# Patient Record
Sex: FEMALE | Race: WHITE | NOT HISPANIC OR LATINO | Employment: OTHER | URBAN - METROPOLITAN AREA
[De-identification: names, ages, dates, MRNs, and addresses within clinical notes are randomized per-mention and may not be internally consistent; named-entity substitution may affect disease eponyms.]

---

## 2017-01-01 ENCOUNTER — HOSPITAL ENCOUNTER (EMERGENCY)
Facility: MEDICAL CENTER | Age: 77
End: 2017-08-18
Payer: MEDICARE

## 2017-01-01 ENCOUNTER — APPOINTMENT (OUTPATIENT)
Dept: RADIOLOGY | Facility: MEDICAL CENTER | Age: 77
DRG: 441 | End: 2017-01-01
Attending: EMERGENCY MEDICINE
Payer: MEDICARE

## 2017-01-01 ENCOUNTER — APPOINTMENT (OUTPATIENT)
Dept: WOUND CARE | Facility: MEDICAL CENTER | Age: 77
End: 2017-01-01
Attending: NURSE PRACTITIONER
Payer: MEDICARE

## 2017-01-01 ENCOUNTER — APPOINTMENT (OUTPATIENT)
Dept: RADIOLOGY | Facility: MEDICAL CENTER | Age: 77
DRG: 563 | End: 2017-01-01
Attending: EMERGENCY MEDICINE
Payer: MEDICARE

## 2017-01-01 ENCOUNTER — PATIENT OUTREACH (OUTPATIENT)
Dept: HEALTH INFORMATION MANAGEMENT | Facility: OTHER | Age: 77
End: 2017-01-01

## 2017-01-01 ENCOUNTER — HOSPITAL ENCOUNTER (OUTPATIENT)
Dept: LAB | Facility: MEDICAL CENTER | Age: 77
End: 2017-04-04
Attending: INTERNAL MEDICINE
Payer: MEDICARE

## 2017-01-01 ENCOUNTER — APPOINTMENT (OUTPATIENT)
Dept: RADIOLOGY | Facility: MEDICAL CENTER | Age: 77
DRG: 441 | End: 2017-01-01
Attending: INTERNAL MEDICINE
Payer: MEDICARE

## 2017-01-01 ENCOUNTER — APPOINTMENT (OUTPATIENT)
Dept: RADIOLOGY | Facility: MEDICAL CENTER | Age: 77
DRG: 442 | End: 2017-01-01
Attending: STUDENT IN AN ORGANIZED HEALTH CARE EDUCATION/TRAINING PROGRAM
Payer: MEDICARE

## 2017-01-01 ENCOUNTER — RESOLUTE PROFESSIONAL BILLING HOSPITAL PROF FEE (OUTPATIENT)
Dept: HOSPITALIST | Facility: MEDICAL CENTER | Age: 77
End: 2017-01-01
Payer: MEDICARE

## 2017-01-01 ENCOUNTER — APPOINTMENT (OUTPATIENT)
Dept: NEPHROLOGY | Facility: MEDICAL CENTER | Age: 77
End: 2017-01-01
Payer: MEDICARE

## 2017-01-01 ENCOUNTER — HOSPITAL ENCOUNTER (OUTPATIENT)
Facility: MEDICAL CENTER | Age: 77
End: 2017-10-11
Attending: NURSE PRACTITIONER
Payer: MEDICARE

## 2017-01-01 ENCOUNTER — APPOINTMENT (OUTPATIENT)
Dept: RADIOLOGY | Facility: MEDICAL CENTER | Age: 77
DRG: 442 | End: 2017-01-01
Attending: EMERGENCY MEDICINE
Payer: MEDICARE

## 2017-01-01 ENCOUNTER — OFFICE VISIT (OUTPATIENT)
Dept: NEPHROLOGY | Facility: MEDICAL CENTER | Age: 77
End: 2017-01-01
Payer: MEDICARE

## 2017-01-01 ENCOUNTER — HOSPITAL ENCOUNTER (INPATIENT)
Facility: MEDICAL CENTER | Age: 77
LOS: 4 days | DRG: 563 | End: 2017-01-30
Attending: EMERGENCY MEDICINE | Admitting: INTERNAL MEDICINE
Payer: MEDICARE

## 2017-01-01 ENCOUNTER — APPOINTMENT (OUTPATIENT)
Dept: RADIOLOGY | Facility: MEDICAL CENTER | Age: 77
DRG: 442 | End: 2017-01-01
Payer: MEDICARE

## 2017-01-01 ENCOUNTER — APPOINTMENT (OUTPATIENT)
Dept: RADIOLOGY | Facility: MEDICAL CENTER | Age: 77
End: 2017-01-01
Attending: EMERGENCY MEDICINE
Payer: MEDICARE

## 2017-01-01 ENCOUNTER — HOSPITAL ENCOUNTER (INPATIENT)
Facility: MEDICAL CENTER | Age: 77
LOS: 3 days | DRG: 442 | End: 2017-04-03
Attending: EMERGENCY MEDICINE | Admitting: INTERNAL MEDICINE
Payer: MEDICARE

## 2017-01-01 ENCOUNTER — TELEPHONE (OUTPATIENT)
Dept: MEDICAL GROUP | Facility: CLINIC | Age: 77
End: 2017-01-01

## 2017-01-01 ENCOUNTER — APPOINTMENT (OUTPATIENT)
Dept: RADIOLOGY | Facility: MEDICAL CENTER | Age: 77
DRG: 433 | End: 2017-01-01
Attending: EMERGENCY MEDICINE
Payer: MEDICARE

## 2017-01-01 ENCOUNTER — HOSPITAL ENCOUNTER (INPATIENT)
Facility: MEDICAL CENTER | Age: 77
LOS: 3 days | DRG: 442 | End: 2017-04-14
Attending: EMERGENCY MEDICINE | Admitting: INTERNAL MEDICINE
Payer: MEDICARE

## 2017-01-01 ENCOUNTER — APPOINTMENT (OUTPATIENT)
Dept: RADIOLOGY | Facility: MEDICAL CENTER | Age: 77
DRG: 442 | End: 2017-01-01
Attending: INTERNAL MEDICINE
Payer: MEDICARE

## 2017-01-01 ENCOUNTER — HOSPITAL ENCOUNTER (OUTPATIENT)
Dept: LAB | Facility: MEDICAL CENTER | Age: 77
End: 2017-04-27
Attending: INTERNAL MEDICINE
Payer: MEDICARE

## 2017-01-01 ENCOUNTER — APPOINTMENT (OUTPATIENT)
Dept: RADIOLOGY | Facility: MEDICAL CENTER | Age: 77
DRG: 563 | End: 2017-01-01
Attending: INTERNAL MEDICINE
Payer: MEDICARE

## 2017-01-01 ENCOUNTER — HOSPITAL ENCOUNTER (OUTPATIENT)
Dept: LAB | Facility: MEDICAL CENTER | Age: 77
End: 2017-07-13
Attending: INTERNAL MEDICINE
Payer: MEDICARE

## 2017-01-01 ENCOUNTER — HOSPITAL ENCOUNTER (OUTPATIENT)
Dept: LAB | Facility: MEDICAL CENTER | Age: 77
End: 2017-11-28
Attending: INTERNAL MEDICINE
Payer: MEDICARE

## 2017-01-01 ENCOUNTER — TELEPHONE (OUTPATIENT)
Dept: NEPHROLOGY | Facility: MEDICAL CENTER | Age: 77
End: 2017-01-01

## 2017-01-01 ENCOUNTER — OFFICE VISIT (OUTPATIENT)
Dept: MEDICAL GROUP | Facility: CLINIC | Age: 77
End: 2017-01-01
Payer: MEDICARE

## 2017-01-01 ENCOUNTER — APPOINTMENT (OUTPATIENT)
Dept: RADIOLOGY | Facility: MEDICAL CENTER | Age: 77
DRG: 356 | End: 2017-01-01
Attending: EMERGENCY MEDICINE
Payer: MEDICARE

## 2017-01-01 ENCOUNTER — HOSPITAL ENCOUNTER (OUTPATIENT)
Dept: LAB | Facility: MEDICAL CENTER | Age: 77
End: 2017-04-03
Attending: INTERNAL MEDICINE
Payer: MEDICARE

## 2017-01-01 ENCOUNTER — HOSPITAL ENCOUNTER (OUTPATIENT)
Facility: MEDICAL CENTER | Age: 77
End: 2017-03-01
Attending: INTERNAL MEDICINE
Payer: MEDICARE

## 2017-01-01 ENCOUNTER — HOSPITAL ENCOUNTER (OUTPATIENT)
Facility: MEDICAL CENTER | Age: 77
End: 2017-09-13
Attending: INTERNAL MEDICINE
Payer: MEDICARE

## 2017-01-01 ENCOUNTER — HOSPITAL ENCOUNTER (INPATIENT)
Facility: MEDICAL CENTER | Age: 77
LOS: 2 days | DRG: 442 | End: 2017-12-22
Attending: EMERGENCY MEDICINE | Admitting: HOSPITALIST
Payer: MEDICARE

## 2017-01-01 ENCOUNTER — HOSPITAL ENCOUNTER (INPATIENT)
Facility: MEDICAL CENTER | Age: 77
LOS: 6 days | DRG: 433 | End: 2017-05-30
Attending: EMERGENCY MEDICINE | Admitting: HOSPITALIST
Payer: MEDICARE

## 2017-01-01 ENCOUNTER — APPOINTMENT (OUTPATIENT)
Dept: RADIOLOGY | Facility: MEDICAL CENTER | Age: 77
DRG: 441 | End: 2017-01-01
Attending: HOSPITALIST
Payer: MEDICARE

## 2017-01-01 ENCOUNTER — HOSPITAL ENCOUNTER (OUTPATIENT)
Dept: LAB | Facility: MEDICAL CENTER | Age: 77
End: 2017-09-12
Attending: INTERNAL MEDICINE
Payer: MEDICARE

## 2017-01-01 ENCOUNTER — HOSPITAL ENCOUNTER (OUTPATIENT)
Dept: LAB | Facility: MEDICAL CENTER | Age: 77
End: 2017-05-12
Attending: INTERNAL MEDICINE
Payer: MEDICARE

## 2017-01-01 ENCOUNTER — HOSPITAL ENCOUNTER (OUTPATIENT)
Facility: MEDICAL CENTER | Age: 77
End: 2017-10-12
Attending: INTERNAL MEDICINE
Payer: MEDICARE

## 2017-01-01 ENCOUNTER — APPOINTMENT (OUTPATIENT)
Dept: RADIOLOGY | Facility: MEDICAL CENTER | Age: 77
DRG: 441 | End: 2017-01-01
Attending: PSYCHIATRY & NEUROLOGY
Payer: MEDICARE

## 2017-01-01 ENCOUNTER — RESOLUTE PROFESSIONAL BILLING HOSPITAL PROF FEE (OUTPATIENT)
Dept: MEDSURG UNIT | Facility: MEDICAL CENTER | Age: 77
End: 2017-01-01
Payer: MEDICARE

## 2017-01-01 ENCOUNTER — HOSPITAL ENCOUNTER (OUTPATIENT)
Dept: LAB | Facility: MEDICAL CENTER | Age: 77
End: 2017-10-16
Attending: INTERNAL MEDICINE
Payer: MEDICARE

## 2017-01-01 ENCOUNTER — HOSPITAL ENCOUNTER (INPATIENT)
Facility: MEDICAL CENTER | Age: 77
LOS: 10 days | DRG: 442 | End: 2017-09-03
Attending: EMERGENCY MEDICINE | Admitting: HOSPITALIST
Payer: MEDICARE

## 2017-01-01 ENCOUNTER — NON-PROVIDER VISIT (OUTPATIENT)
Dept: WOUND CARE | Facility: MEDICAL CENTER | Age: 77
End: 2017-01-01
Attending: FAMILY MEDICINE
Payer: MEDICARE

## 2017-01-01 ENCOUNTER — HOSPITAL ENCOUNTER (EMERGENCY)
Facility: MEDICAL CENTER | Age: 77
End: 2017-11-11
Attending: EMERGENCY MEDICINE
Payer: MEDICARE

## 2017-01-01 ENCOUNTER — HOSPITAL ENCOUNTER (INPATIENT)
Facility: MEDICAL CENTER | Age: 77
LOS: 4 days | DRG: 356 | End: 2017-09-29
Attending: EMERGENCY MEDICINE | Admitting: HOSPITALIST
Payer: MEDICARE

## 2017-01-01 ENCOUNTER — HOSPITAL ENCOUNTER (OUTPATIENT)
Dept: LAB | Facility: MEDICAL CENTER | Age: 77
End: 2017-07-06
Attending: FAMILY MEDICINE
Payer: MEDICARE

## 2017-01-01 ENCOUNTER — OFFICE VISIT (OUTPATIENT)
Dept: URGENT CARE | Facility: PHYSICIAN GROUP | Age: 77
End: 2017-01-01
Payer: MEDICARE

## 2017-01-01 ENCOUNTER — HOSPITAL ENCOUNTER (INPATIENT)
Facility: MEDICAL CENTER | Age: 77
LOS: 6 days | DRG: 441 | End: 2017-08-12
Attending: EMERGENCY MEDICINE | Admitting: INTERNAL MEDICINE
Payer: MEDICARE

## 2017-01-01 ENCOUNTER — HOSPITAL ENCOUNTER (OUTPATIENT)
Facility: MEDICAL CENTER | Age: 77
End: 2017-06-14
Attending: INTERNAL MEDICINE
Payer: MEDICARE

## 2017-01-01 ENCOUNTER — HOSPITAL ENCOUNTER (OUTPATIENT)
Dept: LAB | Facility: MEDICAL CENTER | Age: 77
End: 2017-03-10
Attending: FAMILY MEDICINE
Payer: MEDICARE

## 2017-01-01 ENCOUNTER — HOSPITAL ENCOUNTER (OUTPATIENT)
Dept: LAB | Facility: MEDICAL CENTER | Age: 77
End: 2017-06-21
Attending: FAMILY MEDICINE
Payer: MEDICARE

## 2017-01-01 ENCOUNTER — APPOINTMENT (OUTPATIENT)
Dept: RADIOLOGY | Facility: IMAGING CENTER | Age: 77
End: 2017-01-01
Attending: PHYSICIAN ASSISTANT
Payer: MEDICARE

## 2017-01-01 ENCOUNTER — HOSPITAL ENCOUNTER (INPATIENT)
Facility: MEDICAL CENTER | Age: 77
LOS: 3 days | DRG: 689 | End: 2017-07-02
Attending: EMERGENCY MEDICINE | Admitting: HOSPITALIST
Payer: MEDICARE

## 2017-01-01 ENCOUNTER — HOSPITAL ENCOUNTER (OUTPATIENT)
Dept: RADIOLOGY | Facility: MEDICAL CENTER | Age: 77
End: 2017-05-19
Attending: INTERNAL MEDICINE
Payer: MEDICARE

## 2017-01-01 ENCOUNTER — HOSPITAL ENCOUNTER (INPATIENT)
Facility: MEDICAL CENTER | Age: 77
LOS: 2 days | DRG: 441 | End: 2017-10-04
Attending: EMERGENCY MEDICINE | Admitting: HOSPITALIST
Payer: MEDICARE

## 2017-01-01 ENCOUNTER — HOSPITAL ENCOUNTER (OUTPATIENT)
Dept: LAB | Facility: MEDICAL CENTER | Age: 77
End: 2017-07-05
Attending: INTERNAL MEDICINE
Payer: MEDICARE

## 2017-01-01 VITALS
DIASTOLIC BLOOD PRESSURE: 43 MMHG | HEIGHT: 60 IN | HEART RATE: 76 BPM | SYSTOLIC BLOOD PRESSURE: 99 MMHG | OXYGEN SATURATION: 93 % | RESPIRATION RATE: 18 BRPM | TEMPERATURE: 97.3 F | BODY MASS INDEX: 29.22 KG/M2 | WEIGHT: 148.81 LBS

## 2017-01-01 VITALS
BODY MASS INDEX: 31.56 KG/M2 | OXYGEN SATURATION: 97 % | SYSTOLIC BLOOD PRESSURE: 97 MMHG | HEART RATE: 60 BPM | RESPIRATION RATE: 16 BRPM | WEIGHT: 161.6 LBS | TEMPERATURE: 97.3 F | DIASTOLIC BLOOD PRESSURE: 43 MMHG

## 2017-01-01 VITALS
DIASTOLIC BLOOD PRESSURE: 36 MMHG | SYSTOLIC BLOOD PRESSURE: 91 MMHG | RESPIRATION RATE: 16 BRPM | BODY MASS INDEX: 30.25 KG/M2 | HEART RATE: 82 BPM | OXYGEN SATURATION: 94 % | HEIGHT: 60 IN | WEIGHT: 154.1 LBS | TEMPERATURE: 97.9 F

## 2017-01-01 VITALS
HEIGHT: 60 IN | RESPIRATION RATE: 20 BRPM | DIASTOLIC BLOOD PRESSURE: 56 MMHG | BODY MASS INDEX: 29.78 KG/M2 | SYSTOLIC BLOOD PRESSURE: 105 MMHG | OXYGEN SATURATION: 97 % | TEMPERATURE: 96.6 F | WEIGHT: 151.68 LBS | HEART RATE: 60 BPM

## 2017-01-01 VITALS
DIASTOLIC BLOOD PRESSURE: 50 MMHG | SYSTOLIC BLOOD PRESSURE: 90 MMHG | RESPIRATION RATE: 16 BRPM | WEIGHT: 148 LBS | BODY MASS INDEX: 29.06 KG/M2 | OXYGEN SATURATION: 100 % | TEMPERATURE: 97.1 F | HEART RATE: 84 BPM | HEIGHT: 60 IN

## 2017-01-01 VITALS
OXYGEN SATURATION: 100 % | WEIGHT: 161.82 LBS | BODY MASS INDEX: 31.77 KG/M2 | HEART RATE: 90 BPM | RESPIRATION RATE: 14 BRPM | HEIGHT: 60 IN | SYSTOLIC BLOOD PRESSURE: 114 MMHG | DIASTOLIC BLOOD PRESSURE: 43 MMHG | TEMPERATURE: 97.9 F

## 2017-01-01 VITALS
WEIGHT: 152 LBS | HEART RATE: 82 BPM | TEMPERATURE: 97.7 F | DIASTOLIC BLOOD PRESSURE: 58 MMHG | RESPIRATION RATE: 16 BRPM | OXYGEN SATURATION: 97 % | BODY MASS INDEX: 29.69 KG/M2 | SYSTOLIC BLOOD PRESSURE: 120 MMHG

## 2017-01-01 VITALS
TEMPERATURE: 98.1 F | HEIGHT: 60 IN | HEART RATE: 90 BPM | SYSTOLIC BLOOD PRESSURE: 107 MMHG | DIASTOLIC BLOOD PRESSURE: 60 MMHG | WEIGHT: 165.57 LBS | RESPIRATION RATE: 15 BRPM | OXYGEN SATURATION: 98 % | BODY MASS INDEX: 32.51 KG/M2

## 2017-01-01 VITALS
SYSTOLIC BLOOD PRESSURE: 101 MMHG | DIASTOLIC BLOOD PRESSURE: 50 MMHG | WEIGHT: 155.42 LBS | HEIGHT: 60 IN | RESPIRATION RATE: 16 BRPM | TEMPERATURE: 97.9 F | OXYGEN SATURATION: 99 % | BODY MASS INDEX: 30.51 KG/M2 | HEART RATE: 79 BPM

## 2017-01-01 VITALS
BODY MASS INDEX: 31.22 KG/M2 | HEIGHT: 60 IN | DIASTOLIC BLOOD PRESSURE: 74 MMHG | TEMPERATURE: 97.8 F | WEIGHT: 159 LBS | RESPIRATION RATE: 16 BRPM | HEART RATE: 90 BPM | SYSTOLIC BLOOD PRESSURE: 130 MMHG

## 2017-01-01 VITALS
OXYGEN SATURATION: 99 % | BODY MASS INDEX: 32.38 KG/M2 | DIASTOLIC BLOOD PRESSURE: 46 MMHG | WEIGHT: 165.79 LBS | SYSTOLIC BLOOD PRESSURE: 101 MMHG | TEMPERATURE: 98 F | RESPIRATION RATE: 18 BRPM | HEART RATE: 101 BPM

## 2017-01-01 VITALS
DIASTOLIC BLOOD PRESSURE: 78 MMHG | WEIGHT: 149 LBS | HEIGHT: 60 IN | HEART RATE: 72 BPM | BODY MASS INDEX: 29.25 KG/M2 | TEMPERATURE: 97.9 F | SYSTOLIC BLOOD PRESSURE: 128 MMHG | RESPIRATION RATE: 14 BRPM

## 2017-01-01 VITALS
BODY MASS INDEX: 32.55 KG/M2 | HEIGHT: 60 IN | WEIGHT: 165.79 LBS | HEART RATE: 66 BPM | DIASTOLIC BLOOD PRESSURE: 42 MMHG | TEMPERATURE: 98.9 F | RESPIRATION RATE: 18 BRPM | SYSTOLIC BLOOD PRESSURE: 92 MMHG | OXYGEN SATURATION: 99 %

## 2017-01-01 VITALS
BODY MASS INDEX: 29.64 KG/M2 | HEART RATE: 89 BPM | HEIGHT: 60 IN | DIASTOLIC BLOOD PRESSURE: 78 MMHG | WEIGHT: 151 LBS | RESPIRATION RATE: 16 BRPM | TEMPERATURE: 97.8 F | SYSTOLIC BLOOD PRESSURE: 130 MMHG

## 2017-01-01 VITALS
HEIGHT: 60 IN | WEIGHT: 155.42 LBS | TEMPERATURE: 97.7 F | DIASTOLIC BLOOD PRESSURE: 66 MMHG | SYSTOLIC BLOOD PRESSURE: 127 MMHG | BODY MASS INDEX: 30.51 KG/M2 | RESPIRATION RATE: 20 BRPM | HEART RATE: 91 BPM | OXYGEN SATURATION: 99 %

## 2017-01-01 VITALS
DIASTOLIC BLOOD PRESSURE: 47 MMHG | RESPIRATION RATE: 17 BRPM | SYSTOLIC BLOOD PRESSURE: 95 MMHG | TEMPERATURE: 97.2 F | HEIGHT: 60 IN | OXYGEN SATURATION: 99 % | BODY MASS INDEX: 31.55 KG/M2 | WEIGHT: 160.72 LBS | HEART RATE: 88 BPM

## 2017-01-01 VITALS
SYSTOLIC BLOOD PRESSURE: 120 MMHG | WEIGHT: 183 LBS | HEART RATE: 69 BPM | TEMPERATURE: 97.8 F | OXYGEN SATURATION: 94 % | BODY MASS INDEX: 35.93 KG/M2 | RESPIRATION RATE: 17 BRPM | DIASTOLIC BLOOD PRESSURE: 75 MMHG | HEIGHT: 60 IN

## 2017-01-01 VITALS
RESPIRATION RATE: 16 BRPM | HEART RATE: 80 BPM | TEMPERATURE: 98 F | HEIGHT: 60 IN | WEIGHT: 153 LBS | BODY MASS INDEX: 30.04 KG/M2 | DIASTOLIC BLOOD PRESSURE: 76 MMHG | SYSTOLIC BLOOD PRESSURE: 126 MMHG

## 2017-01-01 VITALS
SYSTOLIC BLOOD PRESSURE: 122 MMHG | TEMPERATURE: 98.4 F | DIASTOLIC BLOOD PRESSURE: 68 MMHG | HEIGHT: 60 IN | RESPIRATION RATE: 22 BRPM | HEART RATE: 86 BPM | WEIGHT: 164 LBS | BODY MASS INDEX: 32.2 KG/M2 | OXYGEN SATURATION: 99 %

## 2017-01-01 DIAGNOSIS — E83.42 HYPOMAGNESEMIA: ICD-10-CM

## 2017-01-01 DIAGNOSIS — R40.4 ALTERED LEVEL OF CONSCIOUSNESS: ICD-10-CM

## 2017-01-01 DIAGNOSIS — N18.9 CRF (CHRONIC RENAL FAILURE), UNSPECIFIED STAGE: ICD-10-CM

## 2017-01-01 DIAGNOSIS — S50.02XA CONTUSION OF LEFT ELBOW, INITIAL ENCOUNTER: ICD-10-CM

## 2017-01-01 DIAGNOSIS — G93.40 ACUTE ENCEPHALOPATHY: ICD-10-CM
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
## 2017-01-01 DIAGNOSIS — R53.83 FATIGUE, UNSPECIFIED TYPE: ICD-10-CM

## 2017-01-01 DIAGNOSIS — N18.30 CHRONIC KIDNEY DISEASE, STAGE III (MODERATE) (HCC): ICD-10-CM

## 2017-01-01 DIAGNOSIS — A49.1 VRE (VANCOMYCIN-RESISTANT ENTEROCOCCI): ICD-10-CM

## 2017-01-01 DIAGNOSIS — K74.60 CIRRHOSIS OF LIVER WITHOUT ASCITES, UNSPECIFIED HEPATIC CIRRHOSIS TYPE (HCC): ICD-10-CM

## 2017-01-01 DIAGNOSIS — R41.0 CONFUSION: ICD-10-CM

## 2017-01-01 DIAGNOSIS — K76.82 HEPATIC ENCEPHALOPATHY (HCC): ICD-10-CM

## 2017-01-01 DIAGNOSIS — E87.6 HYPOKALEMIA: ICD-10-CM

## 2017-01-01 DIAGNOSIS — R41.0 DISORIENTATION: ICD-10-CM

## 2017-01-01 DIAGNOSIS — R58 BLEEDING: ICD-10-CM

## 2017-01-01 DIAGNOSIS — E87.20 METABOLIC ACIDOSIS, NAG, BICARBONATE LOSSES: ICD-10-CM

## 2017-01-01 DIAGNOSIS — N18.4 CHRONIC KIDNEY DISEASE, STAGE IV (SEVERE) (HCC): ICD-10-CM

## 2017-01-01 DIAGNOSIS — K75.81 LIVER CIRRHOSIS SECONDARY TO NASH (HCC): ICD-10-CM

## 2017-01-01 DIAGNOSIS — R39.9 UTI SYMPTOMS: ICD-10-CM

## 2017-01-01 DIAGNOSIS — Z16.21 VRE (VANCOMYCIN-RESISTANT ENTEROCOCCI): ICD-10-CM

## 2017-01-01 DIAGNOSIS — G47.33 OSA ON CPAP: ICD-10-CM

## 2017-01-01 DIAGNOSIS — N39.0 UTI (URINARY TRACT INFECTION) DUE TO ENTEROCOCCUS: ICD-10-CM

## 2017-01-01 DIAGNOSIS — S80.01XA CONTUSION OF RIGHT KNEE, INITIAL ENCOUNTER: ICD-10-CM

## 2017-01-01 DIAGNOSIS — S52.125A CLOSED NONDISPLACED FRACTURE OF HEAD OF LEFT RADIUS, INITIAL ENCOUNTER: ICD-10-CM

## 2017-01-01 DIAGNOSIS — Z09 HOSPITAL DISCHARGE FOLLOW-UP: ICD-10-CM

## 2017-01-01 DIAGNOSIS — E87.20 METABOLIC ACIDOSIS: ICD-10-CM

## 2017-01-01 DIAGNOSIS — N30.00 ACUTE CYSTITIS WITHOUT HEMATURIA: ICD-10-CM

## 2017-01-01 DIAGNOSIS — K76.82 ENCEPHALOPATHY, HEPATIC (HCC): ICD-10-CM

## 2017-01-01 DIAGNOSIS — Z87.19 HISTORY OF CIRRHOSIS OF LIVER: ICD-10-CM

## 2017-01-01 DIAGNOSIS — N17.9 ACUTE RENAL FAILURE, UNSPECIFIED ACUTE RENAL FAILURE TYPE (HCC): ICD-10-CM

## 2017-01-01 DIAGNOSIS — E87.29 HYPERCHLOREMIC METABOLIC ACIDOSIS: ICD-10-CM

## 2017-01-01 DIAGNOSIS — S42.352A COMMINUTED FRACTURE OF HUMERUS, LEFT, CLOSED, INITIAL ENCOUNTER: ICD-10-CM

## 2017-01-01 DIAGNOSIS — K92.2 ACUTE UPPER GI BLEED: ICD-10-CM

## 2017-01-01 DIAGNOSIS — S09.90XA HEAD INJURY, INITIAL ENCOUNTER: ICD-10-CM

## 2017-01-01 DIAGNOSIS — R41.0 DELIRIUM: ICD-10-CM

## 2017-01-01 DIAGNOSIS — M79.602 PAIN OF LEFT UPPER EXTREMITY: ICD-10-CM

## 2017-01-01 DIAGNOSIS — R11.0 NAUSEA: ICD-10-CM

## 2017-01-01 DIAGNOSIS — K74.60 LIVER CIRRHOSIS SECONDARY TO NASH (HCC): ICD-10-CM

## 2017-01-01 DIAGNOSIS — D64.9 ANEMIA, UNSPECIFIED TYPE: ICD-10-CM

## 2017-01-01 DIAGNOSIS — S80.12XA CONTUSION OF LEFT LOWER LEG, INITIAL ENCOUNTER: ICD-10-CM

## 2017-01-01 DIAGNOSIS — D61.818 PANCYTOPENIA (HCC): ICD-10-CM

## 2017-01-01 DIAGNOSIS — S09.90XA CLOSED HEAD INJURY, INITIAL ENCOUNTER: ICD-10-CM

## 2017-01-01 DIAGNOSIS — K21.9 GASTROESOPHAGEAL REFLUX DISEASE, ESOPHAGITIS PRESENCE NOT SPECIFIED: ICD-10-CM

## 2017-01-01 DIAGNOSIS — K50.118 CROHN'S DISEASE OF LARGE INTESTINE WITH OTHER COMPLICATION (HCC): ICD-10-CM

## 2017-01-01 DIAGNOSIS — E83.51 HYPOCALCEMIA: ICD-10-CM

## 2017-01-01 DIAGNOSIS — S01.81XA LACERATION OF OTHER PART OF HEAD WITHOUT FOREIGN BODY, INITIAL ENCOUNTER: ICD-10-CM

## 2017-01-01 DIAGNOSIS — E87.20 METABOLIC ACIDEMIA: ICD-10-CM

## 2017-01-01 DIAGNOSIS — Z91.81 AT RISK FOR FALLS: ICD-10-CM

## 2017-01-01 DIAGNOSIS — S00.83XA CONTUSION OF FACE, INITIAL ENCOUNTER: ICD-10-CM

## 2017-01-01 DIAGNOSIS — D62 ACUTE BLOOD LOSS ANEMIA: ICD-10-CM

## 2017-01-01 DIAGNOSIS — R53.1 WEAKNESS: ICD-10-CM

## 2017-01-01 DIAGNOSIS — E87.20 ACIDOSIS: ICD-10-CM

## 2017-01-01 DIAGNOSIS — R41.82 ALTERED MENTAL STATUS, UNSPECIFIED ALTERED MENTAL STATUS TYPE: ICD-10-CM

## 2017-01-01 DIAGNOSIS — B95.2 UTI (URINARY TRACT INFECTION) DUE TO ENTEROCOCCUS: ICD-10-CM

## 2017-01-01 DIAGNOSIS — D69.6 THROMBOCYTOPENIA (HCC): ICD-10-CM

## 2017-01-01 DIAGNOSIS — K74.69 OTHER CIRRHOSIS OF LIVER (HCC): ICD-10-CM

## 2017-01-01 DIAGNOSIS — R16.1 SPLENOMEGALY: ICD-10-CM

## 2017-01-01 DIAGNOSIS — E55.9 VITAMIN D DEFICIENCY: ICD-10-CM

## 2017-01-01 DIAGNOSIS — D64.9 ANEMIA, UNSPECIFIED: ICD-10-CM

## 2017-01-01 DIAGNOSIS — K92.1 MELENA: ICD-10-CM

## 2017-01-01 LAB
25(OH)D3 SERPL-MCNC: 12 NG/ML (ref 30–100)
25(OH)D3 SERPL-MCNC: 12 NG/ML (ref 30–100)
25(OH)D3 SERPL-MCNC: 22 NG/ML (ref 30–100)
25(OH)D3 SERPL-MCNC: 25 NG/ML (ref 30–100)
25(OH)D3 SERPL-MCNC: 25 NG/ML (ref 30–100)
A1AT SERPL-MCNC: 136 MG/DL (ref 90–200)
A1AT SERPL-MCNC: 92 MG/DL (ref 90–200)
ABO GROUP BLD: NORMAL
AFP-TM SERPL-MCNC: 2 NG/ML (ref 0–9)
AFP-TM SERPL-MCNC: 2 NG/ML (ref 0–9)
ALBUMIN SERPL BCP-MCNC: 2.1 G/DL (ref 3.2–4.9)
ALBUMIN SERPL BCP-MCNC: 2.1 G/DL (ref 3.2–4.9)
ALBUMIN SERPL BCP-MCNC: 2.2 G/DL (ref 3.2–4.9)
ALBUMIN SERPL BCP-MCNC: 2.2 G/DL (ref 3.2–4.9)
ALBUMIN SERPL BCP-MCNC: 2.3 G/DL (ref 3.2–4.9)
ALBUMIN SERPL BCP-MCNC: 2.4 G/DL (ref 3.2–4.9)
ALBUMIN SERPL BCP-MCNC: 2.5 G/DL (ref 3.2–4.9)
ALBUMIN SERPL BCP-MCNC: 2.5 G/DL (ref 3.2–4.9)
ALBUMIN SERPL BCP-MCNC: 2.6 G/DL (ref 3.2–4.9)
ALBUMIN SERPL BCP-MCNC: 2.6 G/DL (ref 3.2–4.9)
ALBUMIN SERPL BCP-MCNC: 2.7 G/DL (ref 3.2–4.9)
ALBUMIN SERPL BCP-MCNC: 2.8 G/DL (ref 3.2–4.9)
ALBUMIN SERPL BCP-MCNC: 2.8 G/DL (ref 3.2–4.9)
ALBUMIN SERPL BCP-MCNC: 2.9 G/DL (ref 3.2–4.9)
ALBUMIN SERPL BCP-MCNC: 3 G/DL (ref 3.2–4.9)
ALBUMIN SERPL BCP-MCNC: 3 G/DL (ref 3.2–4.9)
ALBUMIN SERPL BCP-MCNC: 3.1 G/DL (ref 3.2–4.9)
ALBUMIN SERPL BCP-MCNC: 3.2 G/DL (ref 3.2–4.9)
ALBUMIN SERPL BCP-MCNC: 3.2 G/DL (ref 3.2–4.9)
ALBUMIN SERPL BCP-MCNC: 3.3 G/DL (ref 3.2–4.9)
ALBUMIN SERPL BCP-MCNC: 3.5 G/DL (ref 3.2–4.9)
ALBUMIN SERPL BCP-MCNC: 3.5 G/DL (ref 3.2–4.9)
ALBUMIN SERPL BCP-MCNC: 3.6 G/DL (ref 3.2–4.9)
ALBUMIN SERPL-MCNC: 3.58 G/DL (ref 3.75–5.01)
ALBUMIN/GLOB SERPL: 0.8 G/DL
ALBUMIN/GLOB SERPL: 1 G/DL
ALBUMIN/GLOB SERPL: 1.1 G/DL
ALBUMIN/GLOB SERPL: 1.2 G/DL
ALBUMIN/GLOB SERPL: 1.3 G/DL
ALBUMIN/GLOB SERPL: 1.4 G/DL
ALP SERPL-CCNC: 201 U/L (ref 30–99)
ALP SERPL-CCNC: 203 U/L (ref 30–99)
ALP SERPL-CCNC: 207 U/L (ref 30–99)
ALP SERPL-CCNC: 220 U/L (ref 30–99)
ALP SERPL-CCNC: 223 U/L (ref 30–99)
ALP SERPL-CCNC: 224 U/L (ref 30–99)
ALP SERPL-CCNC: 226 U/L (ref 30–99)
ALP SERPL-CCNC: 237 U/L (ref 30–99)
ALP SERPL-CCNC: 241 U/L (ref 30–99)
ALP SERPL-CCNC: 241 U/L (ref 30–99)
ALP SERPL-CCNC: 245 U/L (ref 30–99)
ALP SERPL-CCNC: 249 U/L (ref 30–99)
ALP SERPL-CCNC: 250 U/L (ref 30–99)
ALP SERPL-CCNC: 251 U/L (ref 30–99)
ALP SERPL-CCNC: 253 U/L (ref 30–99)
ALP SERPL-CCNC: 256 U/L (ref 30–99)
ALP SERPL-CCNC: 261 U/L (ref 30–99)
ALP SERPL-CCNC: 262 U/L (ref 30–99)
ALP SERPL-CCNC: 267 U/L (ref 30–99)
ALP SERPL-CCNC: 267 U/L (ref 30–99)
ALP SERPL-CCNC: 270 U/L (ref 30–99)
ALP SERPL-CCNC: 270 U/L (ref 30–99)
ALP SERPL-CCNC: 273 U/L (ref 30–99)
ALP SERPL-CCNC: 276 U/L (ref 30–99)
ALP SERPL-CCNC: 277 U/L (ref 30–99)
ALP SERPL-CCNC: 278 U/L (ref 30–99)
ALP SERPL-CCNC: 281 U/L (ref 30–99)
ALP SERPL-CCNC: 298 U/L (ref 30–99)
ALP SERPL-CCNC: 317 U/L (ref 30–99)
ALP SERPL-CCNC: 325 U/L (ref 30–99)
ALP SERPL-CCNC: 326 U/L (ref 30–99)
ALP SERPL-CCNC: 330 U/L (ref 30–99)
ALP SERPL-CCNC: 334 U/L (ref 30–99)
ALP SERPL-CCNC: 335 U/L (ref 30–99)
ALP SERPL-CCNC: 353 U/L (ref 30–99)
ALP SERPL-CCNC: 399 U/L (ref 30–99)
ALPHA1 GLOB SERPL ELPH-MCNC: 0.26 G/DL (ref 0.19–0.46)
ALPHA2 GLOB SERPL ELPH-MCNC: 0.61 G/DL (ref 0.48–1.05)
ALT SERPL-CCNC: 20 U/L (ref 2–50)
ALT SERPL-CCNC: 21 U/L (ref 2–50)
ALT SERPL-CCNC: 22 U/L (ref 2–50)
ALT SERPL-CCNC: 22 U/L (ref 2–50)
ALT SERPL-CCNC: 24 U/L (ref 2–50)
ALT SERPL-CCNC: 25 U/L (ref 2–50)
ALT SERPL-CCNC: 26 U/L (ref 2–50)
ALT SERPL-CCNC: 27 U/L (ref 2–50)
ALT SERPL-CCNC: 28 U/L (ref 2–50)
ALT SERPL-CCNC: 29 U/L (ref 2–50)
ALT SERPL-CCNC: 29 U/L (ref 2–50)
ALT SERPL-CCNC: 30 U/L (ref 2–50)
ALT SERPL-CCNC: 30 U/L (ref 2–50)
ALT SERPL-CCNC: 31 U/L (ref 2–50)
ALT SERPL-CCNC: 33 U/L (ref 2–50)
ALT SERPL-CCNC: 35 U/L (ref 2–50)
AMMONIA PLAS-SCNC: 108 UMOL/L (ref 11–45)
AMMONIA PLAS-SCNC: 109 UMOL/L (ref 11–45)
AMMONIA PLAS-SCNC: 122 UMOL/L (ref 11–45)
AMMONIA PLAS-SCNC: 126 UMOL/L (ref 11–45)
AMMONIA PLAS-SCNC: 177 UMOL/L (ref 11–45)
AMMONIA PLAS-SCNC: 182 UMOL/L (ref 11–45)
AMMONIA PLAS-SCNC: 190 UMOL/L (ref 11–45)
AMMONIA PLAS-SCNC: 214 UMOL/L (ref 11–45)
AMMONIA PLAS-SCNC: 331 UMOL/L (ref 11–45)
AMMONIA PLAS-SCNC: 45 UMOL/L (ref 11–45)
AMMONIA PLAS-SCNC: 49 UMOL/L (ref 11–45)
AMMONIA PLAS-SCNC: 50 UMOL/L (ref 11–45)
AMMONIA PLAS-SCNC: 51 UMOL/L (ref 11–45)
AMMONIA PLAS-SCNC: 51 UMOL/L (ref 11–45)
AMMONIA PLAS-SCNC: 59 UMOL/L (ref 11–45)
AMMONIA PLAS-SCNC: 60 UMOL/L (ref 11–45)
AMMONIA PLAS-SCNC: 62 UMOL/L (ref 11–45)
AMMONIA PLAS-SCNC: 64 UMOL/L (ref 11–45)
AMMONIA PLAS-SCNC: 71 UMOL/L (ref 11–45)
AMMONIA PLAS-SCNC: 75 UMOL/L (ref 11–45)
AMMONIA PLAS-SCNC: 76 UMOL/L (ref 11–45)
AMMONIA PLAS-SCNC: 78 UMOL/L (ref 11–45)
AMMONIA PLAS-SCNC: 83 UMOL/L (ref 11–45)
AMMONIA PLAS-SCNC: 83 UMOL/L (ref 11–45)
AMMONIA PLAS-SCNC: 85 UMOL/L (ref 11–45)
AMMONIA PLAS-SCNC: 87 UMOL/L (ref 11–45)
AMMONIA PLAS-SCNC: 95 UMOL/L (ref 11–45)
AMMONIA PLAS-SCNC: 96 UMOL/L (ref 11–45)
AMMONIA PLAS-SCNC: 96 UMOL/L (ref 11–45)
AMORPH CRY #/AREA URNS HPF: PRESENT /HPF
AMPHET UR QL SCN: NEGATIVE
AMPHET UR QL SCN: NEGATIVE
ANION GAP SERPL CALC-SCNC: 10 MMOL/L (ref 0–11.9)
ANION GAP SERPL CALC-SCNC: 11 MMOL/L (ref 0–11.9)
ANION GAP SERPL CALC-SCNC: 11 MMOL/L (ref 0–11.9)
ANION GAP SERPL CALC-SCNC: 12 MMOL/L (ref 0–11.9)
ANION GAP SERPL CALC-SCNC: 13 MMOL/L (ref 0–11.9)
ANION GAP SERPL CALC-SCNC: 14 MMOL/L (ref 0–11.9)
ANION GAP SERPL CALC-SCNC: 4 MMOL/L (ref 0–11.9)
ANION GAP SERPL CALC-SCNC: 5 MMOL/L (ref 0–11.9)
ANION GAP SERPL CALC-SCNC: 6 MMOL/L (ref 0–11.9)
ANION GAP SERPL CALC-SCNC: 7 MMOL/L (ref 0–11.9)
ANION GAP SERPL CALC-SCNC: 8 MMOL/L (ref 0–11.9)
ANION GAP SERPL CALC-SCNC: 9 MMOL/L (ref 0–11.9)
ANISOCYTOSIS BLD QL SMEAR: ABNORMAL
APAP SERPL-MCNC: 15 UG/ML (ref 10–30)
APPEARANCE UR: ABNORMAL
APPEARANCE UR: CLEAR
APTT PPP: 26.4 SEC (ref 24.7–36)
APTT PPP: 31.4 SEC (ref 24.7–36)
APTT PPP: 32.5 SEC (ref 24.7–36)
APTT PPP: 33.3 SEC (ref 24.7–36)
APTT PPP: 33.5 SEC (ref 24.7–36)
APTT PPP: 33.8 SEC (ref 24.7–36)
APTT PPP: 35.1 SEC (ref 24.7–36)
APTT PPP: 37.1 SEC (ref 24.7–36)
APTT PPP: 37.8 SEC (ref 24.7–36)
AST SERPL-CCNC: 30 U/L (ref 12–45)
AST SERPL-CCNC: 32 U/L (ref 12–45)
AST SERPL-CCNC: 36 U/L (ref 12–45)
AST SERPL-CCNC: 37 U/L (ref 12–45)
AST SERPL-CCNC: 37 U/L (ref 12–45)
AST SERPL-CCNC: 38 U/L (ref 12–45)
AST SERPL-CCNC: 38 U/L (ref 12–45)
AST SERPL-CCNC: 39 U/L (ref 12–45)
AST SERPL-CCNC: 39 U/L (ref 12–45)
AST SERPL-CCNC: 40 U/L (ref 12–45)
AST SERPL-CCNC: 40 U/L (ref 12–45)
AST SERPL-CCNC: 41 U/L (ref 12–45)
AST SERPL-CCNC: 44 U/L (ref 12–45)
AST SERPL-CCNC: 46 U/L (ref 12–45)
AST SERPL-CCNC: 47 U/L (ref 12–45)
AST SERPL-CCNC: 48 U/L (ref 12–45)
AST SERPL-CCNC: 49 U/L (ref 12–45)
AST SERPL-CCNC: 50 U/L (ref 12–45)
AST SERPL-CCNC: 51 U/L (ref 12–45)
AST SERPL-CCNC: 53 U/L (ref 12–45)
AST SERPL-CCNC: 53 U/L (ref 12–45)
AST SERPL-CCNC: 54 U/L (ref 12–45)
AST SERPL-CCNC: 54 U/L (ref 12–45)
AST SERPL-CCNC: 55 U/L (ref 12–45)
AST SERPL-CCNC: 55 U/L (ref 12–45)
AST SERPL-CCNC: 56 U/L (ref 12–45)
AST SERPL-CCNC: 57 U/L (ref 12–45)
AST SERPL-CCNC: 57 U/L (ref 12–45)
AST SERPL-CCNC: 61 U/L (ref 12–45)
AST SERPL-CCNC: 66 U/L (ref 12–45)
B-GLOBULIN SERPL ELPH-MCNC: 0.88 G/DL (ref 0.48–1.1)
BACTERIA #/AREA URNS HPF: ABNORMAL /HPF
BACTERIA #/AREA URNS HPF: NEGATIVE /HPF
BACTERIA #/AREA URNS HPF: NEGATIVE /HPF
BACTERIA BLD CULT: ABNORMAL
BACTERIA BLD CULT: ABNORMAL
BACTERIA BLD CULT: NORMAL
BACTERIA UR CULT: ABNORMAL
BACTERIA UR CULT: NORMAL
BARBITURATES UR QL SCN: NEGATIVE
BARBITURATES UR QL SCN: NEGATIVE
BARCODED ABORH UBTYP: 8400
BARCODED PRD CODE UBPRD: NORMAL
BARCODED UNIT NUM UBUNT: NORMAL
BASE EXCESS BLDA CALC-SCNC: -10 MMOL/L (ref -4–3)
BASE EXCESS BLDA CALC-SCNC: -12 MMOL/L (ref -4–3)
BASE EXCESS BLDA CALC-SCNC: -14 MMOL/L (ref -4–3)
BASE EXCESS BLDA CALC-SCNC: -18 MMOL/L (ref -4–3)
BASE EXCESS BLDA CALC-SCNC: -8 MMOL/L (ref -4–3)
BASE EXCESS BLDA CALC-SCNC: -8 MMOL/L (ref -4–3)
BASOPHILS # BLD AUTO: 0 % (ref 0–1.8)
BASOPHILS # BLD AUTO: 0.02 K/UL (ref 0–0.12)
BASOPHILS # BLD AUTO: 0.2 % (ref 0–1.8)
BASOPHILS # BLD AUTO: 0.3 % (ref 0–1.8)
BASOPHILS # BLD AUTO: 0.4 % (ref 0–1.8)
BASOPHILS # BLD AUTO: 0.5 % (ref 0–1.8)
BASOPHILS # BLD AUTO: 0.6 % (ref 0–1.8)
BASOPHILS # BLD AUTO: 0.7 % (ref 0–1.8)
BASOPHILS # BLD AUTO: 0.9 % (ref 0–1.8)
BASOPHILS # BLD: 0 K/UL (ref 0–0.12)
BASOPHILS # BLD: 0.01 K/UL (ref 0–0.12)
BASOPHILS # BLD: 0.02 K/UL (ref 0–0.12)
BASOPHILS # BLD: 0.03 K/UL (ref 0–0.12)
BASOPHILS # BLD: 0.04 K/UL (ref 0–0.12)
BASOPHILS # BLD: 0.06 K/UL (ref 0–0.12)
BASOPHILS NFR BLD AUTO: 0.6 % (ref 0–1.8)
BENZODIAZ UR QL SCN: NEGATIVE
BENZODIAZ UR QL SCN: NEGATIVE
BILIRUB SERPL-MCNC: 0.2 MG/DL (ref 0.1–1.5)
BILIRUB SERPL-MCNC: 0.4 MG/DL (ref 0.1–1.5)
BILIRUB SERPL-MCNC: 0.5 MG/DL (ref 0.1–1.5)
BILIRUB SERPL-MCNC: 0.6 MG/DL (ref 0.1–1.5)
BILIRUB SERPL-MCNC: 0.7 MG/DL (ref 0.1–1.5)
BILIRUB SERPL-MCNC: 0.8 MG/DL (ref 0.1–1.5)
BILIRUB SERPL-MCNC: 0.9 MG/DL (ref 0.1–1.5)
BILIRUB SERPL-MCNC: 1 MG/DL (ref 0.1–1.5)
BILIRUB SERPL-MCNC: 1.1 MG/DL (ref 0.1–1.5)
BILIRUB SERPL-MCNC: 1.2 MG/DL (ref 0.1–1.5)
BILIRUB SERPL-MCNC: 1.3 MG/DL (ref 0.1–1.5)
BILIRUB SERPL-MCNC: 1.3 MG/DL (ref 0.1–1.5)
BILIRUB SERPL-MCNC: 1.4 MG/DL (ref 0.1–1.5)
BILIRUB SERPL-MCNC: 1.7 MG/DL (ref 0.1–1.5)
BILIRUB SERPL-MCNC: 1.9 MG/DL (ref 0.1–1.5)
BILIRUB UR QL STRIP.AUTO: NEGATIVE
BLD GP AB SCN SERPL QL: NORMAL
BLOOD CULTURE HOLD CXBCH: NORMAL
BLOOD CULTURE HOLD CXBCH: NORMAL
BNP SERPL-MCNC: 82 PG/ML (ref 0–100)
BNP SERPL-MCNC: 88 PG/ML (ref 0–100)
BNP SERPL-MCNC: 95 PG/ML (ref 0–100)
BODY FLD TYPE: NORMAL
BODY TEMPERATURE: ABNORMAL CENTIGRADE
BODY TEMPERATURE: ABNORMAL CENTIGRADE
BODY TEMPERATURE: ABNORMAL DEGREES
BUN SERPL-MCNC: 31 MG/DL (ref 8–22)
BUN SERPL-MCNC: 32 MG/DL (ref 8–22)
BUN SERPL-MCNC: 33 MG/DL (ref 8–22)
BUN SERPL-MCNC: 34 MG/DL (ref 8–22)
BUN SERPL-MCNC: 35 MG/DL (ref 8–22)
BUN SERPL-MCNC: 36 MG/DL (ref 8–22)
BUN SERPL-MCNC: 39 MG/DL (ref 8–22)
BUN SERPL-MCNC: 40 MG/DL (ref 8–22)
BUN SERPL-MCNC: 41 MG/DL (ref 8–22)
BUN SERPL-MCNC: 42 MG/DL (ref 8–22)
BUN SERPL-MCNC: 42 MG/DL (ref 8–22)
BUN SERPL-MCNC: 43 MG/DL (ref 8–22)
BUN SERPL-MCNC: 44 MG/DL (ref 8–22)
BUN SERPL-MCNC: 45 MG/DL (ref 8–22)
BUN SERPL-MCNC: 46 MG/DL (ref 8–22)
BUN SERPL-MCNC: 47 MG/DL (ref 8–22)
BUN SERPL-MCNC: 48 MG/DL (ref 8–22)
BUN SERPL-MCNC: 49 MG/DL (ref 8–22)
BUN SERPL-MCNC: 50 MG/DL (ref 8–22)
BUN SERPL-MCNC: 50 MG/DL (ref 8–22)
BUN SERPL-MCNC: 51 MG/DL (ref 8–22)
BUN SERPL-MCNC: 51 MG/DL (ref 8–22)
BUN SERPL-MCNC: 52 MG/DL (ref 8–22)
BUN SERPL-MCNC: 52 MG/DL (ref 8–22)
BUN SERPL-MCNC: 53 MG/DL (ref 8–22)
BUN SERPL-MCNC: 54 MG/DL (ref 8–22)
BUN SERPL-MCNC: 55 MG/DL (ref 8–22)
BUN SERPL-MCNC: 56 MG/DL (ref 8–22)
BUN SERPL-MCNC: 56 MG/DL (ref 8–22)
BUN SERPL-MCNC: 58 MG/DL (ref 8–22)
BUN SERPL-MCNC: 59 MG/DL (ref 8–22)
BUN SERPL-MCNC: 59 MG/DL (ref 8–22)
BUN SERPL-MCNC: 60 MG/DL (ref 8–22)
BUN SERPL-MCNC: 63 MG/DL (ref 8–22)
BUN SERPL-MCNC: 64 MG/DL (ref 8–22)
BUN SERPL-MCNC: 65 MG/DL (ref 8–22)
BUN SERPL-MCNC: 69 MG/DL (ref 8–22)
BUN SERPL-MCNC: 71 MG/DL (ref 8–22)
BUN SERPL-MCNC: 73 MG/DL (ref 8–22)
BUN SERPL-MCNC: 77 MG/DL (ref 8–22)
BUN SERPL-MCNC: 78 MG/DL (ref 8–22)
BUN SERPL-MCNC: 84 MG/DL (ref 8–22)
BUN SERPL-MCNC: 93 MG/DL (ref 8–22)
BURR CELLS BLD QL SMEAR: NORMAL
BZE UR QL SCN: NEGATIVE
BZE UR QL SCN: NEGATIVE
CALCIUM SERPL-MCNC: 7 MG/DL (ref 8.5–10.5)
CALCIUM SERPL-MCNC: 7.2 MG/DL (ref 8.5–10.5)
CALCIUM SERPL-MCNC: 7.3 MG/DL (ref 8.5–10.5)
CALCIUM SERPL-MCNC: 7.4 MG/DL (ref 8.5–10.5)
CALCIUM SERPL-MCNC: 7.5 MG/DL (ref 8.5–10.5)
CALCIUM SERPL-MCNC: 7.5 MG/DL (ref 8.5–10.5)
CALCIUM SERPL-MCNC: 7.6 MG/DL (ref 8.5–10.5)
CALCIUM SERPL-MCNC: 7.7 MG/DL (ref 8.5–10.5)
CALCIUM SERPL-MCNC: 7.8 MG/DL (ref 8.5–10.5)
CALCIUM SERPL-MCNC: 7.9 MG/DL (ref 8.5–10.5)
CALCIUM SERPL-MCNC: 8 MG/DL (ref 8.5–10.5)
CALCIUM SERPL-MCNC: 8.1 MG/DL (ref 8.5–10.5)
CALCIUM SERPL-MCNC: 8.2 MG/DL (ref 8.5–10.5)
CALCIUM SERPL-MCNC: 8.3 MG/DL (ref 8.5–10.5)
CALCIUM SERPL-MCNC: 8.4 MG/DL (ref 8.5–10.5)
CALCIUM SERPL-MCNC: 8.7 MG/DL (ref 8.5–10.5)
CALCIUM SERPL-MCNC: 8.7 MG/DL (ref 8.5–10.5)
CALCIUM SERPL-MCNC: 8.8 MG/DL (ref 8.5–10.5)
CALCIUM SERPL-MCNC: 8.9 MG/DL (ref 8.5–10.5)
CANNABINOIDS UR QL SCN: NEGATIVE
CANNABINOIDS UR QL SCN: NEGATIVE
CFT BLD TEG: 4.8 MIN (ref 5–10)
CHLORIDE SERPL-SCNC: 110 MMOL/L (ref 96–112)
CHLORIDE SERPL-SCNC: 112 MMOL/L (ref 96–112)
CHLORIDE SERPL-SCNC: 112 MMOL/L (ref 96–112)
CHLORIDE SERPL-SCNC: 113 MMOL/L (ref 96–112)
CHLORIDE SERPL-SCNC: 114 MMOL/L (ref 96–112)
CHLORIDE SERPL-SCNC: 115 MMOL/L (ref 96–112)
CHLORIDE SERPL-SCNC: 116 MMOL/L (ref 96–112)
CHLORIDE SERPL-SCNC: 117 MMOL/L (ref 96–112)
CHLORIDE SERPL-SCNC: 118 MMOL/L (ref 96–112)
CHLORIDE SERPL-SCNC: 119 MMOL/L (ref 96–112)
CHLORIDE SERPL-SCNC: 120 MMOL/L (ref 96–112)
CHLORIDE SERPL-SCNC: 121 MMOL/L (ref 96–112)
CHLORIDE SERPL-SCNC: 122 MMOL/L (ref 96–112)
CHLORIDE SERPL-SCNC: 123 MMOL/L (ref 96–112)
CHLORIDE SERPL-SCNC: 124 MMOL/L (ref 96–112)
CHLORIDE SERPL-SCNC: 124 MMOL/L (ref 96–112)
CHLORIDE SERPL-SCNC: 125 MMOL/L (ref 96–112)
CHLORIDE SERPL-SCNC: 127 MMOL/L (ref 96–112)
CHLORIDE UR-SCNC: 41 MMOL/L
CK MB SERPL-MCNC: 2.7 NG/ML (ref 0–5)
CLOT ANGLE BLD TEG: 59.2 DEGREES (ref 53–72)
CLOT LYSIS 30M P MA LENFR BLD TEG: 0 % (ref 0–8)
CO2 BLDA-SCNC: 16 MMOL/L (ref 20–33)
CO2 BLDA-SCNC: 19 MMOL/L (ref 20–33)
CO2 BLDA-SCNC: 19 MMOL/L (ref 20–33)
CO2 BLDA-SCNC: 9 MMOL/L (ref 20–33)
CO2 SERPL-SCNC: 10 MMOL/L (ref 20–33)
CO2 SERPL-SCNC: 11 MMOL/L (ref 20–33)
CO2 SERPL-SCNC: 12 MMOL/L (ref 20–33)
CO2 SERPL-SCNC: 13 MMOL/L (ref 20–33)
CO2 SERPL-SCNC: 14 MMOL/L (ref 20–33)
CO2 SERPL-SCNC: 15 MMOL/L (ref 20–33)
CO2 SERPL-SCNC: 16 MMOL/L (ref 20–33)
CO2 SERPL-SCNC: 17 MMOL/L (ref 20–33)
CO2 SERPL-SCNC: 18 MMOL/L (ref 20–33)
CO2 SERPL-SCNC: 18 MMOL/L (ref 20–33)
CO2 SERPL-SCNC: 20 MMOL/L (ref 20–33)
CO2 SERPL-SCNC: 20 MMOL/L (ref 20–33)
CO2 SERPL-SCNC: 22 MMOL/L (ref 20–33)
CO2 SERPL-SCNC: 8 MMOL/L (ref 20–33)
CO2 SERPL-SCNC: 9 MMOL/L (ref 20–33)
COLOR UR: ABNORMAL
COLOR UR: YELLOW
COMMENT 1642: NORMAL
COMPONENT R 8504R: NORMAL
CREAT SERPL-MCNC: 1.78 MG/DL (ref 0.5–1.4)
CREAT SERPL-MCNC: 1.82 MG/DL (ref 0.5–1.4)
CREAT SERPL-MCNC: 1.89 MG/DL (ref 0.5–1.4)
CREAT SERPL-MCNC: 1.91 MG/DL (ref 0.5–1.4)
CREAT SERPL-MCNC: 1.93 MG/DL (ref 0.5–1.4)
CREAT SERPL-MCNC: 1.95 MG/DL (ref 0.5–1.4)
CREAT SERPL-MCNC: 1.96 MG/DL (ref 0.5–1.4)
CREAT SERPL-MCNC: 1.98 MG/DL (ref 0.5–1.4)
CREAT SERPL-MCNC: 1.99 MG/DL (ref 0.5–1.4)
CREAT SERPL-MCNC: 2.01 MG/DL (ref 0.5–1.4)
CREAT SERPL-MCNC: 2.03 MG/DL (ref 0.5–1.4)
CREAT SERPL-MCNC: 2.04 MG/DL (ref 0.5–1.4)
CREAT SERPL-MCNC: 2.05 MG/DL (ref 0.5–1.4)
CREAT SERPL-MCNC: 2.06 MG/DL (ref 0.5–1.4)
CREAT SERPL-MCNC: 2.06 MG/DL (ref 0.5–1.4)
CREAT SERPL-MCNC: 2.1 MG/DL (ref 0.5–1.4)
CREAT SERPL-MCNC: 2.13 MG/DL (ref 0.5–1.4)
CREAT SERPL-MCNC: 2.14 MG/DL (ref 0.5–1.4)
CREAT SERPL-MCNC: 2.14 MG/DL (ref 0.5–1.4)
CREAT SERPL-MCNC: 2.16 MG/DL (ref 0.5–1.4)
CREAT SERPL-MCNC: 2.21 MG/DL (ref 0.5–1.4)
CREAT SERPL-MCNC: 2.23 MG/DL (ref 0.5–1.4)
CREAT SERPL-MCNC: 2.26 MG/DL (ref 0.5–1.4)
CREAT SERPL-MCNC: 2.28 MG/DL (ref 0.5–1.4)
CREAT SERPL-MCNC: 2.29 MG/DL (ref 0.5–1.4)
CREAT SERPL-MCNC: 2.29 MG/DL (ref 0.5–1.4)
CREAT SERPL-MCNC: 2.3 MG/DL (ref 0.5–1.4)
CREAT SERPL-MCNC: 2.31 MG/DL (ref 0.5–1.4)
CREAT SERPL-MCNC: 2.32 MG/DL (ref 0.5–1.4)
CREAT SERPL-MCNC: 2.35 MG/DL (ref 0.5–1.4)
CREAT SERPL-MCNC: 2.36 MG/DL (ref 0.5–1.4)
CREAT SERPL-MCNC: 2.37 MG/DL (ref 0.5–1.4)
CREAT SERPL-MCNC: 2.37 MG/DL (ref 0.5–1.4)
CREAT SERPL-MCNC: 2.39 MG/DL (ref 0.5–1.4)
CREAT SERPL-MCNC: 2.41 MG/DL (ref 0.5–1.4)
CREAT SERPL-MCNC: 2.44 MG/DL (ref 0.5–1.4)
CREAT SERPL-MCNC: 2.45 MG/DL (ref 0.5–1.4)
CREAT SERPL-MCNC: 2.46 MG/DL (ref 0.5–1.4)
CREAT SERPL-MCNC: 2.47 MG/DL (ref 0.5–1.4)
CREAT SERPL-MCNC: 2.5 MG/DL (ref 0.5–1.4)
CREAT SERPL-MCNC: 2.56 MG/DL (ref 0.5–1.4)
CREAT SERPL-MCNC: 2.57 MG/DL (ref 0.5–1.4)
CREAT SERPL-MCNC: 2.59 MG/DL (ref 0.5–1.4)
CREAT SERPL-MCNC: 2.59 MG/DL (ref 0.5–1.4)
CREAT SERPL-MCNC: 2.68 MG/DL (ref 0.5–1.4)
CREAT SERPL-MCNC: 2.69 MG/DL (ref 0.5–1.4)
CREAT SERPL-MCNC: 2.72 MG/DL (ref 0.5–1.4)
CREAT SERPL-MCNC: 2.86 MG/DL (ref 0.5–1.4)
CREAT SERPL-MCNC: 2.94 MG/DL (ref 0.5–1.4)
CREAT SERPL-MCNC: 3 MG/DL (ref 0.5–1.4)
CREAT SERPL-MCNC: 3.02 MG/DL (ref 0.5–1.4)
CREAT SERPL-MCNC: 3.06 MG/DL (ref 0.5–1.4)
CREAT SERPL-MCNC: 3.11 MG/DL (ref 0.5–1.4)
CREAT SERPL-MCNC: 3.15 MG/DL (ref 0.5–1.4)
CREAT SERPL-MCNC: 3.21 MG/DL (ref 0.5–1.4)
CREAT SERPL-MCNC: 3.21 MG/DL (ref 0.5–1.4)
CREAT SERPL-MCNC: 3.29 MG/DL (ref 0.5–1.4)
CREAT SERPL-MCNC: 3.36 MG/DL (ref 0.5–1.4)
CREAT SERPL-MCNC: 3.78 MG/DL (ref 0.5–1.4)
CREAT SERPL-MCNC: 3.82 MG/DL (ref 0.5–1.4)
CREAT SERPL-MCNC: 3.94 MG/DL (ref 0.5–1.4)
CREAT UR-MCNC: 137 MG/DL
CREAT UR-MCNC: 145.2 MG/DL
CREAT UR-MCNC: 148 MG/DL
CREAT UR-MCNC: 149.8 MG/DL
CREAT UR-MCNC: 190.3 MG/DL
CREAT UR-MCNC: 197.9 MG/DL
CREAT UR-MCNC: 205.3 MG/DL
CRP SERPL HS-MCNC: 1.11 MG/DL (ref 0–0.75)
CT.EXTRINSIC BLD ROTEM: 2.8 MIN (ref 1–3)
CULTURE IF INDICATED INDCX: NO UA CULTURE
CULTURE IF INDICATED INDCX: NO UA CULTURE
CULTURE IF INDICATED INDCX: YES UA CULTURE
CULTURE IF INDICATED INDCX: YES UA CULTURE
DACRYOCYTES BLD QL SMEAR: NORMAL
DEPRECATED KAPPA LC FREE/LAMBDA SER: 2.43 {RATIO} (ref 0.26–1.65)
DSDNA AB TITR SER CLIF: ABNORMAL {TITER}
EER PROT ELECT SER Q1092: ABNORMAL
EKG IMPRESSION: NORMAL
ENA SM IGG SER-ACNC: 0 AU/ML (ref 0–40)
ENA SS-B IGG SER IA-ACNC: 0 AU/ML (ref 0–40)
EOSINOPHIL # BLD AUTO: 0 K/UL (ref 0–0.51)
EOSINOPHIL # BLD AUTO: 0.03 K/UL (ref 0–0.51)
EOSINOPHIL # BLD AUTO: 0.04 K/UL (ref 0–0.51)
EOSINOPHIL # BLD AUTO: 0.05 K/UL (ref 0–0.51)
EOSINOPHIL # BLD AUTO: 0.06 K/UL (ref 0–0.51)
EOSINOPHIL # BLD AUTO: 0.06 K/UL (ref 0–0.51)
EOSINOPHIL # BLD AUTO: 0.07 K/UL (ref 0–0.51)
EOSINOPHIL # BLD AUTO: 0.08 K/UL (ref 0–0.51)
EOSINOPHIL # BLD AUTO: 0.09 K/UL (ref 0–0.51)
EOSINOPHIL # BLD AUTO: 0.1 K/UL (ref 0–0.51)
EOSINOPHIL # BLD AUTO: 0.11 K/UL (ref 0–0.51)
EOSINOPHIL # BLD AUTO: 0.12 K/UL (ref 0–0.51)
EOSINOPHIL # BLD AUTO: 0.14 K/UL (ref 0–0.51)
EOSINOPHIL # BLD AUTO: 0.14 K/UL (ref 0–0.51)
EOSINOPHIL # BLD AUTO: 0.15 K/UL (ref 0–0.51)
EOSINOPHIL # BLD AUTO: 0.17 K/UL (ref 0–0.51)
EOSINOPHIL # BLD AUTO: 0.18 K/UL (ref 0–0.51)
EOSINOPHIL # BLD AUTO: 0.19 K/UL (ref 0–0.51)
EOSINOPHIL # BLD AUTO: 0.2 K/UL (ref 0–0.51)
EOSINOPHIL # BLD AUTO: 0.22 K/UL (ref 0–0.51)
EOSINOPHIL # BLD AUTO: 0.22 K/UL (ref 0–0.51)
EOSINOPHIL # BLD AUTO: 0.24 K/UL (ref 0–0.51)
EOSINOPHIL # BLD AUTO: 0.29 K/UL (ref 0–0.51)
EOSINOPHIL # BLD: 0.08 K/UL (ref 0–0.51)
EOSINOPHIL NFR BLD AUTO: 2.2 % (ref 0–6.9)
EOSINOPHIL NFR BLD: 0 % (ref 0–6.9)
EOSINOPHIL NFR BLD: 0.6 % (ref 0–6.9)
EOSINOPHIL NFR BLD: 0.7 % (ref 0–6.9)
EOSINOPHIL NFR BLD: 1 % (ref 0–6.9)
EOSINOPHIL NFR BLD: 1.4 % (ref 0–6.9)
EOSINOPHIL NFR BLD: 1.6 % (ref 0–6.9)
EOSINOPHIL NFR BLD: 1.6 % (ref 0–6.9)
EOSINOPHIL NFR BLD: 1.7 % (ref 0–6.9)
EOSINOPHIL NFR BLD: 1.9 % (ref 0–6.9)
EOSINOPHIL NFR BLD: 2.1 % (ref 0–6.9)
EOSINOPHIL NFR BLD: 2.2 % (ref 0–6.9)
EOSINOPHIL NFR BLD: 2.2 % (ref 0–6.9)
EOSINOPHIL NFR BLD: 2.4 % (ref 0–6.9)
EOSINOPHIL NFR BLD: 2.5 % (ref 0–6.9)
EOSINOPHIL NFR BLD: 2.5 % (ref 0–6.9)
EOSINOPHIL NFR BLD: 2.6 % (ref 0–6.9)
EOSINOPHIL NFR BLD: 2.7 % (ref 0–6.9)
EOSINOPHIL NFR BLD: 3 % (ref 0–6.9)
EOSINOPHIL NFR BLD: 3 % (ref 0–6.9)
EOSINOPHIL NFR BLD: 3.1 % (ref 0–6.9)
EOSINOPHIL NFR BLD: 3.2 % (ref 0–6.9)
EOSINOPHIL NFR BLD: 3.6 % (ref 0–6.9)
EOSINOPHIL NFR BLD: 3.6 % (ref 0–6.9)
EOSINOPHIL NFR BLD: 3.7 % (ref 0–6.9)
EOSINOPHIL NFR BLD: 3.8 % (ref 0–6.9)
EOSINOPHIL NFR BLD: 3.9 % (ref 0–6.9)
EOSINOPHIL NFR BLD: 3.9 % (ref 0–6.9)
EOSINOPHIL NFR BLD: 4.7 % (ref 0–6.9)
EOSINOPHIL NFR BLD: 5 % (ref 0–6.9)
EOSINOPHIL NFR BLD: 5.2 % (ref 0–6.9)
EOSINOPHIL NFR BLD: 5.3 % (ref 0–6.9)
EOSINOPHIL NFR BLD: 5.6 % (ref 0–6.9)
EOSINOPHIL NFR BLD: 5.7 % (ref 0–6.9)
EOSINOPHIL NFR BLD: 6.1 % (ref 0–6.9)
EOSINOPHIL NFR BLD: 6.8 % (ref 0–6.9)
EPI CELLS #/AREA URNS HPF: ABNORMAL /HPF
EPI CELLS #/AREA URNS HPF: NEGATIVE /HPF
EPO SERPL-ACNC: 25 MU/ML (ref 4–27)
ERYTHROCYTE [DISTWIDTH] IN BLOOD BY AUTOMATED COUNT: 49.8 FL (ref 35.9–50)
ERYTHROCYTE [DISTWIDTH] IN BLOOD BY AUTOMATED COUNT: 50.7 FL (ref 35.9–50)
ERYTHROCYTE [DISTWIDTH] IN BLOOD BY AUTOMATED COUNT: 51.3 FL (ref 35.9–50)
ERYTHROCYTE [DISTWIDTH] IN BLOOD BY AUTOMATED COUNT: 51.4 FL (ref 35.9–50)
ERYTHROCYTE [DISTWIDTH] IN BLOOD BY AUTOMATED COUNT: 51.6 FL (ref 35.9–50)
ERYTHROCYTE [DISTWIDTH] IN BLOOD BY AUTOMATED COUNT: 51.6 FL (ref 35.9–50)
ERYTHROCYTE [DISTWIDTH] IN BLOOD BY AUTOMATED COUNT: 51.8 FL (ref 35.9–50)
ERYTHROCYTE [DISTWIDTH] IN BLOOD BY AUTOMATED COUNT: 52 FL (ref 35.9–50)
ERYTHROCYTE [DISTWIDTH] IN BLOOD BY AUTOMATED COUNT: 52.2 FL (ref 35.9–50)
ERYTHROCYTE [DISTWIDTH] IN BLOOD BY AUTOMATED COUNT: 52.4 FL (ref 35.9–50)
ERYTHROCYTE [DISTWIDTH] IN BLOOD BY AUTOMATED COUNT: 52.5 FL (ref 35.9–50)
ERYTHROCYTE [DISTWIDTH] IN BLOOD BY AUTOMATED COUNT: 52.6 FL (ref 35.9–50)
ERYTHROCYTE [DISTWIDTH] IN BLOOD BY AUTOMATED COUNT: 52.7 FL (ref 35.9–50)
ERYTHROCYTE [DISTWIDTH] IN BLOOD BY AUTOMATED COUNT: 53.3 FL (ref 35.9–50)
ERYTHROCYTE [DISTWIDTH] IN BLOOD BY AUTOMATED COUNT: 53.4 FL (ref 35.9–50)
ERYTHROCYTE [DISTWIDTH] IN BLOOD BY AUTOMATED COUNT: 53.5 FL (ref 35.9–50)
ERYTHROCYTE [DISTWIDTH] IN BLOOD BY AUTOMATED COUNT: 53.6 FL (ref 35.9–50)
ERYTHROCYTE [DISTWIDTH] IN BLOOD BY AUTOMATED COUNT: 53.6 FL (ref 35.9–50)
ERYTHROCYTE [DISTWIDTH] IN BLOOD BY AUTOMATED COUNT: 54.4 FL (ref 35.9–50)
ERYTHROCYTE [DISTWIDTH] IN BLOOD BY AUTOMATED COUNT: 54.5 FL (ref 35.9–50)
ERYTHROCYTE [DISTWIDTH] IN BLOOD BY AUTOMATED COUNT: 55 FL (ref 35.9–50)
ERYTHROCYTE [DISTWIDTH] IN BLOOD BY AUTOMATED COUNT: 55.4 FL (ref 35.9–50)
ERYTHROCYTE [DISTWIDTH] IN BLOOD BY AUTOMATED COUNT: 55.7 FL (ref 35.9–50)
ERYTHROCYTE [DISTWIDTH] IN BLOOD BY AUTOMATED COUNT: 55.9 FL (ref 35.9–50)
ERYTHROCYTE [DISTWIDTH] IN BLOOD BY AUTOMATED COUNT: 55.9 FL (ref 35.9–50)
ERYTHROCYTE [DISTWIDTH] IN BLOOD BY AUTOMATED COUNT: 56 FL (ref 35.9–50)
ERYTHROCYTE [DISTWIDTH] IN BLOOD BY AUTOMATED COUNT: 56.2 FL (ref 35.9–50)
ERYTHROCYTE [DISTWIDTH] IN BLOOD BY AUTOMATED COUNT: 56.3 FL (ref 35.9–50)
ERYTHROCYTE [DISTWIDTH] IN BLOOD BY AUTOMATED COUNT: 56.6 FL (ref 35.9–50)
ERYTHROCYTE [DISTWIDTH] IN BLOOD BY AUTOMATED COUNT: 57.1 FL (ref 35.9–50)
ERYTHROCYTE [DISTWIDTH] IN BLOOD BY AUTOMATED COUNT: 57.6 FL (ref 35.9–50)
ERYTHROCYTE [DISTWIDTH] IN BLOOD BY AUTOMATED COUNT: 58 FL (ref 35.9–50)
ERYTHROCYTE [DISTWIDTH] IN BLOOD BY AUTOMATED COUNT: 58.6 FL (ref 35.9–50)
ERYTHROCYTE [DISTWIDTH] IN BLOOD BY AUTOMATED COUNT: 58.6 FL (ref 35.9–50)
ERYTHROCYTE [DISTWIDTH] IN BLOOD BY AUTOMATED COUNT: 60.9 FL (ref 35.9–50)
ERYTHROCYTE [DISTWIDTH] IN BLOOD BY AUTOMATED COUNT: 61.1 FL (ref 35.9–50)
ERYTHROCYTE [DISTWIDTH] IN BLOOD BY AUTOMATED COUNT: 62 FL (ref 35.9–50)
ERYTHROCYTE [DISTWIDTH] IN BLOOD BY AUTOMATED COUNT: 62.1 FL (ref 35.9–50)
ERYTHROCYTE [DISTWIDTH] IN BLOOD BY AUTOMATED COUNT: 62.8 FL (ref 35.9–50)
ERYTHROCYTE [DISTWIDTH] IN BLOOD BY AUTOMATED COUNT: 62.9 FL (ref 35.9–50)
ERYTHROCYTE [DISTWIDTH] IN BLOOD BY AUTOMATED COUNT: 63 FL (ref 35.9–50)
ERYTHROCYTE [DISTWIDTH] IN BLOOD BY AUTOMATED COUNT: 63.3 FL (ref 35.9–50)
ERYTHROCYTE [DISTWIDTH] IN BLOOD BY AUTOMATED COUNT: 63.5 FL (ref 35.9–50)
ERYTHROCYTE [DISTWIDTH] IN BLOOD BY AUTOMATED COUNT: 64 FL (ref 35.9–50)
ERYTHROCYTE [DISTWIDTH] IN BLOOD BY AUTOMATED COUNT: 64.7 FL (ref 35.9–50)
ERYTHROCYTE [DISTWIDTH] IN BLOOD BY AUTOMATED COUNT: 65.9 FL (ref 35.9–50)
ERYTHROCYTE [DISTWIDTH] IN BLOOD BY AUTOMATED COUNT: 66.2 FL (ref 35.9–50)
ERYTHROCYTE [DISTWIDTH] IN BLOOD BY AUTOMATED COUNT: 66.3 FL (ref 35.9–50)
ERYTHROCYTE [DISTWIDTH] IN BLOOD BY AUTOMATED COUNT: 66.5 FL (ref 35.9–50)
ERYTHROCYTE [DISTWIDTH] IN BLOOD BY AUTOMATED COUNT: 66.7 FL (ref 35.9–50)
ERYTHROCYTE [DISTWIDTH] IN BLOOD BY AUTOMATED COUNT: 67 FL (ref 35.9–50)
ERYTHROCYTE [DISTWIDTH] IN BLOOD BY AUTOMATED COUNT: 67.9 FL (ref 35.9–50)
ETHANOL BLD-MCNC: 0 G/DL
ETHANOL BLD-MCNC: 0.01 G/DL
FERRITIN SERPL-MCNC: 23.5 NG/ML (ref 10–291)
FERRITIN SERPL-MCNC: 38.5 NG/ML (ref 10–291)
FERRITIN SERPL-MCNC: 45.4 NG/ML (ref 10–291)
FERRITIN SERPL-MCNC: 49.5 NG/ML (ref 10–291)
FOLATE SERPL-MCNC: 20.7 NG/ML
FOLATE SERPL-MCNC: >23.7 NG/ML
GAMMA GLOB SERPL ELPH-MCNC: 1.07 G/DL (ref 0.62–1.51)
GFR SERPL CREATININE-BSD FRML MDRD: 11 ML/MIN/1.73 M 2
GFR SERPL CREATININE-BSD FRML MDRD: 11 ML/MIN/1.73 M 2
GFR SERPL CREATININE-BSD FRML MDRD: 12 ML/MIN/1.73 M 2
GFR SERPL CREATININE-BSD FRML MDRD: 13 ML/MIN/1.73 M 2
GFR SERPL CREATININE-BSD FRML MDRD: 14 ML/MIN/1.73 M 2
GFR SERPL CREATININE-BSD FRML MDRD: 15 ML/MIN/1.73 M 2
GFR SERPL CREATININE-BSD FRML MDRD: 16 ML/MIN/1.73 M 2
GFR SERPL CREATININE-BSD FRML MDRD: 16 ML/MIN/1.73 M 2
GFR SERPL CREATININE-BSD FRML MDRD: 17 ML/MIN/1.73 M 2
GFR SERPL CREATININE-BSD FRML MDRD: 18 ML/MIN/1.73 M 2
GFR SERPL CREATININE-BSD FRML MDRD: 19 ML/MIN/1.73 M 2
GFR SERPL CREATININE-BSD FRML MDRD: 20 ML/MIN/1.73 M 2
GFR SERPL CREATININE-BSD FRML MDRD: 21 ML/MIN/1.73 M 2
GFR SERPL CREATININE-BSD FRML MDRD: 22 ML/MIN/1.73 M 2
GFR SERPL CREATININE-BSD FRML MDRD: 23 ML/MIN/1.73 M 2
GFR SERPL CREATININE-BSD FRML MDRD: 24 ML/MIN/1.73 M 2
GFR SERPL CREATININE-BSD FRML MDRD: 25 ML/MIN/1.73 M 2
GFR SERPL CREATININE-BSD FRML MDRD: 26 ML/MIN/1.73 M 2
GFR SERPL CREATININE-BSD FRML MDRD: 26 ML/MIN/1.73 M 2
GFR SERPL CREATININE-BSD FRML MDRD: 27 ML/MIN/1.73 M 2
GFR SERPL CREATININE-BSD FRML MDRD: 28 ML/MIN/1.73 M 2
GLOBULIN SER CALC-MCNC: 2 G/DL (ref 1.9–3.5)
GLOBULIN SER CALC-MCNC: 2.1 G/DL (ref 1.9–3.5)
GLOBULIN SER CALC-MCNC: 2.2 G/DL (ref 1.9–3.5)
GLOBULIN SER CALC-MCNC: 2.3 G/DL (ref 1.9–3.5)
GLOBULIN SER CALC-MCNC: 2.4 G/DL (ref 1.9–3.5)
GLOBULIN SER CALC-MCNC: 2.5 G/DL (ref 1.9–3.5)
GLOBULIN SER CALC-MCNC: 2.6 G/DL (ref 1.9–3.5)
GLOBULIN SER CALC-MCNC: 2.7 G/DL (ref 1.9–3.5)
GLOBULIN SER CALC-MCNC: 2.8 G/DL (ref 1.9–3.5)
GLOBULIN SER CALC-MCNC: 2.8 G/DL (ref 1.9–3.5)
GLOBULIN SER CALC-MCNC: 3 G/DL (ref 1.9–3.5)
GLOBULIN SER CALC-MCNC: 3.1 G/DL (ref 1.9–3.5)
GLUCOSE BLD-MCNC: 101 MG/DL (ref 65–99)
GLUCOSE BLD-MCNC: 102 MG/DL (ref 65–99)
GLUCOSE BLD-MCNC: 116 MG/DL (ref 65–99)
GLUCOSE BLD-MCNC: 118 MG/DL (ref 65–99)
GLUCOSE BLD-MCNC: 123 MG/DL (ref 65–99)
GLUCOSE BLD-MCNC: 133 MG/DL (ref 65–99)
GLUCOSE BLD-MCNC: 138 MG/DL (ref 65–99)
GLUCOSE BLD-MCNC: 142 MG/DL (ref 65–99)
GLUCOSE BLD-MCNC: 144 MG/DL (ref 65–99)
GLUCOSE BLD-MCNC: 145 MG/DL (ref 65–99)
GLUCOSE BLD-MCNC: 151 MG/DL (ref 65–99)
GLUCOSE BLD-MCNC: 153 MG/DL (ref 65–99)
GLUCOSE BLD-MCNC: 162 MG/DL (ref 65–99)
GLUCOSE BLD-MCNC: 162 MG/DL (ref 65–99)
GLUCOSE BLD-MCNC: 171 MG/DL (ref 65–99)
GLUCOSE BLD-MCNC: 76 MG/DL (ref 65–99)
GLUCOSE BLD-MCNC: 78 MG/DL (ref 65–99)
GLUCOSE BLD-MCNC: 81 MG/DL (ref 65–99)
GLUCOSE BLD-MCNC: 86 MG/DL (ref 65–99)
GLUCOSE BLD-MCNC: 92 MG/DL (ref 65–99)
GLUCOSE SERPL-MCNC: 100 MG/DL (ref 65–99)
GLUCOSE SERPL-MCNC: 101 MG/DL (ref 65–99)
GLUCOSE SERPL-MCNC: 102 MG/DL (ref 65–99)
GLUCOSE SERPL-MCNC: 104 MG/DL (ref 65–99)
GLUCOSE SERPL-MCNC: 104 MG/DL (ref 65–99)
GLUCOSE SERPL-MCNC: 105 MG/DL (ref 65–99)
GLUCOSE SERPL-MCNC: 106 MG/DL (ref 65–99)
GLUCOSE SERPL-MCNC: 107 MG/DL (ref 65–99)
GLUCOSE SERPL-MCNC: 108 MG/DL (ref 65–99)
GLUCOSE SERPL-MCNC: 108 MG/DL (ref 65–99)
GLUCOSE SERPL-MCNC: 109 MG/DL (ref 65–99)
GLUCOSE SERPL-MCNC: 111 MG/DL (ref 65–99)
GLUCOSE SERPL-MCNC: 111 MG/DL (ref 65–99)
GLUCOSE SERPL-MCNC: 112 MG/DL (ref 65–99)
GLUCOSE SERPL-MCNC: 113 MG/DL (ref 65–99)
GLUCOSE SERPL-MCNC: 114 MG/DL (ref 65–99)
GLUCOSE SERPL-MCNC: 114 MG/DL (ref 65–99)
GLUCOSE SERPL-MCNC: 116 MG/DL (ref 65–99)
GLUCOSE SERPL-MCNC: 118 MG/DL (ref 65–99)
GLUCOSE SERPL-MCNC: 119 MG/DL (ref 65–99)
GLUCOSE SERPL-MCNC: 128 MG/DL (ref 65–99)
GLUCOSE SERPL-MCNC: 139 MG/DL (ref 65–99)
GLUCOSE SERPL-MCNC: 143 MG/DL (ref 65–99)
GLUCOSE SERPL-MCNC: 145 MG/DL (ref 65–99)
GLUCOSE SERPL-MCNC: 145 MG/DL (ref 65–99)
GLUCOSE SERPL-MCNC: 146 MG/DL (ref 65–99)
GLUCOSE SERPL-MCNC: 146 MG/DL (ref 65–99)
GLUCOSE SERPL-MCNC: 148 MG/DL (ref 65–99)
GLUCOSE SERPL-MCNC: 148 MG/DL (ref 65–99)
GLUCOSE SERPL-MCNC: 153 MG/DL (ref 65–99)
GLUCOSE SERPL-MCNC: 156 MG/DL (ref 65–99)
GLUCOSE SERPL-MCNC: 166 MG/DL (ref 65–99)
GLUCOSE SERPL-MCNC: 69 MG/DL (ref 65–99)
GLUCOSE SERPL-MCNC: 83 MG/DL (ref 65–99)
GLUCOSE SERPL-MCNC: 86 MG/DL (ref 65–99)
GLUCOSE SERPL-MCNC: 86 MG/DL (ref 65–99)
GLUCOSE SERPL-MCNC: 89 MG/DL (ref 65–99)
GLUCOSE SERPL-MCNC: 89 MG/DL (ref 65–99)
GLUCOSE SERPL-MCNC: 91 MG/DL (ref 65–99)
GLUCOSE SERPL-MCNC: 92 MG/DL (ref 65–99)
GLUCOSE SERPL-MCNC: 93 MG/DL (ref 65–99)
GLUCOSE SERPL-MCNC: 93 MG/DL (ref 65–99)
GLUCOSE SERPL-MCNC: 94 MG/DL (ref 65–99)
GLUCOSE SERPL-MCNC: 95 MG/DL (ref 65–99)
GLUCOSE SERPL-MCNC: 96 MG/DL (ref 65–99)
GLUCOSE SERPL-MCNC: 98 MG/DL (ref 65–99)
GLUCOSE SERPL-MCNC: 99 MG/DL (ref 65–99)
GLUCOSE UR STRIP.AUTO-MCNC: NEGATIVE MG/DL
HBV SURFACE AG SER QL: NEGATIVE
HBV SURFACE AG SER QL: NEGATIVE
HCO3 BLDA-SCNC: 10 MMOL/L (ref 17–25)
HCO3 BLDA-SCNC: 12 MMOL/L (ref 17–25)
HCO3 BLDA-SCNC: 15.2 MMOL/L (ref 17–25)
HCO3 BLDA-SCNC: 17.5 MMOL/L (ref 17–25)
HCO3 BLDA-SCNC: 18 MMOL/L (ref 17–25)
HCO3 BLDA-SCNC: 8.2 MMOL/L (ref 17–25)
HCT VFR BLD AUTO: 21.1 % (ref 37–47)
HCT VFR BLD AUTO: 21.1 % (ref 37–47)
HCT VFR BLD AUTO: 23.1 % (ref 37–47)
HCT VFR BLD AUTO: 23.3 % (ref 37–47)
HCT VFR BLD AUTO: 23.4 % (ref 37–47)
HCT VFR BLD AUTO: 23.6 % (ref 37–47)
HCT VFR BLD AUTO: 23.9 % (ref 37–47)
HCT VFR BLD AUTO: 24.5 % (ref 37–47)
HCT VFR BLD AUTO: 24.6 % (ref 37–47)
HCT VFR BLD AUTO: 24.7 % (ref 37–47)
HCT VFR BLD AUTO: 25.1 % (ref 37–47)
HCT VFR BLD AUTO: 25.4 % (ref 37–47)
HCT VFR BLD AUTO: 25.4 % (ref 37–47)
HCT VFR BLD AUTO: 26.1 % (ref 37–47)
HCT VFR BLD AUTO: 26.2 % (ref 37–47)
HCT VFR BLD AUTO: 26.4 % (ref 37–47)
HCT VFR BLD AUTO: 26.5 % (ref 37–47)
HCT VFR BLD AUTO: 26.6 % (ref 37–47)
HCT VFR BLD AUTO: 26.7 % (ref 37–47)
HCT VFR BLD AUTO: 26.9 % (ref 37–47)
HCT VFR BLD AUTO: 27 % (ref 37–47)
HCT VFR BLD AUTO: 27.6 % (ref 37–47)
HCT VFR BLD AUTO: 27.7 % (ref 37–47)
HCT VFR BLD AUTO: 27.9 % (ref 37–47)
HCT VFR BLD AUTO: 28.4 % (ref 37–47)
HCT VFR BLD AUTO: 28.4 % (ref 37–47)
HCT VFR BLD AUTO: 28.6 % (ref 37–47)
HCT VFR BLD AUTO: 28.7 % (ref 37–47)
HCT VFR BLD AUTO: 28.9 % (ref 37–47)
HCT VFR BLD AUTO: 28.9 % (ref 37–47)
HCT VFR BLD AUTO: 29 % (ref 37–47)
HCT VFR BLD AUTO: 29.1 % (ref 37–47)
HCT VFR BLD AUTO: 29.2 % (ref 37–47)
HCT VFR BLD AUTO: 29.3 % (ref 37–47)
HCT VFR BLD AUTO: 29.4 % (ref 37–47)
HCT VFR BLD AUTO: 29.7 % (ref 37–47)
HCT VFR BLD AUTO: 29.8 % (ref 37–47)
HCT VFR BLD AUTO: 30 % (ref 37–47)
HCT VFR BLD AUTO: 30.1 % (ref 37–47)
HCT VFR BLD AUTO: 30.1 % (ref 37–47)
HCT VFR BLD AUTO: 30.2 % (ref 37–47)
HCT VFR BLD AUTO: 30.3 % (ref 37–47)
HCT VFR BLD AUTO: 30.5 % (ref 37–47)
HCT VFR BLD AUTO: 31.7 % (ref 37–47)
HCT VFR BLD AUTO: 31.9 % (ref 37–47)
HCT VFR BLD AUTO: 32 % (ref 37–47)
HCT VFR BLD AUTO: 32.3 % (ref 37–47)
HCT VFR BLD AUTO: 32.5 % (ref 37–47)
HCT VFR BLD AUTO: 32.5 % (ref 37–47)
HCT VFR BLD AUTO: 33 % (ref 37–47)
HCT VFR BLD AUTO: 33 % (ref 37–47)
HCT VFR BLD AUTO: 33.6 % (ref 37–47)
HCT VFR BLD AUTO: 34.9 % (ref 37–47)
HCT VFR BLD AUTO: 36.2 % (ref 37–47)
HCT VFR BLD AUTO: 37 % (ref 37–47)
HCT VFR BLD AUTO: 38.5 % (ref 37–47)
HCV AB SER QL: NEGATIVE
HCV AB SER QL: NEGATIVE
HEMOCCULT SP1 SPEC QL: NEGATIVE
HGB BLD-MCNC: 10.1 G/DL (ref 12–16)
HGB BLD-MCNC: 10.1 G/DL (ref 12–16)
HGB BLD-MCNC: 10.4 G/DL (ref 12–16)
HGB BLD-MCNC: 10.5 G/DL (ref 12–16)
HGB BLD-MCNC: 10.6 G/DL (ref 12–16)
HGB BLD-MCNC: 10.7 G/DL (ref 12–16)
HGB BLD-MCNC: 10.8 G/DL (ref 12–16)
HGB BLD-MCNC: 11.8 G/DL (ref 12–16)
HGB BLD-MCNC: 6.6 G/DL (ref 12–16)
HGB BLD-MCNC: 6.9 G/DL (ref 12–16)
HGB BLD-MCNC: 6.9 G/DL (ref 12–16)
HGB BLD-MCNC: 7.1 G/DL (ref 12–16)
HGB BLD-MCNC: 7.2 G/DL (ref 12–16)
HGB BLD-MCNC: 7.3 G/DL (ref 12–16)
HGB BLD-MCNC: 7.4 G/DL (ref 12–16)
HGB BLD-MCNC: 7.5 G/DL (ref 12–16)
HGB BLD-MCNC: 7.5 G/DL (ref 12–16)
HGB BLD-MCNC: 7.6 G/DL (ref 12–16)
HGB BLD-MCNC: 7.6 G/DL (ref 12–16)
HGB BLD-MCNC: 7.7 G/DL (ref 12–16)
HGB BLD-MCNC: 7.8 G/DL (ref 12–16)
HGB BLD-MCNC: 7.8 G/DL (ref 12–16)
HGB BLD-MCNC: 7.9 G/DL (ref 12–16)
HGB BLD-MCNC: 7.9 G/DL (ref 12–16)
HGB BLD-MCNC: 8 G/DL (ref 12–16)
HGB BLD-MCNC: 8.1 G/DL (ref 12–16)
HGB BLD-MCNC: 8.2 G/DL (ref 12–16)
HGB BLD-MCNC: 8.3 G/DL (ref 12–16)
HGB BLD-MCNC: 8.3 G/DL (ref 12–16)
HGB BLD-MCNC: 8.4 G/DL (ref 12–16)
HGB BLD-MCNC: 8.5 G/DL (ref 12–16)
HGB BLD-MCNC: 8.6 G/DL (ref 12–16)
HGB BLD-MCNC: 8.7 G/DL (ref 12–16)
HGB BLD-MCNC: 8.7 G/DL (ref 12–16)
HGB BLD-MCNC: 8.9 G/DL (ref 12–16)
HGB BLD-MCNC: 8.9 G/DL (ref 12–16)
HGB BLD-MCNC: 9.1 G/DL (ref 12–16)
HGB BLD-MCNC: 9.2 G/DL (ref 12–16)
HGB BLD-MCNC: 9.4 G/DL (ref 12–16)
HGB BLD-MCNC: 9.5 G/DL (ref 12–16)
HGB BLD-MCNC: 9.5 G/DL (ref 12–16)
HGB BLD-MCNC: 9.6 G/DL (ref 12–16)
HGB BLD-MCNC: 9.6 G/DL (ref 12–16)
HGB BLD-MCNC: 9.7 G/DL (ref 12–16)
HGB BLD-MCNC: 9.9 G/DL (ref 12–16)
HIV 1+2 AB+HIV1 P24 AG SERPL QL IA: NON REACTIVE
HYALINE CASTS #/AREA URNS LPF: ABNORMAL /LPF
HYPOCHROMIA BLD QL SMEAR: ABNORMAL
HYPOCHROMIA BLD QL SMEAR: ABNORMAL
IMM GRANULOCYTES # BLD AUTO: 0 K/UL (ref 0–0.11)
IMM GRANULOCYTES # BLD AUTO: 0.01 K/UL (ref 0–0.11)
IMM GRANULOCYTES # BLD AUTO: 0.02 K/UL (ref 0–0.11)
IMM GRANULOCYTES # BLD AUTO: 0.03 K/UL (ref 0–0.11)
IMM GRANULOCYTES # BLD AUTO: 0.04 K/UL (ref 0–0.11)
IMM GRANULOCYTES # BLD AUTO: 0.05 K/UL (ref 0–0.11)
IMM GRANULOCYTES # BLD AUTO: 0.05 K/UL (ref 0–0.11)
IMM GRANULOCYTES # BLD AUTO: 0.07 K/UL (ref 0–0.11)
IMM GRANULOCYTES # BLD AUTO: 0.09 K/UL (ref 0–0.11)
IMM GRANULOCYTES NFR BLD AUTO: 0 % (ref 0–0.9)
IMM GRANULOCYTES NFR BLD AUTO: 0.2 % (ref 0–0.9)
IMM GRANULOCYTES NFR BLD AUTO: 0.2 % (ref 0–0.9)
IMM GRANULOCYTES NFR BLD AUTO: 0.3 % (ref 0–0.9)
IMM GRANULOCYTES NFR BLD AUTO: 0.5 % (ref 0–0.9)
IMM GRANULOCYTES NFR BLD AUTO: 0.5 % (ref 0–0.9)
IMM GRANULOCYTES NFR BLD AUTO: 0.6 % (ref 0–0.9)
IMM GRANULOCYTES NFR BLD AUTO: 0.7 % (ref 0–0.9)
IMM GRANULOCYTES NFR BLD AUTO: 0.8 % (ref 0–0.9)
IMM GRANULOCYTES NFR BLD AUTO: 0.9 % (ref 0–0.9)
IMM GRANULOCYTES NFR BLD AUTO: 1 % (ref 0–0.9)
IMM GRANULOCYTES NFR BLD AUTO: 1 % (ref 0–0.9)
IMM GRANULOCYTES NFR BLD AUTO: 1.1 % (ref 0–0.9)
IMM GRANULOCYTES NFR BLD AUTO: 1.2 % (ref 0–0.9)
IMM GRANULOCYTES NFR BLD AUTO: 1.4 % (ref 0–0.9)
IMM GRANULOCYTES NFR BLD AUTO: 1.6 % (ref 0–0.9)
IMM GRANULOCYTES NFR BLD AUTO: 1.7 % (ref 0–0.9)
INR PPP: 1.19 (ref 0.87–1.13)
INR PPP: 1.21 (ref 0.87–1.13)
INR PPP: 1.21 (ref 0.87–1.13)
INR PPP: 1.23 (ref 0.87–1.13)
INR PPP: 1.25 (ref 0.87–1.13)
INR PPP: 1.26 (ref 0.87–1.13)
INR PPP: 1.28 (ref 0.87–1.13)
INR PPP: 1.37 (ref 0.87–1.13)
INR PPP: 1.37 (ref 0.87–1.13)
INR PPP: 1.38 (ref 0.87–1.13)
INR PPP: 1.39 (ref 0.87–1.13)
INR PPP: 1.39 (ref 0.87–1.13)
INTERPRETATION SERPL IFE-IMP: ABNORMAL
IRON SATN MFR SERPL: 14 % (ref 15–55)
IRON SATN MFR SERPL: 21 % (ref 15–55)
IRON SATN MFR SERPL: 25 % (ref 15–55)
IRON SERPL-MCNC: 53 UG/DL (ref 40–170)
IRON SERPL-MCNC: 87 UG/DL (ref 40–170)
IRON SERPL-MCNC: 89 UG/DL (ref 40–170)
KAPPA LC FREE SER-MCNC: 15.7 MG/DL (ref 0.33–1.94)
KETONES UR STRIP.AUTO-MCNC: NEGATIVE MG/DL
LACTATE BLD-SCNC: 1.2 MMOL/L (ref 0.5–2)
LACTATE BLD-SCNC: 1.3 MMOL/L (ref 0.5–2)
LACTATE BLD-SCNC: 1.4 MMOL/L (ref 0.5–2)
LACTATE BLD-SCNC: 1.6 MMOL/L (ref 0.5–2)
LACTATE BLD-SCNC: 1.6 MMOL/L (ref 0.5–2)
LACTATE BLD-SCNC: 1.7 MMOL/L (ref 0.5–2)
LACTATE BLD-SCNC: 1.8 MMOL/L (ref 0.5–2)
LACTATE BLD-SCNC: 1.9 MMOL/L (ref 0.5–2)
LACTATE BLD-SCNC: 2 MMOL/L (ref 0.5–2)
LACTATE BLD-SCNC: 2.1 MMOL/L (ref 0.5–2)
LAMBDA LC FREE SERPL-MCNC: 6.46 MG/DL (ref 0.57–2.63)
LEUKOCYTE ESTERASE UR QL STRIP.AUTO: ABNORMAL
LEUKOCYTE ESTERASE UR QL STRIP.AUTO: NEGATIVE
LG PLATELETS BLD QL SMEAR: NORMAL
LIPASE SERPL-CCNC: 28 U/L (ref 11–82)
LIPASE SERPL-CCNC: 34 U/L (ref 11–82)
LIPASE SERPL-CCNC: 41 U/L (ref 11–82)
LIPASE SERPL-CCNC: 46 U/L (ref 11–82)
LIPASE SERPL-CCNC: 58 U/L (ref 11–82)
LV EJECT FRACT  99904: 70
LV EJECT FRACT MOD 2C 99903: 81.57
LV EJECT FRACT MOD 4C 99902: 73.83
LV EJECT FRACT MOD BP 99901: 76.86
LYMPHOCYTES # BLD AUTO: 0.14 K/UL (ref 1–4.8)
LYMPHOCYTES # BLD AUTO: 0.26 K/UL (ref 1–4.8)
LYMPHOCYTES # BLD AUTO: 0.29 K/UL (ref 1–4.8)
LYMPHOCYTES # BLD AUTO: 0.34 K/UL (ref 1–4.8)
LYMPHOCYTES # BLD AUTO: 0.35 K/UL (ref 1–4.8)
LYMPHOCYTES # BLD AUTO: 0.35 K/UL (ref 1–4.8)
LYMPHOCYTES # BLD AUTO: 0.36 K/UL (ref 1–4.8)
LYMPHOCYTES # BLD AUTO: 0.36 K/UL (ref 1–4.8)
LYMPHOCYTES # BLD AUTO: 0.37 K/UL (ref 1–4.8)
LYMPHOCYTES # BLD AUTO: 0.38 K/UL (ref 1–4.8)
LYMPHOCYTES # BLD AUTO: 0.38 K/UL (ref 1–4.8)
LYMPHOCYTES # BLD AUTO: 0.39 K/UL (ref 1–4.8)
LYMPHOCYTES # BLD AUTO: 0.4 K/UL (ref 1–4.8)
LYMPHOCYTES # BLD AUTO: 0.42 K/UL (ref 1–4.8)
LYMPHOCYTES # BLD AUTO: 0.44 K/UL (ref 1–4.8)
LYMPHOCYTES # BLD AUTO: 0.44 K/UL (ref 1–4.8)
LYMPHOCYTES # BLD AUTO: 0.46 K/UL (ref 1–4.8)
LYMPHOCYTES # BLD AUTO: 0.47 K/UL (ref 1–4.8)
LYMPHOCYTES # BLD AUTO: 0.47 K/UL (ref 1–4.8)
LYMPHOCYTES # BLD AUTO: 0.48 K/UL (ref 1–4.8)
LYMPHOCYTES # BLD AUTO: 0.48 K/UL (ref 1–4.8)
LYMPHOCYTES # BLD AUTO: 0.49 K/UL (ref 1–4.8)
LYMPHOCYTES # BLD AUTO: 0.49 K/UL (ref 1–4.8)
LYMPHOCYTES # BLD AUTO: 0.51 K/UL (ref 1–4.8)
LYMPHOCYTES # BLD AUTO: 0.53 K/UL (ref 1–4.8)
LYMPHOCYTES # BLD AUTO: 0.54 K/UL (ref 1–4.8)
LYMPHOCYTES # BLD AUTO: 0.6 K/UL (ref 1–4.8)
LYMPHOCYTES # BLD AUTO: 0.62 K/UL (ref 1–4.8)
LYMPHOCYTES # BLD AUTO: 0.63 K/UL (ref 1–4.8)
LYMPHOCYTES # BLD AUTO: 0.66 K/UL (ref 1–4.8)
LYMPHOCYTES # BLD AUTO: 0.81 K/UL (ref 1–4.8)
LYMPHOCYTES # BLD AUTO: 0.87 K/UL (ref 1–4.8)
LYMPHOCYTES # BLD: 0.57 K/UL (ref 1–4.8)
LYMPHOCYTES NFR BLD AUTO: 15.9 % (ref 22–41)
LYMPHOCYTES NFR BLD: 10.2 % (ref 22–41)
LYMPHOCYTES NFR BLD: 10.4 % (ref 22–41)
LYMPHOCYTES NFR BLD: 10.9 % (ref 22–41)
LYMPHOCYTES NFR BLD: 10.9 % (ref 22–41)
LYMPHOCYTES NFR BLD: 11 % (ref 22–41)
LYMPHOCYTES NFR BLD: 11.1 % (ref 22–41)
LYMPHOCYTES NFR BLD: 11.3 % (ref 22–41)
LYMPHOCYTES NFR BLD: 11.5 % (ref 22–41)
LYMPHOCYTES NFR BLD: 11.7 % (ref 22–41)
LYMPHOCYTES NFR BLD: 11.8 % (ref 22–41)
LYMPHOCYTES NFR BLD: 12.5 % (ref 22–41)
LYMPHOCYTES NFR BLD: 12.5 % (ref 22–41)
LYMPHOCYTES NFR BLD: 12.6 % (ref 22–41)
LYMPHOCYTES NFR BLD: 12.9 % (ref 22–41)
LYMPHOCYTES NFR BLD: 12.9 % (ref 22–41)
LYMPHOCYTES NFR BLD: 13.4 % (ref 22–41)
LYMPHOCYTES NFR BLD: 14 % (ref 22–41)
LYMPHOCYTES NFR BLD: 14.1 % (ref 22–41)
LYMPHOCYTES NFR BLD: 14.4 % (ref 22–41)
LYMPHOCYTES NFR BLD: 14.5 % (ref 22–41)
LYMPHOCYTES NFR BLD: 15 % (ref 22–41)
LYMPHOCYTES NFR BLD: 16.2 % (ref 22–41)
LYMPHOCYTES NFR BLD: 16.7 % (ref 22–41)
LYMPHOCYTES NFR BLD: 16.9 % (ref 22–41)
LYMPHOCYTES NFR BLD: 17.9 % (ref 22–41)
LYMPHOCYTES NFR BLD: 20 % (ref 22–41)
LYMPHOCYTES NFR BLD: 21.7 % (ref 22–41)
LYMPHOCYTES NFR BLD: 3.5 % (ref 22–41)
LYMPHOCYTES NFR BLD: 5.3 % (ref 22–41)
LYMPHOCYTES NFR BLD: 5.3 % (ref 22–41)
LYMPHOCYTES NFR BLD: 6.4 % (ref 22–41)
LYMPHOCYTES NFR BLD: 7.1 % (ref 22–41)
LYMPHOCYTES NFR BLD: 8.5 % (ref 22–41)
LYMPHOCYTES NFR BLD: 8.9 % (ref 22–41)
LYMPHOCYTES NFR BLD: 8.9 % (ref 22–41)
LYMPHOCYTES NFR BLD: 9 % (ref 22–41)
LYMPHOCYTES NFR BLD: 9.1 % (ref 22–41)
LYMPHOCYTES NFR BLD: 9.2 % (ref 22–41)
LYMPHOCYTES NFR BLD: 9.3 % (ref 22–41)
LYMPHOCYTES NFR BLD: 9.9 % (ref 22–41)
MACROCYTES BLD QL SMEAR: ABNORMAL
MAGNESIUM SERPL-MCNC: 1.6 MG/DL (ref 1.5–2.5)
MAGNESIUM SERPL-MCNC: 1.7 MG/DL (ref 1.5–2.5)
MAGNESIUM SERPL-MCNC: 1.7 MG/DL (ref 1.5–2.5)
MAGNESIUM SERPL-MCNC: 1.8 MG/DL (ref 1.5–2.5)
MAGNESIUM SERPL-MCNC: 1.9 MG/DL (ref 1.5–2.5)
MAGNESIUM SERPL-MCNC: 2 MG/DL (ref 1.5–2.5)
MAGNESIUM SERPL-MCNC: 2.1 MG/DL (ref 1.5–2.5)
MAGNESIUM SERPL-MCNC: 2.2 MG/DL (ref 1.5–2.5)
MAGNESIUM SERPL-MCNC: 2.3 MG/DL (ref 1.5–2.5)
MAGNESIUM SERPL-MCNC: 2.5 MG/DL (ref 1.5–2.5)
MANUAL DIFF BLD: NORMAL
MCF BLD TEG: 50.5 MM (ref 50–70)
MCH RBC QN AUTO: 27.8 PG (ref 27–33)
MCH RBC QN AUTO: 28.1 PG (ref 27–33)
MCH RBC QN AUTO: 28.2 PG (ref 27–33)
MCH RBC QN AUTO: 28.3 PG (ref 27–33)
MCH RBC QN AUTO: 28.4 PG (ref 27–33)
MCH RBC QN AUTO: 28.5 PG (ref 27–33)
MCH RBC QN AUTO: 28.6 PG (ref 27–33)
MCH RBC QN AUTO: 28.7 PG (ref 27–33)
MCH RBC QN AUTO: 28.8 PG (ref 27–33)
MCH RBC QN AUTO: 28.9 PG (ref 27–33)
MCH RBC QN AUTO: 29 PG (ref 27–33)
MCH RBC QN AUTO: 29.1 PG (ref 27–33)
MCH RBC QN AUTO: 29.2 PG (ref 27–33)
MCH RBC QN AUTO: 29.3 PG (ref 27–33)
MCH RBC QN AUTO: 29.4 PG (ref 27–33)
MCH RBC QN AUTO: 29.5 PG (ref 27–33)
MCH RBC QN AUTO: 29.6 PG (ref 27–33)
MCH RBC QN AUTO: 29.6 PG (ref 27–33)
MCH RBC QN AUTO: 29.7 PG (ref 27–33)
MCH RBC QN AUTO: 29.7 PG (ref 27–33)
MCH RBC QN AUTO: 30.1 PG (ref 27–33)
MCH RBC QN AUTO: 30.3 PG (ref 27–33)
MCHC RBC AUTO-ENTMCNC: 28.8 G/DL (ref 33.6–35)
MCHC RBC AUTO-ENTMCNC: 28.9 G/DL (ref 33.6–35)
MCHC RBC AUTO-ENTMCNC: 28.9 G/DL (ref 33.6–35)
MCHC RBC AUTO-ENTMCNC: 29 G/DL (ref 33.6–35)
MCHC RBC AUTO-ENTMCNC: 29.2 G/DL (ref 33.6–35)
MCHC RBC AUTO-ENTMCNC: 29.4 G/DL (ref 33.6–35)
MCHC RBC AUTO-ENTMCNC: 29.6 G/DL (ref 33.6–35)
MCHC RBC AUTO-ENTMCNC: 29.7 G/DL (ref 33.6–35)
MCHC RBC AUTO-ENTMCNC: 29.8 G/DL (ref 33.6–35)
MCHC RBC AUTO-ENTMCNC: 29.8 G/DL (ref 33.6–35)
MCHC RBC AUTO-ENTMCNC: 29.9 G/DL (ref 33.6–35)
MCHC RBC AUTO-ENTMCNC: 30 G/DL (ref 33.6–35)
MCHC RBC AUTO-ENTMCNC: 30.1 G/DL (ref 33.6–35)
MCHC RBC AUTO-ENTMCNC: 30.1 G/DL (ref 33.6–35)
MCHC RBC AUTO-ENTMCNC: 30.4 G/DL (ref 33.6–35)
MCHC RBC AUTO-ENTMCNC: 30.5 G/DL (ref 33.6–35)
MCHC RBC AUTO-ENTMCNC: 30.5 G/DL (ref 33.6–35)
MCHC RBC AUTO-ENTMCNC: 30.6 G/DL (ref 33.6–35)
MCHC RBC AUTO-ENTMCNC: 30.7 G/DL (ref 33.6–35)
MCHC RBC AUTO-ENTMCNC: 30.7 G/DL (ref 33.6–35)
MCHC RBC AUTO-ENTMCNC: 30.8 G/DL (ref 33.6–35)
MCHC RBC AUTO-ENTMCNC: 30.8 G/DL (ref 33.6–35)
MCHC RBC AUTO-ENTMCNC: 30.9 G/DL (ref 33.6–35)
MCHC RBC AUTO-ENTMCNC: 31 G/DL (ref 33.6–35)
MCHC RBC AUTO-ENTMCNC: 31.1 G/DL (ref 33.6–35)
MCHC RBC AUTO-ENTMCNC: 31.2 G/DL (ref 33.6–35)
MCHC RBC AUTO-ENTMCNC: 31.3 G/DL (ref 33.6–35)
MCHC RBC AUTO-ENTMCNC: 31.4 G/DL (ref 33.6–35)
MCHC RBC AUTO-ENTMCNC: 31.4 G/DL (ref 33.6–35)
MCHC RBC AUTO-ENTMCNC: 31.5 G/DL (ref 33.6–35)
MCHC RBC AUTO-ENTMCNC: 31.5 G/DL (ref 33.6–35)
MCHC RBC AUTO-ENTMCNC: 31.6 G/DL (ref 33.6–35)
MCHC RBC AUTO-ENTMCNC: 31.7 G/DL (ref 33.6–35)
MCHC RBC AUTO-ENTMCNC: 31.9 G/DL (ref 33.6–35)
MCHC RBC AUTO-ENTMCNC: 31.9 G/DL (ref 33.6–35)
MCHC RBC AUTO-ENTMCNC: 32 G/DL (ref 33.6–35)
MCHC RBC AUTO-ENTMCNC: 32.1 G/DL (ref 33.6–35)
MCHC RBC AUTO-ENTMCNC: 32.3 G/DL (ref 33.6–35)
MCV RBC AUTO: 100 FL (ref 81.4–97.8)
MCV RBC AUTO: 100.6 FL (ref 81.4–97.8)
MCV RBC AUTO: 101.1 FL (ref 81.4–97.8)
MCV RBC AUTO: 102.2 FL (ref 81.4–97.8)
MCV RBC AUTO: 88.6 FL (ref 81.4–97.8)
MCV RBC AUTO: 90.5 FL (ref 81.4–97.8)
MCV RBC AUTO: 90.6 FL (ref 81.4–97.8)
MCV RBC AUTO: 90.9 FL (ref 81.4–97.8)
MCV RBC AUTO: 91.1 FL (ref 81.4–97.8)
MCV RBC AUTO: 91.5 FL (ref 81.4–97.8)
MCV RBC AUTO: 91.5 FL (ref 81.4–97.8)
MCV RBC AUTO: 91.7 FL (ref 81.4–97.8)
MCV RBC AUTO: 91.8 FL (ref 81.4–97.8)
MCV RBC AUTO: 91.8 FL (ref 81.4–97.8)
MCV RBC AUTO: 92.3 FL (ref 81.4–97.8)
MCV RBC AUTO: 92.4 FL (ref 81.4–97.8)
MCV RBC AUTO: 92.4 FL (ref 81.4–97.8)
MCV RBC AUTO: 92.8 FL (ref 81.4–97.8)
MCV RBC AUTO: 93 FL (ref 81.4–97.8)
MCV RBC AUTO: 93.2 FL (ref 81.4–97.8)
MCV RBC AUTO: 93.3 FL (ref 81.4–97.8)
MCV RBC AUTO: 93.4 FL (ref 81.4–97.8)
MCV RBC AUTO: 93.7 FL (ref 81.4–97.8)
MCV RBC AUTO: 93.8 FL (ref 81.4–97.8)
MCV RBC AUTO: 94.1 FL (ref 81.4–97.8)
MCV RBC AUTO: 94.1 FL (ref 81.4–97.8)
MCV RBC AUTO: 94.2 FL (ref 81.4–97.8)
MCV RBC AUTO: 94.4 FL (ref 81.4–97.8)
MCV RBC AUTO: 94.6 FL (ref 81.4–97.8)
MCV RBC AUTO: 95 FL (ref 81.4–97.8)
MCV RBC AUTO: 95.7 FL (ref 81.4–97.8)
MCV RBC AUTO: 95.7 FL (ref 81.4–97.8)
MCV RBC AUTO: 96 FL (ref 81.4–97.8)
MCV RBC AUTO: 96 FL (ref 81.4–97.8)
MCV RBC AUTO: 96.1 FL (ref 81.4–97.8)
MCV RBC AUTO: 96.2 FL (ref 81.4–97.8)
MCV RBC AUTO: 96.3 FL (ref 81.4–97.8)
MCV RBC AUTO: 96.8 FL (ref 81.4–97.8)
MCV RBC AUTO: 97.3 FL (ref 81.4–97.8)
MCV RBC AUTO: 97.7 FL (ref 81.4–97.8)
MCV RBC AUTO: 97.8 FL (ref 81.4–97.8)
MCV RBC AUTO: 98.2 FL (ref 81.4–97.8)
MCV RBC AUTO: 98.2 FL (ref 81.4–97.8)
MCV RBC AUTO: 98.6 FL (ref 81.4–97.8)
MCV RBC AUTO: 98.9 FL (ref 81.4–97.8)
MCV RBC AUTO: 98.9 FL (ref 81.4–97.8)
MCV RBC AUTO: 99 FL (ref 81.4–97.8)
MCV RBC AUTO: 99.1 FL (ref 81.4–97.8)
MCV RBC AUTO: 99.1 FL (ref 81.4–97.8)
MCV RBC AUTO: 99.3 FL (ref 81.4–97.8)
MCV RBC AUTO: 99.6 FL (ref 81.4–97.8)
MDMA UR QL SCN: NEGATIVE
METHADONE UR QL SCN: NEGATIVE
METHADONE UR QL SCN: NEGATIVE
MICRO URNS: ABNORMAL
MICRO URNS: NORMAL
MICROALBUMIN UR-MCNC: 0.7 MG/DL
MICROALBUMIN UR-MCNC: <0.7 MG/DL
MICROALBUMIN/CREAT UR: 5 MG/G (ref 0–30)
MICROALBUMIN/CREAT UR: NORMAL MG/G (ref 0–30)
MICROCYTES BLD QL SMEAR: ABNORMAL
MITOCHONDRIA M2 IGG SER-ACNC: 4.6 UNITS (ref 0–20)
MITOCHONDRIA M2 IGG SER-ACNC: 5.3 UNITS (ref 0–20)
MONOCYTES # BLD AUTO: 0.13 K/UL (ref 0–0.85)
MONOCYTES # BLD AUTO: 0.16 K/UL (ref 0–0.85)
MONOCYTES # BLD AUTO: 0.17 K/UL (ref 0–0.85)
MONOCYTES # BLD AUTO: 0.2 K/UL (ref 0–0.85)
MONOCYTES # BLD AUTO: 0.22 K/UL (ref 0–0.85)
MONOCYTES # BLD AUTO: 0.23 K/UL (ref 0–0.85)
MONOCYTES # BLD AUTO: 0.25 K/UL (ref 0–0.85)
MONOCYTES # BLD AUTO: 0.26 K/UL (ref 0–0.85)
MONOCYTES # BLD AUTO: 0.26 K/UL (ref 0–0.85)
MONOCYTES # BLD AUTO: 0.27 K/UL (ref 0–0.85)
MONOCYTES # BLD AUTO: 0.28 K/UL (ref 0–0.85)
MONOCYTES # BLD AUTO: 0.29 K/UL (ref 0–0.85)
MONOCYTES # BLD AUTO: 0.3 K/UL (ref 0–0.85)
MONOCYTES # BLD AUTO: 0.3 K/UL (ref 0–0.85)
MONOCYTES # BLD AUTO: 0.31 K/UL (ref 0–0.85)
MONOCYTES # BLD AUTO: 0.32 K/UL (ref 0–0.85)
MONOCYTES # BLD AUTO: 0.34 K/UL (ref 0–0.85)
MONOCYTES # BLD AUTO: 0.35 K/UL (ref 0–0.85)
MONOCYTES # BLD AUTO: 0.36 K/UL (ref 0–0.85)
MONOCYTES # BLD AUTO: 0.36 K/UL (ref 0–0.85)
MONOCYTES # BLD AUTO: 0.37 K/UL (ref 0–0.85)
MONOCYTES # BLD AUTO: 0.39 K/UL (ref 0–0.85)
MONOCYTES # BLD AUTO: 0.41 K/UL (ref 0–0.85)
MONOCYTES # BLD AUTO: 0.47 K/UL (ref 0–0.85)
MONOCYTES # BLD: 0.25 K/UL (ref 0–0.85)
MONOCYTES NFR BLD AUTO: 10.4 % (ref 0–13.4)
MONOCYTES NFR BLD AUTO: 10.7 % (ref 0–13.4)
MONOCYTES NFR BLD AUTO: 10.8 % (ref 0–13.4)
MONOCYTES NFR BLD AUTO: 11.5 % (ref 0–13.4)
MONOCYTES NFR BLD AUTO: 11.7 % (ref 0–13.4)
MONOCYTES NFR BLD AUTO: 2.7 % (ref 0–13.4)
MONOCYTES NFR BLD AUTO: 2.7 % (ref 0–13.4)
MONOCYTES NFR BLD AUTO: 3.5 % (ref 0–13.4)
MONOCYTES NFR BLD AUTO: 4.9 % (ref 0–13.4)
MONOCYTES NFR BLD AUTO: 5.3 % (ref 0–13.4)
MONOCYTES NFR BLD AUTO: 5.5 % (ref 0–13.4)
MONOCYTES NFR BLD AUTO: 5.6 % (ref 0–13.4)
MONOCYTES NFR BLD AUTO: 6.2 % (ref 0–13.4)
MONOCYTES NFR BLD AUTO: 6.4 % (ref 0–13.4)
MONOCYTES NFR BLD AUTO: 6.5 % (ref 0–13.4)
MONOCYTES NFR BLD AUTO: 6.6 % (ref 0–13.4)
MONOCYTES NFR BLD AUTO: 6.8 % (ref 0–13.4)
MONOCYTES NFR BLD AUTO: 7 % (ref 0–13.4)
MONOCYTES NFR BLD AUTO: 7.1 % (ref 0–13.4)
MONOCYTES NFR BLD AUTO: 7.3 % (ref 0–13.4)
MONOCYTES NFR BLD AUTO: 7.4 % (ref 0–13.4)
MONOCYTES NFR BLD AUTO: 7.6 % (ref 0–13.4)
MONOCYTES NFR BLD AUTO: 7.8 % (ref 0–13.4)
MONOCYTES NFR BLD AUTO: 7.9 % (ref 0–13.4)
MONOCYTES NFR BLD AUTO: 8 % (ref 0–13.4)
MONOCYTES NFR BLD AUTO: 8.1 % (ref 0–13.4)
MONOCYTES NFR BLD AUTO: 8.1 % (ref 0–13.4)
MONOCYTES NFR BLD AUTO: 8.2 % (ref 0–13.4)
MONOCYTES NFR BLD AUTO: 8.3 % (ref 0–13.4)
MONOCYTES NFR BLD AUTO: 8.6 % (ref 0–13.4)
MONOCYTES NFR BLD AUTO: 8.6 % (ref 0–13.4)
MONOCYTES NFR BLD AUTO: 8.9 % (ref 0–13.4)
MONOCYTES NFR BLD AUTO: 9 % (ref 0–13.4)
MONOCYTES NFR BLD AUTO: 9.7 % (ref 0–13.4)
MONOCYTES NFR BLD AUTO: 9.8 % (ref 0–13.4)
MONOCYTES NFR BLD AUTO: 9.8 % (ref 0–13.4)
MORPHOLOGY BLD-IMP: NORMAL
NEUTROPHILS # BLD AUTO: 1.91 K/UL (ref 2–7.15)
NEUTROPHILS # BLD AUTO: 1.97 K/UL (ref 2–7.15)
NEUTROPHILS # BLD AUTO: 1.98 K/UL (ref 2–7.15)
NEUTROPHILS # BLD AUTO: 2.03 K/UL (ref 2–7.15)
NEUTROPHILS # BLD AUTO: 2.12 K/UL (ref 2–7.15)
NEUTROPHILS # BLD AUTO: 2.16 K/UL (ref 2–7.15)
NEUTROPHILS # BLD AUTO: 2.21 K/UL (ref 2–7.15)
NEUTROPHILS # BLD AUTO: 2.29 K/UL (ref 2–7.15)
NEUTROPHILS # BLD AUTO: 2.31 K/UL (ref 2–7.15)
NEUTROPHILS # BLD AUTO: 2.31 K/UL (ref 2–7.15)
NEUTROPHILS # BLD AUTO: 2.32 K/UL (ref 2–7.15)
NEUTROPHILS # BLD AUTO: 2.38 K/UL (ref 2–7.15)
NEUTROPHILS # BLD AUTO: 2.45 K/UL (ref 2–7.15)
NEUTROPHILS # BLD AUTO: 2.54 K/UL (ref 2–7.15)
NEUTROPHILS # BLD AUTO: 2.61 K/UL (ref 2–7.15)
NEUTROPHILS # BLD AUTO: 2.62 K/UL (ref 2–7.15)
NEUTROPHILS # BLD AUTO: 2.74 K/UL (ref 2–7.15)
NEUTROPHILS # BLD AUTO: 2.76 K/UL (ref 2–7.15)
NEUTROPHILS # BLD AUTO: 2.77 K/UL (ref 2–7.15)
NEUTROPHILS # BLD AUTO: 2.87 K/UL (ref 2–7.15)
NEUTROPHILS # BLD AUTO: 2.96 K/UL (ref 2–7.15)
NEUTROPHILS # BLD AUTO: 3.05 K/UL (ref 2–7.15)
NEUTROPHILS # BLD AUTO: 3.06 K/UL (ref 2–7.15)
NEUTROPHILS # BLD AUTO: 3.06 K/UL (ref 2–7.15)
NEUTROPHILS # BLD AUTO: 3.1 K/UL (ref 2–7.15)
NEUTROPHILS # BLD AUTO: 3.15 K/UL (ref 2–7.15)
NEUTROPHILS # BLD AUTO: 3.16 K/UL (ref 2–7.15)
NEUTROPHILS # BLD AUTO: 3.27 K/UL (ref 2–7.15)
NEUTROPHILS # BLD AUTO: 3.32 K/UL (ref 2–7.15)
NEUTROPHILS # BLD AUTO: 3.4 K/UL (ref 2–7.15)
NEUTROPHILS # BLD AUTO: 3.6 K/UL (ref 2–7.15)
NEUTROPHILS # BLD AUTO: 3.93 K/UL (ref 2–7.15)
NEUTROPHILS # BLD AUTO: 3.98 K/UL (ref 2–7.15)
NEUTROPHILS # BLD AUTO: 4.08 K/UL (ref 2–7.15)
NEUTROPHILS # BLD AUTO: 4.17 K/UL (ref 2–7.15)
NEUTROPHILS # BLD AUTO: 4.25 K/UL (ref 2–7.15)
NEUTROPHILS # BLD AUTO: 4.59 K/UL (ref 2–7.15)
NEUTROPHILS # BLD AUTO: 5.24 K/UL (ref 2–7.15)
NEUTROPHILS # BLD AUTO: 5.83 K/UL (ref 2–7.15)
NEUTROPHILS # BLD AUTO: 7.36 K/UL (ref 2–7.15)
NEUTROPHILS # BLD: 2.66 K/UL (ref 2–7.15)
NEUTROPHILS NFR BLD AUTO: 74 % (ref 44–72)
NEUTROPHILS NFR BLD: 61.2 % (ref 44–72)
NEUTROPHILS NFR BLD: 64.6 % (ref 44–72)
NEUTROPHILS NFR BLD: 65.5 % (ref 44–72)
NEUTROPHILS NFR BLD: 66.1 % (ref 44–72)
NEUTROPHILS NFR BLD: 66.8 % (ref 44–72)
NEUTROPHILS NFR BLD: 70.6 % (ref 44–72)
NEUTROPHILS NFR BLD: 71.3 % (ref 44–72)
NEUTROPHILS NFR BLD: 71.6 % (ref 44–72)
NEUTROPHILS NFR BLD: 71.6 % (ref 44–72)
NEUTROPHILS NFR BLD: 71.9 % (ref 44–72)
NEUTROPHILS NFR BLD: 73 % (ref 44–72)
NEUTROPHILS NFR BLD: 73 % (ref 44–72)
NEUTROPHILS NFR BLD: 73.1 % (ref 44–72)
NEUTROPHILS NFR BLD: 73.5 % (ref 44–72)
NEUTROPHILS NFR BLD: 73.7 % (ref 44–72)
NEUTROPHILS NFR BLD: 74.1 % (ref 44–72)
NEUTROPHILS NFR BLD: 74.8 % (ref 44–72)
NEUTROPHILS NFR BLD: 74.9 % (ref 44–72)
NEUTROPHILS NFR BLD: 75.3 % (ref 44–72)
NEUTROPHILS NFR BLD: 75.9 % (ref 44–72)
NEUTROPHILS NFR BLD: 76.2 % (ref 44–72)
NEUTROPHILS NFR BLD: 76.4 % (ref 44–72)
NEUTROPHILS NFR BLD: 76.7 % (ref 44–72)
NEUTROPHILS NFR BLD: 77.2 % (ref 44–72)
NEUTROPHILS NFR BLD: 78.2 % (ref 44–72)
NEUTROPHILS NFR BLD: 78.4 % (ref 44–72)
NEUTROPHILS NFR BLD: 78.5 % (ref 44–72)
NEUTROPHILS NFR BLD: 78.6 % (ref 44–72)
NEUTROPHILS NFR BLD: 79.6 % (ref 44–72)
NEUTROPHILS NFR BLD: 79.7 % (ref 44–72)
NEUTROPHILS NFR BLD: 80 % (ref 44–72)
NEUTROPHILS NFR BLD: 80.7 % (ref 44–72)
NEUTROPHILS NFR BLD: 82.2 % (ref 44–72)
NEUTROPHILS NFR BLD: 82.3 % (ref 44–72)
NEUTROPHILS NFR BLD: 82.5 % (ref 44–72)
NEUTROPHILS NFR BLD: 84.2 % (ref 44–72)
NEUTROPHILS NFR BLD: 85.1 % (ref 44–72)
NEUTROPHILS NFR BLD: 87.3 % (ref 44–72)
NEUTROPHILS NFR BLD: 87.8 % (ref 44–72)
NEUTROPHILS NFR BLD: 91.1 % (ref 44–72)
NEUTS BAND NFR BLD MANUAL: 2.6 % (ref 0–10)
NITRITE UR QL STRIP.AUTO: NEGATIVE
NRBC # BLD AUTO: 0 K/UL
NRBC BLD AUTO-RTO: 0 /100 WBC
NRBC BLD-RTO: 0 /100 WBC
NUCLEAR IGG SER QL IA: DETECTED
NUCLEAR IGG SER QL IA: DETECTED
NUCLEAR IGG TITR SER IF: ABNORMAL {TITER}
NUCLEAR IGG TITR SER IF: ABNORMAL {TITER}
O2/TOTAL GAS SETTING VFR VENT: 21 %
O2/TOTAL GAS SETTING VFR VENT: 30 %
O2/TOTAL GAS SETTING VFR VENT: 40 %
O2/TOTAL GAS SETTING VFR VENT: 60 %
OPIATES UR QL SCN: NEGATIVE
OPIATES UR QL SCN: POSITIVE
OVALOCYTES BLD QL SMEAR: NORMAL
OXYCODONE UR QL SCN: NEGATIVE
OXYCODONE UR QL SCN: POSITIVE
PA AA BLD-ACNC: 34.6 %
PA ADP BLD-ACNC: 14.9 %
PCO2 BLDA: 18.8 MMHG (ref 26–37)
PCO2 BLDA: 20.7 MMHG (ref 26–37)
PCO2 BLDA: 22 MMHG (ref 26–37)
PCO2 BLDA: 30.8 MMHG (ref 26–37)
PCO2 BLDA: 33.4 MMHG (ref 26–37)
PCO2 BLDA: 36.5 MMHG (ref 26–37)
PCO2 TEMP ADJ BLDA: 20.5 MMHG (ref 26–37)
PCO2 TEMP ADJ BLDA: 30.5 MMHG (ref 26–37)
PCO2 TEMP ADJ BLDA: 32.3 MMHG (ref 26–37)
PCO2 TEMP ADJ BLDA: 34.7 MMHG (ref 26–37)
PCP UR QL SCN: NEGATIVE
PCP UR QL SCN: NEGATIVE
PH BLDA: 7.21 [PH] (ref 7.4–7.5)
PH BLDA: 7.3 [PH] (ref 7.4–7.5)
PH BLDA: 7.3 [PH] (ref 7.4–7.5)
PH BLDA: 7.32 [PH] (ref 7.4–7.5)
PH BLDA: 7.33 [PH] (ref 7.4–7.5)
PH BLDA: 7.35 [PH] (ref 7.4–7.5)
PH TEMP ADJ BLDA: 7.21 [PH] (ref 7.4–7.5)
PH TEMP ADJ BLDA: 7.3 [PH] (ref 7.4–7.5)
PH TEMP ADJ BLDA: 7.32 [PH] (ref 7.4–7.5)
PH TEMP ADJ BLDA: 7.34 [PH] (ref 7.4–7.5)
PH UR STRIP.AUTO: 5 [PH]
PH UR STRIP.AUTO: 5 [PH]
PH UR STRIP.AUTO: 5.5 [PH]
PH UR STRIP.AUTO: 5.5 [PH]
PH UR STRIP.AUTO: 6 [PH]
PHOSPHATE SERPL-MCNC: 2.4 MG/DL (ref 2.5–4.5)
PHOSPHATE SERPL-MCNC: 2.7 MG/DL (ref 2.5–4.5)
PHOSPHATE SERPL-MCNC: 2.8 MG/DL (ref 2.5–4.5)
PHOSPHATE SERPL-MCNC: 3.2 MG/DL (ref 2.5–4.5)
PHOSPHATE SERPL-MCNC: 3.2 MG/DL (ref 2.5–4.5)
PHOSPHATE SERPL-MCNC: 3.3 MG/DL (ref 2.5–4.5)
PHOSPHATE SERPL-MCNC: 3.4 MG/DL (ref 2.5–4.5)
PHOSPHATE SERPL-MCNC: 3.5 MG/DL (ref 2.5–4.5)
PHOSPHATE SERPL-MCNC: 3.8 MG/DL (ref 2.5–4.5)
PHOSPHATE SERPL-MCNC: 4.3 MG/DL (ref 2.5–4.5)
PHOSPHATE SERPL-MCNC: 5 MG/DL (ref 2.5–4.5)
PLATELET # BLD AUTO: 35 K/UL (ref 164–446)
PLATELET # BLD AUTO: 36 K/UL (ref 164–446)
PLATELET # BLD AUTO: 37 K/UL (ref 164–446)
PLATELET # BLD AUTO: 37 K/UL (ref 164–446)
PLATELET # BLD AUTO: 40 K/UL (ref 164–446)
PLATELET # BLD AUTO: 47 K/UL (ref 164–446)
PLATELET # BLD AUTO: 50 K/UL (ref 164–446)
PLATELET # BLD AUTO: 51 K/UL (ref 164–446)
PLATELET # BLD AUTO: 52 K/UL (ref 164–446)
PLATELET # BLD AUTO: 53 K/UL (ref 164–446)
PLATELET # BLD AUTO: 54 K/UL (ref 164–446)
PLATELET # BLD AUTO: 55 K/UL (ref 164–446)
PLATELET # BLD AUTO: 56 K/UL (ref 164–446)
PLATELET # BLD AUTO: 56 K/UL (ref 164–446)
PLATELET # BLD AUTO: 57 K/UL (ref 164–446)
PLATELET # BLD AUTO: 58 K/UL (ref 164–446)
PLATELET # BLD AUTO: 58 K/UL (ref 164–446)
PLATELET # BLD AUTO: 59 K/UL (ref 164–446)
PLATELET # BLD AUTO: 60 K/UL (ref 164–446)
PLATELET # BLD AUTO: 62 K/UL (ref 164–446)
PLATELET # BLD AUTO: 62 K/UL (ref 164–446)
PLATELET # BLD AUTO: 63 K/UL (ref 164–446)
PLATELET # BLD AUTO: 63 K/UL (ref 164–446)
PLATELET # BLD AUTO: 64 K/UL (ref 164–446)
PLATELET # BLD AUTO: 64 K/UL (ref 164–446)
PLATELET # BLD AUTO: 65 K/UL (ref 164–446)
PLATELET # BLD AUTO: 66 K/UL (ref 164–446)
PLATELET # BLD AUTO: 68 K/UL (ref 164–446)
PLATELET # BLD AUTO: 68 K/UL (ref 164–446)
PLATELET # BLD AUTO: 69 K/UL (ref 164–446)
PLATELET # BLD AUTO: 70 K/UL (ref 164–446)
PLATELET # BLD AUTO: 74 K/UL (ref 164–446)
PLATELET # BLD AUTO: 75 K/UL (ref 164–446)
PLATELET # BLD AUTO: 76 K/UL (ref 164–446)
PLATELET # BLD AUTO: 76 K/UL (ref 164–446)
PLATELET # BLD AUTO: 78 K/UL (ref 164–446)
PLATELET # BLD AUTO: 80 K/UL (ref 164–446)
PLATELET # BLD AUTO: 84 K/UL (ref 164–446)
PLATELET # BLD AUTO: 94 K/UL (ref 164–446)
PLATELET BLD QL SMEAR: NORMAL
PLATELETS.RETICULATED NFR BLD AUTO: 7.9 K/UL (ref 0.6–13.1)
PMV BLD AUTO: 10.4 FL (ref 9–12.9)
PMV BLD AUTO: 10.6 FL (ref 9–12.9)
PMV BLD AUTO: 10.8 FL (ref 9–12.9)
PMV BLD AUTO: 10.9 FL (ref 9–12.9)
PMV BLD AUTO: 11.1 FL (ref 9–12.9)
PMV BLD AUTO: 11.3 FL (ref 9–12.9)
PMV BLD AUTO: 11.4 FL (ref 9–12.9)
PMV BLD AUTO: 11.4 FL (ref 9–12.9)
PMV BLD AUTO: 11.8 FL (ref 9–12.9)
PMV BLD AUTO: 11.8 FL (ref 9–12.9)
PMV BLD AUTO: 11.9 FL (ref 9–12.9)
PMV BLD AUTO: 12 FL (ref 9–12.9)
PMV BLD AUTO: 12.1 FL (ref 9–12.9)
PMV BLD AUTO: 12.5 FL (ref 9–12.9)
PMV BLD AUTO: 12.6 FL (ref 9–12.9)
PMV BLD AUTO: 12.6 FL (ref 9–12.9)
PMV BLD AUTO: 12.7 FL (ref 9–12.9)
PMV BLD AUTO: 12.8 FL (ref 9–12.9)
PMV BLD AUTO: 12.8 FL (ref 9–12.9)
PMV BLD AUTO: 13 FL (ref 9–12.9)
PMV BLD AUTO: 13.1 FL (ref 9–12.9)
PMV BLD AUTO: 13.2 FL (ref 9–12.9)
PMV BLD AUTO: 13.3 FL (ref 9–12.9)
PMV BLD AUTO: 13.5 FL (ref 9–12.9)
PMV BLD AUTO: 13.5 FL (ref 9–12.9)
PMV BLD AUTO: 13.6 FL (ref 9–12.9)
PMV BLD AUTO: 13.6 FL (ref 9–12.9)
PMV BLD AUTO: 13.7 FL (ref 9–12.9)
PMV BLD AUTO: 13.9 FL (ref 9–12.9)
PMV BLD AUTO: 14 FL (ref 9–12.9)
PO2 BLDA: 118 MMHG (ref 64–87)
PO2 BLDA: 190 MMHG (ref 64–87)
PO2 BLDA: 57 MMHG (ref 64–87)
PO2 BLDA: 82.1 MMHG (ref 64–87)
PO2 BLDA: 85 MMHG (ref 64–87)
PO2 BLDA: 87.2 MMHG (ref 64–87)
PO2 TEMP ADJ BLDA: 117 MMHG (ref 64–87)
PO2 TEMP ADJ BLDA: 184 MMHG (ref 64–87)
PO2 TEMP ADJ BLDA: 56 MMHG (ref 64–87)
PO2 TEMP ADJ BLDA: 81 MMHG (ref 64–87)
POIKILOCYTOSIS BLD QL SMEAR: NORMAL
POTASSIUM SERPL-SCNC: 2.9 MMOL/L (ref 3.6–5.5)
POTASSIUM SERPL-SCNC: 3.1 MMOL/L (ref 3.6–5.5)
POTASSIUM SERPL-SCNC: 3.2 MMOL/L (ref 3.6–5.5)
POTASSIUM SERPL-SCNC: 3.3 MMOL/L (ref 3.6–5.5)
POTASSIUM SERPL-SCNC: 3.4 MMOL/L (ref 3.6–5.5)
POTASSIUM SERPL-SCNC: 3.4 MMOL/L (ref 3.6–5.5)
POTASSIUM SERPL-SCNC: 3.5 MMOL/L (ref 3.6–5.5)
POTASSIUM SERPL-SCNC: 3.6 MMOL/L (ref 3.6–5.5)
POTASSIUM SERPL-SCNC: 3.7 MMOL/L (ref 3.6–5.5)
POTASSIUM SERPL-SCNC: 3.8 MMOL/L (ref 3.6–5.5)
POTASSIUM SERPL-SCNC: 3.9 MMOL/L (ref 3.6–5.5)
POTASSIUM SERPL-SCNC: 4 MMOL/L (ref 3.6–5.5)
POTASSIUM SERPL-SCNC: 4 MMOL/L (ref 3.6–5.5)
POTASSIUM SERPL-SCNC: 4.1 MMOL/L (ref 3.6–5.5)
POTASSIUM SERPL-SCNC: 4.1 MMOL/L (ref 3.6–5.5)
POTASSIUM SERPL-SCNC: 4.2 MMOL/L (ref 3.6–5.5)
POTASSIUM SERPL-SCNC: 4.3 MMOL/L (ref 3.6–5.5)
POTASSIUM SERPL-SCNC: 4.3 MMOL/L (ref 3.6–5.5)
POTASSIUM SERPL-SCNC: 4.4 MMOL/L (ref 3.6–5.5)
POTASSIUM SERPL-SCNC: 4.4 MMOL/L (ref 3.6–5.5)
POTASSIUM SERPL-SCNC: 4.5 MMOL/L (ref 3.6–5.5)
POTASSIUM SERPL-SCNC: 4.5 MMOL/L (ref 3.6–5.5)
POTASSIUM SERPL-SCNC: 4.6 MMOL/L (ref 3.6–5.5)
POTASSIUM SERPL-SCNC: 4.7 MMOL/L (ref 3.6–5.5)
POTASSIUM SERPL-SCNC: 4.7 MMOL/L (ref 3.6–5.5)
POTASSIUM SERPL-SCNC: 4.8 MMOL/L (ref 3.6–5.5)
POTASSIUM SERPL-SCNC: 4.8 MMOL/L (ref 3.6–5.5)
POTASSIUM SERPL-SCNC: 4.9 MMOL/L (ref 3.6–5.5)
POTASSIUM SERPL-SCNC: 4.9 MMOL/L (ref 3.6–5.5)
POTASSIUM SERPL-SCNC: 5 MMOL/L (ref 3.6–5.5)
POTASSIUM SERPL-SCNC: 5 MMOL/L (ref 3.6–5.5)
POTASSIUM UR-SCNC: 19.3 MMOL/L
PREALB SERPL-MCNC: 9 MG/DL (ref 18–38)
PRODUCT TYPE UPROD: NORMAL
PROMYELOCYTES NFR BLD MANUAL: 1.8 %
PROPOXYPH UR QL SCN: NEGATIVE
PROPOXYPH UR QL SCN: NEGATIVE
PROT SERPL-MCNC: 4.4 G/DL (ref 6–8.2)
PROT SERPL-MCNC: 4.5 G/DL (ref 6–8.2)
PROT SERPL-MCNC: 4.6 G/DL (ref 6–8.2)
PROT SERPL-MCNC: 4.6 G/DL (ref 6–8.2)
PROT SERPL-MCNC: 4.7 G/DL (ref 6–8.2)
PROT SERPL-MCNC: 4.8 G/DL (ref 6–8.2)
PROT SERPL-MCNC: 4.8 G/DL (ref 6–8.2)
PROT SERPL-MCNC: 4.9 G/DL (ref 6–8.2)
PROT SERPL-MCNC: 4.9 G/DL (ref 6–8.2)
PROT SERPL-MCNC: 5 G/DL (ref 6–8.2)
PROT SERPL-MCNC: 5.1 G/DL (ref 6–8.2)
PROT SERPL-MCNC: 5.2 G/DL (ref 6–8.2)
PROT SERPL-MCNC: 5.3 G/DL (ref 6–8.2)
PROT SERPL-MCNC: 5.4 G/DL (ref 6–8.2)
PROT SERPL-MCNC: 5.4 G/DL (ref 6–8.2)
PROT SERPL-MCNC: 5.5 G/DL (ref 6–8.2)
PROT SERPL-MCNC: 5.6 G/DL (ref 6–8.2)
PROT SERPL-MCNC: 5.7 G/DL (ref 6–8.2)
PROT SERPL-MCNC: 5.8 G/DL (ref 6–8.2)
PROT SERPL-MCNC: 5.9 G/DL (ref 6–8.2)
PROT SERPL-MCNC: 6.2 G/DL (ref 6–8.2)
PROT SERPL-MCNC: 6.3 G/DL (ref 6–8.2)
PROT SERPL-MCNC: 6.3 G/DL (ref 6–8.2)
PROT SERPL-MCNC: 6.4 G/DL (ref 6–8.3)
PROT SERPL-MCNC: 6.5 G/DL (ref 6–8.2)
PROT UR QL STRIP: 30 MG/DL
PROT UR QL STRIP: NEGATIVE MG/DL
PROTHROMBIN TIME: 15.5 SEC (ref 12–14.6)
PROTHROMBIN TIME: 15.7 SEC (ref 12–14.6)
PROTHROMBIN TIME: 15.7 SEC (ref 12–14.6)
PROTHROMBIN TIME: 15.9 SEC (ref 12–14.6)
PROTHROMBIN TIME: 16.1 SEC (ref 12–14.6)
PROTHROMBIN TIME: 16.2 SEC (ref 12–14.6)
PROTHROMBIN TIME: 16.4 SEC (ref 12–14.6)
PROTHROMBIN TIME: 16.6 SEC (ref 12–14.6)
PROTHROMBIN TIME: 16.7 SEC (ref 12–14.6)
PROTHROMBIN TIME: 16.8 SEC (ref 12–14.6)
PROTHROMBIN TIME: 17.3 SEC (ref 12–14.6)
PROTHROMBIN TIME: 17.5 SEC (ref 12–14.6)
PTH-INTACT SERPL-MCNC: 101 PG/ML (ref 14–72)
PTH-INTACT SERPL-MCNC: 47 PG/ML (ref 14–72)
PTH-INTACT SERPL-MCNC: 53.1 PG/ML (ref 14–72)
PTH-INTACT SERPL-MCNC: 71.4 PG/ML (ref 14–72)
PTH-INTACT SERPL-MCNC: 79.6 PG/ML (ref 14–72)
RBC # BLD AUTO: 2.18 M/UL (ref 4.2–5.4)
RBC # BLD AUTO: 2.35 M/UL (ref 4.2–5.4)
RBC # BLD AUTO: 2.37 M/UL (ref 4.2–5.4)
RBC # BLD AUTO: 2.49 M/UL (ref 4.2–5.4)
RBC # BLD AUTO: 2.51 M/UL (ref 4.2–5.4)
RBC # BLD AUTO: 2.52 M/UL (ref 4.2–5.4)
RBC # BLD AUTO: 2.56 M/UL (ref 4.2–5.4)
RBC # BLD AUTO: 2.57 M/UL (ref 4.2–5.4)
RBC # BLD AUTO: 2.59 M/UL (ref 4.2–5.4)
RBC # BLD AUTO: 2.6 M/UL (ref 4.2–5.4)
RBC # BLD AUTO: 2.61 M/UL (ref 4.2–5.4)
RBC # BLD AUTO: 2.64 M/UL (ref 4.2–5.4)
RBC # BLD AUTO: 2.65 M/UL (ref 4.2–5.4)
RBC # BLD AUTO: 2.67 M/UL (ref 4.2–5.4)
RBC # BLD AUTO: 2.69 M/UL (ref 4.2–5.4)
RBC # BLD AUTO: 2.69 M/UL (ref 4.2–5.4)
RBC # BLD AUTO: 2.71 M/UL (ref 4.2–5.4)
RBC # BLD AUTO: 2.77 M/UL (ref 4.2–5.4)
RBC # BLD AUTO: 2.8 M/UL (ref 4.2–5.4)
RBC # BLD AUTO: 2.8 M/UL (ref 4.2–5.4)
RBC # BLD AUTO: 2.81 M/UL (ref 4.2–5.4)
RBC # BLD AUTO: 2.82 M/UL (ref 4.2–5.4)
RBC # BLD AUTO: 2.86 M/UL (ref 4.2–5.4)
RBC # BLD AUTO: 2.88 M/UL (ref 4.2–5.4)
RBC # BLD AUTO: 2.9 M/UL (ref 4.2–5.4)
RBC # BLD AUTO: 2.91 M/UL (ref 4.2–5.4)
RBC # BLD AUTO: 2.94 M/UL (ref 4.2–5.4)
RBC # BLD AUTO: 2.97 M/UL (ref 4.2–5.4)
RBC # BLD AUTO: 2.97 M/UL (ref 4.2–5.4)
RBC # BLD AUTO: 2.98 M/UL (ref 4.2–5.4)
RBC # BLD AUTO: 2.99 M/UL (ref 4.2–5.4)
RBC # BLD AUTO: 3.02 M/UL (ref 4.2–5.4)
RBC # BLD AUTO: 3.06 M/UL (ref 4.2–5.4)
RBC # BLD AUTO: 3.12 M/UL (ref 4.2–5.4)
RBC # BLD AUTO: 3.16 M/UL (ref 4.2–5.4)
RBC # BLD AUTO: 3.17 M/UL (ref 4.2–5.4)
RBC # BLD AUTO: 3.2 M/UL (ref 4.2–5.4)
RBC # BLD AUTO: 3.22 M/UL (ref 4.2–5.4)
RBC # BLD AUTO: 3.23 M/UL (ref 4.2–5.4)
RBC # BLD AUTO: 3.25 M/UL (ref 4.2–5.4)
RBC # BLD AUTO: 3.27 M/UL (ref 4.2–5.4)
RBC # BLD AUTO: 3.28 M/UL (ref 4.2–5.4)
RBC # BLD AUTO: 3.3 M/UL (ref 4.2–5.4)
RBC # BLD AUTO: 3.31 M/UL (ref 4.2–5.4)
RBC # BLD AUTO: 3.37 M/UL (ref 4.2–5.4)
RBC # BLD AUTO: 3.39 M/UL (ref 4.2–5.4)
RBC # BLD AUTO: 3.45 M/UL (ref 4.2–5.4)
RBC # BLD AUTO: 3.48 M/UL (ref 4.2–5.4)
RBC # BLD AUTO: 3.55 M/UL (ref 4.2–5.4)
RBC # BLD AUTO: 3.56 M/UL (ref 4.2–5.4)
RBC # BLD AUTO: 3.57 M/UL (ref 4.2–5.4)
RBC # BLD AUTO: 3.62 M/UL (ref 4.2–5.4)
RBC # BLD AUTO: 3.66 M/UL (ref 4.2–5.4)
RBC # BLD AUTO: 4.01 M/UL (ref 4.2–5.4)
RBC # URNS HPF: ABNORMAL /HPF
RBC BLD AUTO: PRESENT
RBC UR QL AUTO: ABNORMAL
RBC UR QL AUTO: NEGATIVE
RENAL EPI CELLS #/AREA URNS HPF: ABNORMAL /HPF
RH BLD: NORMAL
SALICYLATES SERPL-MCNC: 0 MG/DL (ref 15–25)
SAO2 % BLDA: 100 % (ref 93–99)
SAO2 % BLDA: 84 % (ref 93–99)
SAO2 % BLDA: 95.3 % (ref 93–99)
SAO2 % BLDA: 95.5 % (ref 93–99)
SAO2 % BLDA: 96 % (ref 93–99)
SAO2 % BLDA: 98 % (ref 93–99)
SCHISTOCYTES BLD QL SMEAR: NORMAL
SIGNIFICANT IND 70042: ABNORMAL
SIGNIFICANT IND 70042: NORMAL
SITE SITE: ABNORMAL
SITE SITE: ABNORMAL
SITE SITE: NORMAL
SMA IGG SER-ACNC: 15 UNITS (ref 0–19)
SODIUM SERPL-SCNC: 133 MMOL/L (ref 135–145)
SODIUM SERPL-SCNC: 133 MMOL/L (ref 135–145)
SODIUM SERPL-SCNC: 134 MMOL/L (ref 135–145)
SODIUM SERPL-SCNC: 134 MMOL/L (ref 135–145)
SODIUM SERPL-SCNC: 135 MMOL/L (ref 135–145)
SODIUM SERPL-SCNC: 136 MMOL/L (ref 135–145)
SODIUM SERPL-SCNC: 137 MMOL/L (ref 135–145)
SODIUM SERPL-SCNC: 138 MMOL/L (ref 135–145)
SODIUM SERPL-SCNC: 139 MMOL/L (ref 135–145)
SODIUM SERPL-SCNC: 140 MMOL/L (ref 135–145)
SODIUM SERPL-SCNC: 141 MMOL/L (ref 135–145)
SODIUM SERPL-SCNC: 142 MMOL/L (ref 135–145)
SODIUM SERPL-SCNC: 143 MMOL/L (ref 135–145)
SODIUM SERPL-SCNC: 145 MMOL/L (ref 135–145)
SODIUM SERPL-SCNC: 146 MMOL/L (ref 135–145)
SODIUM SERPL-SCNC: 149 MMOL/L (ref 135–145)
SODIUM SERPL-SCNC: 150 MMOL/L (ref 135–145)
SODIUM UR-SCNC: <10 MMOL/L
SODIUM UR-SCNC: <10 MMOL/L
SOURCE SOURCE: ABNORMAL
SOURCE SOURCE: NORMAL
SP GR UR STRIP.AUTO: 1
SP GR UR STRIP.AUTO: 1.01
SP GR UR STRIP.AUTO: 1.02
SPECIMEN DRAWN FROM PATIENT: ABNORMAL
SSA52 R0ENA AB IGG Q0420: 2 AU/ML (ref 0–40)
SSA60 R0ENA AB IGG Q0419: 4 AU/ML (ref 0–40)
TEG ALGORITHM TGALG: ABNORMAL
TIBC SERPL-MCNC: 363 UG/DL (ref 250–450)
TIBC SERPL-MCNC: 371 UG/DL (ref 250–450)
TIBC SERPL-MCNC: 414 UG/DL (ref 250–450)
TRANS CELLS #/AREA URNS HPF: ABNORMAL /HPF
TRANS CELLS #/AREA URNS HPF: ABNORMAL /HPF
TREPONEMA PALLIDUM IGG+IGM AB [PRESENCE] IN SERUM OR PLASMA BY IMMUNOASSAY: NON REACTIVE
TRIGL SERPL-MCNC: 109 MG/DL (ref 0–149)
TROPONIN I SERPL-MCNC: 0.37 NG/ML (ref 0–0.04)
TROPONIN I SERPL-MCNC: 0.48 NG/ML (ref 0–0.04)
TROPONIN I SERPL-MCNC: 0.75 NG/ML (ref 0–0.04)
TROPONIN I SERPL-MCNC: <0.01 NG/ML (ref 0–0.04)
TSH SERPL DL<=0.005 MIU/L-ACNC: 1.61 UIU/ML (ref 0.3–3.7)
TSH SERPL DL<=0.005 MIU/L-ACNC: 2.42 UIU/ML (ref 0.3–3.7)
TSH SERPL DL<=0.005 MIU/L-ACNC: 2.62 UIU/ML (ref 0.3–3.7)
TSH SERPL DL<=0.005 MIU/L-ACNC: 2.78 UIU/ML (ref 0.3–3.7)
U1 SNRNP IGG SER QL: 0 AU/ML (ref 0–40)
UNIT STATUS USTAT: NORMAL
UROBILINOGEN UR STRIP.AUTO-MCNC: 0.2 MG/DL
UROBILINOGEN UR STRIP.AUTO-MCNC: NORMAL MG/DL
UROBILINOGEN UR STRIP.AUTO-MCNC: NORMAL MG/DL
VIT B12 SERPL-MCNC: 883 PG/ML (ref 211–911)
VIT B12 SERPL-MCNC: 956 PG/ML (ref 211–911)
WBC # BLD AUTO: 2.6 K/UL (ref 4.8–10.8)
WBC # BLD AUTO: 2.8 K/UL (ref 4.8–10.8)
WBC # BLD AUTO: 3 K/UL (ref 4.8–10.8)
WBC # BLD AUTO: 3.1 K/UL (ref 4.8–10.8)
WBC # BLD AUTO: 3.2 K/UL (ref 4.8–10.8)
WBC # BLD AUTO: 3.3 K/UL (ref 4.8–10.8)
WBC # BLD AUTO: 3.6 K/UL (ref 4.8–10.8)
WBC # BLD AUTO: 3.7 K/UL (ref 4.8–10.8)
WBC # BLD AUTO: 3.7 K/UL (ref 4.8–10.8)
WBC # BLD AUTO: 3.8 K/UL (ref 4.8–10.8)
WBC # BLD AUTO: 4 K/UL (ref 4.8–10.8)
WBC # BLD AUTO: 4.1 K/UL (ref 4.8–10.8)
WBC # BLD AUTO: 4.2 K/UL (ref 4.8–10.8)
WBC # BLD AUTO: 4.3 K/UL (ref 4.8–10.8)
WBC # BLD AUTO: 4.3 K/UL (ref 4.8–10.8)
WBC # BLD AUTO: 4.5 K/UL (ref 4.8–10.8)
WBC # BLD AUTO: 4.9 K/UL (ref 4.8–10.8)
WBC # BLD AUTO: 4.9 K/UL (ref 4.8–10.8)
WBC # BLD AUTO: 5 K/UL (ref 4.8–10.8)
WBC # BLD AUTO: 5 K/UL (ref 4.8–10.8)
WBC # BLD AUTO: 5.1 K/UL (ref 4.8–10.8)
WBC # BLD AUTO: 5.2 K/UL (ref 4.8–10.8)
WBC # BLD AUTO: 5.2 K/UL (ref 4.8–10.8)
WBC # BLD AUTO: 5.4 K/UL (ref 4.8–10.8)
WBC # BLD AUTO: 5.6 K/UL (ref 4.8–10.8)
WBC # BLD AUTO: 5.7 K/UL (ref 4.8–10.8)
WBC # BLD AUTO: 5.8 K/UL (ref 4.8–10.8)
WBC # BLD AUTO: 6 K/UL (ref 4.8–10.8)
WBC # BLD AUTO: 6.2 K/UL (ref 4.8–10.8)
WBC # BLD AUTO: 6.4 K/UL (ref 4.8–10.8)
WBC # BLD AUTO: 8.4 K/UL (ref 4.8–10.8)
WBC #/AREA URNS HPF: ABNORMAL /HPF
YEAST BUDDING URNS QL: PRESENT /HPF
YEAST BUDDING URNS QL: PRESENT /HPF

## 2017-01-01 PROCEDURE — A9270 NON-COVERED ITEM OR SERVICE: HCPCS | Performed by: NURSE PRACTITIONER

## 2017-01-01 PROCEDURE — 302098 PASTE RING (FLAT): Performed by: INTERNAL MEDICINE

## 2017-01-01 PROCEDURE — 86900 BLOOD TYPING SEROLOGIC ABO: CPT

## 2017-01-01 PROCEDURE — 99213 OFFICE O/P EST LOW 20 MIN: CPT | Performed by: INTERNAL MEDICINE

## 2017-01-01 PROCEDURE — 302255 BARRIER CREAM MOISTURE BAZA PROTECT (ZINC) 5OZ: Performed by: HOSPITALIST

## 2017-01-01 PROCEDURE — 85007 BL SMEAR W/DIFF WBC COUNT: CPT

## 2017-01-01 PROCEDURE — 70450 CT HEAD/BRAIN W/O DYE: CPT

## 2017-01-01 PROCEDURE — 700102 HCHG RX REV CODE 250 W/ 637 OVERRIDE(OP): Performed by: INTERNAL MEDICINE

## 2017-01-01 PROCEDURE — 83970 ASSAY OF PARATHORMONE: CPT

## 2017-01-01 PROCEDURE — 99233 SBSQ HOSP IP/OBS HIGH 50: CPT | Performed by: INTERNAL MEDICINE

## 2017-01-01 PROCEDURE — A6250 SKIN SEAL PROTECT MOISTURIZR: HCPCS

## 2017-01-01 PROCEDURE — 97530 THERAPEUTIC ACTIVITIES: CPT

## 2017-01-01 PROCEDURE — G0378 HOSPITAL OBSERVATION PER HR: HCPCS

## 2017-01-01 PROCEDURE — 83735 ASSAY OF MAGNESIUM: CPT

## 2017-01-01 PROCEDURE — A9270 NON-COVERED ITEM OR SERVICE: HCPCS | Performed by: FAMILY MEDICINE

## 2017-01-01 PROCEDURE — 99285 EMERGENCY DEPT VISIT HI MDM: CPT

## 2017-01-01 PROCEDURE — 36415 COLL VENOUS BLD VENIPUNCTURE: CPT

## 2017-01-01 PROCEDURE — 770006 HCHG ROOM/CARE - MED/SURG/GYN SEMI*

## 2017-01-01 PROCEDURE — 770020 HCHG ROOM/CARE - TELE (206)

## 2017-01-01 PROCEDURE — 84160 ASSAY OF PROTEIN ANY SOURCE: CPT

## 2017-01-01 PROCEDURE — 86255 FLUORESCENT ANTIBODY SCREEN: CPT

## 2017-01-01 PROCEDURE — 80048 BASIC METABOLIC PNL TOTAL CA: CPT

## 2017-01-01 PROCEDURE — 76700 US EXAM ABDOM COMPLETE: CPT

## 2017-01-01 PROCEDURE — 94760 N-INVAS EAR/PLS OXIMETRY 1: CPT

## 2017-01-01 PROCEDURE — 82140 ASSAY OF AMMONIA: CPT

## 2017-01-01 PROCEDURE — 84484 ASSAY OF TROPONIN QUANT: CPT

## 2017-01-01 PROCEDURE — 700102 HCHG RX REV CODE 250 W/ 637 OVERRIDE(OP): Performed by: HOSPITALIST

## 2017-01-01 PROCEDURE — 92610 EVALUATE SWALLOWING FUNCTION: CPT

## 2017-01-01 PROCEDURE — 82103 ALPHA-1-ANTITRYPSIN TOTAL: CPT

## 2017-01-01 PROCEDURE — 87340 HEPATITIS B SURFACE AG IA: CPT

## 2017-01-01 PROCEDURE — 85025 COMPLETE CBC W/AUTO DIFF WBC: CPT

## 2017-01-01 PROCEDURE — 76705 ECHO EXAM OF ABDOMEN: CPT

## 2017-01-01 PROCEDURE — 82271 OCCULT BLOOD OTHER SOURCES: CPT

## 2017-01-01 PROCEDURE — 700111 HCHG RX REV CODE 636 W/ 250 OVERRIDE (IP): Performed by: FAMILY MEDICINE

## 2017-01-01 PROCEDURE — 36430 TRANSFUSION BLD/BLD COMPNT: CPT

## 2017-01-01 PROCEDURE — 700105 HCHG RX REV CODE 258: Performed by: HOSPITALIST

## 2017-01-01 PROCEDURE — 86850 RBC ANTIBODY SCREEN: CPT

## 2017-01-01 PROCEDURE — G8419 CALC BMI OUT NRM PARAM NOF/U: HCPCS | Performed by: INTERNAL MEDICINE

## 2017-01-01 PROCEDURE — 302085 CLAMP OSTOMY: Performed by: HOSPITALIST

## 2017-01-01 PROCEDURE — A9270 NON-COVERED ITEM OR SERVICE: HCPCS | Performed by: INTERNAL MEDICINE

## 2017-01-01 PROCEDURE — 82043 UR ALBUMIN QUANTITATIVE: CPT

## 2017-01-01 PROCEDURE — 80053 COMPREHEN METABOLIC PANEL: CPT

## 2017-01-01 PROCEDURE — 99232 SBSQ HOSP IP/OBS MODERATE 35: CPT | Performed by: FAMILY MEDICINE

## 2017-01-01 PROCEDURE — 302108 TAPE SECURITY OSTOMY (PINK): Performed by: HOSPITALIST

## 2017-01-01 PROCEDURE — 71010 DX-CHEST-LIMITED (1 VIEW): CPT

## 2017-01-01 PROCEDURE — 84133 ASSAY OF URINE POTASSIUM: CPT

## 2017-01-01 PROCEDURE — 99291 CRITICAL CARE FIRST HOUR: CPT | Performed by: INTERNAL MEDICINE

## 2017-01-01 PROCEDURE — 97116 GAIT TRAINING THERAPY: CPT

## 2017-01-01 PROCEDURE — 84165 PROTEIN E-PHORESIS SERUM: CPT

## 2017-01-01 PROCEDURE — 99232 SBSQ HOSP IP/OBS MODERATE 35: CPT | Performed by: HOSPITALIST

## 2017-01-01 PROCEDURE — 82746 ASSAY OF FOLIC ACID SERUM: CPT

## 2017-01-01 PROCEDURE — G8979 MOBILITY GOAL STATUS: HCPCS | Mod: CI

## 2017-01-01 PROCEDURE — 0W3F3ZZ CONTROL BLEEDING IN ABDOMINAL WALL, PERCUTANEOUS APPROACH: ICD-10-PCS | Performed by: SURGERY

## 2017-01-01 PROCEDURE — 700111 HCHG RX REV CODE 636 W/ 250 OVERRIDE (IP): Performed by: HOSPITALIST

## 2017-01-01 PROCEDURE — 82570 ASSAY OF URINE CREATININE: CPT

## 2017-01-01 PROCEDURE — A9270 NON-COVERED ITEM OR SERVICE: HCPCS | Performed by: STUDENT IN AN ORGANIZED HEALTH CARE EDUCATION/TRAINING PROGRAM

## 2017-01-01 PROCEDURE — 302102 BAG OST N IMG 2.25IN 2PC (FECAL): Performed by: HOSPITALIST

## 2017-01-01 PROCEDURE — 81003 URINALYSIS AUTO W/O SCOPE: CPT

## 2017-01-01 PROCEDURE — C9113 INJ PANTOPRAZOLE SODIUM, VIA: HCPCS | Performed by: HOSPITALIST

## 2017-01-01 PROCEDURE — 82306 VITAMIN D 25 HYDROXY: CPT

## 2017-01-01 PROCEDURE — 84100 ASSAY OF PHOSPHORUS: CPT

## 2017-01-01 PROCEDURE — 85730 THROMBOPLASTIN TIME PARTIAL: CPT

## 2017-01-01 PROCEDURE — G8989 SELF CARE D/C STATUS: HCPCS | Mod: CI

## 2017-01-01 PROCEDURE — A9270 NON-COVERED ITEM OR SERVICE: HCPCS | Performed by: HOSPITALIST

## 2017-01-01 PROCEDURE — 84300 ASSAY OF URINE SODIUM: CPT

## 2017-01-01 PROCEDURE — 97161 PT EVAL LOW COMPLEX 20 MIN: CPT

## 2017-01-01 PROCEDURE — 306473 SCISSORS,IRIS STERILE DISP: Performed by: HOSPITALIST

## 2017-01-01 PROCEDURE — 81001 URINALYSIS AUTO W/SCOPE: CPT

## 2017-01-01 PROCEDURE — 83690 ASSAY OF LIPASE: CPT

## 2017-01-01 PROCEDURE — 99232 SBSQ HOSP IP/OBS MODERATE 35: CPT | Performed by: INTERNAL MEDICINE

## 2017-01-01 PROCEDURE — 97166 OT EVAL MOD COMPLEX 45 MIN: CPT

## 2017-01-01 PROCEDURE — 700105 HCHG RX REV CODE 258: Performed by: FAMILY MEDICINE

## 2017-01-01 PROCEDURE — 82607 VITAMIN B-12: CPT

## 2017-01-01 PROCEDURE — A6402 STERILE GAUZE <= 16 SQ IN: HCPCS

## 2017-01-01 PROCEDURE — 73060 X-RAY EXAM OF HUMERUS: CPT | Mod: LT

## 2017-01-01 PROCEDURE — 700105 HCHG RX REV CODE 258: Performed by: INTERNAL MEDICINE

## 2017-01-01 PROCEDURE — 302111 WAFER OST 2.25IN N IMG RD 2 PC (BARRIER): Performed by: HOSPITALIST

## 2017-01-01 PROCEDURE — 1111F DSCHRG MED/CURRENT MED MERGE: CPT | Performed by: INTERNAL MEDICINE

## 2017-01-01 PROCEDURE — 83516 IMMUNOASSAY NONANTIBODY: CPT

## 2017-01-01 PROCEDURE — 700111 HCHG RX REV CODE 636 W/ 250 OVERRIDE (IP): Performed by: INTERNAL MEDICINE

## 2017-01-01 PROCEDURE — 97162 PT EVAL MOD COMPLEX 30 MIN: CPT

## 2017-01-01 PROCEDURE — 96365 THER/PROPH/DIAG IV INF INIT: CPT

## 2017-01-01 PROCEDURE — 85027 COMPLETE CBC AUTOMATED: CPT

## 2017-01-01 PROCEDURE — 700101 HCHG RX REV CODE 250: Performed by: EMERGENCY MEDICINE

## 2017-01-01 PROCEDURE — G8980 MOBILITY D/C STATUS: HCPCS | Mod: CI

## 2017-01-01 PROCEDURE — 85610 PROTHROMBIN TIME: CPT

## 2017-01-01 PROCEDURE — 700112 HCHG RX REV CODE 229: Performed by: INTERNAL MEDICINE

## 2017-01-01 PROCEDURE — 83605 ASSAY OF LACTIC ACID: CPT

## 2017-01-01 PROCEDURE — 86923 COMPATIBILITY TEST ELECTRIC: CPT

## 2017-01-01 PROCEDURE — 99213 OFFICE O/P EST LOW 20 MIN: CPT

## 2017-01-01 PROCEDURE — 83880 ASSAY OF NATRIURETIC PEPTIDE: CPT

## 2017-01-01 PROCEDURE — 99223 1ST HOSP IP/OBS HIGH 75: CPT | Mod: AI | Performed by: HOSPITALIST

## 2017-01-01 PROCEDURE — 82962 GLUCOSE BLOOD TEST: CPT | Mod: 91

## 2017-01-01 PROCEDURE — 99291 CRITICAL CARE FIRST HOUR: CPT

## 2017-01-01 PROCEDURE — 83540 ASSAY OF IRON: CPT

## 2017-01-01 PROCEDURE — 93005 ELECTROCARDIOGRAM TRACING: CPT | Performed by: STUDENT IN AN ORGANIZED HEALTH CARE EDUCATION/TRAINING PROGRAM

## 2017-01-01 PROCEDURE — 700105 HCHG RX REV CODE 258: Performed by: EMERGENCY MEDICINE

## 2017-01-01 PROCEDURE — 51798 US URINE CAPACITY MEASURE: CPT

## 2017-01-01 PROCEDURE — G8978 MOBILITY CURRENT STATUS: HCPCS | Mod: CJ

## 2017-01-01 PROCEDURE — 97597 DBRDMT OPN WND 1ST 20 CM/<: CPT

## 2017-01-01 PROCEDURE — G8978 MOBILITY CURRENT STATUS: HCPCS | Mod: CH

## 2017-01-01 PROCEDURE — 99226 PR SUBSEQUENT OBSERVATION CARE,LEVEL III: CPT | Performed by: HOSPITALIST

## 2017-01-01 PROCEDURE — 86235 NUCLEAR ANTIGEN ANTIBODY: CPT

## 2017-01-01 PROCEDURE — 86901 BLOOD TYPING SEROLOGIC RH(D): CPT

## 2017-01-01 PROCEDURE — 87040 BLOOD CULTURE FOR BACTERIA: CPT | Mod: 91

## 2017-01-01 PROCEDURE — 87086 URINE CULTURE/COLONY COUNT: CPT

## 2017-01-01 PROCEDURE — 82553 CREATINE MB FRACTION: CPT

## 2017-01-01 PROCEDURE — 302099 OSTOMY PASTE: Performed by: HOSPITALIST

## 2017-01-01 PROCEDURE — 51702 INSERT TEMP BLADDER CATH: CPT

## 2017-01-01 PROCEDURE — 93010 ELECTROCARDIOGRAM REPORT: CPT | Performed by: INTERNAL MEDICINE

## 2017-01-01 PROCEDURE — 94640 AIRWAY INHALATION TREATMENT: CPT

## 2017-01-01 PROCEDURE — 96366 THER/PROPH/DIAG IV INF ADDON: CPT

## 2017-01-01 PROCEDURE — 86780 TREPONEMA PALLIDUM: CPT

## 2017-01-01 PROCEDURE — 303105 HCHG CATHETER EXTRA

## 2017-01-01 PROCEDURE — G8978 MOBILITY CURRENT STATUS: HCPCS | Mod: CK

## 2017-01-01 PROCEDURE — 80307 DRUG TEST PRSMV CHEM ANLYZR: CPT

## 2017-01-01 PROCEDURE — 85018 HEMOGLOBIN: CPT

## 2017-01-01 PROCEDURE — 87186 SC STD MICRODIL/AGAR DIL: CPT

## 2017-01-01 PROCEDURE — 3017F COLORECTAL CA SCREEN DOC REV: CPT | Performed by: INTERNAL MEDICINE

## 2017-01-01 PROCEDURE — P9016 RBC LEUKOCYTES REDUCED: HCPCS

## 2017-01-01 PROCEDURE — G8996 SWALLOW CURRENT STATUS: HCPCS | Mod: CJ

## 2017-01-01 PROCEDURE — 99239 HOSP IP/OBS DSCHRG MGMT >30: CPT | Performed by: HOSPITALIST

## 2017-01-01 PROCEDURE — 86038 ANTINUCLEAR ANTIBODIES: CPT

## 2017-01-01 PROCEDURE — 99239 HOSP IP/OBS DSCHRG MGMT >30: CPT | Performed by: INTERNAL MEDICINE

## 2017-01-01 PROCEDURE — 84443 ASSAY THYROID STIM HORMONE: CPT

## 2017-01-01 PROCEDURE — 96361 HYDRATE IV INFUSION ADD-ON: CPT

## 2017-01-01 PROCEDURE — G8997 SWALLOW GOAL STATUS: HCPCS | Mod: CH

## 2017-01-01 PROCEDURE — G8432 DEP SCR NOT DOC, RNG: HCPCS | Performed by: INTERNAL MEDICINE

## 2017-01-01 PROCEDURE — 700102 HCHG RX REV CODE 250 W/ 637 OVERRIDE(OP): Performed by: STUDENT IN AN ORGANIZED HEALTH CARE EDUCATION/TRAINING PROGRAM

## 2017-01-01 PROCEDURE — 700105 HCHG RX REV CODE 258: Performed by: STUDENT IN AN ORGANIZED HEALTH CARE EDUCATION/TRAINING PROGRAM

## 2017-01-01 PROCEDURE — 76775 US EXAM ABDO BACK WALL LIM: CPT

## 2017-01-01 PROCEDURE — 1036F TOBACCO NON-USER: CPT | Performed by: INTERNAL MEDICINE

## 2017-01-01 PROCEDURE — 302106 OSTOMY POWDER: Performed by: HOSPITALIST

## 2017-01-01 PROCEDURE — 97165 OT EVAL LOW COMPLEX 30 MIN: CPT

## 2017-01-01 PROCEDURE — 93005 ELECTROCARDIOGRAM TRACING: CPT

## 2017-01-01 PROCEDURE — 99214 OFFICE O/P EST MOD 30 MIN: CPT | Mod: 25 | Performed by: PHYSICIAN ASSISTANT

## 2017-01-01 PROCEDURE — 302135 SEQUENTIAL COMPRESSION MACHINE: Performed by: INTERNAL MEDICINE

## 2017-01-01 PROCEDURE — 83883 ASSAY NEPHELOMETRY NOT SPEC: CPT | Mod: 91

## 2017-01-01 PROCEDURE — 302113 2 1/4 HIGH OUTPUT POUCH": Performed by: INTERNAL MEDICINE

## 2017-01-01 PROCEDURE — 82105 ALPHA-FETOPROTEIN SERUM: CPT

## 2017-01-01 PROCEDURE — 51701 INSERT BLADDER CATHETER: CPT

## 2017-01-01 PROCEDURE — 99222 1ST HOSP IP/OBS MODERATE 55: CPT | Mod: AI | Performed by: INTERNAL MEDICINE

## 2017-01-01 PROCEDURE — G8988 SELF CARE GOAL STATUS: HCPCS | Mod: CI

## 2017-01-01 PROCEDURE — 94003 VENT MGMT INPAT SUBQ DAY: CPT

## 2017-01-01 PROCEDURE — G8987 SELF CARE CURRENT STATUS: HCPCS | Mod: CJ

## 2017-01-01 PROCEDURE — 93005 ELECTROCARDIOGRAM TRACING: CPT | Performed by: INTERNAL MEDICINE

## 2017-01-01 PROCEDURE — 85576 BLOOD PLATELET AGGREGATION: CPT | Mod: 91

## 2017-01-01 PROCEDURE — 30233N1 TRANSFUSION OF NONAUTOLOGOUS RED BLOOD CELLS INTO PERIPHERAL VEIN, PERCUTANEOUS APPROACH: ICD-10-PCS | Performed by: SURGERY

## 2017-01-01 PROCEDURE — 82962 GLUCOSE BLOOD TEST: CPT

## 2017-01-01 PROCEDURE — G8988 SELF CARE GOAL STATUS: HCPCS | Mod: CJ

## 2017-01-01 PROCEDURE — 84478 ASSAY OF TRIGLYCERIDES: CPT

## 2017-01-01 PROCEDURE — 83550 IRON BINDING TEST: CPT

## 2017-01-01 PROCEDURE — 71010 DX-CHEST-PORTABLE (1 VIEW): CPT

## 2017-01-01 PROCEDURE — 302449 STATCHG TRIAGE ONLY (STATISTIC)

## 2017-01-01 PROCEDURE — 99223 1ST HOSP IP/OBS HIGH 75: CPT | Mod: AI | Performed by: INTERNAL MEDICINE

## 2017-01-01 PROCEDURE — 700111 HCHG RX REV CODE 636 W/ 250 OVERRIDE (IP): Performed by: EMERGENCY MEDICINE

## 2017-01-01 PROCEDURE — 700111 HCHG RX REV CODE 636 W/ 250 OVERRIDE (IP)

## 2017-01-01 PROCEDURE — 73090 X-RAY EXAM OF FOREARM: CPT | Mod: TC,LT | Performed by: PHYSICIAN ASSISTANT

## 2017-01-01 PROCEDURE — 73080 X-RAY EXAM OF ELBOW: CPT | Mod: TC,LT | Performed by: PHYSICIAN ASSISTANT

## 2017-01-01 PROCEDURE — 99239 HOSP IP/OBS DSCHRG MGMT >30: CPT | Mod: GC | Performed by: INTERNAL MEDICINE

## 2017-01-01 PROCEDURE — 82803 BLOOD GASES ANY COMBINATION: CPT

## 2017-01-01 PROCEDURE — 99223 1ST HOSP IP/OBS HIGH 75: CPT | Performed by: HOSPITALIST

## 2017-01-01 PROCEDURE — 700101 HCHG RX REV CODE 250: Performed by: INTERNAL MEDICINE

## 2017-01-01 PROCEDURE — 73030 X-RAY EXAM OF SHOULDER: CPT | Mod: LT

## 2017-01-01 PROCEDURE — 99239 HOSP IP/OBS DSCHRG MGMT >30: CPT | Performed by: FAMILY MEDICINE

## 2017-01-01 PROCEDURE — 304538 HCHG NG TUBE

## 2017-01-01 PROCEDURE — C9113 INJ PANTOPRAZOLE SODIUM, VIA: HCPCS | Performed by: EMERGENCY MEDICINE

## 2017-01-01 PROCEDURE — 99284 EMERGENCY DEPT VISIT MOD MDM: CPT

## 2017-01-01 PROCEDURE — 74000 DX-ABDOMEN-1 VIEW: CPT

## 2017-01-01 PROCEDURE — A4565 SLINGS: HCPCS | Performed by: PHYSICIAN ASSISTANT

## 2017-01-01 PROCEDURE — 302111 WAFER OST 2.25IN N IMG RD 2 PC (BARRIER): Performed by: INTERNAL MEDICINE

## 2017-01-01 PROCEDURE — 85018 HEMOGLOBIN: CPT | Mod: 91

## 2017-01-01 PROCEDURE — 93005 ELECTROCARDIOGRAM TRACING: CPT | Performed by: EMERGENCY MEDICINE

## 2017-01-01 PROCEDURE — 305308 HCHG STAPLER,SKIN,DISP.

## 2017-01-01 PROCEDURE — C1751 CATH, INF, PER/CENT/MIDLINE: HCPCS

## 2017-01-01 PROCEDURE — 770001 HCHG ROOM/CARE - MED/SURG/GYN PRIV*

## 2017-01-01 PROCEDURE — 700102 HCHG RX REV CODE 250 W/ 637 OVERRIDE(OP): Performed by: FAMILY MEDICINE

## 2017-01-01 PROCEDURE — 87077 CULTURE AEROBIC IDENTIFY: CPT

## 2017-01-01 PROCEDURE — 84132 ASSAY OF SERUM POTASSIUM: CPT

## 2017-01-01 PROCEDURE — 700105 HCHG RX REV CODE 258

## 2017-01-01 PROCEDURE — A6223 GAUZE >16<=48 NO W/SAL W/O B: HCPCS

## 2017-01-01 PROCEDURE — 93306 TTE W/DOPPLER COMPLETE: CPT

## 2017-01-01 PROCEDURE — 36600 WITHDRAWAL OF ARTERIAL BLOOD: CPT

## 2017-01-01 PROCEDURE — 96375 TX/PRO/DX INJ NEW DRUG ADDON: CPT

## 2017-01-01 PROCEDURE — 302102 2 1/4 OSTOMY POUCH(FECAL)": Performed by: HOSPITALIST

## 2017-01-01 PROCEDURE — 302108 TAPE SECURITY OSTOMY (PINK): Performed by: INTERNAL MEDICINE

## 2017-01-01 PROCEDURE — 87077 CULTURE AEROBIC IDENTIFY: CPT | Mod: 91

## 2017-01-01 PROCEDURE — 82272 OCCULT BLD FECES 1-3 TESTS: CPT

## 2017-01-01 PROCEDURE — 94150 VITAL CAPACITY TEST: CPT

## 2017-01-01 PROCEDURE — 82728 ASSAY OF FERRITIN: CPT

## 2017-01-01 PROCEDURE — 73590 X-RAY EXAM OF LOWER LEG: CPT | Mod: LT

## 2017-01-01 PROCEDURE — 73564 X-RAY EXAM KNEE 4 OR MORE: CPT | Mod: LT

## 2017-01-01 PROCEDURE — 87389 HIV-1 AG W/HIV-1&-2 AB AG IA: CPT

## 2017-01-01 PROCEDURE — B548ZZA ULTRASONOGRAPHY OF SUPERIOR VENA CAVA, GUIDANCE: ICD-10-PCS | Performed by: INTERNAL MEDICINE

## 2017-01-01 PROCEDURE — 80069 RENAL FUNCTION PANEL: CPT

## 2017-01-01 PROCEDURE — 70547 MR ANGIOGRAPHY NECK W/O DYE: CPT

## 2017-01-01 PROCEDURE — 770022 HCHG ROOM/CARE - ICU (200)

## 2017-01-01 PROCEDURE — 85347 COAGULATION TIME ACTIVATED: CPT

## 2017-01-01 PROCEDURE — 85055 RETICULATED PLATELET ASSAY: CPT

## 2017-01-01 PROCEDURE — G8987 SELF CARE CURRENT STATUS: HCPCS | Mod: CI

## 2017-01-01 PROCEDURE — 4040F PNEUMOC VAC/ADMIN/RCVD: CPT | Performed by: INTERNAL MEDICINE

## 2017-01-01 PROCEDURE — 30233N1 TRANSFUSION OF NONAUTOLOGOUS RED BLOOD CELLS INTO PERIPHERAL VEIN, PERCUTANEOUS APPROACH: ICD-10-PCS | Performed by: INTERNAL MEDICINE

## 2017-01-01 PROCEDURE — 99233 SBSQ HOSP IP/OBS HIGH 50: CPT | Mod: GC | Performed by: INTERNAL MEDICINE

## 2017-01-01 PROCEDURE — 85014 HEMATOCRIT: CPT

## 2017-01-01 PROCEDURE — G8987 SELF CARE CURRENT STATUS: HCPCS | Mod: CK

## 2017-01-01 PROCEDURE — 02HV33Z INSERTION OF INFUSION DEVICE INTO SUPERIOR VENA CAVA, PERCUTANEOUS APPROACH: ICD-10-PCS | Performed by: INTERNAL MEDICINE

## 2017-01-01 PROCEDURE — 1101F PT FALLS ASSESS-DOCD LE1/YR: CPT | Mod: 8P | Performed by: INTERNAL MEDICINE

## 2017-01-01 PROCEDURE — 70544 MR ANGIOGRAPHY HEAD W/O DYE: CPT

## 2017-01-01 PROCEDURE — 96374 THER/PROPH/DIAG INJ IV PUSH: CPT

## 2017-01-01 PROCEDURE — 92526 ORAL FUNCTION THERAPY: CPT

## 2017-01-01 PROCEDURE — A9270 NON-COVERED ITEM OR SERVICE: HCPCS | Performed by: EMERGENCY MEDICINE

## 2017-01-01 PROCEDURE — 5A1945Z RESPIRATORY VENTILATION, 24-96 CONSECUTIVE HOURS: ICD-10-PCS | Performed by: EMERGENCY MEDICINE

## 2017-01-01 PROCEDURE — 302099 OSTOMY PASTE: Performed by: INTERNAL MEDICINE

## 2017-01-01 PROCEDURE — 70486 CT MAXILLOFACIAL W/O DYE: CPT

## 2017-01-01 PROCEDURE — 302109 OSTOMY WAFER 4X4: Performed by: HOSPITALIST

## 2017-01-01 PROCEDURE — 99233 SBSQ HOSP IP/OBS HIGH 50: CPT | Performed by: HOSPITALIST

## 2017-01-01 PROCEDURE — G8979 MOBILITY GOAL STATUS: HCPCS | Mod: CH

## 2017-01-01 PROCEDURE — 304999 HCHG REPAIR-SIMPLE/INTERMED LEVEL 1

## 2017-01-01 PROCEDURE — 96372 THER/PROPH/DIAG INJ SC/IM: CPT | Performed by: PHYSICIAN ASSISTANT

## 2017-01-01 PROCEDURE — 700102 HCHG RX REV CODE 250 W/ 637 OVERRIDE(OP): Performed by: EMERGENCY MEDICINE

## 2017-01-01 PROCEDURE — 99214 OFFICE O/P EST MOD 30 MIN: CPT | Performed by: INTERNAL MEDICINE

## 2017-01-01 PROCEDURE — 302113 2 1/4 HIGH OUTPUT POUCH": Performed by: HOSPITALIST

## 2017-01-01 PROCEDURE — 700102 HCHG RX REV CODE 250 W/ 637 OVERRIDE(OP): Performed by: NURSE PRACTITIONER

## 2017-01-01 PROCEDURE — 96376 TX/PRO/DX INJ SAME DRUG ADON: CPT

## 2017-01-01 PROCEDURE — 99223 1ST HOSP IP/OBS HIGH 75: CPT | Mod: AI | Performed by: FAMILY MEDICINE

## 2017-01-01 PROCEDURE — A4371 SKIN BARRIER POWDER PER OZ: HCPCS

## 2017-01-01 PROCEDURE — 99223 1ST HOSP IP/OBS HIGH 75: CPT | Mod: AI,GC | Performed by: INTERNAL MEDICINE

## 2017-01-01 PROCEDURE — 86140 C-REACTIVE PROTEIN: CPT

## 2017-01-01 PROCEDURE — 70551 MRI BRAIN STEM W/O DYE: CPT

## 2017-01-01 PROCEDURE — 82436 ASSAY OF URINE CHLORIDE: CPT

## 2017-01-01 PROCEDURE — 302098 PASTE RING (FLAT): Performed by: HOSPITALIST

## 2017-01-01 PROCEDURE — 0BH17EZ INSERTION OF ENDOTRACHEAL AIRWAY INTO TRACHEA, VIA NATURAL OR ARTIFICIAL OPENING: ICD-10-PCS | Performed by: EMERGENCY MEDICINE

## 2017-01-01 PROCEDURE — G8978 MOBILITY CURRENT STATUS: HCPCS | Mod: CI

## 2017-01-01 PROCEDURE — 84134 ASSAY OF PREALBUMIN: CPT

## 2017-01-01 PROCEDURE — 99213 OFFICE O/P EST LOW 20 MIN: CPT | Mod: 25

## 2017-01-01 PROCEDURE — 87040 BLOOD CULTURE FOR BACTERIA: CPT

## 2017-01-01 PROCEDURE — A4590 SPECIAL CASTING MATERIAL: HCPCS | Performed by: PHYSICIAN ASSISTANT

## 2017-01-01 PROCEDURE — 302094 BELT OSTOMY (HOLLISTER): Performed by: HOSPITALIST

## 2017-01-01 PROCEDURE — G8978 MOBILITY CURRENT STATUS: HCPCS | Mod: CL

## 2017-01-01 PROCEDURE — 86803 HEPATITIS C AB TEST: CPT

## 2017-01-01 PROCEDURE — 31500 INSERT EMERGENCY AIRWAY: CPT

## 2017-01-01 PROCEDURE — 36556 INSERT NON-TUNNEL CV CATH: CPT

## 2017-01-01 PROCEDURE — 93306 TTE W/DOPPLER COMPLETE: CPT | Mod: 26 | Performed by: INTERNAL MEDICINE

## 2017-01-01 PROCEDURE — G8987 SELF CARE CURRENT STATUS: HCPCS | Mod: CL

## 2017-01-01 PROCEDURE — 302111: Performed by: HOSPITALIST

## 2017-01-01 PROCEDURE — 94002 VENT MGMT INPAT INIT DAY: CPT

## 2017-01-01 PROCEDURE — A6448 LT COMPRES BAND <3"/YD: HCPCS | Performed by: PHYSICIAN ASSISTANT

## 2017-01-01 PROCEDURE — 302102 BAG OST N IMG 2.25IN 2PC (FECAL): Performed by: INTERNAL MEDICINE

## 2017-01-01 PROCEDURE — 0HQ0XZZ REPAIR SCALP SKIN, EXTERNAL APPROACH: ICD-10-PCS | Performed by: EMERGENCY MEDICINE

## 2017-01-01 PROCEDURE — 86039 ANTINUCLEAR ANTIBODIES (ANA): CPT

## 2017-01-01 PROCEDURE — 99220 PR INITIAL OBSERVATION CARE,LEVL III: CPT | Performed by: HOSPITALIST

## 2017-01-01 PROCEDURE — 82668 ASSAY OF ERYTHROPOIETIN: CPT

## 2017-01-01 PROCEDURE — 302109 OSTOMY WAFER 4X4: Performed by: INTERNAL MEDICINE

## 2017-01-01 PROCEDURE — G8980 MOBILITY D/C STATUS: HCPCS | Mod: CH

## 2017-01-01 PROCEDURE — 85384 FIBRINOGEN ACTIVITY: CPT

## 2017-01-01 PROCEDURE — 73200 CT UPPER EXTREMITY W/O DYE: CPT | Mod: LT

## 2017-01-01 PROCEDURE — 302103 DOWN DRAIN ADAPTER: Performed by: INTERNAL MEDICINE

## 2017-01-01 PROCEDURE — 302804 HCHG HYDROFIBER SILVER 6X6

## 2017-01-01 PROCEDURE — 700101 HCHG RX REV CODE 250: Performed by: HOSPITALIST

## 2017-01-01 PROCEDURE — 303972 HCHG HYPAFIX RET DRST 18SQ PC"

## 2017-01-01 PROCEDURE — 86225 DNA ANTIBODY NATIVE: CPT

## 2017-01-01 PROCEDURE — 99496 TRANSJ CARE MGMT HIGH F2F 7D: CPT | Performed by: NURSE PRACTITIONER

## 2017-01-01 PROCEDURE — G8989 SELF CARE D/C STATUS: HCPCS | Mod: CJ

## 2017-01-01 PROCEDURE — 72125 CT NECK SPINE W/O DYE: CPT

## 2017-01-01 PROCEDURE — 302985 HCHG BULKY ROLL STERILE

## 2017-01-01 RX ORDER — ACETAMINOPHEN 325 MG/1
650 TABLET ORAL EVERY 6 HOURS PRN
Status: DISCONTINUED | OUTPATIENT
Start: 2017-01-01 | End: 2017-01-01

## 2017-01-01 RX ORDER — LACTULOSE 20 G/30ML
30 SOLUTION ORAL 3 TIMES DAILY
Status: DISCONTINUED | OUTPATIENT
Start: 2017-01-01 | End: 2017-01-01

## 2017-01-01 RX ORDER — PHYTONADIONE 10 MG/ML
10 INJECTION, EMULSION INTRAMUSCULAR; INTRAVENOUS; SUBCUTANEOUS DAILY
Status: DISCONTINUED | OUTPATIENT
Start: 2017-01-01 | End: 2017-01-01 | Stop reason: HOSPADM

## 2017-01-01 RX ORDER — CHLORHEXIDINE GLUCONATE ORAL RINSE 1.2 MG/ML
15 SOLUTION DENTAL 2 TIMES DAILY
Status: DISCONTINUED | OUTPATIENT
Start: 2017-01-01 | End: 2017-01-01

## 2017-01-01 RX ORDER — POTASSIUM CHLORIDE 20 MEQ/1
20 TABLET, EXTENDED RELEASE ORAL 4 TIMES DAILY
Status: DISCONTINUED | OUTPATIENT
Start: 2017-01-01 | End: 2017-01-01

## 2017-01-01 RX ORDER — DEXTROSE AND SODIUM CHLORIDE 5; .9 G/100ML; G/100ML
INJECTION, SOLUTION INTRAVENOUS CONTINUOUS
Status: DISCONTINUED | OUTPATIENT
Start: 2017-01-01 | End: 2017-01-01

## 2017-01-01 RX ORDER — SODIUM BICARBONATE 325 MG/1
1625 TABLET ORAL 2 TIMES DAILY
Qty: 120 TAB | Refills: 3
Start: 2017-01-01 | End: 2017-01-01

## 2017-01-01 RX ORDER — AMOXICILLIN AND CLAVULANATE POTASSIUM 500; 125 MG/1; MG/1
1 TABLET, FILM COATED ORAL EVERY 12 HOURS
Qty: 6 TAB | Refills: 0 | Status: SHIPPED | OUTPATIENT
Start: 2017-01-01 | End: 2017-01-01

## 2017-01-01 RX ORDER — HEPARIN SODIUM 5000 [USP'U]/ML
5000 INJECTION, SOLUTION INTRAVENOUS; SUBCUTANEOUS EVERY 12 HOURS
Status: DISCONTINUED | OUTPATIENT
Start: 2017-01-01 | End: 2017-01-01

## 2017-01-01 RX ORDER — NALOXONE HYDROCHLORIDE 0.4 MG/ML
INJECTION, SOLUTION INTRAMUSCULAR; INTRAVENOUS; SUBCUTANEOUS
Status: COMPLETED
Start: 2017-01-01 | End: 2017-01-01

## 2017-01-01 RX ORDER — SODIUM CHLORIDE 9 MG/ML
INJECTION, SOLUTION INTRAVENOUS CONTINUOUS
Status: DISCONTINUED | OUTPATIENT
Start: 2017-01-01 | End: 2017-01-01 | Stop reason: HOSPADM

## 2017-01-01 RX ORDER — SODIUM CHLORIDE 9 MG/ML
2000 INJECTION, SOLUTION INTRAVENOUS ONCE
Status: DISCONTINUED | OUTPATIENT
Start: 2017-01-01 | End: 2017-01-01

## 2017-01-01 RX ORDER — ACETAMINOPHEN 325 MG/1
650 TABLET ORAL EVERY 6 HOURS PRN
Status: DISCONTINUED | OUTPATIENT
Start: 2017-01-01 | End: 2017-01-01 | Stop reason: HOSPADM

## 2017-01-01 RX ORDER — OMEPRAZOLE 20 MG/1
20 CAPSULE, DELAYED RELEASE ORAL 2 TIMES DAILY
Status: DISCONTINUED | OUTPATIENT
Start: 2017-01-01 | End: 2017-01-01 | Stop reason: HOSPADM

## 2017-01-01 RX ORDER — L. ACIDOPHILUS/L.BULGARICUS 100MM CELL
1 GRANULES IN PACKET (EA) ORAL 2 TIMES DAILY
Qty: 60 PACKET | Refills: 0 | Status: SHIPPED | OUTPATIENT
Start: 2017-01-01 | End: 2017-01-01

## 2017-01-01 RX ORDER — ONDANSETRON 2 MG/ML
4 INJECTION INTRAMUSCULAR; INTRAVENOUS EVERY 4 HOURS PRN
Status: DISCONTINUED | OUTPATIENT
Start: 2017-01-01 | End: 2017-01-01 | Stop reason: HOSPADM

## 2017-01-01 RX ORDER — POTASSIUM CHLORIDE 750 MG/1
20 TABLET, FILM COATED, EXTENDED RELEASE ORAL 4 TIMES DAILY
Status: DISCONTINUED | OUTPATIENT
Start: 2017-01-01 | End: 2017-01-01

## 2017-01-01 RX ORDER — HYDROCODONE BITARTRATE AND ACETAMINOPHEN 5; 325 MG/1; MG/1
1 TABLET ORAL EVERY 8 HOURS PRN
Qty: 20 TAB | Refills: 0 | Status: SHIPPED | OUTPATIENT
Start: 2017-01-01 | End: 2017-01-01

## 2017-01-01 RX ORDER — ZINC SULFATE 50(220)MG
220 CAPSULE ORAL DAILY
Status: DISCONTINUED | OUTPATIENT
Start: 2017-01-01 | End: 2017-01-01 | Stop reason: HOSPADM

## 2017-01-01 RX ORDER — LACTULOSE 20 G/30ML
30 SOLUTION ORAL EVERY 8 HOURS
Status: DISCONTINUED | OUTPATIENT
Start: 2017-01-01 | End: 2017-01-01

## 2017-01-01 RX ORDER — ONDANSETRON 4 MG/1
4 TABLET, ORALLY DISINTEGRATING ORAL EVERY 4 HOURS PRN
Status: DISCONTINUED | OUTPATIENT
Start: 2017-01-01 | End: 2017-01-01 | Stop reason: HOSPADM

## 2017-01-01 RX ORDER — FERROUS GLUCONATE 324(38)MG
324 TABLET ORAL
Status: DISCONTINUED | OUTPATIENT
Start: 2017-01-01 | End: 2017-01-01 | Stop reason: HOSPADM

## 2017-01-01 RX ORDER — BISACODYL 10 MG
10 SUPPOSITORY, RECTAL RECTAL
Status: DISCONTINUED | OUTPATIENT
Start: 2017-01-01 | End: 2017-01-01 | Stop reason: HOSPADM

## 2017-01-01 RX ORDER — BISACODYL 10 MG
10 SUPPOSITORY, RECTAL RECTAL
Status: DISCONTINUED | OUTPATIENT
Start: 2017-01-01 | End: 2017-01-01

## 2017-01-01 RX ORDER — CEFDINIR 300 MG/1
300 CAPSULE ORAL EVERY 12 HOURS
Status: DISCONTINUED | OUTPATIENT
Start: 2017-01-01 | End: 2017-01-01 | Stop reason: HOSPADM

## 2017-01-01 RX ORDER — AMOXICILLIN 250 MG
1 CAPSULE ORAL NIGHTLY
Status: DISCONTINUED | OUTPATIENT
Start: 2017-01-01 | End: 2017-01-01 | Stop reason: HOSPADM

## 2017-01-01 RX ORDER — CLINDAMYCIN HYDROCHLORIDE 300 MG/1
300 CAPSULE ORAL 3 TIMES DAILY
Qty: 21 CAP | Refills: 0 | Status: SHIPPED | OUTPATIENT
Start: 2017-01-01 | End: 2017-01-01

## 2017-01-01 RX ORDER — POTASSIUM CHLORIDE 1500 MG/1
20 TABLET, EXTENDED RELEASE ORAL DAILY
Qty: 30 TAB | Refills: 0 | Status: SHIPPED | OUTPATIENT
Start: 2017-01-01 | End: 2017-01-01 | Stop reason: SDUPTHER

## 2017-01-01 RX ORDER — SODIUM CHLORIDE 9 MG/ML
INJECTION, SOLUTION INTRAVENOUS CONTINUOUS
Status: DISCONTINUED | OUTPATIENT
Start: 2017-01-01 | End: 2017-01-01

## 2017-01-01 RX ORDER — SODIUM BICARBONATE 650 MG/1
1950 TABLET ORAL 3 TIMES DAILY
Status: DISCONTINUED | OUTPATIENT
Start: 2017-01-01 | End: 2017-01-01 | Stop reason: HOSPADM

## 2017-01-01 RX ORDER — OXYCODONE HYDROCHLORIDE 5 MG/1
5 TABLET ORAL EVERY 4 HOURS PRN
Status: DISCONTINUED | OUTPATIENT
Start: 2017-01-01 | End: 2017-01-01 | Stop reason: HOSPADM

## 2017-01-01 RX ORDER — SODIUM BICARBONATE 650 MG/1
650 TABLET ORAL 3 TIMES DAILY
Status: DISCONTINUED | OUTPATIENT
Start: 2017-01-01 | End: 2017-01-01 | Stop reason: HOSPADM

## 2017-01-01 RX ORDER — IPRATROPIUM BROMIDE AND ALBUTEROL SULFATE 2.5; .5 MG/3ML; MG/3ML
3 SOLUTION RESPIRATORY (INHALATION)
Status: DISCONTINUED | OUTPATIENT
Start: 2017-01-01 | End: 2017-01-01

## 2017-01-01 RX ORDER — TRAMADOL HYDROCHLORIDE 50 MG/1
50 TABLET ORAL EVERY 6 HOURS PRN
Status: DISCONTINUED | OUTPATIENT
Start: 2017-01-01 | End: 2017-01-01 | Stop reason: HOSPADM

## 2017-01-01 RX ORDER — ZINC SULFATE 50(220)MG
220 CAPSULE ORAL DAILY
Qty: 30 CAP | Refills: 0 | Status: SHIPPED | OUTPATIENT
Start: 2017-01-01 | End: 2017-01-01

## 2017-01-01 RX ORDER — SODIUM BICARBONATE 650 MG/1
1300 TABLET ORAL DAILY
Status: DISCONTINUED | OUTPATIENT
Start: 2017-01-01 | End: 2017-01-01 | Stop reason: HOSPADM

## 2017-01-01 RX ORDER — PROPRANOLOL HYDROCHLORIDE 10 MG/1
10 TABLET ORAL 3 TIMES DAILY
Status: DISCONTINUED | OUTPATIENT
Start: 2017-01-01 | End: 2017-01-01 | Stop reason: HOSPADM

## 2017-01-01 RX ORDER — L. ACIDOPHILUS/L.BULGARICUS 100MM CELL
1 GRANULES IN PACKET (EA) ORAL DAILY
Status: DISCONTINUED | OUTPATIENT
Start: 2017-01-01 | End: 2017-01-01

## 2017-01-01 RX ORDER — ENEMA 19; 7 G/133ML; G/133ML
1 ENEMA RECTAL
Status: DISCONTINUED | OUTPATIENT
Start: 2017-01-01 | End: 2017-01-01

## 2017-01-01 RX ORDER — ALBUTEROL SULFATE 90 UG/1
2 AEROSOL, METERED RESPIRATORY (INHALATION) EVERY 4 HOURS PRN
Status: DISCONTINUED | OUTPATIENT
Start: 2017-01-01 | End: 2017-01-01 | Stop reason: HOSPADM

## 2017-01-01 RX ORDER — SODIUM CHLORIDE 9 MG/ML
INJECTION, SOLUTION INTRAVENOUS
Status: COMPLETED
Start: 2017-01-01 | End: 2017-01-01

## 2017-01-01 RX ORDER — POLYETHYLENE GLYCOL 3350 17 G/17G
1 POWDER, FOR SOLUTION ORAL
Status: DISCONTINUED | OUTPATIENT
Start: 2017-01-01 | End: 2017-01-01 | Stop reason: HOSPADM

## 2017-01-01 RX ORDER — POTASSIUM CHLORIDE 1500 MG/1
20 TABLET, EXTENDED RELEASE ORAL DAILY
Qty: 30 TAB | Refills: 11 | Status: SHIPPED | OUTPATIENT
Start: 2017-01-01 | End: 2017-01-01

## 2017-01-01 RX ORDER — ASCORBIC ACID 500 MG
500 TABLET ORAL 2 TIMES DAILY
Status: DISCONTINUED | OUTPATIENT
Start: 2017-01-01 | End: 2017-01-01 | Stop reason: HOSPADM

## 2017-01-01 RX ORDER — CARVEDILOL 3.12 MG/1
3.12 TABLET ORAL 2 TIMES DAILY WITH MEALS
Status: DISCONTINUED | OUTPATIENT
Start: 2017-01-01 | End: 2017-01-01 | Stop reason: HOSPADM

## 2017-01-01 RX ORDER — OMEPRAZOLE 20 MG/1
40 CAPSULE, DELAYED RELEASE ORAL DAILY
Status: DISCONTINUED | OUTPATIENT
Start: 2017-01-01 | End: 2017-01-01 | Stop reason: HOSPADM

## 2017-01-01 RX ORDER — DEXTROSE AND SODIUM CHLORIDE 5; .45 G/100ML; G/100ML
INJECTION, SOLUTION INTRAVENOUS CONTINUOUS
Status: DISCONTINUED | OUTPATIENT
Start: 2017-01-01 | End: 2017-01-01

## 2017-01-01 RX ORDER — HEPARIN SODIUM 5000 [USP'U]/ML
5000 INJECTION, SOLUTION INTRAVENOUS; SUBCUTANEOUS EVERY 8 HOURS
Status: DISCONTINUED | OUTPATIENT
Start: 2017-01-01 | End: 2017-01-01

## 2017-01-01 RX ORDER — ONDANSETRON 2 MG/ML
4 INJECTION INTRAMUSCULAR; INTRAVENOUS ONCE
Status: COMPLETED | OUTPATIENT
Start: 2017-01-01 | End: 2017-01-01

## 2017-01-01 RX ORDER — CEFTRIAXONE 2 G/1
2 INJECTION, POWDER, FOR SOLUTION INTRAMUSCULAR; INTRAVENOUS ONCE
Status: COMPLETED | OUTPATIENT
Start: 2017-01-01 | End: 2017-01-01

## 2017-01-01 RX ORDER — NALOXONE HYDROCHLORIDE 0.4 MG/ML
0.4 INJECTION, SOLUTION INTRAMUSCULAR; INTRAVENOUS; SUBCUTANEOUS ONCE
Status: COMPLETED | OUTPATIENT
Start: 2017-01-01 | End: 2017-01-01

## 2017-01-01 RX ORDER — AMOXICILLIN AND CLAVULANATE POTASSIUM 500; 125 MG/1; MG/1
1 TABLET, FILM COATED ORAL EVERY 12 HOURS
Qty: 6 TAB | Refills: 0 | Status: SHIPPED | OUTPATIENT
Start: 2017-01-01 | End: 2017-01-01 | Stop reason: SDUPTHER

## 2017-01-01 RX ORDER — ASCORBIC ACID 500 MG
500 TABLET ORAL 2 TIMES DAILY
Qty: 60 TAB | Refills: 3 | Status: SHIPPED | OUTPATIENT
Start: 2017-01-01 | End: 2017-01-01

## 2017-01-01 RX ORDER — OMEPRAZOLE 20 MG/1
40 CAPSULE, DELAYED RELEASE ORAL DAILY
COMMUNITY
End: 2017-01-01 | Stop reason: SDUPTHER

## 2017-01-01 RX ORDER — LIDOCAINE HYDROCHLORIDE 10 MG/ML
20 INJECTION, SOLUTION INFILTRATION; PERINEURAL ONCE
Status: COMPLETED | OUTPATIENT
Start: 2017-01-01 | End: 2017-01-01

## 2017-01-01 RX ORDER — SODIUM BICARBONATE 650 MG/1
1300 TABLET ORAL 4 TIMES DAILY
Status: DISCONTINUED | OUTPATIENT
Start: 2017-01-01 | End: 2017-01-01

## 2017-01-01 RX ORDER — PROPOFOL 10 MG/ML
20 INJECTION, EMULSION INTRAVENOUS ONCE
Status: COMPLETED | OUTPATIENT
Start: 2017-01-01 | End: 2017-01-01

## 2017-01-01 RX ORDER — SODIUM BICARBONATE 650 MG/1
650 TABLET ORAL 4 TIMES DAILY
Status: DISCONTINUED | OUTPATIENT
Start: 2017-01-01 | End: 2017-01-01

## 2017-01-01 RX ORDER — CARVEDILOL 3.12 MG/1
3.12 TABLET ORAL 2 TIMES DAILY WITH MEALS
Qty: 60 TAB | Refills: 0 | Status: SHIPPED | OUTPATIENT
Start: 2017-01-01 | End: 2017-01-01

## 2017-01-01 RX ORDER — SODIUM CHLORIDE 9 MG/ML
1000 INJECTION, SOLUTION INTRAVENOUS ONCE
Status: COMPLETED | OUTPATIENT
Start: 2017-01-01 | End: 2017-01-01

## 2017-01-01 RX ORDER — SODIUM CHLORIDE 9 MG/ML
500 INJECTION, SOLUTION INTRAVENOUS ONCE
Status: COMPLETED | OUTPATIENT
Start: 2017-01-01 | End: 2017-01-01

## 2017-01-01 RX ORDER — ALBUTEROL SULFATE 90 UG/1
2 AEROSOL, METERED RESPIRATORY (INHALATION)
Status: DISCONTINUED | OUTPATIENT
Start: 2017-01-01 | End: 2017-01-01

## 2017-01-01 RX ORDER — DIPHENHYDRAMINE HYDROCHLORIDE 50 MG/ML
INJECTION INTRAMUSCULAR; INTRAVENOUS
Status: COMPLETED
Start: 2017-01-01 | End: 2017-01-01

## 2017-01-01 RX ORDER — FAMOTIDINE 20 MG/1
20 TABLET, FILM COATED ORAL DAILY
Status: DISCONTINUED | OUTPATIENT
Start: 2017-01-01 | End: 2017-01-01

## 2017-01-01 RX ORDER — SUCCINYLCHOLINE CHLORIDE 20 MG/ML
80 INJECTION INTRAMUSCULAR; INTRAVENOUS ONCE
Status: COMPLETED | OUTPATIENT
Start: 2017-01-01 | End: 2017-01-01

## 2017-01-01 RX ORDER — LACTULOSE 20 G/30ML
30 SOLUTION ORAL EVERY EVENING
Status: DISCONTINUED | OUTPATIENT
Start: 2017-01-01 | End: 2017-01-01 | Stop reason: HOSPADM

## 2017-01-01 RX ORDER — SODIUM CHLORIDE 9 MG/ML
500 INJECTION, SOLUTION INTRAVENOUS
Status: DISCONTINUED | OUTPATIENT
Start: 2017-01-01 | End: 2017-01-01 | Stop reason: HOSPADM

## 2017-01-01 RX ORDER — POTASSIUM CHLORIDE 1500 MG/1
20 TABLET, EXTENDED RELEASE ORAL 4 TIMES DAILY
COMMUNITY
End: 2017-01-01 | Stop reason: SDUPTHER

## 2017-01-01 RX ORDER — AMOXICILLIN 250 MG
2 CAPSULE ORAL 2 TIMES DAILY
Status: DISCONTINUED | OUTPATIENT
Start: 2017-01-01 | End: 2017-01-01 | Stop reason: HOSPADM

## 2017-01-01 RX ORDER — POTASSIUM CHLORIDE 20 MEQ/1
20 TABLET, EXTENDED RELEASE ORAL 2 TIMES DAILY
Status: DISPENSED | OUTPATIENT
Start: 2017-01-01 | End: 2017-01-01

## 2017-01-01 RX ORDER — LIDOCAINE HYDROCHLORIDE 20 MG/ML
INJECTION, SOLUTION INFILTRATION; PERINEURAL
Status: DISPENSED
Start: 2017-01-01 | End: 2017-01-01

## 2017-01-01 RX ORDER — LACTULOSE 20 G/30ML
30 SOLUTION ORAL 2 TIMES DAILY
Status: DISCONTINUED | OUTPATIENT
Start: 2017-01-01 | End: 2017-01-01 | Stop reason: HOSPADM

## 2017-01-01 RX ORDER — LACTULOSE 10 G/15ML
SOLUTION ORAL; RECTAL
Refills: 2 | COMMUNITY
Start: 2017-01-01 | End: 2017-01-01

## 2017-01-01 RX ORDER — SODIUM BICARBONATE 650 MG/1
1850 TABLET ORAL 3 TIMES DAILY
Qty: 270 TAB | Refills: 11 | Status: SHIPPED | OUTPATIENT
Start: 2017-01-01 | End: 2017-01-01

## 2017-01-01 RX ORDER — AMOXICILLIN AND CLAVULANATE POTASSIUM 500; 125 MG/1; MG/1
1 TABLET, FILM COATED ORAL 2 TIMES DAILY
COMMUNITY
Start: 2017-01-01 | End: 2017-01-01

## 2017-01-01 RX ORDER — MORPHINE SULFATE 4 MG/ML
2 INJECTION, SOLUTION INTRAMUSCULAR; INTRAVENOUS
Status: DISCONTINUED | OUTPATIENT
Start: 2017-01-01 | End: 2017-01-01 | Stop reason: HOSPADM

## 2017-01-01 RX ORDER — IPRATROPIUM BROMIDE AND ALBUTEROL SULFATE 2.5; .5 MG/3ML; MG/3ML
3 SOLUTION RESPIRATORY (INHALATION)
Status: DISCONTINUED | OUTPATIENT
Start: 2017-01-01 | End: 2017-01-01 | Stop reason: HOSPADM

## 2017-01-01 RX ORDER — FERROUS SULFATE 325(65) MG
325 TABLET ORAL DAILY
Status: DISCONTINUED | OUTPATIENT
Start: 2017-01-01 | End: 2017-01-01 | Stop reason: HOSPADM

## 2017-01-01 RX ORDER — SODIUM BICARBONATE 650 MG/1
1950 TABLET ORAL 3 TIMES DAILY
Qty: 120 TAB | Refills: 1 | Status: SHIPPED | OUTPATIENT
Start: 2017-01-01

## 2017-01-01 RX ORDER — LACTULOSE 20 G/30ML
30 SOLUTION ORAL
Status: DISPENSED | OUTPATIENT
Start: 2017-01-01 | End: 2017-01-01

## 2017-01-01 RX ORDER — AMOXICILLIN 250 MG
2 CAPSULE ORAL 2 TIMES DAILY
Status: DISCONTINUED | OUTPATIENT
Start: 2017-01-01 | End: 2017-01-01

## 2017-01-01 RX ORDER — POTASSIUM CHLORIDE 20 MEQ/1
20 TABLET, EXTENDED RELEASE ORAL DAILY
Status: DISCONTINUED | OUTPATIENT
Start: 2017-01-01 | End: 2017-01-01 | Stop reason: HOSPADM

## 2017-01-01 RX ORDER — POTASSIUM CHLORIDE 20 MEQ/1
20 TABLET, EXTENDED RELEASE ORAL 2 TIMES DAILY
Status: COMPLETED | OUTPATIENT
Start: 2017-01-01 | End: 2017-01-01

## 2017-01-01 RX ORDER — MORPHINE SULFATE 4 MG/ML
2 INJECTION, SOLUTION INTRAMUSCULAR; INTRAVENOUS ONCE
Status: COMPLETED | OUTPATIENT
Start: 2017-01-01 | End: 2017-01-01

## 2017-01-01 RX ORDER — AMOXICILLIN AND CLAVULANATE POTASSIUM 875; 125 MG/1; MG/1
1 TABLET, FILM COATED ORAL EVERY 12 HOURS
Status: DISCONTINUED | OUTPATIENT
Start: 2017-01-01 | End: 2017-01-01

## 2017-01-01 RX ORDER — DEXTROSE AND SODIUM CHLORIDE 5; .45 G/100ML; G/100ML
INJECTION, SOLUTION INTRAVENOUS CONTINUOUS
Status: DISCONTINUED | OUTPATIENT
Start: 2017-01-01 | End: 2017-01-01 | Stop reason: HOSPADM

## 2017-01-01 RX ORDER — OMEPRAZOLE 20 MG/1
40 CAPSULE, DELAYED RELEASE ORAL DAILY
Status: DISCONTINUED | OUTPATIENT
Start: 2017-01-01 | End: 2017-01-01

## 2017-01-01 RX ORDER — OMEPRAZOLE 20 MG/1
40 CAPSULE, DELAYED RELEASE ORAL DAILY
Qty: 30 CAP | Refills: 3 | Status: SHIPPED | OUTPATIENT
Start: 2017-01-01

## 2017-01-01 RX ORDER — AMOXICILLIN 250 MG
1 CAPSULE ORAL
Status: DISCONTINUED | OUTPATIENT
Start: 2017-01-01 | End: 2017-01-01 | Stop reason: HOSPADM

## 2017-01-01 RX ORDER — SODIUM CHLORIDE 9 MG/ML
30 INJECTION, SOLUTION INTRAVENOUS ONCE
Status: COMPLETED | OUTPATIENT
Start: 2017-01-01 | End: 2017-01-01

## 2017-01-01 RX ORDER — FERROUS GLUCONATE 324(38)MG
324 TABLET ORAL 2 TIMES DAILY WITH MEALS
Status: DISCONTINUED | OUTPATIENT
Start: 2017-01-01 | End: 2017-01-01 | Stop reason: HOSPADM

## 2017-01-01 RX ORDER — FERROUS GLUCONATE 324(38)MG
324 TABLET ORAL
Status: DISCONTINUED | OUTPATIENT
Start: 2017-01-01 | End: 2017-01-01

## 2017-01-01 RX ORDER — LIDOCAINE HYDROCHLORIDE 10 MG/ML
1-2 INJECTION, SOLUTION INFILTRATION; PERINEURAL
Status: DISCONTINUED | OUTPATIENT
Start: 2017-01-01 | End: 2017-01-01

## 2017-01-01 RX ORDER — DEXTROSE MONOHYDRATE 25 G/50ML
25 INJECTION, SOLUTION INTRAVENOUS
Status: DISCONTINUED | OUTPATIENT
Start: 2017-01-01 | End: 2017-01-01

## 2017-01-01 RX ORDER — SODIUM CHLORIDE, SODIUM LACTATE, POTASSIUM CHLORIDE, CALCIUM CHLORIDE 600; 310; 30; 20 MG/100ML; MG/100ML; MG/100ML; MG/100ML
INJECTION, SOLUTION INTRAVENOUS CONTINUOUS
Status: DISCONTINUED | OUTPATIENT
Start: 2017-01-01 | End: 2017-01-01

## 2017-01-01 RX ORDER — DOXYCYCLINE HYCLATE 100 MG
100 TABLET ORAL 2 TIMES DAILY
Qty: 14 TAB | Refills: 0 | Status: SHIPPED | OUTPATIENT
Start: 2017-01-01 | End: 2017-01-01

## 2017-01-01 RX ORDER — SODIUM BICARBONATE 650 MG/1
650 TABLET ORAL 4 TIMES DAILY
Status: ON HOLD | COMMUNITY
End: 2017-01-01

## 2017-01-01 RX ORDER — PROPRANOLOL HYDROCHLORIDE 10 MG/1
10 TABLET ORAL 3 TIMES DAILY
Qty: 90 TAB | Refills: 2 | Status: SHIPPED | DISCHARGE
Start: 2017-01-01 | End: 2017-01-01

## 2017-01-01 RX ORDER — CEFDINIR 300 MG/1
300 CAPSULE ORAL EVERY 12 HOURS
Qty: 4 CAP | Refills: 0 | Status: SHIPPED | OUTPATIENT
Start: 2017-01-01 | End: 2017-01-01

## 2017-01-01 RX ORDER — LACTULOSE 20 G/30ML
30 SOLUTION ORAL 2 TIMES DAILY
Qty: 30 BOTTLE | Refills: 1 | Status: SHIPPED | OUTPATIENT
Start: 2017-01-01 | End: 2017-01-01

## 2017-01-01 RX ORDER — DOCUSATE SODIUM 100 MG/1
100 CAPSULE, LIQUID FILLED ORAL EVERY MORNING
Status: DISCONTINUED | OUTPATIENT
Start: 2017-01-01 | End: 2017-01-01 | Stop reason: HOSPADM

## 2017-01-01 RX ORDER — SODIUM BICARBONATE 650 MG/1
650 TABLET ORAL 3 TIMES DAILY
Status: DISCONTINUED | OUTPATIENT
Start: 2017-01-01 | End: 2017-01-01

## 2017-01-01 RX ORDER — POTASSIUM CHLORIDE 750 MG/1
20 TABLET, FILM COATED, EXTENDED RELEASE ORAL DAILY
Status: DISCONTINUED | OUTPATIENT
Start: 2017-01-01 | End: 2017-01-01 | Stop reason: HOSPADM

## 2017-01-01 RX ORDER — LACTULOSE 20 G/30ML
30 SOLUTION ORAL EVERY 8 HOURS
Status: DISCONTINUED | OUTPATIENT
Start: 2017-01-01 | End: 2017-01-01 | Stop reason: HOSPADM

## 2017-01-01 RX ORDER — AMOXICILLIN AND CLAVULANATE POTASSIUM 500; 125 MG/1; MG/1
1 TABLET, FILM COATED ORAL EVERY 12 HOURS
Status: DISCONTINUED | OUTPATIENT
Start: 2017-01-01 | End: 2017-01-01 | Stop reason: HOSPADM

## 2017-01-01 RX ORDER — SODIUM BICARBONATE 650 MG/1
1300 TABLET ORAL 3 TIMES DAILY
Status: DISCONTINUED | OUTPATIENT
Start: 2017-01-01 | End: 2017-01-01 | Stop reason: HOSPADM

## 2017-01-01 RX ORDER — SODIUM BICARBONATE 650 MG/1
1300 TABLET ORAL 3 TIMES DAILY
Qty: 180 TAB | Refills: 11 | Status: SHIPPED | OUTPATIENT
Start: 2017-01-01 | End: 2017-01-01 | Stop reason: SDUPTHER

## 2017-01-01 RX ORDER — LACTULOSE 20 G/30ML
15 SOLUTION ORAL 3 TIMES DAILY
Status: DISCONTINUED | OUTPATIENT
Start: 2017-01-01 | End: 2017-01-01 | Stop reason: HOSPADM

## 2017-01-01 RX ORDER — LACTULOSE 10 G/15ML
20 SOLUTION ORAL EVERY MORNING
COMMUNITY

## 2017-01-01 RX ORDER — LACTULOSE 20 G/30ML
15 SOLUTION ORAL
Status: DISCONTINUED | OUTPATIENT
Start: 2017-01-01 | End: 2017-01-01

## 2017-01-01 RX ORDER — ZINC SULFATE 50(220)MG
220 CAPSULE ORAL DAILY
Status: DISCONTINUED | OUTPATIENT
Start: 2017-01-01 | End: 2017-01-01

## 2017-01-01 RX ORDER — HYDRALAZINE HYDROCHLORIDE 20 MG/ML
20 INJECTION INTRAMUSCULAR; INTRAVENOUS EVERY 6 HOURS PRN
Status: DISCONTINUED | OUTPATIENT
Start: 2017-01-01 | End: 2017-01-01 | Stop reason: HOSPADM

## 2017-01-01 RX ORDER — FERROUS GLUCONATE 324(38)MG
324 TABLET ORAL
COMMUNITY

## 2017-01-01 RX ORDER — SODIUM BICARBONATE 650 MG/1
650 TABLET ORAL 3 TIMES DAILY
Qty: 120 TAB | Refills: 3 | Status: SHIPPED | OUTPATIENT
Start: 2017-01-01 | End: 2017-01-01 | Stop reason: SDUPTHER

## 2017-01-01 RX ORDER — ZINC SULFATE 50(220)MG
220 CAPSULE ORAL DAILY
COMMUNITY

## 2017-01-01 RX ORDER — PROPRANOLOL HYDROCHLORIDE 10 MG/1
10 TABLET ORAL EVERY 8 HOURS
Status: DISCONTINUED | OUTPATIENT
Start: 2017-01-01 | End: 2017-01-01 | Stop reason: HOSPADM

## 2017-01-01 RX ORDER — POTASSIUM CHLORIDE 750 MG/1
20 TABLET, FILM COATED, EXTENDED RELEASE ORAL 4 TIMES DAILY
Status: DISCONTINUED | OUTPATIENT
Start: 2017-01-01 | End: 2017-01-01 | Stop reason: HOSPADM

## 2017-01-01 RX ORDER — TRAMADOL HYDROCHLORIDE 50 MG/1
50 TABLET ORAL EVERY 12 HOURS PRN
Status: DISCONTINUED | OUTPATIENT
Start: 2017-01-01 | End: 2017-01-01

## 2017-01-01 RX ORDER — POLYETHYLENE GLYCOL 3350 17 G/17G
1 POWDER, FOR SOLUTION ORAL
Status: DISCONTINUED | OUTPATIENT
Start: 2017-01-01 | End: 2017-01-01

## 2017-01-01 RX ORDER — LACTULOSE 20 G/30ML
15 SOLUTION ORAL 3 TIMES DAILY
Qty: 90 EACH | Refills: 2
Start: 2017-01-01 | End: 2017-01-01

## 2017-01-01 RX ORDER — SODIUM BICARBONATE 650 MG/1
1300 TABLET ORAL 2 TIMES DAILY
Qty: 120 TAB | Refills: 11 | Status: ON HOLD | OUTPATIENT
Start: 2017-01-01 | End: 2017-01-01

## 2017-01-01 RX ORDER — SODIUM BICARBONATE 650 MG/1
1300 TABLET ORAL 2 TIMES DAILY
Status: DISCONTINUED | OUTPATIENT
Start: 2017-01-01 | End: 2017-01-01

## 2017-01-01 RX ORDER — LACTULOSE 20 G/30ML
30 SOLUTION ORAL
Status: DISCONTINUED | OUTPATIENT
Start: 2017-01-01 | End: 2017-01-01 | Stop reason: HOSPADM

## 2017-01-01 RX ORDER — ALBUTEROL SULFATE 90 UG/1
2 AEROSOL, METERED RESPIRATORY (INHALATION) EVERY 4 HOURS PRN
Qty: 8.5 G
Start: 2017-01-01 | End: 2017-01-01

## 2017-01-01 RX ORDER — HYDROCODONE BITARTRATE AND ACETAMINOPHEN 5; 325 MG/1; MG/1
1 TABLET ORAL
Status: DISCONTINUED | OUTPATIENT
Start: 2017-01-01 | End: 2017-01-01

## 2017-01-01 RX ORDER — L. ACIDOPHILUS/L.BULGARICUS 100MM CELL
1 GRANULES IN PACKET (EA) ORAL
Status: DISCONTINUED | OUTPATIENT
Start: 2017-01-01 | End: 2017-01-01 | Stop reason: HOSPADM

## 2017-01-01 RX ORDER — POTASSIUM CHLORIDE 1500 MG/1
20 TABLET, EXTENDED RELEASE ORAL 4 TIMES DAILY
Qty: 120 TAB | Refills: 3 | Status: ON HOLD | OUTPATIENT
Start: 2017-01-01 | End: 2018-01-01

## 2017-01-01 RX ORDER — LACTULOSE 20 G/30ML
45 SOLUTION ORAL 3 TIMES DAILY
Status: DISCONTINUED | OUTPATIENT
Start: 2017-01-01 | End: 2017-01-01 | Stop reason: HOSPADM

## 2017-01-01 RX ORDER — MAGNESIUM SULFATE HEPTAHYDRATE 40 MG/ML
2 INJECTION, SOLUTION INTRAVENOUS ONCE
Status: COMPLETED | OUTPATIENT
Start: 2017-01-01 | End: 2017-01-01

## 2017-01-01 RX ORDER — POTASSIUM CHLORIDE 20 MEQ/1
40 TABLET, EXTENDED RELEASE ORAL ONCE
Status: COMPLETED | OUTPATIENT
Start: 2017-01-01 | End: 2017-01-01

## 2017-01-01 RX ORDER — LACTULOSE 20 G/30ML
15 SOLUTION ORAL 3 TIMES DAILY
Qty: 90 EACH | Refills: 2 | Status: ON HOLD | OUTPATIENT
Start: 2017-01-01 | End: 2017-01-01

## 2017-01-01 RX ORDER — CEFTRIAXONE 1 G/1
1 INJECTION, POWDER, FOR SOLUTION INTRAMUSCULAR; INTRAVENOUS ONCE
Status: DISCONTINUED | OUTPATIENT
Start: 2017-01-01 | End: 2017-01-01

## 2017-01-01 RX ORDER — TRAMADOL HYDROCHLORIDE 50 MG/1
100 TABLET ORAL EVERY 6 HOURS PRN
Status: DISCONTINUED | OUTPATIENT
Start: 2017-01-01 | End: 2017-01-01 | Stop reason: HOSPADM

## 2017-01-01 RX ORDER — LACTULOSE 20 G/30ML
45 SOLUTION ORAL ONCE
Status: COMPLETED | OUTPATIENT
Start: 2017-01-01 | End: 2017-01-01

## 2017-01-01 RX ORDER — ENALAPRILAT 1.25 MG/ML
1.25 INJECTION INTRAVENOUS EVERY 6 HOURS PRN
Status: DISCONTINUED | OUTPATIENT
Start: 2017-01-01 | End: 2017-01-01 | Stop reason: HOSPADM

## 2017-01-01 RX ORDER — POTASSIUM CHLORIDE 20 MEQ/1
40 TABLET, EXTENDED RELEASE ORAL EVERY 4 HOURS
Status: DISPENSED | OUTPATIENT
Start: 2017-01-01 | End: 2017-01-01

## 2017-01-01 RX ADMIN — POTASSIUM CHLORIDE 20 MEQ: 750 TABLET, FILM COATED, EXTENDED RELEASE ORAL at 08:33

## 2017-01-01 RX ADMIN — Medication 325 MG: at 08:25

## 2017-01-01 RX ADMIN — FAMOTIDINE 20 MG: 10 INJECTION, SOLUTION INTRAVENOUS at 09:25

## 2017-01-01 RX ADMIN — LACTULOSE 30 ML: 20 SOLUTION ORAL at 09:01

## 2017-01-01 RX ADMIN — FERROUS GLUCONATE 324 MG: 324 TABLET ORAL at 17:35

## 2017-01-01 RX ADMIN — PROPRANOLOL HYDROCHLORIDE 10 MG: 10 TABLET ORAL at 09:22

## 2017-01-01 RX ADMIN — LACTULOSE 30 ML: 20 SOLUTION ORAL at 08:08

## 2017-01-01 RX ADMIN — HEPARIN SODIUM 5000 UNITS: 5000 INJECTION, SOLUTION INTRAVENOUS; SUBCUTANEOUS at 14:44

## 2017-01-01 RX ADMIN — RIFAXIMIN 550 MG: 550 TABLET ORAL at 09:12

## 2017-01-01 RX ADMIN — SODIUM CHLORIDE: 9 INJECTION, SOLUTION INTRAVENOUS at 14:18

## 2017-01-01 RX ADMIN — OMEPRAZOLE 40 MG: 20 CAPSULE, DELAYED RELEASE ORAL at 08:20

## 2017-01-01 RX ADMIN — FERROUS GLUCONATE 324 MG: 324 TABLET ORAL at 08:47

## 2017-01-01 RX ADMIN — LACTULOSE 30 ML: 20 SOLUTION ORAL at 20:21

## 2017-01-01 RX ADMIN — ONDANSETRON 4 MG: 4 TABLET, ORALLY DISINTEGRATING ORAL at 03:23

## 2017-01-01 RX ADMIN — RIFAXIMIN 550 MG: 550 TABLET ORAL at 20:53

## 2017-01-01 RX ADMIN — RIFAXIMIN 550 MG: 550 TABLET ORAL at 20:25

## 2017-01-01 RX ADMIN — SODIUM BICARBONATE TAB 650 MG 1300 MG: 650 TAB at 08:49

## 2017-01-01 RX ADMIN — DEXTROSE AND SODIUM CHLORIDE: 5; .45 INJECTION, SOLUTION INTRAVENOUS at 13:48

## 2017-01-01 RX ADMIN — RIFAXIMIN 550 MG: 550 TABLET ORAL at 14:17

## 2017-01-01 RX ADMIN — STANDARDIZED SENNA CONCENTRATE AND DOCUSATE SODIUM 1 TABLET: 8.6; 5 TABLET, FILM COATED ORAL at 21:01

## 2017-01-01 RX ADMIN — VITAMIN D, TAB 1000IU (100/BT) 1000 UNITS: 25 TAB at 07:54

## 2017-01-01 RX ADMIN — POTASSIUM CHLORIDE 20 MEQ: 750 TABLET, FILM COATED, EXTENDED RELEASE ORAL at 17:22

## 2017-01-01 RX ADMIN — LACTULOSE 30 ML: 20 SOLUTION ORAL at 08:43

## 2017-01-01 RX ADMIN — POTASSIUM CHLORIDE 20 MEQ: 1500 TABLET, EXTENDED RELEASE ORAL at 09:02

## 2017-01-01 RX ADMIN — SODIUM CHLORIDE: 9 INJECTION, SOLUTION INTRAVENOUS at 14:44

## 2017-01-01 RX ADMIN — OXYCODONE HYDROCHLORIDE 5 MG: 5 TABLET ORAL at 03:31

## 2017-01-01 RX ADMIN — PROPRANOLOL HYDROCHLORIDE 10 MG: 10 TABLET ORAL at 15:56

## 2017-01-01 RX ADMIN — RIFAXIMIN 550 MG: 550 TABLET ORAL at 15:07

## 2017-01-01 RX ADMIN — RIFAXIMIN 550 MG: 550 TABLET ORAL at 15:26

## 2017-01-01 RX ADMIN — STANDARDIZED SENNA CONCENTRATE AND DOCUSATE SODIUM 1 TABLET: 8.6; 5 TABLET, FILM COATED ORAL at 21:39

## 2017-01-01 RX ADMIN — SODIUM BICARBONATE 1787.5 MG: 650 TABLET ORAL at 08:23

## 2017-01-01 RX ADMIN — OMEPRAZOLE 40 MG: 20 CAPSULE, DELAYED RELEASE ORAL at 09:31

## 2017-01-01 RX ADMIN — RIFAXIMIN 550 MG: 550 TABLET ORAL at 21:18

## 2017-01-01 RX ADMIN — PROPRANOLOL HYDROCHLORIDE 10 MG: 10 TABLET ORAL at 16:30

## 2017-01-01 RX ADMIN — FERROUS GLUCONATE 324 MG: 324 TABLET ORAL at 08:07

## 2017-01-01 RX ADMIN — LACTULOSE 30 ML: 20 SOLUTION ORAL at 06:21

## 2017-01-01 RX ADMIN — OMEPRAZOLE 40 MG: 20 CAPSULE, DELAYED RELEASE ORAL at 10:15

## 2017-01-01 RX ADMIN — VITAMIN D, TAB 1000IU (100/BT) 1000 UNITS: 25 TAB at 08:07

## 2017-01-01 RX ADMIN — DEXTROSE AND SODIUM CHLORIDE: 5; .45 INJECTION, SOLUTION INTRAVENOUS at 18:29

## 2017-01-01 RX ADMIN — SODIUM CHLORIDE: 9 INJECTION, SOLUTION INTRAVENOUS at 14:26

## 2017-01-01 RX ADMIN — OXYCODONE HYDROCHLORIDE AND ACETAMINOPHEN 500 MG: 500 TABLET ORAL at 08:48

## 2017-01-01 RX ADMIN — PROPRANOLOL HYDROCHLORIDE 10 MG: 10 TABLET ORAL at 09:02

## 2017-01-01 RX ADMIN — OMEPRAZOLE 20 MG: 20 CAPSULE, DELAYED RELEASE ORAL at 09:02

## 2017-01-01 RX ADMIN — OMEPRAZOLE 20 MG: 20 CAPSULE, DELAYED RELEASE ORAL at 21:39

## 2017-01-01 RX ADMIN — VITAMIN D, TAB 1000IU (100/BT) 1000 UNITS: 25 TAB at 08:25

## 2017-01-01 RX ADMIN — PROPRANOLOL HYDROCHLORIDE 10 MG: 10 TABLET ORAL at 20:30

## 2017-01-01 RX ADMIN — RIFAXIMIN 550 MG: 550 TABLET ORAL at 10:05

## 2017-01-01 RX ADMIN — OXYCODONE HYDROCHLORIDE 5 MG: 5 TABLET ORAL at 21:01

## 2017-01-01 RX ADMIN — FERROUS GLUCONATE 324 MG: 324 TABLET ORAL at 17:07

## 2017-01-01 RX ADMIN — DOCUSATE SODIUM 100 MG: 100 CAPSULE ORAL at 08:30

## 2017-01-01 RX ADMIN — PROPRANOLOL HYDROCHLORIDE 10 MG: 10 TABLET ORAL at 22:15

## 2017-01-01 RX ADMIN — ACETAMINOPHEN 650 MG: 325 TABLET, FILM COATED ORAL at 11:04

## 2017-01-01 RX ADMIN — DOCUSATE SODIUM 100 MG: 100 CAPSULE ORAL at 09:20

## 2017-01-01 RX ADMIN — OXYCODONE HYDROCHLORIDE 5 MG: 5 TABLET ORAL at 17:45

## 2017-01-01 RX ADMIN — SODIUM BICARBONATE 650 MG: 650 TABLET ORAL at 20:35

## 2017-01-01 RX ADMIN — SODIUM BICARBONATE 1950 MG: 650 TABLET ORAL at 16:42

## 2017-01-01 RX ADMIN — DOCUSATE SODIUM 100 MG: 100 CAPSULE ORAL at 08:44

## 2017-01-01 RX ADMIN — INSULIN LISPRO 2 UNITS: 100 INJECTION, SOLUTION INTRAVENOUS; SUBCUTANEOUS at 12:08

## 2017-01-01 RX ADMIN — RIFAXIMIN 550 MG: 550 TABLET ORAL at 20:27

## 2017-01-01 RX ADMIN — SODIUM BICARBONATE 1787.5 MG: 650 TABLET ORAL at 09:27

## 2017-01-01 RX ADMIN — HEPARIN SODIUM 5000 UNITS: 5000 INJECTION, SOLUTION INTRAVENOUS; SUBCUTANEOUS at 20:53

## 2017-01-01 RX ADMIN — FERROUS GLUCONATE 324 MG: 324 TABLET ORAL at 10:03

## 2017-01-01 RX ADMIN — FERROUS GLUCONATE 324 MG: 324 TABLET ORAL at 08:34

## 2017-01-01 RX ADMIN — CEFTRIAXONE 2 G: 2 INJECTION, SOLUTION INTRAVENOUS at 08:28

## 2017-01-01 RX ADMIN — OMEPRAZOLE 20 MG: 20 CAPSULE, DELAYED RELEASE ORAL at 08:55

## 2017-01-01 RX ADMIN — DEXTROSE AND SODIUM CHLORIDE: 5; .45 INJECTION, SOLUTION INTRAVENOUS at 16:52

## 2017-01-01 RX ADMIN — RIFAXIMIN 550 MG: 550 TABLET ORAL at 08:07

## 2017-01-01 RX ADMIN — RIFAXIMIN 550 MG: 550 TABLET ORAL at 20:30

## 2017-01-01 RX ADMIN — LACTOBACILLUS ACIDOPHILUS / LACTOBACILLUS BULGARICUS 1 PACKET: 100 MILLION CFU STRENGTH GRANULES at 13:47

## 2017-01-01 RX ADMIN — HEPARIN SODIUM 5000 UNITS: 5000 INJECTION, SOLUTION INTRAVENOUS; SUBCUTANEOUS at 13:47

## 2017-01-01 RX ADMIN — SODIUM BICARBONATE 1787.5 MG: 650 TABLET ORAL at 21:04

## 2017-01-01 RX ADMIN — OMEPRAZOLE 20 MG: 20 CAPSULE, DELAYED RELEASE ORAL at 08:36

## 2017-01-01 RX ADMIN — SODIUM BICARBONATE 1950 MG: 650 TABLET ORAL at 20:53

## 2017-01-01 RX ADMIN — POTASSIUM CHLORIDE 20 MEQ: 750 TABLET, FILM COATED, EXTENDED RELEASE ORAL at 13:03

## 2017-01-01 RX ADMIN — HEPARIN SODIUM 5000 UNITS: 5000 INJECTION, SOLUTION INTRAVENOUS; SUBCUTANEOUS at 05:28

## 2017-01-01 RX ADMIN — FERROUS GLUCONATE 324 MG: 324 TABLET ORAL at 09:30

## 2017-01-01 RX ADMIN — IPRATROPIUM BROMIDE AND ALBUTEROL SULFATE 3 ML: .5; 3 SOLUTION RESPIRATORY (INHALATION) at 19:12

## 2017-01-01 RX ADMIN — POTASSIUM CHLORIDE 20 MEQ: 1500 TABLET, EXTENDED RELEASE ORAL at 20:53

## 2017-01-01 RX ADMIN — RIFAXIMIN 550 MG: 550 TABLET ORAL at 20:56

## 2017-01-01 RX ADMIN — OMEPRAZOLE 40 MG: 20 CAPSULE, DELAYED RELEASE ORAL at 09:01

## 2017-01-01 RX ADMIN — SODIUM BICARBONATE TAB 650 MG 1625 MG: 650 TAB at 20:50

## 2017-01-01 RX ADMIN — LACTOBACILLUS ACIDOPHILUS / LACTOBACILLUS BULGARICUS 1 PACKET: 100 MILLION CFU STRENGTH GRANULES at 09:50

## 2017-01-01 RX ADMIN — OMEPRAZOLE 20 MG: 20 CAPSULE, DELAYED RELEASE ORAL at 21:17

## 2017-01-01 RX ADMIN — FERROUS GLUCONATE 324 MG: 324 TABLET ORAL at 07:59

## 2017-01-01 RX ADMIN — RIFAXIMIN 550 MG: 550 TABLET ORAL at 15:41

## 2017-01-01 RX ADMIN — SODIUM BICARBONATE TAB 650 MG 1300 MG: 650 TAB at 08:07

## 2017-01-01 RX ADMIN — PROPRANOLOL HYDROCHLORIDE 10 MG: 10 TABLET ORAL at 00:02

## 2017-01-01 RX ADMIN — RIFAXIMIN 550 MG: 550 TABLET ORAL at 17:44

## 2017-01-01 RX ADMIN — VITAMIN D, TAB 1000IU (100/BT) 1000 UNITS: 25 TAB at 09:00

## 2017-01-01 RX ADMIN — VITAMIN D, TAB 1000IU (100/BT) 1000 UNITS: 25 TAB at 10:40

## 2017-01-01 RX ADMIN — SODIUM CHLORIDE 80 MG: 9 INJECTION, SOLUTION INTRAVENOUS at 11:19

## 2017-01-01 RX ADMIN — TRAMADOL HYDROCHLORIDE 50 MG: 50 TABLET, FILM COATED ORAL at 21:39

## 2017-01-01 RX ADMIN — CEFDINIR 300 MG: 300 CAPSULE ORAL at 20:38

## 2017-01-01 RX ADMIN — SODIUM BICARBONATE 650 MG: 650 TABLET ORAL at 21:00

## 2017-01-01 RX ADMIN — POTASSIUM CHLORIDE 20 MEQ: 750 TABLET, FILM COATED, EXTENDED RELEASE ORAL at 20:56

## 2017-01-01 RX ADMIN — POTASSIUM CHLORIDE 20 MEQ: 750 TABLET, FILM COATED, EXTENDED RELEASE ORAL at 08:26

## 2017-01-01 RX ADMIN — SODIUM BICARBONATE 1300 MG: 650 TABLET ORAL at 14:45

## 2017-01-01 RX ADMIN — POTASSIUM CHLORIDE 20 MEQ: 1500 TABLET, EXTENDED RELEASE ORAL at 20:13

## 2017-01-01 RX ADMIN — PROPRANOLOL HYDROCHLORIDE 10 MG: 10 TABLET ORAL at 14:07

## 2017-01-01 RX ADMIN — SODIUM CHLORIDE 1000 ML: 9 INJECTION, SOLUTION INTRAVENOUS at 01:38

## 2017-01-01 RX ADMIN — OMEPRAZOLE 40 MG: 20 CAPSULE, DELAYED RELEASE ORAL at 08:23

## 2017-01-01 RX ADMIN — VITAMIN D, TAB 1000IU (100/BT) 1000 UNITS: 25 TAB at 08:52

## 2017-01-01 RX ADMIN — SODIUM CHLORIDE: 9 INJECTION, SOLUTION INTRAVENOUS at 21:00

## 2017-01-01 RX ADMIN — DOCUSATE SODIUM 100 MG: 100 CAPSULE ORAL at 08:25

## 2017-01-01 RX ADMIN — SODIUM CHLORIDE: 9 INJECTION, SOLUTION INTRAVENOUS at 09:18

## 2017-01-01 RX ADMIN — OMEPRAZOLE 20 MG: 20 CAPSULE, DELAYED RELEASE ORAL at 09:21

## 2017-01-01 RX ADMIN — SODIUM BICARBONATE 1787.5 MG: 650 TABLET ORAL at 09:45

## 2017-01-01 RX ADMIN — VITAMIN D, TAB 1000IU (100/BT) 1000 UNITS: 25 TAB at 08:50

## 2017-01-01 RX ADMIN — VITAMIN D, TAB 1000IU (100/BT) 1000 UNITS: 25 TAB at 08:00

## 2017-01-01 RX ADMIN — CEFTRIAXONE 2 G: 2 INJECTION, POWDER, FOR SOLUTION INTRAMUSCULAR; INTRAVENOUS at 17:51

## 2017-01-01 RX ADMIN — SODIUM BICARBONATE 650 MG: 650 TABLET ORAL at 20:10

## 2017-01-01 RX ADMIN — AMOXICILLIN AND CLAVULANATE POTASSIUM 1 TABLET: 500; 125 TABLET, FILM COATED ORAL at 21:16

## 2017-01-01 RX ADMIN — SODIUM CHLORIDE: 9 INJECTION, SOLUTION INTRAVENOUS at 06:12

## 2017-01-01 RX ADMIN — RIFAXIMIN 550 MG: 550 TABLET ORAL at 09:23

## 2017-01-01 RX ADMIN — TRAMADOL HYDROCHLORIDE 50 MG: 50 TABLET, COATED ORAL at 10:05

## 2017-01-01 RX ADMIN — RIFAXIMIN 550 MG: 550 TABLET ORAL at 08:52

## 2017-01-01 RX ADMIN — SODIUM BICARBONATE TAB 650 MG 1300 MG: 650 TAB at 10:29

## 2017-01-01 RX ADMIN — LACTOBACILLUS ACIDOPHILUS / LACTOBACILLUS BULGARICUS 1 PACKET: 100 MILLION CFU STRENGTH GRANULES at 16:06

## 2017-01-01 RX ADMIN — Medication 325 MG: at 08:44

## 2017-01-01 RX ADMIN — VITAMIN D, TAB 1000IU (100/BT) 1000 UNITS: 25 TAB at 09:31

## 2017-01-01 RX ADMIN — SODIUM BICARBONATE 650 MG: 650 TABLET ORAL at 14:13

## 2017-01-01 RX ADMIN — OMEPRAZOLE 40 MG: 20 CAPSULE, DELAYED RELEASE ORAL at 15:41

## 2017-01-01 RX ADMIN — OMEPRAZOLE 40 MG: 20 CAPSULE, DELAYED RELEASE ORAL at 08:52

## 2017-01-01 RX ADMIN — SODIUM BICARBONATE 1787.5 MG: 650 TABLET ORAL at 09:54

## 2017-01-01 RX ADMIN — FERROUS GLUCONATE 324 MG: 324 TABLET ORAL at 09:12

## 2017-01-01 RX ADMIN — LACTULOSE 30 ML: 20 SOLUTION ORAL at 13:41

## 2017-01-01 RX ADMIN — RIFAXIMIN 550 MG: 550 TABLET ORAL at 08:05

## 2017-01-01 RX ADMIN — IPRATROPIUM BROMIDE AND ALBUTEROL SULFATE 3 ML: .5; 3 SOLUTION RESPIRATORY (INHALATION) at 10:46

## 2017-01-01 RX ADMIN — RIFAXIMIN 550 MG: 550 TABLET ORAL at 10:15

## 2017-01-01 RX ADMIN — VITAMIN D, TAB 1000IU (100/BT) 1000 UNITS: 25 TAB at 08:11

## 2017-01-01 RX ADMIN — SODIUM CHLORIDE 8 MG/HR: 9 INJECTION, SOLUTION INTRAVENOUS at 06:20

## 2017-01-01 RX ADMIN — POTASSIUM CHLORIDE 20 MEQ: 750 TABLET, FILM COATED, EXTENDED RELEASE ORAL at 08:49

## 2017-01-01 RX ADMIN — SODIUM BICARBONATE 1950 MG: 650 TABLET ORAL at 20:22

## 2017-01-01 RX ADMIN — LACTULOSE 30 ML: 20 SOLUTION ORAL at 22:02

## 2017-01-01 RX ADMIN — VITAMIN D, TAB 1000IU (100/BT) 1000 UNITS: 25 TAB at 09:23

## 2017-01-01 RX ADMIN — OMEPRAZOLE 20 MG: 20 CAPSULE, DELAYED RELEASE ORAL at 08:50

## 2017-01-01 RX ADMIN — SODIUM BICARBONATE 1300 MG: 650 TABLET ORAL at 20:32

## 2017-01-01 RX ADMIN — OMEPRAZOLE 20 MG: 20 CAPSULE, DELAYED RELEASE ORAL at 08:44

## 2017-01-01 RX ADMIN — CARVEDILOL 3.12 MG: 3.12 TABLET, FILM COATED ORAL at 18:16

## 2017-01-01 RX ADMIN — SODIUM CHLORIDE: 9 INJECTION, SOLUTION INTRAVENOUS at 16:18

## 2017-01-01 RX ADMIN — SODIUM BICARBONATE 1950 MG: 650 TABLET ORAL at 09:13

## 2017-01-01 RX ADMIN — SODIUM CHLORIDE 1000 ML: 9 INJECTION, SOLUTION INTRAVENOUS at 10:22

## 2017-01-01 RX ADMIN — OXYCODONE HYDROCHLORIDE 5 MG: 5 TABLET ORAL at 10:57

## 2017-01-01 RX ADMIN — SODIUM CHLORIDE, POTASSIUM CHLORIDE, SODIUM LACTATE AND CALCIUM CHLORIDE: 600; 310; 30; 20 INJECTION, SOLUTION INTRAVENOUS at 06:10

## 2017-01-01 RX ADMIN — SODIUM BICARBONATE 1950 MG: 650 TABLET ORAL at 12:30

## 2017-01-01 RX ADMIN — LACTULOSE 30 ML: 20 SOLUTION ORAL at 08:49

## 2017-01-01 RX ADMIN — OXYCODONE HYDROCHLORIDE AND ACETAMINOPHEN 500 MG: 500 TABLET ORAL at 20:21

## 2017-01-01 RX ADMIN — RIFAXIMIN 550 MG: 550 TABLET ORAL at 22:32

## 2017-01-01 RX ADMIN — FENTANYL CITRATE 100 MCG: 50 INJECTION, SOLUTION INTRAMUSCULAR; INTRAVENOUS at 11:22

## 2017-01-01 RX ADMIN — AMOXICILLIN AND CLAVULANATE POTASSIUM 1 TABLET: 500; 125 TABLET, FILM COATED ORAL at 10:05

## 2017-01-01 RX ADMIN — SODIUM CHLORIDE: 9 INJECTION, SOLUTION INTRAVENOUS at 21:49

## 2017-01-01 RX ADMIN — CEFTRIAXONE SODIUM 1 G: 1 INJECTION, POWDER, FOR SOLUTION INTRAMUSCULAR; INTRAVENOUS at 16:16

## 2017-01-01 RX ADMIN — ONDANSETRON 4 MG: 2 INJECTION, SOLUTION INTRAMUSCULAR; INTRAVENOUS at 23:46

## 2017-01-01 RX ADMIN — RIFAXIMIN 550 MG: 550 TABLET ORAL at 07:54

## 2017-01-01 RX ADMIN — PROPRANOLOL HYDROCHLORIDE 10 MG: 10 TABLET ORAL at 10:28

## 2017-01-01 RX ADMIN — OMEPRAZOLE 40 MG: 20 CAPSULE, DELAYED RELEASE ORAL at 08:34

## 2017-01-01 RX ADMIN — RIFAXIMIN 550 MG: 550 TABLET ORAL at 20:22

## 2017-01-01 RX ADMIN — LACTULOSE 30 ML: 20 SOLUTION ORAL at 18:37

## 2017-01-01 RX ADMIN — CEFTRIAXONE SODIUM 2 G: 2 INJECTION, POWDER, FOR SOLUTION INTRAMUSCULAR; INTRAVENOUS at 09:07

## 2017-01-01 RX ADMIN — SODIUM BICARBONATE 1950 MG: 650 TABLET ORAL at 13:55

## 2017-01-01 RX ADMIN — LACTULOSE 30 ML: 20 SOLUTION ORAL at 10:40

## 2017-01-01 RX ADMIN — RIFAXIMIN 550 MG: 550 TABLET ORAL at 20:38

## 2017-01-01 RX ADMIN — LACTULOSE 30 ML: 20 SOLUTION ORAL at 22:15

## 2017-01-01 RX ADMIN — PROPOFOL 5 MCG/KG/MIN: 10 INJECTION, EMULSION INTRAVENOUS at 05:25

## 2017-01-01 RX ADMIN — SODIUM BICARBONATE TAB 650 MG 1300 MG: 650 TAB at 22:03

## 2017-01-01 RX ADMIN — SODIUM BICARBONATE 1300 MG: 650 TABLET ORAL at 20:18

## 2017-01-01 RX ADMIN — CEFTRIAXONE 2 G: 2 INJECTION, SOLUTION INTRAVENOUS at 11:45

## 2017-01-01 RX ADMIN — OMEPRAZOLE 20 MG: 20 CAPSULE, DELAYED RELEASE ORAL at 20:38

## 2017-01-01 RX ADMIN — OMEPRAZOLE 40 MG: 20 CAPSULE, DELAYED RELEASE ORAL at 10:40

## 2017-01-01 RX ADMIN — FERROUS GLUCONATE 324 MG: 324 TABLET ORAL at 09:28

## 2017-01-01 RX ADMIN — VITAMIN D, TAB 1000IU (100/BT) 1000 UNITS: 25 TAB at 09:25

## 2017-01-01 RX ADMIN — OMEPRAZOLE 40 MG: 20 CAPSULE, DELAYED RELEASE ORAL at 05:49

## 2017-01-01 RX ADMIN — LACTULOSE 30 ML: 20 SOLUTION ORAL at 20:28

## 2017-01-01 RX ADMIN — LACTULOSE 30 ML: 20 SOLUTION ORAL at 07:58

## 2017-01-01 RX ADMIN — SODIUM CHLORIDE: 9 INJECTION, SOLUTION INTRAVENOUS at 22:32

## 2017-01-01 RX ADMIN — LACTULOSE 30 ML: 20 SOLUTION ORAL at 13:53

## 2017-01-01 RX ADMIN — PROPRANOLOL HYDROCHLORIDE 10 MG: 10 TABLET ORAL at 15:47

## 2017-01-01 RX ADMIN — INSULIN LISPRO 2 UNITS: 100 INJECTION, SOLUTION INTRAVENOUS; SUBCUTANEOUS at 00:27

## 2017-01-01 RX ADMIN — SODIUM CHLORIDE: 9 INJECTION, SOLUTION INTRAVENOUS at 03:23

## 2017-01-01 RX ADMIN — SODIUM CHLORIDE 8 MG/HR: 9 INJECTION, SOLUTION INTRAVENOUS at 21:52

## 2017-01-01 RX ADMIN — LACTULOSE 30 ML: 20 SOLUTION ORAL at 09:02

## 2017-01-01 RX ADMIN — RIFAXIMIN 550 MG: 550 TABLET ORAL at 08:34

## 2017-01-01 RX ADMIN — SODIUM CHLORIDE: 9 INJECTION, SOLUTION INTRAVENOUS at 14:45

## 2017-01-01 RX ADMIN — POTASSIUM CHLORIDE 20 MEQ: 1500 TABLET, EXTENDED RELEASE ORAL at 20:33

## 2017-01-01 RX ADMIN — VITAMIN D, TAB 1000IU (100/BT) 1000 UNITS: 25 TAB at 09:13

## 2017-01-01 RX ADMIN — OMEPRAZOLE 20 MG: 20 CAPSULE, DELAYED RELEASE ORAL at 08:07

## 2017-01-01 RX ADMIN — SODIUM CHLORIDE: 9 INJECTION, SOLUTION INTRAVENOUS at 22:47

## 2017-01-01 RX ADMIN — LACTULOSE 45 ML: 20 SOLUTION ORAL at 20:08

## 2017-01-01 RX ADMIN — SODIUM BICARBONATE 650 MG: 650 TABLET ORAL at 20:26

## 2017-01-01 RX ADMIN — RIFAXIMIN 550 MG: 550 TABLET ORAL at 08:48

## 2017-01-01 RX ADMIN — SODIUM BICARBONATE TAB 650 MG 1300 MG: 650 TAB at 08:44

## 2017-01-01 RX ADMIN — RIFAXIMIN 550 MG: 550 TABLET ORAL at 09:15

## 2017-01-01 RX ADMIN — RIFAXIMIN 550 MG: 550 TABLET ORAL at 08:36

## 2017-01-01 RX ADMIN — OMEPRAZOLE 20 MG: 20 CAPSULE, DELAYED RELEASE ORAL at 21:01

## 2017-01-01 RX ADMIN — SODIUM BICARBONATE 1787.5 MG: 650 TABLET ORAL at 20:43

## 2017-01-01 RX ADMIN — HEPARIN SODIUM 5000 UNITS: 5000 INJECTION, SOLUTION INTRAVENOUS; SUBCUTANEOUS at 21:09

## 2017-01-01 RX ADMIN — POTASSIUM CHLORIDE 20 MEQ: 750 TABLET, FILM COATED, EXTENDED RELEASE ORAL at 18:15

## 2017-01-01 RX ADMIN — POTASSIUM CHLORIDE 20 MEQ: 1500 TABLET, EXTENDED RELEASE ORAL at 08:06

## 2017-01-01 RX ADMIN — CHLORHEXIDINE GLUCONATE 15 ML: 1.2 RINSE ORAL at 20:29

## 2017-01-01 RX ADMIN — VITAMIN D, TAB 1000IU (100/BT) 1000 UNITS: 25 TAB at 08:30

## 2017-01-01 RX ADMIN — FERROUS SULFATE TAB 325 MG (65 MG ELEMENTAL FE) 325 MG: 325 (65 FE) TAB at 10:29

## 2017-01-01 RX ADMIN — SODIUM BICARBONATE 1300 MG: 650 TABLET ORAL at 17:42

## 2017-01-01 RX ADMIN — OMEPRAZOLE 40 MG: 20 CAPSULE, DELAYED RELEASE ORAL at 08:33

## 2017-01-01 RX ADMIN — DEXTROSE AND SODIUM CHLORIDE: 5; .45 INJECTION, SOLUTION INTRAVENOUS at 15:25

## 2017-01-01 RX ADMIN — Medication 325 MG: at 09:20

## 2017-01-01 RX ADMIN — LACTULOSE 30 ML: 20 SOLUTION ORAL at 21:38

## 2017-01-01 RX ADMIN — SODIUM BICARBONATE TAB 650 MG 1300 MG: 650 TAB at 08:30

## 2017-01-01 RX ADMIN — LIDOCAINE HYDROCHLORIDE 20 ML: 10 INJECTION, SOLUTION INFILTRATION; PERINEURAL at 23:30

## 2017-01-01 RX ADMIN — LACTULOSE 30 ML: 20 SOLUTION ORAL at 17:43

## 2017-01-01 RX ADMIN — CEFTRIAXONE 2 G: 2 INJECTION, POWDER, FOR SOLUTION INTRAMUSCULAR; INTRAVENOUS at 11:10

## 2017-01-01 RX ADMIN — SODIUM BICARBONATE 1787.5 MG: 650 TABLET ORAL at 15:07

## 2017-01-01 RX ADMIN — SODIUM BICARBONATE TAB 650 MG 1300 MG: 650 TAB at 09:20

## 2017-01-01 RX ADMIN — HEPARIN SODIUM 5000 UNITS: 5000 INJECTION, SOLUTION INTRAVENOUS; SUBCUTANEOUS at 14:49

## 2017-01-01 RX ADMIN — IPRATROPIUM BROMIDE AND ALBUTEROL SULFATE 3 ML: .5; 3 SOLUTION RESPIRATORY (INHALATION) at 15:20

## 2017-01-01 RX ADMIN — OMEPRAZOLE 40 MG: 20 CAPSULE, DELAYED RELEASE ORAL at 08:46

## 2017-01-01 RX ADMIN — LACTULOSE 30 ML: 20 SOLUTION ORAL at 22:12

## 2017-01-01 RX ADMIN — SODIUM BICARBONATE 1787.5 MG: 650 TABLET ORAL at 08:25

## 2017-01-01 RX ADMIN — PROPRANOLOL HYDROCHLORIDE 10 MG: 10 TABLET ORAL at 23:59

## 2017-01-01 RX ADMIN — RIFAXIMIN 550 MG: 550 TABLET ORAL at 10:28

## 2017-01-01 RX ADMIN — ACETAMINOPHEN 650 MG: 325 TABLET, FILM COATED ORAL at 20:22

## 2017-01-01 RX ADMIN — HEPARIN SODIUM 5000 UNITS: 5000 INJECTION, SOLUTION INTRAVENOUS; SUBCUTANEOUS at 13:40

## 2017-01-01 RX ADMIN — SODIUM BICARBONATE 1300 MG: 650 TABLET ORAL at 08:06

## 2017-01-01 RX ADMIN — SODIUM CHLORIDE: 9 INJECTION, SOLUTION INTRAVENOUS at 06:20

## 2017-01-01 RX ADMIN — FERROUS GLUCONATE 324 MG: 324 TABLET ORAL at 10:40

## 2017-01-01 RX ADMIN — SODIUM BICARBONATE 1950 MG: 650 TABLET ORAL at 06:06

## 2017-01-01 RX ADMIN — MORPHINE SULFATE 2 MG: 4 INJECTION INTRAVENOUS at 21:51

## 2017-01-01 RX ADMIN — PROPRANOLOL HYDROCHLORIDE 10 MG: 10 TABLET ORAL at 05:37

## 2017-01-01 RX ADMIN — LACTULOSE 30 ML: 20 SOLUTION ORAL at 05:51

## 2017-01-01 RX ADMIN — LACTULOSE 30 ML: 20 SOLUTION ORAL at 13:50

## 2017-01-01 RX ADMIN — SODIUM BICARBONATE TAB 650 MG 1625 MG: 650 TAB at 21:44

## 2017-01-01 RX ADMIN — RIFAXIMIN 550 MG: 550 TABLET ORAL at 14:45

## 2017-01-01 RX ADMIN — DEXTROSE AND SODIUM CHLORIDE: 5; .45 INJECTION, SOLUTION INTRAVENOUS at 22:00

## 2017-01-01 RX ADMIN — SODIUM BICARBONATE TAB 650 MG 1300 MG: 650 TAB at 08:35

## 2017-01-01 RX ADMIN — LACTULOSE 30 ML: 20 SOLUTION ORAL at 22:50

## 2017-01-01 RX ADMIN — LACTOBACILLUS ACIDOPHILUS / LACTOBACILLUS BULGARICUS 1 PACKET: 100 MILLION CFU STRENGTH GRANULES at 12:54

## 2017-01-01 RX ADMIN — IPRATROPIUM BROMIDE AND ALBUTEROL SULFATE 3 ML: .5; 3 SOLUTION RESPIRATORY (INHALATION) at 03:30

## 2017-01-01 RX ADMIN — FERROUS GLUCONATE 324 MG: 324 TABLET ORAL at 09:17

## 2017-01-01 RX ADMIN — LACTULOSE 30 ML: 20 SOLUTION ORAL at 09:31

## 2017-01-01 RX ADMIN — SODIUM BICARBONATE TAB 650 MG 1300 MG: 650 TAB at 00:45

## 2017-01-01 RX ADMIN — SODIUM BICARBONATE 1300 MG: 650 TABLET ORAL at 08:47

## 2017-01-01 RX ADMIN — LACTULOSE 30 ML: 20 SOLUTION ORAL at 09:23

## 2017-01-01 RX ADMIN — SODIUM CHLORIDE: 9 INJECTION, SOLUTION INTRAVENOUS at 01:38

## 2017-01-01 RX ADMIN — LACTOBACILLUS ACIDOPHILUS / LACTOBACILLUS BULGARICUS 1 PACKET: 100 MILLION CFU STRENGTH GRANULES at 08:15

## 2017-01-01 RX ADMIN — SODIUM BICARBONATE 1950 MG: 650 TABLET ORAL at 13:02

## 2017-01-01 RX ADMIN — SODIUM BICARBONATE 1300 MG: 650 TABLET ORAL at 20:28

## 2017-01-01 RX ADMIN — Medication 220 MG: at 08:14

## 2017-01-01 RX ADMIN — SODIUM BICARBONATE 650 MG: 650 TABLET ORAL at 13:48

## 2017-01-01 RX ADMIN — LACTOBACILLUS ACIDOPHILUS / LACTOBACILLUS BULGARICUS 1 PACKET: 100 MILLION CFU STRENGTH GRANULES at 17:20

## 2017-01-01 RX ADMIN — VITAMIN D, TAB 1000IU (100/BT) 1000 UNITS: 25 TAB at 09:47

## 2017-01-01 RX ADMIN — LACTULOSE 30 ML: 20 SOLUTION ORAL at 08:00

## 2017-01-01 RX ADMIN — Medication 220 MG: at 09:16

## 2017-01-01 RX ADMIN — SODIUM BICARBONATE 1950 MG: 650 TABLET ORAL at 09:15

## 2017-01-01 RX ADMIN — SODIUM CHLORIDE: 9 INJECTION, SOLUTION INTRAVENOUS at 15:01

## 2017-01-01 RX ADMIN — POTASSIUM CHLORIDE 20 MEQ: 750 TABLET, FILM COATED, EXTENDED RELEASE ORAL at 07:54

## 2017-01-01 RX ADMIN — SODIUM CHLORIDE: 9 INJECTION, SOLUTION INTRAVENOUS at 22:24

## 2017-01-01 RX ADMIN — SODIUM CHLORIDE: 9 INJECTION, SOLUTION INTRAVENOUS at 02:48

## 2017-01-01 RX ADMIN — RIFAXIMIN 550 MG: 550 TABLET ORAL at 09:54

## 2017-01-01 RX ADMIN — PROPRANOLOL HYDROCHLORIDE 10 MG: 10 TABLET ORAL at 21:46

## 2017-01-01 RX ADMIN — VITAMIN D, TAB 1000IU (100/BT) 1000 UNITS: 25 TAB at 08:49

## 2017-01-01 RX ADMIN — CARVEDILOL 3.12 MG: 3.12 TABLET, FILM COATED ORAL at 08:07

## 2017-01-01 RX ADMIN — SODIUM CHLORIDE 500 ML: 9 INJECTION, SOLUTION INTRAVENOUS at 08:07

## 2017-01-01 RX ADMIN — SODIUM CHLORIDE: 9 INJECTION, SOLUTION INTRAVENOUS at 15:47

## 2017-01-01 RX ADMIN — Medication 325 MG: at 08:30

## 2017-01-01 RX ADMIN — OXYCODONE HYDROCHLORIDE 5 MG: 5 TABLET ORAL at 09:21

## 2017-01-01 RX ADMIN — PROPOFOL 10 MCG/KG/MIN: 10 INJECTION, EMULSION INTRAVENOUS at 09:04

## 2017-01-01 RX ADMIN — OMEPRAZOLE 40 MG: 20 CAPSULE, DELAYED RELEASE ORAL at 10:07

## 2017-01-01 RX ADMIN — SODIUM CHLORIDE: 9 INJECTION, SOLUTION INTRAVENOUS at 19:55

## 2017-01-01 RX ADMIN — SODIUM BICARBONATE 1950 MG: 650 TABLET ORAL at 15:35

## 2017-01-01 RX ADMIN — SODIUM CHLORIDE 8 MG/HR: 9 INJECTION, SOLUTION INTRAVENOUS at 23:05

## 2017-01-01 RX ADMIN — RIFAXIMIN 550 MG: 550 TABLET ORAL at 20:32

## 2017-01-01 RX ADMIN — SODIUM BICARBONATE 1300 MG: 650 TABLET ORAL at 14:39

## 2017-01-01 RX ADMIN — SODIUM ACETATE: 164 INJECTION, SOLUTION, CONCENTRATE INTRAVENOUS at 05:09

## 2017-01-01 RX ADMIN — OMEPRAZOLE 20 MG: 20 CAPSULE, DELAYED RELEASE ORAL at 09:23

## 2017-01-01 RX ADMIN — STANDARDIZED SENNA CONCENTRATE AND DOCUSATE SODIUM 1 TABLET: 8.6; 5 TABLET, FILM COATED ORAL at 21:17

## 2017-01-01 RX ADMIN — LACTULOSE 30 ML: 20 SOLUTION ORAL at 10:15

## 2017-01-01 RX ADMIN — RIFAXIMIN 550 MG: 550 TABLET ORAL at 08:08

## 2017-01-01 RX ADMIN — POTASSIUM CHLORIDE 20 MEQ: 750 TABLET, FILM COATED, EXTENDED RELEASE ORAL at 20:53

## 2017-01-01 RX ADMIN — ACETAMINOPHEN 650 MG: 325 TABLET, FILM COATED ORAL at 20:25

## 2017-01-01 RX ADMIN — POTASSIUM CHLORIDE 20 MEQ: 1500 TABLET, EXTENDED RELEASE ORAL at 08:34

## 2017-01-01 RX ADMIN — POTASSIUM CHLORIDE 20 MEQ: 750 TABLET, FILM COATED, EXTENDED RELEASE ORAL at 13:00

## 2017-01-01 RX ADMIN — VITAMIN D, TAB 1000IU (100/BT) 1000 UNITS: 25 TAB at 08:14

## 2017-01-01 RX ADMIN — VITAMIN D, TAB 1000IU (100/BT) 1000 UNITS: 25 TAB at 09:03

## 2017-01-01 RX ADMIN — CEFTRIAXONE SODIUM 2 G: 2 INJECTION, POWDER, FOR SOLUTION INTRAMUSCULAR; INTRAVENOUS at 09:50

## 2017-01-01 RX ADMIN — VITAMIN D, TAB 1000IU (100/BT) 1000 UNITS: 25 TAB at 08:36

## 2017-01-01 RX ADMIN — STANDARDIZED SENNA CONCENTRATE AND DOCUSATE SODIUM 1 TABLET: 8.6; 5 TABLET, FILM COATED ORAL at 21:46

## 2017-01-01 RX ADMIN — SODIUM CHLORIDE: 9 INJECTION, SOLUTION INTRAVENOUS at 11:03

## 2017-01-01 RX ADMIN — FERROUS GLUCONATE 324 MG: 324 TABLET ORAL at 08:52

## 2017-01-01 RX ADMIN — LACTULOSE 30 ML: 20 SOLUTION ORAL at 19:46

## 2017-01-01 RX ADMIN — SODIUM BICARBONATE 1950 MG: 650 TABLET ORAL at 06:07

## 2017-01-01 RX ADMIN — DEXTROSE AND SODIUM CHLORIDE 1000 ML: 5; .45 INJECTION, SOLUTION INTRAVENOUS at 16:45

## 2017-01-01 RX ADMIN — HEPARIN SODIUM 5000 UNITS: 5000 INJECTION, SOLUTION INTRAVENOUS; SUBCUTANEOUS at 22:12

## 2017-01-01 RX ADMIN — SODIUM CHLORIDE: 9 INJECTION, SOLUTION INTRAVENOUS at 13:30

## 2017-01-01 RX ADMIN — LACTULOSE 30 ML: 20 SOLUTION ORAL at 20:27

## 2017-01-01 RX ADMIN — Medication 325 MG: at 08:50

## 2017-01-01 RX ADMIN — LACTULOSE 30 ML: 20 SOLUTION ORAL at 20:41

## 2017-01-01 RX ADMIN — TRAMADOL HYDROCHLORIDE 50 MG: 50 TABLET, COATED ORAL at 06:11

## 2017-01-01 RX ADMIN — IPRATROPIUM BROMIDE AND ALBUTEROL SULFATE 3 ML: .5; 3 SOLUTION RESPIRATORY (INHALATION) at 07:52

## 2017-01-01 RX ADMIN — RIFAXIMIN 550 MG: 550 TABLET ORAL at 10:39

## 2017-01-01 RX ADMIN — SODIUM BICARBONATE 650 MG: 650 TABLET ORAL at 20:41

## 2017-01-01 RX ADMIN — RIFAXIMIN 550 MG: 550 TABLET ORAL at 08:23

## 2017-01-01 RX ADMIN — POTASSIUM CHLORIDE 20 MEQ: 1500 TABLET, EXTENDED RELEASE ORAL at 10:03

## 2017-01-01 RX ADMIN — RIFAXIMIN 550 MG: 550 TABLET ORAL at 20:26

## 2017-01-01 RX ADMIN — IPRATROPIUM BROMIDE AND ALBUTEROL SULFATE 3 ML: .5; 3 SOLUTION RESPIRATORY (INHALATION) at 22:52

## 2017-01-01 RX ADMIN — RIFAXIMIN 550 MG: 550 TABLET ORAL at 09:25

## 2017-01-01 RX ADMIN — LACTULOSE 30 ML: 20 SOLUTION ORAL at 20:12

## 2017-01-01 RX ADMIN — SODIUM BICARBONATE 1950 MG: 650 TABLET ORAL at 21:18

## 2017-01-01 RX ADMIN — AMOXICILLIN AND CLAVULANATE POTASSIUM 1 TABLET: 500; 125 TABLET, FILM COATED ORAL at 10:39

## 2017-01-01 RX ADMIN — LACTOBACILLUS ACIDOPHILUS / LACTOBACILLUS BULGARICUS 1 PACKET: 100 MILLION CFU STRENGTH GRANULES at 08:08

## 2017-01-01 RX ADMIN — Medication 220 MG: at 10:07

## 2017-01-01 RX ADMIN — FERROUS GLUCONATE 324 MG: 324 TABLET ORAL at 09:04

## 2017-01-01 RX ADMIN — SODIUM BICARBONATE 650 MG: 650 TABLET ORAL at 07:59

## 2017-01-01 RX ADMIN — RIFAXIMIN 550 MG: 550 TABLET ORAL at 20:52

## 2017-01-01 RX ADMIN — OMEPRAZOLE 40 MG: 20 CAPSULE, DELAYED RELEASE ORAL at 08:00

## 2017-01-01 RX ADMIN — OMEPRAZOLE 40 MG: 20 CAPSULE, DELAYED RELEASE ORAL at 08:49

## 2017-01-01 RX ADMIN — LACTULOSE 30 ML: 20 SOLUTION ORAL at 22:14

## 2017-01-01 RX ADMIN — SODIUM BICARBONATE 1950 MG: 650 TABLET ORAL at 13:09

## 2017-01-01 RX ADMIN — POTASSIUM CHLORIDE 20 MEQ: 750 TABLET, FILM COATED, EXTENDED RELEASE ORAL at 14:03

## 2017-01-01 RX ADMIN — CEFTRIAXONE SODIUM 2 G: 2 INJECTION, POWDER, FOR SOLUTION INTRAMUSCULAR; INTRAVENOUS at 08:16

## 2017-01-01 RX ADMIN — SODIUM BICARBONATE 1950 MG: 650 TABLET ORAL at 20:25

## 2017-01-01 RX ADMIN — CHLORHEXIDINE GLUCONATE 15 ML: 1.2 RINSE ORAL at 09:24

## 2017-01-01 RX ADMIN — STANDARDIZED SENNA CONCENTRATE AND DOCUSATE SODIUM 2 TABLET: 8.6; 5 TABLET, FILM COATED ORAL at 08:25

## 2017-01-01 RX ADMIN — FERROUS GLUCONATE 324 MG: 324 TABLET ORAL at 08:20

## 2017-01-01 RX ADMIN — SODIUM CHLORIDE: 9 INJECTION, SOLUTION INTRAVENOUS at 06:24

## 2017-01-01 RX ADMIN — LACTOBACILLUS ACIDOPHILUS / LACTOBACILLUS BULGARICUS 1 PACKET: 100 MILLION CFU STRENGTH GRANULES at 09:02

## 2017-01-01 RX ADMIN — OXYCODONE HYDROCHLORIDE 5 MG: 5 TABLET ORAL at 08:30

## 2017-01-01 RX ADMIN — VITAMIN D, TAB 1000IU (100/BT) 1000 UNITS: 25 TAB at 10:05

## 2017-01-01 RX ADMIN — LACTOBACILLUS ACIDOPHILUS / LACTOBACILLUS BULGARICUS 1 PACKET: 100 MILLION CFU STRENGTH GRANULES at 17:46

## 2017-01-01 RX ADMIN — VITAMIN D, TAB 1000IU (100/BT) 1000 UNITS: 25 TAB at 09:04

## 2017-01-01 RX ADMIN — SODIUM BICARBONATE 1787.5 MG: 650 TABLET ORAL at 21:36

## 2017-01-01 RX ADMIN — SODIUM BICARBONATE 1950 MG: 650 TABLET ORAL at 08:49

## 2017-01-01 RX ADMIN — POTASSIUM CHLORIDE 20 MEQ: 1500 TABLET, EXTENDED RELEASE ORAL at 22:32

## 2017-01-01 RX ADMIN — ACETAMINOPHEN 650 MG: 325 TABLET, FILM COATED ORAL at 16:06

## 2017-01-01 RX ADMIN — LACTULOSE 30 ML: 20 SOLUTION ORAL at 10:28

## 2017-01-01 RX ADMIN — SODIUM BICARBONATE 650 MG: 650 TABLET ORAL at 17:23

## 2017-01-01 RX ADMIN — LACTULOSE 30 ML: 20 SOLUTION ORAL at 20:07

## 2017-01-01 RX ADMIN — LACTULOSE 30 ML: 20 SOLUTION ORAL at 10:03

## 2017-01-01 RX ADMIN — VITAMIN D, TAB 1000IU (100/BT) 1000 UNITS: 25 TAB at 09:21

## 2017-01-01 RX ADMIN — VITAMIN D, TAB 1000IU (100/BT) 1000 UNITS: 25 TAB at 08:24

## 2017-01-01 RX ADMIN — HEPARIN SODIUM 5000 UNITS: 5000 INJECTION, SOLUTION INTRAVENOUS; SUBCUTANEOUS at 08:50

## 2017-01-01 RX ADMIN — LACTULOSE 30 ML: 20 SOLUTION ORAL at 20:25

## 2017-01-01 RX ADMIN — LACTULOSE 45 ML: 20 SOLUTION ORAL at 11:46

## 2017-01-01 RX ADMIN — LACTULOSE 30 ML: 20 SOLUTION ORAL at 20:26

## 2017-01-01 RX ADMIN — LACTULOSE 30 ML: 20 SOLUTION ORAL at 08:14

## 2017-01-01 RX ADMIN — RIFAXIMIN 550 MG: 550 TABLET ORAL at 09:47

## 2017-01-01 RX ADMIN — SODIUM BICARBONATE 1950 MG: 650 TABLET ORAL at 21:19

## 2017-01-01 RX ADMIN — SODIUM CHLORIDE: 9 INJECTION, SOLUTION INTRAVENOUS at 20:42

## 2017-01-01 RX ADMIN — THERA TABS 1 TABLET: TAB at 08:47

## 2017-01-01 RX ADMIN — LACTOBACILLUS ACIDOPHILUS / LACTOBACILLUS BULGARICUS 1 PACKET: 100 MILLION CFU STRENGTH GRANULES at 18:16

## 2017-01-01 RX ADMIN — SODIUM BICARBONATE 1950 MG: 650 TABLET ORAL at 21:16

## 2017-01-01 RX ADMIN — ONDANSETRON 4 MG: 2 INJECTION, SOLUTION INTRAMUSCULAR; INTRAVENOUS at 15:06

## 2017-01-01 RX ADMIN — OXYCODONE HYDROCHLORIDE 5 MG: 5 TABLET ORAL at 21:41

## 2017-01-01 RX ADMIN — SODIUM CHLORIDE, POTASSIUM CHLORIDE, SODIUM LACTATE AND CALCIUM CHLORIDE: 600; 310; 30; 20 INJECTION, SOLUTION INTRAVENOUS at 22:02

## 2017-01-01 RX ADMIN — LACTULOSE 30 ML: 20 SOLUTION ORAL at 08:33

## 2017-01-01 RX ADMIN — LACTULOSE 30 ML: 20 SOLUTION ORAL at 15:42

## 2017-01-01 RX ADMIN — FENTANYL CITRATE 100 MCG: 50 INJECTION, SOLUTION INTRAMUSCULAR; INTRAVENOUS at 09:47

## 2017-01-01 RX ADMIN — OMEPRAZOLE 40 MG: 20 CAPSULE, DELAYED RELEASE ORAL at 17:14

## 2017-01-01 RX ADMIN — SODIUM BICARBONATE 650 MG: 650 TABLET ORAL at 08:00

## 2017-01-01 RX ADMIN — POTASSIUM CHLORIDE 20 MEQ: 1500 TABLET, EXTENDED RELEASE ORAL at 10:05

## 2017-01-01 RX ADMIN — OXYCODONE HYDROCHLORIDE 5 MG: 5 TABLET ORAL at 16:54

## 2017-01-01 RX ADMIN — OMEPRAZOLE 40 MG: 20 CAPSULE, DELAYED RELEASE ORAL at 06:12

## 2017-01-01 RX ADMIN — RIFAXIMIN 550 MG: 550 TABLET ORAL at 19:47

## 2017-01-01 RX ADMIN — SODIUM BICARBONATE 1950 MG: 650 TABLET ORAL at 17:18

## 2017-01-01 RX ADMIN — VITAMIN D, TAB 1000IU (100/BT) 1000 UNITS: 25 TAB at 18:38

## 2017-01-01 RX ADMIN — INSULIN LISPRO 2 UNITS: 100 INJECTION, SOLUTION INTRAVENOUS; SUBCUTANEOUS at 05:29

## 2017-01-01 RX ADMIN — POTASSIUM CHLORIDE 20 MEQ: 750 TABLET, FILM COATED, EXTENDED RELEASE ORAL at 16:44

## 2017-01-01 RX ADMIN — RIFAXIMIN 550 MG: 550 TABLET ORAL at 20:35

## 2017-01-01 RX ADMIN — RIFAXIMIN 550 MG: 550 TABLET ORAL at 20:13

## 2017-01-01 RX ADMIN — SODIUM BICARBONATE 1300 MG: 650 TABLET ORAL at 14:19

## 2017-01-01 RX ADMIN — SODIUM BICARBONATE 1950 MG: 650 TABLET ORAL at 13:15

## 2017-01-01 RX ADMIN — LACTULOSE 30 ML: 20 SOLUTION ORAL at 20:56

## 2017-01-01 RX ADMIN — LACTULOSE 30 ML: 20 SOLUTION ORAL at 09:04

## 2017-01-01 RX ADMIN — SODIUM CHLORIDE 8 MG/HR: 9 INJECTION, SOLUTION INTRAVENOUS at 12:47

## 2017-01-01 RX ADMIN — OMEPRAZOLE 40 MG: 20 CAPSULE, DELAYED RELEASE ORAL at 10:03

## 2017-01-01 RX ADMIN — SODIUM BICARBONATE TAB 650 MG 1300 MG: 650 TAB at 08:25

## 2017-01-01 RX ADMIN — HEPARIN SODIUM 5000 UNITS: 5000 INJECTION, SOLUTION INTRAVENOUS; SUBCUTANEOUS at 13:50

## 2017-01-01 RX ADMIN — RIFAXIMIN 550 MG: 550 TABLET ORAL at 09:02

## 2017-01-01 RX ADMIN — SODIUM BICARBONATE 1950 MG: 650 TABLET ORAL at 20:38

## 2017-01-01 RX ADMIN — SODIUM CHLORIDE: 9 INJECTION, SOLUTION INTRAVENOUS at 10:29

## 2017-01-01 RX ADMIN — POTASSIUM CHLORIDE 20 MEQ: 1500 TABLET, EXTENDED RELEASE ORAL at 10:39

## 2017-01-01 RX ADMIN — RIFAXIMIN 550 MG: 550 TABLET ORAL at 08:43

## 2017-01-01 RX ADMIN — TRAMADOL HYDROCHLORIDE 50 MG: 50 TABLET, COATED ORAL at 21:08

## 2017-01-01 RX ADMIN — VITAMIN D, TAB 1000IU (100/BT) 1000 UNITS: 25 TAB at 09:54

## 2017-01-01 RX ADMIN — SODIUM CHLORIDE: 9 INJECTION, SOLUTION INTRAVENOUS at 12:58

## 2017-01-01 RX ADMIN — RIFAXIMIN 550 MG: 550 TABLET ORAL at 09:01

## 2017-01-01 RX ADMIN — SODIUM BICARBONATE 1787.5 MG: 650 TABLET ORAL at 20:29

## 2017-01-01 RX ADMIN — PHYTONADIONE 10 MG: 10 INJECTION, EMULSION INTRAMUSCULAR; INTRAVENOUS; SUBCUTANEOUS at 14:39

## 2017-01-01 RX ADMIN — VITAMIN D, TAB 1000IU (100/BT) 1000 UNITS: 25 TAB at 08:47

## 2017-01-01 RX ADMIN — SODIUM BICARBONATE 1950 MG: 650 TABLET ORAL at 05:40

## 2017-01-01 RX ADMIN — RIFAXIMIN 550 MG: 550 TABLET ORAL at 08:11

## 2017-01-01 RX ADMIN — SODIUM BICARBONATE 1950 MG: 650 TABLET ORAL at 08:25

## 2017-01-01 RX ADMIN — PROPRANOLOL HYDROCHLORIDE 10 MG: 10 TABLET ORAL at 00:11

## 2017-01-01 RX ADMIN — OMEPRAZOLE 40 MG: 20 CAPSULE, DELAYED RELEASE ORAL at 09:04

## 2017-01-01 RX ADMIN — RIFAXIMIN 550 MG: 550 TABLET ORAL at 21:39

## 2017-01-01 RX ADMIN — HEPARIN SODIUM 5000 UNITS: 5000 INJECTION, SOLUTION INTRAVENOUS; SUBCUTANEOUS at 20:27

## 2017-01-01 RX ADMIN — VITAMIN D, TAB 1000IU (100/BT) 1000 UNITS: 25 TAB at 10:11

## 2017-01-01 RX ADMIN — ACETAMINOPHEN 650 MG: 325 TABLET, FILM COATED ORAL at 22:35

## 2017-01-01 RX ADMIN — SODIUM CHLORIDE 8 MG/HR: 9 INJECTION, SOLUTION INTRAVENOUS at 20:21

## 2017-01-01 RX ADMIN — OMEPRAZOLE 20 MG: 20 CAPSULE, DELAYED RELEASE ORAL at 22:15

## 2017-01-01 RX ADMIN — FERROUS GLUCONATE 324 MG: 324 TABLET ORAL at 08:00

## 2017-01-01 RX ADMIN — OMEPRAZOLE 40 MG: 20 CAPSULE, DELAYED RELEASE ORAL at 08:09

## 2017-01-01 RX ADMIN — LACTULOSE 30 ML: 20 SOLUTION ORAL at 21:16

## 2017-01-01 RX ADMIN — RIFAXIMIN 550 MG: 550 TABLET ORAL at 16:41

## 2017-01-01 RX ADMIN — SODIUM BICARBONATE 650 MG: 650 TABLET ORAL at 14:26

## 2017-01-01 RX ADMIN — LACTULOSE 30 ML: 20 SOLUTION ORAL at 20:53

## 2017-01-01 RX ADMIN — ACETAMINOPHEN 650 MG: 325 TABLET, FILM COATED ORAL at 03:06

## 2017-01-01 RX ADMIN — SODIUM BICARBONATE 1950 MG: 650 TABLET ORAL at 22:33

## 2017-01-01 RX ADMIN — PROPRANOLOL HYDROCHLORIDE 10 MG: 10 TABLET ORAL at 18:38

## 2017-01-01 RX ADMIN — FERROUS GLUCONATE 324 MG: 324 TABLET ORAL at 10:08

## 2017-01-01 RX ADMIN — OMEPRAZOLE 40 MG: 20 CAPSULE, DELAYED RELEASE ORAL at 08:15

## 2017-01-01 RX ADMIN — PHYTONADIONE 10 MG: 10 INJECTION, EMULSION INTRAMUSCULAR; INTRAVENOUS; SUBCUTANEOUS at 08:15

## 2017-01-01 RX ADMIN — LACTULOSE 30 ML: 20 SOLUTION ORAL at 16:41

## 2017-01-01 RX ADMIN — SODIUM CHLORIDE: 9 INJECTION, SOLUTION INTRAVENOUS at 00:57

## 2017-01-01 RX ADMIN — POTASSIUM CHLORIDE 20 MEQ: 750 TABLET, FILM COATED, EXTENDED RELEASE ORAL at 16:25

## 2017-01-01 RX ADMIN — LACTULOSE 30 ML: 20 SOLUTION ORAL at 08:07

## 2017-01-01 RX ADMIN — OMEPRAZOLE 20 MG: 20 CAPSULE, DELAYED RELEASE ORAL at 21:46

## 2017-01-01 RX ADMIN — VITAMIN D, TAB 1000IU (100/BT) 1000 UNITS: 25 TAB at 08:34

## 2017-01-01 RX ADMIN — LACTULOSE 30 ML: 20 SOLUTION ORAL at 21:19

## 2017-01-01 RX ADMIN — CEFTRIAXONE 2 G: 2 INJECTION, SOLUTION INTRAVENOUS at 08:35

## 2017-01-01 RX ADMIN — FERROUS GLUCONATE 324 MG: 324 TABLET ORAL at 09:50

## 2017-01-01 RX ADMIN — OMEPRAZOLE 20 MG: 20 CAPSULE, DELAYED RELEASE ORAL at 20:30

## 2017-01-01 RX ADMIN — FERROUS GLUCONATE 324 MG: 324 TABLET ORAL at 08:14

## 2017-01-01 RX ADMIN — VITAMIN D, TAB 1000IU (100/BT) 1000 UNITS: 25 TAB at 08:44

## 2017-01-01 RX ADMIN — OMEPRAZOLE 20 MG: 20 CAPSULE, DELAYED RELEASE ORAL at 08:30

## 2017-01-01 RX ADMIN — LACTULOSE 30 ML: 20 SOLUTION ORAL at 10:39

## 2017-01-01 RX ADMIN — LACTULOSE 30 ML: 20 SOLUTION ORAL at 16:31

## 2017-01-01 RX ADMIN — RIFAXIMIN 550 MG: 550 TABLET ORAL at 13:53

## 2017-01-01 RX ADMIN — HEPARIN SODIUM 5000 UNITS: 5000 INJECTION, SOLUTION INTRAVENOUS; SUBCUTANEOUS at 05:30

## 2017-01-01 RX ADMIN — RIFAXIMIN 550 MG: 550 TABLET ORAL at 21:09

## 2017-01-01 RX ADMIN — PROPRANOLOL HYDROCHLORIDE 10 MG: 10 TABLET ORAL at 15:42

## 2017-01-01 RX ADMIN — SODIUM CHLORIDE: 9 INJECTION, SOLUTION INTRAVENOUS at 06:46

## 2017-01-01 RX ADMIN — LACTOBACILLUS ACIDOPHILUS / LACTOBACILLUS BULGARICUS 1 PACKET: 100 MILLION CFU STRENGTH GRANULES at 08:48

## 2017-01-01 RX ADMIN — RIFAXIMIN 550 MG: 550 TABLET ORAL at 21:38

## 2017-01-01 RX ADMIN — IPRATROPIUM BROMIDE AND ALBUTEROL SULFATE 3 ML: .5; 3 SOLUTION RESPIRATORY (INHALATION) at 23:57

## 2017-01-01 RX ADMIN — LACTULOSE 30 ML: 20 SOLUTION ORAL at 05:28

## 2017-01-01 RX ADMIN — POTASSIUM CHLORIDE 20 MEQ: 1500 TABLET, EXTENDED RELEASE ORAL at 10:11

## 2017-01-01 RX ADMIN — RIFAXIMIN 550 MG: 550 TABLET ORAL at 20:29

## 2017-01-01 RX ADMIN — POTASSIUM CHLORIDE 20 MEQ: 1500 TABLET, EXTENDED RELEASE ORAL at 09:16

## 2017-01-01 RX ADMIN — HEPARIN SODIUM 5000 UNITS: 5000 INJECTION, SOLUTION INTRAVENOUS; SUBCUTANEOUS at 05:26

## 2017-01-01 RX ADMIN — OMEPRAZOLE 20 MG: 20 CAPSULE, DELAYED RELEASE ORAL at 08:43

## 2017-01-01 RX ADMIN — RIFAXIMIN 550 MG: 550 TABLET ORAL at 20:47

## 2017-01-01 RX ADMIN — RIFAXIMIN 550 MG: 550 TABLET ORAL at 00:45

## 2017-01-01 RX ADMIN — FERROUS SULFATE TAB 325 MG (65 MG ELEMENTAL FE) 325 MG: 325 (65 FE) TAB at 08:55

## 2017-01-01 RX ADMIN — SODIUM BICARBONATE 1787.5 MG: 650 TABLET ORAL at 15:53

## 2017-01-01 RX ADMIN — RIFAXIMIN 550 MG: 550 TABLET ORAL at 09:04

## 2017-01-01 RX ADMIN — FENTANYL CITRATE 50 MCG/HR: 50 INJECTION, SOLUTION INTRAMUSCULAR; INTRAVENOUS at 14:25

## 2017-01-01 RX ADMIN — FERROUS GLUCONATE 324 MG: 324 TABLET ORAL at 08:24

## 2017-01-01 RX ADMIN — RIFAXIMIN 550 MG: 550 TABLET ORAL at 21:16

## 2017-01-01 RX ADMIN — SODIUM CHLORIDE 1000 ML: 9 INJECTION, SOLUTION INTRAVENOUS at 09:53

## 2017-01-01 RX ADMIN — POTASSIUM CHLORIDE 20 MEQ: 750 TABLET, FILM COATED, EXTENDED RELEASE ORAL at 20:21

## 2017-01-01 RX ADMIN — RIFAXIMIN 550 MG: 550 TABLET ORAL at 21:20

## 2017-01-01 RX ADMIN — HEPARIN SODIUM 5000 UNITS: 5000 INJECTION, SOLUTION INTRAVENOUS; SUBCUTANEOUS at 17:42

## 2017-01-01 RX ADMIN — SODIUM BICARBONATE 1950 MG: 650 TABLET ORAL at 10:08

## 2017-01-01 RX ADMIN — MORPHINE SULFATE 2 MG: 4 INJECTION INTRAVENOUS at 05:13

## 2017-01-01 RX ADMIN — SODIUM BICARBONATE 1300 MG: 650 TABLET ORAL at 16:31

## 2017-01-01 RX ADMIN — SODIUM BICARBONATE 1950 MG: 650 TABLET ORAL at 20:21

## 2017-01-01 RX ADMIN — LACTULOSE 30 ML: 20 SOLUTION ORAL at 20:36

## 2017-01-01 RX ADMIN — SODIUM BICARBONATE 1950 MG: 650 TABLET ORAL at 16:26

## 2017-01-01 RX ADMIN — OMEPRAZOLE 20 MG: 20 CAPSULE, DELAYED RELEASE ORAL at 20:26

## 2017-01-01 RX ADMIN — TRAMADOL HYDROCHLORIDE 50 MG: 50 TABLET, COATED ORAL at 16:59

## 2017-01-01 RX ADMIN — LACTOBACILLUS ACIDOPHILUS / LACTOBACILLUS BULGARICUS 1 PACKET: 100 MILLION CFU STRENGTH GRANULES at 17:50

## 2017-01-01 RX ADMIN — LACTULOSE 30 ML: 20 SOLUTION ORAL at 20:32

## 2017-01-01 RX ADMIN — PROPRANOLOL HYDROCHLORIDE 10 MG: 10 TABLET ORAL at 08:07

## 2017-01-01 RX ADMIN — MORPHINE SULFATE 2 MG: 4 INJECTION INTRAVENOUS at 22:47

## 2017-01-01 RX ADMIN — OMEPRAZOLE 40 MG: 20 CAPSULE, DELAYED RELEASE ORAL at 09:50

## 2017-01-01 RX ADMIN — IPRATROPIUM BROMIDE AND ALBUTEROL SULFATE 3 ML: .5; 3 SOLUTION RESPIRATORY (INHALATION) at 03:18

## 2017-01-01 RX ADMIN — RIFAXIMIN 550 MG: 550 TABLET ORAL at 20:28

## 2017-01-01 RX ADMIN — SODIUM CHLORIDE 500 ML: 9 INJECTION, SOLUTION INTRAVENOUS at 09:15

## 2017-01-01 RX ADMIN — SODIUM BICARBONATE 650 MG: 650 TABLET ORAL at 09:01

## 2017-01-01 RX ADMIN — SODIUM CHLORIDE: 9 INJECTION, SOLUTION INTRAVENOUS at 20:45

## 2017-01-01 RX ADMIN — SODIUM BICARBONATE 1950 MG: 650 TABLET ORAL at 09:02

## 2017-01-01 RX ADMIN — SODIUM BICARBONATE 650 MG: 650 TABLET ORAL at 07:54

## 2017-01-01 RX ADMIN — FERROUS GLUCONATE 324 MG: 324 TABLET ORAL at 09:02

## 2017-01-01 RX ADMIN — CARVEDILOL 3.12 MG: 3.12 TABLET, FILM COATED ORAL at 17:20

## 2017-01-01 RX ADMIN — RIFAXIMIN 550 MG: 550 TABLET ORAL at 08:55

## 2017-01-01 RX ADMIN — SODIUM CHLORIDE: 9 INJECTION, SOLUTION INTRAVENOUS at 18:15

## 2017-01-01 RX ADMIN — OMEPRAZOLE 20 MG: 20 CAPSULE, DELAYED RELEASE ORAL at 10:28

## 2017-01-01 RX ADMIN — HEPARIN SODIUM 5000 UNITS: 5000 INJECTION, SOLUTION INTRAVENOUS; SUBCUTANEOUS at 20:26

## 2017-01-01 RX ADMIN — SODIUM BICARBONATE 1950 MG: 650 TABLET ORAL at 20:56

## 2017-01-01 RX ADMIN — RIFAXIMIN 550 MG: 550 TABLET ORAL at 14:39

## 2017-01-01 RX ADMIN — SODIUM BICARBONATE 1950 MG: 650 TABLET ORAL at 21:38

## 2017-01-01 RX ADMIN — LACTULOSE 30 ML: 20 SOLUTION ORAL at 00:11

## 2017-01-01 RX ADMIN — CHLORHEXIDINE GLUCONATE 15 ML: 1.2 RINSE ORAL at 21:09

## 2017-01-01 RX ADMIN — VITAMIN D, TAB 1000IU (100/BT) 1000 UNITS: 25 TAB at 10:08

## 2017-01-01 RX ADMIN — SODIUM CHLORIDE 8 MG/HR: 9 INJECTION, SOLUTION INTRAVENOUS at 09:48

## 2017-01-01 RX ADMIN — RIFAXIMIN 550 MG: 550 TABLET ORAL at 08:20

## 2017-01-01 RX ADMIN — LACTULOSE 30 ML: 20 SOLUTION ORAL at 15:55

## 2017-01-01 RX ADMIN — LACTULOSE 45 ML: 20 SOLUTION ORAL at 08:36

## 2017-01-01 RX ADMIN — FERROUS GLUCONATE 324 MG: 324 TABLET ORAL at 10:12

## 2017-01-01 RX ADMIN — OMEPRAZOLE 20 MG: 20 CAPSULE, DELAYED RELEASE ORAL at 13:46

## 2017-01-01 RX ADMIN — LACTOBACILLUS ACIDOPHILUS / LACTOBACILLUS BULGARICUS 1 PACKET: 100 MILLION CFU STRENGTH GRANULES at 08:49

## 2017-01-01 RX ADMIN — OMEPRAZOLE 40 MG: 20 CAPSULE, DELAYED RELEASE ORAL at 09:17

## 2017-01-01 RX ADMIN — RIFAXIMIN 550 MG: 550 TABLET ORAL at 09:13

## 2017-01-01 RX ADMIN — VITAMIN D, TAB 1000IU (100/BT) 1000 UNITS: 25 TAB at 08:09

## 2017-01-01 RX ADMIN — VITAMIN D, TAB 1000IU (100/BT) 1000 UNITS: 25 TAB at 09:16

## 2017-01-01 RX ADMIN — STANDARDIZED SENNA CONCENTRATE AND DOCUSATE SODIUM 2 TABLET: 8.6; 5 TABLET, FILM COATED ORAL at 20:30

## 2017-01-01 RX ADMIN — SODIUM BICARBONATE TAB 650 MG 1625 MG: 650 TAB at 09:07

## 2017-01-01 RX ADMIN — LACTULOSE 30 ML: 20 SOLUTION ORAL at 08:26

## 2017-01-01 RX ADMIN — CARVEDILOL 3.12 MG: 3.12 TABLET, FILM COATED ORAL at 08:08

## 2017-01-01 RX ADMIN — HEPARIN SODIUM 5000 UNITS: 5000 INJECTION, SOLUTION INTRAVENOUS; SUBCUTANEOUS at 22:15

## 2017-01-01 RX ADMIN — SODIUM BICARBONATE TAB 650 MG 1625 MG: 650 TAB at 08:55

## 2017-01-01 RX ADMIN — SODIUM BICARBONATE 1300 MG: 650 TABLET ORAL at 17:45

## 2017-01-01 RX ADMIN — RIFAXIMIN 550 MG: 550 TABLET ORAL at 17:46

## 2017-01-01 RX ADMIN — AMOXICILLIN AND CLAVULANATE POTASSIUM 1 TABLET: 500; 125 TABLET, FILM COATED ORAL at 21:18

## 2017-01-01 RX ADMIN — CEFTRIAXONE SODIUM 1 G: 1 INJECTION, POWDER, FOR SOLUTION INTRAMUSCULAR; INTRAVENOUS at 19:46

## 2017-01-01 RX ADMIN — TRAMADOL HYDROCHLORIDE 50 MG: 50 TABLET, FILM COATED ORAL at 22:15

## 2017-01-01 RX ADMIN — HEPARIN SODIUM 5000 UNITS: 5000 INJECTION, SOLUTION INTRAVENOUS; SUBCUTANEOUS at 06:00

## 2017-01-01 RX ADMIN — SODIUM BICARBONATE 1950 MG: 650 TABLET ORAL at 22:15

## 2017-01-01 RX ADMIN — HEPARIN SODIUM 5000 UNITS: 5000 INJECTION, SOLUTION INTRAVENOUS; SUBCUTANEOUS at 05:46

## 2017-01-01 RX ADMIN — THERA TABS 1 TABLET: TAB at 14:03

## 2017-01-01 RX ADMIN — SODIUM BICARBONATE 1787.5 MG: 650 TABLET ORAL at 17:45

## 2017-01-01 RX ADMIN — IPRATROPIUM BROMIDE AND ALBUTEROL SULFATE 3 ML: .5; 3 SOLUTION RESPIRATORY (INHALATION) at 11:13

## 2017-01-01 RX ADMIN — DEXTROSE AND SODIUM CHLORIDE: 5; .45 INJECTION, SOLUTION INTRAVENOUS at 09:30

## 2017-01-01 RX ADMIN — LACTULOSE 30 ML: 20 SOLUTION ORAL at 20:38

## 2017-01-01 RX ADMIN — LACTULOSE 30 ML: 20 SOLUTION ORAL at 13:47

## 2017-01-01 RX ADMIN — DEXTROSE AND SODIUM CHLORIDE: 5; 900 INJECTION, SOLUTION INTRAVENOUS at 14:18

## 2017-01-01 RX ADMIN — SODIUM CHLORIDE 1000 ML: 9 INJECTION, SOLUTION INTRAVENOUS at 11:13

## 2017-01-01 RX ADMIN — TRAMADOL HYDROCHLORIDE 50 MG: 50 TABLET, FILM COATED ORAL at 20:38

## 2017-01-01 RX ADMIN — DEXTROSE AND SODIUM CHLORIDE: 5; .45 INJECTION, SOLUTION INTRAVENOUS at 09:04

## 2017-01-01 RX ADMIN — SODIUM CHLORIDE: 9 INJECTION, SOLUTION INTRAVENOUS at 17:39

## 2017-01-01 RX ADMIN — RIFAXIMIN 550 MG: 550 TABLET ORAL at 07:59

## 2017-01-01 RX ADMIN — OMEPRAZOLE 40 MG: 20 CAPSULE, DELAYED RELEASE ORAL at 08:47

## 2017-01-01 RX ADMIN — PROPOFOL 20 MG: 10 INJECTION, EMULSION INTRAVENOUS at 08:59

## 2017-01-01 RX ADMIN — OMEPRAZOLE 40 MG: 20 CAPSULE, DELAYED RELEASE ORAL at 10:05

## 2017-01-01 RX ADMIN — SODIUM BICARBONATE 1300 MG: 650 TABLET ORAL at 08:14

## 2017-01-01 RX ADMIN — DEXTROSE AND SODIUM CHLORIDE: 5; 900 INJECTION, SOLUTION INTRAVENOUS at 17:30

## 2017-01-01 RX ADMIN — LACTULOSE 30 ML: 20 SOLUTION ORAL at 16:13

## 2017-01-01 RX ADMIN — PROPRANOLOL HYDROCHLORIDE 10 MG: 10 TABLET ORAL at 08:55

## 2017-01-01 RX ADMIN — CARVEDILOL 3.12 MG: 3.12 TABLET, FILM COATED ORAL at 17:50

## 2017-01-01 RX ADMIN — OMEPRAZOLE 20 MG: 20 CAPSULE, DELAYED RELEASE ORAL at 08:25

## 2017-01-01 RX ADMIN — ALBUTEROL SULFATE 2 PUFF: 90 AEROSOL, METERED RESPIRATORY (INHALATION) at 21:20

## 2017-01-01 RX ADMIN — VITAMIN D, TAB 1000IU (100/BT) 1000 UNITS: 25 TAB at 10:29

## 2017-01-01 RX ADMIN — FERROUS GLUCONATE 324 MG: 324 TABLET ORAL at 08:48

## 2017-01-01 RX ADMIN — SUCCINYLCHOLINE CHLORIDE 80 MG: 20 INJECTION, SOLUTION INTRAMUSCULAR; INTRAVENOUS at 08:59

## 2017-01-01 RX ADMIN — CHLORHEXIDINE GLUCONATE 15 ML: 1.2 RINSE ORAL at 22:12

## 2017-01-01 RX ADMIN — LACTULOSE 30 ML: 20 SOLUTION ORAL at 09:29

## 2017-01-01 RX ADMIN — OMEPRAZOLE 40 MG: 20 CAPSULE, DELAYED RELEASE ORAL at 05:42

## 2017-01-01 RX ADMIN — HEPARIN SODIUM 5000 UNITS: 5000 INJECTION, SOLUTION INTRAVENOUS; SUBCUTANEOUS at 20:32

## 2017-01-01 RX ADMIN — IPRATROPIUM BROMIDE AND ALBUTEROL SULFATE 3 ML: .5; 3 SOLUTION RESPIRATORY (INHALATION) at 18:36

## 2017-01-01 RX ADMIN — SODIUM BICARBONATE 1787.5 MG: 650 TABLET ORAL at 21:10

## 2017-01-01 RX ADMIN — OMEPRAZOLE 20 MG: 20 CAPSULE, DELAYED RELEASE ORAL at 21:38

## 2017-01-01 RX ADMIN — POTASSIUM CHLORIDE 40 MEQ: 1500 TABLET, EXTENDED RELEASE ORAL at 09:14

## 2017-01-01 RX ADMIN — FERROUS GLUCONATE 324 MG: 324 TABLET ORAL at 08:43

## 2017-01-01 RX ADMIN — DEXTROSE AND SODIUM CHLORIDE: 5; .45 INJECTION, SOLUTION INTRAVENOUS at 20:41

## 2017-01-01 RX ADMIN — FERROUS SULFATE TAB 325 MG (65 MG ELEMENTAL FE) 325 MG: 325 (65 FE) TAB at 09:03

## 2017-01-01 RX ADMIN — HEPARIN SODIUM 5000 UNITS: 5000 INJECTION, SOLUTION INTRAVENOUS; SUBCUTANEOUS at 05:13

## 2017-01-01 RX ADMIN — LACTULOSE 30 ML: 20 SOLUTION ORAL at 08:52

## 2017-01-01 RX ADMIN — ONDANSETRON 4 MG: 2 INJECTION, SOLUTION INTRAMUSCULAR; INTRAVENOUS at 16:35

## 2017-01-01 RX ADMIN — SODIUM BICARBONATE 1950 MG: 650 TABLET ORAL at 14:40

## 2017-01-01 RX ADMIN — SODIUM BICARBONATE 1300 MG: 650 TABLET ORAL at 15:25

## 2017-01-01 RX ADMIN — SODIUM BICARBONATE 150 MEQ: 84 INJECTION, SOLUTION INTRAVENOUS at 10:12

## 2017-01-01 RX ADMIN — POTASSIUM CHLORIDE 20 MEQ: 750 TABLET, FILM COATED, EXTENDED RELEASE ORAL at 08:11

## 2017-01-01 RX ADMIN — LACTOBACILLUS ACIDOPHILUS / LACTOBACILLUS BULGARICUS 1 PACKET: 100 MILLION CFU STRENGTH GRANULES at 08:07

## 2017-01-01 RX ADMIN — LACTULOSE 30 ML: 20 SOLUTION ORAL at 08:20

## 2017-01-01 RX ADMIN — SODIUM BICARBONATE 1300 MG: 650 TABLET ORAL at 08:20

## 2017-01-01 RX ADMIN — RIFAXIMIN 550 MG: 550 TABLET ORAL at 08:24

## 2017-01-01 RX ADMIN — SODIUM BICARBONATE 1950 MG: 650 TABLET ORAL at 06:10

## 2017-01-01 RX ADMIN — LACTULOSE 30 ML: 20 SOLUTION ORAL at 20:47

## 2017-01-01 RX ADMIN — VITAMIN D, TAB 1000IU (100/BT) 1000 UNITS: 25 TAB at 09:01

## 2017-01-01 RX ADMIN — OXYCODONE HYDROCHLORIDE AND ACETAMINOPHEN 500 MG: 500 TABLET ORAL at 14:04

## 2017-01-01 RX ADMIN — RIFAXIMIN 550 MG: 550 TABLET ORAL at 22:03

## 2017-01-01 RX ADMIN — POTASSIUM CHLORIDE 20 MEQ: 750 TABLET, FILM COATED, EXTENDED RELEASE ORAL at 07:59

## 2017-01-01 RX ADMIN — RIFAXIMIN 550 MG: 550 TABLET ORAL at 20:41

## 2017-01-01 RX ADMIN — RIFAXIMIN 550 MG: 550 TABLET ORAL at 22:15

## 2017-01-01 RX ADMIN — LACTULOSE 30 ML: 20 SOLUTION ORAL at 10:05

## 2017-01-01 RX ADMIN — OMEPRAZOLE 40 MG: 20 CAPSULE, DELAYED RELEASE ORAL at 08:06

## 2017-01-01 RX ADMIN — OMEPRAZOLE 20 MG: 20 CAPSULE, DELAYED RELEASE ORAL at 20:08

## 2017-01-01 RX ADMIN — LACTULOSE 30 ML: 20 SOLUTION ORAL at 22:01

## 2017-01-01 RX ADMIN — OMEPRAZOLE 40 MG: 20 CAPSULE, DELAYED RELEASE ORAL at 08:24

## 2017-01-01 RX ADMIN — LACTULOSE 45 ML: 20 SOLUTION ORAL at 15:47

## 2017-01-01 RX ADMIN — FERROUS GLUCONATE 324 MG: 324 TABLET ORAL at 08:09

## 2017-01-01 RX ADMIN — NALOXONE HYDROCHLORIDE 0.4 MG: 0.4 INJECTION, SOLUTION INTRAMUSCULAR; INTRAVENOUS; SUBCUTANEOUS at 19:43

## 2017-01-01 RX ADMIN — SODIUM BICARBONATE 1950 MG: 650 TABLET ORAL at 08:33

## 2017-01-01 RX ADMIN — CEFTRIAXONE SODIUM 2 G: 2 INJECTION, POWDER, FOR SOLUTION INTRAMUSCULAR; INTRAVENOUS at 15:23

## 2017-01-01 RX ADMIN — VITAMIN D, TAB 1000IU (100/BT) 1000 UNITS: 25 TAB at 08:20

## 2017-01-01 RX ADMIN — INSULIN LISPRO 2 UNITS: 100 INJECTION, SOLUTION INTRAVENOUS; SUBCUTANEOUS at 17:32

## 2017-01-01 RX ADMIN — SODIUM BICARBONATE 1950 MG: 650 TABLET ORAL at 09:29

## 2017-01-01 RX ADMIN — LACTULOSE 30 ML: 20 SOLUTION ORAL at 20:18

## 2017-01-01 RX ADMIN — SODIUM CHLORIDE: 9 INJECTION, SOLUTION INTRAVENOUS at 09:33

## 2017-01-01 RX ADMIN — LACTULOSE 30 ML: 20 SOLUTION ORAL at 20:35

## 2017-01-01 RX ADMIN — CARVEDILOL 3.12 MG: 3.12 TABLET, FILM COATED ORAL at 09:50

## 2017-01-01 RX ADMIN — RIFAXIMIN 550 MG: 550 TABLET ORAL at 21:01

## 2017-01-01 RX ADMIN — LACTULOSE 15 ML: 20 SOLUTION ORAL at 08:55

## 2017-01-01 RX ADMIN — SODIUM BICARBONATE 1950 MG: 650 TABLET ORAL at 16:43

## 2017-01-01 RX ADMIN — RIFAXIMIN 550 MG: 550 TABLET ORAL at 08:49

## 2017-01-01 RX ADMIN — SODIUM BICARBONATE 1300 MG: 650 TABLET ORAL at 20:53

## 2017-01-01 RX ADMIN — IPRATROPIUM BROMIDE AND ALBUTEROL SULFATE 3 ML: .5; 3 SOLUTION RESPIRATORY (INHALATION) at 15:46

## 2017-01-01 RX ADMIN — RIFAXIMIN 550 MG: 550 TABLET ORAL at 15:54

## 2017-01-01 RX ADMIN — IPRATROPIUM BROMIDE AND ALBUTEROL SULFATE 3 ML: .5; 3 SOLUTION RESPIRATORY (INHALATION) at 10:53

## 2017-01-01 RX ADMIN — LACTULOSE 30 ML: 20 SOLUTION ORAL at 09:13

## 2017-01-01 RX ADMIN — RIFAXIMIN 550 MG: 550 TABLET ORAL at 22:50

## 2017-01-01 RX ADMIN — RIFAXIMIN 550 MG: 550 TABLET ORAL at 10:07

## 2017-01-01 RX ADMIN — VITAMIN D, TAB 1000IU (100/BT) 1000 UNITS: 25 TAB at 08:33

## 2017-01-01 RX ADMIN — DEXTROSE AND SODIUM CHLORIDE: 5; .45 INJECTION, SOLUTION INTRAVENOUS at 23:32

## 2017-01-01 RX ADMIN — RIFAXIMIN 550 MG: 550 TABLET ORAL at 20:21

## 2017-01-01 RX ADMIN — SODIUM BICARBONATE 1787.5 MG: 650 TABLET ORAL at 22:14

## 2017-01-01 RX ADMIN — RIFAXIMIN 550 MG: 550 TABLET ORAL at 21:44

## 2017-01-01 RX ADMIN — SODIUM BICARBONATE 1787.5 MG: 650 TABLET ORAL at 14:18

## 2017-01-01 RX ADMIN — PROPRANOLOL HYDROCHLORIDE 10 MG: 10 TABLET ORAL at 21:17

## 2017-01-01 RX ADMIN — CEFDINIR 300 MG: 300 CAPSULE ORAL at 08:24

## 2017-01-01 RX ADMIN — LACTULOSE 30 ML: 20 SOLUTION ORAL at 21:09

## 2017-01-01 RX ADMIN — RIFAXIMIN 550 MG: 550 TABLET ORAL at 08:25

## 2017-01-01 RX ADMIN — RIFAXIMIN 550 MG: 550 TABLET ORAL at 09:50

## 2017-01-01 RX ADMIN — POTASSIUM CHLORIDE 40 MEQ: 1500 TABLET, EXTENDED RELEASE ORAL at 06:20

## 2017-01-01 RX ADMIN — ONDANSETRON 4 MG: 2 INJECTION INTRAMUSCULAR; INTRAVENOUS at 15:26

## 2017-01-01 RX ADMIN — RIFAXIMIN 550 MG: 550 TABLET ORAL at 08:47

## 2017-01-01 RX ADMIN — CEFTRIAXONE SODIUM 1 G: 1 INJECTION, POWDER, FOR SOLUTION INTRAMUSCULAR; INTRAVENOUS at 20:35

## 2017-01-01 RX ADMIN — SODIUM BICARBONATE 1950 MG: 650 TABLET ORAL at 22:50

## 2017-01-01 RX ADMIN — FERROUS GLUCONATE 324 MG: 324 TABLET ORAL at 07:54

## 2017-01-01 RX ADMIN — SODIUM CHLORIDE 8 MG/HR: 9 INJECTION, SOLUTION INTRAVENOUS at 12:10

## 2017-01-01 RX ADMIN — FERROUS GLUCONATE 324 MG: 324 TABLET ORAL at 09:01

## 2017-01-01 RX ADMIN — VITAMIN D, TAB 1000IU (100/BT) 1000 UNITS: 25 TAB at 07:59

## 2017-01-01 RX ADMIN — POTASSIUM CHLORIDE 20 MEQ: 750 TABLET, FILM COATED, EXTENDED RELEASE ORAL at 08:48

## 2017-01-01 RX ADMIN — LACTOBACILLUS ACIDOPHILUS / LACTOBACILLUS BULGARICUS 1 PACKET: 100 MILLION CFU STRENGTH GRANULES at 13:03

## 2017-01-01 RX ADMIN — SODIUM BICARBONATE 1950 MG: 650 TABLET ORAL at 12:28

## 2017-01-01 RX ADMIN — SODIUM BICARBONATE 1950 MG: 650 TABLET ORAL at 08:43

## 2017-01-01 RX ADMIN — FAMOTIDINE 20 MG: 10 INJECTION, SOLUTION INTRAVENOUS at 08:25

## 2017-01-01 RX ADMIN — RIFAXIMIN 550 MG: 550 TABLET ORAL at 22:12

## 2017-01-01 RX ADMIN — SODIUM BICARBONATE 1787.5 MG: 650 TABLET ORAL at 13:53

## 2017-01-01 RX ADMIN — SODIUM BICARBONATE 1300 MG: 650 TABLET ORAL at 20:12

## 2017-01-01 RX ADMIN — LACTULOSE 30 ML: 20 SOLUTION ORAL at 05:26

## 2017-01-01 RX ADMIN — SODIUM BICARBONATE 1300 MG: 650 TABLET ORAL at 10:04

## 2017-01-01 RX ADMIN — VITAMIN D, TAB 1000IU (100/BT) 1000 UNITS: 25 TAB at 09:50

## 2017-01-01 RX ADMIN — LACTULOSE 30 ML: 20 SOLUTION ORAL at 14:26

## 2017-01-01 RX ADMIN — SODIUM CHLORIDE: 9 INJECTION, SOLUTION INTRAVENOUS at 22:15

## 2017-01-01 RX ADMIN — DEXTROSE AND SODIUM CHLORIDE: 5; .45 INJECTION, SOLUTION INTRAVENOUS at 02:06

## 2017-01-01 RX ADMIN — SODIUM CHLORIDE 500 ML: 9 INJECTION, SOLUTION INTRAVENOUS at 22:14

## 2017-01-01 RX ADMIN — RIFAXIMIN 550 MG: 550 TABLET ORAL at 09:29

## 2017-01-01 RX ADMIN — POTASSIUM CHLORIDE 40 MEQ: 1500 TABLET, EXTENDED RELEASE ORAL at 06:47

## 2017-01-01 RX ADMIN — RIFAXIMIN 550 MG: 550 TABLET ORAL at 20:08

## 2017-01-01 RX ADMIN — PROPRANOLOL HYDROCHLORIDE 10 MG: 10 TABLET ORAL at 16:13

## 2017-01-01 RX ADMIN — SODIUM BICARBONATE 650 MG: 650 TABLET ORAL at 09:50

## 2017-01-01 RX ADMIN — IPRATROPIUM BROMIDE AND ALBUTEROL SULFATE 3 ML: .5; 3 SOLUTION RESPIRATORY (INHALATION) at 07:08

## 2017-01-01 RX ADMIN — LACTULOSE 45 ML: 20 SOLUTION ORAL at 08:07

## 2017-01-01 RX ADMIN — SODIUM BICARBONATE 650 MG: 650 TABLET ORAL at 13:50

## 2017-01-01 RX ADMIN — LACTULOSE 30 ML: 20 SOLUTION ORAL at 07:54

## 2017-01-01 RX ADMIN — SODIUM BICARBONATE 650 MG: 650 TABLET ORAL at 09:31

## 2017-01-01 RX ADMIN — RIFAXIMIN 550 MG: 550 TABLET ORAL at 08:14

## 2017-01-01 RX ADMIN — SODIUM CHLORIDE 2178 ML: 9 INJECTION, SOLUTION INTRAVENOUS at 17:47

## 2017-01-01 RX ADMIN — CEFTRIAXONE 2 G: 2 INJECTION, POWDER, FOR SOLUTION INTRAMUSCULAR; INTRAVENOUS at 00:46

## 2017-01-01 RX ADMIN — AMOXICILLIN AND CLAVULANATE POTASSIUM 1 TABLET: 500; 125 TABLET, FILM COATED ORAL at 20:25

## 2017-01-01 RX ADMIN — FERROUS GLUCONATE 324 MG: 324 TABLET ORAL at 08:11

## 2017-01-01 RX ADMIN — VITAMIN D, TAB 1000IU (100/BT) 1000 UNITS: 25 TAB at 10:03

## 2017-01-01 RX ADMIN — TRAMADOL HYDROCHLORIDE 50 MG: 50 TABLET, COATED ORAL at 23:42

## 2017-01-01 RX ADMIN — INSULIN LISPRO 2 UNITS: 100 INJECTION, SOLUTION INTRAVENOUS; SUBCUTANEOUS at 00:51

## 2017-01-01 RX ADMIN — SODIUM BICARBONATE TAB 650 MG 1300 MG: 650 TAB at 09:23

## 2017-01-01 RX ADMIN — ACETAMINOPHEN 650 MG: 325 TABLET, FILM COATED ORAL at 22:38

## 2017-01-01 RX ADMIN — FERROUS GLUCONATE 324 MG: 324 TABLET ORAL at 08:33

## 2017-01-01 RX ADMIN — IPRATROPIUM BROMIDE AND ALBUTEROL SULFATE 3 ML: .5; 3 SOLUTION RESPIRATORY (INHALATION) at 06:54

## 2017-01-01 RX ADMIN — SODIUM CHLORIDE 250 ML: 9 INJECTION, SOLUTION INTRAVENOUS at 06:30

## 2017-01-01 RX ADMIN — SODIUM BICARBONATE 1950 MG: 650 TABLET ORAL at 06:33

## 2017-01-01 RX ADMIN — DOCUSATE SODIUM 100 MG: 100 CAPSULE ORAL at 08:50

## 2017-01-01 RX ADMIN — SODIUM BICARBONATE 1950 MG: 650 TABLET ORAL at 16:13

## 2017-01-01 RX ADMIN — LACTULOSE 30 ML: 20 SOLUTION ORAL at 08:11

## 2017-01-01 RX ADMIN — LACTULOSE 30 ML: 20 SOLUTION ORAL at 08:34

## 2017-01-01 RX ADMIN — FERROUS GLUCONATE 324 MG: 324 TABLET ORAL at 10:05

## 2017-01-01 RX ADMIN — SODIUM BICARBONATE 1950 MG: 650 TABLET ORAL at 20:37

## 2017-01-01 RX ADMIN — MAGNESIUM SULFATE IN WATER 2 G: 40 INJECTION, SOLUTION INTRAVENOUS at 09:26

## 2017-01-01 RX ADMIN — FAMOTIDINE 20 MG: 10 INJECTION, SOLUTION INTRAVENOUS at 11:22

## 2017-01-01 RX ADMIN — CHLORHEXIDINE GLUCONATE 15 ML: 1.2 RINSE ORAL at 08:25

## 2017-01-01 RX ADMIN — Medication 220 MG: at 13:03

## 2017-01-01 RX ADMIN — AMOXICILLIN AND CLAVULANATE POTASSIUM 1 TABLET: 500; 125 TABLET, FILM COATED ORAL at 08:34

## 2017-01-01 RX ADMIN — PROPRANOLOL HYDROCHLORIDE 10 MG: 10 TABLET ORAL at 21:39

## 2017-01-01 RX ADMIN — LACTULOSE 30 ML: 20 SOLUTION ORAL at 20:30

## 2017-01-01 RX ADMIN — LACTOBACILLUS ACIDOPHILUS / LACTOBACILLUS BULGARICUS 1 PACKET: 100 MILLION CFU STRENGTH GRANULES at 12:35

## 2017-01-01 RX ADMIN — RIFAXIMIN 550 MG: 550 TABLET ORAL at 20:36

## 2017-01-01 RX ADMIN — POTASSIUM CHLORIDE 20 MEQ: 750 TABLET, FILM COATED, EXTENDED RELEASE ORAL at 23:46

## 2017-01-01 RX ADMIN — FERROUS GLUCONATE 324 MG: 324 TABLET ORAL at 18:16

## 2017-01-01 ASSESSMENT — ENCOUNTER SYMPTOMS
DEPRESSION: 0
BLURRED VISION: 0
BLURRED VISION: 0
HEADACHES: 0
DEPRESSION: 0
HEADACHES: 0
FALLS: 1
EYE PAIN: 0
BACK PAIN: 0
WEAKNESS: 1
CARDIOVASCULAR NEGATIVE: 1
BLOOD IN STOOL: 0
MEMORY LOSS: 1
SHORTNESS OF BREATH: 0
DEPRESSION: 0
CHILLS: 0
COUGH: 0
FEVER: 0
SHORTNESS OF BREATH: 0
BLURRED VISION: 0
TINGLING: 0
CONSTIPATION: 0
NAUSEA: 0
SEIZURES: 0
BLURRED VISION: 0
MYALGIAS: 0
NAUSEA: 0
CHILLS: 0
VOMITING: 0
HEARTBURN: 0
FEVER: 0
FEVER: 0
VOMITING: 0
NAUSEA: 0
PALPITATIONS: 0
TREMORS: 1
ORTHOPNEA: 0
FEVER: 0
MYALGIAS: 0
NERVOUS/ANXIOUS: 0
EYE DISCHARGE: 0
NAUSEA: 0
MYALGIAS: 0
CHILLS: 0
HEADACHES: 0
BLURRED VISION: 0
SHORTNESS OF BREATH: 0
VOMITING: 0
DIZZINESS: 0
PHOTOPHOBIA: 0
PALPITATIONS: 0
FEVER: 0
SHORTNESS OF BREATH: 0
SORE THROAT: 1
CHILLS: 0
FEVER: 0
PALPITATIONS: 0
NAUSEA: 0
PSYCHIATRIC NEGATIVE: 1
RESPIRATORY NEGATIVE: 1
ABDOMINAL PAIN: 1
RESPIRATORY NEGATIVE: 1
MYALGIAS: 0
FLANK PAIN: 0
BLURRED VISION: 0
MYALGIAS: 0
DIARRHEA: 0
SENSORY CHANGE: 0
CHILLS: 0
COUGH: 0
MYALGIAS: 0
FEVER: 0
BLURRED VISION: 0
COUGH: 0
FEVER: 0
EYE REDNESS: 0
ABDOMINAL PAIN: 0
WHEEZING: 0
DIZZINESS: 0
FOCAL WEAKNESS: 0
ABDOMINAL PAIN: 0
PALPITATIONS: 0
NAUSEA: 0
BLURRED VISION: 0
DIZZINESS: 0
COUGH: 0
VOMITING: 0
ABDOMINAL PAIN: 0
HEARTBURN: 0
ABDOMINAL PAIN: 0
NEUROLOGICAL NEGATIVE: 1
VOMITING: 0
MEMORY LOSS: 0
FEVER: 0
BACK PAIN: 0
NERVOUS/ANXIOUS: 0
BLURRED VISION: 0
FLANK PAIN: 0
WEAKNESS: 1
NAUSEA: 0
VOMITING: 0
HALLUCINATIONS: 0
WEAKNESS: 1
HEADACHES: 0
FEVER: 0
CONSTIPATION: 0
NAUSEA: 0
COUGH: 0
BACK PAIN: 0
DIAPHORESIS: 0
LOSS OF CONSCIOUSNESS: 0
VOMITING: 0
DIARRHEA: 0
PND: 0
HALLUCINATIONS: 0
SHORTNESS OF BREATH: 0
MYALGIAS: 0
BRUISES/BLEEDS EASILY: 1
NEUROLOGICAL NEGATIVE: 1
CHILLS: 0
TREMORS: 1
MYALGIAS: 0
BLOOD IN STOOL: 0
COUGH: 0
DOUBLE VISION: 0
COUGH: 0
FEVER: 0
TREMORS: 1
TREMORS: 1
DIARRHEA: 0
HEARTBURN: 0
FALLS: 0
CONSTITUTIONAL NEGATIVE: 1
DOUBLE VISION: 0
WEAKNESS: 1
NAUSEA: 0
SHORTNESS OF BREATH: 0
SHORTNESS OF BREATH: 0
COUGH: 0
FEVER: 0
SENSORY CHANGE: 0
NAUSEA: 0
COUGH: 0
COUGH: 0
FOCAL WEAKNESS: 0
WEAKNESS: 1
SPUTUM PRODUCTION: 0
DIZZINESS: 1
POLYDIPSIA: 0
VOMITING: 0
CHILLS: 0
FEVER: 0
ABDOMINAL PAIN: 0
FOCAL WEAKNESS: 0
VOMITING: 0
NERVOUS/ANXIOUS: 0
COUGH: 0
DIAPHORESIS: 0
DEPRESSION: 0
FEVER: 0
NERVOUS/ANXIOUS: 1
CHILLS: 0
VOMITING: 0
DIZZINESS: 0
NAUSEA: 1
BACK PAIN: 0
WEAKNESS: 1
FLANK PAIN: 0
DIARRHEA: 0
DEPRESSION: 0
DIARRHEA: 0
CHILLS: 0
VOMITING: 0
VOMITING: 0
NAUSEA: 0
CHILLS: 0
DIZZINESS: 0
CHILLS: 0
FALLS: 0
HEADACHES: 0
NAUSEA: 0
SHORTNESS OF BREATH: 1
MYALGIAS: 0
NAUSEA: 0
SHORTNESS OF BREATH: 0
CHILLS: 0
FEVER: 0
SHORTNESS OF BREATH: 0
COUGH: 0
FEVER: 0
WEIGHT LOSS: 0
DIARRHEA: 0
MYALGIAS: 1
DIZZINESS: 0
COUGH: 1
HEADACHES: 0
FEVER: 0
FLANK PAIN: 0
DIZZINESS: 0
WHEEZING: 0
SHORTNESS OF BREATH: 0
WHEEZING: 0
DEPRESSION: 0
ABDOMINAL PAIN: 0
DEPRESSION: 0
VOMITING: 0
SHORTNESS OF BREATH: 0
DIZZINESS: 0
PALPITATIONS: 0
FEVER: 0
COUGH: 0
VOMITING: 0
SHORTNESS OF BREATH: 0
FOCAL WEAKNESS: 0
HEARTBURN: 0
COUGH: 0
FEVER: 0
ABDOMINAL PAIN: 0
CHILLS: 0
HEARTBURN: 0
CONSTITUTIONAL NEGATIVE: 1
FEVER: 0
BACK PAIN: 0
COUGH: 0
ABDOMINAL PAIN: 1
CHILLS: 0
DIZZINESS: 0
ABDOMINAL PAIN: 0
MEMORY LOSS: 0
EYES NEGATIVE: 1
SENSORY CHANGE: 0
FLANK PAIN: 0
FOCAL WEAKNESS: 0
NAUSEA: 0
FOCAL WEAKNESS: 0
WHEEZING: 0
BRUISES/BLEEDS EASILY: 0
CONSTIPATION: 0
NAUSEA: 0
BLURRED VISION: 0
SPUTUM PRODUCTION: 0
CHILLS: 0
SENSORY CHANGE: 0
HEARTBURN: 0
FEVER: 0
PALPITATIONS: 0
WEAKNESS: 1
HEADACHES: 0
BLOOD IN STOOL: 0
DIZZINESS: 0
FEVER: 0
DIAPHORESIS: 0
FEVER: 0
RESPIRATORY NEGATIVE: 1
NAUSEA: 0
DEPRESSION: 0
HEADACHES: 0
NERVOUS/ANXIOUS: 0
WEAKNESS: 1
FOCAL WEAKNESS: 0
DEPRESSION: 0
CHILLS: 0
DOUBLE VISION: 0
SHORTNESS OF BREATH: 0
HEARTBURN: 0
PALPITATIONS: 0
SHORTNESS OF BREATH: 0
HALLUCINATIONS: 0
DIARRHEA: 0
NECK PAIN: 0
HEADACHES: 0
SHORTNESS OF BREATH: 0
HEMOPTYSIS: 0
HEADACHES: 0
FEVER: 0
CONSTITUTIONAL NEGATIVE: 1
WEAKNESS: 0
SHORTNESS OF BREATH: 0
SHORTNESS OF BREATH: 0
NAUSEA: 0
COUGH: 0
FEVER: 0
CHILLS: 0
TREMORS: 0
VOMITING: 0
BLURRED VISION: 0
CHILLS: 0
SHORTNESS OF BREATH: 0
NAUSEA: 0
ABDOMINAL PAIN: 0
HEADACHES: 0
SHORTNESS OF BREATH: 0
HEARTBURN: 0
CARDIOVASCULAR NEGATIVE: 1
EYE PAIN: 0
FEVER: 0
DIZZINESS: 0
VOMITING: 0
SHORTNESS OF BREATH: 0
FEVER: 0
SENSORY CHANGE: 0
FLANK PAIN: 0
CONSTIPATION: 0
VOMITING: 0
SHORTNESS OF BREATH: 0
COUGH: 0
HEADACHES: 0
VOMITING: 0
FEVER: 0
HEADACHES: 0
VOMITING: 0
HEADACHES: 0
WHEEZING: 0
WEAKNESS: 1
PALPITATIONS: 0
BLURRED VISION: 0
FEVER: 0
DEPRESSION: 0
PALPITATIONS: 0
FEVER: 0
BLURRED VISION: 0
COUGH: 0
COUGH: 0
FEVER: 0
ABDOMINAL PAIN: 0
GASTROINTESTINAL NEGATIVE: 1
COUGH: 0
CARDIOVASCULAR NEGATIVE: 1
MYALGIAS: 0
PALPITATIONS: 0
BRUISES/BLEEDS EASILY: 0
PALPITATIONS: 0
COUGH: 0
VOMITING: 0
CHILLS: 0
CHILLS: 0
NAUSEA: 0
DIARRHEA: 0
ABDOMINAL PAIN: 0
COUGH: 0
BLURRED VISION: 0
HEADACHES: 0
NAUSEA: 0
ROS SKIN COMMENTS: BRUISING
BLURRED VISION: 0
BLURRED VISION: 0
DEPRESSION: 0
BRUISES/BLEEDS EASILY: 0
CLAUDICATION: 0
CHILLS: 0
PALPITATIONS: 0
MYALGIAS: 1
BLURRED VISION: 0
VOMITING: 0
TREMORS: 1
CONSTIPATION: 0
BACK PAIN: 0
HEADACHES: 0
SHORTNESS OF BREATH: 0
FEVER: 0
NAUSEA: 1
FOCAL WEAKNESS: 0
CHILLS: 0
COUGH: 0
CHILLS: 0
VOMITING: 0
MYALGIAS: 0
ABDOMINAL PAIN: 0
COUGH: 0
FEVER: 0
FEVER: 0
CHILLS: 0
NERVOUS/ANXIOUS: 1
VOMITING: 0
HEADACHES: 0
DIZZINESS: 0
HEADACHES: 0
SHORTNESS OF BREATH: 0
BLOOD IN STOOL: 0
SPEECH CHANGE: 0
DIZZINESS: 0
ABDOMINAL PAIN: 0
HEARTBURN: 0
NAUSEA: 0
NAUSEA: 0
CHILLS: 0
CHILLS: 0
COUGH: 0
CONSTIPATION: 0
SENSORY CHANGE: 0
DEPRESSION: 0
HEADACHES: 0
GASTROINTESTINAL NEGATIVE: 1
SHORTNESS OF BREATH: 0
DIZZINESS: 0
BLURRED VISION: 0
NAUSEA: 0
BLOOD IN STOOL: 0
VOMITING: 1
COUGH: 0
SHORTNESS OF BREATH: 0
HEADACHES: 0
SPUTUM PRODUCTION: 0
VOMITING: 0
CHILLS: 0
NERVOUS/ANXIOUS: 0
STRIDOR: 0
WEAKNESS: 1
PALPITATIONS: 0
SHORTNESS OF BREATH: 0
INSOMNIA: 0
NAUSEA: 0

## 2017-01-01 ASSESSMENT — COGNITIVE AND FUNCTIONAL STATUS - GENERAL
STANDING UP FROM CHAIR USING ARMS: A LITTLE
DRESSING REGULAR LOWER BODY CLOTHING: A LITTLE
MOVING TO AND FROM BED TO CHAIR: A LITTLE
HELP NEEDED FOR BATHING: A LITTLE
EATING MEALS: A LITTLE
SUGGESTED CMS G CODE MODIFIER DAILY ACTIVITY: CK
EATING MEALS: TOTAL
MOBILITY SCORE: 16
TOILETING: A LITTLE
DRESSING REGULAR UPPER BODY CLOTHING: A LITTLE
DRESSING REGULAR LOWER BODY CLOTHING: A LOT
CLIMB 3 TO 5 STEPS WITH RAILING: A LOT
DRESSING REGULAR UPPER BODY CLOTHING: A LITTLE
MOBILITY SCORE: 24
SUGGESTED CMS G CODE MODIFIER DAILY ACTIVITY: CJ
WALKING IN HOSPITAL ROOM: A LITTLE
SUGGESTED CMS G CODE MODIFIER DAILY ACTIVITY: CJ
MOVING FROM LYING ON BACK TO SITTING ON SIDE OF FLAT BED: A LITTLE
CLIMB 3 TO 5 STEPS WITH RAILING: A LOT
STANDING UP FROM CHAIR USING ARMS: A LITTLE
DAILY ACTIVITIY SCORE: 18
DAILY ACTIVITIY SCORE: 22
SUGGESTED CMS G CODE MODIFIER MOBILITY: CJ
TOILETING: A LITTLE
WALKING IN HOSPITAL ROOM: A LITTLE
HELP NEEDED FOR BATHING: A LITTLE
SUGGESTED CMS G CODE MODIFIER DAILY ACTIVITY: CH
SUGGESTED CMS G CODE MODIFIER MOBILITY: CJ
SUGGESTED CMS G CODE MODIFIER MOBILITY: CK
SUGGESTED CMS G CODE MODIFIER DAILY ACTIVITY: CH
SUGGESTED CMS G CODE MODIFIER DAILY ACTIVITY: CJ
SUGGESTED CMS G CODE MODIFIER MOBILITY: CH
MOVING TO AND FROM BED TO CHAIR: A LITTLE
WALKING IN HOSPITAL ROOM: A LOT
HELP NEEDED FOR BATHING: A LOT
DAILY ACTIVITIY SCORE: 22
DAILY ACTIVITIY SCORE: 24
MOBILITY SCORE: 21
SUGGESTED CMS G CODE MODIFIER MOBILITY: CK
MOBILITY SCORE: 17
STANDING UP FROM CHAIR USING ARMS: A LOT
MOBILITY SCORE: 22
SUGGESTED CMS G CODE MODIFIER MOBILITY: CJ
TURNING FROM BACK TO SIDE WHILE IN FLAT BAD: A LITTLE
MOVING TO AND FROM BED TO CHAIR: A LITTLE
MOBILITY SCORE: 20
STANDING UP FROM CHAIR USING ARMS: A LITTLE
SUGGESTED CMS G CODE MODIFIER DAILY ACTIVITY: CK
DAILY ACTIVITIY SCORE: 14
CLIMB 3 TO 5 STEPS WITH RAILING: A LITTLE
PERSONAL GROOMING: A LITTLE
DRESSING REGULAR LOWER BODY CLOTHING: A LITTLE
TOILETING: A LITTLE
CLIMB 3 TO 5 STEPS WITH RAILING: A LITTLE
HELP NEEDED FOR BATHING: A LITTLE
MOVING TO AND FROM BED TO CHAIR: A LITTLE
DRESSING REGULAR LOWER BODY CLOTHING: A LITTLE
DRESSING REGULAR LOWER BODY CLOTHING: A LITTLE
CLIMB 3 TO 5 STEPS WITH RAILING: A LOT
DAILY ACTIVITIY SCORE: 22
DAILY ACTIVITIY SCORE: 24
PERSONAL GROOMING: A LITTLE
MOVING FROM LYING ON BACK TO SITTING ON SIDE OF FLAT BED: A LITTLE

## 2017-01-01 ASSESSMENT — PAIN SCALES - GENERAL
PAINLEVEL_OUTOF10: 0
PAINLEVEL_OUTOF10: 6
PAINLEVEL_OUTOF10: 0
PAINLEVEL_OUTOF10: 2
PAINLEVEL_OUTOF10: 7
PAINLEVEL_OUTOF10: 0
PAINLEVEL_OUTOF10: 3
PAINLEVEL_OUTOF10: 0
PAINLEVEL_OUTOF10: 0
PAINLEVEL_OUTOF10: 2
PAINLEVEL_OUTOF10: 0
PAINLEVEL_OUTOF10: 3
PAINLEVEL_OUTOF10: 2
PAINLEVEL_OUTOF10: 0
PAINLEVEL: 7=MODERATE-SEVERE PAIN
PAINLEVEL_OUTOF10: 0
PAINLEVEL_OUTOF10: 10
PAINLEVEL_OUTOF10: 0
PAINLEVEL_OUTOF10: 2
PAINLEVEL_OUTOF10: 0
PAINLEVEL_OUTOF10: 4
PAINLEVEL_OUTOF10: 5
PAINLEVEL_OUTOF10: 0
PAINLEVEL_OUTOF10: 5
PAINLEVEL_OUTOF10: 0
PAINLEVEL_OUTOF10: 0
PAINLEVEL_OUTOF10: 2
PAINLEVEL_OUTOF10: 4
PAINLEVEL_OUTOF10: 0
PAINLEVEL_OUTOF10: 0
PAINLEVEL_OUTOF10: 7
PAINLEVEL_OUTOF10: 2
PAINLEVEL_OUTOF10: 3
PAINLEVEL_OUTOF10: 5
PAINLEVEL_OUTOF10: 0
PAINLEVEL_OUTOF10: 3
PAINLEVEL_OUTOF10: 3
PAINLEVEL_OUTOF10: 0
PAINLEVEL_OUTOF10: 4
PAINLEVEL_OUTOF10: 0
PAINLEVEL_OUTOF10: 7
PAINLEVEL_OUTOF10: 0
PAINLEVEL_OUTOF10: 8
PAINLEVEL_OUTOF10: 0
PAINLEVEL_OUTOF10: 3
PAINLEVEL_OUTOF10: 0
PAINLEVEL_OUTOF10: 0
PAINLEVEL_OUTOF10: 2
PAINLEVEL_OUTOF10: 0
PAINLEVEL_OUTOF10: 7
PAINLEVEL_OUTOF10: 0
PAINLEVEL_OUTOF10: 3
PAINLEVEL_OUTOF10: 4
PAINLEVEL_OUTOF10: 2
PAINLEVEL_OUTOF10: 7
PAINLEVEL_OUTOF10: 5
PAINLEVEL_OUTOF10: 0
PAINLEVEL_OUTOF10: 0
PAINLEVEL_OUTOF10: 5
PAINLEVEL_OUTOF10: 0
PAINLEVEL_OUTOF10: 5
PAINLEVEL_OUTOF10: 5
PAINLEVEL_OUTOF10: 0
PAINLEVEL_OUTOF10: 1
PAINLEVEL_OUTOF10: 0
PAINLEVEL_OUTOF10: 5
PAINLEVEL_OUTOF10: 0
PAINLEVEL_OUTOF10: 2
PAINLEVEL_OUTOF10: 0
PAINLEVEL_OUTOF10: 7
PAINLEVEL_OUTOF10: 2
PAINLEVEL_OUTOF10: 2
PAINLEVEL_OUTOF10: 0
PAINLEVEL_OUTOF10: 0
PAINLEVEL_OUTOF10: 2
PAINLEVEL_OUTOF10: 0
PAINLEVEL_OUTOF10: 2
PAINLEVEL_OUTOF10: 0
PAINLEVEL_OUTOF10: 2
PAINLEVEL_OUTOF10: 0
PAINLEVEL_OUTOF10: 10
PAINLEVEL_OUTOF10: 0
PAINLEVEL_OUTOF10: 0
PAINLEVEL_OUTOF10: 3
PAINLEVEL_OUTOF10: 5
PAINLEVEL_OUTOF10: 6
PAINLEVEL_OUTOF10: 0
PAINLEVEL_OUTOF10: 2
PAINLEVEL_OUTOF10: 5
PAINLEVEL_OUTOF10: 0
PAINLEVEL_OUTOF10: 0
PAINLEVEL_OUTOF10: 2
PAINLEVEL_OUTOF10: 0
PAINLEVEL_OUTOF10: 0
PAINLEVEL_OUTOF10: 7
PAINLEVEL_OUTOF10: 0
PAINLEVEL_OUTOF10: 7
PAINLEVEL_OUTOF10: 0
PAINLEVEL_OUTOF10: 10
PAINLEVEL_OUTOF10: 0
PAINLEVEL_OUTOF10: 2
PAINLEVEL_OUTOF10: 0
PAINLEVEL_OUTOF10: 2
PAINLEVEL_OUTOF10: 0
PAINLEVEL_OUTOF10: 0
PAINLEVEL_OUTOF10: 5
PAINLEVEL_OUTOF10: 0
PAINLEVEL_OUTOF10: 7

## 2017-01-01 ASSESSMENT — GAIT ASSESSMENTS
DEVIATION: BRADYKINETIC
DEVIATION: DECREASED BASE OF SUPPORT;SHUFFLED GAIT
GAIT LEVEL OF ASSIST: MINIMAL ASSIST
ASSISTIVE DEVICE: FRONT WHEEL WALKER
DEVIATION: BRADYKINETIC;SHUFFLED GAIT
ASSISTIVE DEVICE: FRONT WHEEL WALKER
DISTANCE (FEET): 50
DISTANCE (FEET): 150
DISTANCE (FEET): 125
DEVIATION: INCREASED BASE OF SUPPORT;BRADYKINETIC;SHUFFLED GAIT
GAIT LEVEL OF ASSIST: MINIMAL ASSIST
ASSISTIVE DEVICE: QUAD CANE
GAIT LEVEL OF ASSIST: MINIMAL ASSIST
GAIT LEVEL OF ASSIST: MODIFIED INDEPENDENT
DISTANCE (FEET): 2
DEVIATION: BRADYKINETIC
DISTANCE (FEET): 150
GAIT LEVEL OF ASSIST: CONTACT GUARD ASSIST
ASSISTIVE DEVICE: SINGLE POINT CANE
GAIT LEVEL OF ASSIST: CONTACT GUARD ASSIST
DEVIATION: BRADYKINETIC;SHUFFLED GAIT
DEVIATION: BRADYKINETIC;SHUFFLED GAIT
DISTANCE (FEET): 15
DISTANCE (FEET): 3
DISTANCE (FEET): 25
GAIT LEVEL OF ASSIST: SUPERVISED
ASSISTIVE DEVICE: 4 WHEEL WALKER
DEVIATION: BRADYKINETIC;SHUFFLED GAIT
GAIT LEVEL OF ASSIST: STAND BY ASSIST
GAIT LEVEL OF ASSIST: SUPERVISED
ASSISTIVE DEVICE: FRONT WHEEL WALKER
ASSISTIVE DEVICE: HEMI-WALKER
DEVIATION: BRADYKINETIC;SHUFFLED GAIT
DISTANCE (FEET): 200
ASSISTIVE DEVICE: QUAD CANE
DISTANCE (FEET): 15
GAIT LEVEL OF ASSIST: MODERATE ASSIST
GAIT LEVEL OF ASSIST: MINIMAL ASSIST
DISTANCE (FEET): 100

## 2017-01-01 ASSESSMENT — LIFESTYLE VARIABLES
EVER_SMOKED: YES
EVER_SMOKED: YES
ALCOHOL_USE: NO
DO YOU DRINK ALCOHOL: NO
EVER_SMOKED: YES
PACK_YEARS: 10
DO YOU DRINK ALCOHOL: NO
EVER_SMOKED: YES
SUBSTANCE_ABUSE: 0
EVER_SMOKED: YES
REASON UNABLE TO ASSESS: INTUBATED, ON VENT
ALCOHOL_USE: NO
EVER_SMOKED: YES
DO YOU DRINK ALCOHOL: NO
ALCOHOL_USE: NO
EVER_SMOKED: YES
ALCOHOL_USE: NO
ALCOHOL_USE: NO
EVER_SMOKED: YES
EVER_SMOKED: YES
REASON UNABLE TO ASSESS: INTUBATED, ON VENT
EVER_SMOKED: YES
EVER_SMOKED: YES
ALCOHOL_USE: NO
DO YOU DRINK ALCOHOL: NO
EVER_SMOKED: YES
ALCOHOL_USE: NO
DO YOU DRINK ALCOHOL: NO
ALCOHOL_USE: NO

## 2017-01-01 ASSESSMENT — PATIENT HEALTH QUESTIONNAIRE - PHQ9
SUM OF ALL RESPONSES TO PHQ QUESTIONS 1-9: 0
1. LITTLE INTEREST OR PLEASURE IN DOING THINGS: NOT AT ALL
1. LITTLE INTEREST OR PLEASURE IN DOING THINGS: NOT AT ALL
SUM OF ALL RESPONSES TO PHQ9 QUESTIONS 1 AND 2: 0
2. FEELING DOWN, DEPRESSED, IRRITABLE, OR HOPELESS: NOT AT ALL
1. LITTLE INTEREST OR PLEASURE IN DOING THINGS: NOT AT ALL
SUM OF ALL RESPONSES TO PHQ QUESTIONS 1-9: 0
1. LITTLE INTEREST OR PLEASURE IN DOING THINGS: NOT AT ALL
2. FEELING DOWN, DEPRESSED, IRRITABLE, OR HOPELESS: NOT AT ALL
2. FEELING DOWN, DEPRESSED, IRRITABLE, OR HOPELESS: NOT AT ALL
SUM OF ALL RESPONSES TO PHQ QUESTIONS 1-9: 0
SUM OF ALL RESPONSES TO PHQ9 QUESTIONS 1 AND 2: 0
SUM OF ALL RESPONSES TO PHQ9 QUESTIONS 1 AND 2: 0
SUM OF ALL RESPONSES TO PHQ QUESTIONS 1-9: 0
SUM OF ALL RESPONSES TO PHQ QUESTIONS 1-9: 0
SUM OF ALL RESPONSES TO PHQ9 QUESTIONS 1 AND 2: 0
2. FEELING DOWN, DEPRESSED, IRRITABLE, OR HOPELESS: NOT AT ALL
2. FEELING DOWN, DEPRESSED, IRRITABLE, OR HOPELESS: NOT AT ALL
1. LITTLE INTEREST OR PLEASURE IN DOING THINGS: NOT AT ALL
1. LITTLE INTEREST OR PLEASURE IN DOING THINGS: NOT AT ALL
SUM OF ALL RESPONSES TO PHQ9 QUESTIONS 1 AND 2: 0
SUM OF ALL RESPONSES TO PHQ9 QUESTIONS 1 AND 2: 0
SUM OF ALL RESPONSES TO PHQ QUESTIONS 1-9: 0
CLINICAL INTERPRETATION OF PHQ2 SCORE: 0
2. FEELING DOWN, DEPRESSED, IRRITABLE, OR HOPELESS: NOT AT ALL
SUM OF ALL RESPONSES TO PHQ QUESTIONS 1-9: 0
1. LITTLE INTEREST OR PLEASURE IN DOING THINGS: NOT AT ALL
SUM OF ALL RESPONSES TO PHQ9 QUESTIONS 1 AND 2: 0
2. FEELING DOWN, DEPRESSED, IRRITABLE, OR HOPELESS: NOT AT ALL

## 2017-01-01 ASSESSMENT — ACTIVITIES OF DAILY LIVING (ADL)
TOILETING: INDEPENDENT

## 2017-01-01 ASSESSMENT — COPD QUESTIONNAIRES
COPD SCREENING SCORE: 4
DO YOU EVER COUGH UP ANY MUCUS OR PHLEGM?: NO/ONLY WITH OCCASIONAL COLDS OR INFECTIONS
DO YOU EVER COUGH UP ANY MUCUS OR PHLEGM?: YES, A FEW DAYS A WEEK OR MONTH
DO YOU EVER COUGH UP ANY MUCUS OR PHLEGM?: NO/ONLY WITH OCCASIONAL COLDS OR INFECTIONS
DURING THE PAST 4 WEEKS HOW MUCH DID YOU FEEL SHORT OF BREATH: SOME OF THE TIME
DURING THE PAST 4 WEEKS HOW MUCH DID YOU FEEL SHORT OF BREATH: SOME OF THE TIME
DURING THE PAST 4 WEEKS HOW MUCH DID YOU FEEL SHORT OF BREATH: NONE/LITTLE OF THE TIME
HAVE YOU SMOKED AT LEAST 100 CIGARETTES IN YOUR ENTIRE LIFE: YES
DURING THE PAST 4 WEEKS HOW MUCH DID YOU FEEL SHORT OF BREATH: NONE/LITTLE OF THE TIME
DURING THE PAST 4 WEEKS HOW MUCH DID YOU FEEL SHORT OF BREATH: NONE/LITTLE OF THE TIME
HAVE YOU SMOKED AT LEAST 100 CIGARETTES IN YOUR ENTIRE LIFE: YES
COPD SCREENING SCORE: 5
HAVE YOU SMOKED AT LEAST 100 CIGARETTES IN YOUR ENTIRE LIFE: YES
COPD SCREENING SCORE: 7
COPD SCREENING SCORE: 5
HAVE YOU SMOKED AT LEAST 100 CIGARETTES IN YOUR ENTIRE LIFE: YES
COPD SCREENING SCORE: 5
DURING THE PAST 4 WEEKS HOW MUCH DID YOU FEEL SHORT OF BREATH: SOME OF THE TIME
COPD SCREENING SCORE: 5
COPD SCREENING SCORE: 4
DO YOU EVER COUGH UP ANY MUCUS OR PHLEGM?: YES, A FEW DAYS A WEEK OR MONTH
COPD SCREENING SCORE: 5
DO YOU EVER COUGH UP ANY MUCUS OR PHLEGM?: YES, A FEW DAYS A WEEK OR MONTH
HAVE YOU SMOKED AT LEAST 100 CIGARETTES IN YOUR ENTIRE LIFE: NO/DON'T KNOW
HAVE YOU SMOKED AT LEAST 100 CIGARETTES IN YOUR ENTIRE LIFE: YES
DURING THE PAST 4 WEEKS HOW MUCH DID YOU FEEL SHORT OF BREATH: SOME OF THE TIME
DURING THE PAST 4 WEEKS HOW MUCH DID YOU FEEL SHORT OF BREATH: SOME OF THE TIME
HAVE YOU SMOKED AT LEAST 100 CIGARETTES IN YOUR ENTIRE LIFE: YES
HAVE YOU SMOKED AT LEAST 100 CIGARETTES IN YOUR ENTIRE LIFE: YES
DO YOU EVER COUGH UP ANY MUCUS OR PHLEGM?: YES, A FEW DAYS A WEEK OR MONTH

## 2017-01-01 ASSESSMENT — PAIN SCALES - WONG BAKER: WONGBAKER_NUMERICALRESPONSE: HURTS A WHOLE LOT

## 2017-01-01 ASSESSMENT — PULMONARY FUNCTION TESTS: FVC: 1.6

## 2017-01-13 PROBLEM — D64.9 ANEMIA: Status: ACTIVE | Noted: 2017-01-13

## 2017-01-25 NOTE — IP AVS SNAPSHOT
1/30/2017          Cristina Tenorio  [address]  [facility] [address]    Dear Cristina:    [facility] wants to ensure your discharge home is safe and you or your loved ones have had all your questions answered regarding your care after you leave the hospital.    You may receive a telephone call within two days of your discharge.  This call is to make certain you understand your discharge instructions as well as ensure we provided you with the best care possible during your stay with us.     The call will only last approximately 3-5 minutes and will be done by a nurse.    Once again, we want to ensure your discharge home is safe and that you have a clear understanding of any next steps in your care.  If you have any questions or concerns, please do not hesitate to contact us, we are here for you.  Thank you for choosing [facility] for your healthcare needs.    Sincerely,    Paul Busby    [facility]

## 2017-01-25 NOTE — IP AVS SNAPSHOT
ComHear Access Code: Activation code not generated  Current ComHear Status: Active    [employer]  A secure, online tool to manage your health information     [employer]’s ComHear® is a secure, online tool that connects you to your personalized health information from the privacy of your home -- day or night - making it very easy for you to manage your healthcare. Once the activation process is completed, you can even access your medical information using the ComHear claudia, which is available for free in the Apple Claudia store or Google Play store.     ComHear provides the following levels of access (as shown below):   My Chart Features   [facility] Primary Care Doctor [facility]  Specialists [facility]  Urgent  Care Non-[facility]  Primary Care  Doctor   Email your healthcare team securely and privately 24/7 X X X X   Manage appointments: schedule your next appointment; view details of past/upcoming appointments X      Request prescription refills. X      View recent personal medical records, including lab and immunizations X X X X   View health record, including health history, allergies, medications X X X X   Read reports about your outpatient visits, procedures, consult and ER notes X X X X   See your discharge summary, which is a recap of your hospital and/or ER visit that includes your diagnosis, lab results, and care plan. X X       How to register for ComHear:  1. Go to  [email or web address].  2. Click on the Sign Up Now box, which takes you to the New Member Sign Up page. You will need to provide the following information:  a. Enter your ComHear Access Code exactly as it appears at the top of this page. (You will not need to use this code after you’ve completed the sign-up process. If you do not sign up before the expiration date, you must request a new code.)   b. Enter your date of birth.   c. Enter your home email address.   d. Click Submit, and follow the next screen’s instructions.  3. Create a ComHear ID. This will  be your Trueffect login ID and cannot be changed, so think of one that is secure and easy to remember.  4. Create a Trueffect password. You can change your password at any time.  5. Enter your Password Reset Question and Answer. This can be used at a later time if you forget your password.   6. Enter your e-mail address. This allows you to receive e-mail notifications when new information is available in Trueffect.  7. Click Sign Up. You can now view your health information.    For assistance activating your Trueffect account, call [phone]

## 2017-01-25 NOTE — IP AVS SNAPSHOT
After Visit Summary                                                                                                                  Name:Cristina Tenorio  Medical Record Number:[id number]  CSN: [id number]    YOB: 1940   Age: 76 y.o.  Sex: female  HT:1.524 m (5') WT: 83.008 kg (183 lb)          Admit Date: 1/25/2017     Discharge Date:   Today's Date: 1/30/2017  Attending Doctor:  Star Salcido M.D.                  Allergies:  Sulfa drugs            Discharge Instructions       Discharge Instructions    Discharged to other by medical transportation with escort. Discharged via wheelchair, hospital escort: Yes.  Special equipment needed: Hemiwalker    Be sure to schedule a follow-up appointment with your primary care doctor or any specialists as instructed.     Discharge Plan:   Diet Plan: Discussed  Activity Level: Discussed  Confirmed Follow up Appointment: Patient to Call and Schedule Appointment  Influenza Vaccine Indication: Not indicated: Previously immunized this influenza season and > 8 years of age    I understand that a diet low in cholesterol, fat, and sodium is recommended for good health. Unless I have been given specific instructions below for another diet, I accept this instruction as my diet prescription.   Other diet: Renal    Special Instructions: Discharge instructions for the Orthopedic Patient    Follow up with Primary Care Physician within 2 weeks of discharge to home, regarding:  Review of medications and diagnostic testing.  Surveillance for medical complications.  Workup and treatment of osteoporosis, if appropriate.     -Is this a Joint Replacement patient? No    -Is this patient being discharged with medication to prevent blood clots?  No    · Is patient discharged on Warfarin / Coumadin?   No     · Is patient Post Blood Transfusion?  No    Depression / Suicide Risk    As you are discharged from this [facility] facility, it is important to learn how to keep safe from harming  yourself.    Recognize the warning signs:  · Abrupt changes in personality, positive or negative- including increase in energy   · Giving away possessions  · Change in eating patterns- significant weight changes-  positive or negative  · Change in sleeping patterns- unable to sleep or sleeping all the time   · Unwillingness or inability to communicate  · Depression  · Unusual sadness, discouragement and loneliness  · Talk of wanting to die  · Neglect of personal appearance   · Rebelliousness- reckless behavior  · Withdrawal from people/activities they love  · Confusion- inability to concentrate     If you or a loved one observes any of these behaviors or has concerns about self-harm, here's what you can do:  · Talk about it- your feelings and reasons for harming yourself  · Remove any means that you might use to hurt yourself (examples: pills, rope, extension cords, firearm)  · Get professional help from the community (Mental Health, Substance Abuse, psychological counseling)  · Do not be alone:Call your Safe Contact- someone whom you trust who will be there for you.  · Call your local CRISIS HOTLINE 093-9451 or 740-499-7746  · Call your local Children's Mobile Crisis Response Team [place] (041) 383-5448 or www.Facishare  · Call the toll free National Suicide Prevention Hotlines   · National Suicide Prevention Lifeline 814-756-XATM (0094)  · National Hope Line Network 800-SUICIDE (155-4759)      Humerus Fracture Treated With Immobilization  The humerus is the large bone in the upper arm. A broken (fractured) humerus is often treated by wearing a cast, splint, or sling (immobilization). This holds the broken pieces in place so they can heal.   HOME CARE  · Put ice on the injured area.  ¨ Put ice in a plastic bag.  ¨ Place a towel between your skin and the bag.  ¨ Leave the ice on for 15-20 minutes, 03-04 times a day.  · If you are given a cast:  ¨ Do not scratch the skin under the cast.  ¨ Check the skin  around the cast every day. You may put lotion on any red or sore areas.  ¨ Keep the cast dry and clean.  · If you are given a splint:  ¨ Wear the splint as told.  ¨ Keep the splint clean and dry.  ¨ Loosen the elastic around the splint if your fingers become numb, cold, tingle, or turn blue.  · If you are given a sling:  ¨ Wear the sling as told.  · Do not put pressure on any part of the cast or splint until it is fully hardened.  · The cast or splint must be protected with a plastic bag during bathing. Do not lower the cast or splint into water.  · Only take medicine as told by your doctor.  · Do exercises as told by your doctor.  · Follow up as told by your doctor.  GET HELP RIGHT AWAY IF:   · Your skin or fingernails turn blue or gray.  · Your arm feels cold or numb.  · You have very bad pain in the injured arm.  · You are having problems with the medicines you were given.  MAKE SURE YOU:   · Understand these instructions.  · Will watch your condition.  · Will get help right away if you are not doing well or get worse.     This information is not intended to replace advice given to you by your health care provider. Make sure you discuss any questions you have with your health care provider.     Document Released: 06/05/2009 Document Revised: 01/08/2016 Document Reviewed: 02/01/2012  [employer] Interactive Patient Education ©2016 [employer]          Your appointments     Apr 07, 2017  1:15 PM   Follow Up Visit with Arik Vee M.D.   [facility] ([address])    [address] [place] [address] [address]  [place] [id number] [phone]           You will be receiving a confirmation call a few days before your appointment from our automated call confirmation system.              Follow-up Information     1. Call Roshni Morillo M.D..    Specialty:  Family Medicine    Contact information    [address]  [place] [id number] [phone]          2. Call Errol SCHNEIDER  LUMA Qureshi.    Specialty:  Orthopaedics    Contact information    [address] [place] [address]  [phone]           Discharge Medication Instructions:    Below are the medications your physician expects you to take upon discharge:    Review all your home medications and newly ordered medications with your doctor and/or pharmacist. Follow medication instructions as directed by your doctor and/or pharmacist.    Please keep your medication list with you and share with your physician.               Medication List      START taking these medications        Instructions    hydrocodone-acetaminophen 5-325 MG Tabs per tablet   Commonly known as:  NORCO    Take 1 Tab by mouth every 8 hours as needed.   Dose:  1 Tab       propranolol 10 MG Tabs   Last time this was given:  10 mg on 1/30/2017  2:07 PM   Commonly known as:  INDERAL    Take 1 Tab by mouth 3 times a day.   Dose:  10 mg         CONTINUE taking these medications        Instructions    ferrous sulfate 325 (65 FE) MG tablet   Last time this was given:  325 mg on 1/30/2017  8:30 AM    Take 325 mg by mouth every day.   Dose:  325 mg       ipratropium-albuterol  MCG/ACT Aers   Commonly known as:  COMBIVENT RESPIMAT    Inhale 1 Puff by mouth every four hours as needed (shortness of breath/wheezing.). Maximum 6 puffs/24 hours.   Dose:  1 Puff       omeprazole 20 MG delayed-release capsule   Last time this was given:  20 mg on 1/30/2017  8:30 AM   Commonly known as:  PRILOSEC    Take 1 Cap by mouth 2 times a day.   Dose:  20 mg       sodium bicarbonate 650 MG Tabs   Last time this was given:  1,300 mg on 1/30/2017  8:30 AM   Commonly known as:  SODIUM BICARBONATE    Take 1,300 mg by mouth every day.   Dose:  1300 mg       vitamin D 1000 UNIT Tabs   Last time this was given:  1,000 Units on 1/30/2017  8:30 AM   Commonly known as:  cholecalciferol    Take 1,000 Units by mouth every day.   Dose:  1000 Units               Instructions           Diet /  Nutrition:    Follow any diet instructions given to you by your doctor or the dietician, including how much salt (sodium) you are allowed each day.    If you are overweight, talk to your doctor about a weight reduction plan.    Activity:    Remain physically active following your doctor's instructions about exercise and activity.    Rest often.     Any time you become even a little tired or short of breath, SIT DOWN and rest.    Worsening Symptoms:    Report any of the following signs and symptoms to the doctor's office immediately:    *Pain of jaw, arm, or neck  *Chest pain not relieved by medication                               *Dizziness or loss of consciousness  *Difficulty breathing even when at rest   *More tired than usual                                       *Bleeding drainage or swelling of surgical site  *Swelling of feet, ankles, legs or stomach                 *Fever (>100ºF)  *Pink or blood tinged sputum  *Weight gain (3lbs/day or 5lbs /week)           *Shock from internal defibrillator (if applicable)  *Palpitations or irregular heartbeats                *Cool and/or numb extremities    Stroke Awareness    Common Risk Factors for Stroke include:    Age  Atrial Fibrillation  Carotid Artery Stenosis  Diabetes Mellitus  Excessive alcohol consumption  High blood pressure  Overweight   Physical inactivity  Smoking    Warning signs and symptoms of a stroke include:    *Sudden numbness or weakness of the face, arm or leg (especially on one side of the body).  *Sudden confusion, trouble speaking or understanding.  *Sudden trouble seeing in one or both eyes.  *Sudden trouble walking, dizziness, loss of balance or coordination.Sudden severe headache with no known cause.    It is very important to get treatment quickly when a stroke occurs. If you experience any of the above warning signs, call 911 immediately.                   Disclaimer         Quit Smoking / Tobacco Use:    I understand the use of any  tobacco products increases my chance of suffering from future heart disease or stroke and could cause other illnesses which may shorten my life. Quitting the use of tobacco products is the single most important thing I can do to improve my health. For further information on smoking / tobacco cessation call a Toll Free Quit Line at 1-713.485.6901 (*National Cancer [place]) or 1-399.770.4913 (American Lung Association) or you can access the web based program at www.lungusa.org.    [place] Tobacco Users Help Line:  (227) 737-9079       Toll Free: 1-996.171.2519  Quit Tobacco Program [facility] Management Services (747)158-8775    Crisis Hotline:    [place] Crisis Hotline:  7-690-VPUUQBT or 1-145.960.8278    [place] Crisis Hotline:    1-125.487.5335 or 041-372-1300    Discharge Survey:   Thank you for choosing [facility]. We hope we did everything we could to make your hospital stay a pleasant one. You may be receiving a phone survey and we would appreciate your time and participation in answering the questions. Your input is very valuable to us in our efforts to improve our service to our patients and their families.        My signature on this form indicates that:    1. I have reviewed and understand the above information.  2. My questions regarding this information have been answered to my satisfaction.  3. I have formulated a plan with my discharge nurse to obtain my prescribed medications for home.                  Disclaimer         __________________________________                     __________       ________                       Patient Signature                                                 Date                    Time

## 2017-01-25 NOTE — IP AVS SNAPSHOT
" <p align=\"LEFT\"><IMG SRC=\"//[email or web address]\" alt=\"Image\" WIDTH=\"50%\" HEIGHT=\"200\" BORDER=\"\"></p>                   Name:Cristina Tenorio  Medical Record Number:[id number]  CSN: [id number]    YOB: 1940   Age: 76 y.o.  Sex: female  HT:1.524 m (5') WT: 83.008 kg (183 lb)          Admit Date: 1/25/2017     Discharge Date:   Today's Date: 1/30/2017  Attending Doctor:  Star Salcido M.D.                  Allergies:  Sulfa drugs          Your appointments     Apr 07, 2017  1:15 PM   Follow Up Visit with Arik Vee M.D.   [facility] ([address] [place] [address])    [facility] [address]  [place] [id number] [phone]           You will be receiving a confirmation call a few days before your appointment from our automated call confirmation system.              Follow-up Information     1. Call Roshni Morillo M.D..    Specialty:  Family Medicine    Contact information    [address]  [place] [id number] [phone]          2. Call Errol Qureshi M.D..    Specialty:  Orthopaedics    Contact information    [address]  [place] [address]  [phone]           Medication List      Take these Medications        Instructions    ferrous sulfate 325 (65 FE) MG tablet    Take 325 mg by mouth every day.   Dose:  325 mg       hydrocodone-acetaminophen 5-325 MG Tabs per tablet   Commonly known as:  NORCO    Take 1 Tab by mouth every 8 hours as needed.   Dose:  1 Tab       ipratropium-albuterol  MCG/ACT Aers   Commonly known as:  COMBIVENT RESPIMAT    Inhale 1 Puff by mouth every four hours as needed (shortness of breath/wheezing.). Maximum 6 puffs/24 hours.   Dose:  1 Puff       omeprazole 20 MG delayed-release capsule   Commonly known as:  PRILOSEC    Take 1 Cap by mouth 2 times a day.   Dose:  20 mg       propranolol 10 MG Tabs   Commonly known as:  INDERAL    Take 1 Tab by mouth 3 times a day.   Dose:  10 mg       sodium " bicarbonate 650 MG Tabs   Commonly known as:  SODIUM BICARBONATE    Take 1,300 mg by mouth every day.   Dose:  1300 mg       vitamin D 1000 UNIT Tabs   Commonly known as:  cholecalciferol    Take 1,000 Units by mouth every day.   Dose:  1000 Units

## 2017-01-26 PROBLEM — S42.302A LEFT HUMERAL FRACTURE: Status: ACTIVE | Noted: 2017-01-01

## 2017-01-26 PROBLEM — K74.60 HEPATIC CIRRHOSIS (HCC): Status: ACTIVE | Noted: 2017-01-01

## 2017-01-26 NOTE — PROGRESS NOTES
Report received. Assumed care. Pt in bed awake. A/O x4. VSS. Responds appropriately. Denies pain, SOB. Assessment complete. Sling to the left arm in place. Noted laceration to the head, with staple in place, rossy. Noted redness but blanching sacrum. Noted LUQ ileostomy bag with moderate stool, brown, intact. Discussed POC, pain control, PT/OT, safety, DC planning , pt verbalizes understanding. Explained importance of calling before getting OOB. Call light and belongings within reach. Bed alarm on. Bed in the lowest position. Treaded socks in place. Hourly rounding in progress. Will continue to monitor .

## 2017-01-26 NOTE — DISCHARGE PLANNING
Medical Social Work    Pt is a 75 y/o female admitted on 1/25/17 for a left humerus fracture sustained after falling at home.  Pt has complex medical hx of liver cirrhosis, chronic kidney disease stage III, COPD, and Crohn's disease.  Pt has a colostomy in place.  Non-operative management for humerus fracture at this time.  Pt was evaluated by PT/OT and pt is requiring moderate assistance for bed mobility and ambulation.  SW or TCN will meet with pt to discuss d/c planning and potential SNF vs. Rehab referral.  No orders placed at this time.  SW notified [facility] of potential need for placement and requested SNF insurance benefits be gathered.     Plan: SW/TCN to meet with pt to discuss potential need for placement (SNF vs. Rehab) prior to d/c home.

## 2017-01-26 NOTE — PROGRESS NOTES
Med rec reviewed and complete per pt at bedside.  Allergies reviewed.  No ABX within last 30 days.

## 2017-01-26 NOTE — ED NOTES
Cleaned pt's scalp, ear, and face with lube and water. Placed lube and gauze over lac. Lac is 3cm to left occiput.

## 2017-01-26 NOTE — PROGRESS NOTES
2 RN skin check complete with Lin Ohara RN. Small area of blanching redness to left buttock near sacrum. No other breakdown noted.

## 2017-01-26 NOTE — CONSULTS
"1/26/2017    Cristina Tenorio is a 76 y.o. female who presents after a fall with a left proximal humerus fracture and is here for operative management. Patient denies numbness, parasthesias, loss of concousness or other trauma    Past Medical History   Diagnosis Date   • Bowel obstruction (CMS-HCC)    • Crohn's disease (CMS-HCC)    • Indigestion    • Obstruction    • Snoring    • Ileostomy in place (CMS-HCC)    • COPD (chronic obstructive pulmonary disease) (CMS-HCC)      per pt   • COPD (chronic obstructive pulmonary disease) (CMS-HCC) 3/20/2013   • Arthritis 12/30/16     to hands and feet   • Heart burn    • Emphysema of lung (CMS-HCC)      COPD   • Sleep apnea    • TRAVIS on CPAP      10/2016-CPAP DC'd and wears O2 @4L/NC   • Hemorrhagic disorder (CMS-HCC)      anemia of unkown etiology.   • Anemia 12/30/16     unknown etiology   • Renal disorder      Increased creatitine level due to meds.   • Breath shortness      uses O2@ at night and prn ([employer]). Uses inhaler prn. 12/30/16-reports no changes    • Cataract 2006,2007     bilat phaco with IOL   • Cirrhosis of liver (CMS-HCC) 12/30/16     states dx at one time but no treatment for >10yrs       Past Surgical History   Procedure Laterality Date   • Cholecystectomy  2013     \"burst\"   • Bowel resection  1992     with ileostomy (right side)   • Ileostomy  2010     moved to left side   • Recovery  6/21/2010     Performed by SURGERY, IR-RECOVERY at SURGERY SAME DAY [facility]   • Sigmoidoscopy flex  9/27/2016     Procedure: SIGMOIDOSCOPY FLEX;  Surgeon: Aftab Alegre M.D.;  Location: ENDOSCOPY [facility];  Service:    • Gastroscopy-endo  9/27/2016     Procedure: GASTROSCOPY-ENDO;  Surgeon: Aftab Alegre M.D.;  Location: ENDOSCOPY [facility];  Service:    • Gastroscopy wiithsavary dilatation  9/28/2016     Procedure: GASTROSCOPY WIITH SAVARY DILATATION;  Surgeon: Aftab Alegre M.D.;  Location: ENDOSCOPY [facility];  Service: " Gastroenterology   • Gastroscopy w/push enterscopy  9/28/2016     Procedure: GASTROSCOPY W/PUSH ENTERSCOPY;  Surgeon: Aftab Alegre M.D.;  Location: ENDOSCOPY [facility];  Service:    • Gastroscopy-endo N/A 10/5/2016     Procedure: GASTROSCOPY-ENDO;  Surgeon: Aravind Roman M.D.;  Location: ENDOSCOPY [facility];  Service:    • Umbilical hernia repair  2000   • Colonoscopy       Hx of  several    • Other surgical procedure  1994     closed off rectum    • Gastroscopy N/A 1/13/2017     Procedure: GASTROSCOPY - ENTEROSCOPY PUSH;  Surgeon: Boni Brooks M.D.;  Location: SURGERY [facility];  Service:        Medications  No current facility-administered medications on file prior to encounter.     Current Outpatient Prescriptions on File Prior to Encounter   Medication Sig Dispense Refill   • sodium bicarbonate (SODIUM BICARBONATE) 650 MG Tab Take 1,300 mg by mouth every day.     • omeprazole (PRILOSEC) 20 MG delayed-release capsule Take 1 Cap by mouth 2 times a day. 60 Cap 2   • vitamin D (CHOLECALCIFEROL) 1000 UNIT Tab Take 1,000 Units by mouth every day.     • ferrous sulfate 325 (65 FE) MG tablet Take 325 mg by mouth every day.     • ipratropium-albuterol (COMBIVENT RESPIMAT)  MCG/ACT Aero Soln Inhale 1 Puff by mouth every four hours as needed (shortness of breath/wheezing.). Maximum 6 puffs/24 hours. 1 Inhaler 6       Allergies  Sulfa drugs    ROS  Left shoulder pain. All other systems were reviewed and found to be negative    History reviewed. No pertinent family history.    Social History     Social History   • Marital Status:      Spouse Name: N/A   • Number of Children: N/A   • Years of Education: N/A     Social History Main Topics   • Smoking status: Former Smoker -- 50 years     Types: Cigarettes, Cigars     Quit date: 03/30/2013   • Smokeless tobacco: None      Comment: 50yrs 1.5 ppd quit 2009   • Alcohol Use: No      Comment: socially   • Drug Use: No   • Sexual  Activity: Not Asked     Other Topics Concern   • None     Social History Narrative       Physical Exam  Vitals  Blood pressure 134/63, pulse 81, temperature 35.8 °C (96.5 °F), resp. rate 16, height 1.524 m (5'), weight 83.008 kg (183 lb), SpO2 97 %, not currently breastfeeding.  General: Well Developed, Well Nourished, no acute distress  HEENT: Normocephalic, atraumatic  Eyes: Anicteric, PERRLA, EOMI  Neck: Supple, nontender, no masses  Lungs: CTA, no wheezes or crackles  Heart: RRR, no murmurs, rubs or gallops  Abdomen: Soft, NT, ND  Pelvis: Stable to AP and Lateral Compression  Skin: Intact, no open wounds  Extremities: Left shoulder swelling and pain  Neuro: M/R/U/A intact  Vascular: 2+Rad/Uln, Capillary refill <2 seconds    Radiographs:  DX-CHEST-LIMITED (1 VIEW)   Final Result         1. Left lung base atelectasis.      CT-SHOULDER W/O LEFT   Final Result         Redemonstration of angulated impacted fracture of the left humeral neck.      No glenohumeral joint dislocation.      Groundglass opacity seen in the left upper lobe could be edema or contusion.      DX-SHOULDER 2+ LEFT   Final Result         1. Displayed impacted fracture of the left humeral neck.      2. The humeral head appears inferiorly located when compared to the glenoid and could be subluxed or dislocated.      DX-HUMERUS 2+ LEFT   Final Result         1. Displayed impacted fracture of the left humeral neck.      2. The humeral head appears inferiorly located when compared to the glenoid and could be subluxed or dislocated.      CT-HEAD W/O   Final Result         1. No acute intracranial abnormality. No evidence of acute hemorrhage.                   Laboratory Values  Recent Labs      01/25/17   2250   WBC  3.2*   RBC  3.31*   HEMOGLOBIN  9.2*   HEMATOCRIT  30.1*   MCV  90.9   MCH  27.8   MCHC  30.6*   RDW  51.8*   PLATELETCT  51*   MPV  11.4     Recent Labs      01/25/17   2250   SODIUM  141   POTASSIUM  4.6   CHLORIDE  120*   CO2  14*    GLUCOSE  153*   BUN  34*             Impression:Left proximal humerus fracture    Plan: Sling for comfort. No surgery required. Gentle ROM ok.

## 2017-01-26 NOTE — CARE PLAN
Problem: Safety  Goal: Will remain free from injury  Treaded socks in place, bed in the lowest position, bed alarm on, call light and belongings within reach, pt call for assistance appropriately    Problem: Venous Thromboembolism (VTW)/Deep Vein Thrombosis (DVT) Prevention:  Goal: Patient will participate in Venous Thrombosis (VTE)/Deep Vein Thrombosis (DVT)Prevention Measures  scds on    Problem: Pain Management  Goal: Pain level will decrease to patient’s comfort goal  Denies pain at this time, hourly rounding in progress    Problem: Skin Integrity  Goal: Risk for impaired skin integrity will decrease  q2 turns, lillie risk assessment, applied barrier cream

## 2017-01-26 NOTE — ED NOTES
"BIBA from home after GLF. Pt denies LOC. C/o left upper arm pain and has controlled bleeding to behind left ear. Pt states, \"my balance has been off all day.\". AOx4, speaking in full sentences.   "

## 2017-01-26 NOTE — THERAPY
"Occupational Therapy Evaluation completed.   Functional Status:  Pt seen for OT eval due to L humeral fx. Min-mod A bed mobility, Mod A LB dressing, Mod A toileting.   Plan of Care: Will benefit from Occupational Therapy 3 times per week  Discharge Recommendations:  Equipment: Will Continue to Assess for Equipment Needs. Post-acute therapy recommended before discharged home.    See \"Rehab Therapy-Acute\" Patient Summary Report for complete documentation.    "

## 2017-01-26 NOTE — ED PROVIDER NOTES
ED Provider Note  Chief Complaint:   Head injury    HPI:  Cristina Tenorio is a 76 y.o. female who presents with a head injury. She was in her home, tripped and fell striking the left side of her head and her left shoulder. She says that she does have frequent falls, has not had any recent balance assessments per PT OT assessments. Most recent fall was one year ago when she slipped on ice. She is not on any anticoagulation or antiplatelet agents. She denies bleeding disorder, no loss of consciousness, no headache. No neck or back pain. No exacerbating or alleviating factors. She did not have any preceding episodes of chest pain, shortness of breath or lightheadedness.    No history of sedating medications, no antihypertensive use. No nausea or vomiting. She does have some bruising on the left hand, otherwise no rashes or lesions. No history of endocrine disorder, no history of impaired immunity. No recent fevers or other evidence of infection.    Review of Systems:  See HPI for pertinent positives and negatives. All other systems negative.    Past Medical History:   has a past medical history of Bowel obstruction (CMS-HCC); Crohn's disease (CMS-HCC); Indigestion; Obstruction; Snoring; Ileostomy in place (CMS-HCC); COPD (chronic obstructive pulmonary disease) (CMS-HCC); COPD (chronic obstructive pulmonary disease) (CMS-HCC) (3/20/2013); Arthritis (12/30/16); Heart burn; Emphysema of lung (CMS-HCC); Sleep apnea; TRAVIS on CPAP; Hemorrhagic disorder (CMS-HCC); Anemia (12/30/16); Renal disorder; Breath shortness; Cataract (2006,2007); and Cirrhosis of liver (CMS-HCC) (12/30/16).    Social History:  Social History     Social History Main Topics   • Smoking status: Former Smoker -- 50 years     Types: Cigarettes, Cigars     Quit date: 03/30/2013   • Smokeless tobacco: Not on file      Comment: 50yrs 1.5 ppd quit 2009   • Alcohol Use: No      Comment: socially   • Drug Use: No   • Sexual Activity: Not on file        Surgical History:   has past surgical history that includes cholecystectomy (2013); bowel resection (1992); ileostomy (2010); recovery (6/21/2010); sigmoidoscopy flex (9/27/2016); gastroscopy-endo (9/27/2016); gastroscopy wiithsavary dilatation (9/28/2016); gastroscopy w/push enterscopy (9/28/2016); gastroscopy-endo (N/A, 10/5/2016); umbilical hernia repair (2000); colonoscopy; other surgical procedure (1994); and gastroscopy (N/A, 1/13/2017).    Current Medications:  Home Medications     Reviewed by Ash Sanchez M.D. (Physician) on 01/26/17 at 0337  Med List Status: Partial    Medication Last Dose Status    ferrous sulfate 325 (65 FE) MG tablet 1/12/2017 Active    ipratropium-albuterol (COMBIVENT RESPIMAT)  MCG/ACT Aero Soln 11/3/2016 Active    omeprazole (PRILOSEC) 20 MG delayed-release capsule 1/25/2017 Active    sodium bicarbonate (SODIUM BICARBONATE) 650 MG Tab 1/25/2017 Active    vitamin D (CHOLECALCIFEROL) 1000 UNIT Tab 1/25/2017 Active                Allergies:  Allergies   Allergen Reactions   • Sulfa Drugs Hives and Itching       Physical Exam:  Vital Signs: /63 mmHg  Pulse 81  Temp(Src) 35.8 °C (96.5 °F)  Resp 16  Ht 1.524 m (5')  Wt 83.008 kg (183 lb)  BMI 35.74 kg/m2  SpO2 97%  Constitutional: Alert, no acute distress  HENT: Normocephalic, 1.5 cm laceration to left occipital region, hemostatic on arrival, involving dermal layer and subcutaneous fat, does not involve galea  Eyes: Pupils equal and reactive, normal conjunctiva, non-icteric  Neck: Supple, normal range of motion, no stridor  Cardiovascular: Normal peripheral perfusion, no murmer, no cyanosis, normal cardiac auscultation  Pulmonary: No respiratory distress, normal work of breathing, no accessory muscule usage, breath sounds clear and equal bilaterally  Abdomen: Bowel sounds present, non-tender, non-distended with no peritoneal signs, no palpable masses  Skin: Warm, dry, no rashes or lesions  Back: No pain with  active range of motion  Musculoskeletal: Tender to palpation over left upper arm, no shoulder tenderness to palpation, patient is able to passively range her shoulder by moving her left arm with her other arm. No obvious deformity. 2+ distal radial and ulnar pulses, motor function in the hand is intact in radial, median and ulnar nerve distribution, no sensory deficits.  Neurologic: CN II-XII intact, speech normal, muscle strength 5/5 in all four extremities, normal  strength bilaterally, sensation grossly intact, normal finger-to-nose, no pronator drift  Psychiatric: Normal and appropriate mood and affect    Labs:  Labs Reviewed   CBC WITH DIFFERENTIAL - Abnormal; Notable for the following:     WBC 3.2 (*)     RBC 3.31 (*)     Hemoglobin 9.2 (*)     Hematocrit 30.1 (*)     MCHC 30.6 (*)     RDW 51.8 (*)     Platelet Count 51 (*)     Neutrophils-Polys 80.70 (*)     Lymphocytes 9.20 (*)     Lymphs (Absolute) 0.29 (*)     All other components within normal limits   COMP METABOLIC PANEL - Abnormal; Notable for the following:     Chloride 120 (*)     Co2 14 (*)     Glucose 153 (*)     Bun 34 (*)     Creatinine 2.06 (*)     Calcium 8.4 (*)     AST(SGOT) 61 (*)     Alkaline Phosphatase 273 (*)     Total Protein 5.8 (*)     All other components within normal limits   ESTIMATED GFR - Abnormal; Notable for the following:     GFR If  28 (*)     GFR If Non  23 (*)     All other components within normal limits       Radiology:  DX-CHEST-LIMITED (1 VIEW)   Final Result         1. Left lung base atelectasis.      CT-SHOULDER W/O LEFT   Final Result         Redemonstration of angulated impacted fracture of the left humeral neck.      No glenohumeral joint dislocation.      Groundglass opacity seen in the left upper lobe could be edema or contusion.      DX-SHOULDER 2+ LEFT   Final Result         1. Displayed impacted fracture of the left humeral neck.      2. The humeral head appears inferiorly  located when compared to the glenoid and could be subluxed or dislocated.      DX-HUMERUS 2+ LEFT   Final Result         1. Displayed impacted fracture of the left humeral neck.      2. The humeral head appears inferiorly located when compared to the glenoid and could be subluxed or dislocated.      CT-HEAD W/O   Final Result         1. No acute intracranial abnormality. No evidence of acute hemorrhage.                    Differential diagnosis:  Scalp laceration, closed head injury, concussion, intracranial bleed, shoulder sprain or strain, humerus fracture    LACERATION REPAIR PROCEDURE NOTE  The patient's identification was confirmed and consent was obtained.  This procedure was performed by Dr. Calhoun at 12:28 AM  Site: Left occipital scalp  Sterile procedures observed  Anesthetic used (type and amt): Lidocaine 1% without epinephrine, 1 mL  Suture type/size: Staples  Length: 1.5 cm  # of Sutures: One  Technique: Staple  Complexity simple  Tetanus UTD  Site anesthetized, irrigated with NS, explored without evidence of foreign body, wound well approximated, site covered with dry, sterile dressing. Patient tolerated procedure well without complications. Instructions for care discussed verbally and patient provided with additional written instructions for homecare and f/u.    MDM:  History and physical exam as documented above. Patient presents with closed head injury and left arm pain. 2 mg morphine given for pain control, patient states she is able to tolerate this well. Her tetanus is up-to-date.    11:34 PM on reassessment, patient's pain is controlled however now she is experiencing nausea. This is treated with some crackers and Zofran. CT of the head is negative for acute process. Plain film of the shoulder demonstrates a displaced impacted fracture of the left humeral neck. Humeral head appears inferiorly located when compared to glenoid and could be subluxed or dislocated.    12:29 AM patient remains well  controlled. Patient is awaiting CT of the shoulder to further define injury.    CT demonstrates angulated impacted fracture of the left humeral neck, no glenohumeral joint dislocation is seen. Laceration repaired according above instructions. Patient tolerated the procedure well. This patient is already at very high risk for falls having sustained multiple falls over the past several years at baseline. At this time, she is requiring IV narcotics for pain control. She is very comfortable, however with the pain medication and the lack of her left arm for stabilization she is not at this time able to safely ambulate. Plan at this time is for hospitalist admission for orthopedic evaluation as well as PT OT evaluation for possible assistive devices or rehab placement if she is not able to ambulate safely. Case discussed with hospitalist who kindly agrees to admit the patient.    2:16 AM case discussed with Dr. Qureshi who recommends sling, kindly agrees to evaluate the patient in the hospital    Disposition:  Admit to hospitalist in guarded condition    Final Impression:  Left humeral neck fracture  Head laceration    Electronically signed by: Jessy Calhoun, 1/25/2017 10:35 PM

## 2017-01-26 NOTE — ED NOTES
Pt resting comfortably on gurney. Pt with no changes from previous assessments. Respirations are even and unlabored. Bed in lowest position, call light within reach. Pt with no further needs at this time.

## 2017-01-26 NOTE — H&P
CHIEF COMPLAINT:  Ground level fall/left humeral fracture.    HISTORY OF PRESENT ILLNESS:  This is a 76-year-old female with a history of   liver cirrhosis, chronic kidney disease stage III; COPD, not on home oxygen;   Crohn's disease, had a ground-level fall at home and suffered with left   humeral fracture.  Apparently, she was watching TV when she stood up to put   the ear phone out, she apparently lost her balance and fell hitting her back   of her head on the coffee table and landed on the left side of her body.    Denies any loss of consciousness.  Denies any dizziness prior to the fall.    She has had a laceration on her left occipital area, in which emergency room   physician did stapled it here. X-ray of the left shoulder showed displaced   impacted fracture with the left humeral neck.    PAST MEDICAL HISTORY:  Liver cirrhosis, Crohn's disease, COPD, chronic anemia,   hypertension and sleep apnea.    PAST SURGICAL HISTORY:  Cholecystectomy, bowel resection with colostomy   creation, cholecystectomy and the hernia repair.    FAMILY HISTORY:  No cancer in the family.    SOCIAL HISTORY:  Denies smoking, alcohol or illicit drug use.     ALLERGIES:  TO SULFA.    HOME MEDICATIONS:  1.  Ferrous sulfate 325 mg daily.  2.  Combivent  one puff inhalation every 4 hours as needed.  3.  Prilosec 200 mg b.i.d.  4.  Sodium bicarbonate 1300 mg daily.   5.  Vitamin D 1000 units daily.     REVIEW OF SYSTEMS:  The patient denies any cough or shortness of breath.  No   dysuria.  No hematuria.  All other systems are reviewed and were negative.    PHYSICAL EXAMINATION:  VITAL SIGNS:  Blood pressure is 104/46, pulse of 70, respiratory rate of 16,   temperature of 36.  Oxygen is 97% on room air.  GENERAL:  The patient is elderly individual lying in bed comfortably, not in   distress.  HEENT:  Head normocephalic and has a contusion in the left occipital area.   Eyes, pupils reactive to light and anicteric sclerae.  Pinkish  palpebral   conjunctivae.  Oral mucosa, no oral lesions noted.  Moist mucosa.  NECK:  No JVD.  No lymphadenopathy.  Supple.  No thyromegaly.  CHEST AND LUNGS:  Equal chest expansion.  Clear to auscultation bilaterally.    No crackles.  No wheezing.  No tenderness to palpation.   CARDIOVASCULAR:  Regular rate and rhythm.  S1, S2 heard.  No murmurs noted.  GASTROINTESTINAL:  Positive bowel sounds.  No tenderness.  No   hepatosplenomegaly.  EXTREMITIES:  Pulse is palpable in both upper and lower extremities.  The   patient has 2+ pitting edema in both lower extremities.  Left shoulder has a   sling placed.  NEUROLOGIC:  Cranial nerves II-XII intact.  Alert and oriented x3.  SKIN:  No cyanosis.  Capillary refill time normal.  No rash, but the patient   has bruises in the left arm.    LABORATORY DATA:  Currently pending.  CT of the shoulder showed demonstration   of angulated impacted fracture with left femoral neck.  No glenohumeral joint   dislocation.  Ground-glass opacities seen in the left upper lobe could be   edema or contusion.  CT of the head without contrast showed no acute   intracranial abnormality.  No evidence of acute hemorrhage.     ASSESSMENT AND PLAN:  1.  Left humeral neck fracture.  Immobilizer.  Orthopedic surgeon was already   consulted.  Awaiting further recommendations.  We will get physical therapy   and occupational therapy.  2.  Liver cirrhosis.  Currently stable.   3.  Chronic anemia.  Continue ferrous sulfate.  4.  Chronic kidney disease stage III.  Continue sodium bicarb.  Labs are   pending. Preop evaluation will get an EKG and chest x-ray.   5.  Code status is full.   6.  Deep vein thrombosis prophylaxis.  We will put on sequential compression   devices.    MEDICAL DECISION MAKING:  More than 2 midnights.       ____________________________________     MD RAJNI Hernandez / ALYSON    DD:  01/26/2017 03:53:25  DT:  01/26/2017 07:01:38    D#:  [id number]  Job#:  [id number]

## 2017-01-26 NOTE — DIETARY
Nutrition Services: Poor PO in admit screen  76 year old female admitted for left humeral fracture s/p fall at home.  Pertinent PMH: bowel obstruction, chron's disease, indigestion, ileostomy in place, COPD, arthritis, heart burn, emphysema of lung, hemorrhagic disorder, renal disorder and liver cirrhosis.  Patient is on a renal diet - day one of admit.    Ht: 5'  Wt: 83 kg  BMI: 35.74  Pertinent Labs: Glucose: 153, Bun: 34, Creatinine: 2.06, GFR: 23, Calcium: 8.4, Alk Phos: 273  Pertinent Meds: Colace, pericolace, ferrous sulfate, prilosec, sodium bicarbonate, vitamin D, morphine (prn)  Fluids: No IVF noted in chart  Skin: laceration to head  GI: Last BM 1/25      PLAN/RECOMMEND -   1) Nutrition rep to see patient daily for meal and snack preferences.  2) Encourage PO  3) Weekly weights to monitor fluid and nutrition status  4) Obtain supplement order per RD as needed.    RD following

## 2017-01-26 NOTE — THERAPY
"Physical Therapy Evaluation completed.   Bed Mobility:  Supine to Sit: Moderate Assist  Transfers: Sit to Stand: Contact Guard Assist  Gait: Level Of Assist: Moderate Assist with Ricky-Walker       Plan of Care: Will benefit from Physical Therapy 3 times per week  Discharge Recommendations: Equipment: Will Continue to Assess for Equipment Needs. Post-acute therapy recommended before discharged home.    See \"Rehab Therapy-Acute\" Patient Summary Report for complete documentation.     "

## 2017-01-27 NOTE — WOUND TEAM
"Patient with established ileostomy (reported by patient as stoma location is midline/LUQ).  Current appliance is intact with small amount watery, loose brown stool. Stoma red per pouch. Patient is independent with ostomy care but now with broken arm she will need assist from nursing.  She also reports that she will have her  bring in her ostomy supplies as she \"hates\" lock and roll pouch.  Ostomy supplies ordered x 2 to ensure nursing has appropriate supplies should she need appliance change prior to her supplies arriving.  "

## 2017-01-27 NOTE — PROGRESS NOTES
Hospital Medicine Progress Note, Adult, Complex               Author: Star S Omari Date & Time created: 2017  6:31 PM     Interval History:  CC: L humeral neck fracture  : non-op per ortho. PT/OT. Referred to SNF    Review of Systems:  Review of Systems   Constitutional: Negative for fever and chills.   Respiratory: Negative for cough, shortness of breath and wheezing.    Cardiovascular: Negative for chest pain.   Gastrointestinal: Negative for nausea, vomiting, diarrhea and constipation.   Musculoskeletal: Positive for joint pain.       Physical Exam:  Physical Exam   Constitutional: She appears well-developed.   HENT:   Head: Normocephalic.   Cardiovascular: Normal rate.  Exam reveals no gallop.    Pulmonary/Chest: No respiratory distress. She has no wheezes.   Abdominal: She exhibits no distension. There is no tenderness.   Musculoskeletal: She exhibits tenderness.   Neurological: She is alert.       Labs:        Invalid input(s): [id number]      Recent Labs      17   2250   SODIUM  141   POTASSIUM  4.6   CHLORIDE  120*   CO2  14*   BUN  34*   CREATININE  2.06*   CALCIUM  8.4*     Recent Labs      17   2250   ALTSGPT  28   ASTSGOT  61*   ALKPHOSPHAT  273*   TBILIRUBIN  0.7   GLUCOSE  153*     Recent Labs      17   2250   RBC  3.31*   HEMOGLOBIN  9.2*   HEMATOCRIT  30.1*   PLATELETCT  51*     Recent Labs      17   2250   WBC  3.2*   NEUTSPOLYS  80.70*   LYMPHOCYTES  9.20*   MONOCYTES  7.90   EOSINOPHILS  1.60   BASOPHILS  0.30   ASTSGOT  61*   ALTSGPT  28   ALKPHOSPHAT  273*   TBILIRUBIN  0.7           Hemodynamics:  Temp (24hrs), Av.2 °C (97.2 °F), Min:35.8 °C (96.5 °F), Max:36.7 °C (98.1 °F)  Temperature: 36.7 °C (98.1 °F)  Pulse  Av.3  Min: 62  Max: 82   Blood Pressure : 129/58 mmHg, NIBP: 108/49 mmHg     Respiratory:    Respiration: 17, Pulse Oximetry: 98 %        RUL Breath Sounds: Clear, RML Breath Sounds: Diminished, RLL Breath Sounds: Diminished, REMY Breath  Sounds: Clear, LLL Breath Sounds: Diminished  Fluids:  No intake or output data in the 24 hours ending 01/26/17 1831  Weight: 83.008 kg (183 lb)  GI/Nutrition:  Orders Placed This Encounter   Procedures   • Diet Order     Standing Status: Standing      Number of Occurrences: 1      Standing Expiration Date:      Order Specific Question:  Diet:     Answer:  Renal [8]     Medical Decision Making, by Problem:  Active Hospital Problems    Diagnosis   • *Left humeral fracture [S42.302A]  - ortho Althausan consulted  - no-op per ortho   • Chronic kidney disease, stage III (moderate) [N18.3]  - monitoring   • Hepatic cirrhosis (CMS-HCC) [K74.60]  - stable   • Anemia [D64.9]  - monitoring     Dispo: referred to SNF    Labs reviewed and Medications reviewed  Loaiza catheter: No Loaiza        DVT prophylaxis - mechanical: SCDs

## 2017-01-27 NOTE — PROGRESS NOTES
Report received. Assumed care. Pt in bed awake. A/O x4. VSS. Responds appropriately. Denies pain, SOB. Assessment complete. Sling to the left arm in place, cdi. SCDs on. Laceration to heap noted with rossy tarango. Discussed POC, pain control, mobility, safety, DC planning , pt verbalizes understanding. Explained importance of calling before getting OOB. Call light and belongings within reach. Bed alarm on. Bed in the lowest position. Treaded socks in place. Hourly rounding in progress. Will continue to monitor .

## 2017-01-27 NOTE — DISCHARGE PLANNING
TCN met with patient and  to discuss the care teams recommendation for SNF. Patient and  are agreeable to suggestion and selected [facility]. Choice signed and faxed to [facility]. TCN to follow as needed.

## 2017-01-27 NOTE — RESPIRATORY CARE
COPD EDUCATION by COPD CLINICAL EDUCATOR  1/27/2017 at 7:20 AM by Sulema Barragan     Patient reviewed by COPD education team. Patient does not qualify for COPD program.

## 2017-01-28 PROBLEM — R25.1 TREMOR: Status: ACTIVE | Noted: 2017-01-01

## 2017-01-28 NOTE — CARE PLAN
Problem: Safety  Goal: Will remain free from injury  Treaded socks in place, bed in the lowest position, bed alarm on, call light and belongings within reach, pt call for assistance appropriately    Problem: Venous Thromboembolism (VTW)/Deep Vein Thrombosis (DVT) Prevention:  Goal: Patient will participate in Venous Thrombosis (VTE)/Deep Vein Thrombosis (DVT)Prevention Measures  scds on    Problem: Pain Management  Goal: Pain level will decrease to patient’s comfort goal  Medicated with oxy 5 with adequate pain control, hourly rounding in progress    Problem: Skin Integrity  Goal: Risk for impaired skin integrity will decrease  q2 turns, lillie risk assessment, applied barrier cream

## 2017-01-28 NOTE — PROGRESS NOTES
Hospital Medicine Progress Note, Adult, Complex               Author: Star Salcido Date & Time created: 1/28/2017  10:13 AM     Interval History:  CC: L humeral neck fracture  1/26: non-op per ortho. PT/OT. Referred to SNF  1/27: Looking at [facility].     Review of Systems:  Review of Systems   Constitutional: Negative for fever.   Respiratory: Negative for cough and shortness of breath.    Cardiovascular: Negative for chest pain.   Gastrointestinal: Negative for nausea, vomiting, diarrhea and constipation.   Musculoskeletal: Positive for joint pain.     Physical Exam:  Physical Exam   Constitutional: She appears well-developed.   HENT:   Head: Normocephalic.   Eyes: Conjunctivae are normal.   Cardiovascular: Normal rate.    Pulmonary/Chest: No respiratory distress. She has no wheezes.   Abdominal: She exhibits no distension. There is no tenderness.   Musculoskeletal: She exhibits tenderness.   Neurological: She is alert.     Labs:        Invalid input(s): [id number]      Recent Labs      01/25/17 2250 01/27/17 0308 01/28/17   0209   SODIUM  141  141  140   POTASSIUM  4.6  4.7  4.2   CHLORIDE  120*  118*  118*   CO2  14*  15*  16*   BUN  34*  35*  33*   CREATININE  2.06*  2.03*  1.98*   CALCIUM  8.4*  8.1*  7.9*     Recent Labs      01/25/17 2250 01/27/17   0308  01/28/17   0209   ALTSGPT  28   --    --    ASTSGOT  61*   --    --    ALKPHOSPHAT  273*   --    --    TBILIRUBIN  0.7   --    --    GLUCOSE  153*  102*  111*     Recent Labs      01/25/17 2250 01/27/17   0308  01/28/17   0209   RBC  3.31*  2.99*  2.91*   HEMOGLOBIN  9.2*  8.5*  8.2*   HEMATOCRIT  30.1*  26.5*  26.7*   PLATELETCT  51*  50*  53*     Recent Labs      01/25/17 2250 01/27/17 0308  01/28/17   0209   WBC  3.2*  2.8*  2.6*   NEUTSPOLYS  80.70*   --    --    LYMPHOCYTES  9.20*   --    --    MONOCYTES  7.90   --    --    EOSINOPHILS  1.60   --    --    BASOPHILS  0.30   --    --    ASTSGOT  61*   --    --    ALTSGPT  28    --    --    ALKPHOSPHAT  273*   --    --    TBILIRUBIN  0.7   --    --            Hemodynamics:  Temp (24hrs), Av.6 °C (97.8 °F), Min:36.2 °C (97.1 °F), Max:36.8 °C (98.3 °F)  Temperature: 36.2 °C (97.1 °F)  Pulse  Av.6  Min: 62  Max: 88   Blood Pressure : 120/71 mmHg     Respiratory:    Respiration: 16, Pulse Oximetry: 94 %        RUL Breath Sounds: Clear, RML Breath Sounds: Diminished, RLL Breath Sounds: Diminished, REMY Breath Sounds: Clear, LLL Breath Sounds: Diminished  Fluids:    Intake/Output Summary (Last 24 hours) at 17 1013  Last data filed at 17 0200   Gross per 24 hour   Intake      0 ml   Output    300 ml   Net   -300 ml        GI/Nutrition:  Orders Placed This Encounter   Procedures   • Diet Order     Standing Status: Standing      Number of Occurrences: 1      Standing Expiration Date:      Order Specific Question:  Diet:     Answer:  Renal [8]     Medical Decision Making, by Problem:  Active Hospital Problems    Diagnosis   • *Left humeral fracture [S42.302A]  - ortho Althausan consulted  - no-op per ortho  - sling   • Chronic kidney disease, stage III (moderate) [N18.3]  - monitoring   • Hepatic cirrhosis (CMS-HCC) [K74.60]  - stable   • Anemia [D64.9]  - monitoring   Thrombocytopenia  - no current bleeding  - monitoring Hgb     Dispo: referred to SNF    Labs reviewed and Medications reviewed  Loaiza catheter: No Loaiza      DVT Prophylaxis: Contraindicated - High bleeding risk  DVT prophylaxis - mechanical: SCDs

## 2017-01-28 NOTE — PROGRESS NOTES
Hospital Medicine Progress Note, Adult, Complex               Author: Star Salcido Date & Time created: 2017  6:19 PM     Interval History:  CC: L humeral neck fracture  : non-op per ortho. PT/OT. Referred to SNF  : [facility] at [facility] [place].     Review of Systems:  Review of Systems   Constitutional: Negative for fever.   Respiratory: Negative for cough and shortness of breath.    Cardiovascular: Negative for chest pain.   Gastrointestinal: Negative for nausea, vomiting, diarrhea and constipation.   Musculoskeletal: Positive for joint pain.     Physical Exam:  Physical Exam   Constitutional: She appears well-developed.   HENT:   Head: Normocephalic.   Eyes: Conjunctivae are normal.   Cardiovascular: Normal rate.    Pulmonary/Chest: No respiratory distress. She has no wheezes.   Abdominal: She exhibits no distension. There is no tenderness.   Musculoskeletal: She exhibits tenderness.   Neurological: She is alert.     Labs:        Invalid input(s): [id number]      Recent Labs      17   0308   SODIUM  141  141   POTASSIUM  4.6  4.7   CHLORIDE  120*  118*   CO2  14*  15*   BUN  34*  35*   CREATININE  2.06*  2.03*   CALCIUM  8.4*  8.1*     Recent Labs      17   0308   ALTSGPT  28   --    ASTSGOT  61*   --    ALKPHOSPHAT  273*   --    TBILIRUBIN  0.7   --    GLUCOSE  153*  102*     Recent Labs      17   0308   RBC  3.31*  2.99*   HEMOGLOBIN  9.2*  8.5*   HEMATOCRIT  30.1*  26.5*   PLATELETCT  51*  50*     Recent Labs      17   0308   WBC  3.2*  2.8*   NEUTSPOLYS  80.70*   --    LYMPHOCYTES  9.20*   --    MONOCYTES  7.90   --    EOSINOPHILS  1.60   --    BASOPHILS  0.30   --    ASTSGOT  61*   --    ALTSGPT  28   --    ALKPHOSPHAT  273*   --    TBILIRUBIN  0.7   --            Hemodynamics:  Temp (24hrs), Av.6 °C (97.8 °F), Min:36.1 °C (97 °F), Max:37.2 °C (99 °F)  Temperature: 36.7 °C (98.1 °F)  Pulse  Avg:  79.2  Min: 62  Max: 88   Blood Pressure : 102/42 mmHg     Respiratory:    Respiration: 17, Pulse Oximetry: 98 %        RUL Breath Sounds: Clear, RML Breath Sounds: Diminished, RLL Breath Sounds: Diminished, REMY Breath Sounds: Clear, LLL Breath Sounds: Diminished  Fluids:    Intake/Output Summary (Last 24 hours) at 01/27/17 1819  Last data filed at 01/27/17 0500   Gross per 24 hour   Intake      0 ml   Output    200 ml   Net   -200 ml        GI/Nutrition:  Orders Placed This Encounter   Procedures   • Diet Order     Standing Status: Standing      Number of Occurrences: 1      Standing Expiration Date:      Order Specific Question:  Diet:     Answer:  Renal [8]     Medical Decision Making, by Problem:  Active Hospital Problems    Diagnosis   • *Left humeral fracture [S42.302A]  - ortho Althausan consulted  - no-op per ortho  - sling   • Chronic kidney disease, stage III (moderate) [N18.3]  - monitoring   • Hepatic cirrhosis (CMS-HCC) [K74.60]  - stable   • Anemia [D64.9]  - monitoring   Thrombocytopenia  - no current bleeding  - monitoring Hgb     Dispo: referred to SNF    Labs reviewed and Medications reviewed  Loaiza catheter: No Loaiza      DVT Prophylaxis: Contraindicated - High bleeding risk  DVT prophylaxis - mechanical: SCDs

## 2017-01-28 NOTE — WOUND TEAM
MELLY Hunter requested Wound Team to assess ileostomy due to bleeding. Nursing staff changed appliance before Wound Team at bedside. While Wound Team in room no bleeding seen. Patient states that this bleeding is from inside the stoma and occurs often, and that her physicians are aware. Stated that her  is bringing her personal supplies. New appliance not removed at this time. MELLY Hunter took a photo of peristomal skin, which appears macerated and partially denuded. Educated MELLY Hunter on how to crust peristomal skin and sent ostomy powder and no sting skin protectant to tube station 301 for her to receive.

## 2017-01-28 NOTE — DISCHARGE SUMMARY
Hospital Medicine Discharge Note     Admit Date:  1/25/2017       Discharge Date:   1/30/2017    Attending Physician:  Star Salcido     Diagnoses (includes active and resolved):   Principal Problem:    Left humeral fracture POA: Yes  Active Problems:    Chronic kidney disease, stage III (moderate) POA: Yes    Anemia POA: Yes    Hepatic cirrhosis (CMS-HCC) POA: Yes  Resolved Problems:    * No resolved hospital problems. *     Also:  Thrombocytopenia     Hospital Summary (Brief Narrative):       76-year-old female w/h/o liver cirrhosis, chronic kidney disease stage III; COPD, not on home oxygen; Crohn's disease, had a ground-level fall at home p/w left humeral fracture. She was watching TV when she stood up to pull the ear phone out, she lost her balance and fell hitting her back of her head on the coffee table and landed on the left side of her body. X-ray of the left shoulder showed displaced impacted fracture with the left humeral neck.  She was admitted for workup of her fracture. Ortho saw her an rec sling w/ no surgery. She worked w/ PT/OT and was very weak so will be sent to a SNF for further therapy.    She does complain of tremor and was started on a trial of low dose propranolol.   Narcotic history was researched for this patient. The last fill was in 9/2016.     Consultants:      Magdalena Qureshi    Procedures:        None    Discharge Medications:         Cristina Tenorio   Home Medication Instructions VANDANA:[id number]    Printed on:01/28/17 4290   Medication Information                      ferrous sulfate 325 (65 FE) MG tablet  Take 325 mg by mouth every day.             hydrocodone-acetaminophen (NORCO) 5-325 MG Tab per tablet  Take 1 Tab by mouth every 8 hours as needed.             ipratropium-albuterol (COMBIVENT RESPIMAT)  MCG/ACT Aero Soln  Inhale 1 Puff by mouth every four hours as needed (shortness of breath/wheezing.). Maximum 6 puffs/24 hours.             omeprazole (PRILOSEC) 20 MG  delayed-release capsule  Take 1 Cap by mouth 2 times a day.             propranolol (INDERAL) 10 MG Tab  Take 1 Tab by mouth 3 times a day.             sodium bicarbonate (SODIUM BICARBONATE) 650 MG Tab  Take 1,300 mg by mouth every day.             vitamin D (CHOLECALCIFEROL) 1000 UNIT Tab  Take 1,000 Units by mouth every day.               Disposition:   Transfer to skilled nursing facility    Diet:   Regular    Activity:   As tolerated    Code status:   Full code    Primary Care Provider:    Roshni Morillo M.D.    Follow up appointment details :      MD at [facility]  No follow-up provider specified.  Future Appointments  Date Time Provider Department Center   4/7/2017 1:15 PM Arik Vee M.D. [facility]       Pending Studies:        None    Time spent on discharge day patient visit: 42 minutes    #################################################    Interval history/exam for day of discharge:    Filed Vitals:    01/27/17 2000 01/28/17 0400 01/28/17 0800 01/28/17 1200   BP: 145/77 114/55 120/71 121/72   Pulse: 84 86 75 80   Temp:  36.8 °C (98.3 °F) 36.2 °C (97.1 °F) 36.3 °C (97.4 °F)   Resp: 16 17 16 16   Height:       Weight:       SpO2: 96% 97% 94% 96%     Weight/BMI: Body mass index is 35.74 kg/(m^2).  Pulse Oximetry: 96 %, O2 (LPM): 2, O2 Delivery: Nasal Cannula    General:  NAD  CVS:  RRR  PULM:  CTAB, no respiratory distress  Ext: left arm in sling    Most Recent Labs:    Lab Results   Component Value Date/Time    WBC 2.6* 01/28/2017 02:09 AM    RBC 2.91* 01/28/2017 02:09 AM    HEMOGLOBIN 8.2* 01/28/2017 02:09 AM    HEMATOCRIT 26.7* 01/28/2017 02:09 AM    MCV 91.8 01/28/2017 02:09 AM    MCH 28.2 01/28/2017 02:09 AM    MCHC 30.7* 01/28/2017 02:09 AM    MPV 13.6* 01/28/2017 02:09 AM    NEUTROPHILS-POLYS 80.70* 01/25/2017 10:50 PM    LYMPHOCYTES 9.20* 01/25/2017 10:50 PM    MONOCYTES 7.90 01/25/2017 10:50 PM    EOSINOPHILS 1.60 01/25/2017 10:50 PM    BASOPHILS 0.30 01/25/2017 10:50 PM    HYPOCHROMIA 1+  04/01/2014 03:11 AM    ANISOCYTOSIS 1+ 12/19/2016 10:28 AM      Lab Results   Component Value Date/Time    SODIUM 140 01/28/2017 02:09 AM    POTASSIUM 4.2 01/28/2017 02:09 AM    CHLORIDE 118* 01/28/2017 02:09 AM    CO2 16* 01/28/2017 02:09 AM    GLUCOSE 111* 01/28/2017 02:09 AM    BUN 33* 01/28/2017 02:09 AM    CREATININE 1.98* 01/28/2017 02:09 AM      Lab Results   Component Value Date/Time    ALT(SGPT) 28 01/25/2017 10:50 PM    AST(SGOT) 61* 01/25/2017 10:50 PM    ALKALINE PHOSPHATASE 273* 01/25/2017 10:50 PM    TOTAL BILIRUBIN 0.7 01/25/2017 10:50 PM    DIRECT BILIRUBIN 0.5 10/07/2016 03:11 AM    LIPASE 17 10/04/2016 10:35 PM    ALBUMIN 3.2 01/25/2017 10:50 PM    GLOBULIN 2.6 01/25/2017 10:50 PM    PRE-ALBUMIN 13.3* 04/29/2010 01:25 AM    INR 1.60* 10/04/2016 10:35 PM    MACROCYTOSIS 1+ 12/19/2016 10:28 AM     Lab Results   Component Value Date/Time    PT 19.5* 10/04/2016 10:35 PM    INR 1.60* 10/04/2016 10:35 PM        Imaging/ Testing:      DX-CHEST-LIMITED (1 VIEW)   Final Result         1. Left lung base atelectasis.      CT-SHOULDER W/O LEFT   Final Result         Redemonstration of angulated impacted fracture of the left humeral neck.      No glenohumeral joint dislocation.      Groundglass opacity seen in the left upper lobe could be edema or contusion.      DX-SHOULDER 2+ LEFT   Final Result         1. Displayed impacted fracture of the left humeral neck.      2. The humeral head appears inferiorly located when compared to the glenoid and could be subluxed or dislocated.      DX-HUMERUS 2+ LEFT   Final Result         1. Displayed impacted fracture of the left humeral neck.      2. The humeral head appears inferiorly located when compared to the glenoid and could be subluxed or dislocated.      CT-HEAD W/O   Final Result         1. No acute intracranial abnormality. No evidence of acute hemorrhage.                   Instructions:      The patient was instructed to return to the ER in the event of worsening  symptoms. I have counseled the patient on the importance of compliance and the patient has agreed to proceed with all medical recommendations and follow up plan indicated above.   The patient understands that all medications come with benefits and risks. Risks may include permanent injury or death and these risks can be minimized with close reassessment and monitoring.

## 2017-01-28 NOTE — PROGRESS NOTES
Report received. Assumed care. Pt in bed awake. A/O x4. VSS. Responds appropriately. C/O pain, medicated per MAR, no SOB. Assessment complete. Left upper arm sling in place, NWB. 1 assist to the bathroom with selam walker. LUQ ileostomy in place, changed bag, intact, stoma red in color, noted skin redness around the stoma.  Discussed POC, pain control, mobility, safety, DC planning, pt verbalizes understanding. Explained importance of calling before getting OOB. Call light and belongings within reach. Bed alarm on. Bed in the lowest position. Treaded socks in place. Hourly rounding in progress. Will continue to monitor .

## 2017-01-29 NOTE — PROGRESS NOTES
Hospital Medicine Progress Note, Adult, Complex               Author: Star S Omari Date & Time created: 1/28/2017  6:54 PM     Interval History:  CC: L humeral neck fracture  1/26: non-op per ortho. PT/OT. Referred to SNF  1/27: Looking at [facility].   1/28: Trialing propranolol for tremor. Likely transfer to SNF tomorrow    Review of Systems:  Review of Systems   Constitutional: Negative for fever.   Respiratory: Negative for cough and shortness of breath.    Cardiovascular: Negative for chest pain.   Gastrointestinal: Negative for nausea, vomiting and diarrhea.   Musculoskeletal: Positive for joint pain.   Neurological: Positive for tremors.   Psychiatric/Behavioral: The patient is nervous/anxious.      Physical Exam:  Physical Exam   Constitutional: She appears well-developed.   HENT:   Head: Normocephalic.   Cardiovascular: Normal rate.    Pulmonary/Chest: No respiratory distress.   Abdominal: She exhibits no distension. There is no tenderness.   Musculoskeletal: She exhibits tenderness.   Neurological: She is alert.     Labs:        Invalid input(s): [id number]      Recent Labs      01/25/17 2250 01/27/17   0308  01/28/17   0209   SODIUM  141  141  140   POTASSIUM  4.6  4.7  4.2   CHLORIDE  120*  118*  118*   CO2  14*  15*  16*   BUN  34*  35*  33*   CREATININE  2.06*  2.03*  1.98*   CALCIUM  8.4*  8.1*  7.9*     Recent Labs      01/25/17 2250 01/27/17   0308  01/28/17   0209   ALTSGPT  28   --    --    ASTSGOT  61*   --    --    ALKPHOSPHAT  273*   --    --    TBILIRUBIN  0.7   --    --    GLUCOSE  153*  102*  111*     Recent Labs      01/25/17 2250 01/27/17   0308  01/28/17   0209   RBC  3.31*  2.99*  2.91*   HEMOGLOBIN  9.2*  8.5*  8.2*   HEMATOCRIT  30.1*  26.5*  26.7*   PLATELETCT  51*  50*  53*     Recent Labs      01/25/17 2250 01/27/17   0308  01/28/17   0209   WBC  3.2*  2.8*  2.6*   NEUTSPOLYS  80.70*   --    --    LYMPHOCYTES  9.20*   --    --    MONOCYTES  7.90   --    --     EOSINOPHILS  1.60   --    --    BASOPHILS  0.30   --    --    ASTSGOT  61*   --    --    ALTSGPT  28   --    --    ALKPHOSPHAT  273*   --    --    TBILIRUBIN  0.7   --    --            Hemodynamics:  Temp (24hrs), Av.5 °C (97.7 °F), Min:36.2 °C (97.1 °F), Max:36.8 °C (98.3 °F)  Temperature: 36.6 °C (97.9 °F)  Pulse  Av.9  Min: 62  Max: 88   Blood Pressure : 128/68 mmHg     Respiratory:    Respiration: 16, Pulse Oximetry: 95 %        RUL Breath Sounds: Clear, RML Breath Sounds: Diminished, RLL Breath Sounds: Diminished, REMY Breath Sounds: Clear, LLL Breath Sounds: Diminished  Fluids:    Intake/Output Summary (Last 24 hours) at 17 1854  Last data filed at 17 0200   Gross per 24 hour   Intake      0 ml   Output    300 ml   Net   -300 ml        GI/Nutrition:  Orders Placed This Encounter   Procedures   • Diet Order     Standing Status: Standing      Number of Occurrences: 1      Standing Expiration Date:      Order Specific Question:  Diet:     Answer:  Renal [8]     Medical Decision Making, by Problem:  Active Hospital Problems    Diagnosis   • *Left humeral fracture [S42.302A]  - ortho Althausan consulted  - no-op per ortho  - sling   • Chronic kidney disease, stage III (moderate) [N18.3]  - monitoring   • Hepatic cirrhosis (CMS-HCC) [K74.60]  - stable   • Anemia [D64.9]  - monitoring   Thrombocytopenia  - no current bleeding  - monitoring Hgb   Tremor  - Trial propranolol    Dispo: referred to SNF, likely transfer tomorrow    Labs reviewed and Medications reviewed  Loaiza catheter: No Loaiza      DVT Prophylaxis: Contraindicated - High bleeding risk  DVT prophylaxis - mechanical: SCDs

## 2017-01-30 NOTE — PROGRESS NOTES
Hospital Medicine Progress Note, Adult, Complex               Author: Star Salcido Date & Time created: 2017  4:07 PM     Interval History:  CC: L humeral neck fracture  : non-op per ortho. PT/OT. Referred to SNF  : Looking at [facility] and [place].   : Trialing propranolol for tremor. Likely transfer to SNF tomorrow  : Hgb improving. No apparent ostomy bleeding.     Review of Systems:  Review of Systems   Constitutional: Negative for fever and chills.   Respiratory: Negative for cough and shortness of breath.    Gastrointestinal: Negative for nausea, vomiting and diarrhea.   Musculoskeletal: Positive for joint pain.   Neurological: Positive for tremors.   Psychiatric/Behavioral: The patient is nervous/anxious.      Physical Exam:  Physical Exam   Constitutional: She appears well-developed.   HENT:   Head: Normocephalic.   Eyes: Conjunctivae are normal.   Cardiovascular: Normal rate.    Pulmonary/Chest: No respiratory distress.   Abdominal: She exhibits no distension. There is no tenderness. There is no rebound.   Musculoskeletal: She exhibits tenderness.   Neurological: She is alert.     Labs:        Invalid input(s): [id number]      Recent Labs      17   SODIUM  141  140  139   POTASSIUM  4.7  4.2  4.3   CHLORIDE  118*  118*  115*   CO2  15*  16*  16*   BUN  35*  33*  31*   CREATININE  2.03*  1.98*  1.95*   CALCIUM  8.1*  7.9*  8.1*     Recent Labs      17   GLUCOSE  102*  111*  109*     Recent Labs      17   RBC  2.99*  2.91*  3.30*   HEMOGLOBIN  8.5*  8.2*  9.4*   HEMATOCRIT  26.5*  26.7*  30.2*   PLATELETCT  50*  53*  58*     Recent Labs      17   WBC  2.8*  2.6*  3.2*           Hemodynamics:  Temp (24hrs), Av.8 °C (98.2 °F), Min:36.3 °C (97.3 °F), Max:37 °C (98.6 °F)  Temperature: 37 °C (98.6  °F)  Pulse  Av.4  Min: 62  Max: 88   Blood Pressure : 106/64 mmHg     Respiratory:    Respiration: 18, Pulse Oximetry: 97 %        RUL Breath Sounds: Clear, RML Breath Sounds: Diminished, RLL Breath Sounds: Diminished, REMY Breath Sounds: Clear, LLL Breath Sounds: Diminished  Fluids:    Intake/Output Summary (Last 24 hours) at 17 1607  Last data filed at 17 1300   Gross per 24 hour   Intake    650 ml   Output     50 ml   Net    600 ml        GI/Nutrition:  Orders Placed This Encounter   Procedures   • Diet Order     Standing Status: Standing      Number of Occurrences: 1      Standing Expiration Date:      Order Specific Question:  Diet:     Answer:  Renal [8]     Medical Decision Making, by Problem:  Active Hospital Problems    Diagnosis   • *Left humeral fracture [S42.302A]  - ortho [facility]an consulted  - no-op per ortho  - sling   • Chronic kidney disease, stage III (moderate) [N18.3]  - monitoring   • Hepatic cirrhosis (CMS-HCC) [K74.60]  - stable   • Anemia [D64.9]  - monitoring   Thrombocytopenia  - no current bleeding  - monitoring Hgb   - now stabilized and improving  Tremor  - Trial propranolol    Dispo: referred to SNF, likely transfer tomorrow    Labs reviewed and Medications reviewed  Loaiza catheter: No Loaiza      DVT Prophylaxis: Contraindicated - High bleeding risk  DVT prophylaxis - mechanical: SCDs

## 2017-01-30 NOTE — DISCHARGE PLANNING
Arranged Transport with [facility] at [facility], pt will be transported by [facility] today @ 3:00 to be transported to [facility].

## 2017-01-30 NOTE — CARE PLAN
Problem: Nutritional:  Goal: Achieve adequate nutritional intake  Patient will consume >50% of meals   Outcome: MET Date Met:  01/30/17

## 2017-01-30 NOTE — CARE PLAN
Problem: Communication  Goal: The ability to communicate needs accurately and effectively will improve  Outcome: PROGRESSING AS EXPECTED  Educated patient on plan of care. Updated white board. All questions answered.     Problem: Safety  Goal: Will remain free from falls  Outcome: PROGRESSING AS EXPECTED  Pt remained free from falls during shift. Pt oriented to call light, bed in low position and wheels locked. Pt encouraged to call for assistance. Bed alarm intact.

## 2017-01-30 NOTE — THERAPY
"Physical Therapy Treatment completed.   Bed Mobility:  Supine to Sit: Moderate Assist  Transfers: Sit to Stand: Minimal Assist  Gait: Level Of Assist: Minimal Assist with Ricky-Walker       Plan of Care: Will benefit from Physical Therapy 3 times per week  Discharge Recommendations: Equipment: Will Continue to Assess for Equipment Needs. Post-acute therapy recommended before discharged home.     See \"Rehab Therapy-Acute\" Patient Summary Report for complete documentation.       "

## 2017-01-30 NOTE — DISCHARGE INSTRUCTIONS
Discharge Instructions    Discharged to other by medical transportation with escort. Discharged via wheelchair, hospital escort: Yes.  Special equipment needed: Sorinker    Be sure to schedule a follow-up appointment with your primary care doctor or any specialists as instructed.     Discharge Plan:   Diet Plan: Discussed  Activity Level: Discussed  Confirmed Follow up Appointment: Patient to Call and Schedule Appointment  Influenza Vaccine Indication: Not indicated: Previously immunized this influenza season and > 8 years of age    I understand that a diet low in cholesterol, fat, and sodium is recommended for good health. Unless I have been given specific instructions below for another diet, I accept this instruction as my diet prescription.   Other diet: Renal    Special Instructions: Discharge instructions for the Orthopedic Patient    Follow up with Primary Care Physician within 2 weeks of discharge to home, regarding:  Review of medications and diagnostic testing.  Surveillance for medical complications.  Workup and treatment of osteoporosis, if appropriate.     -Is this a Joint Replacement patient? No    -Is this patient being discharged with medication to prevent blood clots?  No    · Is patient discharged on Warfarin / Coumadin?   No     · Is patient Post Blood Transfusion?  No    Depression / Suicide Risk    As you are discharged from [facility], it is important to learn how to keep safe from harming yourself.    Recognize the warning signs:  · Abrupt changes in personality, positive or negative- including increase in energy   · Giving away possessions  · Change in eating patterns- significant weight changes-  positive or negative  · Change in sleeping patterns- unable to sleep or sleeping all the time   · Unwillingness or inability to communicate  · Depression  · Unusual sadness, discouragement and loneliness  · Talk of wanting to die  · Neglect of personal  appearance   · Rebelliousness- reckless behavior  · Withdrawal from people/activities they love  · Confusion- inability to concentrate     If you or a loved one observes any of these behaviors or has concerns about self-harm, here's what you can do:  · Talk about it- your feelings and reasons for harming yourself  · Remove any means that you might use to hurt yourself (examples: pills, rope, extension cords, firearm)  · Get professional help from the community (Mental Health, Substance Abuse, psychological counseling)  · Do not be alone:Call your Safe Contact- someone whom you trust who will be there for you.  · Call your local CRISIS HOTLINE 728-0352 or 930-777-5116  · Call your local Children's Mobile Crisis Response Team [place] (783) 995-7762 or www.Smadex  · Call the toll free National Suicide Prevention Hotlines   · National Suicide Prevention Lifeline 656-505-HGBA (0250)  · HowDo Line Network 800-SUICIDE (128-8458)      Humerus Fracture Treated With Immobilization  The humerus is the large bone in the upper arm. A broken (fractured) humerus is often treated by wearing a cast, splint, or sling (immobilization). This holds the broken pieces in place so they can heal.   HOME CARE  · Put ice on the injured area.  ¨ Put ice in a plastic bag.  ¨ Place a towel between your skin and the bag.  ¨ Leave the ice on for 15-20 minutes, 03-04 times a day.  · If you are given a cast:  ¨ Do not scratch the skin under the cast.  ¨ Check the skin around the cast every day. You may put lotion on any red or sore areas.  ¨ Keep the cast dry and clean.  · If you are given a splint:  ¨ Wear the splint as told.  ¨ Keep the splint clean and dry.  ¨ Loosen the elastic around the splint if your fingers become numb, cold, tingle, or turn blue.  · If you are given a sling:  ¨ Wear the sling as told.  · Do not put pressure on any part of the cast or splint until it is fully hardened.  · The cast or splint must be  protected with a plastic bag during bathing. Do not lower the cast or splint into water.  · Only take medicine as told by your doctor.  · Do exercises as told by your doctor.  · Follow up as told by your doctor.  GET HELP RIGHT AWAY IF:   · Your skin or fingernails turn blue or gray.  · Your arm feels cold or numb.  · You have very bad pain in the injured arm.  · You are having problems with the medicines you were given.  MAKE SURE YOU:   · Understand these instructions.  · Will watch your condition.  · Will get help right away if you are not doing well or get worse.     This information is not intended to replace advice given to you by your health care provider. Make sure you discuss any questions you have with your health care provider.     Document Released: 06/05/2009 Document Revised: 01/08/2016 Document Reviewed: 02/01/2012  [employer] Interactive Patient Education ©2016 [employer]

## 2017-01-30 NOTE — DISCHARGE PLANNING
Medical Social Work    Pt is medically cleared to transfer to SNF.  Pt's has been accepted to [facility] and pt's final choice is [facility].  Transport arranged for 1500 today.  ALONDRA met with pt at bedside.  Pt agreeable to transfer and signed cobra.  Cobra and transfer packet placed on pt's chart and bedside RN updated.        Plan: Pt to transfer to [facility] today at 1500.

## 2017-01-31 NOTE — PROGRESS NOTES
Pt was discharged to [facility] this PM as ordered by MD with medical transporation providing transportation via wheelchair. Pt AA/Ox4. VSS. Afebrile. No signs / symptoms of distress noted. Discharge Instructions given and explained and copy placed in yellow envelope for SNF, pt verbalized understanding. Questions answered. Needs attended. Pt helped into wheelchair, made comfortable, and was wheeled off from unit by transport staff. Report given to MELLY Samuel at [facility].

## 2017-03-31 PROBLEM — G93.40 ENCEPHALOPATHY ACUTE: Status: ACTIVE | Noted: 2017-01-01

## 2017-03-31 NOTE — ED PROVIDER NOTES
ED Provider Note    Scribed for Demetrio Cortez M.D. by Marco A Schultz. 3/31/2017  1:16 PM    Primary care provider: Roshni Morillo M.D.  Means of arrival: ambulance  History obtained from: patient's   History limited by: patient's altered mental status    CHIEF COMPLAINT  Chief Complaint   Patient presents with   • ALOC     x2 days       HPI  Cristina Tenorio is a 76 y.o. female who presents to the Emergency Department for evaluation of altered mental status. Per patient's , the patient has been confused the past few days. He reports the patient is typically able to cook, clean, and dress herself at baseline.  states the patient has not had a fever, cough, vomiting, or diarrhea. Patient denies abdominal pain or chest pain. Nursing staff states the patient has a colostomy that was very full upon arrival to the ED. The patient lives at home with her  at [place]. They have a nurse come to the house on a regular basis, but the  is unsure of why the nurse comes. He adds her last hospitalization was one year ago.     No further details of history of present illness can be obtained within the constraints of urgency of the patient's clinical condition.    REVIEW OF SYSTEMS  Pertinent positives include confusion. Pertinent negatives include no abdominal pain, chest pain, fever, cough, vomiting, or diarrhea.     No further details of ROS can be obtained within the constraints of urgency of the patient's clinical condition.      PAST MEDICAL HISTORY   has a past medical history of Bowel obstruction (CMS-HCC); Crohn's disease (CMS-HCC); Indigestion; Obstruction; Snoring; Ileostomy in place (CMS-HCC); COPD (chronic obstructive pulmonary disease) (CMS-HCC); COPD (chronic obstructive pulmonary disease) (CMS-HCC) (3/20/2013); Arthritis (12/30/16); Heart burn; Emphysema of lung (CMS-HCC); Sleep apnea; TRAVIS on CPAP; Hemorrhagic disorder (CMS-HCC); Anemia (12/30/16); Renal disorder; Breath  shortness; Cataract (2006,2007); and Cirrhosis of liver (CMS-HCC) (12/30/16).    SURGICAL HISTORY   has past surgical history that includes cholecystectomy (2013); bowel resection (1992); ileostomy (2010); recovery (6/21/2010); sigmoidoscopy flex (9/27/2016); gastroscopy-endo (9/27/2016); gastroscopy wiithsavary dilatation (9/28/2016); gastroscopy w/push enterscopy (9/28/2016); gastroscopy-endo (N/A, 10/5/2016); umbilical hernia repair (2000); colonoscopy; other surgical procedure (1994); and gastroscopy (N/A, 1/13/2017).    SOCIAL HISTORY  Social History   Substance Use Topics   • Smoking status: Former Smoker -- 50 years     Types: Cigarettes, Cigars     Quit date: 03/30/2013   • Smokeless tobacco: Not on file      Comment: 50yrs 1.5 ppd quit 2009   • Alcohol Use: No      Comment: socially      History   Drug Use No       FAMILY HISTORY  No family history on file.    CURRENT MEDICATIONS  Home Medications     Reviewed by Charlette Bonilla (Pharmacy Tech) on 03/31/17 at 1525  Med List Status: Unable to Obtain    Medication Last Dose Status    ferrous sulfate 325 (65 FE) MG tablet 1/26/2017 Active    hydrocodone-acetaminophen (NORCO) 5-325 MG Tab per tablet  Active    ipratropium-albuterol (COMBIVENT RESPIMAT)  MCG/ACT Aero Soln 1/26/2017 Active    omeprazole (PRILOSEC) 20 MG delayed-release capsule 1/26/2017 Active    propranolol (INDERAL) 10 MG Tab  Active    sodium bicarbonate (SODIUM BICARBONATE) 650 MG Tab 1/26/2017 Active    vitamin D (CHOLECALCIFEROL) 1000 UNIT Tab 1/26/2017 Active                ALLERGIES  Allergies   Allergen Reactions   • Sulfa Drugs Hives and Itching       PHYSICAL EXAM  VITAL SIGNS: /88 mmHg  Pulse 94  Temp(Src) 37.2 °C (98.9 °F)  Resp 18  Wt 72.576 kg (160 lb)  SpO2 96%    Vital signs reviewed.  Constitutional:  Elderly female. Non-toxic appearing. No acute distress.  Immunologic: No lymphadenopathy  Eyes: Extraocular movements are intact. No scleral  icterus  Oropharynx: Moist mucous membranes. No oral lesions  Cardiovascular: Regular rate and rhythm.   Pulmonary/Chest: Clear to ausculation. No respiratory distress.  Abdominal: Soft, non tender. Stool filled colostomy back.   Musculoskeletal: Tenderness to left humerus.   Neurological: Awake, alert and oriented x1.   Skin: Warm, dry. Bruising on bilateral upper and lower extremities.   Psychiatric: Unable to evaluate.    LABS  Results for orders placed or performed during the hospital encounter of 03/31/17   CBC WITH DIFFERENTIAL   Result Value Ref Range    WBC 2.8 (L) 4.8 - 10.8 K/uL    RBC 4.01 (L) 4.20 - 5.40 M/uL    Hemoglobin 11.8 (L) 12.0 - 16.0 g/dL    Hematocrit 38.5 37.0 - 47.0 %    MCV 96.0 81.4 - 97.8 fL    MCH 29.4 27.0 - 33.0 pg    MCHC 30.6 (L) 33.6 - 35.0 g/dL    RDW 57.1 (H) 35.9 - 50.0 fL    Platelet Count 52 (L) 164 - 446 K/uL    MPV 13.3 (H) 9.0 - 12.9 fL    Neutrophils-Polys 74.90 (H) 44.00 - 72.00 %    Lymphocytes 14.10 (L) 22.00 - 41.00 %    Monocytes 7.10 0.00 - 13.40 %    Eosinophils 2.10 0.00 - 6.90 %    Basophils 0.70 0.00 - 1.80 %    Immature Granulocytes 1.10 (H) 0.00 - 0.90 %    Nucleated RBC 0.00 /100 WBC    Neutrophils (Absolute) 2.12 2.00 - 7.15 K/uL    Lymphs (Absolute) 0.40 (L) 1.00 - 4.80 K/uL    Monos (Absolute) 0.20 0.00 - 0.85 K/uL    Eos (Absolute) 0.06 0.00 - 0.51 K/uL    Baso (Absolute) 0.02 0.00 - 0.12 K/uL    Immature Granulocytes (abs) 0.03 0.00 - 0.11 K/uL    NRBC (Absolute) 0.00 K/uL   COMP METABOLIC PANEL   Result Value Ref Range    Sodium 139 135 - 145 mmol/L    Potassium 4.6 3.6 - 5.5 mmol/L    Chloride 121 (H) 96 - 112 mmol/L    Co2 8 (LL) 20 - 33 mmol/L    Anion Gap 10.0 0.0 - 11.9    Glucose 114 (H) 65 - 99 mg/dL    Bun 35 (H) 8 - 22 mg/dL    Creatinine 2.14 (H) 0.50 - 1.40 mg/dL    Calcium 8.7 8.5 - 10.5 mg/dL    AST(SGOT) 53 (H) 12 - 45 U/L    ALT(SGPT) 27 2 - 50 U/L    Alkaline Phosphatase 267 (H) 30 - 99 U/L    Total Bilirubin 1.2 0.1 - 1.5 mg/dL    Albumin  3.5 3.2 - 4.9 g/dL    Total Protein 6.5 6.0 - 8.2 g/dL    Globulin 3.0 1.9 - 3.5 g/dL    A-G Ratio 1.2 g/dL   LACTIC ACID   Result Value Ref Range    Lactic Acid 1.6 0.5 - 2.0 mmol/L   URINALYSIS   Result Value Ref Range    Color Yellow     Character Clear     Specific Gravity 1.008 <1.035    Ph 6.0 5.0-8.0    Glucose Negative Negative mg/dL    Ketones Negative Negative mg/dL    Protein Negative Negative mg/dL    Bilirubin Negative Negative    Nitrite Negative Negative    Leukocyte Esterase Negative Negative    Occult Blood Negative Negative    Micro Urine Req see below    TROPONIN   Result Value Ref Range    Troponin I <0.01 0.00 - 0.04 ng/mL   BTYPE NATRIURETIC PEPTIDE   Result Value Ref Range    B Natriuretic Peptide 88 0 - 100 pg/mL   APTT   Result Value Ref Range    APTT 31.4 24.7 - 36.0 sec   PROTHROMBIN TIME   Result Value Ref Range    PT 16.4 (H) 12.0 - 14.6 sec    INR 1.28 (H) 0.87 - 1.13   AMMONIA   Result Value Ref Range    Ammonia 85 (H) 11 - 45 umol/L   ESTIMATED GFR   Result Value Ref Range    GFR If  27 (A) >60 mL/min/1.73 m 2    GFR If Non African American 22 (A) >60 mL/min/1.73 m 2      All labs reviewed by me.    RADIOLOGY  CT-HEAD W/O   Final Result         1. No acute intracranial findings. No evidence of acute hemorrhage or mass lesion.      2. White matter lucencies most consistent with chronic small vessel ischemic change (versus demyelination or gliosis).      Please note, hyperacute ischemia can remain occult on CT. If ischemia is clinically suspected, MRI is suggested for further evaluation.      DX-CHEST-PORTABLE (1 VIEW)   Final Result         1. Persistent patchy opacities in the left lung base.      US-LIVER AND BILIARY TREE    (Results Pending)     The radiologist's interpretation of all radiological studies have been reviewed by me.    COURSE & MEDICAL DECISION MAKING  Pertinent Labs & Imaging studies reviewed. (See chart for details) The patient's [facility] and  past medical   records were reviewed showing history of cirrhosis, kidney failure, Crohn's disease, and COPD.    1:16 PM - Patient seen and examined at bedside. The patient is awake but only able to answer some questions, and she seems confused. She is also hard of hearing, and I am unable to obtain a comprehensive history due to her altered mental status. Ordered chest x-ray, lactic acid, APTT, PTT, CBC with differential, CMP, Blood culture x2, UA, Urine culture, Troponin, BNP, Ammonia, estimated GFR to evaluate her symptoms. The differential diagnoses include but are not limited to: UTI vs pneumonia vs stroke. Blood work came back. It was fairly remarkable with chronic renal insufficiency, a low bicarb of 8 and a ammonia level is 85. Patient be admitted. IV fluids were established.    2:42 PM - Paged hospitalist.     3:29 PM - I discussed the patient's case and the above findings with Dr. Jean-Baptiste (Hospitalist) who agrees to admit the patient under his care.      DISPOSITION:   Patient will be admitted to Dr. Jean-Baptiste in guarded condition.     FINAL IMPRESSION  1. Delirium    2. Hepatic encephalopathy (CMS-HCC)          Marco A ANG (Scribe), am scribing for, and in the presence of, Demetrio Cortez M.D..    Electronically signed by: Marco A Schultz (Scribe), 3/31/2017    Demetrio ANG M.D. personally performed the services described in this documentation, as scribed by Marco A Schultz in my presence, and it is both accurate and complete.    The note accurately reflects work and decisions made by me.  Demetrio Cortez  3/31/2017  4:41 PM

## 2017-03-31 NOTE — ED NOTES
PT bib [facility] from home with c/c increased ALOC x2 days.  Pt arrives a&ox1, follows simple commands.  Friend at bedside- very poor historian.  On cardiac monitor.  Chart up for ERP evaluation.

## 2017-03-31 NOTE — IP AVS SNAPSHOT
Home Care Instructions                                                                                                                  Name:Cristina Tenorio  Medical Record Number:[id number]  CSN: [id number]    YOB: 1940   Age: 76 y.o.  Sex: female  HT:  WT: 73.3 kg (161 lb 9.6 oz)          Admit Date: 3/31/2017     Discharge Date:   Today's Date: 4/3/2017  Attending Doctor:  Manuel Verdugo M.D.                  Allergies:  Sulfa drugs            Discharge Instructions       Discharge Instructions    Discharged to home by car with relative. Discharged via wheelchair, hospital escort: Yes.  Special equipment needed: Not Applicable    Be sure to schedule a follow-up appointment with your primary care doctor and Dr. Brooks (GI - 2-3 weeks).     Discharge Plan:   Influenza Vaccine Indication: Not indicated: Previously immunized this influenza season and > 8 years of age    I understand that a diet low in cholesterol, fat, and sodium is recommended for good health. Unless I have been given specific instructions below for another diet, I accept this instruction as my diet prescription.   Other diet: Hepatic    Special Instructions: None    · Is patient discharged on Warfarin / Coumadin?   No     · Is patient Post Blood Transfusion?  No  Diet and Hepatitis  Chronic hepatitis infection affects the functioning of your liver and the way your body uses energy from food. Making the proper adjustments in your diet can help to protect your liver and prevent further complications from your condition.  You may also be at an increased risk for malnutrition because of the side effects of hepatitis treatment. Common side effects of hepatitis treatment include poor appetite, nausea, and vomiting. It is important that you eat a balanced diet with enough calories to support your body's energy needs and maintain a healthy weight.  WHAT IS MY PLAN?  Your health care provider or dietitian may make specific diet  recommendations based on your condition.  · Sodium (mg/day): ________  · Fluids (oz/day): ________  · Protein (oz/day): ________  · Vitamin and mineral supplements: ____________________________  WHAT DO I NEED TO KNOW ABOUT THIS DIET?  · Limit foods that are high in fat, sugar, and salt (sodium). Do not add extra salt to food.  · Avoid processed foods. Check food labels for dietary information.  · Do not eat uncooked shellfish.  · Avoid dehydration by drinking enough fluid to keep your urine clear or pale yellow or as directed. Limit your fluid intake only if recommended by your health care provider.  · [place], boil, or bake foods instead of frying. Do not use pots and pans made of iron.  · Try eating frequent small meals instead of three large meals each day.  · Avoid or limit drinks that contain alcohol, such as beer, wine, and hard liquor.  · Maintain a clean meal preparation and eating space. Wash your hands before and after preparing food and eating.  · If you are underweight, your health care provider may recommend a high-calorie diet.  · Take vitamin and mineral supplements only as directed by your dietitian.  · Make any dietary changes that are recommended by your health care provider or your dietitian. You may need to adjust the amount of certain foods and substances that you eat.  WHAT FOODS CAN I EAT?  Grains  Whole-grain bread, tortillas, cereals, and pasta. Brown rice. Oats and oatmeal.  Vegetables  Carrots, broccoli, cabbage, celery, cucumbers, and potatoes. Frozen vegetables. Low-sodium canned vegetables.  Fruits  Apples, bananas, pears, apricots, grapes, and cherries. Canned fruit (in juice or water).  Meats and Other Protein Sources  Lean meat and poultry. Fish. Tofu. Eggs. Nuts and nut butters. Beans.  Dairy  Low-fat yogurt. Low-fat cottage cheese. Low-fat cheese. Milk shakes.  Beverages  Water. Low-fat milk. 100% fruit juice. Low-sodium vegetable juice. Smoothies. Herbal tea.  Condiments  Low-fat  mayonnaise. Low-sodium soy sauce.  Sweets and Desserts  Low-fat cookies. Low-fat bran muffins.  Fats and Oils  Canola oil, olive oil, corn oil, sunflower oil, peanut oil, and flaxseed oil.  Other  Baked chips. Whole-grain crackers. Powdered protein supplements (check label for sodium, fat, and iron content).  The items listed above may not be a complete list of recommended foods or beverages. Contact your dietitian for more options.  WHAT FOODS ARE NOT RECOMMENDED?  Food adjustments will be different for each person with chronic hepatitis infection. Be sure to see a dietitian who can help you determine the specific adjustments that you will need to make for each of the food groups. Foods and food ingredients that are commonly not recommended include:  Grains  Iron-fortified cereals and breads.  Vegetables  Fermented vegetables, such as sauerkraut and pickles. Canned vegetables that are high in sodium. Dark green leafy vegetables, such as spinach and kale.  Fruits  Raisins.  Meats and Other Protein Sources  Red meat. Pork. Chicken. Shellfish. Fish. Beans. Salted nuts. Salted or cured meats.  Condiments  Ketchup. Mustard. Barbecue sauce.  Fats and Oils  Animal fats, such as butter, lard, or ghee. Fruit oils, such as coconut oil or avocado oil.   Other  Salted snacks, such as potato chips or pretzels. Canned soups. Frozen dinners. Processed foods. Multivitamins and supplements that contain iron.   The items listed above may not be a complete list of foods and beverages to avoid. Contact your dietitian for more information.     This information is not intended to replace advice given to you by your health care provider. Make sure you discuss any questions you have with your health care provider.         Depression / Suicide Risk    As you are discharged from this [facility] facility, it is important to learn how to keep safe from harming yourself.    Recognize the warning signs:  · Abrupt changes in personality,  positive or negative- including increase in energy   · Giving away possessions  · Change in eating patterns- significant weight changes-  positive or negative  · Change in sleeping patterns- unable to sleep or sleeping all the time   · Unwillingness or inability to communicate  · Depression  · Unusual sadness, discouragement and loneliness  · Talk of wanting to die  · Neglect of personal appearance   · Rebelliousness- reckless behavior  · Withdrawal from people/activities they love  · Confusion- inability to concentrate     If you or a loved one observes any of these behaviors or has concerns about self-harm, here's what you can do:  · Talk about it- your feelings and reasons for harming yourself  · Remove any means that you might use to hurt yourself (examples: pills, rope, extension cords, firearm)  · Get professional help from the community (Mental Health, Substance Abuse, psychological counseling)  · Do not be alone:Call your Safe Contact- someone whom you trust who will be there for you.  · Call your local CRISIS HOTLINE 754-3497 or 105-156-4499  · Call your local Children's Mobile Crisis Response Team [place] (473) 269-1566 or wwwTagArray  · Call the toll free National Suicide Prevention Hotlines   · National Suicide Prevention Lifeline 122-690-HVUI (6858)  · National Hope Line Network 800-SUICIDE (760-9398)        Your appointments     Apr 07, 2017  1:15 PM   Follow Up Visit with Arik Vee M.D.   [facility] ([address])    [facility] [address] [place] [address], [address]  [facility] [id number] [phone]           You will be receiving a confirmation call a few days before your appointment from our automated call confirmation system.              Follow-up Information     1. Follow up with Roshni Morillo M.D.. Go on 4/6/2017.    Specialty:  Family Medicine    Why:  PLEASE ARRIVE AT 2:45PM FOR YOUR APPOINTMENT. THANK YOU    Contact information    [address]  [place] [address]  [phone]          2. Schedule an appointment as soon as possible for a visit with Boni Brooks M.D..    Specialty:  Gastroenterology    Contact information    [address]  [place] [address]  [phone]           Discharge Medication Instructions:    Below are the medications your physician expects you to take upon discharge:    Review all your home medications and newly ordered medications with your doctor and/or pharmacist. Follow medication instructions as directed by your doctor and/or pharmacist.    Please keep your medication list with you and share with your physician.               Medication List      START taking these medications        Instructions    lactulose 20 GM/30ML Soln   Last time this was given:  45 mL on 4/3/2017  8:36 AM    Take 15 mL by mouth 3 times a day.   Dose:  15 mL       rifaximin 550 MG Tabs tablet   Last time this was given:  550 mg on 4/3/2017  8:36 AM   Commonly known as:  XIFAXAN    Take 1 Tab by mouth 2 Times a Day.   Dose:  550 mg         CONTINUE taking these medications        Instructions    ferrous sulfate 325 (65 FE) MG tablet    Take 325 mg by mouth every day.   Dose:  325 mg       hydrocodone-acetaminophen 5-325 MG Tabs per tablet   Commonly known as:  NORCO    Take 1 Tab by mouth every 8 hours as needed.   Dose:  1 Tab       ipratropium-albuterol  MCG/ACT Aers   Commonly known as:  COMBIVENT RESPIMAT    Inhale 1 Puff by mouth every four hours as needed (shortness of breath/wheezing.). Maximum 6 puffs/24 hours.   Dose:  1 Puff       omeprazole 20 MG delayed-release capsule   Last time this was given:  20 mg on 4/3/2017  8:36 AM   Commonly known as:  PRILOSEC    Take 1 Cap by mouth 2 times a day.   Dose:  20 mg       propranolol 10 MG Tabs   Last time this was given:  10 mg on 4/3/2017 12:02 AM   Commonly known as:  INDERAL    Take 1 Tab by mouth 3 times a day.   Dose:  10 mg       sodium bicarbonate 650 MG Tabs      Last time this was given:  1,300 mg on 4/3/2017  8:35 AM   Commonly known as:  SODIUM BICARBONATE    Take 1,300 mg by mouth every day.   Dose:  1300 mg       vitamin D 1000 UNIT Tabs   Last time this was given:  1,000 Units on 4/3/2017  8:36 AM   Commonly known as:  cholecalciferol    Take 1,000 Units by mouth every day.   Dose:  1000 Units               Instructions           Diet / Nutrition:    Follow any diet instructions given to you by your doctor or the dietician, including how much salt (sodium) you are allowed each day.    If you are overweight, talk to your doctor about a weight reduction plan.    Activity:    Remain physically active following your doctor's instructions about exercise and activity.    Rest often.     Any time you become even a little tired or short of breath, SIT DOWN and rest.    Worsening Symptoms:    Report any of the following signs and symptoms to the doctor's office immediately:    *Pain of jaw, arm, or neck  *Chest pain not relieved by medication                               *Dizziness or loss of consciousness  *Difficulty breathing even when at rest   *More tired than usual                                       *Bleeding drainage or swelling of surgical site  *Swelling of feet, ankles, legs or stomach                 *Fever (>100ºF)  *Pink or blood tinged sputum  *Weight gain (3lbs/day or 5lbs /week)           *Shock from internal defibrillator (if applicable)  *Palpitations or irregular heartbeats                *Cool and/or numb extremities    Stroke Awareness    Common Risk Factors for Stroke include:    Age  Atrial Fibrillation  Carotid Artery Stenosis  Diabetes Mellitus  Excessive alcohol consumption  High blood pressure  Overweight   Physical inactivity  Smoking    Warning signs and symptoms of a stroke include:    *Sudden numbness or weakness of the face, arm or leg (especially on one side of the body).  *Sudden confusion, trouble speaking or understanding.  *Sudden  trouble seeing in one or both eyes.  *Sudden trouble walking, dizziness, loss of balance or coordination.Sudden severe headache with no known cause.    It is very important to get treatment quickly when a stroke occurs. If you experience any of the above warning signs, call 911 immediately.                   Disclaimer         Quit Smoking / Tobacco Use:    I understand the use of any tobacco products increases my chance of suffering from future heart disease or stroke and could cause other illnesses which may shorten my life. Quitting the use of tobacco products is the single most important thing I can do to improve my health. For further information on smoking / tobacco cessation call a Toll Free Quit Line at 1-976.401.7138 (*National Cancer [place]) or 1-567.650.3006 (American Lung Association) or you can access the web based program at www.lungO3b Networks.org.    [place] Tobacco Users Help Line:  (814) 490-4713       Toll Free: 1-255.414.9733  Quit Tobacco Program Pending sale to [facility] ([phone]    Crisis Hotline:    [place] Crisis Hotline:  9-274-DRMXHBF or 1-889.325.2091    [place] Crisis Hotline:    1-982.412.1041 or 631-491-2045    Discharge Survey:   Thank you for choosing Pending sale to [facility]. We hope we did everything we could to make your hospital stay a pleasant one. You may be receiving a phone survey and we would appreciate your time and participation in answering the questions. Your input is very valuable to us in our efforts to improve our service to our patients and their families.        My signature on this form indicates that:    1. I have reviewed and understand the above information.  2. My questions regarding this information have been answered to my satisfaction.  3. I have formulated a plan with my discharge nurse to obtain my prescribed medications for home.                  Disclaimer         __________________________________                     __________       ________                        Patient Signature                                                 Date                    Time

## 2017-03-31 NOTE — IP AVS SNAPSHOT
Glownet Access Code: Activation code not generated  Current Glownet Status: Active    [employer]  A secure, online tool to manage your health information     [employer]’s Glownet® is a secure, online tool that connects you to your personalized health information from the privacy of your home -- day or night - making it very easy for you to manage your healthcare. Once the activation process is completed, you can even access your medical information using the Glownet claudia, which is available for free in the Apple Claudia store or Google Play store.     Glownet provides the following levels of access (as shown below):   My Chart Features   [facility] Primary Care Doctor [facility]  Specialists [facility]  Urgent  Care Non-[facility]  Primary Care  Doctor   Email your healthcare team securely and privately 24/7 X X X X   Manage appointments: schedule your next appointment; view details of past/upcoming appointments X      Request prescription refills. X      View recent personal medical records, including lab and immunizations X X X X   View health record, including health history, allergies, medications X X X X   Read reports about your outpatient visits, procedures, consult and ER notes X X X X   See your discharge summary, which is a recap of your hospital and/or ER visit that includes your diagnosis, lab results, and care plan. X X       How to register for Glownet:  1. Go to  [email or web address].  2. Click on the Sign Up Now box, which takes you to the New Member Sign Up page. You will need to provide the following information:  a. Enter your Glownet Access Code exactly as it appears at the top of this page. (You will not need to use this code after you’ve completed the sign-up process. If you do not sign up before the expiration date, you must request a new code.)   b. Enter your date of birth.   c. Enter your home email address.   d. Click Submit, and follow the next screen’s instructions.  3. Create a Glownet ID. This will  be your [employer] login ID and cannot be changed, so think of one that is secure and easy to remember.  4. Create a [employer] password. You can change your password at any time.  5. Enter your Password Reset Question and Answer. This can be used at a later time if you forget your password.   6. Enter your e-mail address. This allows you to receive e-mail notifications when new information is available in [employer].  7. Click Sign Up. You can now view your health information.    For assistance activating your [employer] account, call [phone]

## 2017-03-31 NOTE — IP AVS SNAPSHOT
4/3/2017          Cristina Tenorio  [address]  [place] [address]    Dear Cristina:    [facility] wants to ensure your discharge home is safe and you or your loved ones have had all your questions answered regarding your care after you leave the hospital.    You may receive a telephone call within two days of your discharge.  This call is to make certain you understand your discharge instructions as well as ensure we provided you with the best care possible during your stay with us.     The call will only last approximately 3-5 minutes and will be done by a nurse.    Once again, we want to ensure your discharge home is safe and that you have a clear understanding of any next steps in your care.  If you have any questions or concerns, please do not hesitate to contact us, we are here for you.  Thank you for choosing [facility] for your healthcare needs.    Sincerely,    Paul Busby    [facility]

## 2017-03-31 NOTE — IP AVS SNAPSHOT
" <p align=\"LEFT\"><IMG SRC=\"//[email or web address]$/[email or web address]\" alt=\"Image\" WIDTH=\"50%\" HEIGHT=\"200\" BORDER=\"\"></p>                   Name:Cristina Tenorio  Medical Record Number:[id number]  CSN: [id number]    YOB: 1940   Age: 76 y.o.  Sex: female  HT:  WT: 73.3 kg (161 lb 9.6 oz)          Admit Date: 3/31/2017     Discharge Date:   Today's Date: 4/3/2017  Attending Doctor:  Manuel Verdugo M.D.                  Allergies:  Sulfa drugs          Your appointments     Apr 07, 2017  1:15 PM   Follow Up Visit with Arik Vee M.D.   [facility] ([address] [place] [address])    [facility] [address]  [place] [id number] [phone]           You will be receiving a confirmation call a few days before your appointment from our automated call confirmation system.              Follow-up Information     1. Follow up with Roshni Morillo M.D.. Go on 4/6/2017.    Specialty:  Family Medicine    Why:  PLEASE ARRIVE AT 2:45PM FOR YOUR APPOINTMENT. THANK YOU    Contact information    [address]  [place] [address] [phone]          2. Schedule an appointment as soon as possible for a visit with Boni Brooks M.D..    Specialty:  Gastroenterology    Contact information    [id number]  [place] [id number] [phone]           Medication List      Take these Medications        Instructions    ferrous sulfate 325 (65 FE) MG tablet    Take 325 mg by mouth every day.   Dose:  325 mg       hydrocodone-acetaminophen 5-325 MG Tabs per tablet   Commonly known as:  NORCO    Take 1 Tab by mouth every 8 hours as needed.   Dose:  1 Tab       ipratropium-albuterol  MCG/ACT Aers   Commonly known as:  COMBIVENT RESPIMAT    Inhale 1 Puff by mouth every four hours as needed (shortness of breath/wheezing.). Maximum 6 puffs/24 hours.   Dose:  1 Puff       lactulose 20 GM/30ML Soln    Take 15 mL by mouth 3 times a day.   Dose:  15 mL       omeprazole 20 MG " delayed-release capsule   Commonly known as:  PRILOSEC    Take 1 Cap by mouth 2 times a day.   Dose:  20 mg       propranolol 10 MG Tabs   Commonly known as:  INDERAL    Take 1 Tab by mouth 3 times a day.   Dose:  10 mg       rifaximin 550 MG Tabs tablet   Commonly known as:  XIFAXAN    Take 1 Tab by mouth 2 Times a Day.   Dose:  550 mg       sodium bicarbonate 650 MG Tabs   Commonly known as:  SODIUM BICARBONATE    Take 1,300 mg by mouth every day.   Dose:  1300 mg       vitamin D 1000 UNIT Tabs   Commonly known as:  cholecalciferol    Take 1,000 Units by mouth every day.   Dose:  1000 Units

## 2017-04-01 PROBLEM — G89.29 CHRONIC PAIN: Status: ACTIVE | Noted: 2017-01-01

## 2017-04-01 PROBLEM — K74.60 CIRRHOSIS (HCC): Status: ACTIVE | Noted: 2017-01-01

## 2017-04-01 PROBLEM — K76.82 ACUTE HEPATIC ENCEPHALOPATHY (HCC): Status: ACTIVE | Noted: 2017-01-01

## 2017-04-01 PROBLEM — K21.9 GERD (GASTROESOPHAGEAL REFLUX DISEASE): Status: ACTIVE | Noted: 2017-01-01

## 2017-04-01 PROBLEM — E66.9 OBESITY (BMI 30.0-34.9): Status: ACTIVE | Noted: 2017-01-01

## 2017-04-01 NOTE — H&P
"PRIMARY CARE PHYSICIAN:  Roshni Morillo MD    GASTROENTEROLOGIST:  Boni Brooks MD from [facility].    CHIEF COMPLAINT:  Patient reports \"I do not know why I am here.\"  According to   significant other, patient was acting strange at home.    HISTORY OF PRESENT ILLNESS:  This is a 76-year-old female, who presents to the   emergency room for further evaluation of confusion.  Upon evaluation by me,   patient is aware that she is at [facility] when questioned   regarding the month, she reports this is January, when questioned regarding   the year, she reports this is 1991, when questioned regarding who is the   president of the country, she continues to think and is unable to answer this   question.  Patient is accompanied by her significant other who has been living   with the patient.  According to her significant other, patient was not acting   herself today very strange, confused overall and not knowing what was going   on.  Otherwise, patient at baseline is independent with activities of daily   living.  There is a caregiver who comes at home and helps with IADLs.    Otherwise, upon evaluation by me, patient denies having any headaches, chest   pain, shortness of breath, or cough.  She denies any abdominal pain.  She has   underlying colostomy with clear bowel movements.  She denies any dysuria,   frequency, urgency, or hematuria.  She denies any lower extremity swelling.    HOME MEDICATIONS:  1.  Norco 5/325 mg every 8 hours as needed.  2.  Inderal 10 mg 3 times a day.  3.  Sodium bicarbonate 1300 mg daily.  4.  Omeprazole 20 mg twice a day.  5.  Vitamin D3 1000 units daily.  6.  Ferrous sulfate 325 mg daily.  7.  Combivent inhaler as needed.    PAST MEDICAL HISTORY:  1.  Liver cirrhosis.  2.  Crohn's disease.  3.  Chronic obstructive pulmonary disease.  4.  Chronic anemia.  5.  Hypertension.  6.  Obstructive sleep apnea.  7.  Chronic kidney disease, stage III.  8.  " Thrombocytopenia.  9.  Left humeral fracture.  10.  History of gastrointestinal bleed.  11.  History of cerebrovascular accident.  12.  Obesity.  13.  Gastroesophageal reflux disease.    PAST SURGICAL HISTORY:  1.  Cholecystectomy.  2.  Bowel resection with colostomy creation.  3.  Hernia repair.    FAMILY HISTORY:  Mother with history of lung disease.  Father  from old   age and brother had a history of brain cancer.    SOCIAL HISTORY:  Unable to obtain from the patient given her underlying   encephalopathy, but according to her significant other, no smoking, alcohol   use or drugs abuse is noted.    ALLERGIES:  REPORTED TO [facility].    REVIEW OF SYSTEMS:  Detailed review of system was reviewed with the patient   and negative other than as listed in the history of presenting illness and   past medical history.  This remains restricted given patient's underlying   acute encephalopathy.    PHYSICAL EXAMINATION:  VITAL SIGNS:  On presentation, temperature of 37.2 degrees Celsius, pulse of   94, respiratory rate of 18, blood pressure of 145/88, weight of 72.5   kilograms, and oxygen saturation of 96% on room air.  GENERAL:  Patient is only alert to herself and being in the hospital,   otherwise she is confused as reviewed in the history of presenting illness.    She appears to be calm and in no acute distress.  HEAD, EYES, AND ENT:  Head is normocephalic.  Extraocular movements are   intact.  Bilateral pupils are responsive to light.  No conjunctival pallor is   noted.  No scleral icterus is noted.  NECK:  Minimal jugular venous distention is noted.  CARDIOVASCULAR:  Regular rate and rhythm.  S1 plus S2.  No murmurs, rubs, or   gallops.  RESPIRATORY:  Clear to auscultation bilaterally, slightly diminished in bases.  ABDOMEN:  Soft, slightly distended, tympanic on percussion.  No tenderness is   noted.  Bowel sounds are normal in frequency.  GENITOURINARY:  Not examined.  MUSCULOSKELETAL:  No joint swelling or  tenderness on examination.  SKIN:  Multiple ecchymosis overlying the bilateral upper and lower extremities   noted.  NEUROLOGICAL:  Cranial nerves without any gross deficit.  Motor strength of   5/5 in bilateral upper and lower extremities.  EXTREMITIES:  Bilateral lower extremity edema is noted.  No cyanosis.    LABORATORY STUDIES:  White blood cell count of 2.8, hemoglobin of 11.8, and   platelet count of 52.  Sodium of 139, potassium of 4.6, BUN of 35, creatinine   of 2.14, and bicarbonate of 8.  AST of 53, otherwise elevated alkaline   phosphatase at 267.  Elevated ammonia level of ____.  INR of 1.28.  Urinalysis   is negative for any evidence of infection.  ABG obtained reveals a pH of   7.32, pCO2 of 18.8, and pO2 of 87.2.    IMAGING STUDIES:  Chest x-ray obtained on presentation reveals chronic left   lower lobe patchy obesities.  Noncontrasted CT of the head reveals no acute   intracranial findings.  There is no evidence of acute hemorrhage or mass   lesion.    ASSESSMENT AND PLAN:  1.  Acute encephalopathy with hyperammonemia, concerning for hepatic   encephalopathy.  At this point, patient will be admitted to the hospital.    Based on her presentation, I anticipate this patient would require greater   than 2 midnights in the hospital for ongoing treatment of her hepatic   encephalopathy/further evaluation of encephalopathy.  Given her history of   cirrhosis, presentation is most likely 2/2 hepatic encephalopathy.  At baseline, she is   not on management for encephalopathy.  At this point in time, we would recommend   aggressive management with lactulose therapy for the next 4 doses with   subsequent initiation of lactulose, rifaximin, and zinc therapy.  Recommend   ruling out infectious etiology.  Urinalysis is negative for any evidence of   infection.  She does not have any signs or symptoms manifesting a pneumonic   process at this point in time and hence clinical monitoring should be   recommended as  opposed to initiation of antibiotic therapy.  We will obtain   blood cultures.  No other incident is noted, which may have led to   precipitation of encephalopathy in this patient.  No evidence of   gastrointestinal bleeding is noted at this point in time.  Patient will be   monitored during the hospital stay and further evaluation/management will   depend on the clinical course.  2.  Metabolic acidosis with respiratory compensation, most likely secondary to   underlying chronic kidney disease.  We will repeat her chemistry panel in the   morning tomorrow.  Meanwhile, continue gentle IV fluid hydration.  3.  Cirrhosis, most likely nonalcoholic steatohepatitis based on her previous   biopsy results with now progression to worse development of encephalopathy.    She is baseline on beta-blocker therapy likely for underlying portal   hypertension, which is manifested by her pancytopenia.  She is followed by Dr. Brooks from [facility] in the outpatient setting.  I do not see any   further workup, which has been done for underlying cirrhosis, most likely this   has been accomplished by [facility] in the outpatient setting.  Given   this, I would not pursue any further workup.  Recommend consultation from GI   consultants for evaluation and management of her underlying cirrhosis and to   see if any further inpatient workup is recommended by them.  Given liver   biopsy has been obtained, I assume that previously viral serologies and other   factors leading to cirrhosis would have been entertained by a gastroenterology   in the outpatient setting and this workup is not available to us.  In sight   of repeating, this recommend discussion with gastroenterology prior to   repeating any workup for this patient.  4.  Transaminitis in the setting of cirrhosis.  Recommend discussion with   gastroenterology.  5.  Gastroesophageal reflux disease.  Continue omeprazole therapy.  6.  Chronic pain with narcotic dependence.   Recommend holding narcotics given   presentation with encephalopathy.  7.  Chronic obstructive pulmonary disease without acute exacerbation.  8.  Chronic kidney disease, stage IV.  Avoid nephrotoxin dose medication   renally and monitor her renal function.  9.  Obesity with a body mass index of 31.56.  Recommend weight loss.  10.  History of Crohn's disease.  11.  Preventive prophylaxis.  Deep venous thrombosis preventive prophylaxis   with sequential compression devices have been ordered.  Given her presentation   with profound thrombocytopenia, recommend monitoring before initiation of   preventive prophylaxis with subcutaneous heparin.  Stress ulcer prophylaxes   are not indicated in this patient.  12.  Code status:  Patient is unable to be engaged her productive discussion   regarding her code status given her encephalopathy at this point in time.    This should be discussed with the patient as her encephalopathy improves and   patient should be encouraged to complete advanced directives.       ____________________________________     MD SAMIRA GALLEGOS / ALYSON    DD:  03/31/2017 21:30:16  DT:  03/31/2017 22:42:01    D#:  [id number]  Job#:  [id number]

## 2017-04-01 NOTE — PROGRESS NOTES
Hospital Medicine Progress Note, Adult, Complex               Author: Manuel Verdugo Date & Time created: 4/1/2017  12:10 PM     Interval History:  76/F with liver cirrhosis, COPD, Crohn's disease with colostomy, follows Dr. Brooks of [facility], TRAVIS, CKD 3, HTN, thrombocytopenia, obesity, chronic pain with narcotic dependence, and GERD, admitted for confusion/ALOC. Labs notable for WBC 2.8, Hgb 11.8, platelet of 52 (stable). Ammonia level was elevated at 85. UA negative for UTI. CXR showed chronic LLL patchy opacities. Head CT was NAD. [place] to have hepatic encephalopathy. Started on lactulose, xifaxan    4/1/2017 - no overnight events. Remains hemodynamically stable and afebrile. Stable on RA, saturating well. Pancytopenia stable. Crea 1.99 within her baseline of 1.9-2.1. No repeat ammonia done. CO2 12, AG 7.     > Seen and examined. Per , mentation much better. AOx3. Non-distractible, able to recite days of week forward and backwards without prompting. Coherent. Denied any CP, SOB, nausea, vomiting. (+) loose stools per colostomy.       Review of Systems:  ROS     Pertinent positives/negatives as mentioned above.     A complete review of systems was done. All other systems were negative.       Physical Exam:  Physical Exam   Constitutional: She is oriented to person, place, and time. She appears well-developed and well-nourished. No distress.   Obese   HENT:   Head: Normocephalic and atraumatic.   Mouth/Throat: No oropharyngeal exudate.   Eyes: Conjunctivae are normal. Pupils are equal, round, and reactive to light. No scleral icterus.   Neck: Normal range of motion. Neck supple.   Cardiovascular: Normal rate and regular rhythm.  Exam reveals no gallop and no friction rub.    No murmur heard.  Pulmonary/Chest: Effort normal and breath sounds normal. No respiratory distress. She has no wheezes. She has no rales. She exhibits no tenderness.   Diminished air entry B/L bases   Abdominal: Soft. Bowel sounds are  normal. She exhibits distension. There is no tenderness. There is no rebound and no guarding.   (+) colostomy in place, with liquid stools   Musculoskeletal: Normal range of motion. She exhibits no edema or tenderness.   Lymphadenopathy:     She has no cervical adenopathy.   Neurological: She is alert and oriented to person, place, and time. No cranial nerve deficit.   Non-distractible, able to recite days of week forward and backwards without prompting. Coherent.   Skin: Skin is warm and dry. No rash noted. No erythema. No pallor.   Psychiatric: She has a normal mood and affect. Her behavior is normal. Judgment and thought content normal.   Nursing note and vitals reviewed.      Labs:  Recent Labs      17   1526   [id number]  7.32*   [id number]  18.8*   [id number]  87.2*   KTHH2ANT  95.5   ARTHCO3  10*   ARTBE  -14*     Recent Labs      17   TROPONINI  <0.01   BNPBTYPENAT  88     Recent Labs      17   1326  17   SODIUM  139  143   POTASSIUM  4.6  3.8   CHLORIDE  121*  124*   CO2  8*  12*   BUN  35*  35*   CREATININE  2.14*  1.99*   CALCIUM  8.7  8.8     Recent Labs      17   1326  17   ALTSGPT  27  28   ASTSGOT  53*  54*   ALKPHOSPHAT  267*  250*   TBILIRUBIN  1.2  0.9   GLUCOSE  114*  101*     Recent Labs      17   1326  17   1339  17   RBC  4.01*   --   3.23*   HEMOGLOBIN  11.8*   --   9.4*   HEMATOCRIT  38.5   --   29.8*   PLATELETCT  52*   --   60*   PROTHROMBTM   --   16.4*   --    APTT   --   31.4   --    INR   --   1.28*   --      Recent Labs      17   1326  17   WBC  2.8*  3.0*   NEUTSPOLYS  74.90*  71.30   LYMPHOCYTES  14.10*  16.20*   MONOCYTES  7.10  8.90   EOSINOPHILS  2.10  2.60   BASOPHILS  0.70  0.70   ASTSGOT  53*  54*   ALTSGPT  27  28   ALKPHOSPHAT  267*  250*   TBILIRUBIN  1.2  0.9           Hemodynamics:  Temp (24hrs), Av.6 °C (97.8 °F), Min:36.1 °C (97 °F), Max:37.2 °C (98.9 °F)  Temperature:  36.5 °C (97.7 °F)  Pulse  Av.9  Min: 70  Max: 94Heart Rate (Monitored): 89  Blood Pressure : 112/46 mmHg, NIBP: 129/60 mmHg     Respiratory:    Respiration: 18, Pulse Oximetry: 93 %, O2 Daily Delivery Respiratory : Room Air with O2 Available           Fluids:    Intake/Output Summary (Last 24 hours) at 17 1210  Last data filed at 17 0227   Gross per 24 hour   Intake    120 ml   Output   1550 ml   Net  -1430 ml     Weight: 73.3 kg (161 lb 9.6 oz)  GI/Nutrition:  Orders Placed This Encounter   Procedures   • DIET NPO     Standing Status: Standing      Number of Occurrences: 8      Standing Expiration Date:      Order Specific Question:  Restrict to:     Answer:  Sips with Medications [3]     Medical Decision Making, by Problem:  Active Hospital Problems    Diagnosis   • Encephalopathy acute [G93.40]     Acute hepatic encephalopathy  - with component of narcotics. With elevated ammonia level. Much improved.   - continue lactulose and xifaxan. Repeat ammonia level and trend.   - goal to have at least 2-3 loose stools per day.   - continue to hold narcotics.     Liver cirrhosis with portal hypertension  - likely due to BAKER. Follows Dr. Brooks of [facility].   - outpatient follow-up with GI. Continue lactulose and xifaxan as above. Continue propranolol.     COPD  - not in acute exacerbation. Continue BD per RT protocol. O2 PRN.    Crohn's disease  - stable.   - outpatient follow-up with GI.     TRAVIS   - continue CPAP    CKD 3  - very mild increase from baseline. Now back to baseline renal function.   - continue gentle IVF - decrease IVF to LR at 75cc/hr. BMP in AM.  - continue sodium bicarbonate.     Metabolic acidosis with respiratory compensation  - due to CKD, cirrhosis. Stable.      HTN  - continue propranolol.     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   - stable. Monitor.      Obesity   - Body mass index is 31.56 kg/(m^2).    Cronic pain with narcotic dependence  - holding  narcotics due to encephalopathy.    GERD  - continue PPI.    Dispo - monitor on the floors. Will get PT/OT to help with discharge planning.       Labs reviewed, Medications reviewed and Radiology images reviewed  Loaiza catheter: No Loaiza      DVT Prophylaxis: Contraindicated - High bleeding risk  DVT prophylaxis - mechanical: SCDs  Ulcer prophylaxis: Yes    Assessed for rehab: Patient was assess for and/or received rehabilitation services during this hospitalization

## 2017-04-01 NOTE — PROGRESS NOTES
Assumed care, assessment complete. Pt is A & O x 4. Pt denies having pain. Fall precautions and appropriate signs in place. Pt oriented to unit routine, call light/phone system and RN extension number provided. Pt educated regarding fall precautions. Bed alarm in use. Pt denies any additional needs at this time. Call light within reach.

## 2017-04-01 NOTE — RESPIRATORY CARE
COPD EDUCATION by COPD CLINICAL EDUCATOR  4/1/2017 at 6:35 AM by Sulema Barragan     Patient reviewed by COPD education team. Patient does not qualify for COPD program.

## 2017-04-02 NOTE — PROGRESS NOTES
Assumed care of pt, received report from day shift RN, pt assessed.  Pt no complaints of pain at this time.  Pt is A&O x4.  Pt fall risk, wearing treaded socks, bed locked and in lowest position, bed alarm is on.  Pt instructed to call for assistance prior to getting OOB, pt verbalized understanding.  Call light, phone, and personal belongings within reach.

## 2017-04-02 NOTE — CARE PLAN
Problem: Knowledge Deficit  Goal: Knowledge of the prescribed therapeutic regimen will improve  Intervention: Discuss information regarding therpeutic regimen and document in education  Pt displayed understanding and knowledge about disease process and treatment plan      Problem: Discharge Barriers/Planning  Goal: Patient’s continuum of care needs will be met  Intervention: Assess potential discharge barriers on admission and throughout hospital stay  Pt critical labs improving

## 2017-04-02 NOTE — PROGRESS NOTES
Hospital Medicine Progress Note, Adult, Complex               Author: Manuel Verdugo Date & Time created: 4/2/2017  7:04 AM     Interval History:  76/F with liver cirrhosis, COPD, Crohn's disease with colostomy, follows Dr. Brooks of [facility], TRAVIS, CKD 3, HTN, thrombocytopenia, obesity, chronic pain with narcotic dependence, and GERD, admitted for confusion/ALOC. Labs notable for WBC 2.8, Hgb 11.8, platelet of 52 (stable). Ammonia level was elevated at 85. UA negative for UTI. CXR showed chronic LLL patchy opacities. Head CT was NAD. [place] to have hepatic encephalopathy. Started on lactulose, xifaxan    4/2/2017 - uneventful night. VSS. Afebrile. Stable on RA, saturating well. (+) pancytopenia - stable. Na, K WNL. Crea within baseline at 1.78. Ammonia 126 from 64.     > Seen and examined. Conversant, awake and coherent. No complaints. No nausea, vomiting, abd pain, Cp, SOB. (+) liquid stools per colostomy.           Review of Systems:  ROS     Pertinent positives/negatives as mentioned above.     A complete review of systems was done. All other systems were negative.       Physical Exam:  Physical Exam   Constitutional: She is oriented to person, place, and time. She appears well-developed and well-nourished. No distress.   Obese   HENT:   Head: Normocephalic and atraumatic.   Mouth/Throat: No oropharyngeal exudate.   Eyes: Conjunctivae are normal. Pupils are equal, round, and reactive to light. No scleral icterus.   Neck: Normal range of motion. Neck supple.   Cardiovascular: Normal rate and regular rhythm.  Exam reveals no gallop and no friction rub.    No murmur heard.  Pulmonary/Chest: Effort normal and breath sounds normal. No respiratory distress. She has no wheezes. She has no rales. She exhibits no tenderness.   Diminished air entry B/L bases   Abdominal: Soft. Bowel sounds are normal. She exhibits distension. There is no tenderness. There is no rebound and no guarding.   (+) colostomy in place, with liquid  stools   Musculoskeletal: Normal range of motion. She exhibits no edema or tenderness.   Lymphadenopathy:     She has no cervical adenopathy.   Neurological: She is alert and oriented to person, place, and time. No cranial nerve deficit.   Non-distractible, able to recite days of week forward and backwards without prompting. Coherent.   Skin: Skin is warm and dry. No rash noted. No erythema. No pallor.   Psychiatric: She has a normal mood and affect. Her behavior is normal. Judgment and thought content normal.   Nursing note and vitals reviewed.      Labs:  Recent Labs      03/31/17   1526   [id number]  7.32*   [id number]  18.8*   [id number]  87.2*   IAQY0RCJ  95.5   ARTHCO3  10*   ARTBE  -14*     Recent Labs      03/31/17   1326   TROPONINI  <0.01   BNPBTYPENAT  88     Recent Labs      03/31/17   1326  04/01/17 0221 04/02/17   0250   SODIUM  139  143  143   POTASSIUM  4.6  3.8  3.9   CHLORIDE  121*  124*  122*   CO2  8*  12*  13*   BUN  35*  35*  32*   CREATININE  2.14*  1.99*  1.78*   CALCIUM  8.7  8.8  8.1*     Recent Labs      03/31/17   1326  04/01/17 0221 04/02/17   0250   ALTSGPT  27  28   --    ASTSGOT  53*  54*   --    ALKPHOSPHAT  267*  250*   --    TBILIRUBIN  1.2  0.9   --    GLUCOSE  114*  101*  116*     Recent Labs      03/31/17   1326  03/31/17   1339  04/01/17 0221 04/02/17   0250   RBC  4.01*   --   3.23*  3.12*   HEMOGLOBIN  11.8*   --   9.4*  9.1*   HEMATOCRIT  38.5   --   29.8*  29.1*   PLATELETCT  52*   --   60*  51*   PROTHROMBTM   --   16.4*   --    --    APTT   --   31.4   --    --    INR   --   1.28*   --    --      Recent Labs      03/31/17   1326  04/01/17 0221 04/02/17   0250   WBC  2.8*  3.0*  3.1*   NEUTSPOLYS  74.90*  71.30  65.50   LYMPHOCYTES  14.10*  16.20*  20.00*   MONOCYTES  7.10  8.90  9.70   EOSINOPHILS  2.10  2.60  3.90   BASOPHILS  0.70  0.70  0.60   ASTSGOT  53*  54*   --    ALTSGPT  27  28   --    ALKPHOSPHAT  267*  250*   --    TBILIRUBIN  1.2  0.9   --             Hemodynamics:  Temp (24hrs), Av.5 °C (97.7 °F), Min:36.2 °C (97.1 °F), Max:36.7 °C (98 °F)  Temperature: 36.7 °C (98 °F)  Pulse  Av.4  Min: 64  Max: 94   Blood Pressure : 130/96 mmHg     Respiratory:    Respiration: 16, Pulse Oximetry: 96 %           Fluids:    Intake/Output Summary (Last 24 hours) at 17 0704  Last data filed at 17 0500   Gross per 24 hour   Intake   1100 ml   Output   1250 ml   Net   -150 ml        GI/Nutrition:  Orders Placed This Encounter   Procedures   • DIET ORDER     Standing Status: Standing      Number of Occurrences: 1      Standing Expiration Date:      Order Specific Question:  Diet:     Answer:  Hepatic [9]     Order Specific Question:  Texture/Fiber modifications:     Answer:  Dysphagia 3(Mechanical Soft)specify fluid consistency(question 6) [3]     Order Specific Question:  Consistency/Fluid modifications:     Answer:  Thin Liquids [3]     Medical Decision Making, by Problem:  Active Hospital Problems    Diagnosis   • Encephalopathy acute [G93.40]     Acute hepatic encephalopathy  - with component of narcotics. With elevated ammonia levelBack to baseline but ammonia level doubled.   - increase lactulose to 45mg TID. Continue xifaxan. Will await repeat ammonia at 9AM, if goes doesn can go home. If not, continue escalated intervention and repeat ammonia in AM.   - continue to hold narcotics.     Liver cirrhosis with portal hypertension  - likely due to BAKER. Follows Dr. Brooks of [facility].   - outpatient follow-up with GI. Continue lactulose and xifaxan as above. Continue propranolol.     COPD  - not in acute exacerbation. Continue BD per RT protocol. O2 PRN.    Crohn's disease  - stable.   - outpatient follow-up with GI.     TRAVIS   - continue CPAP    CKD 3  - very mild increase from baseline. Now back to baseline renal function.   - continue gentle IVF NS at 75cc/hr. BMP in AM.  - continue sodium bicarbonate.     Metabolic acidosis with respiratory  compensation  - due to CKD, cirrhosis. Stable.      HTN  - continue propranolol.     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   - stable. Monitor.      Obesity   - Body mass index is 31.56 kg/(m^2).    Cronic pain with narcotic dependence  - holding narcotics due to encephalopathy.    GERD  - continue PPI.    Dispo - monitor on the floors. Await PT/OT for discharge planning. Ambulate.       Labs reviewed, Medications reviewed and Radiology images reviewed  Loaiza catheter: No Loaiza      DVT Prophylaxis: Contraindicated - High bleeding risk  DVT prophylaxis - mechanical: SCDs  Ulcer prophylaxis: Yes    Assessed for rehab: Patient was assess for and/or received rehabilitation services during this hospitalization

## 2017-04-02 NOTE — PROGRESS NOTES
Melida from Lab called with critical result of 126 ammonia at 0330. Critical lab result read back to Melida.   MICHAEL Amaya notified of critical lab result at 0340.  Critical lab result read back by MICHAEL Amaya.

## 2017-04-02 NOTE — CARE PLAN
Problem: Communication  Goal: The ability to communicate needs accurately and effectively will improve  Outcome: PROGRESSING AS EXPECTED  Pt capable of verbalizing all needs and utilizes call light system approprietly.    Problem: Safety  Goal: Will remain free from injury  Outcome: PROGRESSING AS EXPECTED  Pt fall risk, pt wearing treaded socks, bed locked and in lowest position, bed alarm is active.  Pt call light and phone within reach.

## 2017-04-02 NOTE — CARE PLAN
Problem: Knowledge Deficit  Goal: Knowledge of disease process/condition, treatment plan, diagnostic tests, and medications will improve  Intervention: Assess knowledge level of disease process/condition, treatment plan, diagnostic tests, and medications  Pt displayed understanding and knowledge about disease process and treatment plan      Problem: Fluid Volume:  Goal: Will maintain balanced intake and output  Intervention: Monitor, educate, and encourage compliance with therapeutic intake of liquids  Pt on IV maintenance fluids and voids frequently using bedpan.

## 2017-04-03 PROBLEM — K76.82 ACUTE HEPATIC ENCEPHALOPATHY (HCC): Status: RESOLVED | Noted: 2017-01-01 | Resolved: 2017-01-01

## 2017-04-03 NOTE — DISCHARGE PLANNING
Prescription for Xifaxan faxed to [facility] pharmacy in [facility] where per patient, she has her prescriptions filled. Await call back re: patient's co-pay amount. Per patient, she has Medicare part D.

## 2017-04-03 NOTE — THERAPY
"Occupational Therapy Evaluation completed.   Functional Status:  Pt seen for OT eval. Supervision-CGA for ADLs. Pt has SO who is able to assist as needed and recommended DME.   Plan of Care: Patient with no further skilled OT needs in the acute care setting at this time  Discharge Recommendations:  Equipment: No Equipment Needed. Post-acute therapy Discharge to home with outpatient or home health for additional skilled therapy services.    See \"Rehab Therapy-Acute\" Patient Summary Report for complete documentation.    "

## 2017-04-03 NOTE — PROGRESS NOTES
Hospital Medicine Progress Note, Adult, Complex               Author: Manueltyesha Verdugo Date & Time created: 4/3/2017  7:03 AM     Interval History:  76/F with liver cirrhosis, COPD, Crohn's disease with colostomy, follows Dr. Brooks of [facility], TRAVIS, CKD 3, HTN, thrombocytopenia, obesity, chronic pain with narcotic dependence, and GERD, admitted for confusion/ALOC. Labs notable for WBC 2.8, Hgb 11.8, platelet of 52 (stable). Ammonia level was elevated at 85. UA negative for UTI. CXR showed chronic LLL patchy opacities. Head CT was NAD. [place] to have hepatic encephalopathy. Started on lactulose, xifaxan    4/3/2017 - no overnight events. Remains hemodynamically stable and afebrile. Stable on RA, saturating well. WBC, Hgb, platelets stable. Ammonia 87. Crea 1.91.    > Seen and examined. Fully coherent and conversant. Not in distress. AOx3. Denied any CP, SOB, nausea, vomiting. (+) loose stools per colostomy bag.           Review of Systems:  ROS     Pertinent positives/negatives as mentioned above.     A complete review of systems was done. All other systems were negative.       Physical Exam:  Physical Exam   Constitutional: She is oriented to person, place, and time. She appears well-developed and well-nourished. No distress.   Obese   HENT:   Head: Normocephalic and atraumatic.   Mouth/Throat: No oropharyngeal exudate.   Eyes: Conjunctivae are normal. Pupils are equal, round, and reactive to light. No scleral icterus.   Neck: Normal range of motion. Neck supple.   Cardiovascular: Normal rate and regular rhythm.  Exam reveals no gallop and no friction rub.    No murmur heard.  Pulmonary/Chest: Effort normal and breath sounds normal. No respiratory distress. She has no wheezes. She has no rales. She exhibits no tenderness.   Diminished air entry B/L bases   Abdominal: Soft. Bowel sounds are normal. She exhibits distension. There is no tenderness. There is no rebound and no guarding.   (+) colostomy in place, with liquid  stools   Musculoskeletal: Normal range of motion. She exhibits no edema or tenderness.   Lymphadenopathy:     She has no cervical adenopathy.   Neurological: She is alert and oriented to person, place, and time. No cranial nerve deficit.   Non-distractible, able to recite days of week forward and backwards without prompting. Coherent.   Skin: Skin is warm and dry. No rash noted. No erythema. No pallor.   Psychiatric: She has a normal mood and affect. Her behavior is normal. Judgment and thought content normal.   Nursing note and vitals reviewed.      Labs:  Recent Labs      03/31/17   1526   LJJAT06C  7.32*   [id number]  18.8*   [id number]  87.2*   [id number]  95.5   ARTHCO3  10*   ARTBE  -14*     Recent Labs      03/31/17   1326   TROPONINI  <0.01   BNPBTYPENAT  88     Recent Labs      04/01/17   0221 04/02/17 0250 04/03/17   0524   SODIUM  143  143  140   POTASSIUM  3.8  3.9  4.4   CHLORIDE  124*  122*  120*   CO2  12*  13*  12*   BUN  35*  32*  32*   CREATININE  1.99*  1.78*  1.91*   CALCIUM  8.8  8.1*  8.2*     Recent Labs      03/31/17   1326  04/01/17 0221 04/02/17 0250 04/03/17   0524   ALTSGPT  27  28   --    --    ASTSGOT  53*  54*   --    --    ALKPHOSPHAT  267*  250*   --    --    TBILIRUBIN  1.2  0.9   --    --    GLUCOSE  114*  101*  116*  106*     Recent Labs      03/31/17   1339  04/01/17 0221 04/02/17   0250 04/03/17   0524   RBC   --   3.23*  3.12*  3.45*   HEMOGLOBIN   --   9.4*  9.1*  10.1*   HEMATOCRIT   --   29.8*  29.1*  33.0*   PLATELETCT   --   60*  51*  55*   PROTHROMBTM  16.4*   --    --    --    APTT  31.4   --    --    --    INR  1.28*   --    --    --      Recent Labs      03/31/17   1326  04/01/17 0221 04/02/17 0250 04/03/17   0524   WBC  2.8*  3.0*  3.1*  4.0*   NEUTSPOLYS  74.90*  71.30  65.50  61.20   LYMPHOCYTES  14.10*  16.20*  20.00*  21.70*   MONOCYTES  7.10  8.90  9.70  11.70   EOSINOPHILS  2.10  2.60  3.90  4.70   BASOPHILS  0.70  0.70  0.60  0.50   ASTSGOT   53*  54*   --    --    ALTSGPT  27  28   --    --    ALKPHOSPHAT  267*  250*   --    --    TBILIRUBIN  1.2  0.9   --    --            Hemodynamics:  Temp (24hrs), Av.3 °C (97.3 °F), Min:36.1 °C (97 °F), Max:36.4 °C (97.6 °F)  Temperature: 36.1 °C (97 °F)  Pulse  Av.3  Min: 61  Max: 94   Blood Pressure : 124/43 mmHg     Respiratory:    Respiration: 16, Pulse Oximetry: 97 %           Fluids:    Intake/Output Summary (Last 24 hours) at 17 0703  Last data filed at 17 1900   Gross per 24 hour   Intake    716 ml   Output   2750 ml   Net  -2034 ml        GI/Nutrition:  Orders Placed This Encounter   Procedures   • DIET ORDER     Standing Status: Standing      Number of Occurrences: 1      Standing Expiration Date:      Order Specific Question:  Diet:     Answer:  Hepatic [9]     Order Specific Question:  Texture/Fiber modifications:     Answer:  Dysphagia 3(Mechanical Soft)specify fluid consistency(question 6) [3]     Order Specific Question:  Consistency/Fluid modifications:     Answer:  Thin Liquids [3]     Medical Decision Making, by Problem:  Active Hospital Problems    Diagnosis   • Encephalopathy acute [G93.40]     Acute hepatic encephalopathy  - with component of narcotics. With elevated ammonia level, but mentation back to baseline. No asterixis.   - will maintain on lactulose and xifaxan. Will not treat numbers but rather mentation. Will check with CM/SW to make sure xifaxan covered by insurance.   - continue to hold narcotics.     Liver cirrhosis with portal hypertension  - likely due to BAKER. Follows Dr. Brooks of [facility]. Has an appointment on Wed.   - outpatient follow-up with GI. Continue lactulose and xifaxan as above. Continue propranolol.     COPD  - not in acute exacerbation. Continue BD per RT protocol. O2 PRN.    Crohn's disease  - stable.   - outpatient follow-up with GI.     TRAVIS   - continue CPAP    CKD 3  - within baseline.   - continue sodium bicarbonate. BMP in AM if still here.      Metabolic acidosis with respiratory compensation  - due to CKD, cirrhosis. Stable.      HTN  - continue propranolol.     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   - stable. Monitor.      Obesity   - Body mass index is 31.56 kg/(m^2).    Cronic pain with narcotic dependence  - continue to hold narcotics due to encephalopathy.    GERD  - continue PPI.    Dispo - monitor on the floors. Await PT/OT for discharge planning. Ambulate.       Labs reviewed, Medications reviewed and Radiology images reviewed  Loaiza catheter: No Loaiza      DVT Prophylaxis: Contraindicated - High bleeding risk  DVT prophylaxis - mechanical: SCDs  Ulcer prophylaxis: Yes    Assessed for rehab: Patient was assess for and/or received rehabilitation services during this hospitalization

## 2017-04-03 NOTE — DISCHARGE INSTRUCTIONS
Discharge Instructions    Discharged to home by car with relative. Discharged via wheelchair, hospital escort: Yes.  Special equipment needed: Not Applicable    Be sure to schedule a follow-up appointment with your primary care doctor and Dr. Brooks (GI - 2-3 weeks).     Discharge Plan:   Influenza Vaccine Indication: Not indicated: Previously immunized this influenza season and > 8 years of age    I understand that a diet low in cholesterol, fat, and sodium is recommended for good health. Unless I have been given specific instructions below for another diet, I accept this instruction as my diet prescription.   Other diet: Hepatic    Special Instructions: None    · Is patient discharged on Warfarin / Coumadin?   No     · Is patient Post Blood Transfusion?  No  Diet and Hepatitis  Chronic hepatitis infection affects the functioning of your liver and the way your body uses energy from food. Making the proper adjustments in your diet can help to protect your liver and prevent further complications from your condition.  You may also be at an increased risk for malnutrition because of the side effects of hepatitis treatment. Common side effects of hepatitis treatment include poor appetite, nausea, and vomiting. It is important that you eat a balanced diet with enough calories to support your body's energy needs and maintain a healthy weight.  WHAT IS MY PLAN?  Your health care provider or dietitian may make specific diet recommendations based on your condition.  · Sodium (mg/day): ________  · Fluids (oz/day): ________  · Protein (oz/day): ________  · Vitamin and mineral supplements: ____________________________  WHAT DO I NEED TO KNOW ABOUT THIS DIET?  · Limit foods that are high in fat, sugar, and salt (sodium). Do not add extra salt to food.  · Avoid processed foods. Check food labels for dietary information.  · Do not eat uncooked shellfish.  · Avoid dehydration by drinking enough fluid to keep your urine clear or  pale yellow or as directed. Limit your fluid intake only if recommended by your health care provider.  · [place], boil, or bake foods instead of frying. Do not use pots and pans made of iron.  · Try eating frequent small meals instead of three large meals each day.  · Avoid or limit drinks that contain alcohol, such as beer, wine, and hard liquor.  · Maintain a clean meal preparation and eating space. Wash your hands before and after preparing food and eating.  · If you are underweight, your health care provider may recommend a high-calorie diet.  · Take vitamin and mineral supplements only as directed by your dietitian.  · Make any dietary changes that are recommended by your health care provider or your dietitian. You may need to adjust the amount of certain foods and substances that you eat.  WHAT FOODS CAN I EAT?  Grains  Whole-grain bread, tortillas, cereals, and pasta. Brown rice. Oats and oatmeal.  Vegetables  Carrots, broccoli, cabbage, celery, cucumbers, and potatoes. Frozen vegetables. Low-sodium canned vegetables.  Fruits  Apples, bananas, pears, apricots, grapes, and cherries. Canned fruit (in juice or water).  Meats and Other Protein Sources  Lean meat and poultry. Fish. Tofu. Eggs. Nuts and nut butters. Beans.  Dairy  Low-fat yogurt. Low-fat cottage cheese. Low-fat cheese. Milk shakes.  Beverages  Water. Low-fat milk. 100% fruit juice. Low-sodium vegetable juice. Smoothies. Herbal tea.  Condiments  Low-fat mayonnaise. Low-sodium soy sauce.  Sweets and Desserts  Low-fat cookies. Low-fat bran muffins.  Fats and Oils  Canola oil, olive oil, corn oil, sunflower oil, peanut oil, and flaxseed oil.  Other  Baked chips. Whole-grain crackers. Powdered protein supplements (check label for sodium, fat, and iron content).  The items listed above may not be a complete list of recommended foods or beverages. Contact your dietitian for more options.  WHAT FOODS ARE NOT RECOMMENDED?  Food adjustments will be different  for each person with chronic hepatitis infection. Be sure to see a dietitian who can help you determine the specific adjustments that you will need to make for each of the food groups. Foods and food ingredients that are commonly not recommended include:  Grains  Iron-fortified cereals and breads.  Vegetables  Fermented vegetables, such as sauerkraut and pickles. Canned vegetables that are high in sodium. Dark green leafy vegetables, such as spinach and kale.  Fruits  Raisins.  Meats and Other Protein Sources  Red meat. Pork. Chicken. Shellfish. Fish. Beans. Salted nuts. Salted or cured meats.  Condiments  Ketchup. Mustard. Barbecue sauce.  Fats and Oils  Animal fats, such as butter, lard, or ghee. Fruit oils, such as coconut oil or avocado oil.   Other  Salted snacks, such as potato chips or pretzels. Canned soups. Frozen dinners. Processed foods. Multivitamins and supplements that contain iron.   The items listed above may not be a complete list of foods and beverages to avoid. Contact your dietitian for more information.     This information is not intended to replace advice given to you by your health care provider. Make sure you discuss any questions you have with your health care provider.         Depression / Suicide Risk    As you are discharged from this [facility] facility, it is important to learn how to keep safe from harming yourself.    Recognize the warning signs:  · Abrupt changes in personality, positive or negative- including increase in energy   · Giving away possessions  · Change in eating patterns- significant weight changes-  positive or negative  · Change in sleeping patterns- unable to sleep or sleeping all the time   · Unwillingness or inability to communicate  · Depression  · Unusual sadness, discouragement and loneliness  · Talk of wanting to die  · Neglect of personal appearance   · Rebelliousness- reckless behavior  · Withdrawal from people/activities they love  · Confusion- inability  to concentrate     If you or a loved one observes any of these behaviors or has concerns about self-harm, here's what you can do:  · Talk about it- your feelings and reasons for harming yourself  · Remove any means that you might use to hurt yourself (examples: pills, rope, extension cords, firearm)  · Get professional help from the community (Mental Health, Substance Abuse, psychological counseling)  · Do not be alone:Call your Safe Contact- someone whom you trust who will be there for you.  · Call your local CRISIS HOTLINE 059-4073 or 566-479-4570  · Call your local Children's Mobile Crisis Response Team [place] (899) 399-5114 or www.[employer]  · Call the toll free National Suicide Prevention Hotlines   · National Suicide Prevention Lifeline 252-994-XZQS (1148)  · National Hope Line Network 800-SUICIDE (508-6864)

## 2017-04-03 NOTE — CARE PLAN
Problem: Infection  Goal: Will remain free from infection  Outcome: PROGRESSING AS EXPECTED  Pt on PO antibiotics and is showing no signs of new or worsening infection.    Problem: Bowel/Gastric:  Goal: Normal bowel function is maintained or improved  Outcome: PROGRESSING AS EXPECTED  Pt has colostomy in Q, care provided by staff.

## 2017-04-03 NOTE — THERAPY
"Physical Therapy Evaluation completed.   Bed Mobility:  Supine to Sit: Supervised  Transfers: Sit to Stand: Stand by Assist  Gait: Level Of Assist: Stand by Assist with Front-Wheel Walker       Plan of Care: Patient with no further skilled PT needs in the acute care setting at this time and Patient demonstrates safety with mobility in this environment at this time.   Discharge Recommendations: Equipment: No Equipment Needed. Post-acute therapy Discharge to home with outpatient or home health for additional skilled therapy services.    See \"Rehab Therapy-Acute\" Patient Summary Report for complete documentation.     "

## 2017-04-03 NOTE — DISCHARGE PLANNING
Spoke with Dr. Verdugo who states that should the prescription not be affordable to the patient , the Lactulose would suffice. I called [facility] to follow up on the co-pay amount and was told to check back in an hour. When I explained that to the patient and her SO, they chose to take the prescription to the pharmacy prior to being informed of what her copay would be and verbalized understanding re: the Lacutlose.

## 2017-04-03 NOTE — PROGRESS NOTES
Assumed care, assessment complete. Pt is A & O x 4. Pt denies having pain. Fall precautions and appropriate signs in place. Pt oriented to unit routine, call light/phone system and RN extension number provided. Pt educated regarding fall precautions. Bed alarm in use. Pt denies any additional needs at this time. Call light within reach. Pt [place] bathroom with FWW

## 2017-04-03 NOTE — DISCHARGE SUMMARY
HOSPITAL MEDICINE DISCHARGE SUMMARY    Name: Cristina Tenorio  MRN: [id number]  : 1940  Admit Date: 3/31/2017  Discharge Date: 4/3/2017  Attending Provider: Manuel Verdugo M.D.  Primary Care Physician: Roshni Morillo M.D.    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, along with obstructive sleep apnea, CKD stage    III, hypertension, chronic thrombocytopenia, obesity, chronic pain with    narcotic dependence, and GERD.  She was admitted for confusion/altered level    of consciousness.     For further details of history of present illness, past medical, social,    family histories, allergies, and medications, please refer to a copy of    admission note by Dr. Harry Jean-Baptiste on 2017.     HOSPITAL COURSE:  The patient was admitted to the hospitalist service after    being initially evaluated in the emergency department where initial labs were    notable for WBC of 2.8, hemoglobin of 11.8, and platelets of 52, which were    all stable from her chronic level.  Ammonia level was noted to be elevated at    85.  Urinalysis was negative for UTI.  Chest x-ray showed chronic left lower    lobe patchy opacities.  Head CT was obtained, which did  not show any acute    intracranial pathology.  She was felt to have hepatic encephalopathy given her   elevated ammonia level and history of cirrhosis.  She was started on    lactulose and Xifaxan.  Her narcotics were held temporarily.     Her mentation improved, and she had loose stools per colostomy with the    lactulose and Xifaxan.  Per her , her mentation returned to her    baseline.  She remained hemodynamically stable and afebrile.  Although her    ammonia level was fluctuating, her mentation improved, and she did not show    any signs of asterixis.     She was seen by PT and OT who cleared her for discharge and did not recommend    any further skilled needs.  With her clinical improvement, she was deemed    ready to be discharged from the hospital.  She did not have any further    inpatient needs.  Patient felt comfortable going home.     The discharge plan was discussed with the patient and she was agreeable to it.     The patient was subsequently sent home in improved and stable condition.         FOLLOW-UP ISSUES:   1. Cirrhosis/hepatic encephalopathy - she will be started on xifaxan and lactulose, with goal to have continued loose stools per colostomy. Follow-up with GI.      CHANGES IN MANAGEMENT OF CHRONIC CONDITION: As above.     PENDING TESTS: none    FOLLOW-UP TESTS ORDERED POST DISCHARGE: none    DISCHARGE MEDICATIONS:   Medication Sig   rifaximin (XIFAXAN) 550 MG Tab tablet Take 1 Tab by mouth 2 Times a Day.   lactulose 20 GM/30ML Solution Take 15 mL by mouth 3 times a day.   hydrocodone-acetaminophen (NORCO) 5-325 MG Tab per tablet Take 1 Tab by mouth every 8 hours as needed.   propranolol (INDERAL) 10 MG Tab Take 1 Tab by mouth 3 times a day.   sodium bicarbonate (SODIUM BICARBONATE) 650 MG Tab Take 1,300 mg by mouth every day.   omeprazole (PRILOSEC) 20 MG delayed-release capsule Take 1 Cap by mouth 2 times a day.   vitamin D (CHOLECALCIFEROL) 1000 UNIT Tab Take 1,000 Units by mouth  every day.   ferrous sulfate 325 (65 FE) MG tablet Take 325 mg by mouth every day.   ipratropium-albuterol (COMBIVENT RESPIMAT)  MCG/ACT Aero Soln Inhale 1 Puff by mouth every four hours as needed (shortness of breath/wheezing.). Maximum 6 puffs/24 hours.          FOLLOW-UP APPOINTMENTS:   1. PCP in 1-2 weeks.   2. GI (Dr. Brooks) in 2-3 weeks.       For further details on discharge medications, patient education, diet, and activity, please refer to electronic copy of discharge instructions.       TIME SPENT: 40 minutes, with greater than 50% of the time spent on face-to-face encounter, addressing medical issues, coordination of care, counseling, discharge planning, medication reconciliation, and documentation.

## 2017-04-03 NOTE — PROGRESS NOTES
Assumed care of pt, received report from day shift RN, pt assessed.  Pt has no complaints of pain at this time.  Pt is A&O x4.  Pt fall risk, wearing treaded socks, bed locked and in lowest position, bed alarm is on.  Pt instructed to call for assistance prior to getting OOB, pt verbalized understanding.  Call light, phone, and personal belongings within reach.

## 2017-04-04 NOTE — WOUND TEAM
Late entry:  Pt was seen earlier to assess ostomy needs. RN had changed appliance and it was intact @ time of assessment. MD in room to see pt and she is to DC home today. Pt performs self care. Skin is intact underneath per RN. Pt has own supplies @ home and uses a one piece appliance. She has no ostomy needs @ this time.

## 2017-04-04 NOTE — PROGRESS NOTES
Pt d/c'd home with . Pt left via wheelchair. Ostomy care was performed before d/c. Prescriptions were provided with instruction for use. D/c instructions including scheduling a f/u appointment was provided and pt demonstrated understanding.

## 2017-04-04 NOTE — CARE PLAN
Problem: Knowledge Deficit  Goal: Knowledge of the prescribed therapeutic regimen will improve  Intervention: Discuss information regarding therpeutic regimen and document in education  Pt displayed understanding and knowledge about disease process and treatment plan      Problem: Discharge Barriers/Planning  Goal: Patient’s continuum of care needs will be met  Intervention: Explain discharge instructions and medication reconcilliation to patient and significant other/support system  D/c instructions including scheduling a f/u appointment was provided and pt demonstrated understanding.

## 2017-04-07 PROBLEM — N18.4 CHRONIC KIDNEY DISEASE, STAGE IV (SEVERE) (HCC): Status: ACTIVE | Noted: 2017-01-01

## 2017-04-07 PROBLEM — E87.29 HYPERCHLOREMIC METABOLIC ACIDOSIS: Status: ACTIVE | Noted: 2017-01-01

## 2017-04-07 NOTE — MR AVS SNAPSHOT
Cristina Carreno Coby   2017 1:15 PM   Office Visit   MRN: [id number]    Department:  [facility]   Dept Phone:  [phone]    Description:  Female : 1940   Provider:  Arik Vee M.D.           Reason for Visit     Follow-Up           Allergies as of 2017     Allergen Noted Reactions    Sulfa Drugs 2016   Hives, Itching      You were diagnosed with     Hyperchloremic metabolic acidosis   [230633]       Chronic kidney disease, stage IV (severe) (CMS-HCC)   [585.4.ICD-9-CM]       Vitamin D deficiency   [8529305]         Vital Signs     Blood Pressure Pulse Temperature Respirations Height Weight    130/74 mmHg 90 36.6 °C (97.8 °F) (Temporal) 16 1.524 m (5') 72.122 kg (159 lb)    Body Mass Index Smoking Status                31.05 kg/m2 Former Smoker          Basic Information     Date Of Birth Sex Race Ethnicity Preferred Language    1940 Female White Non- English      Your appointments     May 19, 2017  2:15 PM   Follow Up Visit with Arik Vee M.D.   [facility] ([address])    [facility] [address], [address]  [facility] [address] [phone]           You will be receiving a confirmation call a few days before your appointment from our automated call confirmation system.              Problem List              ICD-10-CM Priority Class Noted - Resolved    Essential hypertension, benign I10   2011 - Present    Crohn's disease (HCC) K50.90 Medium  2011 - Present    Acute bronchitis J20.9   3/20/2013 - Present    Hypoxia R09.02   3/20/2013 - Present    COPD (chronic obstructive pulmonary disease) (HCC) J44.9   3/20/2013 - Present    Bronchitis J40   3/30/2014 - Present    Bandemia D72.825   3/30/2014 - Present    Splenomegaly R16.1   2016 - Present    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 D61.818   2016 - Present    TRAVIS on CPAP G47.33 Low  5/3/2016 - Present    Vitamin D deficiency E55.9   2016 -  Present    Acidosis E87.2   9/2/2016 - Present    Thrombocytopenia (HCC) D69.6 Low  9/27/2016 - Present    Liver cirrhosis (CMS-HCC) K74.60   10/5/2016 - Present    Hypomagnesemia E83.42   10/6/2016 - Present    Hypocalcemia E83.51   10/6/2016 - Present    Anemia D64.9   1/13/2017 - Present    Left humeral fracture S42.302A   1/26/2017 - Present    Hepatic cirrhosis (CMS-HCC) K74.60   1/26/2017 - Present    Tremor R25.1   1/28/2017 - Present    Cirrhosis with portal hypertension K74.60   4/1/2017 - Present    GERD (gastroesophageal reflux disease) K21.9   4/1/2017 - Present    Obesity (BMI 30.0-34.9) E66.9   4/1/2017 - Present    Chronic pain with narcotic dependence G89.29   4/1/2017 - Present    Hyperchloremic metabolic acidosis E87.2   4/7/2017 - Present    Chronic kidney disease, stage IV (severe) (CMS-HCC) N18.4   4/7/2017 - Present      Health Maintenance        Date Due Completion Dates    IMM DTaP/Tdap/Td Vaccine (1 - Tdap) 10/6/1960 ---    PAP SMEAR 10/6/1962 ---    MAMMOGRAM 10/6/1981 ---    COLONOSCOPY 10/6/1991 ---    IMM ZOSTER VACCINE 10/6/2001 ---    BONE DENSITY 10/6/2006 ---            Current Immunizations     13-VALENT PCV PREVNAR 10/6/2016    Influenza TIV (IM) 11/15/2013, 11/20/2012    Influenza Vaccine Adult HD 10/6/2016  2:55 PM    Pneumococcal polysaccharide vaccine (PPSV-23) 11/20/2010      Below and/or attached are the medications your provider expects you to take. Review all of your home medications and newly ordered medications with your provider and/or pharmacist. Follow medication instructions as directed by your provider and/or pharmacist. Please keep your medication list with you and share with your provider. Update the information when medications are discontinued, doses are changed, or new medications (including over-the-counter products) are added; and carry medication information at all times in the event of emergency situations     Allergies:  SULFA DRUGS - Hives,Itching                Medications  Valid as of: April 07, 2017 -  1:35 PM    Generic Name Brand Name Tablet Size Instructions for use    Cholecalciferol (Tab) cholecalciferol 1000 UNIT Take 1,000 Units by mouth every day.        Ferrous Sulfate (Tab) ferrous sulfate 325 (65 FE) MG Take 325 mg by mouth every day.        Hydrocodone-Acetaminophen (Tab) NORCO 5-325 MG Take 1 Tab by mouth every 8 hours as needed.        Ipratropium-Albuterol (Aero Soln) COMBIVENT RESPIMAT  MCG/ACT Inhale 1 Puff by mouth every four hours as needed (shortness of breath/wheezing.). Maximum 6 puffs/24 hours.        Lactulose (Solution) lactulose 20 GM/30ML Take 15 mL by mouth 3 times a day.        Omeprazole (CAPSULE DELAYED RELEASE) PRILOSEC 20 MG Take 1 Cap by mouth 2 times a day.        Propranolol HCl (Tab) INDERAL 10 MG Take 1 Tab by mouth 3 times a day.        RifAXIMin (Tab) XIFAXAN 550 MG Take 1 Tab by mouth 2 Times a Day.        Sodium Bicarbonate (Tab) SODIUM BICARBONATE 650 MG Take 2 Tabs by mouth 2 times a day.        .                 Medicines prescribed today were sent to:     [facility] [address] [place] [address] [place], [place] - [address] AT [facility] [employer] [address] [place] [address] [address] [place] [address]    Phone: [phone] Fax: [phone]    Open 24 Hours?: No      Medication refill instructions:       If your prescription bottle indicates you have medication refills left, it is not necessary to call your provider’s office. Please contact your pharmacy and they will refill your medication.    If your prescription bottle indicates you do not have any refills left, you may request refills at any time through one of the following ways: The online [employer] system (except Urgent Care), by calling your provider’s office, or by asking your pharmacy to contact your provider’s office with a refill request. Medication refills are processed only during regular business hours and may not be available until the next business day. Your provider may  request additional information or to have a follow-up visit with you prior to refilling your medication.   *Please Note: Medication refills are assigned a new Rx number when refilled electronically. Your pharmacy may indicate that no refills were authorized even though a new prescription for the same medication is available at the pharmacy. Please request the medicine by name with the pharmacy before contacting your provider for a refill.        Your To Do List     Future Labs/Procedures Complete By Expires    BASIC METABOLIC PANEL  As directed 10/5/2017    CBC WITHOUT DIFFERENTIAL  As directed 10/5/2017    MICROALBUMIN CREAT RATIO URINE  As directed 10/7/2017    PTH INTACT (PTH ONLY)  As directed 4/8/2018    VITAMIN D,25 HYDROXY  As directed 10/8/2017         [employer] Access Code: Activation code not generated  Current [employer] Status: Active

## 2017-04-07 NOTE — PROGRESS NOTES
Subjective:      Cristina Tenorio is a 76 y.o. female who presents with CKD IV Follow-Up            HPI  Patient with with a history of Crohn's disease, CKD IV.         1. CKD stage IV - Cr worse, was recently hospitalized. No proteinuria. Feeling weak, tired.  2. HTN - BP at goal  3. Hyperchloremic acidosis - increased liquid stool in pouch  4. Vitamin D deficiency - Vit D low, taking 3000 u daily       Review of Systems   Constitutional: Negative for fever and chills.   Cardiovascular: Negative for chest pain and palpitations.          Objective:     /74 mmHg  Pulse 90  Temp(Src) 36.6 °C (97.8 °F) (Temporal)  Resp 16  Ht 1.524 m (5')  Wt 72.122 kg (159 lb)  BMI 31.05 kg/m2     Physical Exam   Constitutional: She is oriented to person, place, and time. She appears well-developed and well-nourished.   Cardiovascular: Normal rate and regular rhythm.    Pulmonary/Chest: Effort normal and breath sounds normal.   Neurological: She is alert and oriented to person, place, and time.   Skin: Skin is warm and dry.   Psychiatric: She has a normal mood and affect. Her behavior is normal.               Assessment/Plan:     1. Hyperchloremic metabolic acidosis  Increase sodium bicarbonate to 1300mg BID, d/w patient, will follow    2. Chronic kidney disease, stage IV (severe) (CMS-HCC)  Cr up, increase salt in diet, increased sodium bicarbonate, will follow up with CKD labs. She does appear weak, d/w patient, she will follow up by phone on Monday.    - BASIC METABOLIC PANEL; Future  - CBC WITHOUT DIFFERENTIAL; Future  - PTH INTACT (PTH ONLY); Future  - VITAMIN D,25 HYDROXY; Future  - MICROALBUMIN CREAT RATIO URINE; Future    3. Vitamin D deficiency  Increase vitamin D to 5000 units daily

## 2017-04-11 PROBLEM — Z87.19: Status: ACTIVE | Noted: 2017-01-01

## 2017-04-11 PROBLEM — K76.82 HEPATIC ENCEPHALOPATHY (HCC): Status: ACTIVE | Noted: 2017-01-01

## 2017-04-11 PROBLEM — M25.512 LEFT SHOULDER PAIN: Status: ACTIVE | Noted: 2017-01-01

## 2017-04-11 PROBLEM — E72.20 HYPERAMMONEMIA (HCC): Status: ACTIVE | Noted: 2017-01-01

## 2017-04-11 PROBLEM — R74.01 TRANSAMINITIS: Status: ACTIVE | Noted: 2017-01-01

## 2017-04-11 NOTE — ED NOTES
"Pt bib EMS.   Chief Complaint   Patient presents with   • ALOC     Pt is unable to verbalize person, place, and time. Per EMS, pt is feels hot and has productive cough. Noted oral secretions on chin     Pt placed on monitor. Pt's  states pt has been unresponsive all day today. Pt is lethargic and unresponsive when asked questions. Pt will say \"yes\" but nothing more. Chart up for ERP now.   "

## 2017-04-12 PROBLEM — E87.20 METABOLIC ACIDOSIS, NAG, BICARBONATE LOSSES: Status: ACTIVE | Noted: 2017-01-01

## 2017-04-12 NOTE — PROGRESS NOTES
2 RN skin assessment upon admission done with Charge RN Arthur ENRIQUE. Both ears intact, elbows intact, both arms bruises and discoloration; left upper arm with fractured humerus per report guarded by patient; no sling; abdomen with well healed scar and ileostomy with stoma pink emptied with liquid dark green stool on left upper abdominal quadrant; sacrum reddened and blanching with Mepilex applied to prevent breakdown; groins intact and both heels reddened; left more readily blanching then red will float both heels.

## 2017-04-12 NOTE — RESPIRATORY CARE
COPD EDUCATION by COPD CLINICAL EDUCATOR  4/12/2017 at 6:21 AM by Sulema Barragan     Patient reviewed by COPD education team. Patient does not qualify for COPD program.

## 2017-04-12 NOTE — H&P
[facility] Admitting History and Physical    Name Cristina Tenorio 1940   Age/Sex 76 y.o. female   MRN [id number]   Code Status FULL     After 5PM or if no immediate response to page, please call for cross-coverage  Attending/Team: Dr. Gupta/Felipe Call [phone] to page   1st Call - Day Intern (R1):   Dr. Alegre 2nd Call - Day Sr. Resident (R2/R3):   Dr. Lopez       Chief Complaint:  Altered mental status with confusion for last 3 days     HPI:  This is a 77 yo  female with hx of recent hepatic encephalopathy from 3/31-4/3 and sent home on lactulose/rifaximin with questionable hx of compliance at home, hx of liver cirrhosis due to BAKER from recent liver bx (2016), portal HTN with esophageal varices Grade II (recent obscure GI bleed s/p enteroscopy in 2017 with argon plasma coag at stomach AVMs), chronic pancytopenia due to hypersplenism, CKD IV following nephrology, COPD on home O2 (unknown amount of liters), TRAVIS on CPAP nightly, hx of previous displaced impacted fracture of left humeral neck from shoulder x-ray in 2017 (no surg rec from ortho), and hx of Crohn's disease with ileostomy.    Patient came in with altered mental status for last 3 days per Dave (boyfriend who lives with patient for more than 10 yrs at home). No other symptoms other than physical pain from the left shoulder that she displaced in 2017. Denied recent fall witness from Dave and patient stated she doesn't remember any recent falls. But questionable fall history recently as she kept complaining pain. Dave said patient seemed to be compliant with medications (lactulose/rifaximin/propranolol) at home after recent discharge 1 week ago, but it is still questionable as Dave himself may also have a dementia. Denied any recent GI bleed, chest pain/sob, fever, chills, n/v/d. Although patient had dried food content at the right lower mouth suspicious for vomiting hx.     At ED:  Non-toxic  appearance but looks somewhat confused and altered. But able to answer basic yes/no questions to some of ROS questions. Stable vitals with 98% Sat w/o NC O2.     Labs: WBC 4.2, Hgb 10.5 (baseline Hgb 10) MCV 92, PLT 53 (baseline 50-60), BUN/Cr 42/2.35 (baseline Cr 1.8-2), ammonia 78, AST 65, ALT 35, , Tbill 1.3, Alb 3.6, Mg 1.9, PT/INR 16.2/1.26, CXR no infiltrates or PNA, UA clear for UTI, BCx pending. Got one dose of rocephin 2g IV at the ED for SBP prevention.       Review of Systems   Constitutional: Negative for fever and chills.   Eyes: Negative for blurred vision.   Respiratory: Negative for shortness of breath and wheezing.    Cardiovascular: Negative for chest pain and leg swelling.   Gastrointestinal: Negative for nausea, vomiting, abdominal pain, diarrhea and constipation.        Doesn't remember vomiting    Genitourinary: Negative for dysuria and hematuria.   Musculoskeletal: Positive for joint pain.        Left shoulder pain but patient doesn't remember falling recently    Skin: Negative for rash.   Neurological: Negative for dizziness, sensory change, focal weakness and headaches.   Endo/Heme/Allergies: Bruises/bleeds easily.   Psychiatric/Behavioral: Positive for memory loss. Negative for substance abuse.             Past Medical History:   Past Medical History   Diagnosis Date   • Bowel obstruction (CMS-HCC)    • Crohn's disease (CMS-HCC)    • Indigestion    • Obstruction    • Snoring    • Ileostomy in place (CMS-HCC)    • COPD (chronic obstructive pulmonary disease) (CMS-HCC)      per pt   • COPD (chronic obstructive pulmonary disease) (CMS-HCC) 3/20/2013   • Arthritis 12/30/16     to hands and feet   • Heart burn    • Emphysema of lung (CMS-HCC)      COPD   • Sleep apnea    • TRAVIS on CPAP      10/2016-CPAP DC'd and wears O2 @4L/NC   • Hemorrhagic disorder (CMS-HCC)      anemia of unkown etiology.   • Anemia 12/30/16     unknown etiology   • Renal disorder      Increased creatitine level due  "to meds.   • Breath shortness      uses O2@ at night and prn ([employer]). Uses inhaler prn. 12/30/16-reports no changes    • Cataract 2006,2007     bilat phaco with IOL   • Cirrhosis of liver (CMS-HCC) 12/30/16     states dx at one time but no treatment for >10yrs   • Occasional tremors        Past Surgical History:  Past Surgical History   Procedure Laterality Date   • Cholecystectomy  2013     \"burst\"   • Bowel resection  1992     with ileostomy (right side)   • Ileostomy  2010     moved to left side   • Recovery  6/21/2010     Performed by SURGERY, IR-RECOVERY at SURGERY SAME DAY [facility]   • Sigmoidoscopy flex  9/27/2016     Procedure: SIGMOIDOSCOPY FLEX;  Surgeon: Aftab Alegre M.D.;  Location: [facility];  Service:    • Gastroscopy-endo  9/27/2016     Procedure: GASTROSCOPY-ENDO;  Surgeon: Aftab Alegre M.D.;  Location: [facility];  Service:    • Gastroscopy wiithsavary dilatation  9/28/2016     Procedure: GASTROSCOPY WIITH SAVARY DILATATION;  Surgeon: Aftab Alegre M.D.;  Location: [facility];  Service: Gastroenterology   • Gastroscopy w/push enterscopy  9/28/2016     Procedure: GASTROSCOPY W/PUSH ENTERSCOPY;  Surgeon: Aftab Alegre M.D.;  Location: ENDOSCOPY [facility];  Service:    • Gastroscopy-endo N/A 10/5/2016     Procedure: GASTROSCOPY-ENDO;  Surgeon: Aravind Roman M.D.;  Location: [facility];  Service:    • Umbilical hernia repair  2000   • Colonoscopy       Hx of  several    • Other surgical procedure  1994     closed off rectum    • Gastroscopy N/A 1/13/2017     Procedure: GASTROSCOPY - ENTEROSCOPY PUSH;  Surgeon: Boni Brooks M.D.;  Location: SURGERY [facility];  Service:        Current Outpatient Medications:  Home Medications     Reviewed by Charlette Quesada (Pharmacy Tech) on 04/11/17 at 1927  Med List Status: Complete    Medication Last Dose Status    ferrous sulfate 325 (65 FE) MG " tablet unknown Active    hydrocodone-acetaminophen (NORCO) 5-325 MG Tab per tablet unknown Active    ipratropium-albuterol (COMBIVENT RESPIMAT)  MCG/ACT Aero Soln unknown Active    lactulose 20 GM/30ML Solution unknown Active    omeprazole (PRILOSEC) 20 MG delayed-release capsule unknown Active    propranolol (INDERAL) 10 MG Tab unknown Active    rifaximin (XIFAXAN) 550 MG Tab tablet unknown Active    sodium bicarbonate (SODIUM BICARBONATE) 650 MG Tab unknown Active    vitamin D (CHOLECALCIFEROL) 1000 UNIT Tab unknown Active                Medication Allergy/Sensitivities:  Allergies   Allergen Reactions   • Sulfa Drugs Hives and Itching         Family History:  No family history on file.   I couldn't obtain family history due to altered mental status of patient.    Social History:  Social History     Social History   • Marital Status:      Spouse Name: N/A   • Number of Children: N/A   • Years of Education: N/A     Occupational History   • Not on file.     Social History Main Topics   • Smoking status: Former Smoker -- 50 years     Types: Cigarettes, Cigars     Quit date: 03/30/2013   • Smokeless tobacco: Not on file      Comment: 50yrs 1.5 ppd quit 2009   • Alcohol Use: No      Comment: socially   • Drug Use: No   • Sexual Activity: Not on file     Other Topics Concern   • Not on file     Social History Narrative       Living situation: [place] with boyfriend (Dave)  PCP : Roshni Morillo M.D.      Physical Exam     Filed Vitals:    04/11/17 1730 04/11/17 1801 04/11/17 1838 04/11/17 1900   Pulse: 85 90 93 92   Resp: 14 18 15 17   Height:       Weight:       SpO2: 97% 96% 99% 98%     Body mass index is 31.25 kg/(m^2).  Pulse 92  Resp 17  Ht 1.524 m (5')  Wt 72.576 kg (160 lb)  BMI 31.25 kg/m2  SpO2 98%  O2 therapy: Pulse Oximetry: 98 %    Physical Exam   Constitutional: No distress.   Obese lady, non-toxic appearance, but seems overall confused, very slow response in verbal communication   HENT:    Head: Normocephalic and atraumatic.   Mouth/Throat: No oropharyngeal exudate.   Dry oral mucosa and residual dried food content near her right lower jaw   Eyes: Conjunctivae and EOM are normal. Pupils are equal, round, and reactive to light. Right eye exhibits no discharge. Left eye exhibits no discharge. No scleral icterus.   Neck: Normal range of motion. No JVD present.   Thick neck circumference    Pulmonary/Chest: No stridor.   Abdominal: Soft. Bowel sounds are normal. She exhibits no distension and no mass. There is no tenderness. There is no rebound and no guarding.   Ileostomy [facility] at [facility] with greenish stool visualized without sadiq blood noted, longitudinal surgical scars noted at the central abd region    Musculoskeletal: She exhibits tenderness. She exhibits no edema.   Left shoulder deformity noted without edema as her humeral head is displaced anteriorly, pt unwilling to move her left arm due to pain and mild tenderness as I palpated deep at the shoulder joint (AC)   Neurological: She is alert. No cranial nerve deficit.   Alert, awake, but not oriented to herself, time and place   Skin: Skin is dry. She is not diaphoretic.   Bruises seen at the left forearm and bilateral hands   Psychiatric:   Confused         Data Review       Old Records Request:   Completed  Current Records review and summary: Completed      Lab Data Review:  Recent Results (from the past 24 hour(s))   Lactic acid (lactate)    Collection Time: 04/11/17  4:50 PM   Result Value Ref Range    Lactic Acid 2.1 (H) 0.5 - 2.0 mmol/L   COMP METABOLIC PANEL    Collection Time: 04/11/17  4:50 PM   Result Value Ref Range    Sodium 138 135 - 145 mmol/L    Potassium 4.6 3.6 - 5.5 mmol/L    Chloride 119 (H) 96 - 112 mmol/L    Co2 10 (L) 20 - 33 mmol/L    Anion Gap 9.0 0.0 - 11.9    Glucose 108 (H) 65 - 99 mg/dL    Bun 42 (H) 8 - 22 mg/dL    Creatinine 2.35 (H) 0.50 - 1.40 mg/dL    Calcium 8.8 8.5 - 10.5 mg/dL    AST(SGOT) 66 (H) 12 - 45 U/L     ALT(SGPT) 35 2 - 50 U/L    Alkaline Phosphatase 281 (H) 30 - 99 U/L    Total Bilirubin 1.3 0.1 - 1.5 mg/dL    Albumin 3.6 3.2 - 4.9 g/dL    Total Protein 6.3 6.0 - 8.2 g/dL    Globulin 2.7 1.9 - 3.5 g/dL    A-G Ratio 1.3 g/dL   ESTIMATED GFR    Collection Time: 04/11/17  4:50 PM   Result Value Ref Range    GFR If  24 (A) >60 mL/min/1.73 m 2    GFR If Non African American 20 (A) >60 mL/min/1.73 m 2   CBC WITH DIFFERENTIAL    Collection Time: 04/11/17  5:10 PM   Result Value Ref Range    WBC 4.2 (L) 4.8 - 10.8 K/uL    RBC 3.55 (L) 4.20 - 5.40 M/uL    Hemoglobin 10.5 (L) 12.0 - 16.0 g/dL    Hematocrit 32.5 (L) 37.0 - 47.0 %    MCV 91.5 81.4 - 97.8 fL    MCH 29.6 27.0 - 33.0 pg    MCHC 32.3 (L) 33.6 - 35.0 g/dL    RDW 55.0 (H) 35.9 - 50.0 fL    Platelet Count 53 (L) 164 - 446 K/uL    MPV 10.8 9.0 - 12.9 fL    Neutrophils-Polys 78.50 (H) 44.00 - 72.00 %    Lymphocytes 12.90 (L) 22.00 - 41.00 %    Monocytes 6.20 0.00 - 13.40 %    Eosinophils 1.70 0.00 - 6.90 %    Basophils 0.50 0.00 - 1.80 %    Immature Granulocytes 0.20 0.00 - 0.90 %    Nucleated RBC 0.00 /100 WBC    Neutrophils (Absolute) 3.27 2.00 - 7.15 K/uL    Lymphs (Absolute) 0.54 (L) 1.00 - 4.80 K/uL    Monos (Absolute) 0.26 0.00 - 0.85 K/uL    Eos (Absolute) 0.07 0.00 - 0.51 K/uL    Baso (Absolute) 0.02 0.00 - 0.12 K/uL    Immature Granulocytes (abs) 0.01 0.00 - 0.11 K/uL    NRBC (Absolute) 0.00 K/uL   AMMONIA    Collection Time: 04/11/17  5:10 PM   Result Value Ref Range    Ammonia 78 (H) 11 - 45 umol/L   URINALYSIS    Collection Time: 04/11/17  5:25 PM   Result Value Ref Range    Color Yellow     Character Clear     Specific Gravity 1.011 <1.035    Ph 6.0 5.0-8.0    Glucose Negative Negative mg/dL    Ketones Negative Negative mg/dL    Protein Negative Negative mg/dL    Bilirubin Negative Negative    Nitrite Negative Negative    Leukocyte Esterase Negative Negative    Occult Blood Negative Negative    Micro Urine Req see below         Imaging/Procedures Review:    ndependant Imaging Review: Completed  DX-CHEST-PORTABLE (1 VIEW)   Final Result      1.  Mild bibasilar atelectasis.      2.  Elevated left hemidiaphragm.      DX-SHOULDER 2+ LEFT    (Results Pending)        EKG:   EKG Independant Review: Completed  No new EKG done at the time of my encounter at the ED with patient. So ordered a new one.    (x) Records reviewed and summarized in current documentation         Assessment/Plan     # Acute hepatic encephalopathy with hyperammonemia  # Transaminitis  # Hx of liver cirrhosis due to BAKER with portal hypertension and esophageal varices Grade II  - recent hospital admission from 3/31-4/3 for hepatic encephalopathy and discharged with lactulose and rifaximin, however questionable compliance at home, live with Dave (boyfriend), and sometimes caregiver comes at home  - per Dave (boyfriend who lives with a patient at home together) -- worsened mentation compared to baseline  - hx of obscure GI bleed and enteroscopy done in 01/2017 by OLIVIA ALEJANDRE from recent liver bx (09/2016)  - ammonia 78 (discharged with 87), AST 65, ALT 35, , Tbill 1.3, Alb 3.6, Mg 1.9, PT/INR 16.2/1.26, CXR no infiltrates or PNA, UA clear for UTI, BCx pending -- no signs of sepsis, got one dose of C3 at ED for SBP prevention, no signs of bleed as complications from cirrhosis, no electrolytes abnormalities except acidosis with acute on CKD   PLANS:  - admit patient to [facility] floor   - lactulose (titrate bw 3-5 daily)/rifaximin for hyperammonemia (level fluctuated last admission, but clinically responded last time)  - propranolol as she has esophageal varices, Grade II with portal HTN   - obtain EKG, F/U Bcx to rule out infectious etiology  - US abd to look for ascites, will hold off SBP abx empirically yet as she didn't clinically complain any abdominal pain or signs of peritonitis   - gentle IV fluid for hydration  - DVT prophylaxis with SCDs as she is high risk for  bleed (consider heparin when Hgb stable without bleed)  - NPO with sips with meds and bedside swallow eval with seizure/fall/aspiration precautions (consider starting diet if she passes with better mentation)  - day team to re-assess the code status as mentation improves and complete advance directives and establish POA  - patient follows [facility] outpatient (consider calling if any further bleed or drop in Hgb noted)  - consulted  (Not sure how patient and her boyfriend (Dave) take care of each other and ?compliance issue taking meds)      # Acute on CKD IV  # Hyperchloremic metabolic acidosis due to underlying CKD  - BUN/Cr 42/2.35 (baseline Cr 1.8-2) --> mild acute injury likely from ?pre-renal as she could have vomited early on with less fluid intake at home   - avoid nephrotoxin medications and renally dose    - following nephro outpatient, no need for HD yet      # 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, chronic  - WBC 4.2, Hgb 10.5 MCV 92, PLT 53  - hx of obscure GI bleed and enteroscopy done in 01/2017 by GI --  argon plasma coag at stomach AVMs done  - chronic condition for her, no obvious GI bleed recently  - gentle IV fluid hydration and monitor kidney function       # Left shoulder pain  - hx of previous displaced impacted fracture of left humeral neck from shoulder x-ray in 01/2017 and ortho rec said sling without surgery  - questionable another fall at this admission? -- Dave denied any recent falls and patient doesn't recall any further falls from her memory   - ordered repeat DX-shoulder 2 view for evaluating any new fractures (consider ortho if new fx seen)  - started tramadol for pain control PRN   - PT/OT eval/treat for shoulder pain      # Chronic pain, narcotic dependent  - takes norco at home  - will hold it for now as patient can get more altered with narcotics  - will start tramadol instead       # TRAVIS on CPAP, nightly  # Obesity    # COPD  - not sure how many oxygen level she  is on  - on CPAP nightly   - stable, RT PRN and IS  - recommend weight loss upon discharge       # GERD  - cont oral PPI      # Hx of Crohn's disease with ileostomy  - on ileostomy more than 10 yrs   - no complications recently  - no sadiq blood seen at the ostomy  - ctm        Disposition: Inpatient management for hepatic encephalopathy and other chronic medical issues including acidosis and acute kidney injury    Anticipated Hospital stay:  >2 midnights    Quality Measures  EKG reviewed, Labs reviewed, Medications reviewed and Radiology images reviewed  Loaiza catheter: No Loaiza      DVT Prophylaxis: Contraindicated - High bleeding risk  DVT prophylaxis - mechanical: SCDs  Ulcer prophylaxis: Yes  : Got one dose of rocephin at the ED for preventing potential SBP in the setting of cirrhosis.

## 2017-04-12 NOTE — DISCHARGE PLANNING
Medical Social Work    Referral: Assessment on Readmission    Intervention: Pt readmitted for the same dx; was previously hospitalized and dc 3/31/17 . Social work intern received call from Bedside RN expressing concern for pt's dc. RN stated the pt is acting confused, and pt's significant other may possibly be confused as well. Social work intern STALIN Michael and Rosario BECKMAN met with pt and pt's significant other. Pt was AOx1; information was obtained from significant other Dave Brush ([phone] or [phone]). Dave appeared AOx4 and in good health, fully answering questions and expressing concern for pt. Dave and pt live together at [address] [place] [address]. Dave buys groceries and cooks for pt. Pt and significant other have family out of state.  Pt owns a walker and cane; uses O2 at night; has Medicare and [employer] ins. Dave stated the pt had Home Health prior to admission.    Plan: Pt to DC home with help.     Care Transition Team Assessment    Information Source  Orientation : Disoriented to Event, Disoriented to Person, Disoriented to Time  Information Given By: Significant Other  Informant's Name:  (Dave Brush)  Who is responsible for making decisions for patient? : Patient    Readmission Evaluation  Is this a readmission?: Yes - unplanned readmission  Why do you think you were readmitted?:  (Admitted for the same diagnosis. )    Elopement Risk  Legal Hold: No  Ambulatory or Self Mobile in Wheelchair: No-Not an Elopement Risk  Elopement Risk: Not at Risk for Elopement         Discharge Preparedness  What is your plan after discharge?: Home with help  Prior Functional Level: Needs Assist with Activities of Daily Living, Uses Cane, Uses Walker  Difficulity with ADLs: None  Difficulity with IADLs: Cooking, Keeping track of finances, Laundry, Shopping    Functional Assesment  Prior Functional Level: Needs Assist with Activities of Daily Living, Uses Cane, Uses Walker    Finances  Financial  Barriers to Discharge: No         Values / Beliefs / Concerns  Values / Beliefs Concerns : No         Domestic Abuse  Have you ever been the victim of abuse or violence?: Not Sure    Psychological Assessment  History of Substance Abuse: None  History of Psychiatric Problems: No  Non-compliant with Treatment: No  Newly Diagnosed Illness: No    Discharge Risks or Barriers  Discharge risks or barriers?: No  Patient risk factors: Complex medical needs, Lack of outside supports, Readmission, Vulnerable adult

## 2017-04-12 NOTE — ASSESSMENT & PLAN NOTE
Hx of cirrhosis 2/2 BAKER with portal HTN and esophageal varices grade II  Ammonia levels remain elevated 80s  Possible 2/2 medication non-compliance vs opiod use: started on opiods (recent fracture), lactulose and rifaximen at last admit in March 2017.     -Today per friend mentation slightly improved  -She is A&O x3, but has slow responses  -ABD US: no peritoneal fluid    Plan:  -pending FOBT to help rule out SBP  -Continue home meds and CTM mentation status and ammonia levels during stay  -May need daily home health visit to ensure medication compliance.   -Speech therapy states pt able to swallow with strict 1:1 feeding

## 2017-04-12 NOTE — ED NOTES
Patient now clearly stating her name, that she is at [facility], and that the president is Ignacia. Patient states that the year is 1941.

## 2017-04-12 NOTE — PROGRESS NOTES
Senior Admit Note     Ms. 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 He reports acute onset of confusion and mentation changes starting this morning. She was recently hospitalized and discharged on 31 March with the diagnosis of acute hepatic encephalopathy. In the emergency room, she was oriented to person and place but not to time or situation. She was very difficult to obtain history from but reported taking her medications as prescribed. The friend was unfortunately able to provide additional information. He recollects her being in her normal state of health up until last night/this morning, when she awoke but did not want to get out of bed and was less conversant and more confused than usual. He may himself be a poor historian--as he reported different findings to the emergency room nursing staff.     ROS:   Patient denied any chest pain, shortness of breath, abdominal pain, nausea, vomiting or diarrhea.  The patient's only complaint in the emergency room was pain in the left arm. She recently was hospitalized for a fractured left humerus and seen by orthopedics with recommendations for nonoperative management. Arm was previously managed with the sling, which the friend reports was discontinued by the orthopedist.    Physical Exam:   Vitals: Afebrile, HR 93, respiratory rate 19 blood pressure 140-150s/60s  General: non-toxic apeparnce. Resting comfortably in bed. Confused/altered.   Awake, answers questions slowly. Often has to be reoriented.  HEENT: EOMI. Scleral clear.  CVS: normal S1, S2. NSR. No m/r/g. No JVD.   PULM: CTABL . No w/r/r. No basilar crackles.   ABD: soft, NT, ND. +BS.   : No wyatt. No suprapubic tenderness. No CVA tenderness.   EXT: no LE edema b/l. No cyanosis.  Neuro: Limited 2/2:  Difficult to assess  neurological status as the patient would not follow directions to allow for appropriate neurological examination. She was able to move right upper extremity more than left upper extremity. She was able to wiggle toes on both feet.   Pysch: AAOx2--person and place.  Skin: no rashes.     Recent Labs      04/11/17   1710   WBC  4.2*   RBC  3.55*   HEMOGLOBIN  10.5*   HEMATOCRIT  32.5*   MCV  91.5   MCH  29.6   RDW  55.0*   PLATELETCT  53*   MPV  10.8   NEUTSPOLYS  78.50*   LYMPHOCYTES  12.90*   MONOCYTES  6.20   EOSINOPHILS  1.70   BASOPHILS  0.50     Recent Labs      04/11/17   1650   SODIUM  138   POTASSIUM  4.6   CHLORIDE  119*   CO2  10*   GLUCOSE  108*   BUN  42*     Recent Labs      04/11/17   1650  04/11/17   1710   ASTSGOT  66*   --    ALTSGPT  35   --    TBILIRUBIN  1.3   --    ALKPHOSPHAT  281*   --    GLOBULIN  2.7   --    INR   --   1.26*   AMMONIA   --   78*     DX-SHOULDER 2+ LEFT   Final Result      1.  No acute left shoulder fracture or dislocation identified.      2.  Chronic inferomedial subluxation of the left humeral head and old appearing fracture of the neck of the left humeral head.      DX-CHEST-PORTABLE (1 VIEW)   Final Result      1.  Mild bibasilar atelectasis.      2.  Elevated left hemidiaphragm.      US-ABDOMEN COMPLETE SURVEY    (Results Pending)     A/P:   1. Acute encephalopathy query from over medication (opiates) v. Hyperammonemia from non-compliance (lactulose/rifaximen)   2. Acute on chronic kidney injury  3. Non-anion gap acidosis query kidney injury v. 2/2 chronic ileostomy  4. Pancytopenia   5. History of Cirrhosis 2/2 BAKER with portal hypertension and esophageal varices (grade II)  6. History of COPD (2L oxygen)   7. History of portal hypertension without ascites   8. History of left humeral fracture (Jan 2017) - non-operative management     Patient admitted after presenting with altered mental status. The etiology of her altered mental status is somewhat unclear and  unfortunately her history and the history provided by her friend are limited/unclear. Chart review shows similar presentation at the end of last month at which time she was found to have acute encephalopathy secondary to hyperammonemia. She was treated with lactulose and rifaximin with marked improvement in mentation and discharge from the hospital a few days after.     Narcan was given in the ED today with no significant improvement in mentation. Ileostomy bag showed greenish colored loose output. Her ammonia level is elevated but was unable to assess asterixis on physical exam secondary to mentation changes. Despite her mentation changes, she does not appear toxic. She is afebrile and does not have any abdominal tenderness but SBP should be considered. A repeat ultrasound the abdomen was ordered to evaluate for possibility of ascites. Should she develop fevers, we will consider empirically treating her on abx for SBP. She has acute on chronic kidney injury which I suspect is prerenal in nature. She was given fluids in the ED and will be continued on gentle IV hydration. Consider albumin if suspicion for hepatorenal/failure of improvement after IV fluids. We will investigate other reversible causes of encephalopathy.    PLAN:   - admit to tele   - MELD: 22  - s/p narcan with minimal/no improvement  - f/u urinalysis, abdominal U/S (consider diagnostic para if ascites), urine drug screen, TEG study     - gentle IV hydration (consider albumin if developing hepatorenal)   - continue lactulose, rifaximen, propanolol (Push enteroscopy 1/2017: eso varices, AVMs, portal HTN gastropathy)   - repeat XR of shoulder: chronic fracture, no new changes   - hold pain medications/ avoid neurosedatives     Please refer to Dr. Lam's H&P for complete details and management of chronic stable issues.

## 2017-04-12 NOTE — ASSESSMENT & PLAN NOTE
- On 2L O2 at home  - on CPAP nightly for TRAVIS  - stable, RT PRN and IS  - recommend weight loss upon discharge

## 2017-04-12 NOTE — CARE PLAN
Problem: Bowel/Gastric:  Goal: Normal bowel function is maintained or improved  Outcome: PROGRESSING AS EXPECTED  Monitor ileostomy site

## 2017-04-12 NOTE — THERAPY
"Physical Therapy Evaluation completed.   Bed Mobility:  Supine to Sit: Moderate Assist  Transfers: Sit to Stand: Contact Guard Assist  Gait: Level Of Assist: Minimal Assist (min to CGA ) with No Equipment Needed       Plan of Care: Will benefit from Physical Therapy 3 times per week  Discharge Recommendations: Equipment: Will Continue to Assess for Equipment Needs. Post-acute therapy Discharge to a transitional care facility for continued skilled therapy services. and Discharge to home with outpatient or home health for additional skilled therapy services.    See \"Rehab Therapy-Acute\" Patient Summary Report for complete documentation.     "

## 2017-04-12 NOTE — ASSESSMENT & PLAN NOTE
BUN 42>>39, Creatinine 2.35>>2 after IVF - Likely prerenal - FENA .09 - encourage PO fluids  Chronic elevations in BUN/Cr likely 2/2 portal HTN and abdominal circulation changes in chronic conditions  GFR shows no acute changes from baseline  No indications for dialysis at this time  Continue to follow with nephro outpatient

## 2017-04-12 NOTE — THERAPY
"Occupational Therapy Evaluation completed.   Functional Status:  Pt presenting to skilled services with mild to moderate deficits with ADLs 2/2 decline in cognition, impaired balance, generalized weakness and LUE ROM impaired following L humeral fx(non-operative, Jan 2017, unclear POC). Pt would highly benefit from ORTHO consult post humeral fx resulting in ~1 finger shoulder suluxation with unclear protocol for progression and wb status. Pt performed bed mobility with min/mod a, ambulated to BR with cga using SPC, toilet t/f min a with use of gb's and sequencing, required cues to initiate handwashing post voiding, oral care with cga and cues for sequencing for thoroughness. Pt would benefit from skilled acute services while in house and  given this is pt's second admission, appears to have  questionable follow through medically per MD notes, unclear quality at which pt's was perfoming ADLs, might benefit  from post acute skilled services prior to d/c home.    Plan of Care: Will benefit from Occupational Therapy 3 times per week  Discharge Recommendations:  Equipment: Will Continue to Assess for Equipment Needs. Post-acute therapy see above note    See \"Rehab Therapy-Acute\" Patient Summary Report for complete documentation.    "

## 2017-04-12 NOTE — THERAPY
"Speech Language Therapy Clinical Swallow Evaluation completed.    Functional Status: Orders received and verified for a clinical swallow evaluation. RN stating OK to proceed with evaluation. Patient awake and AAOx2-3 with confusion regarding day and reason. Oral motor examination revealed no overt structural deficits. PO trials of ice chips, NTL, puree, pudding, and soft solids resulted in slight increase WOB in ~50% of trials. However no other s/sx observed and voice remained clear through all trials. She required pacing and slow feeding rate during trials to reduce WOB. Patient had delay swallow trigger of 5 seconds with pudding however voice remained clear throughout trials. Mastication with soft solids was minimally delayed however appeared functional. No oral residue noted. PO trials of 6 oz thin liquids resulted in immediate throat clearing followed by delayed coughing during 2 instances which is concerning for fatigue during meal. Patient with generalized weakness and hand tremors requiring 1:1 assistance with feeding. At this time, patient appears at the level for a D2/NTL. Please d/c PO with any s/sx of aspiration.     Recommendations - Diet: Dysphagia II, Nectar Thick Liquids                             Strategies: Strict 1:1 feeding , No Straws and Head of Bed at 90 Degrees                            Medication Administration: Float Whole in Puree     Plan of Care: Will benefit from Speech Therapy 3 times per week    Post-Acute Therapy: Discharge to home with outpatient or home health for additional skilled therapy services.    See \"Rehab Therapy-Acute\" Patient Summary Report for complete documentation. Thank you for the consult.         "

## 2017-04-12 NOTE — CARE PLAN
Problem: Safety  Goal: Will remain free from injury  Outcome: PROGRESSING AS EXPECTED  Patient will not fall this admission    Problem: Bowel/Gastric:  Goal: Normal bowel function is maintained or improved  Outcome: PROGRESSING AS EXPECTED  Monitor and record ileostomy site and output

## 2017-04-12 NOTE — ED PROVIDER NOTES
"ED Provider Note    Scribed for Dr. Arsen Mchugh M.D. by Connor Escalona. 4/11/2017  5:06 PM    Primary care provider: Roshni Morillo M.D.  Means of arrival: EMS  History obtained from: Patient's   History limited by: ALOC    CHIEF COMPLAINT  Chief Complaint   Patient presents with   • ALOC     Pt is unable to verbalize person, place, and time. Per EMS, pt is feels hot and has productive cough. Noted oral secretions on chin       HPI  Cristina Tenorio is a 76 y.o. female who presents to the Emergency Department by ambulance for altered level of consciousness onset 3 days ago. The patient's  states the patient has had a similar episode of altered level of consciousness 2 weeks ago and was admitted to [facility] at that time. According to her  she improved but however For the past 3 days, per , the patient has had an altered mental status and lethargic. Today, he says the patient has been unresponsive all day. EMS was called. Per EMS, patient felt hot and has productive cough. She will say \"yes\" but nothing more. See below for past medical history.  denies cough    HPI is limited secondary to the patient's altered level of consciousness.       REVIEW OF SYSTEMS  Limited secondary to the patient's altered level of consciousness.     See HPI for further details.     PAST MEDICAL HISTORY   has a past medical history of Bowel obstruction (CMS-HCC); Crohn's disease (CMS-HCC); Indigestion; Obstruction; Snoring; Ileostomy in place (CMS-HCC); COPD (chronic obstructive pulmonary disease) (CMS-HCC); COPD (chronic obstructive pulmonary disease) (CMS-HCC) (3/20/2013); Arthritis (12/30/16); Heart burn; Emphysema of lung (CMS-HCC); Sleep apnea; TRAVIS on CPAP; Hemorrhagic disorder (CMS-HCC); Anemia (12/30/16); Renal disorder; Breath shortness; Cataract (2006,2007); Cirrhosis of liver (CMS-HCC) (12/30/16); and Occasional tremors.    SURGICAL HISTORY   has past surgical history that " includes cholecystectomy (2013); bowel resection (1992); ileostomy (2010); recovery (6/21/2010); sigmoidoscopy flex (9/27/2016); gastroscopy-endo (9/27/2016); gastroscopy wiithsavary dilatation (9/28/2016); gastroscopy w/push enterscopy (9/28/2016); gastroscopy-endo (N/A, 10/5/2016); umbilical hernia repair (2000); colonoscopy; other surgical procedure (1994); and gastroscopy (N/A, 1/13/2017).    SOCIAL HISTORY  Social History   Substance Use Topics   • Smoking status: Former Smoker -- 50 years     Types: Cigarettes, Cigars     Quit date: 03/30/2013      Comment: 50yrs 1.5 ppd quit 2009   • Alcohol Use: No      Comment: socially      History   Drug Use No       FAMILY HISTORY  No family history noted    CURRENT MEDICATIONS  No current facility-administered medications for this encounter.    Current outpatient prescriptions:   •  sodium bicarbonate (SODIUM BICARBONATE) 650 MG Tab, Take 2 Tabs by mouth 2 times a day., Disp: 120 Tab, Rfl: 11  •  rifaximin (XIFAXAN) 550 MG Tab tablet, Take 1 Tab by mouth 2 Times a Day., Disp: 60 Tab, Rfl: 1  •  lactulose 20 GM/30ML Solution, Take 15 mL by mouth 3 times a day., Disp: 90 Each, Rfl: 2  •  hydrocodone-acetaminophen (NORCO) 5-325 MG Tab per tablet, Take 1 Tab by mouth every 8 hours as needed., Disp: 20 Tab, Rfl: 0  •  propranolol (INDERAL) 10 MG Tab, Take 1 Tab by mouth 3 times a day., Disp: 90 Tab, Rfl: 2  •  omeprazole (PRILOSEC) 20 MG delayed-release capsule, Take 1 Cap by mouth 2 times a day., Disp: 60 Cap, Rfl: 2  •  vitamin D (CHOLECALCIFEROL) 1000 UNIT Tab, Take 1,000 Units by mouth every day., Disp: , Rfl:   •  ferrous sulfate 325 (65 FE) MG tablet, Take 325 mg by mouth every day., Disp: , Rfl:   •  ipratropium-albuterol (COMBIVENT RESPIMAT)  MCG/ACT Aero Soln, Inhale 1 Puff by mouth every four hours as needed (shortness of breath/wheezing.). Maximum 6 puffs/24 hours., Disp: 1 Inhaler, Rfl: 6    ALLERGIES  Allergies   Allergen Reactions   • Sulfa Drugs Hives  and Itching       PHYSICAL EXAM  VITAL SIGNS: Pulse 93  Resp 19  Ht 1.524 m (5')  Wt 72.576 kg (160 lb)  BMI 31.25 kg/m2  SpO2 96%    Constitutional: Well developed, Well nourished, Non-toxic appearance. Somnolent. Responds to yes and no questions at times.   HENT: Normocephalic, Atraumatic, Bilateral external ears normal, Oropharynx moist, No oral exudates.   Eyes: PERRLA, EOMI, Conjunctiva normal, No discharge. Scleral icterus.   Neck: No tenderness, Supple, No stridor.   Lymphatic: No lymphadenopathy noted.   Cardiovascular: Normal heart rate, Normal rhythm.   Thorax & Lungs: Clear to auscultation bilaterally, No respiratory distress, No wheezing, No crackles.   Abdomen: Soft, No tenderness, No masses, No pulsatile masses. Output in colostomy bag.   Skin: Warm, Dry, No erythema, No rash. Jaundice.   Extremities:, No edema No cyanosis.   Musculoskeletal: No tenderness to palpation or major deformities noted.  Intact distal pulses  Neurologic: Unable to cooperate for neurologic testing. Responds yes and no questions at times.   Psychiatric: Unable to assess.     LABS  Results for orders placed or performed during the hospital encounter of 04/11/17   Lactic acid (lactate)   Result Value Ref Range    Lactic Acid 2.1 (H) 0.5 - 2.0 mmol/L   CBC WITH DIFFERENTIAL   Result Value Ref Range    WBC 4.2 (L) 4.8 - 10.8 K/uL    RBC 3.55 (L) 4.20 - 5.40 M/uL    Hemoglobin 10.5 (L) 12.0 - 16.0 g/dL    Hematocrit 32.5 (L) 37.0 - 47.0 %    MCV 91.5 81.4 - 97.8 fL    MCH 29.6 27.0 - 33.0 pg    MCHC 32.3 (L) 33.6 - 35.0 g/dL    RDW 55.0 (H) 35.9 - 50.0 fL    Platelet Count 53 (L) 164 - 446 K/uL    MPV 10.8 9.0 - 12.9 fL    Neutrophils-Polys 78.50 (H) 44.00 - 72.00 %    Lymphocytes 12.90 (L) 22.00 - 41.00 %    Monocytes 6.20 0.00 - 13.40 %    Eosinophils 1.70 0.00 - 6.90 %    Basophils 0.50 0.00 - 1.80 %    Immature Granulocytes 0.20 0.00 - 0.90 %    Nucleated RBC 0.00 /100 WBC    Neutrophils (Absolute) 3.27 2.00 - 7.15 K/uL     Lymphs (Absolute) 0.54 (L) 1.00 - 4.80 K/uL    Monos (Absolute) 0.26 0.00 - 0.85 K/uL    Eos (Absolute) 0.07 0.00 - 0.51 K/uL    Baso (Absolute) 0.02 0.00 - 0.12 K/uL    Immature Granulocytes (abs) 0.01 0.00 - 0.11 K/uL    NRBC (Absolute) 0.00 K/uL   COMP METABOLIC PANEL   Result Value Ref Range    Sodium 138 135 - 145 mmol/L    Potassium 4.6 3.6 - 5.5 mmol/L    Chloride 119 (H) 96 - 112 mmol/L    Co2 10 (L) 20 - 33 mmol/L    Anion Gap 9.0 0.0 - 11.9    Glucose 108 (H) 65 - 99 mg/dL    Bun 42 (H) 8 - 22 mg/dL    Creatinine 2.35 (H) 0.50 - 1.40 mg/dL    Calcium 8.8 8.5 - 10.5 mg/dL    AST(SGOT) 66 (H) 12 - 45 U/L    ALT(SGPT) 35 2 - 50 U/L    Alkaline Phosphatase 281 (H) 30 - 99 U/L    Total Bilirubin 1.3 0.1 - 1.5 mg/dL    Albumin 3.6 3.2 - 4.9 g/dL    Total Protein 6.3 6.0 - 8.2 g/dL    Globulin 2.7 1.9 - 3.5 g/dL    A-G Ratio 1.3 g/dL   URINALYSIS   Result Value Ref Range    Color Yellow     Character Clear     Specific Gravity 1.011 <1.035    Ph 6.0 5.0-8.0    Glucose Negative Negative mg/dL    Ketones Negative Negative mg/dL    Protein Negative Negative mg/dL    Bilirubin Negative Negative    Nitrite Negative Negative    Leukocyte Esterase Negative Negative    Occult Blood Negative Negative    Micro Urine Req see below    AMMONIA   Result Value Ref Range    Ammonia 78 (H) 11 - 45 umol/L   ESTIMATED GFR   Result Value Ref Range    GFR If  24 (A) >60 mL/min/1.73 m 2    GFR If Non African American 20 (A) >60 mL/min/1.73 m 2     All labs reviewed by me.    RADIOLOGY  DX-CHEST-PORTABLE (1 VIEW)   Final Result      1.  Mild bibasilar atelectasis.      2.  Elevated left hemidiaphragm.        The radiologist's interpretation of all radiological studies have been reviewed by me.    COURSE & MEDICAL DECISION MAKING  Pertinent Labs & Imaging studies reviewed. (See chart for details)    Reviewed the patient's past medical records that showed admission for probable hepatic encephalopathy and possible narcotic  component    5:06 PM - Patient seen and examined at bedside. Ordered DX-chest, lactic acid, CBC with differential, CMP, Urinalysis, Urine culture, blood culture to evaluate her symptoms. Patient will be admitted to further evaluation and treatment. The differential diagnoses include but are not limited to: Hepatic encephalopathy narcotic effect sepsis. Patient was subsequently found    6:16 PM - Paged [facility]    6:25 PM - Spoke with Dr. Monroe, [facility], concerning patient case. Agreed to admit patient for further treatment and evaluation.     CRITICAL CARE  The very real possibility of a deterioration of this patient's condition required the highest level of my preparedness for sudden, emergent intervention.  I provided critical care services, which included medication orders, frequent reevaluations of the patient's condition and response to treatment, ordering and reviewing test results, and discussing the case with various consultants.  The critical care time associated with the care of the patient was 35 minutes. Review chart for interventions. This time is exclusive of any other billable procedures.     Decision Making:  Patient found elevated lactic acid slightly of 2.1 we'll give fluid bolus. No other signs of infection or source of infection identified. The patient's ammonia is elevated consistent with hepatic encephalopathy with may be other factors as well    DISPOSITION:       FINAL IMPRESSION  Altered mental status  Hepatic encephalopathy      IConnor (Scribe), am scribing for, and in the presence of, Arsen Mhcugh M.D..    Electronically signed by: Connor Escalona ([place]), 4/11/2017    Arsen ANG M.D. personally performed the services described in this documentation, as scribed by Connor Escalona in my presence, and it is both accurate and complete.    The note accurately reflects work and decisions made by me.  Arsen Mchugh  4/11/2017  7:06 PM

## 2017-04-12 NOTE — ASSESSMENT & PLAN NOTE
Chronic for this patient likely 2/2 splenomegaly and portal HTN  No acute treatment needed   Transfuse if Hgb unstable or drops below 7 and patient symptomatic

## 2017-04-12 NOTE — PROGRESS NOTES
[facility] Internal Medicine Interval Note    Name Cristina Tenorio     1940   Age/Sex 76 y.o. female   MRN [id number]   Code Status FULL     After 5PM or if no immediate response to page, please call for cross-coverage  Attending/Team: Felipe Gupta Call [phone] to page   1st Call - Day Intern (R1):   Codey 2nd Call - Day Sr. Resident (R2/R3):   [facility] Summary    Pt is a 75 yo female who presented to ED after recent discharge following hepatic encephalopathy, for decreased mentation per primary care giver who is long time boyfriend and lives with patient. 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    Chief complaint/ reason for interval visit (Primary Diagnosis)   confusion    Interval Problem Daily Status Update      -SLP: Dysphagia II, Nectar Thick Liquids   -PT/OT/SW pending - possible daily home health visits?  -pending FOBT  -increased NaHCO3 to 1625mg  -encourage PO fluids  -Unclear if memory intact. 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   Eyes: Negative for blurred vision.   Respiratory: Negative for cough.    Cardiovascular: Negative for chest pain.   Gastrointestinal: Positive for abdominal pain.        Ileostomy in place with  green/brown fluid in bag, no abd pain   Genitourinary: Negative for dysuria and frequency.   Musculoskeletal: Negative for myalgias.   Neurological: Positive for weakness. Negative for focal weakness and headaches.       Consultants/Specialty  none    Disposition  Remains inpatient for treatment of hepatic encephalopathy likely cause of somewhat altered mental status    Quality Measures  EKG reviewed, Labs reviewed, Medications reviewed and Radiology images reviewed  Loaiza catheter: No Loaiza        DVT prophylaxis - mechanical: SCDs                Physical Exam       Filed Vitals:    04/11/17 2326 04/12/17 0400 04/12/17 0803 04/12/17 1222   BP: 106/51 122/55 122/59 128/64   Pulse: 72 76 77 79   Temp: 36.3 °C (97.4 °F) 36.4 °C (97.6 °F) 36.7 °C (98.1 °F) 36.8 °C (98.2 °F)   Resp: 16 18 20 20   Height:       Weight:       SpO2: 98% 97% 97% 95%     Body mass index is 33.2 kg/(m^2). Weight: 77.1 kg (169 lb 15.6 oz)  Oxygen Therapy:  Pulse Oximetry: 95 %, O2 (LPM): 0, O2 Delivery: None (Room Air)    Physical Exam   Constitutional: She is oriented to person, place, and time and well-developed, well-nourished, and in no distress. No distress.   HENT:   Head: Normocephalic and atraumatic.   Eyes: EOM are normal. Pupils are equal, round, and reactive to light.   Neck: Neck supple.   Cardiovascular: Normal rate and regular rhythm.    No murmur heard.  Pulmonary/Chest: No respiratory distress. She has no wheezes. She has no rales.   Abdominal: Soft. Bowel sounds are normal.   Soft, non-tender, BS+, Ileostomy in place with green/brown fluid in bag, no surrounding erythema or tenderness, no abd tenderness, no palpable organomegaly   Musculoskeletal: She exhibits edema.   Trace BL LE edema   Neurological: She is alert and oriented to person, place, and time. No cranial nerve deficit.   Limited 2/2: confusion. However no gross focal deficiets appreciated   Skin: She is not diaphoretic.   Psychiatric:   Unclear if memory intact          Lab Data Review:      4/12/2017  11:05 AM    Recent Labs      04/11/17 1650 04/11/17 1710 04/12/17 0228   SODIUM  138   --   146*   POTASSIUM  4.6   --   3.7   CHLORIDE  119*   --   127*   CO2  10*   --   11*   BUN  42*   --   39*   CREATININE  2.35*   --   2.01*   MAGNESIUM   --   1.9   --    CALCIUM  8.8   --   8.0*       Recent Labs      04/11/17 1650 04/12/17 0228   ALTSGPT  35  30   ASTSGOT  66*  49*   ALKPHOSPHAT  281*  245*   TBILIRUBIN  1.3  0.8   GLUCOSE  108*  109*       Recent Labs      04/11/17 1710 04/12/17 0228   RBC  3.55*  3.27*   HEMOGLOBIN  10.5*  9.6*   HEMATOCRIT  32.5*  30.0*   PLATELETCT  53*  50*   PROTHROMBTM  16.2*   --    INR  1.26*   --        Recent Labs      04/11/17 1650 04/11/17 1710 04/12/17 0228   WBC   --   4.2*  3.0*   NEUTSPOLYS   --   78.50*  73.70*   LYMPHOCYTES   --   12.90*  14.00*   MONOCYTES   --   6.20  8.30   EOSINOPHILS   --   1.70  3.00   BASOPHILS   --   0.50  0.70   ASTSGOT  66*   --   49*   ALTSGPT  35   --   30   ALKPHOSPHAT  281*   --   245*   TBILIRUBIN  1.3   --   0.8           Assessment/Plan   Hepatic encephalopathy (CMS-HCC)  Assessment & Plan  Hx of cirrhosis 2/2 BAKER with portal HTN and esophageal varices grade II  Ammonia levels remain elevated 80s  Possible 2/2 medication non-compliance vs opiod use: started on opiods (recent fracture), lactulose and rifaximen at last admit in March 2017.     -Today per friend mentation slightly improved  -She is A&O x3, but has slow responses  -ABD US: no peritoneal fluid    Plan:  -pending FOBT to help rule out SBP  -Continue home meds and CTM mentation status and ammonia levels during stay  -May need daily home health visit to ensure medication compliance.   -Speech therapy states pt able to swallow with strict 1:1 feeding     Crohn's disease (HCC)  Assessment & Plan  Hx of Crohn's disease with ileostomy  - on ileostomy more than 10 yrs    - no complications recently  - no sadiq blood  seen at the ostomy  - pending FOBT  - ctm    Metabolic acidosis, NAG, bicarbonate losses  Assessment & Plan  HARLEEN - Possibly from GI losses  HCO3 10>>11, >>127   After IVF  On NaHCO3 at home   Increased dose from 1300mg to 1625mg BID - goal HCO3 in 20s  CTM and adjust dose    COPD (chronic obstructive pulmonary disease) ([facility])  Assessment & Plan  - On 2L O2 at home  - on CPAP nightly for TRAVIS  - stable, RT PRN and IS  - recommend weight loss upon discharge    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  Assessment & Plan  Chronic for this patient likely 2/2 splenomegaly and portal HTN  No acute treatment needed   Transfuse if Hgb unstable or drops below 7 and patient symptomatic    Chronic kidney disease, stage IV (severe) (CMS-HCC)  Assessment & Plan  BUN 42>>39, Creatinine 2.35>>2 after IVF - Likely prerenal - FENA .09 - encourage PO fluids  Chronic elevations in BUN/Cr likely 2/2 portal HTN and abdominal circulation changes in chronic conditions  GFR shows no acute changes from baseline  No indications for dialysis at this time  Continue to follow with nephro outpatient    Left shoulder pain  Assessment & Plan  - hx of previous displaced impacted fracture of left humeral neck from shoulder x-ray in 01/2017 and ortho rec said sling without surgery  - questionable another fall at this admission? -- Dave denied any recent falls and patient doesn't recall any further falls from her memory    - ordered repeat DX-shoulder 2 view for evaluating any new fractures (consider ortho if new fx seen)  - started tramadol for pain control PRN    - PT/OT eval/treat for shoulder pain and functional status      Hepatic encephalopathy (CMS-HCC)  Assessment & Plan  Hx of cirrhosis 2/2 BAKER with portal HTN and esophageal varices grade II  Ammonia levels remain elevated 80s  Possible 2/2 medication non-compliance vs opiod use: started on opiods (recent fracture), lactulose and rifaximen at last admit in March 2017.     -Today per friend  mentation slightly improved  -She is A&O x3, but has slow responses  -ABD US: no peritoneal fluid    Plan:  -pending FOBT to help rule out SBP  -Continue home meds and CTM mentation status and ammonia levels during stay  -May need daily home health visit to ensure medication compliance.   -Speech therapy states pt able to swallow with strict 1:1 feeding     Crohn's disease ([facility])  Assessment & Plan  Hx of Crohn's disease with ileostomy  - on ileostomy more than 10 yrs    - no complications recently  - no sadiq blood seen at the ostomy  - pending FOBT  - ctm    Metabolic acidosis, NAG, bicarbonate losses  Assessment & Plan  HARLEEN - Possibly from GI losses  HCO3 10>>11, >>127   After IVF  On NaHCO3 at home   Increased dose from 1300mg to 1625mg BID - goal HCO3 in 20s  CTM and adjust dose    COPD (chronic obstructive pulmonary disease) ([facility])  Assessment & Plan  - On 2L O2 at home  - on CPAP nightly for TRAVIS  - stable, RT PRN and IS  - recommend weight loss upon discharge    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  Assessment & Plan  Chronic for this patient likely 2/2 splenomegaly and portal HTN  No acute treatment needed   Transfuse if Hgb unstable or drops below 7 and patient symptomatic    Chronic kidney disease, stage IV (severe) (CMS-[facility])  Assessment & Plan  BUN 42>>39, Creatinine 2.35>>2 after IVF - Likely prerenal - FENA .09 - encourage PO fluids  Chronic elevations in BUN/Cr likely 2/2 portal HTN and abdominal circulation changes in chronic conditions  GFR shows no acute changes from baseline  No indications for dialysis at this time  Continue to follow with nephro outpatient    Left shoulder pain  Assessment & Plan  - hx of previous displaced impacted fracture of left humeral neck from shoulder x-ray in 01/2017 and ortho rec said sling without surgery  - questionable another fall at this admission? -- Dave denied any recent falls and patient doesn't recall any further falls from her memory    - ordered repeat  DX-shoulder 2 view for evaluating any new fractures (consider ortho if new fx seen)  - started tramadol for pain control PRN    - PT/OT eval/treat for shoulder pain and functional status

## 2017-04-12 NOTE — PROGRESS NOTES
Patient received from Emergency Room per stretcher accompanied by RN; vital signs stable; moves right hand, able to show one finger with right hand, moving toes of her right foot; unable to move left foot; or leg; limited movement of left hand due to broken left humerus. Both forearms with bruises left upper arm edematous and discolored. Both heels red and blanching left more readily than right floating both heels; sacrum reddened and mepilex applied to prevent breakdown with Arthur ENRIQUE RN , charge assisting 2 skin RN upon arrival to unit. IV of normal saline bolus infusing.

## 2017-04-12 NOTE — ASSESSMENT & PLAN NOTE
- hx of previous displaced impacted fracture of left humeral neck from shoulder x-ray in 01/2017 and ortho rec said sling without surgery  - questionable another fall at this admission? -- Dave denied any recent falls and patient doesn't recall any further falls from her memory    - ordered repeat DX-shoulder 2 view for evaluating any new fractures (consider ortho if new fx seen)  - started tramadol for pain control PRN    - PT/OT eval/treat for shoulder pain and functional status

## 2017-04-12 NOTE — PROGRESS NOTES
[facility] Interval Note     Name Cristina Tenorio     1940   Age/Sex 76 y.o. female   MRN [id number]   Code Status Full     After 5PM or if no immediate response to page, please call for cross-coverage  Attending/Team: Dr. Gupta/RAUL Call [phone] to page   1st Call - Day Intern (R1):   Dr. Alegre 2nd Call - Day Sr. Resident (R2/R3):   [facility]    Pt is a 77 yo female who presented to ED after recent discharge following hepatic encephalopathy, for decreased mentation per primary care giver who is long time boyfriend and lives with patient. 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    Interval History    Pt is AOx4 if interview is conducted in loud voice, pt has a hard time hearing. Still slightly confused with poor recall on exam however not too far from baseline today per primary care giver who is in the room. Pt able to state some medications she is taking at home but requires help with meals and ambulation.     Active Hospital Problems    Diagnosis   • Crohn's disease (HCC) [K50.90]   • Hepatic encephalopathy (CMS-HCC) [K72.90]   • Hyperammonemia (CMS-HCC) [E72.20]   • Hx of portal hypertension [Z86.79]   • Transaminitis [R74.0]   • Left shoulder pain [M25.512]   • Chronic kidney disease, stage IV (severe) (CMS-HCC) [N18.4]   • Hyperchloremic metabolic acidosis [E87.2]   • Chronic pain with narcotic dependence [G89.29]   • Left humeral fracture [S42.302A]   • Anemia [D64.9]   • Liver cirrhosis (CMS-HCC) [K74.60]   • 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 [D61.818]   • COPD (chronic obstructive pulmonary disease) (HCC) [J44.9]       Review of Systems  ROS  Limited due to mentation  Pulm: denies SOB   CV: denies SOB  GI: denies any focal abdominal tenderness  : denies frequency    Consultants  None    Procedures  None    Disposition  Remains inpatient for treatment of hepatic  encephalopathy likely cause of somewhat altered mental status    Quality Measures  Core Measures  Labs reviewed include ammonia, urine/blood cultures, lactic acid, UDS, Vit B12, folate    Physical Exam  Filed Vitals:    04/11/17 2326 04/12/17 0400 04/12/17 0803 04/12/17 1222   BP: 106/51 122/55 122/59 128/64   Pulse: 72 76 77 79   Temp: 36.3 °C (97.4 °F) 36.4 °C (97.6 °F) 36.7 °C (98.1 °F) 36.8 °C (98.2 °F)   Resp: 16 18 20 20   Height:       Weight:       SpO2: 98% 97% 97% 95%     Body mass index is 33.2 kg/(m^2).  Oxygen Therapy:  Pulse Oximetry: 95 %, O2 (LPM): 0, O2 Delivery: None (Room Air)  Physical Exam  General: non-toxic apeparnce. Resting comfortably in bed. AOx4   Awake, answers questions slowly. Must speak loudly as patient has mild deafness  HEENT: EOMI. Scleral clear.  CVS: normal S1, S2. No extra heart sounds or murmurs. No JVD.   PULM: CTABL .  No basilar crackles.   GI: soft, NT, ND. +BS. Ilieostomy bag  : No suprapubic tenderness.  EXT: no LE edema, DP pulses 2+ bilaterally  Neuro: Limited 2/2: confusion. However no gross focal deficiets appreciated    Pysch: AAOx4  Skin: no rashes. But multiple bruises    Lab Data Review  Recent Results (from the past 24 hour(s))   Lactic acid (lactate)    Collection Time: 04/11/17  4:50 PM   Result Value Ref Range    Lactic Acid 2.1 (H) 0.5 - 2.0 mmol/L   COMP METABOLIC PANEL    Collection Time: 04/11/17  4:50 PM   Result Value Ref Range    Sodium 138 135 - 145 mmol/L    Potassium 4.6 3.6 - 5.5 mmol/L    Chloride 119 (H) 96 - 112 mmol/L    Co2 10 (L) 20 - 33 mmol/L    Anion Gap 9.0 0.0 - 11.9    Glucose 108 (H) 65 - 99 mg/dL    Bun 42 (H) 8 - 22 mg/dL    Creatinine 2.35 (H) 0.50 - 1.40 mg/dL    Calcium 8.8 8.5 - 10.5 mg/dL    AST(SGOT) 66 (H) 12 - 45 U/L    ALT(SGPT) 35 2 - 50 U/L    Alkaline Phosphatase 281 (H) 30 - 99 U/L    Total Bilirubin 1.3 0.1 - 1.5 mg/dL    Albumin 3.6 3.2 - 4.9 g/dL    Total Protein 6.3 6.0 - 8.2 g/dL    Globulin 2.7 1.9 - 3.5 g/dL     A-G Ratio 1.3 g/dL   ESTIMATED GFR    Collection Time: 04/11/17  4:50 PM   Result Value Ref Range    GFR If  24 (A) >60 mL/min/1.73 m 2    GFR If Non African American 20 (A) >60 mL/min/1.73 m 2   TSH (Thyroid Stimulating Hormone)    Collection Time: 04/11/17  4:50 PM   Result Value Ref Range    TSH 1.610 0.300 - 3.700 uIU/mL   CBC WITH DIFFERENTIAL    Collection Time: 04/11/17  5:10 PM   Result Value Ref Range    WBC 4.2 (L) 4.8 - 10.8 K/uL    RBC 3.55 (L) 4.20 - 5.40 M/uL    Hemoglobin 10.5 (L) 12.0 - 16.0 g/dL    Hematocrit 32.5 (L) 37.0 - 47.0 %    MCV 91.5 81.4 - 97.8 fL    MCH 29.6 27.0 - 33.0 pg    MCHC 32.3 (L) 33.6 - 35.0 g/dL    RDW 55.0 (H) 35.9 - 50.0 fL    Platelet Count 53 (L) 164 - 446 K/uL    MPV 10.8 9.0 - 12.9 fL    Neutrophils-Polys 78.50 (H) 44.00 - 72.00 %    Lymphocytes 12.90 (L) 22.00 - 41.00 %    Monocytes 6.20 0.00 - 13.40 %    Eosinophils 1.70 0.00 - 6.90 %    Basophils 0.50 0.00 - 1.80 %    Immature Granulocytes 0.20 0.00 - 0.90 %    Nucleated RBC 0.00 /100 WBC    Neutrophils (Absolute) 3.27 2.00 - 7.15 K/uL    Lymphs (Absolute) 0.54 (L) 1.00 - 4.80 K/uL    Monos (Absolute) 0.26 0.00 - 0.85 K/uL    Eos (Absolute) 0.07 0.00 - 0.51 K/uL    Baso (Absolute) 0.02 0.00 - 0.12 K/uL    Immature Granulocytes (abs) 0.01 0.00 - 0.11 K/uL    NRBC (Absolute) 0.00 K/uL   BLOOD CULTURE    Collection Time: 04/11/17  5:10 PM   Result Value Ref Range    Significant Indicator NEG     Source BLD     Site PERIPHERAL     Blood Culture       No Growth    Note: Blood cultures are incubated for 5 days and  are monitored continuously.Positive blood cultures  are called to the RN and reported as soon as  they are identified.     AMMONIA    Collection Time: 04/11/17  5:10 PM   Result Value Ref Range    Ammonia 78 (H) 11 - 45 umol/L   PROTHROMBIN TIME    Collection Time: 04/11/17  5:10 PM   Result Value Ref Range    PT 16.2 (H) 12.0 - 14.6 sec    INR 1.26 (H) 0.87 - 1.13   MAGNESIUM    Collection Time:  17  5:10 PM   Result Value Ref Range    Magnesium 1.9 1.5 - 2.5 mg/dL   URINALYSIS    Collection Time: 17  5:25 PM   Result Value Ref Range    Color Yellow     Character Clear     Specific Gravity 1.011 <1.035    Ph 6.0 5.0-8.0    Glucose Negative Negative mg/dL    Ketones Negative Negative mg/dL    Protein Negative Negative mg/dL    Bilirubin Negative Negative    Nitrite Negative Negative    Leukocyte Esterase Negative Negative    Occult Blood Negative Negative    Micro Urine Req see below    URINE CULTURE(NEW)    Collection Time: 17  5:25 PM   Result Value Ref Range    Significant Indicator NEG     Source UR     Site      Urine Culture No growth at 24 hours.    BLOOD CULTURE    Collection Time: 17  5:45 PM   Result Value Ref Range    Significant Indicator NEG     Source BLD     Site PERIPHERAL     Blood Culture       No Growth    Note: Blood cultures are incubated for 5 days and  are monitored continuously.Positive blood cultures  are called to the RN and reported as soon as  they are identified.     EKG    Collection Time: 17  7:39 PM   Result Value Ref Range    Report       [facility] Emergency Dept.    Test Date:  2017  Pt Name:    TRUE MACARIO                Department: ER  MRN:        [id number]                      Room:       [facility]  Gender:     F                            Technician: [id number]  :        [id number]                   Requested By:JESUS MONROE  Order #:    [id number]                    Reading MD:    Measurements  Intervals                                Axis  Rate:       91                           P:          74  VA:         140                          QRS:        2  QRSD:       80                           T:          74  QT:         380  QTc:        468    Interpretive Statements  SINUS RHYTHM  LOW VOLTAGE THROUGHOUT  BORDERLINE R WAVE PROGRESSION, ANTERIOR LEADS  Compared to ECG 2017 05:19:33  Atrial premature complex(es) no  longer present  Myocardial infarct finding no longer present     LACTIC ACID    Collection Time: 04/11/17 10:57 PM   Result Value Ref Range    Lactic Acid 1.3 0.5 - 2.0 mmol/L   CBC with Differential    Collection Time: 04/12/17  2:28 AM   Result Value Ref Range    WBC 3.0 (L) 4.8 - 10.8 K/uL    RBC 3.27 (L) 4.20 - 5.40 M/uL    Hemoglobin 9.6 (L) 12.0 - 16.0 g/dL    Hematocrit 30.0 (L) 37.0 - 47.0 %    MCV 91.7 81.4 - 97.8 fL    MCH 29.4 27.0 - 33.0 pg    MCHC 32.0 (L) 33.6 - 35.0 g/dL    RDW 54.5 (H) 35.9 - 50.0 fL    Platelet Count 50 (L) 164 - 446 K/uL    MPV 12.5 9.0 - 12.9 fL    Neutrophils-Polys 73.70 (H) 44.00 - 72.00 %    Lymphocytes 14.00 (L) 22.00 - 41.00 %    Monocytes 8.30 0.00 - 13.40 %    Eosinophils 3.00 0.00 - 6.90 %    Basophils 0.70 0.00 - 1.80 %    Immature Granulocytes 0.30 0.00 - 0.90 %    Nucleated RBC 0.00 /100 WBC    Neutrophils (Absolute) 2.21 2.00 - 7.15 K/uL    Lymphs (Absolute) 0.42 (L) 1.00 - 4.80 K/uL    Monos (Absolute) 0.25 0.00 - 0.85 K/uL    Eos (Absolute) 0.09 0.00 - 0.51 K/uL    Baso (Absolute) 0.02 0.00 - 0.12 K/uL    Immature Granulocytes (abs) 0.01 0.00 - 0.11 K/uL    NRBC (Absolute) 0.00 K/uL   Comp Metabolic Panel (CMP)    Collection Time: 04/12/17  2:28 AM   Result Value Ref Range    Sodium 146 (H) 135 - 145 mmol/L    Potassium 3.7 3.6 - 5.5 mmol/L    Chloride 127 (H) 96 - 112 mmol/L    Co2 11 (L) 20 - 33 mmol/L    Anion Gap 8.0 0.0 - 11.9    Glucose 109 (H) 65 - 99 mg/dL    Bun 39 (H) 8 - 22 mg/dL    Creatinine 2.01 (H) 0.50 - 1.40 mg/dL    Calcium 8.0 (L) 8.5 - 10.5 mg/dL    AST(SGOT) 49 (H) 12 - 45 U/L    ALT(SGPT) 30 2 - 50 U/L    Alkaline Phosphatase 245 (H) 30 - 99 U/L    Total Bilirubin 0.8 0.1 - 1.5 mg/dL    Albumin 3.0 (L) 3.2 - 4.9 g/dL    Total Protein 5.4 (L) 6.0 - 8.2 g/dL    Globulin 2.4 1.9 - 3.5 g/dL    A-G Ratio 1.3 g/dL   AMMONIA    Collection Time: 04/12/17  2:28 AM   Result Value Ref Range    Ammonia 83 (H) 11 - 45 umol/L   VITAMIN B12    Collection Time:  04/12/17  2:28 AM   Result Value Ref Range    Vitamin B12 -True Cobalamin 956 (H) 211 - 911 pg/mL   FOLATE    Collection Time: 04/12/17  2:28 AM   Result Value Ref Range    Folate -Folic Acid 20.7 >4.0 ng/mL   T.PALLIDUM AB EIA    Collection Time: 04/12/17  2:28 AM   Result Value Ref Range    Syphilis, Treponemal Qual Non Reactive Non Reactive   HIV ANTIBODIES    Collection Time: 04/12/17  2:28 AM   Result Value Ref Range    HIV Ag/Ab Combo Assay Non Reactive Non Reactive   PLATELET MAPPING WITH BASIC TEG    Collection Time: 04/12/17  2:28 AM   Result Value Ref Range    Reaction Time Initial-R 4.8 (L) 5.0 - 10.0 min    Clot Kinetics-K 2.8 1.0 - 3.0 min    Clot Angle-Angle 59.2 53.0 - 72.0 degrees    Maximum Clot Strength-MA 50.5 50.0 - 70.0 mm    Lysis 30 minutes-LY30 0.0 0.0 - 8.0 %    % Inhibition ADP 14.9 %    % Inhibition AA 34.6 %    TEG Algorithm Link Algorithm    ESTIMATED GFR    Collection Time: 04/12/17  2:28 AM   Result Value Ref Range    GFR If  29 (A) >60 mL/min/1.73 m 2    GFR If Non  24 (A) >60 mL/min/1.73 m 2   URINE DRUG SCREEN    Collection Time: 04/12/17  3:00 AM   Result Value Ref Range    Amphetamines Urine Negative Negative    Barbiturates Negative Negative    Benzodiazepines Negative Negative    Cocaine Metabolite Negative Negative    Methadone Negative Negative    Ecstasy Negative Negative    Opiates Positive (A) Negative    Oxycodone Negative Negative    Phencyclidine -Pcp Negative Negative    Propoxyphene Negative Negative    Cannabinoid Metab Negative Negative   URINE SODIUM RANDOM    Collection Time: 04/12/17  3:00 AM   Result Value Ref Range    Sodium, Urine -per volume <10 mmol/L   URINE CREATININE RANDOM    Collection Time: 04/12/17  3:00 AM   Result Value Ref Range    Creatinine, Random Urine 148.00 mg/dL   LACTIC ACID    Collection Time: 04/12/17  8:42 AM   Result Value Ref Range    Lactic Acid 1.4 0.5 - 2.0 mmol/L       Assessment/Plan  Hepatic  Encephalopathy  Assessment and Plan  Hx of cirrhosis 2/2 BAKER with portal HTN and esophageal varices grade II  Ammonia levels remain elevated 80s  Possible 2/2 medication non-compliance, started on lactulose and rifaximen at last admit in March 2017.   Continue home meds and CTM mentation status and ammonia levels during stay  Doses medication herself at home, may require help from home health visit or primary care giver to ensure medication compliance  Social work recommends help at home  Speech therapy states pt able to swallow with strict 1:1 feeding which may aid in medication adherence if continued at home    Chronic Medical Conditions   Pancytopenia  Assesment and Plan:  Chronic for this patient likely 2/2 splenomegaly and portal HTN  No acute treatment needed   Transfuse if Hgb unstable or drops below 7    Chronic kidney disease  Assessment and plan  No acute elevations in BUN or creatinine   Likely 2/2 portal HTN and abdominal circulation changes in chronic conditions  GFR shows no acute changes from baseline  No indications for dialysis at this time, continue to follow with nephro outpatient      Crohn's disease ([facility])  Assessment & Plan  Hx of Crohn's disease with ileostomy  - on ileostomy more than 10 yrs    - no complications recently  - no sadiq blood seen at the ostomy  - pending FOBT  - ctm    COPD (chronic obstructive pulmonary disease) ([facility])  Assessment & Plan  - On 2L O2 at home  - on CPAP nightly for TRAVIS  - stable, RT PRN and IS  - recommend weight loss upon discharge    Left shoulder pain  Assessment & Plan  - hx of previous displaced impacted fracture of left humeral neck from shoulder x-ray in 01/2017 and ortho rec said sling without surgery  - questionable another fall at this admission? -- Dave denied any recent falls and patient doesn't recall any further falls from her memory    - ordered repeat DX-shoulder 2 view for evaluating any new fractures (consider ortho if new fx seen)  - started  tramadol for pain control PRN    - PT/OT eval/treat for shoulder pain and functional status

## 2017-04-12 NOTE — ASSESSMENT & PLAN NOTE
Hx of Crohn's disease with ileostomy  - on ileostomy more than 10 yrs    - no complications recently  - no sadiq blood seen at the ostomy  - pending FOBT  - ctm

## 2017-04-12 NOTE — ED NOTES
Med rec complete per Pt and [facility]@[phone]  Per [facility] all medication on med rec were picked up as of 4/7/17  Pt unable to interview and SO at bedside is unsure if Pt took her medication this morning   Allergies reviewed  Pt is currently on a 30 day course of Xifaxan started on 4/3

## 2017-04-12 NOTE — ASSESSMENT & PLAN NOTE
HARLEEN - Possibly from GI losses  HCO3 10>>11, >>127   After IVF  On NaHCO3 at home   Increased dose from 1300mg to 1625mg BID - goal HCO3 in 20s  CTM and adjust dose

## 2017-04-13 NOTE — PROGRESS NOTES
Pt is up in chair, legs elevated, strip alarm on, aax4 at this time, [place], call light within reach, bed in low position, no complaints of pain, iliostomy bag intact and empied

## 2017-04-13 NOTE — PROGRESS NOTES
PT mentioned they didn't know the status of pt's previously fractures arm, (previous falls at home) doc called to possibly get an xray

## 2017-04-13 NOTE — CARE PLAN
Problem: Communication  Goal: The ability to communicate needs accurately and effectively will improve  Intervention: [place] patient and significant other/support system to call light to alert staff of needs  Oriented at this time, [facility]  Intervention: Reorient patient to environment as needed  oriented  Intervention: Use communication aids and/or /Language Line as appropriate  Bf is bringing hearing aides

## 2017-04-13 NOTE — PROGRESS NOTES
[facility] [place] Internal Medicine Interval Note     Name Cristina Tenorio     1940   Age/Sex 76 y.o. female   MRN [id number]   Code Status Full     After 5PM or if no immediate response to page, please call for cross-coverage  Attending/Team: Dr. Gupta/RAUL Call [phone] to page   1st Call - Day Intern (R1):   Dr. Rodrigez 2nd Call - Day Sr. Resident (R2/R3):   [facility]    Pt is a 77 yo female who presented to ED after recent discharge following hepatic encephalopathy, for decreased mentation per primary care giver who is long time boyfriend and lives with patient. 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    Interval History    Pt much improved today as far as mentation is concerned. Able to enumerate wishes and concerns about husbands ability to take care of her at home. More steady with ambulation today per nursing overnight. PT saying that pt would benefit from SNF upon discharge and if patient is willing that is not unreasonable given her baseline functionality.     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   Eyes: Negative for blurred vision.   Respiratory: Negative for cough.     Cardiovascular: Negative for chest pain.   Gastrointestinal: Positive for abdominal pain.       Ileostomy in place with green/brown fluid in bag, no abd pain   Genitourinary: Negative for dysuria and frequency.   Musculoskeletal: Negative for myalgias.   Neurological: Positive for weakness. Negative for focal weakness and headaches.    Consultants  None    Procedures  None      Disposition  Remains inpatient for treatment of hepatic encephalopathy and 2/2 altered mental status  Quality Measures  Core Measures  EKG reviewed, Labs reviewed, Medications reviewed and Radiology images reviewed  Loaiza catheter: No Loaiza  DVT prophylaxis - mechanical: SCDs    Physical Exam  Filed Vitals:    04/13/17 0100 04/13/17 0400 04/13/17 0746 04/13/17 1129   BP: 106/57 96/46 93/50 115/47   Pulse: 78 63 56 57   Temp: 36.3 °C (97.4 °F) 36.3 °C (97.3 °F) 36.2 °C (97.2 °F) 36.2 °C (97.2 °F)   Resp: 16 17 18 18   Height:       Weight:       SpO2: 97% 94% 96% 97%     Body mass index is 30.31 kg/(m^2).  Oxygen Therapy:  Pulse Oximetry: 97 %, O2 (LPM): 0, O2 Delivery: None (Room Air)  Physical Exam  Constitutional: She is oriented to person, place, and time and well-developed, well-nourished, and in no distress. No distress.   HENT:    Head: Normocephalic and atraumatic.   Eyes: EOM are normal. Pupils are equal, round, and reactive to light.   Neck: Neck supple.   Cardiovascular: Normal rate and regular rhythm.     No murmur heard.  Pulmonary/Chest: No respiratory distress. She has no wheezes. She has no rales.   Abdominal: Soft. Bowel sounds are normal.   Soft, non-tender, BS+, Ileostomy in place with green/brown fluid in bag, no surrounding erythema or tenderness, no abd tenderness, no palpable organomegaly   Musculoskeletal: She exhibits edema.   Trace BL LE edema   Neurological: She is alert and oriented to person, place, and time. No cranial nerve deficit.   Limited 2/2: confusion. However no gross focal deficiets appreciated   Skin:  She is not diaphoretic.   Psychiatric: normal affect    Lab Data Review  Recent Results (from the past 24 hour(s))   CBC WITH DIFFERENTIAL    Collection Time: 04/13/17  2:55 AM   Result Value Ref Range    WBC 3.6 (L) 4.8 - 10.8 K/uL    RBC 3.20 (L) 4.20 - 5.40 M/uL    Hemoglobin 9.4 (L) 12.0 - 16.0 g/dL    Hematocrit 29.7 (L) 37.0 - 47.0 %    MCV 92.8 81.4 - 97.8 fL    MCH 29.4 27.0 - 33.0 pg    MCHC 31.6 (L) 33.6 - 35.0 g/dL    RDW 56.2 (H) 35.9 - 50.0 fL    Platelet Count 55 (L) 164 - 446 K/uL    MPV 12.0 9.0 - 12.9 fL    Neutrophils-Polys 66.80 44.00 - 72.00 %    Lymphocytes 16.90 (L) 22.00 - 41.00 %    Monocytes 9.80 0.00 - 13.40 %    Eosinophils 5.60 0.00 - 6.90 %    Basophils 0.60 0.00 - 1.80 %    Immature Granulocytes 0.30 0.00 - 0.90 %    Nucleated RBC 0.00 /100 WBC    Neutrophils (Absolute) 2.38 2.00 - 7.15 K/uL    Lymphs (Absolute) 0.60 (L) 1.00 - 4.80 K/uL    Monos (Absolute) 0.35 0.00 - 0.85 K/uL    Eos (Absolute) 0.20 0.00 - 0.51 K/uL    Baso (Absolute) 0.02 0.00 - 0.12 K/uL    Immature Granulocytes (abs) 0.01 0.00 - 0.11 K/uL    NRBC (Absolute) 0.00 K/uL   COMP METABOLIC PANEL    Collection Time: 04/13/17  2:55 AM   Result Value Ref Range    Sodium 142 135 - 145 mmol/L    Potassium 3.6 3.6 - 5.5 mmol/L    Chloride 122 (H) 96 - 112 mmol/L    Co2 13 (L) 20 - 33 mmol/L    Anion Gap 7.0 0.0 - 11.9    Glucose 93 65 - 99 mg/dL    Bun 35 (H) 8 - 22 mg/dL    Creatinine 1.96 (H) 0.50 - 1.40 mg/dL    Calcium 8.2 (L) 8.5 - 10.5 mg/dL    AST(SGOT) 37 12 - 45 U/L    ALT(SGPT) 25 2 - 50 U/L    Alkaline Phosphatase 220 (H) 30 - 99 U/L    Total Bilirubin 0.7 0.1 - 1.5 mg/dL    Albumin 2.7 (L) 3.2 - 4.9 g/dL    Total Protein 4.9 (L) 6.0 - 8.2 g/dL    Globulin 2.2 1.9 - 3.5 g/dL    A-G Ratio 1.2 g/dL   MAGNESIUM    Collection Time: 04/13/17  2:55 AM   Result Value Ref Range    Magnesium 1.9 1.5 - 2.5 mg/dL   PHOSPHORUS    Collection Time: 04/13/17  2:55 AM   Result Value Ref Range    Phosphorus 3.3 2.5 - 4.5 mg/dL    ESTIMATED GFR    Collection Time: 04/13/17  2:55 AM   Result Value Ref Range    GFR If African American 30 (A) >60 mL/min/1.73 m 2    GFR If Non African American 25 (A) >60 mL/min/1.73 m 2   FLUID OCCULT BLOOD    Collection Time: 04/13/17  9:29 AM   Result Value Ref Range    Fluid Type Stool     Occult Blood Negative Negative   AMMONIA    Collection Time: 04/13/17 11:43 AM   Result Value Ref Range    Ammonia 49 (H) 11 - 45 umol/L       Assessment/Plan  Hepatic encephalopathy (CMS-HCC)  Assessment & Plan  Hx of cirrhosis 2/2 BAKER with portal HTN and esophageal varices grade II  Ammonia levels remain elevated 80s  Possible 2/2 medication non-compliance vs opiod use: started on opiods (recent fracture), lactulose and rifaximen at last admit in March 2017.     -Today mentation much improved  -She is A&O x3, faster responses today  -ABD US: no peritoneal fluid    Plan:  -pending FOBT to help rule out SBP  -Continue home meds and CTM mentation status and ammonia levels during stay  -May need daily home health visit to ensure medication compliance.  PT recommends transitional care facility upon DC  -Speech therapy states pt able to swallow with strict 1:1 feeding     Crohn's disease ([facility])  Assessment & Plan  Hx of Crohn's disease with ileostomy  - on ileostomy more than 10 yrs    - no complications recently  - no sadiq blood seen at the ostomy  - pending FOBT  - ctm    Metabolic acidosis, NAG, bicarbonate losses  Assessment & Plan  HARLEEN - Possibly from GI losses  HCO3 increasing to 13 this a.m.   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  Assessment & Plan  Chronic for this patient likely 2/2 splenomegaly and portal HTN  No acute treatment needed     Transfuse if Hgb unstable or drops below 7 and patient symptomatic    Chronic kidney disease, stage IV (severe) (CMS-HCC)  Assessment & Plan  BUN 42>>39, Creatinine 2.35>>2 after IVF - Likely prerenal - FENA .09 - encourage PO fluids  Chronic elevations in BUN/Cr likely 2/2 portal HTN and abdominal circulation changes in chronic conditions  GFR shows no acute changes from baseline  No indications for dialysis at this time  Continue to follow with nephro outpatient    Left shoulder pain  Assessment & Plan  - hx of previous displaced impacted fracture of left humeral neck from shoulder x-ray in 01/2017 and ortho rec said sling without surgery  - questionable another fall at this admission? -- Dave denied any recent falls and patient doesn't recall any further falls from her memory    - ordered repeat DX-shoulder 2 view for evaluating any new fractures (consider ortho if new fx seen)  - started tramadol for pain control PRN    - PT/OT eval/treat for shoulder pain and functional status

## 2017-04-13 NOTE — PROGRESS NOTES
Report received at bedside from Alyx RN; patient very alert and watching television; pupils are equal and reactive; moving all extremities; on room air with congested cough noted. Complaining of left upper arm pain. Able to communicate batter and more alert than yesterday. Will continue to monitor.

## 2017-04-13 NOTE — CARE PLAN
Problem: Safety  Goal: Will remain free from injury  Outcome: PROGRESSING AS EXPECTED  Patient continues to use call light appropriately    Problem: Knowledge Deficit  Goal: Knowledge of disease process/condition, treatment plan, diagnostic tests, and medications will improve  Outcome: PROGRESSING AS EXPECTED  Speech clear with limited use of left hand and no use of left leg

## 2017-04-13 NOTE — PROGRESS NOTES
[facility] Internal Medicine Interval Note    Name Cristina Tenorio     1940   Age/Sex 76 y.o. female   MRN [id number]   Code Status FULL     After 5PM or if no immediate response to page, please call for cross-coverage  Attending/Team: Felipe Gupta Call [phone] to page   1st Call - Day Intern (R1):   Codey 2nd Call - Day Sr. Resident (R2/R3):   [facility] Summary    Pt is a 75 yo female who presented to ED after recent discharge following hepatic encephalopathy, for decreased mentation per primary care giver who is long time boyfriend and lives with patient. 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    Chief complaint/ reason for interval visit (Primary Diagnosis)   Confusion.    Interval Problem Daily Status Update     - patient is better today. Mentation is much improved  -  Ammonia level down trending.  -  PT evaluation for SNF.SNF referral  Placed.  - Ordered a X ray of the L humerus to rule out new fractures.  - dietry consult to optimize ileostomy diet .    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   Eyes: Negative for blurred vision.   Respiratory: Negative for cough.    Cardiovascular: Negative for chest pain.   Gastrointestinal: Positive for abdominal pain.        Ileostomy  in place with green/brown fluid in bag, no abd pain   Genitourinary: Negative for dysuria and frequency.   Musculoskeletal: Negative for myalgias.   Neurological: Positive for weakness. Negative for focal weakness and headaches.       Consultants/Specialty  none    Disposition  Remains inpatient for treatment of hepatic encephalopathy likely cause of somewhat altered mental status    Quality Measures  EKG reviewed, Labs reviewed, Medications reviewed and Radiology images reviewed  Loaiza catheter: No Loaiza        DVT prophylaxis - mechanical: SCDs                Physical Exam       Filed Vitals:    04/13/17 0400 04/13/17 0746 04/13/17 1129 04/13/17 1527   BP: 96/46 93/50 115/47 105/49   Pulse: 63 56 57 50   Temp: 36.3 °C (97.3 °F) 36.2 °C (97.2 °F) 36.2 °C (97.2 °F) 36.1 °C (96.9 °F)   Resp: 17 18 18 18   Height:       Weight:       SpO2: 94% 96% 97% 96%     Body mass index is 30.31 kg/(m^2). Weight: 70.4 kg (155 lb 3.3 oz)  Oxygen Therapy:  Pulse Oximetry: 96 %, O2 (LPM): 0, O2 Delivery: None (Room Air)    Physical Exam   Constitutional: She is oriented to person, place, and time and well-developed, well-nourished, and in no distress. No distress.   HENT:   Head: Normocephalic and atraumatic.   Eyes: EOM are normal. Pupils are equal, round, and reactive to light.   Neck: Neck supple.   Cardiovascular: Normal rate and regular rhythm.    No murmur heard.  Pulmonary/Chest: No respiratory distress. She has no wheezes. She has no rales.   Abdominal: Soft. Bowel sounds are normal.   Soft, non-tender, BS+, Ileostomy in place with green/brown fluid in bag, no surrounding erythema or tenderness, no abd tenderness, no palpable organomegaly   Musculoskeletal: She exhibits edema.   Trace BL LE edema   Neurological: She is alert and oriented to person, place, and time. No cranial nerve deficit.   Limited 2/2: confusion. However no gross focal deficiets appreciated   Skin: She is not diaphoretic.   Psychiatric:   Unclear if  memory intact         Lab Data Review:      4/12/2017  11:05 AM    Recent Labs      04/11/17 1650 04/11/17 1710 04/12/17 0228 04/13/17   0255   SODIUM  138   --   146*  142   POTASSIUM  4.6   --   3.7  3.6   CHLORIDE  119*   --   127*  122*   CO2  10*   --   11*  13*   BUN  42*   --   39*  35*   CREATININE  2.35*   --   2.01*  1.96*   MAGNESIUM   --   1.9   --   1.9   PHOSPHORUS   --    --    --   3.3   CALCIUM  8.8   --   8.0*  8.2*       Recent Labs      04/11/17 1650 04/12/17 0228 04/13/17   0255   ALTSGPT  35  30  25   ASTSGOT  66*  49*  37   ALKPHOSPHAT  281*  245*  220*   TBILIRUBIN  1.3  0.8  0.7   GLUCOSE  108*  109*  93       Recent Labs      04/11/17 1710 04/12/17 0228 04/13/17   0255   RBC  3.55*  3.27*  3.20*   HEMOGLOBIN  10.5*  9.6*  9.4*   HEMATOCRIT  32.5*  30.0*  29.7*   PLATELETCT  53*  50*  55*   PROTHROMBTM  16.2*   --    --    INR  1.26*   --    --        Recent Labs      04/11/17 1650 04/11/17 1710 04/12/17 0228 04/13/17   0255   WBC   --   4.2*  3.0*  3.6*   NEUTSPOLYS   --   78.50*  73.70*  66.80   LYMPHOCYTES   --   12.90*  14.00*  16.90*   MONOCYTES   --   6.20  8.30  9.80   EOSINOPHILS   --   1.70  3.00  5.60   BASOPHILS   --   0.50  0.70  0.60   ASTSGOT  66*   --   49*  37   ALTSGPT  35   --   30  25   ALKPHOSPHAT  281*   --   245*  220*   TBILIRUBIN  1.3   --   0.8  0.7           Assessment/Plan     Hepatic encephalopathy (CMS-HCC)  Assessment & Plan.  Hx of cirrhosis 2/2 BAKER with portal HTN and esophageal varices grade II.  Mentation much improved today,She is A&O x3,  Ammonia levels down trending .  Possible 2/2 medication non-compliance vs opiod use: started on opiods (recent fracture), lactulose and rifaximen at last admit in March 2017.   ABD US: no peritoneal fluid.    Plan:  -Continue home meds and CTM mentation status and ammonia levels during stay  - PT evaluation, patient needs SNF.  Referral to SNF  placed  -Speech therapy states pt able to  swallow with strict 1:1 feeding     Crohn's disease ([facility])  Assessment & Plan  Hx of Crohn's disease with ileostomy  - on ileostomy more than 10 yrs    - no complications recently  - no sadiq blood seen at the ostomy  -  FOBT negative  - ctm    Metabolic acidosis, NAG, bicarbonate losses  Assessment & Plan  HARLEEN - Possibly from GI losses  HCO3 10>>11> 13, >>127>122   After IVF  On NaHCO3 at home    Increased dose from 1300mg to 1625mg BID - goal HCO3 in 20s  CTM and adjust dose    COPD (chronic obstructive pulmonary disease) ([facility])  Assessment & Plan  - On 2L O2 at home  - on CPAP nightly for TRAVIS  - stable, RT PRN and IS  - recommend weight loss upon discharge    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  Assessment & Plan  Chronic for this patient likely 2/2 splenomegaly and portal HTN  No acute treatment needed    Transfuse if Hgb unstable or drops below 7 and patient symptomatic    Chronic kidney disease, stage IV (severe) (CMS-[facility])  Assessment & Plan  BUN 42>>39> 35, Creatinine 2.35>>2 >1.96 after IVF - Likely prerenal - FENA .09 - encourage PO fluids  Chronic elevations in BUN/Cr likely 2/2 portal HTN and abdominal circulation changes in chronic conditions  GFR shows no acute changes from baseline  No indications for dialysis at this time  Continue to follow with nephro outpatient    Left shoulder pain  Assessment & Plan  - hx of previous displaced impacted fracture of left humeral neck from shoulder x-ray in 01/2017 and ortho rec said sling without surgery  - questionable another fall at this admission? -- Dave denied any recent falls and patient doesn't recall any further falls from her memory    - ordered repeat DX-shoulder 2 view for evaluating any new fractures (consider ortho if new fx seen). X ray  Result pending.  - started tramadol for pain control PRN    - PT/OT eval/treat for shoulder pain and functional status. X ray of the L humerus to evaluate for recent fracture

## 2017-04-14 NOTE — PROGRESS NOTES
Up to chair for lunch, friends in room, no complaints of pain, frequent and large amount of fluid out of illiostomy

## 2017-04-14 NOTE — PROGRESS NOTES
Bed in low position, call light within reach, aax4, RA, VSS, skin intact, stanby assist with walker, expected to be discharged today

## 2017-04-14 NOTE — PROGRESS NOTES
Pt took a two hour nap, illiostomy bag was full and leaking, bag changed per patient, denies pain, does not have full ROM in left arm, will go down to have xray this evening, pt is being transferred to [facility]

## 2017-04-14 NOTE — DISCHARGE PLANNING
Received choice form from [facility] at 1000.  Referral sent to [facility] at 1054 on 04-14-17.

## 2017-04-14 NOTE — PROGRESS NOTES
Received report from previous nurse regarding prior 12 hours.  POC reviewed with pt, white board updated, pt verbalized understanding, call light within reach.  Pt tolerated evening meds with observation by RN.

## 2017-04-14 NOTE — DISCHARGE PLANNING
Medical Social Work    SW met with UNLYNNETTE MATSON and was advised pt is ready for d/c to SNF today. SW confirmed pt is alert and oriented to be making decision. SW met with pt at bedside and informed her she has been accepted to [facility], to which pt indicated her choice is [facility]. ALONDRA completed transport request form with a preferred date and time of today at 1500. ALONDRA faxed request to [facility] Rachna for processing.    Plan: LUCI Briscoe with check on available transport times. ALONDRA will complete transfer packet and inform RN and pt of d/c time. UNLYNNETTE MATSON aware and will dictate d/c summary.

## 2017-04-14 NOTE — DISCHARGE PLANNING
Medical Social Work    ALONDRA met with pt at bedside and advised of her transfer to [facility] at 1500 today. Pt agreedable to transfer. SW placed completed transport packet and signed COBRA on pt's chart. SW placed initialed IMM in pt's chart.    Plan: Pt will transfer to [facility] at 1500 today via [facility].

## 2017-04-14 NOTE — PROGRESS NOTES
Report given to Dorina PICKARD at [facility], abdirahman, ra, aax4 at this time, friend in room, walked the halls with walker, steady gait

## 2017-04-14 NOTE — DISCHARGE PLANNING
Transitional Care Navigator:    Met with pt and spouse at bedside to discuss transitional care services. Pt is agreeable to SNF placement and has chosen [facility] and [facility]. Choice form completed and faxed to [facility]. TCN to follow as needed.

## 2017-04-14 NOTE — DISCHARGE PLANNING
Poke with Kirk at [facility], they will accept patient today in transfer.  Patient to transfer at 1500 via the [facility] van.  KEVON Rolon has been advised of transport time and need for Discharge Summary.

## 2017-04-14 NOTE — THERAPY
"Occupational Therapy Treatment completed   Functional Status:  Pt seen for OT tx, demonstrating improved mentation compared to eval, still has safety concerns requires vc's during ambulation to initate rest breaks once fatigued, intermittent lob requiring cga/min a to correct, declined all ADLs today except donning/doffing new socks. Still awaiting updates regarding L shoulder mobilization and wb status. Pt would continue to benefit from acute and post acute therapy.  Plan of Care: Will benefit from Occupational Therapy 3 times per week  Discharge Recommendations:  Equipment Will Continue to Assess for Equipment Needs. Post-acute therapy Discharge to a transitional care facility for continued skilled therapy services.    See \"Rehab Therapy-Acute\" Patient Summary Report for complete documentation.     "

## 2017-04-14 NOTE — DISCHARGE SUMMARY
[facility] [place]      Admit Date:  4/11/2017       Discharge Date:   4/14/17    Service:   R Internal Medicine Blue Team  Attending Physician(s):   Alonso       Senior Resident(s):   Jessica  Andrew Resident(s):   Codey      Primary Diagnosis:   Hepatic encephalopathy     Secondary Diagnoses:                Active Hospital Problems    Diagnosis   • Hepatic encephalopathy (CMS-HCC) [K72.90]   • Metabolic acidosis, NAG, bicarbonate losses [E87.2]   • Crohn's disease (HCC) [K50.90]   • Hyperammonemia (CMS-HCC) [E72.20]   • Hx of portal hypertension [Z86.79]   • Transaminitis [R74.0]   • Left shoulder pain [M25.512]   • Chronic kidney disease, stage IV (severe) (CMS-HCC) [N18.4]   • Hyperchloremic metabolic acidosis [E87.2]   • Chronic pain with narcotic dependence [G89.29]   • Left humeral fracture [S42.302A]   • Anemia [D64.9]   • Liver cirrhosis (CMS-HCC) [K74.60]   • 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 [D61.818]   • COPD (chronic obstructive pulmonary disease) (HCC) [J44.9]       Hospital Summary (Brief Narrative):         Patient is a pleasant 76-year-old female who was admitted with hepatic encephalopathy due to possible noncompliance with lactulose/rifaximin or due to increased intake of opioids due to recent humeral fracture. She was recently discharged with hepatic encephalopathy. Unclear if patient is able to take care of herself at home and take her medication to prevent readmission for same symptoms. Patient has a history of BAKER diagnosed with a biopsy.  On admission patient was confused, oriented ×1, her ammonia levels were elevated in the 80s, although decreased from previous admission. During hospital stay ammonia levels dropped to within normal limits. Abdominal ultrasound showed no ascites, no antibiotics were started for SBP prophylaxis. Her mild encephalopathy resolved with fluids, lactulose, rifaximin. We'll discharge patient with home dose of lactulose and  rifaximin, and recommend sufficient oral fluids.     This a.m. patient complains of mild presyncopal symptoms upon standing. Likely due to mild dehydration from high-dose of lactulose and GI output. Blood pressure stable in 100s. Ordered JOVANNY hose for today and encourage by mouth intake of fluids along with decreasing lactulose dose. If patient continues to have presyncopal symptoms upon standing consider starting midordrine to increase her blood pressure.    Physical therapy evaluated the patient and recommend SNF placement prior to going home.     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     -She is A&O x3, no longer confused  -ABD US: no peritoneal fluid  -FOBT neg    -Discharge to SNF  -May need daily home health visit to ensure medication compliance when discharged from SNF.   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  Chronic for this patient likely 2/2 splenomegaly and portal HTN  No acute treatment needed     Chronic kidney disease, stage IV (severe) (CMS-HCC)  BUN 42>>39, Creatinine 2.35>>2 after IVF - Likely prerenal - FENA .09 - encourage PO fluids  Chronic elevations in BUN/Cr likely  2/2 portal HTN and abdominal circulation changes in chronic conditions  GFR shows no acute changes from baseline  Continue to follow with nephro outpatient    Left shoulder pain  - hx of previous displaced impacted fracture of left humeral neck from shoulder x-ray in 01/2017 and ortho rec said sling without surgery  - started tramadol for pain control PRN      Consultants:     none    Procedures:        none    Imaging/ Testing:      DX-HUMERUS 2+ LEFT   Final Result            1. Redemonstration of impacted, displaced fracture of the left humeral neck. No fracture in the distal humerus.      US-ABDOMEN LIMITED   Final Result      Limited abdominal ultrasound showing no ascites.      DX-SHOULDER 2+ LEFT   Final Result      1.  No acute left shoulder fracture or dislocation identified.      2.  Chronic inferomedial subluxation of the left humeral head and old appearing fracture of the neck of the left humeral head.      DX-CHEST-PORTABLE (1 VIEW)   Final Result      1.  Mild bibasilar atelectasis.      2.  Elevated left hemidiaphragm.          Discharge Medications:         Medication Reconciliation: Completed    Coby Cristina Carreno   Home Medication Instructions VANDANA:[id number]    Printed on:04/14/17 1334   Medication Information                      albuterol 108 (90 BASE) MCG/ACT Aero Soln inhalation aerosol  Inhale 2 Puffs by mouth every four hours as needed.             ferrous sulfate 325 (65 FE) MG tablet  Take 325 mg by mouth every day.             hydrocodone-acetaminophen (NORCO) 5-325 MG Tab per tablet  Take 1 Tab by mouth every 8 hours as needed.             ipratropium-albuterol (COMBIVENT RESPIMAT)  MCG/ACT Aero Soln  Inhale 1 Puff by mouth every four hours as needed (shortness of breath/wheezing.). Maximum 6 puffs/24 hours.             lactulose 20 GM/30ML Solution  Take 15 mL by mouth 3 times a day.             omeprazole (PRILOSEC) 20 MG delayed-release capsule  Take 1 Cap by mouth 2 times a  day.             propranolol (INDERAL) 10 MG Tab  Take 1 Tab by mouth 3 times a day.             rifaximin (XIFAXAN) 550 MG Tab tablet  Take 1 Tab by mouth 2 Times a Day.             sodium bicarbonate 325 MG Tab  Take 5 Tabs by mouth 2 times a day.             vitamin D (CHOLECALCIFEROL) 1000 UNIT Tab  Take 1,000 Units by mouth every day.                   Disposition:   [place]    Diet:   dysphagia    Activity:   As tolerated    Instructions:      Encourage ambulation and PO intake of water.    The patient was instructed to return to the ER in the event of worsening symptoms. I have counseled the patient on the importance of compliance and the patient has agreed to proceed with all medical recommendations and follow up plan indicated above.   The patient understands that all medications come with benefits and risks. Risks may include permanent injury or death and these risks can be minimized with close reassessment and monitoring.        Primary Care Provider:      Discharge summary faxed to primary care provider:  Completed  Copy of discharge summary given to the patient: Completed    Follow up appointment details :      With PCP in 1-2 weeks    Pending Studies:        none    Time spent on discharge day patient visit, preparing discharge paperwork and arranging for patient follow up.    Summary of follow up issues:   Consider midodrine if presyncopal symptoms continue with low SBP.     Discharge Time (Minutes) :    35min      Condition on Discharge    ______________________________________________________________________    Interval history/exam for day of discharge:      Filed Vitals:    04/13/17 1527 04/13/17 2059 04/14/17 0400 04/14/17 0800   BP: 105/49 97/48 102/58 105/56   Pulse: 50 60 56 60   Temp: 36.1 °C (96.9 °F) 36.4 °C (97.5 °F) 36.1 °C (97 °F) 35.9 °C (96.6 °F)   Resp: 18 17 18 20   Height:       Weight:  68.8 kg (151 lb 10.8 oz)     SpO2: 96% 96% 96% 97%     Weight/BMI: Body mass index is 29.62  kg/(m^2).  Pulse Oximetry: 97 %, O2 (LPM): 0, O2 Delivery: None (Room Air)    General: Alert and oriented ×4, pleasant, cooperative, no longer confused  CVS: RRR, no MRG   PULM: CTA, no crackles or wheeze  ABD: Soft, nontender, with ileostomy and no surrounding erythema or pustular discharge. Ileostomy with greenish/brown stool.    Most Recent Labs:    Lab Results   Component Value Date/Time    WBC 4.0* 04/14/2017 06:51 AM    RBC 3.37* 04/14/2017 06:51 AM    HEMOGLOBIN 9.9* 04/14/2017 06:51 AM    HEMATOCRIT 32.0* 04/14/2017 06:51 AM    MCV 95.0 04/14/2017 06:51 AM    MCH 29.4 04/14/2017 06:51 AM    MCHC 30.9* 04/14/2017 06:51 AM    MPV 13.0* 04/14/2017 06:51 AM    NEUTROPHILS-POLYS 66.10 04/14/2017 06:51 AM    LYMPHOCYTES 16.70* 04/14/2017 06:51 AM    MONOCYTES 9.80 04/14/2017 06:51 AM    EOSINOPHILS 6.10 04/14/2017 06:51 AM    BASOPHILS 0.50 04/14/2017 06:51 AM    HYPOCHROMIA 1+ 04/01/2014 03:11 AM    ANISOCYTOSIS 1+ 12/19/2016 10:28 AM      Lab Results   Component Value Date/Time    SODIUM 140 04/14/2017 06:51 AM    POTASSIUM 4.1 04/14/2017 06:51 AM    CHLORIDE 118* 04/14/2017 06:51 AM    CO2 13* 04/14/2017 06:51 AM    GLUCOSE 98 04/14/2017 06:51 AM    BUN 36* 04/14/2017 06:51 AM    CREATININE 2.10* 04/14/2017 06:51 AM      Lab Results   Component Value Date/Time    ALT(SGPT) 25 04/14/2017 06:51 AM    AST(SGOT) 38 04/14/2017 06:51 AM    ALKALINE PHOSPHATASE 226* 04/14/2017 06:51 AM    TOTAL BILIRUBIN 0.8 04/14/2017 06:51 AM    DIRECT BILIRUBIN 0.5 10/07/2016 03:11 AM    LIPASE 17 10/04/2016 10:35 PM    ALBUMIN 2.9* 04/14/2017 06:51 AM    GLOBULIN 2.1 04/14/2017 06:51 AM    PRE-ALBUMIN 13.3* 04/29/2010 01:25 AM    INR 1.26* 04/11/2017 05:10 PM    MACROCYTOSIS 1+ 12/19/2016 10:28 AM     Lab Results   Component Value Date/Time    PT 16.2* 04/11/2017 05:10 PM    INR 1.26* 04/11/2017 05:10 PM

## 2017-05-19 PROBLEM — K76.82 HEPATIC ENCEPHALOPATHY (HCC): Status: RESOLVED | Noted: 2017-01-01 | Resolved: 2017-01-01

## 2017-05-19 NOTE — MR AVS SNAPSHOT
Cristina Carreno Coby   2017 2:15 PM   Office Visit   MRN: [id number]    Department:  [facility]   Dept Phone:  [phone]    Description:  Female : 1940   Provider:  Arik Vee M.D.           Reason for Visit     Follow-Up           Allergies as of 2017     Allergen Noted Reactions    Sulfa Drugs 2016   Hives, Itching      You were diagnosed with     Chronic kidney disease, stage IV (severe) (CMS-HCC)   [585.4.ICD-9-CM]       Metabolic acidosis, NAG, bicarbonate losses   [044581]         Vital Signs     Blood Pressure Pulse Temperature Respirations Height Weight    130/78 mmHg 89 36.6 °C (97.8 °F) (Temporal) 16 1.524 m (5') 68.493 kg (151 lb)    Body Mass Index Last Menstrual Period Smoking Status             29.49 kg/m2 1993 Former Smoker         Basic Information     Date Of Birth Sex Race Ethnicity Preferred Language    1940 Female White Non- English      Problem List              ICD-10-CM Priority Class Noted - Resolved    Essential hypertension, benign I10   2011 - Present    Crohn's disease (HCC) K50.90 Medium  2011 - Present    Acute bronchitis J20.9   3/20/2013 - Present    Hypoxia R09.02   3/20/2013 - Present    COPD (chronic obstructive pulmonary disease) (HCC) J44.9   3/20/2013 - Present    Bronchitis J40   3/30/2014 - Present    Bandemia D72.825   3/30/2014 - Present    Splenomegaly R16.1   2016 - Present    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 D61.818   2016 - Present    TRAVIS on CPAP G47.33, Z99.89 Low  5/3/2016 - Present    Vitamin D deficiency E55.9   2016 - Present    Acidosis E87.2   2016 - Present    Liver cirrhosis (CMS-HCC) K74.60   10/5/2016 - Present    Hypomagnesemia E83.42   10/6/2016 - Present    Hypocalcemia E83.51   10/6/2016 - Present    Anemia D64.9   2017 - Present    Left humeral fracture S42.302A   2017 - Present    Hepatic cirrhosis (CMS-HCC) K74.60   2017 - Present    Tremor  R25.1   1/28/2017 - Present    Cirrhosis with portal hypertension K74.60   4/1/2017 - Present    GERD (gastroesophageal reflux disease) K21.9   4/1/2017 - Present    Obesity (BMI 30.0-34.9) E66.9   4/1/2017 - Present    Chronic pain with narcotic dependence G89.29   4/1/2017 - Present    Hyperchloremic metabolic acidosis E87.2   4/7/2017 - Present    Chronic kidney disease, stage IV (severe) (CMS-HCC) N18.4   4/7/2017 - Present    Hyperammonemia (CMS-HCC) E72.20   4/11/2017 - Present    Hx of portal hypertension Z86.79   4/11/2017 - Present    Transaminitis R74.0   4/11/2017 - Present    Left shoulder pain M25.512   4/11/2017 - Present    Metabolic acidosis, NAG, bicarbonate losses E87.2 Medium  4/12/2017 - Present      Health Maintenance        Date Due Completion Dates    IMM DTaP/Tdap/Td Vaccine (1 - Tdap) 10/6/1960 ---    PAP SMEAR 10/6/1962 ---    MAMMOGRAM 10/6/1981 ---    COLONOSCOPY 10/6/1991 ---    IMM ZOSTER VACCINE 10/6/2001 ---    BONE DENSITY 10/6/2006 ---            Current Immunizations     13-VALENT PCV PREVNAR 10/6/2016    Influenza TIV (IM) 11/15/2013, 11/20/2012    Influenza Vaccine Adult HD 10/6/2016  2:55 PM    Pneumococcal polysaccharide vaccine (PPSV-23) 11/20/2010      Below and/or attached are the medications your provider expects you to take. Review all of your home medications and newly ordered medications with your provider and/or pharmacist. Follow medication instructions as directed by your provider and/or pharmacist. Please keep your medication list with you and share with your provider. Update the information when medications are discontinued, doses are changed, or new medications (including over-the-counter products) are added; and carry medication information at all times in the event of emergency situations     Allergies:  SULFA DRUGS - Hives,Itching               Medications  Valid as of: May 19, 2017 -  3:19 PM    Generic Name Brand Name Tablet Size Instructions for use     Albuterol Sulfate (Aero Soln) albuterol 108 (90 BASE) MCG/ACT Inhale 2 Puffs by mouth every four hours as needed.        Cholecalciferol (Tab) cholecalciferol 1000 UNIT Take 1,000 Units by mouth every day.        Ferrous Sulfate (Tab) ferrous sulfate 325 (65 FE) MG Take 325 mg by mouth every day.        Hydrocodone-Acetaminophen (Tab) NORCO 5-325 MG Take 1 Tab by mouth every 8 hours as needed.        Ipratropium-Albuterol (Aero Soln) COMBIVENT RESPIMAT  MCG/ACT Inhale 1 Puff by mouth every four hours as needed (shortness of breath/wheezing.). Maximum 6 puffs/24 hours.        Lactulose (Solution) lactulose 20 GM/30ML Take 15 mL by mouth 3 times a day.        Omeprazole (CAPSULE DELAYED RELEASE) PRILOSEC 20 MG Take 1 Cap by mouth 2 times a day.        Propranolol HCl (Tab) INDERAL 10 MG Take 1 Tab by mouth 3 times a day.        RifAXIMin (Tab) XIFAXAN 550 MG Take 1 Tab by mouth 2 Times a Day.        Sodium Bicarbonate (Tab) sodium bicarbonate 325 MG Take 5 Tabs by mouth 2 times a day.        .                 Medicines prescribed today were sent to:     [facility] [address] [place] [address] [place], [place] - [facility] [address] AT [facility] [address] [address] [place] [place] [address]    Phone: [phone] Fax: [phone]    Open 24 Hours?: No      Medication refill instructions:       If your prescription bottle indicates you have medication refills left, it is not necessary to call your provider’s office. Please contact your pharmacy and they will refill your medication.    If your prescription bottle indicates you do not have any refills left, you may request refills at any time through one of the following ways: The online [employer] system (except Urgent Care), by calling your provider’s office, or by asking your pharmacy to contact your provider’s office with a refill request. Medication refills are processed only during regular business hours and may not be available until the next business day. Your provider may  request additional information or to have a follow-up visit with you prior to refilling your medication.   *Please Note: Medication refills are assigned a new Rx number when refilled electronically. Your pharmacy may indicate that no refills were authorized even though a new prescription for the same medication is available at the pharmacy. Please request the medicine by name with the pharmacy before contacting your provider for a refill.        Your To Do List     Future Labs/Procedures Complete By Expires    BASIC METABOLIC PANEL  As directed 11/16/2017    CBC WITHOUT DIFFERENTIAL  As directed 11/16/2017    MICROALBUMIN CREAT RATIO URINE  As directed 11/18/2017    PTH INTACT (PTH ONLY)  As directed 5/20/2018    VITAMIN D,25 HYDROXY  As directed 11/19/2017         Neronote Access Code: Activation code not generated  Current Neronote Status: Active

## 2017-05-19 NOTE — PROGRESS NOTES
Subjective:      Cristina Tenorio is a 76 y.o. female who presents with CKD Follow-Up            HPI  Patient with with a history of Crohn's disease, CKD IV, cirrhosis, hepatic encephalopathy recently hospitalized for encephalopathy. Feeling worse over the last few days. Has been hospitalized 5 times since September. Has been declining.  States she can sometimes get confused, weak, but the next day is better. Currently seems mildly encephalopathic.    1. CKD stage IV - Cr 2.26, stable, continues to have acidosis  2. HTN - BP at goal  3. Hyperchloremic acidosis - on sodium bicarbonate  4. Vitamin D deficiency - Vit D low, taking 3000 u daily       Review of Systems   Constitutional: Negative for fever and chills.   Respiratory: Positive for shortness of breath.    Cardiovascular: Negative for chest pain.          Objective:     /78 mmHg  Pulse 89  Temp(Src) 36.6 °C (97.8 °F) (Temporal)  Resp 16  Ht 1.524 m (5')  Wt 68.493 kg (151 lb)  BMI 29.49 kg/m2  LMP 04/12/1993     Physical Exam   Constitutional: She is oriented to person, place, and time. She appears well-developed and well-nourished.   Cardiovascular: Normal rate and regular rhythm.    Pulmonary/Chest: Effort normal and breath sounds normal.   Neurological: She is alert and oriented to person, place, and time.   Skin: Skin is warm and dry.   Psychiatric: She has a normal mood and affect. Her behavior is normal.               Assessment/Plan:     1. Chronic kidney disease, stage IV (severe) (CMS-HCC)  Cr stable, overall health has been declining, poor candidate for RRT. Discussed with patient.    - BASIC METABOLIC PANEL; Future  - CBC WITHOUT DIFFERENTIAL; Future  - VITAMIN D,25 HYDROXY; Future  - PTH INTACT (PTH ONLY); Future  - MICROALBUMIN CREAT RATIO URINE; Future    2. Metabolic acidosis, NAG, bicarbonate losses  Taking sodium bicarbonate, minimizing due to salt content    3. EOL Care discussion  Had a long discussion with patient  regarding EOL care and her decline in her health. Discussed that aggressive treatment is not the only option, and that we can limit the scope of our treatments all the way to palliative care only.

## 2017-05-24 PROBLEM — K76.82 HEPATIC ENCEPHALOPATHY (HCC): Status: ACTIVE | Noted: 2017-01-01

## 2017-05-24 NOTE — IP AVS SNAPSHOT
Recordant Access Code: Activation code not generated  Current Recordant Status: Active    MarginLefthart  A secure, online tool to manage your health information     [employer]’s Recordant® is a secure, online tool that connects you to your personalized health information from the privacy of your home -- day or night - making it very easy for you to manage your healthcare. Once the activation process is completed, you can even access your medical information using the Recordant claudia, which is available for free in the Apple Claudia store or Google Play store.     Recordant provides the following levels of access (as shown below):   My Chart Features   [facility] Primary Care Doctor [facility]  Specialists [facility]  Urgent  Care Non-[facility]  Primary Care  Doctor   Email your healthcare team securely and privately 24/7 X X X X   Manage appointments: schedule your next appointment; view details of past/upcoming appointments X      Request prescription refills. X      View recent personal medical records, including lab and immunizations X X X X   View health record, including health history, allergies, medications X X X X   Read reports about your outpatient visits, procedures, consult and ER notes X X X X   See your discharge summary, which is a recap of your hospital and/or ER visit that includes your diagnosis, lab results, and care plan. X X       How to register for Recordant:  1. Go to  [email or web address].  2. Click on the Sign Up Now box, which takes you to the New Member Sign Up page. You will need to provide the following information:  a. Enter your Recordant Access Code exactly as it appears at the top of this page. (You will not need to use this code after you’ve completed the sign-up process. If you do not sign up before the expiration date, you must request a new code.)   b. Enter your date of birth.   c. Enter your home email address.   d. Click Submit, and follow the next screen’s instructions.  3. Create a Recordant ID. This will  be your [employer] login ID and cannot be changed, so think of one that is secure and easy to remember.  4. Create a [employer] password. You can change your password at any time.  5. Enter your Password Reset Question and Answer. This can be used at a later time if you forget your password.   6. Enter your e-mail address. This allows you to receive e-mail notifications when new information is available in [employer].  7. Click Sign Up. You can now view your health information.    For assistance activating your [employer] account, call [phone]

## 2017-05-24 NOTE — IP AVS SNAPSHOT
5/30/2017    Cristina Tenorio  [address]  [place] [address]    Dear Cristina:    [facility] wants to ensure your discharge home is safe and you or your loved ones have had all of your questions answered regarding your care after you leave the hospital.    Below is a list of resources and contact information should you have any questions regarding your hospital stay, follow-up instructions, or active medical symptoms.    Questions or Concerns Regarding… Contact   Medical Questions Related to Your Discharge  (7 days a week, 8am-5pm) Contact a Nurse Care Coordinator   [phone]   Medical Questions Not Related to Your Discharge  (24 hours a day / 7 days a week)  Contact the Nurse Health Line   [phone]    Medications or Discharge Instructions Refer to your discharge packet   or contact your [facility] Primary Care Provider   [phone]   Follow-up Appointment(s) Schedule your appointment via uGift   or contact Scheduling [phone]   Billing Review your statement via [employer]Gift  or contact Billing [phone]   Medical Records Review your records via uGift   or contact Medical Records [phone]     You may receive a telephone call within two days of discharge. This call is to make certain you understand your discharge instructions and have the opportunity to have any questions answered. You can also easily access your medical information, test results and upcoming appointments via the uGift free online health management tool. You can learn more and sign up at [employer] Mobile Apps/uGift. For assistance setting up your uGift account, please call [phone].    Once again, we want to ensure your discharge home is safe and that you have a clear understanding of any next steps in your care. If you have any questions or concerns, please do not hesitate to contact us, we are here for you. Thank you for choosing [facility] for your healthcare needs.    Sincerely,    Your [facility] Healthcare Team

## 2017-05-24 NOTE — IP AVS SNAPSHOT
" <p align=\"LEFT\"><IMG SRC=\"//[email or web address]$/[email or web address]\" alt=\"Image\" WIDTH=\"50%\" HEIGHT=\"200\" BORDER=\"\"></p>                   Name:Cristina Tenorio  Medical Record Number:[id number]  CSN: [id number]    YOB: 1940   Age: 76 y.o.  Sex: female  HT:1.524 m (5') WT: 69.9 kg (154 lb 1.6 oz)          Admit Date: 5/24/2017     Discharge Date:   Today's Date: 5/30/2017  Attending Doctor:  Melvin Chawla M.D.                  Allergies:  Sulfa drugs          Your appointments     Jul 07, 2017  2:30 PM   Follow Up Visit with Arik Vee M.D.   [facility] ([address] [place] [address])    [address] [place] [address], [address]  [place] [id number] [phone]           You will be receiving a confirmation call a few days before your appointment from our automated call confirmation system.              Follow-up Information     1. Follow up with Roshni Morillo M.D.. Go on 6/14/2017.    Specialty:  Family Medicine    Why:  Please arrive at 1:00 pm for you appointment. Thank you.    Contact information    [address]  [place] [id number] [phone]          2. Follow up with Boni Brooks M.D.. Go on 6/26/2017.    Specialty:  Gastroenterology    Why:  Please arrive at 12:45 pm for you appointment. Thank you.    Contact information    [id number]  [place] [address]  [phone]           Medication List      Take these Medications        Instructions    ferrous gluconate 324 (38 FE) MG Tabs   Commonly known as:  FERGON    Take 324 mg by mouth every morning with breakfast.   Dose:  324 mg       lactobacillus granules Pack    Take 1 Packet by mouth 2 Times a Day.   Dose:  1 Packet       omeprazole 20 MG delayed-release capsule   Commonly known as:  PRILOSEC    Take 40 mg by mouth every day.   Dose:  40 mg       rifaximin 550 MG Tabs tablet   What changed:  when to take this   Commonly known as:  XIFAXAN    Take 1 Tab by mouth 3 times a day.   Dose:  550 mg   "    vitamin D 1000 UNIT Tabs   Commonly known as:  cholecalciferol    Take 1,000 Units by mouth every day.   Dose:  1000 Units       zinc sulfate 220 MG Caps   Commonly known as:  ZINCATE    Take 1 Cap by mouth every day.   Dose:  220 mg

## 2017-05-24 NOTE — PROGRESS NOTES
"2 RN skin check completed. Skin checked under all medical devices. Generalized bruising present. Redness under L breast, pt states \"I don't know what that's from\". Feet dry & flaky bilaterally. Colostomy present to LLQ. Skin otherwise intact.  "

## 2017-05-24 NOTE — ED NOTES
Yelena  from Lab called with critical result of amonia 182 at 1138. Critical lab result read back to Yelena.   Dr. Sams notified of critical lab result at 1138.  Critical lab result read back by Dr. Sams.

## 2017-05-24 NOTE — IP AVS SNAPSHOT
Home Care Instructions                                                                                                                  Name:Cristina Tenorio  Medical Record Number:[id number]  CSN: [id number]    YOB: 1940   Age: 76 y.o.  Sex: female  HT:1.524 m (5') WT: 69.9 kg (154 lb 1.6 oz)          Admit Date: 5/24/2017     Discharge Date:   Today's Date: 5/30/2017  Attending Doctor:  Melvin Chawla M.D.                  Allergies:  Sulfa drugs            Discharge Instructions       Follow up with Primary Care Physician in 1 week.      Discharge Instructions    Discharged to home by car with relative. Discharged via wheelchair, hospital escort: Yes.  Special equipment needed: Not Applicable    Be sure to schedule a follow-up appointment with your primary care doctor or any specialists as instructed.     Discharge Plan:   Diet Plan: Discussed  Activity Level: Discussed  Confirmed Follow up Appointment: Patient to Call and Schedule Appointment  Confirmed Symptoms Management: Discussed  Medication Reconciliation Updated: Yes  Influenza Vaccine Indication: Not indicated: Previously immunized this influenza season and > 8 years of age    I understand that a diet low in cholesterol, fat, and sodium is recommended for good health. Unless I have been given specific instructions below for another diet, I accept this instruction as my diet prescription.   Other diet: Regular    Special Instructions: None    · Is patient discharged on Warfarin / Coumadin?   No     · Is patient Post Blood Transfusion?  No      Hepatic Encephalopathy  Hepatic encephalopathy is a loss of brain function from advanced liver disease. The effects of the condition depend on the type of liver damage and how severe it is. In some cases, hepatic encephalopathy can be reversed.  CAUSES  The exact cause of hepatic encephalopathy is not known.  RISK FACTORS  You have a higher risk of getting this condition if your liver is damaged.  When the liver is damaged harmful substances called toxins can build up in the body. Certain toxins, such as ammonia, can harm your brain. Conditions that can cause liver damage include:  · An infection.  · Dehydration.  · Intestinal bleeding.  · Drinking too much alcohol.  · Taking certain medicines, including tranquilizers, water pills (diuretics), antidepressants, or sleeping pills.  SIGNS AND SYMPTOMS  Signs and symptoms may develop suddenly. Or, they may develop slowly and get worse gradually. Symptoms can range from mild to severe.  Mild Hepatic Encephalopathy  · Mild confusion.  · Personality and mood changes.  · Anxiety and agitation.  · Drowsiness.  · Loss of mental abilities.  · Musty or sweet-smelling breath.  Worsening or Severe Hepatic Encephalopathy  · Slowed movement.  · Slurred speech.  · Extreme personality changes.  · Disorientation.  · Abnormal shaking or flapping of the hands.  · Coma.  DIAGNOSIS  To make a diagnosis, your health care provider will do a physical exam. To rule out other causes of your signs and symptoms, he or she may order tests. You may have:  · Blood tests. These may be done to check your ammonia level, measure how long it takes your blood to clot, and check for infection.  · Liver function tests. These may be done to check how well your liver is working.  · MRI and CT scans. These may be done to check for a brain disorder.  · Electroencephalogram (EEG). This may be done to measure the electrical activity in your brain.  TREATMENT  The first step in treatment is identifying and treating possible triggers. The next step is involves taking medicine to lower the level of toxins in the body and to prevent ammonia from building up. You may need to take:  · Antibiotics to reduce the ammonia-producing bacteria in your gut.  · Lactulose to help flush ammonia from the gut.  HOME CARE INSTRUCTIONS  Eating and Drinking  · Follow a low-protein diet that includes plenty of fruits,  vegetables, and whole grains, as directed by your health care provider. Ammonia is produced when you digest high-protein foods.  · Work with a dietitian or with your health care provider to make sure you are getting the right balance of protein and minerals.  · Drink enough fluids to keep your urine clear or pale yellow. Drinking plenty of water helps prevent constipation.  · Do not drink alcohol or use illegal drugs.  Medicines  · Only take medicine as directed by your health care provider.  · If you were prescribed an antibiotic medicine, finish it all even if you start to feel better.  · Do not start any new medicines, including over-the-counter medicines, without first checking with your health care provider.  SEEK MEDICAL CARE IF:  · You have new symptoms.  · Your symptoms change.  · Your symptoms get worse.  · You have a fever.  · You are constipated.  · You have persistent nausea, vomiting, or diarrhea.  SEEK IMMEDIATE MEDICAL CARE IF:  · You become very confused or drowsy.  · You vomit blood or material that looks like coffee grounds.  · Your stool is bloody or black or looks like tar.     This information is not intended to replace advice given to you by your health care provider. Make sure you discuss any questions you have with your health care provider.     Document Released: 02/27/2008 Document Revised: 01/08/2016 Document Reviewed: 08/05/2015  [employer] Patient Education ©2016 [employer]      Depression / Suicide Risk    As you are discharged from this [facility] facility, it is important to learn how to keep safe from harming yourself.    Recognize the warning signs:  · Abrupt changes in personality, positive or negative- including increase in energy   · Giving away possessions  · Change in eating patterns- significant weight changes-  positive or negative  · Change in sleeping patterns- unable to sleep or sleeping all the time   · Unwillingness or inability to  communicate  · Depression  · Unusual sadness, discouragement and loneliness  · Talk of wanting to die  · Neglect of personal appearance   · Rebelliousness- reckless behavior  · Withdrawal from people/activities they love  · Confusion- inability to concentrate     If you or a loved one observes any of these behaviors or has concerns about self-harm, here's what you can do:  · Talk about it- your feelings and reasons for harming yourself  · Remove any means that you might use to hurt yourself (examples: pills, rope, extension cords, firearm)  · Get professional help from the community (Mental Health, Substance Abuse, psychological counseling)  · Do not be alone:Call your Safe Contact- someone whom you trust who will be there for you.  · Call your local CRISIS HOTLINE 214-8491 or 049-670-2854  · Call your local Children's Mobile Crisis Response Team [place] (354) 398-7278 or www.Websupport  · Call the toll free National Suicide Prevention Hotlines   · National Suicide Prevention Lifeline 097-814-VFRE (8060)  · [facility] 800-SUICIDE (443-4692)        Your appointments     Jul 07, 2017  2:30 PM   Follow Up Visit with Arik Vee M.D.   [facility] ([address])    [facility] [address], [address]  [place] [id number] [phone]           You will be receiving a confirmation call a few days before your appointment from our automated call confirmation system.              Follow-up Information     1. Follow up with Roshni Morillo M.D.. Go on 6/14/2017.    Specialty:  Family Medicine    Why:  Please arrive at 1:00 pm for you appointment. Thank you.    Contact information    [address]  [place] [id number] [phone]          2. Follow up with Boni Brooks M.D.. Go on 6/26/2017.    Specialty:  Gastroenterology    Why:  Please arrive at 12:45 pm for you appointment. Thank you.    Contact information    [id number]  [place] [address]  [phone]           Discharge Medication Instructions:    Below are the medications your physician expects you to take upon discharge:    Review all your home medications and newly ordered medications with your doctor and/or pharmacist. Follow medication instructions as directed by your doctor and/or pharmacist.    Please keep your medication list with you and share with your physician.               Medication List      START taking these medications        Instructions    Morning Afternoon Evening Bedtime    lactobacillus granules Pack   Last time this was given:  1 Packet on 5/30/2017  8:15 AM        Take 1 Packet by mouth 2 Times a Day.   Dose:  1 Packet                        zinc sulfate 220 MG Caps   Last time this was given:  220 mg on 5/30/2017  8:14 AM   Commonly known as:  ZINCATE        Take 1 Cap by mouth every day.   Dose:  220 mg                          CHANGE how you take these medications        Instructions    Morning Afternoon Evening Bedtime    rifaximin 550 MG Tabs tablet   What changed:  when to take this   Last time this was given:  550 mg on 5/30/2017  8:14 AM   Commonly known as:  XIFAXAN        Take 1 Tab by mouth 3 times a day.   Dose:  550 mg                          CONTINUE taking these medications        Instructions    Morning Afternoon Evening Bedtime    ferrous gluconate 324 (38 FE) MG Tabs   Last time this was given:  324 mg on 5/30/2017  8:14 AM   Commonly known as:  FERGON        Take 324 mg by mouth every morning with breakfast.   Dose:  324 mg                        omeprazole 20 MG delayed-release capsule   Last time this was given:  40 mg on 5/30/2017  8:15 AM   Commonly known as:  PRILOSEC        Take 40 mg by mouth every day.   Dose:  40 mg                        vitamin D 1000 UNIT Tabs   Last time this was given:  1,000 Units on 5/30/2017  8:14 AM   Commonly known as:  cholecalciferol        Take 1,000 Units by mouth every day.   Dose:  1000 Units                              Where to Get Your Medications      Information about where to get these medications is not yet available     ! Ask your nurse or doctor about these medications    - lactobacillus granules Pack  - rifaximin 550 MG Tabs tablet  - zinc sulfate 220 MG Caps            Instructions           Diet / Nutrition:    Follow any diet instructions given to you by your doctor or the dietician, including how much salt (sodium) you are allowed each day.    If you are overweight, talk to your doctor about a weight reduction plan.    Activity:    Remain physically active following your doctor's instructions about exercise and activity.    Rest often.     Any time you become even a little tired or short of breath, SIT DOWN and rest.    Worsening Symptoms:    Report any of the following signs and symptoms to the doctor's office immediately:    *Pain of jaw, arm, or neck  *Chest pain not relieved by medication                               *Dizziness or loss of consciousness  *Difficulty breathing even when at rest   *More tired than usual                                       *Bleeding drainage or swelling of surgical site  *Swelling of feet, ankles, legs or stomach                 *Fever (>100ºF)  *Pink or blood tinged sputum  *Weight gain (3lbs/day or 5lbs /week)           *Shock from internal defibrillator (if applicable)  *Palpitations or irregular heartbeats                *Cool and/or numb extremities    Stroke Awareness    Common Risk Factors for Stroke include:    Age  Atrial Fibrillation  Carotid Artery Stenosis  Diabetes Mellitus  Excessive alcohol consumption  High blood pressure  Overweight   Physical inactivity  Smoking    Warning signs and symptoms of a stroke include:    *Sudden numbness or weakness of the face, arm or leg (especially on one side of the body).  *Sudden confusion, trouble speaking or understanding.  *Sudden trouble seeing in one or both eyes.  *Sudden trouble walking,  dizziness, loss of balance or coordination.Sudden severe headache with no known cause.    It is very important to get treatment quickly when a stroke occurs. If you experience any of the above warning signs, call 911 immediately.                   Disclaimer         Quit Smoking / Tobacco Use:    I understand the use of any tobacco products increases my chance of suffering from future heart disease or stroke and could cause other illnesses which may shorten my life. Quitting the use of tobacco products is the single most important thing I can do to improve my health. For further information on smoking / tobacco cessation call a Toll Free Quit Line at 1-380.923.7320 (*National [employer] [place]) or 1-933.301.5294 (American Lung Association) or you can access the web based program at www.lungRECEPTA biopharma.org.    [place] Tobacco Users Help Line:  (725) 906-2223       Toll Free: 1-778.196.8653  Quit Tobacco Program [facility] (838)337-9682    Crisis Hotline:    [place] Crisis Hotline:  8-407-EZEXUDX or 1-655.936.3963    [place] Crisis Hotline:    1-874.962.5614 or 538-491-1206    Discharge Survey:   Thank you for choosing [facility]. We hope we did everything we could to make your hospital stay a pleasant one. You may be receiving a phone survey and we would appreciate your time and participation in answering the questions. Your input is very valuable to us in our efforts to improve our service to our patients and their families.        My signature on this form indicates that:    1. I have reviewed and understand the above information.  2. My questions regarding this information have been answered to my satisfaction.  3. I have formulated a plan with my discharge nurse to obtain my prescribed medications for home.                  Disclaimer         __________________________________                     __________       ________                       Patient Signature                                                  Date                    Time

## 2017-05-24 NOTE — ED NOTES
Blood sent to lab  Straight cath aseptic techniques, sent sample to lab  Emptied pt's colostomy bag. Updated  and pt with poc

## 2017-05-24 NOTE — ED NOTES
The Medication Reconciliation process has been completed by interviewing the patient, calling the pharmacy and reviewing the list from [facility].      Allergies have been reviewed  Antibiotic use in 30 days - She is on Xifaxan    Home Pharmacy:  [facility]

## 2017-05-24 NOTE — ED PROVIDER NOTES
ED Provider Note    CHIEF COMPLAINT  Chief Complaint   Patient presents with   • Difficulty Walking   • Mental Status Change     slow to answer       HPI  Cristina Tenorio is a 76 y.o. female who presents in the care of her , complaining of altered mental status. The patient has a history of Crohn's disease and liver problems and has a daily dose of lactulose scheduled for elevated ammonia levels. She has been taking her medications as prescribed, according to her , however, this morning when she woke, she didn't want to get out of bed and was acting more confused according to the . The  states she has not had any falls or trauma. The patient denies any chest pain, abdominal pain or shortness of breath. She states that she does not know why she didn't want to get up today. According to her . She has not had a cough, fever, shortness of breath. She has not had any falls or head trauma.    REVIEW OF SYSTEMS  See HPI for further details. Unable to obtain review of systems secondary to the patient's mental status.   PAST MEDICAL HISTORY  Past Medical History   Diagnosis Date   • Bowel obstruction (CMS-HCC)    • Crohn's disease (CMS-HCC)    • Indigestion    • Obstruction    • Snoring    • Ileostomy in place (CMS-HCC)    • COPD (chronic obstructive pulmonary disease) (CMS-HCC)      per pt   • COPD (chronic obstructive pulmonary disease) (CMS-[facility]) 3/20/2013   • Arthritis 12/30/16     to hands and feet   • Heart burn    • Emphysema of lung (CMS-HCC)      COPD   • Sleep apnea    • TRAVIS on CPAP      10/2016-CPAP DC'd and wears O2 @4L/NC   • Hemorrhagic disorder (CMS-HCC)      anemia of unkown etiology.   • Anemia 12/30/16     unknown etiology   • Renal disorder      Increased creatitine level due to meds.   • Breath shortness      uses O2@ at night and prn ([employer]). Uses inhaler prn. 12/30/16-reports no changes    • Cataract 2006,2007     bilat phaco with IOL   • Cirrhosis of liver  "(CMS-[facility]) 12/30/16     states dx at one time but no treatment for >10yrs   • Occasional tremors        FAMILY HISTORY  History reviewed. No pertinent family history.    SOCIAL HISTORY  Social History     Social History   • Marital Status:      Spouse Name: N/A   • Number of Children: N/A   • Years of Education: N/A     Social History Main Topics   • Smoking status: Former Smoker -- 50 years     Types: Cigarettes, Cigars     Quit date: 03/30/2013   • Smokeless tobacco: None      Comment: 50yrs 1.5 ppd quit 2009   • Alcohol Use: No      Comment: socially   • Drug Use: No   • Sexual Activity: Not Asked     Other Topics Concern   • None     Social History Narrative       SURGICAL HISTORY  Past Surgical History   Procedure Laterality Date   • Cholecystectomy  2013     \"burst\"   • Bowel resection  1992     with ileostomy (right side)   • Ileostomy  2010     moved to left side   • Recovery  6/21/2010     Performed by SURGERY, IR-RECOVERY at SURGERY SAME DAY [facility] ORS   • Sigmoidoscopy flex  9/27/2016     Procedure: SIGMOIDOSCOPY FLEX;  Surgeon: Aftab Alegre M.D.;  Location: [facility];  Service:    • Gastroscopy-endo  9/27/2016     Procedure: GASTROSCOPY-ENDO;  Surgeon: Aftab Alegre M.D.;  Location: [facility];  Service:    • Gastroscopy wiithsavary dilatation  9/28/2016     Procedure: GASTROSCOPY WIITH SAVARY DILATATION;  Surgeon: Aftab Alegre M.D.;  Location: [facility];  Service: Gastroenterology   • Gastroscopy w/push enterscopy  9/28/2016     Procedure: GASTROSCOPY W/PUSH ENTERSCOPY;  Surgeon: Aftab Alegre M.D.;  Location: [facility];  Service:    • Gastroscopy-endo N/A 10/5/2016     Procedure: GASTROSCOPY-ENDO;  Surgeon: Aravind Roman M.D.;  Location: [facility];  Service:    • Umbilical hernia repair  2000   • Colonoscopy       Hx of  several    • Other surgical procedure  1994     closed off rectum    • " Gastroscopy N/A 1/13/2017     Procedure: GASTROSCOPY - ENTEROSCOPY PUSH;  Surgeon: Boni Brooks M.D.;  Location: SURGERY [facility];  Service:        CURRENT MEDICATIONS  Home Medications     Reviewed by Charlette Stinson (Pharmacy Tech) on 05/24/17 at 1331  Med List Status: Complete    Medication Last Dose Status    ferrous gluconate (FERGON) 324 (38 FE) MG Tab 5/23/2017 Active    omeprazole (PRILOSEC) 20 MG delayed-release capsule 5/23/2017 Active    rifaximin (XIFAXAN) 550 MG Tab tablet 5/23/2017 Active    vitamin D (CHOLECALCIFEROL) 1000 UNIT Tab 5/23/2017 Active                ALLERGIES  Allergies   Allergen Reactions   • Sulfa Drugs Hives and Itching       PHYSICAL EXAM  VITAL SIGNS: /59 mmHg  Pulse 75  Temp(Src) 36.4 °C (97.6 °F)  Resp 20  Ht 1.524 m (5')  Wt 66.5 kg (146 lb 9.7 oz)  BMI 28.63 kg/m2  SpO2 98%  LMP 04/12/1993  Constitutional: Well developed, Well nourished, No acute distress, Non-toxic appearance. Confused  HEENT: Normocephalic, Atraumatic. No Scalp contusions or abrasions external ears normal, pharynx pink,  Mucous  Membranes moist, No rhinorrhea or mucosal edema  Eyes: PERRL, EOMI, Conjunctiva normal, No discharge.   Neck: Normal range of motion, No tenderness, Supple, No stridor.   Lymphatic: No lymphadenopathy    Cardiovascular: Regular Rate and Rhythm, No murmurs,  rubs, or gallops.   Thorax & Lungs: Lungs clear to auscultation bilaterally, No respiratory distress, No wheezes, rhales or rhonchi, No chest wall tenderness.   Abdomen: Bowel sounds normal, Soft, non tender, non distended,  No pulsatile masses., no rebound guarding or peritoneal signs.   Skin: Warm, Dry, No erythema, No rash,   Back:  No CVA tenderness,  No spinal tenderness, bony crepitance step offs or instability.   Extremities: Equal, intact distal pulses, No cyanosis, clubbing or edema,  No tenderness.   Musculoskeletal: Good range of motion in all major joints. No tenderness to  palpation or major deformities noted.   Neurologic: Alert & oriented x 3, Cranial nerves II-XII intact, Equal strength and sensation upper and lower extremities bilaterally,  No focal deficits noted.   Psychiatric: Pleasantly confused, not combative    EKG  EKG Interpretation    Interpreted by emergency department physician    Rhythm: normal sinus   Rate: normal  Axis: normal  Ectopy: none  Conduction: normal  ST Segments: no acute change  T Waves: no acute change  Q Waves: none    Clinical Impression: non-specific EKG      Unchanged from prior 4/11    RADIOLOGY/PROCEDURES  DX-CHEST-PORTABLE (1 VIEW)   Final Result      1.  Unchanged bibasilar atelectasis   2.  Elevated LEFT diaphragm   3.  Atherosclerosis      CT-HEAD W/O   Final Result      1.  Cerebral atrophy.      2.  White matter lucencies most consistent with small vessel ischemic change versus demyelination or gliosis.      3.  Otherwise, Head CT without contrast with no acute findings. No evidence of acute cerebral  hemorrhage or mass lesion.              COURSE & MEDICAL DECISION MAKING  Pertinent Labs & Imaging studies reviewed. (See chart for details)  Differential diagnosis: CVA, TIA, elevated ammonia level, UTI, renal insufficiency, hypothyroid      Results for orders placed or performed during the hospital encounter of 05/24/17   CBC WITH DIFFERENTIAL   Result Value Ref Range    WBC 4.3 (L) 4.8 - 10.8 K/uL    RBC 3.48 (L) 4.20 - 5.40 M/uL    Hemoglobin 10.1 (L) 12.0 - 16.0 g/dL    Hematocrit 32.3 (L) 37.0 - 47.0 %    MCV 92.8 81.4 - 97.8 fL    MCH 29.0 27.0 - 33.0 pg    MCHC 31.3 (L) 33.6 - 35.0 g/dL    RDW 52.2 (H) 35.9 - 50.0 fL    Platelet Count 52 (L) 164 - 446 K/uL    MPV 11.4 9.0 - 12.9 fL    Neutrophils-Polys 80.00 (H) 44.00 - 72.00 %    Lymphocytes 10.40 (L) 22.00 - 41.00 %    Monocytes 6.60 0.00 - 13.40 %    Eosinophils 1.60 0.00 - 6.90 %    Basophils 0.70 0.00 - 1.80 %    Immature Granulocytes 0.70 0.00 - 0.90 %    Nucleated RBC 0.00 /100  WBC    Neutrophils (Absolute) 3.40 2.00 - 7.15 K/uL    Lymphs (Absolute) 0.44 (L) 1.00 - 4.80 K/uL    Monos (Absolute) 0.28 0.00 - 0.85 K/uL    Eos (Absolute) 0.07 0.00 - 0.51 K/uL    Baso (Absolute) 0.03 0.00 - 0.12 K/uL    Immature Granulocytes (abs) 0.03 0.00 - 0.11 K/uL    NRBC (Absolute) 0.00 K/uL   COMP METABOLIC PANEL   Result Value Ref Range    Sodium 141 135 - 145 mmol/L    Potassium 4.1 3.6 - 5.5 mmol/L    Chloride 121 (H) 96 - 112 mmol/L    Co2 11 (L) 20 - 33 mmol/L    Anion Gap 9.0 0.0 - 11.9    Glucose 112 (H) 65 - 99 mg/dL    Bun 54 (H) 8 - 22 mg/dL    Creatinine 2.47 (H) 0.50 - 1.40 mg/dL    Calcium 7.8 (L) 8.5 - 10.5 mg/dL    AST(SGOT) 56 (H) 12 - 45 U/L    ALT(SGPT) 27 2 - 50 U/L    Alkaline Phosphatase 278 (H) 30 - 99 U/L    Total Bilirubin 1.1 0.1 - 1.5 mg/dL    Albumin 3.1 (L) 3.2 - 4.9 g/dL    Total Protein 5.7 (L) 6.0 - 8.2 g/dL    Globulin 2.6 1.9 - 3.5 g/dL    A-G Ratio 1.2 g/dL   LIPASE   Result Value Ref Range    Lipase 41 11 - 82 U/L   PROTHROMBIN TIME   Result Value Ref Range    PT 17.5 (H) 12.0 - 14.6 sec    INR 1.39 (H) 0.87 - 1.13   APTT   Result Value Ref Range    APTT 33.3 24.7 - 36.0 sec   TROPONIN   Result Value Ref Range    Troponin I <0.01 0.00 - 0.04 ng/mL   CKMB   Result Value Ref Range    CK-Mb 2.7 0.0 - 5.0 ng/mL   URINALYSIS CULTURE, IF INDICATED   Result Value Ref Range    Color Yellow     Character Clear     Specific Gravity 1.011 <1.035    Ph 6.0 5.0-8.0    Glucose Negative Negative mg/dL    Ketones Negative Negative mg/dL    Protein Negative Negative mg/dL    Bilirubin Negative Negative    Nitrite Negative Negative    Leukocyte Esterase Negative Negative    Occult Blood Negative Negative    Micro Urine Req see below     Culture Indicated No UA Culture   AMMONIA   Result Value Ref Range    Ammonia 182 (HH) 11 - 45 umol/L   TSH   Result Value Ref Range    TSH 2.420 0.300 - 3.700 uIU/mL   ESTIMATED GFR   Result Value Ref Range    GFR If  23 (A) >60  mL/min/1.73 m 2    GFR If Non  19 (A) >60 mL/min/1.73 m 2   EKG (NOW)   Result Value Ref Range    Report       [facility] Emergency Dept.    Test Date:  2017  Pt Name:    TRUE MACARIO                Department: ER  MRN:        [id number]                      Room:       [facility]  Gender:     F                            Technician: [id number]  :        [id number]                   Requested By:JULIEN REED  Order #:    [id number]                    Reading MD:    Measurements  Intervals                                Axis  Rate:       77                           P:          80  HI:         168                          QRS:        17  QRSD:       82                           T:          71  QT:         404  QTc:        458    Interpretive Statements  SINUS RHYTHM  LOW VOLTAGE THROUGHOUT  BORDERLINE INFERIOR Q WAVES  Compared to ECG 2017 19:39:25  No significant changes         11:48 AM  I spoke with the hospitalist who has accepted the patient for admission. Her ammonia level is very high and I think this is what is contributing to her altered mental status. She was given oral lactulose in the emergency department. At this time. I spoke with her  and she will be admitted in guarded condition.        FINAL IMPRESSION  1. Disorientation    2. Encephalopathy, hepatic (CMS-HCC)           PLAN/DISPOSITION  Admit in guarded condition          Electronically signed by: Julien Reed, 2017 11:14 AM

## 2017-05-24 NOTE — ED NOTES
Chief Complaint   Patient presents with   • Difficulty Walking   • Mental Status Change     slow to answer     Pt bib ems from home, having difficulty with ambulation and slow to answer. Pt has history of cirrhosis of liver.   Blood pressure 124/48, pulse 77, temperature 36.7 °C (98.1 °F), resp. rate 20, height 1.524 m (5'), weight 72.122 kg (159 lb), last menstrual period 04/12/1993, SpO2 97 %.

## 2017-05-25 PROBLEM — R78.81 BACTEREMIA DUE TO GRAM-POSITIVE BACTERIA: Status: ACTIVE | Noted: 2017-01-01

## 2017-05-25 PROBLEM — E72.20 HYPERAMMONEMIA (HCC): Status: RESOLVED | Noted: 2017-01-01 | Resolved: 2017-01-01

## 2017-05-25 NOTE — WOUND TEAM
"[facility]   Inpatient Services   Established Ostomy Management/ troubleshooting  HPI: Reviewed  PMH: Reviewed   SH: Reviewed   Reason for Ostomy nurse consult:  Established ileostomy         Subjective:  \"I do everything myself.\"      Objective:  In bed, appliance changed this morning due to leaking.    Ostomy type:  Ileostomy   Stoma location:  Q   Stoma assessment:    Appearance:  Red, budded      Size:  1 1/2    Protrusion:  <1\"   Output:  Heavy, watery, brown    MC jxn:  Intact     Peristomal skin:  Intact, discolored purple (patient says this is normal for her.     Ostomy Appliance (type and size):  2 piece 2 1/4 high output with paste ring attached to DD      Interventions and Education:  Previous dressing removed, cleansed with warm wash cloth.  Cut barrier to approximately 1 1/2\" applied paste ring around stoma and applied barrier to skin.  Attached 2 1/4\" high output back connected to down drain since patient is getting lactulose and nursing is emptying back every 1-2 hours.               Evaluation:  Nursing to assist with appliance changes while inpatient.       Plan:  Nursing to continue ostomy changes while inpatient        Anticipated discharge needs:  No education needed, has own supplies at home.    "

## 2017-05-25 NOTE — PROGRESS NOTES
AAOx4, forgetful at times.  at bedside. C/o 0/10 pain, declining intervention at this time. -N/V. -N/T. Denies new onset of chest pain/SOB. +BS in all 4 quadrants, colostomy in place to LLQ, watery stool. Redness present under L breast. POC discussed, denies further needs at this time. Bed alarm on, call light within reach & hourly rounding in place.

## 2017-05-25 NOTE — H&P
PRIMARY CARE PHYSICIAN:  Unknown.    CHIEF COMPLAINT:  Sick.    HISTORY OF PRESENT ILLNESS:  The patient is a 76-year-old female with the   history of multiple medical problems including cirrhosis of liver with hepatic   encephalopathy, obesity, Crohn's disease, chronic kidney disease stage IV,   and chronic pain syndrome, on narcotics.  Patient was recently hospitalized   for hepatic encephalopathy.  Patient is now brought in by her significant   other.  The significant other states that the patient has been very lethargic   and is not very active for over the past 2 days.  The patient is not able to   provide much information because of the confusion.  Patient's significant   other states that there has been no nausea, vomiting, and no fevers or chills,   but other than that, she was not able to provide further information.    PAST MEDICAL HISTORY:  1.  Recent hospitalization for hepatic encephalopathy.  2.  Obesity.  3.  Crohn disease.  4.  Cirrhosis of the liver.  5.  Chronic kidney disease, stage IV.  6.  Chronic pain syndrome, on narcotics.  7.  Left humerus fracture.  8.  Iron deficiency anemia with pancytopenia.  9.  COPD.  10.  Bowel obstruction.  11.  Sleep apnea, on CPAP.  12.  Gastroesophageal disease.  13.  Cataracts.    PAST SURGICAL HISTORY:  Ileostomy, cholecystectomy, salivary duct dilation,   gastroscopy, umbilical hernia repair, and multiple colonoscopies.    FAMILY HISTORY:  Reviewed and negative.    SOCIAL HISTORY:  No alcohol, tobacco, or IV drug abuse.    ALLERGIES:  PATIENT HAS ALLERGY TO SULFA.    MEDICATIONS:  1.  Ferrous gluconate 325 mg daily.  2.  Omeprazole 40 mg daily.  3.  Rifaximin 550 mg b.i.d.  4.  Vitamin D 1000 units daily.    REVIEW OF SYSTEMS:  I am not able to obtain due to patient's medical   condition.    PHYSICAL EXAMINATION:  GENERAL:  The patient is a well-developed female, in no acute distress.  VITAL SIGNS:  Temperature 36.7 C, heart rate 82, respiration 18, blood    pressure 124/48, and saturating 100% on room air.  HEENT:  Normocephalic and atraumatic.  Pupils are equal, round and reactive to   light, anicteric sclerae.  Extraocular muscles are intact.  NECK:  No cervical lymphadenopathy is appreciated.  Oropharynx is small with   Mallampatti score of 4.  Mucosa is moist, clear without ulcerations or   exudates.  PULMONARY:  Clear to auscultation bilaterally.  CARDIOVASCULAR:  Regular rate and rhythm without murmurs, rubs, or gallops.  ABDOMEN:  Positive bowel sounds.  Soft, nontender, and nondistended.  EXTREMITIES:  No clubbing or cyanosis and +1 to 2 edema bilaterally in the   lower extremities.  NEUROLOGICAL:  Cranial nerves II-XII intact.  SKIN:  No erythema or ulcerations.    LABORATORY STUDIES:  WBC of 4.3, hemoglobin 10.1, and platelet count 52.    Sodium 141, potassium 4.1, chloride 121, bicarbonate 11, BUN 54, creatinine   2.47, glucose of 112, calcium 7.8, alkaline phosphatase ____.  AST 56, ALT 27,   and total bilirubin of 1.1.  Ammonia level is 182.  Troponin is negative x1.    PT of 17.5, INR 1.39, and PTT of 33.3.  Urinalysis is negative.    RADIOGRAPHIC STUDIES:  Head CT shows atrophy.  Chest x-ray shows bibasilar   atelectasis without change from previous evaluation with elevated left   hemidiaphragm.    ASSESSMENT AND PLAN:  1.  The patient is a 76-year-old female with the history of multiple medical   problems including cirrhosis of liver, chronic kidney disease stage IV, and   recent hepatic encephalopathy, now with findings concerning for hypertensive   encephalopathy with hyperammonemia.  2.  Hepatic encephalopathy.  We will initiate the scheduled lactulose.  We   will follow the ammonia level.  We will minimize narcotics and sedatives when   possible.  3.  Pancytopenia/iron deficient:  We will continue with iron replacement and   follow.  4.  Transaminitis, mild.  We will follow.  5.  Acute ____ on chronic kidney disease, stage IV:  Follow with fluid  and   blood pressure management.  6.  Obesity:  Encouraged dietary kilocalorie restriction.  7.  Chronic pain syndrome:  Provide judicious use of narcotics.  8.  Chronic obstructive pulmonary disease:  Provide supplementary oxygen,   respiratory protocol, incentive spirometry, Pneumovax and Fluvax as   appropriate.  9.  Obstructive sleep apnea:  Encourage continuous positive airway pressure   compliance.  10.  Stable issues:  Medical history including cataracts, gastroesophageal   reflux disease, bowel obstruction, left humerus fracture, and Crohn's disease.  11.  Preventives:  Provide deep venous thrombosis prophylaxis.    DISPOSITION:  Complex and guarded.    The patient will require greater than 2 nights of stay for management.       ____________________________________     MD SARA OCONNOR / ALYSON    DD:  05/24/2017 19:32:49  DT:  05/24/2017 21:00:53    D#:  [id number]  Job#:  [id number]

## 2017-05-25 NOTE — DISCHARGE PLANNING
Care Transition Team Assessment      Patient stated that her partner is starting to have the early stages of dementia as he can't remember directions and is starting to forget things.  Has an appointment with his primary doctor on 6/7/17 to see if there is something that can be done to help him.    Information Source  Orientation : Oriented x 4  Information Given By: Patient  Who is responsible for making decisions for patient? : Patient    Readmission Evaluation  Is this a readmission?: Yes - unplanned readmission  Why do you think you were readmitted?:  (Same reason as before, I was confused )  Was an appointment arranged for you prior to discharge?: No  Were there new prescriptions you were supposed to fill after you were discharged?: Yes, prescriptions filled  Did you understand your discharge instructions?: Yes  Did you have enough support after your last discharge?: Yes    Elopement Risk  Legal Hold: No  Ambulatory or Self Mobile in Wheelchair: No-Not an Elopement Risk  Elopement Risk: Not at Risk for Elopement    Interdisciplinary Discharge Planning  Does Admitting Nurse Feel This Could be a Complex Discharge?: No  Primary Care Physician: Gilberto angulo  Lives with - Patient's Self Care Capacity: Spouse  Patient or legal guardian wants to designate a caregiver (see row info): No  Support Systems: Spouse / Significant Other  Housing / Facility: [address]  Do You Take your Prescribed Medications Regularly: Yes  Able to Return to Previous ADL's: Yes  Mobility Issues: Yes  Patient Expects to be Discharged to:: home  Assistance Needed: No  Durable Medical Equipment: Other - Specify (single point cane)    Discharge Preparedness  What is your plan after discharge?: Uncertain - pending medical team collaboration  What are your discharge supports?: Partner  Prior Functional Level: Ambulatory, Independent with Activities of Daily Living, Independent with Medication Management, Uses Cane, Uses Walker  Difficulity  with ADLs: Walking  Difficulty with ADLs Comment:  (Uses cane and or a walker )  Difficulity with IADLs: Driving, Keeping track of finances, Shopping    Functional Assesment  Prior Functional Level: Ambulatory, Independent with Activities of Daily Living, Independent with Medication Management, Uses Cane, Uses Walker    Finances  Financial Barriers to Discharge: No  Prescription Coverage: Yes    Vision / Hearing Impairment  Vision Impairment : Yes  Right Eye Vision: Impaired, Wears Glasses  Left Eye Vision: Impaired, Wears Glasses  Hearing Impairment : Yes  Hearing Impairment: Both Ears  Does Pt Need Special Equipment for the Hearing Impaired?: Yes-But Does not Need for Facility to Arrange Equipment    Values / Beliefs / Concerns  Values / Beliefs Concerns : No    Advance Directive  Advance Directive?: None (Has the paperwork at home. )  Advance Directive offered?: AD Booklet refused    Domestic Abuse  Have you ever been the victim of abuse or violence?: No  Physical Abuse or Sexual Abuse: No  Verbal Abuse or Emotional Abuse: No    Psychological Assessment  History of Substance Abuse: None  History of Psychiatric Problems: No  Non-compliant with Treatment: No  Newly Diagnosed Illness: No    Discharge Risks or Barriers  Discharge risks or barriers?: Complex medical needs  Patient risk factors: Complex medical needs, Readmission    Anticipated Discharge Information  Anticipated discharge disposition: Home  Discharge Address:  ([address] [place] [address])  Discharge Contact Phone Number:  ([phone])

## 2017-05-25 NOTE — PROGRESS NOTES
Received call from Indra from lab at 0355 for a critical ammonia result of 122, previous drawing was 182.

## 2017-05-25 NOTE — CARE PLAN
Problem: Safety  Goal: Will remain free from injury  Patient will be reminded to utilize the call light when in need of assistance

## 2017-05-25 NOTE — CARE PLAN
Problem: Communication  Goal: The ability to communicate needs accurately and effectively will improve  Outcome: PROGRESSING AS EXPECTED  Pt is [place], assistance with communication provided.     Problem: Safety  Goal: Will remain free from falls  Outcome: PROGRESSING AS EXPECTED  Bed alarm on. Treaded socks in use. Bed in low position, call light within reach, hourly rounding in place.     Problem: Venous Thromboembolism (VTW)/Deep Vein Thrombosis (DVT) Prevention:  Goal: Patient will participate in Venous Thrombosis (VTE)/Deep Vein Thrombosis (DVT)Prevention Measures  Outcome: PROGRESSING AS EXPECTED  Heparin for prophylaxis.     Problem: Bowel/Gastric:  Goal: Normal bowel function is maintained or improved  Outcome: PROGRESSING AS EXPECTED  Pt having large watery output and gas from colostomy. Pt is on lactulose. Assistance emptying colostomy bag provided as needed.

## 2017-05-25 NOTE — PROGRESS NOTES
Assumed care of pt at shift change, report received from day shift RN. Pt A&Ox4, forgetful at times, sitting up in bed. Denies pain, nausea, dizziness, SOB, numbness or tingling. Colostomy to LLQ, hyperactive bowel sounds on RUQ and RLQ, watery stool with food particles noted. Denies additional needs at this time. Bed in low position, alarm on, call light within reach, hourly rounding in place.

## 2017-05-25 NOTE — PROGRESS NOTES
Lab called at approximately 10:18am with results of Gram positive cocc with possible staph 1st bottle.  Results were given to Dr. Montez at 10:20am

## 2017-05-25 NOTE — RESPIRATORY CARE
COPD EDUCATION by COPD CLINICAL EDUCATOR  5/25/2017 at 7:18 AM by Sulema Barragan     Patient reviewed by COPD education team. Patient does not qualify for COPD program.

## 2017-05-26 NOTE — PROGRESS NOTES
Received report from day shift RN. Discussed POC with pt., verbalized understanding, assumed care @1915. A&Ox4. Pt hard of hearing. On room air. Upper lung sound clear. Denies pain. Ostomy in place. Draining watery stool. Max assist. Running NS @60cc/hr. Bed alarm on. Safety precautions in place; treaded socks on, call light within reach, personal belongings within reach, upper bed rails up.

## 2017-05-26 NOTE — CARE PLAN
Problem: Safety  Goal: Will remain free from falls  Outcome: PROGRESSING AS EXPECTED  Bed alarm in place. Socks in place when out of bed  Intervention: Assess risk factors for falls    05/25/17 2022   OTHER   Fall Risk High Risk to Fall - 2 or more points    Mobility Status Assessment 2-2 Healthcare Providers Required for Assistance with Ambulation & Transfer   No injury during shift          Problem: Discharge Barriers/Planning  Goal: Patient’s continuum of care needs will be met  Outcome: PROGRESSING AS EXPECTED  Pt will be discharged home when appropriate.   Intervention: Assess potential discharge barriers on admission and throughout hospital stay    05/24/17 1552   OTHER   Does Admitting Nurse Feel This Could be a Complex Discharge? No

## 2017-05-26 NOTE — PROGRESS NOTES
Hospital Medicine Progress Note, Adult, Complex               Author: Paul Montez Date & Time created: 5/25/2017  5:37 PM     Interval History:  75 yo male with history of liver cirrhosis presents with altered mental status.       5/25/17:  The patient seem to much better this afternoon.  The patient knows that she is [facility] and the current month and year.    Review of Systems:  Review of Systems   Constitutional: Negative for fever.   Respiratory: Negative for shortness of breath.    Cardiovascular: Negative for chest pain.   Gastrointestinal: Negative for vomiting and abdominal pain.   Neurological: Negative for headaches.       Physical Exam:  Physical Exam   Constitutional: She is oriented to person, place, and time. No distress.   HENT:   Mouth/Throat: Oropharynx is clear and moist. No oropharyngeal exudate.   Eyes: Conjunctivae are normal. Pupils are equal, round, and reactive to light. Right eye exhibits no discharge. Left eye exhibits no discharge.   Neck: Normal range of motion. No JVD present.   Cardiovascular: Normal rate and regular rhythm.    Pulmonary/Chest: No respiratory distress.   Abdominal: Soft. Bowel sounds are normal. She exhibits no distension.   Musculoskeletal: She exhibits no edema.   Able to move bilateral upper and lower extremities.   Neurological: She is alert and oriented to person, place, and time.   Skin: Skin is warm and dry.   Psychiatric: She has a normal mood and affect.       Labs:        Invalid input(s): [id number]  Recent Labs      05/24/17   1049   CKMB  2.7   TROPONINI  <0.01     Recent Labs      05/24/17   1049  05/25/17   0250   SODIUM  141  142   POTASSIUM  4.1  3.7   CHLORIDE  121*  123*   CO2  11*  11*   BUN  54*  54*   CREATININE  2.47*  2.50*   CALCIUM  7.8*  8.1*     Recent Labs      05/24/17   1049  05/25/17   0250   ALTSGPT  27  28   ASTSGOT  56*  51*   ALKPHOSPHAT  278*  273*   TBILIRUBIN  1.1  0.7   LIPASE  41   --    GLUCOSE  112*  93      Recent Labs      17   1049   RBC  3.48*   HEMOGLOBIN  10.1*   HEMATOCRIT  32.3*   PLATELETCT  52*   PROTHROMBTM  17.5*   APTT  33.3   INR  1.39*     Recent Labs      17   1049  17   0250   WBC  4.3*   --    NEUTSPOLYS  80.00*   --    LYMPHOCYTES  10.40*   --    MONOCYTES  6.60   --    EOSINOPHILS  1.60   --    BASOPHILS  0.70   --    ASTSGOT  56*  51*   ALTSGPT  27  28   ALKPHOSPHAT  278*  273*   TBILIRUBIN  1.1  0.7           Hemodynamics:  Temp (24hrs), Av.2 °C (97.1 °F), Min:35.9 °C (96.6 °F), Max:36.6 °C (97.9 °F)  Temperature: 36.1 °C (96.9 °F)  Pulse  Av.2  Min: 62  Max: 87   Blood Pressure : (!) 99/53 mmHg     Respiratory:    Respiration: 18, Pulse Oximetry: 94 %     Work Of Breathing / Effort: Mild  RUL Breath Sounds: Clear, RML Breath Sounds: Clear, RLL Breath Sounds: Diminished, REMY Breath Sounds: Clear, LLL Breath Sounds: Diminished  Fluids:    Intake/Output Summary (Last 24 hours) at 17 1737  Last data filed at 17 1400   Gross per 24 hour   Intake   1440 ml   Output    600 ml   Net    840 ml        GI/Nutrition:  Orders Placed This Encounter   Procedures   • Diet Order     Standing Status: Standing      Number of Occurrences: 1      Standing Expiration Date:      Order Specific Question:  Diet:     Answer:  Hepatic [9]     Medical Decision Making, by Problem:  Active Hospital Problems    Diagnosis   • *Hepatic encephalopathy (CMS-HCC) [K72.90]  -- continue lactulose and rifaximin.  Ammonia level trending down.   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  Start sodium bicarbonate replacement   • Crohn's disease (HCC) [K50.90]  -- follow up with outpatient gastroenterologist.  Does not appear to have acute flair up at this time.   • Bacteremia due to Gram-positive bacteria [A49.9]  -- patient does not appears to have sepsis at this time. Repeat blood culture.  Start empiric antibiotic  ceftriaxone.   • Anemia [D64.9]  -- likely secondary to chronic illness.   Monitor hemoglobin.   Liver cirrhosis of unknown cause -- will call the patient's gastroenterologist to discuss the patient's case.  Acute kidney injury on CKD stage 3 -- baseline creatinine is close to 2.   Continue gentle replacement.              DVT Prophylaxis: Heparin

## 2017-05-26 NOTE — CARE PLAN
Problem: Safety  Goal: Will remain free from falls  Outcome: PROGRESSING AS EXPECTED  A&Ox4 at this time. Max assist. Q2 turns. Safety precautions in place, bed placed in low position, call light within reach. Hourly rounding done.     Problem: Bowel/Gastric:  Goal: Normal bowel function is maintained or improved  Outcome: PROGRESSING AS EXPECTED  Pt has ileostomy in place. Draining large watery urine. Stoma red, peristomal skin intact. Will continue to monitor.

## 2017-05-27 NOTE — PROGRESS NOTES
Hospital Medicine Progress Note, Adult, Complex               Author: Paul Montez Date & Time created: 5/26/2017  5:47 PM     Interval History:  77 yo male with history of liver cirrhosis presents with altered mental status.       5/25/17:  The patient seem to much better this afternoon.  The patient knows that she is [facility] and the current month and year.  5/26/17:  The patient states that she feels okay but feeling very fatigued today.    Review of Systems:  Review of Systems   Constitutional: Negative for fever.   Respiratory: Negative for shortness of breath.    Cardiovascular: Negative for chest pain.   Gastrointestinal: Negative for vomiting and abdominal pain.   Neurological: Negative for headaches.       Physical Exam:  Physical Exam   Constitutional: She is oriented to person, place, and time. No distress.   HENT:   Mouth/Throat: Oropharynx is clear and moist. No oropharyngeal exudate.   Eyes: Conjunctivae are normal. Pupils are equal, round, and reactive to light. Right eye exhibits no discharge. Left eye exhibits no discharge.   Neck: Normal range of motion. No JVD present.   Cardiovascular: Normal rate and regular rhythm.    Pulmonary/Chest: No respiratory distress.   Abdominal: Soft. Bowel sounds are normal. She exhibits no distension.   Musculoskeletal: She exhibits no edema.   Able to move bilateral upper and lower extremities.   Neurological: She is alert and oriented to person, place, and time.   Skin: Skin is warm and dry.   Psychiatric: She has a normal mood and affect.       Labs:        Invalid input(s): [id number]  Recent Labs      05/24/17   1049   CKMB  2.7   TROPONINI  <0.01     Recent Labs      05/24/17   1049  05/25/17   0250  05/26/17   0439   SODIUM  141  142  140   POTASSIUM  4.1  3.7  3.8   CHLORIDE  121*  123*  122*   CO2  11*  11*  10*   BUN  54*  54*  46*   CREATININE  2.47*  2.50*  2.16*   MAGNESIUM   --    --   1.9   CALCIUM  7.8*  8.1*  7.7*     Recent Labs       17   1049  17   0250  17   0439   ALTSGPT  27  28  26   ASTSGOT  56*  51*  49*   ALKPHOSPHAT  278*  273*  262*   TBILIRUBIN  1.1  0.7  0.5   LIPASE  41   --    --    GLUCOSE  112*  93  106*     Recent Labs      17   1049  17   0439  17   1556   RBC  3.48*  3.56*   --    HEMOGLOBIN  10.1*  10.4*   --    HEMATOCRIT  32.3*  33.6*   --    PLATELETCT  52*  63*   --    PROTHROMBTM  17.5*   --    --    APTT  33.3   --    --    INR  1.39*   --    --    IRON   --    --   89   FERRITIN   --    --   23.5   TOTIRONBC   --    --   363     Recent Labs      17   1049  17   0250  17   0439   WBC  4.3*   --   4.0*   NEUTSPOLYS  80.00*   --   79.60*   LYMPHOCYTES  10.40*   --   9.30*   MONOCYTES  6.60   --   6.80   EOSINOPHILS  1.60   --   3.80   BASOPHILS  0.70   --   0.50   ASTSGOT  56*  51*  49*   ALTSGPT  27  28  26   ALKPHOSPHAT  278*  273*  262*   TBILIRUBIN  1.1  0.7  0.5           Hemodynamics:  Temp (24hrs), Av.2 °C (97.2 °F), Min:35.9 °C (96.7 °F), Max:36.8 °C (98.3 °F)  Temperature: 36.1 °C (96.9 °F)  Pulse  Av  Min: 62  Max: 87   Blood Pressure : 134/49 mmHg     Respiratory:    Respiration: 17, Pulse Oximetry: 100 %        RUL Breath Sounds: Clear, RML Breath Sounds: Clear, RLL Breath Sounds: Diminished, REMY Breath Sounds: Clear, LLL Breath Sounds: Diminished  Fluids:    Intake/Output Summary (Last 24 hours) at 17 3727  Last data filed at 17 1700   Gross per 24 hour   Intake    820 ml   Output   1300 ml   Net   -480 ml        GI/Nutrition:  Orders Placed This Encounter   Procedures   • Diet Order     Standing Status: Standing      Number of Occurrences: 1      Standing Expiration Date:      Order Specific Question:  Diet:     Answer:  Hepatic [9]     Medical Decision Making, by Problem:  Active Hospital Problems    Diagnosis   • *Hepatic encephalopathy (CMS-HCC) [K72.90]  -- continue lactulose and rifaximin.  Ammonia level risen today.  -- due  to large amount will continue with fluid replacement.  -- The patient follows Dr. Brooks of gastroenterology consultant. Per Dr. 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  Continue Sodium bicarbonate replacement   • Crohn's disease (HCC) [K50.90]  -- follow up with outpatient gastroenterologist.  Does not appear to have acute flair up at this time.   • Bacteremia due to Gram-positive bacteria [A49.9]  -- patient does not appears to have sepsis at this time. Repeat blood culture is pending.  Continue empiric antibiotic ceftriaxone.   • Anemia [D64.9]  -- likely secondary to chronic illness.   Monitor hemoglobin.  Check iron studies     Acute kidney injury on CKD stage 3 -- baseline creatinine is close to 2.   Renal function improved with fluid hydration.    Ordered physical therapy and occupational therapy evaluation and treatment.            DVT Prophylaxis: Heparin

## 2017-05-27 NOTE — CARE PLAN
Problem: Discharge Barriers/Planning  Goal: Patient’s continuum of care needs will be met  Intervention: Assess potential discharge barriers on admission and throughout hospital stay  Patient is currently receiving IV antibiotics and is waiting for final blood culture results.

## 2017-05-27 NOTE — CARE PLAN
Problem: Safety  Goal: Will remain free from injury  Outcome: PROGRESSING AS EXPECTED    Problem: Bowel/Gastric:  Goal: Normal bowel function is maintained or improved  Outcome: PROGRESSING AS EXPECTED

## 2017-05-27 NOTE — PROGRESS NOTES
Assumed care. AOx4, pain at this time.      Discussed POC to include IV rocephin Q24 hours, IV fluid replacement, lactulose, and monitoring ammonium levels. All questions have been answered at this time.     Patient resting comfortably in bed. No needs or concerns at this time.     Safety precautions and hourly rounding in place.

## 2017-05-28 NOTE — PROGRESS NOTES
Hospital Medicine Progress Note, Adult, Complex               Author: Paul Montez Date & Time created: 5/28/2017  8:45 AM     Interval History:  75 yo male with history of liver cirrhosis presents with altered mental status.       5/25/17:  The patient seem to much better this afternoon.  The patient knows that she is [facility] and the current month and year.  5/26/17:  The patient states that she feels okay but feeling very fatigued today.  5/27/17:  The patient is asking what can be done at home to prevent ammonia level going up at home.  The patients denies abdominal pain.   5/28/17:   The patient states that she has no new complaint.     Review of Systems:  Review of Systems   Constitutional: Negative for fever.   Respiratory: Negative for shortness of breath.    Cardiovascular: Negative for chest pain.   Gastrointestinal: Negative for vomiting and abdominal pain.   Neurological: Negative for headaches.       Physical Exam:  Physical Exam   Constitutional: She is oriented to person, place, and time. No distress.   HENT:   Mouth/Throat: Oropharynx is clear and moist. No oropharyngeal exudate.   Eyes: Conjunctivae are normal. Pupils are equal, round, and reactive to light. Right eye exhibits no discharge. Left eye exhibits no discharge.   Neck: Normal range of motion. No JVD present.   Cardiovascular: Normal rate and regular rhythm.    Pulmonary/Chest: No respiratory distress.   Abdominal: Soft. Bowel sounds are normal. She exhibits no distension.   Musculoskeletal: She exhibits no edema.   Able to move bilateral upper and lower extremities.  Unable to lift up lower extremities against resistence.   Neurological: She is alert and oriented to person, place, and time. No cranial nerve deficit.   Skin: Skin is warm and dry.   Psychiatric: She has a normal mood and affect.       Labs:  Recent Labs      05/27/17   1640   ISTATAPH  7.208*   ISTATAPCO2  20.7*   ISTATAPO2  57*   ISTATATCO2  9*   [id number]   84*   ISTATARTHCO3  8.2*   ISTATARTBE  -18*   ISTATTEMP  98.2 F   ISTATFIO2  21   ISTATSPEC  Arterial   ISTATAPHTC  7.211*   [id number]  56*         Recent Labs      17   SODIUM  140  138  134*   POTASSIUM  3.8  3.5*  3.7   CHLORIDE  122*  120*  117*   CO2  10*  10*  10*   BUN  46*  46*  39*   CREATININE  2.16*  2.39*  2.36*   MAGNESIUM  1.9  1.9   --    CALCIUM  7.7*  7.9*  7.3*     Recent Labs      17   ALTSGPT     ASTSGOT  49*  44  39   ALKPHOSPHAT  262*  241*  223*   TBILIRUBIN  0.5  0.5  0.5   GLUCOSE  106*  114*  113*     Recent Labs      17   1556  17   RBC  3.56*   --   3.28*  3.16*   HEMOGLOBIN  10.4*   --   9.5*  9.1*   HEMATOCRIT  33.6*   --   30.5*  29.2*   PLATELETCT  63*   --   52*  50*   IRON   --   89   --    --    FERRITIN   --   23.5   --    --    TOTIRONBC   --   363   --    --      Recent Labs      17   WBC  4.0*  3.8*  3.8*   NEUTSPOLYS  79.60*  74.80*  71.90   LYMPHOCYTES  9.30*  10.90*  12.90*   MONOCYTES  6.80  8.10  9.70   EOSINOPHILS  3.80  5.20  4.70   BASOPHILS  0.50  0.50  0.30   ASTSGOT  49*  44  39   ALTSGPT    25   ALKPHOSPHAT  262*  241*  223*   TBILIRUBIN  0.5  0.5  0.5           Hemodynamics:  Temp (24hrs), Av.6 °C (97.9 °F), Min:36.1 °C (96.9 °F), Max:37.2 °C (98.9 °F)  Temperature: 36.9 °C (98.5 °F)  Pulse  Av.6  Min: 62  Max: 95   Blood Pressure : 103/53 mmHg     Respiratory:    Respiration: 19, Pulse Oximetry: 99 %, O2 Daily Delivery Respiratory : Silicone Nasal Cannula     Work Of Breathing / Effort: Mild  RUL Breath Sounds: Clear, RML Breath Sounds: Clear, RLL Breath Sounds: Diminished, REMY Breath Sounds: Clear, LLL Breath Sounds: Diminished  Fluids:    Intake/Output Summary (Last 24 hours) at 17 0845  Last data filed at 17 0700   Gross per 24  hour   Intake   2980 ml   Output   1000 ml   Net   1980 ml        GI/Nutrition:  Orders Placed This Encounter   Procedures   • Diet Order     Standing Status: Standing      Number of Occurrences: 1      Standing Expiration Date:      Order Specific Question:  Diet:     Answer:  Hepatic [9]     Medical Decision Making, by Problem:  Active Hospital Problems    Diagnosis   • *Hepatic encephalopathy (CMS-HCC) [K72.90]  -- continue lactulose and rifaximin.  Ammonia level decreased today.  However, is requiring continue iv fluid infusion to maintain adequate hydration.   continue lactulosetwice daily and rifaximin  TID.  -- due to large amount bowel movmeent will continue with fluid replacement. Wean off iv fluid replacement.  -- . Per Dr. Brooks, gastroenterologist, the patient has cryptogenic cirrhosis  -- Dr. Aranda to consult today.  Appreciate his input.   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  ABG pH is in acidotic range. Continue sodium bicarbonate to 1300 mg po TID.    -- Still persistent metabolic acidosis. Give IV sodium bicarb 150 meq x 1 liter and monitor bicarb level.   • Crohn's disease (HCC) [K50.90]  -- follow up with outpatient gastroenterologist.  Does not appear to have acute flair up at this time.   • Bacteremia due to Gram-positive bacteria [A49.9]  -- patient does not appears to have sepsis at this time. Repeat blood cultures are thus far negative. Thus, discontinue ceftriaxone and monitor.  Initial blood culture is likely contaminant.   • Anemia [D64.9]  -- likely secondary to chronic illness.   Monitor hemoglobin.  iron studies indicate adequate iron level.     Acute kidney injury on CKD stage 3 -- baseline creatinine is close to 2.   Continue fluid hydration.  Hypokalemia -- given potassium replacement, level normalized  Continue physical therapy and occupational therapy evaluation and treatment.  Intend to discharge  the patient home when the patient's intake and output is balanced and metabolic acidosis is resovled.          DVT Prophylaxis: Heparin

## 2017-05-28 NOTE — CARE PLAN
Problem: Infection  Goal: Will remain free from infection  Outcome: PROGRESSING AS EXPECTED  Assessed s/s of infection, performed hand washing before changing pt's ostomy bag    Problem: Venous Thromboembolism (VTW)/Deep Vein Thrombosis (DVT) Prevention:  Goal: Patient will participate in Venous Thrombosis (VTE)/Deep Vein Thrombosis (DVT)Prevention Measures  Outcome: PROGRESSING AS EXPECTED  Instructed pt using ankle flex, foot rotation. Scheduled heparin med given per MAR.    Problem: Skin Integrity  Goal: Risk for impaired skin integrity will decrease  Outcome: PROGRESSING AS EXPECTED  Changing ostomy bag.

## 2017-05-28 NOTE — CONSULTS
DATE OF SERVICE:  05/28/2017    REFERRING PHYSICIAN:  Allyssa Lane MD    CONSULTING PHYSICIAN:  Nikolai Aranda MD  Chief Complaint: Altered mental ststus    HISTORY OF PRESENT ILLNESS:  This patient is a 76-year-old female who suffers   from cryptogenic cirrhosis and has had recurrent episodes of hepatic   encephalopathy.  She has been managed as an outpatient on xifaxan 550 mg twice   a day.  She was admitted several days ago with another episode of hepatic   encephalopathy and was treated with lactulose in addition to her usual doses   of xifaxan and she seems to have cleared quite well mentally.  She denies   abdominal pain, nausea, vomiting.  She has noted relatively high output from   her ileostomy that she has had due to history of inflammatory bowel disease.    She denies any blood in her ostomy output.  There was concern that she may   have staph bacteremia, but that was felt to be a contaminant.    ALLERGIES:  TO SULFA.    MEDICATIONS:  As an outpatient, she was taking iron, omeprazole, b.i.d.   xifaxan.    PAST MEDICAL HISTORY:  1.  Cryptogenic cirrhosis.  2.  Hepatic encephalopathy.  3.  Obesity.  4.  Crohn's disease.  5.  Stage IV chronic kidney disease.  6.  Chronic pain, on narcotics.  7.  Anemia with pancytopenia.  8.  Chronic obstructive pulmonary disease.  9.  Sleep apnea.  10.  Gastroesophageal reflux disease.  11.  Cataracts.  12.  Ileostomy.  13.  Cholecystectomy.  14.  Umbilical hernia repair.    SOCIAL HISTORY:  The patient does not smoke or drink alcohol.    FAMILY HISTORY:  Noncontributory.    REVIEW OF SYSTEMS:  NEUROLOGIC:  She is alert and oriented x3 and is without specific neurologic   complaints at this point.    PHYSICAL EXAMINATION:  VITAL SIGNS:  Blood pressure is 110/70, pulse is 70, respiratory rate is 18,   the temperature is afebrile.  SKIN:  Unremarkable.  EYES:  Nonicteric.  NECK:  Supple.  LYMPH NODES:  Negative supraclavicular or cervical and axillary.  CARDIOVASCULAR:   Regular rate and rhythm.  LUNGS:  Clear to auscultation and percussion.  ABDOMEN:  Normoactive bowel sounds, nontender.  There is an ileostomy present   in the lower abdomen, which is draining green material without blood.  EXTREMITIES:  Mild degenerative changes.    LABORATORY DATA:  Ammonia on admit was 177, it is now down 83.  Sodium 138,   potassium 3.5, creatinine is 2.39.  INR is 1.39, PTT is 33.3, hematocrit 30.5,   white blood cell count 3.8, platelet count 52,000.  AST is 44, ALT is 26, alk   phos is 241, bilirubin 0.5, albumin 2.6.  Iron saturation is 25%, and blood   culture shows staph aureus, which was felt to be a contaminant.  CT scan of   the head shows cerebral atrophy.  Chest x-ray shows unchanged bibasilar   atelectasis.  An ultrasound on 05/19/17 showed fatty changes versus fibrosis   and splenomegaly.    IMPRESSION AND RECOMMENDATIONS:  1.  Hepatic encephalopathy.  The patient has been treated on b.i.d. xifaxan,   but has had recurrence of her episodes of hepatic encephalopathy.  The concern   with lactulose is the increase in ileostomy output associated with that   medication.  I have managed patients with up to four xifaxan pills a day and   that seems to have helped them.  I would decrease her use of lactulose and   increased her use of xifaxan up to that level if necessary.  Neomycin is an   option for other people, but given her stage IV chronic kidney disease that   would be contraindicated.  2.  Cryptogenic cirrhosis.  The exact etiology is unknown.  We could be   looking at a burnt out fatty liver picture.  3.  Pancytopenia.  This is stable.  She does have splenomegaly and a component   of her thrombocytopenia may be related to her splenomegaly.  4.  Splenomegaly, most likely related to portal hypertension.  5.  Stage IV chronic kidney disease that would eliminate the possibility of   using neomycin to control her hepatic encephalopathy.  6.  Positive blood culture for staph aureus felt  to be a contaminating.  7.  Chronic obstructive pulmonary disease.  8.  Sleep apnea.       ____________________________________     MD ÁNGEL Ibarra / ALYSON    DD:  05/28/2017 15:19:46  DT:  05/28/2017 16:48:45    D#:  [id number]  Job#:  [id number]

## 2017-05-28 NOTE — PROGRESS NOTES
Pt AxOx4, denied pain, numbness or tingling. Helping pt changing ostomy bag. Bed pan offered. Bed alarm in place, call light within reach, bed in low position

## 2017-05-28 NOTE — PROGRESS NOTES
Hospital Medicine Progress Note, Adult, Complex               Author: Paul Montez Date & Time created: 5/27/2017  5:01 PM     Interval History:  75 yo male with history of liver cirrhosis presents with altered mental status.       5/25/17:  The patient seem to much better this afternoon.  The patient knows that she is [facility] and the current month and year.  5/26/17:  The patient states that she feels okay but feeling very fatigued today.  5/27/17:  The patient is asking what can be done at home to prevent ammonia level going up at home.  The patients denies abdominal pain.     Review of Systems:  Review of Systems   Constitutional: Negative for fever.   Respiratory: Negative for shortness of breath.    Cardiovascular: Negative for chest pain.   Gastrointestinal: Negative for vomiting and abdominal pain.   Neurological: Negative for headaches.       Physical Exam:  Physical Exam   Constitutional: She is oriented to person, place, and time. No distress.   HENT:   Mouth/Throat: Oropharynx is clear and moist. No oropharyngeal exudate.   Eyes: Conjunctivae are normal. Pupils are equal, round, and reactive to light. Right eye exhibits no discharge. Left eye exhibits no discharge.   Neck: Normal range of motion. No JVD present.   Cardiovascular: Normal rate and regular rhythm.    Pulmonary/Chest: No respiratory distress.   Abdominal: Soft. Bowel sounds are normal. She exhibits no distension.   Musculoskeletal: She exhibits no edema.   Able to move bilateral upper and lower extremities.  Unable to lift up lower extremities against resistence.   Neurological: She is alert and oriented to person, place, and time. No cranial nerve deficit.   Skin: Skin is warm and dry.   Psychiatric: She has a normal mood and affect.       Labs:  Recent Labs      05/27/17   1640   ISTATAPH  7.208*   ISTATAPCO2  20.7*   ISTATAPO2  57*   ISTATATCO2  9*   [id number]  84*   ISTATARTHCO3  8.2*   ISTATARTBE  -18*   ISTATTEMP  98.2  F   ISTATFIO2  21   ISTATSPEC  Arterial   ISTATAPHTC  7.211*   [id number]  56*         Recent Labs      17   SODIUM  142  140  138   POTASSIUM  3.7  3.8  3.5*   CHLORIDE  123*  122*  120*   CO2  11*  10*  10*   BUN  54*  46*  46*   CREATININE  2.50*  2.16*  2.39*   MAGNESIUM   --   1.9  1.9   CALCIUM  8.1*  7.7*  7.9*     Recent Labs      17   ALTSGPT     ASTSGOT  51*  49*  44   ALKPHOSPHAT  273*  262*  241*   TBILIRUBIN  0.7  0.5  0.5   GLUCOSE  93  106*  114*     Recent Labs      17   1556  17   RBC  3.56*   --   3.28*   HEMOGLOBIN  10.4*   --   9.5*   HEMATOCRIT  33.6*   --   30.5*   PLATELETCT  63*   --   52*   IRON   --   89   --    FERRITIN   --   23.5   --    TOTIRONBC   --   363   --      Recent Labs      17   WBC   --   4.0*  3.8*   NEUTSPOLYS   --   79.60*  74.80*   LYMPHOCYTES   --   9.30*  10.90*   MONOCYTES   --   6.80  8.10   EOSINOPHILS   --   3.80  5.20   BASOPHILS   --   0.50  0.50   ASTSGOT  51*  49*  44   ALTSGPT     ALKPHOSPHAT  273*  262*  241*   TBILIRUBIN  0.7  0.5  0.5           Hemodynamics:  Temp (24hrs), Av.3 °C (97.4 °F), Min:36.1 °C (96.9 °F), Max:36.6 °C (97.9 °F)  Temperature: 36.3 °C (97.4 °F)  Pulse  Av.9  Min: 62  Max: 95   Blood Pressure : 106/53 mmHg     Respiratory:    Respiration: 18, Pulse Oximetry: 100 %     Work Of Breathing / Effort: Mild  RUL Breath Sounds: Clear, RML Breath Sounds: Clear, RLL Breath Sounds: Diminished, REMY Breath Sounds: Clear, LLL Breath Sounds: Diminished  Fluids:    Intake/Output Summary (Last 24 hours) at 17 1701  Last data filed at 17 1600   Gross per 24 hour   Intake   1180 ml   Output   2100 ml   Net   -920 ml     Weight: 69.9 kg (154 lb 1.6 oz)  GI/Nutrition:  Orders Placed This Encounter   Procedures   • Diet Order     Standing  Status: Standing      Number of Occurrences: 1      Standing Expiration Date:      Order Specific Question:  Diet:     Answer:  Hepatic [9]     Medical Decision Making, by Problem:  Active Hospital Problems    Diagnosis   • *Hepatic encephalopathy (CMS-HCC) [K72.90]  -- continue lactulose and rifaximin.  Ammonia level decreased today.  However, is requiring continue iv fluid infusion to maintain adequate hydration.   Discussed the patient's case with Dr. Aranda, gastroenterologist, change lactulose to twice daily and increase rifaximin to TID.  -- due to large amount will continue with fluid replacement.  -- The patient follows Dr. Brooks of gastroenterology consultant. Per Dr. 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  Reviewed ABG result which indicates pH is in acidotic range.  Increase sodium bicarbonate to 1300 mg po TID.   • Crohn's disease (HCC) [K50.90]  -- follow up with outpatient gastroenterologist.  Does not appear to have acute flair up at this time.   • Bacteremia due to Gram-positive bacteria [A49.9]  -- patient does not appears to have sepsis at this time. Repeat blood culture 48 hours result is pending.  If repeat blood culture is negative, then discontinue ceftriaxone.   • Anemia [D64.9]  -- likely secondary to chronic illness.   Monitor hemoglobin.  Check iron studies     Acute kidney injury on CKD stage 3 -- baseline creatinine is close to 2.   Continue fluid hydration.  Hypokalemia -- continue potassium replacement.    Awaiting physical therapy and occupational therapy evaluation and treatment.            DVT Prophylaxis: Heparin

## 2017-05-29 NOTE — CARE PLAN
Problem: Infection  Goal: Will remain free from infection  Outcome: PROGRESSING AS EXPECTED  Assess s/s of infection, VSS, encourage pt washing hand after bathroom, eating    Problem: Bowel/Gastric:  Goal: Normal bowel function is maintained or improved  Outcome: PROGRESSING AS EXPECTED  Drainage x2 ileostomy bag    Problem: Discharge Barriers/Planning  Goal: Patient’s continuum of care needs will be met  Outcome: PROGRESSING SLOWER THAN EXPECTED

## 2017-05-29 NOTE — PROGRESS NOTES
Spoke with Dr. Chawla about pt.'s blood pressure this morning of 102/60 and the fact that pt. Has coreg due.  Based on pt.'s blood pressure Dr. Chawla gives order to hold morning dose of this medication.

## 2017-05-29 NOTE — CARE PLAN
Problem: Safety  Goal: Will remain free from injury  Outcome: PROGRESSING AS EXPECTED  Pt. Continues with a bed alarm, but is willing to use her call light appropriately when she has a need.    Problem: Skin Integrity  Goal: Risk for impaired skin integrity will decrease  Outcome: PROGRESSING AS EXPECTED  Pt. Has been ambulating today and has been up to the chair for multiple meals.  PT & OT have been working with her as well.

## 2017-05-29 NOTE — THERAPY
"Occupational Therapy Evaluation completed.   Functional Status:  SBA supine>sit EOB, CGA w/LB dressing, SBA sit>Stand walking in room w/SPC to bathroom spv w/toilet txf and hygiene able to manage ostomy spv w/txf back to chair, requesting UE exercises however pt demonstrated previous provided exercises I'ly remained up in chair alarm on call light w/in reach educated on fall prevention and using call light pt reports good support from spouse at home   Plan of Care: Patient with no further skilled OT needs in the acute care setting at this time  Discharge Recommendations:  Equipment: No Equipment Needed. Post-acute therapy Discharge to home with outpatient or home health for additional skilled therapy services.    See \"Rehab Therapy-Acute\" Patient Summary Report for complete documentation.    "

## 2017-05-29 NOTE — PROGRESS NOTES
Pt. Sitting up in chair, A&O x 4, on room air, eating her breakfast.  Pt. [place], but is appropriate and verbalizes understanding of what pills she is on and what they are for.  Pt. Having numerous dark stools from ostomy bag, but is also on her home medication for hepatic encephalopathy as well as lactulose.  Pt. Very anxious for discharge, but she is made aware that at this time, there are no discharge orders placed, pt. Verbalizes understanding and has no further questions.

## 2017-05-29 NOTE — PROGRESS NOTES
Hospital Medicine Progress Note, Adult, Complex               Author: Melvin Chawla Date & Time created: 5/29/2017  12:14 PM     Interval History:  76YR OLD F WITH HEPATIC ENCEPHALOPATHY    Review of Systems:  Review of Systems   Unable to perform ROS: acuity of condition       Physical Exam:  Physical Exam   Constitutional: No distress.   HENT:   Head: Normocephalic and atraumatic.   Eyes: Right eye exhibits no discharge. Left eye exhibits no discharge.   Neck: Neck supple. No JVD present.   Cardiovascular: Normal rate and regular rhythm.    Pulmonary/Chest: Effort normal. No stridor. No respiratory distress. She has no wheezes. She has no rales.   Abdominal: Soft. There is no rebound and no guarding.   Musculoskeletal: She exhibits no tenderness.   Skin: Skin is warm and dry. She is not diaphoretic.       Labs:  Recent Labs      05/27/17   1640   ISTATAPH  7.208*   ISTATAPCO2  20.7*   ISTATAPO2  57*   ISTATATCO2  9*   [id number]  84*   ISTATARTHCO3  8.2*   ISTATARTBE  -18*   ISTATTEMP  98.2 F   ISTATFIO2  21   ISTATSPEC  Arterial   ISTATAPHTC  7.211*   [id number]  56*         Recent Labs      05/27/17   0315 05/28/17 0207  05/29/17   0237   SODIUM  138  134*  137   POTASSIUM  3.5*  3.7  3.6   CHLORIDE  120*  117*  117*   CO2  10*  10*  13*   BUN  46*  39*  40*   CREATININE  2.39*  2.36*  2.06*   MAGNESIUM  1.9   --    --    CALCIUM  7.9*  7.3*  7.4*     Recent Labs      05/27/17   0315  05/28/17   0207  05/29/17   0237   ALTSGPT  26  25  20   ASTSGOT  44  39  32   ALKPHOSPHAT  241*  223*  201*   TBILIRUBIN  0.5  0.5  0.5   GLUCOSE  114*  113*  96     Recent Labs      05/26/17   1556  05/27/17   0315  05/28/17   0207  05/29/17   0237   RBC   --   3.28*  3.16*  2.88*   HEMOGLOBIN   --   9.5*  9.1*  8.3*   HEMATOCRIT   --   30.5*  29.2*  27.0*   PLATELETCT   --   52*  50*  51*   IRON  89   --    --    --    FERRITIN  23.5   --    --    --    [facility]  363   --    --    --      Recent Labs      05/27/17    0315  17   0207  17   0237   WBC  3.8*  3.8*  2.8*   NEUTSPOLYS  74.80*  71.90  70.60   LYMPHOCYTES  10.90*  12.90*  12.50*   MONOCYTES  8.10  9.70  10.40   EOSINOPHILS  5.20  4.70  5.00   BASOPHILS  0.50  0.30  0.40   ASTSGOT  44  39  32   ALTSGPT  26  25  20   ALKPHOSPHAT  241*  223*  201*   TBILIRUBIN  0.5  0.5  0.5           Hemodynamics:  Temp (24hrs), Av.8 °C (98.2 °F), Min:36.5 °C (97.7 °F), Max:37.1 °C (98.7 °F)  Temperature: 36.7 °C (98 °F)  Pulse  Av.3  Min: 62  Max: 95   Blood Pressure : 102/60 mmHg     Respiratory:    Respiration: 18, Pulse Oximetry: 94 %        RUL Breath Sounds: Clear, RML Breath Sounds: Clear, RLL Breath Sounds: Diminished, REMY Breath Sounds: Clear, LLL Breath Sounds: Diminished  Fluids:    Intake/Output Summary (Last 24 hours) at 17 1214  Last data filed at 17 1042   Gross per 24 hour   Intake    750 ml   Output   1920 ml   Net  -1170 ml        GI/Nutrition:  Orders Placed This Encounter   Procedures   • Diet Order     Standing Status: Standing      Number of Occurrences: 1      Standing Expiration Date:      Order Specific Question:  Diet:     Answer:  Hepatic [9]     Medical Decision Making, by Problem:  Active Hospital Problems    Diagnosis   • *Hepatic encephalopathy (CMS-HCC) [K72.90]   • Metabolic acidosis, NAG, bicarbonate losses [E87.2]   • Crohn's disease (HCC) [K50.90]   • Bacteremia due to Gram-positive bacteria [A49.9]   • Anemia [D64.9]   76YR OLD F WITH HEPATIC ENCEPHALOPATHY  AMMONIA LEVEL IN AM  ON RIFAXIMIN 5500 TID  ON LACTULOSE BID  GI INPUT IS NOTED  HAS LIVER CIRRHOSIS    CRYPTOGENIC LIVER CIRRHOSIS  VITAMIN K IS GIVEN  ZINC OXIDE    HTN  COREG    DISPOSITION  PT AND OT EVAL      Loaiza catheter: No Loaiza        DVT prophylaxis - mechanical: SCDs

## 2017-05-29 NOTE — PROGRESS NOTES
Gastroenterology Progress Note     Author: Nikolai Gill   Date & Time Created: 5/29/2017 11:27 AM    Interval History:  GI Attending  Cc: encephalopathy, high ostomy output  S: Patient examined and interviewed, course reviewed.  Discussed with Dr Aranda.  Pt's spouse present and helps with history.  Pt denies confusion.  She relates her ostomy output is less in volume and thickness.  No bright red blood per ostomy.  No nausea, vomiting, hematemesis.  Pt would like to go home today.     Review of Systems:  Review of Systems   Constitutional: Negative for fever and chills.   Respiratory: Negative for cough and shortness of breath.    Cardiovascular: Negative for chest pain and palpitations.   Gastrointestinal: Negative for heartburn, nausea, vomiting, abdominal pain and blood in stool.   Genitourinary: Negative for dysuria and hematuria.       Physical Exam:  Physical Exam   Constitutional: She is oriented to person, place, and time. She appears well-developed and well-nourished. No distress.   Very hard of hearing elderly  female, nad    HENT:   Head: Normocephalic and atraumatic.   Eyes: Right eye exhibits no discharge. Left eye exhibits no discharge. No scleral icterus.   Cardiovascular: Normal rate and regular rhythm.    No murmur heard.  Pulmonary/Chest: Breath sounds normal. No respiratory distress. She has no wheezes.   Abdominal: She exhibits no distension. There is no tenderness. There is no rebound.   Musculoskeletal: She exhibits no edema.   Neurological: She is alert and oriented to person, place, and time.   No asterixis    Skin: She is not diaphoretic.   Vitals reviewed.      Labs:  Recent Labs      05/27/17   1640   ISTATAPH  7.208*   ISTATAPCO2  20.7*   ISTATAPO2  57*   ISTATATCO2  9*   [id number]  84*   ISTATARTHCO3  8.2*   ISTATARTBE  -18*   ISTATTEMP  98.2 F   ISTATFIO2  21   ISTATSPEC  Arterial   ISTATAPHTC  7.211*   [id number]  56*         Recent Labs      05/27/17   0315   17   SODIUM  138  134*  137   POTASSIUM  3.5*  3.7  3.6   CHLORIDE  120*  117*  117*   CO2  10*  10*  13*   BUN  46*  39*  40*   CREATININE  2.39*  2.36*  2.06*   MAGNESIUM  1.9   --    --    CALCIUM  7.9*  7.3*  7.4*     Recent Labs      17   ALTSGPT    25  20   ASTSGOT  44  39  32   ALKPHOSPHAT  241*  223*  201*   TBILIRUBIN  0.5  0.5  0.5   GLUCOSE  114*  113*  96     Recent Labs      17   1556  17   RBC   --   3.28*  3.16*  2.88*   HEMOGLOBIN   --   9.5*  9.1*  8.3*   HEMATOCRIT   --   30.5*  29.2*  27.0*   PLATELETCT   --   52*  50*  51*   IRON  89   --    --    --    FERRITIN  23.5   --    --    --    TOTIRONBC  363   --    --    --      Recent Labs      17   WBC  3.8*  3.8*  2.8*   NEUTSPOLYS  74.80*  71.90  70.60   LYMPHOCYTES  10.90*  12.90*  12.50*   MONOCYTES  8.10  9.70  10.40   EOSINOPHILS  5.20  4.70  5.00   BASOPHILS  0.50  0.30  0.40   ASTSGOT  44  39  32   ALTSGPT  26  25  20   ALKPHOSPHAT  241*  223*  201*   TBILIRUBIN  0.5  0.5  0.5     Hemodynamics:  Temp (24hrs), Av.8 °C (98.2 °F), Min:36.5 °C (97.7 °F), Max:37.1 °C (98.7 °F)  Temperature: 36.7 °C (98 °F)  Pulse  Av.3  Min: 62  Max: 95   Blood Pressure : 102/60 mmHg     Respiratory:    Respiration: 18, Pulse Oximetry: 94 %        RUL Breath Sounds: Clear, RML Breath Sounds: Clear, RLL Breath Sounds: Diminished, REMY Breath Sounds: Clear, LLL Breath Sounds: Diminished  Fluids:    Intake/Output Summary (Last 24 hours) at 17 1127  Last data filed at 17 1042   Gross per 24 hour   Intake    750 ml   Output   1920 ml   Net  -1170 ml        GI/Nutrition:  Orders Placed This Encounter   Procedures   • Diet Order     Standing Status: Standing      Number of Occurrences: 1      Standing Expiration Date:      Order Specific Question:  Diet:     Answer:  Hepatic  [9]     Medical Decision Making, by Problem:  Active Hospital Problems    Diagnosis   • *Hepatic encephalopathy (CMS-HCC) [K72.90]   • Metabolic acidosis, NAG, bicarbonate losses [E87.2]   • Crohn's disease (HCC) [K50.90]   • Bacteremia due to Gram-positive bacteria [A49.9]   • Anemia [D64.9]     IMPRESSION(S):  1.  Hepatic encephalopathy.  Add zinc.   2.  Cryptogenic cirrhosis.  The exact etiology is unknown.  We could be    looking at a burnt out fatty liver picture.  3.  Pancytopenia.  This is stable.  She does have splenomegaly and a component   of her thrombocytopenia may be related to her splenomegaly.  4.  Splenomegaly, most likely related to portal hypertension.  5.  Stage IV chronic kidney disease that would eliminate the possibility of    using neomycin to control her hepatic encephalopathy.  6.  Positive blood culture for staph aureus felt to be a contaminating.  7.  Chronic obstructive pulmonary disease.  8.  Sleep apnea.      Plan:  1) zinc 220 mg daily  2) Continue rifaximin 550 mg po tid  3) Continue lactulose bid  4) Monitor ostomy output  Okay to discharge to home on current regimen, from GI/Hepatology stand point.    GI to sign off.  Please call with questions.  I will arrange for follow up in our clinic in about one month.        Labs reviewed, Medications reviewed and Radiology images reviewed

## 2017-05-29 NOTE — THERAPY
"Physical Therapy Evaluation completed.   Bed Mobility:  Supine to Sit:  (Up in chair)  Transfers: Sit to Stand: Stand by Assist  Gait: Level Of Assist: Contact Guard Assist with Single Point Cane       Plan of Care: Will benefit from Physical Therapy 1-2 more sessions prior to DC and Plan to complete next treatment by Thursday 6/1  Discharge Recommendations: Equipment: No Equipment Needed. Post-acute therapy Discharge to home with outpatient or home health for additional skilled therapy services.    See \"Rehab Therapy-Acute\" Patient Summary Report for complete documentation.     "

## 2017-05-30 NOTE — DISCHARGE INSTRUCTIONS
Follow up with Primary Care Physician in 1 week.      Discharge Instructions    Discharged to home by car with relative. Discharged via wheelchair, hospital escort: Yes.  Special equipment needed: Not Applicable    Be sure to schedule a follow-up appointment with your primary care doctor or any specialists as instructed.     Discharge Plan:   Diet Plan: Discussed  Activity Level: Discussed  Confirmed Follow up Appointment: Patient to Call and Schedule Appointment  Confirmed Symptoms Management: Discussed  Medication Reconciliation Updated: Yes  Influenza Vaccine Indication: Not indicated: Previously immunized this influenza season and > 8 years of age    I understand that a diet low in cholesterol, fat, and sodium is recommended for good health. Unless I have been given specific instructions below for another diet, I accept this instruction as my diet prescription.   Other diet: Regular    Special Instructions: None    · Is patient discharged on Warfarin / Coumadin?   No     · Is patient Post Blood Transfusion?  No      Hepatic Encephalopathy  Hepatic encephalopathy is a loss of brain function from advanced liver disease. The effects of the condition depend on the type of liver damage and how severe it is. In some cases, hepatic encephalopathy can be reversed.  CAUSES  The exact cause of hepatic encephalopathy is not known.  RISK FACTORS  You have a higher risk of getting this condition if your liver is damaged. When the liver is damaged harmful substances called toxins can build up in the body. Certain toxins, such as ammonia, can harm your brain. Conditions that can cause liver damage include:  · An infection.  · Dehydration.  · Intestinal bleeding.  · Drinking too much alcohol.  · Taking certain medicines, including tranquilizers, water pills (diuretics), antidepressants, or sleeping pills.  SIGNS AND SYMPTOMS  Signs and symptoms may develop suddenly. Or, they may develop slowly and get worse gradually. Symptoms  can range from mild to severe.  Mild Hepatic Encephalopathy  · Mild confusion.  · Personality and mood changes.  · Anxiety and agitation.  · Drowsiness.  · Loss of mental abilities.  · Musty or sweet-smelling breath.  Worsening or Severe Hepatic Encephalopathy  · Slowed movement.  · Slurred speech.  · Extreme personality changes.  · Disorientation.  · Abnormal shaking or flapping of the hands.  · Coma.  DIAGNOSIS  To make a diagnosis, your health care provider will do a physical exam. To rule out other causes of your signs and symptoms, he or she may order tests. You may have:  · Blood tests. These may be done to check your ammonia level, measure how long it takes your blood to clot, and check for infection.  · Liver function tests. These may be done to check how well your liver is working.  · MRI and CT scans. These may be done to check for a brain disorder.  · Electroencephalogram (EEG). This may be done to measure the electrical activity in your brain.  TREATMENT  The first step in treatment is identifying and treating possible triggers. The next step is involves taking medicine to lower the level of toxins in the body and to prevent ammonia from building up. You may need to take:  · Antibiotics to reduce the ammonia-producing bacteria in your gut.  · Lactulose to help flush ammonia from the gut.  HOME CARE INSTRUCTIONS  Eating and Drinking  · Follow a low-protein diet that includes plenty of fruits, vegetables, and whole grains, as directed by your health care provider. Ammonia is produced when you digest high-protein foods.  · Work with a dietitian or with your health care provider to make sure you are getting the right balance of protein and minerals.  · Drink enough fluids to keep your urine clear or pale yellow. Drinking plenty of water helps prevent constipation.  · Do not drink alcohol or use illegal drugs.  Medicines  · Only take medicine as directed by your health care provider.  · If you were prescribed  an antibiotic medicine, finish it all even if you start to feel better.  · Do not start any new medicines, including over-the-counter medicines, without first checking with your health care provider.  SEEK MEDICAL CARE IF:  · You have new symptoms.  · Your symptoms change.  · Your symptoms get worse.  · You have a fever.  · You are constipated.  · You have persistent nausea, vomiting, or diarrhea.  SEEK IMMEDIATE MEDICAL CARE IF:  · You become very confused or drowsy.  · You vomit blood or material that looks like coffee grounds.  · Your stool is bloody or black or looks like tar.     This information is not intended to replace advice given to you by your health care provider. Make sure you discuss any questions you have with your health care provider.     Document Released: 02/27/2008 Document Revised: 01/08/2016 Document Reviewed: 08/05/2015  [employer] Interactive Patient Education ©2016 [employer]      Depression / Suicide Risk    As you are discharged from this [facility] facility, it is important to learn how to keep safe from harming yourself.    Recognize the warning signs:  · Abrupt changes in personality, positive or negative- including increase in energy   · Giving away possessions  · Change in eating patterns- significant weight changes-  positive or negative  · Change in sleeping patterns- unable to sleep or sleeping all the time   · Unwillingness or inability to communicate  · Depression  · Unusual sadness, discouragement and loneliness  · Talk of wanting to die  · Neglect of personal appearance   · Rebelliousness- reckless behavior  · Withdrawal from people/activities they love  · Confusion- inability to concentrate     If you or a loved one observes any of these behaviors or has concerns about self-harm, here's what you can do:  · Talk about it- your feelings and reasons for harming yourself  · Remove any means that you might use to hurt yourself (examples: pills, rope, extension cords,  firearm)  · Get professional help from the community (Mental Health, Substance Abuse, psychological counseling)  · Do not be alone:Call your Safe Contact- someone whom you trust who will be there for you.  · Call your local CRISIS HOTLINE 666-3736 or 667-799-4565  · Call your local Children's Mobile Crisis Response Team [place] (931) 139-7235 or www.[employer]  · Call the toll free National Suicide Prevention Hotlines   · National Suicide Prevention Lifeline 076-046-WQAB (7002)  · National Hope Line Network 800-SUICIDE (831-0477)

## 2017-05-30 NOTE — PROGRESS NOTES
Patient discharged to home via wheelchair accompanied by her . Patient's IV removed prior to discharge with no complications. Prescriptions for lactobacillus, zinc sulfate, and rifaxmin given to patient to be filled once outside of hospital. Discharge instructions reviewed and all questions answered.

## 2017-05-30 NOTE — PROGRESS NOTES
Patient sitting up in chair, no S/S of distress, AA&Ox4. Denies SOB, chest pain, dizziness. Patient states pain 0/10, no medication necessary at this time. Chair alarm on, call light within reach,  pt calls appropriately and does not get out of bed. Bed in lowest position, bed locked, RN and CNA numbers provided, no further needs at this time. Hourly rounding in place.

## 2017-05-30 NOTE — DISCHARGE SUMMARY
DATE OF ADMISSION:  05/24/2017    DATE OF DISCHARGE:  05/30/2017    HISTORY OF PRESENT ILLNESS:  This is a 76-year-old female who came to the   emergency room with the complaint of not feeling well and feeling sick.    Patient apparently had been lethargic, nonreactive for the past 2 days prior   to coming to the emergency room.  Patient was diagnosed with hepatic   encephalopathy, admitted to hospital for further management.  Also, there was   a question of taking too much narcotics.  Patient was admitted to hospital for   further management.  There was a consultation that was done with the GI.  Dr. Aranda from GI service saw the patient.  Recommendation for the hepatic   encephalopathy, the patient's rifaximin was increased from twice daily to 3   times daily.  Patient also was noted to have increased ileostomy output, and   lactulose was recommended to be decreased to twice daily.  Patient also is   known to have a stage IV chronic kidney disease.  Patient's symptoms started   to improve.  Patient also was seen with the physiotherapist.  No intervention   was recommended.  The patient also had a CAT scan of the head, cerebral   atrophy was noted.  Also, had a chest x-ray that was done and unchanged   bibasilar atelectasis, elevated left diaphragm.  Atherosclerosis is noted.  At   this time, patient is in stable condition and will be discharged home.  The   patient's ammonia level was also noted to be elevated at the time of   presentation.  Her ammonia level was noted to be 182 at the time of   presentation, and it has come down to 96 with mental status at baseline   without any confusion.    PHYSICAL EXAMINATION:  VITAL SIGNS:  Today, temperature of 36.6, pulse of 82, respirations of 16,   blood pressure 91/36, O2 saturation 94% on room air.  GENERAL APPEARANCE:  The patient is awake, alert, oriented x3, in no acute   distress.  HEENT:  Neck is supple.  Lips are dry.  No jugular venous distention noted.     Also, the patient is hard of hearing.  CARDIOVASCULAR:  S1, S2, regular.  LUNGS:  Decreased breath sounds, otherwise clear.  ABDOMEN:  Obese, soft, nontender.  EXTREMITIES:  No edema noted.    LABORATORY DATA:  WBC of 3000, H and H 8.6 and 27, platelets of 54,000.    Patient has pancytopenia secondary to liver cirrhosis.  Sodium is 137,   potassium 3.6, chloride 116, bicarbonate of 14, BUN of 44, creatinine of 2.41.    Patient has chronic kidney disease, stage IV.  AST of 30, ALT of 20.    ASSESSMENT AND PLAN:  This is a 76-year-old female with hepatic   encephalopathy:  1.  Has improved.  2.  Patient will be on rifaximin 550 mg p.o. q. 8 hours.  3.  Lactulose 30 g p.o. b.i.d.  4.  Zinc oxide 220 mg p.o. daily.    Patient is to follow up with primary care physician in 1 week.    For anemia of chronic disease, continue with ferrous gluconate.    The patient is to follow up in 1 week.    Time spent 40 minutes.       ____________________________________     Melvin Chawla MD    [facility]    DD:  05/30/2017 09:03:49  DT:  05/30/2017 09:42:29    D#:  [id number]  Job#:  [id number]    cc: _____ _____

## 2017-05-30 NOTE — CARE PLAN
Problem: Safety  Goal: Will remain free from injury  Outcome: PROGRESSING AS EXPECTED  Patient has bed alarm in place, calls appropriately.  Goal: Will remain free from falls  Fall precautions in place: treaded slipper socks on, mobility signs posted, hourly rounding, bed in lowest position, belongings and call light are within reach, bed alarm on, and near nurses station.    Problem: Pain Management  Goal: Pain level will decrease to patient’s comfort goal  Outcome: PROGRESSING AS EXPECTED  Patient has no complaints of pain at this time.

## 2017-06-29 PROBLEM — N39.0 UTI (URINARY TRACT INFECTION): Status: ACTIVE | Noted: 2017-01-01

## 2017-06-29 NOTE — ED NOTES
Med rec complete per pt and family at bedside  Allergies reviewed  Pt is currently on Xifaxan TID, unsure of when pt first started

## 2017-06-29 NOTE — IP AVS SNAPSHOT
" <p align=\"LEFT\"><IMG SRC=\"//[email or web address]$/[email or web address]\" alt=\"Image\" WIDTH=\"50%\" HEIGHT=\"200\" BORDER=\"\"></p>                   Name:Cristina Tenorio  Medical Record Number:[id number]  CSN: [id number]    YOB: 1940   Age: 76 y.o.  Sex: female  HT:1.524 m (5') WT: 67.5 kg (148 lb 13 oz)          Admit Date: 6/29/2017     Discharge Date:   Today's Date: 7/2/2017  Attending Doctor:  Thea Lara D.O.                  Allergies:  Sulfa drugs          Your appointments     Jul 07, 2017  2:30 PM   Follow Up Visit with Arik Vee M.D.   [facility] ([address] [place] [address])    [address] [place] [address], [address]  [place] [id number] [phone]           You will be receiving a confirmation call a few days before your appointment from our automated call confirmation system.            Jul 14, 2017  3:00 PM   Ostomy New Evaluation with Jennifer Parkinson R.N.   [facility] ([address] [place] [address])    [address]  [place] [id number] [phone]            Jul 18, 2017  3:30 PM   Ostomy 30 Minute Follow Up with Yandy Eagle R.N.   [facility] ([address] [place] [address])    [address]  [place] [id number] [phone]            Jul 21, 2017  3:30 PM   Ostomy 30 Minute Follow Up with Yandy Eagle R.N.   [facility] ([address] [place] [address])    [address]  [place] [id number] [phone]              Follow-up Information     1. Follow up with Roshni Morillo M.D. In 1 week.    Specialty:  Family Medicine    Contact information    [address]  [place] [address]  [phone]           Medication List      Take these Medications        Instructions    ferrous gluconate 324 (38 FE) MG Tabs   Commonly known as:  FERGON    Take 324 mg by mouth every morning with breakfast.   Dose:  324 mg       lactulose 20 GM/30ML Soln    Take 30 mL by mouth 2 Times a Day.   Dose:  30 mL       omeprazole 20 MG delayed-release capsule   Commonly known as:  PRILOSEC    Take 40 " mg by mouth every day.   Dose:  40 mg       potassium Chloride ER 20 MEQ Tbcr tablet   Commonly known as:  K-TAB    Take 1 Tab by mouth every day.   Dose:  20 mEq       Probiotic Caps    Take 2 Caps by mouth every morning.   Dose:  2 Cap       rifaximin 550 MG Tabs tablet   Commonly known as:  XIFAXAN    Take 1 Tab by mouth 3 times a day.   Dose:  550 mg       sodium bicarbonate 650 MG Tabs   Commonly known as:  SODIUM BICARBONATE    Take 1 Tab by mouth 3 times a day.   Dose:  650 mg       vitamin D 1000 UNIT Tabs   Commonly known as:  cholecalciferol    Take 1,000 Units by mouth every day.   Dose:  1000 Units

## 2017-06-29 NOTE — ED NOTES
PT BIB [facility] for general weakness and malaise from home. Pt A&Ox4 with some re-orientation. VSS. Chart up for ERP.

## 2017-06-29 NOTE — IP AVS SNAPSHOT
Enersave Access Code: Activation code not generated  Current Enersave Status: Active    [employer]  A secure, online tool to manage your health information     [employer]’s Enersave® is a secure, online tool that connects you to your personalized health information from the privacy of your home -- day or night - making it very easy for you to manage your healthcare. Once the activation process is completed, you can even access your medical information using the Enersave claudia, which is available for free in the Apple Claudia store or Google Play store.     Enersave provides the following levels of access (as shown below):   My Chart Features   [facility] Primary Care Doctor [facility]  Specialists [facility]  Urgent  Care Non-[facility]  Primary Care  Doctor   Email your healthcare team securely and privately 24/7 X X X X   Manage appointments: schedule your next appointment; view details of past/upcoming appointments X      Request prescription refills. X      View recent personal medical records, including lab and immunizations X X X X   View health record, including health history, allergies, medications X X X X   Read reports about your outpatient visits, procedures, consult and ER notes X X X X   See your discharge summary, which is a recap of your hospital and/or ER visit that includes your diagnosis, lab results, and care plan. X X       How to register for Enersave:  1. Go to  [email or web address].  2. Click on the Sign Up Now box, which takes you to the New Member Sign Up page. You will need to provide the following information:  a. Enter your Enersave Access Code exactly as it appears at the top of this page. (You will not need to use this code after you’ve completed the sign-up process. If you do not sign up before the expiration date, you must request a new code.)   b. Enter your date of birth.   c. Enter your home email address.   d. Click Submit, and follow the next screen’s instructions.  3. Create a Enersave ID. This will  be your Air Button login ID and cannot be changed, so think of one that is secure and easy to remember.  4. Create a Air Button password. You can change your password at any time.  5. Enter your Password Reset Question and Answer. This can be used at a later time if you forget your password.   6. Enter your e-mail address. This allows you to receive e-mail notifications when new information is available in Air Button.  7. Click Sign Up. You can now view your health information.    For assistance activating your Air Button account, call [phone]

## 2017-06-29 NOTE — ED PROVIDER NOTES
ED Provider Note    CHIEF COMPLAINT  No chief complaint on file.      HPI  Cristina Tenorio is a 76 y.o. female who presents for evaluation of generalized weakness. History is obtained somewhat from the patient but also primarily from the family. Evidently the patient was very weak and confused this morning. She was weak to the point where she really couldn't do any of her normal activities. He doesn't like she's had any fevers. She's had no vomiting. The patient does have a history of hepatic and cephalopathy due to cirrhosis of the liver. She was recently in symptoms. She's had no fall or head injury. She denies any urinary symptoms. She's had no focal weakness.    REVIEW OF SYSTEMS  See HPI for further details. All other systems are negative.     PAST MEDICAL HISTORY  Past Medical History   Diagnosis Date   • Bowel obstruction (CMS-HCC)    • Crohn's disease (CMS-HCC)    • Indigestion    • Obstruction    • Snoring    • Ileostomy in place (CMS-HCC)    • COPD (chronic obstructive pulmonary disease) (CMS-HCC)      per pt   • COPD (chronic obstructive pulmonary disease) (CMS-HCC) 3/20/2013   • Arthritis 12/30/16     to hands and feet   • Heart burn    • Emphysema of lung (CMS-HCC)      COPD   • Sleep apnea    • TRAVIS on CPAP      10/2016-CPAP DC'd and wears O2 @4L/NC   • Hemorrhagic disorder (CMS-HCC)      anemia of unkown etiology.   • Anemia 12/30/16     unknown etiology   • Renal disorder      Increased creatitine level due to meds.   • Breath shortness      uses O2@ at night and prn ([facility]). Uses inhaler prn. 12/30/16-reports no changes    • Cataract 2006,2007     bilat phaco with IOL   • Cirrhosis of liver (CMS-[facility]) 12/30/16     states dx at one time but no treatment for >10yrs   • Occasional tremors        FAMILY HISTORY  No family history on file.    SOCIAL HISTORY  Social History     Social History   • Marital Status:      Spouse Name: N/A   • Number of Children: N/A   • Years of Education: N/A  "    Social History Main Topics   • Smoking status: Former Smoker -- 50 years     Types: Cigarettes, Cigars     Quit date: 03/30/2013   • Smokeless tobacco: Not on file      Comment: 50yrs 1.5 ppd quit 2009   • Alcohol Use: No      Comment: socially   • Drug Use: No   • Sexual Activity: Not on file     Other Topics Concern   • Not on file     Social History Narrative       SURGICAL HISTORY  Past Surgical History   Procedure Laterality Date   • Cholecystectomy  2013     \"burst\"   • Bowel resection  1992     with ileostomy (right side)   • Ileostomy  2010     moved to left side   • Recovery  6/21/2010     Performed by SURGERY, IR-RECOVERY at SURGERY SAME DAY [facility]   • Sigmoidoscopy flex  9/27/2016     Procedure: SIGMOIDOSCOPY FLEX;  Surgeon: Aftab Alegre M.D.;  Location: [facility];  Service:    • Gastroscopy-endo  9/27/2016     Procedure: GASTROSCOPY-ENDO;  Surgeon: Aftab Alegre M.D.;  Location: [facility];  Service:    • Gastroscopy wiithsavary dilatation  9/28/2016     Procedure: GASTROSCOPY WIITH SAVARY DILATATION;  Surgeon: Aftab Alegre M.D.;  Location: [facility];  Service: Gastroenterology   • Gastroscopy w/push enterscopy  9/28/2016     Procedure: GASTROSCOPY W/PUSH ENTERSCOPY;  Surgeon: Aftab Alegre M.D.;  Location: [facility];  Service:    • Gastroscopy-endo N/A 10/5/2016     Procedure: GASTROSCOPY-ENDO;  Surgeon: Aravind Roman M.D.;  Location: [facility];  Service:    • Umbilical hernia repair  2000   • Colonoscopy       Hx of  several    • Other surgical procedure  1994     closed off rectum    • Gastroscopy N/A 1/13/2017     Procedure: GASTROSCOPY - ENTEROSCOPY PUSH;  Surgeon: Boni Brooks M.D.;  Location: [facility];  Service:        CURRENT MEDICATIONS  Home Medications     **Home medications have not yet been reviewed for this encounter**          ALLERGIES  Allergies "   Allergen Reactions   • Sulfa Drugs Hives and Itching       PHYSICAL EXAM  VITAL SIGNS: [facility] 04/12/1993    Constitutional: Well developed, Well nourished, No acute distress, Non-toxic appearance.   HENT: Normocephalic, Atraumatic.   Eyes:  EOMI, Conjunctiva normal, No discharge.   Cardiovascular: Normal heart rate.   Thorax & Lungs: No respiratory distress.   Abdomen: Soft, nontender, ileostomy in place.  Skin: Warm, Dry.   Extremities:  No edema, No cyanosis. No calf tenderness or swelling.  Musculoskeletal: Good range of motion in all major joints.  Neurologic: Awake but slightly slow to respond. She was all extremities spontaneously and symmetrically with no focal weakness. She is able to follow commands.          COURSE & MEDICAL DECISION MAKING  Pertinent Labs & Imaging studies reviewed. (See chart for details)  This is a 76-year-old here for evaluation of generalized weakness and confusion. It sounds as if she's had similar symptoms with hepatic encephalopathy in the past. An IV is established and laboratories are obtained. Her ammonia level is 71. This is elevated but is not as high as I would anticipate to have significant symptoms. She has multiple chronic laboratory abnormalities to include a BUN of 73 and a creatinine of 3.78 which is in her normal range. She has a chronic metabolic acidosis with a bicarb of 11 which is chronic for her as well. CBC shows normal white count she does have a hemoglobin of 8 which is also chronic. Platelet count is 69. Urine is positive for blood as well as leukocyte esterase. It will be cultured. I suspect that she probably has symptoms from urinary tract infection as well as a component of some hepatic encephalopathy. I discussed results of the tests with the family. I discussed the case with Dr. Garcia of the hospitalist service and he will be the primary admitting physician.      FINAL IMPRESSION  1. Generalized weakness  2. Confusion  3. Chronic renal failure  4.  Chronic anemia  5. Chronic metabolic acidosis  6. Acute urinary tract infection  7. Probable Mild hepatic encephalopathy         Electronically signed by: James Morrissey, 6/29/2017 11:53 AM

## 2017-06-29 NOTE — IP AVS SNAPSHOT
Home Care Instructions                                                                                                                  Name:Cristina Tenorio  Medical Record Number:[id number]  CSN: [id number]    YOB: 1940   Age: 76 y.o.  Sex: female  HT:1.524 m (5') WT: 67.5 kg (148 lb 13 oz)          Admit Date: 6/29/2017     Discharge Date:   Today's Date: 7/2/2017  Attending Doctor:  Thea Lara D.O.                  Allergies:  Sulfa drugs            Discharge Instructions       Urinary Tract Infection  A urinary tract infection (UTI) can occur any place along the urinary tract. The tract includes the kidneys, ureters, bladder, and urethra. A type of germ called bacteria often causes a UTI. UTIs are often helped with antibiotic medicine.   HOME CARE   · If given, take antibiotics as told by your doctor. Finish them even if you start to feel better.  · Drink enough fluids to keep your pee (urine) clear or pale yellow.  · Avoid tea, drinks with caffeine, and bubbly (carbonated) drinks.  · Pee often. Avoid holding your pee in for a long time.  · Pee before and after having sex (intercourse).  · Wipe from front to back after you poop (bowel movement) if you are a woman. Use each tissue only once.  GET HELP RIGHT AWAY IF:   · You have back pain.  · You have lower belly (abdominal) pain.  · You have chills.  · You feel sick to your stomach (nauseous).  · You throw up (vomit).  · Your burning or discomfort with peeing does not go away.  · You have a fever.  · Your symptoms are not better in 3 days.  MAKE SURE YOU:   · Understand these instructions.  · Will watch your condition.  · Will get help right away if you are not doing well or get worse.     This information is not intended to replace advice given to you by your health care provider. Make sure you discuss any questions you have with your health care provider.     Document Released: 06/05/2009 Document Revised: 01/08/2016 Document Reviewed:  07/18/2013  [employer] Interactive Patient Education ©2016 [employer]  Discharge Instructions    Discharged to home by car with relative. Discharged via wheelchair, hospital escort: Yes.  Special equipment needed: Not Applicable    Be sure to schedule a follow-up appointment with your primary care doctor or any specialists as instructed.     Discharge Plan:   Smoking Cessation Offered: Patient Refused  Influenza Vaccine Indication: Not indicated: Previously immunized this influenza season and > 8 years of age    I understand that a diet low in cholesterol, fat, and sodium is recommended for good health. Unless I have been given specific instructions below for another diet, I accept this instruction as my diet prescription.   Other diet: regular    Special Instructions: None    · Is patient discharged on Warfarin / Coumadin?   No     · Is patient Post Blood Transfusion?  No    Depression / Suicide Risk    As you are discharged from this [facility] facility, it is important to learn how to keep safe from harming yourself.    Recognize the warning signs:  · Abrupt changes in personality, positive or negative- including increase in energy   · Giving away possessions  · Change in eating patterns- significant weight changes-  positive or negative  · Change in sleeping patterns- unable to sleep or sleeping all the time   · Unwillingness or inability to communicate  · Depression  · Unusual sadness, discouragement and loneliness  · Talk of wanting to die  · Neglect of personal appearance   · Rebelliousness- reckless behavior  · Withdrawal from people/activities they love  · Confusion- inability to concentrate     If you or a loved one observes any of these behaviors or has concerns about self-harm, here's what you can do:  · Talk about it- your feelings and reasons for harming yourself  · Remove any means that you might use to hurt yourself (examples: pills, rope, extension cords, firearm)  · Get professional help from  the community (Mental Health, Substance Abuse, psychological counseling)  · Do not be alone:Call your Safe Contact- someone whom you trust who will be there for you.  · Call your local CRISIS HOTLINE 646-8624 or 386-008-3547  · Call your local Children's Mobile Crisis Response Team [place] (448) 664-2820 or www.[employer]  · Call the toll free National Suicide Prevention Hotlines   · National Suicide Prevention Lifeline 560-057-ZIVQ (6299)  · National Hope Line Network 800-SUICIDE (364-5865)        Your appointments     Jul 07, 2017  2:30 PM   Follow Up Visit with Arik Vee M.D.   [facility] ([address] [place] [address])    [address] [place] [address] Medicine , [address]  [place] [id number] [phone]           You will be receiving a confirmation call a few days before your appointment from our automated call confirmation system.            Jul 14, 2017  3:00 PM   Ostomy New Evaluation with Jennifer Parkinson R.N.   [facility] ([address] [place] [address])    [address]  [place] [address] [phone]            Jul 18, 2017  3:30 PM   Ostomy 30 Minute Follow Up with Yandy Eagle R.N.   [facility] ([address] [place] [address])    [address]  [place] [address] [phone]            Jul 21, 2017  3:30 PM   Ostomy 30 Minute Follow Up with Yandy Eagle R.N.   [facility] ([address] [place] [address])    [address]  [place] [id number] [phone]              Follow-up Information     1. Follow up with Roshni Morillo M.D. In 1 week.    Specialty:  Family Medicine    Contact information    [address]  [place] [id number] [phone]           Discharge Medication Instructions:    Below are the medications your physician expects you to take upon discharge:    Review all your home medications and newly ordered medications with your doctor and/or pharmacist. Follow medication instructions as directed by your doctor and/or pharmacist.    Please keep your medication  list with you and share with your physician.               Medication List      START taking these medications        Instructions    Morning Afternoon Evening Bedtime    lactulose 20 GM/30ML Soln   Last time this was given:  30 mL on 7/2/2017  7:54 AM   Next Dose Due:  Take in late afternoon.        Take 30 mL by mouth 2 Times a Day.   Dose:  30 mL                        potassium Chloride ER 20 MEQ Tbcr tablet   Last time this was given:  20 mEq on 7/2/2017  7:54 AM   Commonly known as:  K-TAB   Next Dose Due:  Take tomorrow morning.        Take 1 Tab by mouth every day.   Dose:  20 mEq                        sodium bicarbonate 650 MG Tabs   Last time this was given:  650 mg on 7/2/2017  7:54 AM   Commonly known as:  SODIUM BICARBONATE   Next Dose Due:  Take at lunch and supper time.        Take 1 Tab by mouth 3 times a day.   Dose:  650 mg                          CONTINUE taking these medications        Instructions    Morning Afternoon Evening Bedtime    ferrous gluconate 324 (38 FE) MG Tabs   Last time this was given:  324 mg on 7/2/2017  7:54 AM   Commonly known as:  FERGON   Next Dose Due:  Take tomorrow morning.        Take 324 mg by mouth every morning with breakfast.   Dose:  324 mg                        omeprazole 20 MG delayed-release capsule   Last time this was given:  40 mg on 7/2/2017  6:12 AM   Commonly known as:  PRILOSEC   Next Dose Due:  Take tomorrow morning.        Take 40 mg by mouth every day.   Dose:  40 mg                        Probiotic Caps   Next Dose Due:  Take tomorrow morning.        Take 2 Caps by mouth every morning.   Dose:  2 Cap                        rifaximin 550 MG Tabs tablet   Last time this was given:  550 mg on 7/2/2017  7:54 AM   Commonly known as:  XIFAXAN   Next Dose Due:  Take this afternoon and at bedtime.        Take 1 Tab by mouth 3 times a day.   Dose:  550 mg                        vitamin D 1000 UNIT Tabs   Last time this was given:  1,000 Units on 7/2/2017   7:54 AM   Commonly known as:  cholecalciferol   Next Dose Due:  Take tomorrow morning.        Take 1,000 Units by mouth every day.   Dose:  1000 Units                             Where to Get Your Medications      Information about where to get these medications is not yet available     ! Ask your nurse or doctor about these medications    - lactulose 20 GM/30ML Soln  - potassium Chloride ER 20 MEQ Tbcr tablet  - sodium bicarbonate 650 MG Tabs            Orders for after discharge     REFERRAL TO HOME HEALTH    Complete by:  As directed    Home health will create and establish a plan of care             Instructions           Diet / Nutrition:    Follow any diet instructions given to you by your doctor or the dietician, including how much salt (sodium) you are allowed each day.    If you are overweight, talk to your doctor about a weight reduction plan.    Activity:    Remain physically active following your doctor's instructions about exercise and activity.    Rest often.     Any time you become even a little tired or short of breath, SIT DOWN and rest.    Worsening Symptoms:    Report any of the following signs and symptoms to the doctor's office immediately:    *Pain of jaw, arm, or neck  *Chest pain not relieved by medication                               *Dizziness or loss of consciousness  *Difficulty breathing even when at rest   *More tired than usual                                       *Bleeding drainage or swelling of surgical site  *Swelling of feet, ankles, legs or stomach                 *Fever (>100ºF)  *Pink or blood tinged sputum  *Weight gain (3lbs/day or 5lbs /week)           *Shock from internal defibrillator (if applicable)  *Palpitations or irregular heartbeats                *Cool and/or numb extremities    Stroke Awareness    Common Risk Factors for Stroke include:    Age  Atrial Fibrillation  Carotid Artery Stenosis  Diabetes Mellitus  Excessive alcohol consumption  High blood  pressure  Overweight   Physical inactivity  Smoking    Warning signs and symptoms of a stroke include:    *Sudden numbness or weakness of the face, arm or leg (especially on one side of the body).  *Sudden confusion, trouble speaking or understanding.  *Sudden trouble seeing in one or both eyes.  *Sudden trouble walking, dizziness, loss of balance or coordination.Sudden severe headache with no known cause.    It is very important to get treatment quickly when a stroke occurs. If you experience any of the above warning signs, call 911 immediately.                   Disclaimer         Quit Smoking / Tobacco Use:    I understand the use of any tobacco products increases my chance of suffering from future heart disease or stroke and could cause other illnesses which may shorten my life. Quitting the use of tobacco products is the single most important thing I can do to improve my health. For further information on smoking / tobacco cessation call a Toll Free Quit Line at 1-176.291.4846 (*National Cancer [place]) or 1-441.865.7897 (American Lung Association) or you can access the web based program at www.[employer]org.    [place] Tobacco Users Help Line:  (903) 342-5272       Toll Free: 1-404.834.2202  Quit Tobacco Program [facility] (487)422-4752    Crisis Hotline:    [place] Crisis Hotline:  6-249-FPTXALT or 1-304.570.3797    [place] Crisis Hotline:    1-246.184.7934 or 108-770-1163    Discharge Survey:   Thank you for choosing [facility]. We hope we did everything we could to make your hospital stay a pleasant one. You may be receiving a phone survey and we would appreciate your time and participation in answering the questions. Your input is very valuable to us in our efforts to improve our service to our patients and their families.        My signature on this form indicates that:    1. I have reviewed and understand the above information.  2. My questions regarding this information have  been answered to my satisfaction.  3. I have formulated a plan with my discharge nurse to obtain my prescribed medications for home.                  Disclaimer         __________________________________                     __________       ________                       Patient Signature                                                 Date                    Time

## 2017-06-29 NOTE — ED NOTES
Pt report given to Marie PICKARD [id number]. Pt originally room with male pt, room changed and still dirty. Charge RN notified

## 2017-06-29 NOTE — IP AVS SNAPSHOT
7/2/2017    Cristina Ev Coby  [address] [place] [address]  [address]  [place] [address]    Dear Cristina:    [facility] wants to ensure your discharge home is safe and you or your loved ones have had all of your questions answered regarding your care after you leave the hospital.    Below is a list of resources and contact information should you have any questions regarding your hospital stay, follow-up instructions, or active medical symptoms.    Questions or Concerns Regarding… Contact   Medical Questions Related to Your Discharge  (7 days a week, 8am-5pm) Contact a Nurse Care Coordinator   [phone]   Medical Questions Not Related to Your Discharge  (24 hours a day / 7 days a week)  Contact the Nurse Health Line   [phone]    Medications or Discharge Instructions Refer to your discharge packet   or contact your [facility] Primary Care Provider   [phone]   Follow-up Appointment(s) Schedule your appointment via [employer]   or contact Scheduling [phone]   Billing Review your statement via [employer]  or contact Billing [phone]   Medical Records Review your records via [employer]   or contact Medical Records [phone]     You may receive a telephone call within two days of discharge. This call is to make certain you understand your discharge instructions and have the opportunity to have any questions answered. You can also easily access your medical information, test results and upcoming appointments via the [employer] free online health management tool. You can learn more and sign up at [email or web address]. For assistance setting up your [employer] account, please call [phone].    Once again, we want to ensure your discharge home is safe and that you have a clear understanding of any next steps in your care. If you have any questions or concerns, please do not hesitate to contact us, we are here for you. Thank you for choosing [facility] for your healthcare needs.    Sincerely,    Your [facility] Healthcare Team

## 2017-06-30 PROBLEM — L89.151 DECUBITUS ULCER OF SACRAL REGION, STAGE 1: Status: ACTIVE | Noted: 2017-01-01

## 2017-06-30 PROBLEM — R55 NEAR SYNCOPE: Status: ACTIVE | Noted: 2017-01-01

## 2017-06-30 NOTE — PROGRESS NOTES
Patient skin is assessed, admit profile is complete, patient is complaining of weight loss of 40 lbs since September, and dietitian consult is in place. Patient states memory is not intact. Patient states she is unable to ambulate due to weakness. Patient states she has problems with memory. Continue to monitor.  Patient uses bedpan, ileostomy in place. Pink Stage I pressure ulcer wound consult on board.

## 2017-06-30 NOTE — PROGRESS NOTES
Patient sitting in bed, eyes open, eating dinner, in no apparent distress. Patient is oriented to room, call light, and safety protocol. Patient has weakness in lower extremities, is unable to ambulate. PT/OT consult has been placed.Side rails up X2, bed alarm is in place. Will continue to monitor.

## 2017-06-30 NOTE — PROGRESS NOTES
Called on call Dr. Romero with results for patient Hgb 7.6. Instructed to watch for now, pass on  To day shift nurse.

## 2017-06-30 NOTE — ASSESSMENT & PLAN NOTE
Advise diet and exercise  RN to document wt  Reports 40lb weight loss, unintentional  Will need outpatient cancer screening

## 2017-06-30 NOTE — CARE PLAN
Problem: Safety  Goal: Will remain free from injury  Outcome: PROGRESSING AS EXPECTED  Fall precautions in place with bed alarm, bed in lowest position, and wheels locked.     Problem: Mobility  Goal: Risk for activity intolerance will decrease  Outcome: PROGRESSING AS EXPECTED  Pt up to chair for lunch and walked to BR X2. Stand by assist with steady gait.

## 2017-06-30 NOTE — RESPIRATORY CARE
COPD EDUCATION by COPD CLINICAL EDUCATOR  6/30/2017 at 6:53 AM by Sulema Barragan     Patient reviewed by COPD education team. Patient does not qualify for COPD program.

## 2017-06-30 NOTE — PROGRESS NOTES
Skin Assessment: bruising bilaterally to upper and lower extremities, stage I pink pressure ulcer on coccyx and pink and moist in between legs

## 2017-06-30 NOTE — ASSESSMENT & PLAN NOTE
Multifactorial, 2nd to above  Pt/ot eval, baseline generalized weakness, with 40lb weight loss per pt  No new focal deficits

## 2017-06-30 NOTE — H&P
"DOS: 6/29/2017    PATIENT ID:  NAME:  Cristina Tenorio  MRN:               [id number]  YOB: 1940    PMD: Roshni Morillo M.D.    CC: near syncope    HPI: Cristina Tenorio is a 76 y.o. female with chief complaint of near syncope, she and her significant other are both  poor historians. She apparently was getting up out of bed when she got very dizzy and nearly passed out. She has   History of Cirrhosis of the liver with episodes of hepatic encephalopathy so she was thinking it was that. Her ammonia  level her is 70 which appears to be her baseline. Work up shows probable UTI                PAST MEDICAL HISTORY  Past Medical History   Diagnosis Date   • Bowel obstruction (CMS-HCC)    • Crohn's disease (CMS-HCC)    • Indigestion    • Obstruction    • Snoring    • Ileostomy in place (CMS-HCC)    • COPD (chronic obstructive pulmonary disease) (CMS-HCC)      per pt   • COPD (chronic obstructive pulmonary disease) (CMS-HCC) 3/20/2013   • Arthritis 12/30/16     to hands and feet   • Heart burn    • Emphysema of lung (CMS-HCC)      COPD   • Sleep apnea    • TRAVIS on CPAP      10/2016-CPAP DC'd and wears O2 @4L/NC   • Hemorrhagic disorder (CMS-HCC)      anemia of unkown etiology.   • Anemia 12/30/16     unknown etiology   • Renal disorder      Increased creatitine level due to meds.   • Breath shortness      uses O2@ at night and prn ([facility]). Uses inhaler prn. 12/30/16-reports no changes    • Cataract 2006,2007     bilat phaco with IOL   • Cirrhosis of liver (CMS-[facility]) 12/30/16     states dx at one time but no treatment for >10yrs   • Occasional tremors         CKD 4    PAST SURGICAL HISTORY  Past Surgical History   Procedure Laterality Date   • Cholecystectomy  2013     \"burst\"   • Bowel resection  1992     with ileostomy (right side)   • Ileostomy  2010     moved to left side   • Recovery  6/21/2010     Performed by SURGERY, IR-RECOVERY at SURGERY SAME DAY [facility]   • Sigmoidoscopy flex  9/27/2016     " "Procedure: SIGMOIDOSCOPY FLEX;  Surgeon: Aftab Alegre M.D.;  Location: ENDOSCOPY [facility];  Service:    • Gastroscopy-endo  9/27/2016     Procedure: GASTROSCOPY-ENDO;  Surgeon: Aftab Alegre M.D.;  Location: ENDOSCOPY [facility];  Service:    • Gastroscopy wiithsavary dilatation  9/28/2016     Procedure: GASTROSCOPY WIITH SAVARY DILATATION;  Surgeon: Aftab Alegre M.D.;  Location: ENDOSCOPY [facility];  Service: Gastroenterology   • Gastroscopy w/push enterscopy  9/28/2016     Procedure: GASTROSCOPY W/PUSH ENTERSCOPY;  Surgeon: Aftab Alegre M.D.;  Location: [facility];  Service:    • Gastroscopy-endo N/A 10/5/2016     Procedure: GASTROSCOPY-ENDO;  Surgeon: Aravind Roman M.D.;  Location: ENDOSCOPY [facility];  Service:    • Umbilical hernia repair  2000   • Colonoscopy       Hx of  several    • Other surgical procedure  1994     closed off rectum    • Gastroscopy N/A 1/13/2017     Procedure: GASTROSCOPY - ENTEROSCOPY PUSH;  Surgeon: Boni Brooks M.D.;  Location: SURGERY [facility];  Service:             FAMILY HISTORY  No family history on file.    SOCIAL HISTORY  Social History   Substance Use Topics   • Smoking status: Former Smoker -- 50 years     Types: Cigarettes, Cigars     Quit date: 03/30/2013   • Smokeless tobacco: Not on file      Comment: 50yrs 1.5 ppd quit 2009   • Alcohol Use: No      Comment: socially       ALLERGIES  Allergies as of 06/29/2017 - Christian as Reviewed 06/29/2017   Allergen Reaction Noted   • Sulfa drugs Hives and Itching 06/29/2017       OUTPATIENT MEDICATIONS  Medication list reviewed    REVIEW OF SYSTEMS  A full 10 point ROS was completed and all pertinent positives and negatives are included in the HPI above by AMA/CMS criteria.    PHYSICAL EXAM:  Blood pressure 127/52, pulse 81, temperature 37.1 °C (98.7 °F), resp. rate 16, height 1.651 m (5' 5\"), weight 70.308 kg (155 lb), last menstrual period 04/12/1993, " SpO2 99 %.  Oxygen: Pulse Oximetry: 99 %, O2 Delivery: None (Room Air)    Gen: NAD, comfortable  HEENT: NCAT, EOMI, no LAD, no JVD, neck supple, MMM  Heart: +S1/S2, RRR, no murmur appreciated  Lungs: CTAB, no accessory muscle use  GI: NTND, soft, +BS, no rebound/guarding, ostomy  Extremities: Warm, well-perfused, no cyanosis/clubbing, trace edema, bruising  MSK: 5/5 strength in all 4 extremities, sensation intact to light touch  Neuro: AO x 3, CN II-XII grossly intact    LAB TESTS:   Recent Labs      06/29/17   1305   WBC  5.2   RBC  2.80*   HEMOGLOBIN  8.2*   HEMATOCRIT  26.1*   MCV  93.2   MCH  29.3   RDW  62.9*   PLATELETCT  69*   MPV  12.5   NEUTSPOLYS  82.50*   LYMPHOCYTES  8.90*   MONOCYTES  6.20   EOSINOPHILS  1.40   BASOPHILS  0.40             Recent Labs      06/29/17   1305   SODIUM  141   POTASSIUM  3.2*   CHLORIDE  118*   CO2  11*   BUN  73*   CREATININE  3.78*   CALCIUM  8.1*   ALBUMIN  3.1*     Estimated GFR/CRCL = Estimated Creatinine Clearance: 12.6 mL/min (by C-G formula based on Cr of 3.78).  Recent Labs      06/29/17   1305   GLUCOSE  106*     GLYCOHEMOGLOBIN   Date/Time Value Ref Range Status   08/12/2014 07:26 AM 6.5* 0.0 - 5.6 % Final     Comment:     Increased risk for diabetes:  5.7 -6.4%  Diabetes:  >6.4%  Glycemic control for adults with diabetes:  <7.0%  The above interpretations are per ADA guidelines.  Diagnosis  of diabetes mellitus on the basis of elevated Hemoglobin A1c  should be confirmed by repeating the Hb A1c test.     FREE T-4   Date/Time Value Ref Range Status   07/18/2014 07:10 AM 0.87 0.53 - 1.43 ng/dL Final     Recent Labs      06/29/17   1305  06/29/17   1330   ASTSGOT  44   --    ALTSGPT  27   --    TBILIRUBIN  1.0   --    ALKPHOSPHAT  276*   --    GLOBULIN  2.5   --    AMMONIA   --   71*           Invalid input(s): [id number]  VITAMIN B12 -TRUE COBALAMIN   Date/Time Value Ref Range Status   06/14/2017 02:40  211 - 911 pg/mL Final   04/12/2017 02:28 * 211 -  911 pg/mL Final   07/18/2014 07:10  211 - 911 pg/mL Final     FOLATE -FOLIC ACID   Date/Time Value Ref Range Status   06/14/2017 02:40 PM >23.7 >4.0 ng/mL Final   04/12/2017 02:28 AM 20.7 >4.0 ng/mL Final   07/18/2014 07:10 AM 11.2 >4.0 ng/mL Final     FERRITIN   Date/Time Value Ref Range Status   06/14/2017 02:40 PM 38.5 10.0 - 291.0 ng/mL Final   05/26/2017 03:56 PM 23.5 10.0 - 291.0 ng/mL Final   03/10/2017 10:27 AM 49.5 10.0 - 291.0 ng/mL Final     IRON   Date/Time Value Ref Range Status   05/26/2017 03:56 PM 89 40 - 170 ug/dL Final   03/10/2017 10:27 AM 87 40 - 170 ug/dL Final   03/01/2017 12:00 PM 53 40 - 170 ug/dL Final     TOTAL IRON BINDING   Date/Time Value Ref Range Status   05/26/2017 03:56  250 - 450 ug/dL Final   03/10/2017 10:27  250 - 450 ug/dL Final   03/01/2017 12:00  250 - 450 ug/dL Final       IMAGES:  None    ASSESSMENT/PLAN:     1. UTI. IV Rocephin  2. Near Syncope. Likely secondary to dehydration and infection  3. Acute on Chronic Renal failure. IVF NS, careful hydration  4. Cirrhosis of Liver. Lactulose, Rifaximin  5. Elevated Ammonia. Chronic, no signs of acute hepatic encephalopathy, continue Lactulose and   Rifaximin  6. CKD 4  7. Anemia. Fe, follow cbc  8. Thrombocytopenia. Follow cbc  9. COPD. RT protocol  10. Sleep apnea. CPAP  11. Hypokalemia. Kdur, follow bmp, Mg, Ph  12. PPX: SCDs  13. DISPO: Pt will require 2 midnight stay.    CODE STATUS: FULL

## 2017-06-30 NOTE — PROGRESS NOTES
Pt resting quietly in bed. AOX4. Morning meds given per MAR. Tolerated well 1 at a time. Ileostomy in place. Fluids running to Lt. Wrist. Denies pain. Fall precautions in place with bed alarm and bed in lowest position. Denies any needs, will continue to monitor.

## 2017-06-30 NOTE — CONSULTS
"Reason for PC Consult: Advance Care Planning    Consulted by: Dr. Lara    Assessment:  General: 76 year old female admitted 6/29/17 with UTI after near syncopal episode. PMH significant for cirrhosis of liver, COPD, TRAVIS on CPAP, CKD 4, anemia, occasional tremor, bowel obstruction, ileostomy placement, Crohn's disease, and arthritis. Awake, alert, and oriented. No family at bedside.     Dyspnea: Present. SOB OE. 98% 2 LPM via nasal cannula. Wears 2 LPM of home oxygen at night.   Last BM: 06/30/17.    Pain: Denies.    Depression: Denies.       Spiritual:  Is Mosque or spirituality important for coping with this illness? No; declined spiritual care visit.   Has a  or spiritual provider visit been requested? No    Palliative Performance Scale: 50%    Advance Directive: None; patient reports she started one that her brother Arturo went home to look for. Assisted patient with completion. She designated DPOA-HC and alternates and indicated she would want directives # 2, 3, and 5.   DPOA: None; patient designated her S/O Aravind Brush as her DPOA--[phone], her sister Reina Hong [phone] as first alternate, and her brother Anselmo King [phone] as her second alternate.     POLST: None; assisted patient with completion. Patient stated she would want limited resuscitation which was spelled out (see code status), PO/IV antibiotics, defined trial of tube feeding and IVF, and a trial only of TPN.     Code Status: Full; patient would like code status changed to  partial (DNI - no intubation/ventilation, chest compressions, defibrillation and cardioversion okay.     Outcome:  Introduced myself to patient and discussed role of palliative care. Discussed goals of care and ACP. She reports she has been on a mechanical ventilator and would not want to go through that again. She expressed, \"I wouldn't really want\" chest compression/defibrillation but states she is willing to endure said interventions if " "she in turn lives. Patient reports her Brother Arturo is visiting from [place] and him and his wife have left for lunch and to find her AD as well as \"work on the computer.\" Offered to call Arturo to save some time and explain I could help the patient with her AD/POLST. Patient could not recall Arturo's phone number and states that he took her cell phone so she can't look it up but provided her sister Reina's phone number as Reina will know Aram's phone number.     Call placed to Reina at [phone]. Introduced myself, role of palliative care, and discussed above. Reina provided Arturo's phone number. Provided my phone number for Reina's future use.     Call placed to Arturo and left message introducing myself, discussing role of palliative care, and above. Provided call back number.     Updated patient and explained I will need to have the physician review POLST, sign it and sign a certificate of competency so that AD can be notarized. Provided business card and encouraged patient to call with any questions, needs, or concerns. She requested I write her brother Anselmo's phone number down on the back of my card, which was done.      Received message that Anselmo called back. Returned his call. He stated he is concerned with patient's home safety, \"911 once a month just isn't a good plan.\" He reports the patient's s/o has poor memory and has a difficult time caring for his sister. He stated, \"She doesn't want to do it but it might be time for an assisted living.\" Explained that the patient has a PT/OT evaluation and they will assess mobility and her ability to care for herself and make recommendations for post acute services if needed. Anselmo encouraged to call with any questions, needs, or concerns.     Updated: Dr. Lara    Plan: Assist with completion of AD/POLST. Await PT/OT recommendation for post acute services.      Thank you for allowing Palliative Care to participate in this patient's care. Please feel " free to call [phone] with any questions or concerns.

## 2017-06-30 NOTE — FACE TO FACE
Face to Face Supporting Documentation - Home Health    The encounter with this patient was in whole or in part the primary reason for home health admission.    Date of encounter:   Patient:                    MRN:                       YOB: 2016  Cristina Tenorio  [id number]  1940     Home health to see patient for:  Skilled Nursing care for assessment, interventions & education, Physical Therapy evaluation and treatment and Occupational therapy evaluation and treatment    Skilled need for:  Exacerbation of Chronic Disease State cirrhosis, uti, near syncope    Skilled nursing interventions to include:  Comment: pt/ot    Homebound status evidenced by:  Need the aid of supportive devices such as crutches, canes, wheelchairs or walkers or Needs the assistance of another person in order to leave the home. Leaving home requires a considerable and taxing effort. There is a normal inability to leave the home.    Community Physician to provide follow up care: Roshni Morillo M.D.     Optional Interventions? No      I certify the face to face encounter for this home health care referral meets the CMS requirements and the encounter/clinical assessment with the patient was, in whole, or in part, for the medical condition(s) listed above, which is the primary reason for home health care. Based on my clinical findings: the service(s) are medically necessary, support the need for home health care, and the homebound criteria are met.  I certify that this patient has had a face to face encounter by myself.  Thea Lara D.O. - NPI: [id number]

## 2017-06-30 NOTE — PROGRESS NOTES
[facility] Hospitalist Progress Note    Date of Service: 2017    Chief Complaint  76 y.o. female admitted 2017 with h/o Chrohn's s/p ostomy, with cirrhosis, pancytopenia, splenomegaly, h/o hepatic encephalopathy with near syncope 2nd to dehydration and uti    Interval Problem Update  Feels better, oriented x 4, + dysuria  Denies abd pain    Consultants/Specialty  none    Disposition  Home with home health in 1-2 days with clinical improvement  Pt/ot        Review of Systems   Constitutional: Negative for fever, chills, malaise/fatigue and diaphoresis.   HENT: Negative for congestion and hearing loss.    Respiratory: Negative for cough and shortness of breath.    Cardiovascular: Positive for leg swelling. Negative for chest pain and palpitations.   Gastrointestinal: Negative for nausea, vomiting, abdominal pain, diarrhea and constipation.   Genitourinary: Positive for dysuria. Negative for flank pain.   Musculoskeletal: Positive for myalgias. Negative for back pain and joint pain.   Neurological: Positive for tremors and weakness. Negative for dizziness, sensory change, speech change, focal weakness and headaches.   Psychiatric/Behavioral: Negative for depression and memory loss. The patient is not nervous/anxious.       Physical Exam  Laboratory/Imaging   Hemodynamics  Temp (24hrs), Av.6 °C (97.8 °F), Min:36.3 °C (97.3 °F), Max:37.1 °C (98.7 °F)   Temperature: 36.3 °C (97.3 °F)  Pulse  Av.2  Min: 73  Max: 95    Blood Pressure : 103/43 mmHg      Respiratory      Respiration: 16, Pulse Oximetry: 98 %, O2 Daily Delivery Respiratory : Room Air with O2 Available             Fluids    Intake/Output Summary (Last 24 hours) at 17 0829  Last data filed at 17 0600   Gross per 24 hour   Intake    400 ml   Output    850 ml   Net   -450 ml       Nutrition  Orders Placed This Encounter   Procedures   • Diet Order     Standing Status: Standing      Number of Occurrences: 1      Standing Expiration Date:       Order Specific Question:  Diet:     Answer:  Regular [1]     Physical Exam   Constitutional: She is oriented to person, place, and time. She appears well-nourished. No distress.   obese   HENT:   Head: Normocephalic and atraumatic.   Nose: Nose normal.   Eyes: EOM are normal. Pupils are equal, round, and reactive to light. Right eye exhibits no discharge. Left eye exhibits no discharge. No scleral icterus.   Neck: Neck supple. No JVD present. No thyromegaly present.   Cardiovascular: Normal rate and intact distal pulses.    Pulmonary/Chest: Breath sounds normal. No respiratory distress. She has no wheezes.   Abdominal: Soft. Bowel sounds are normal. She exhibits no distension. There is no tenderness.   + ostomy   Musculoskeletal: She exhibits edema. She exhibits no tenderness.   Neurological: She is alert and oriented to person, place, and time. No cranial nerve deficit. She exhibits normal muscle tone. Coordination normal.   Skin: Skin is warm and dry. No rash noted. She is not diaphoretic. No erythema.   Psychiatric: She has a normal mood and affect. Her behavior is normal. Judgment and thought content normal.   Nursing note and vitals reviewed.      Recent Labs      06/29/17   1305  06/30/17   0413   WBC  5.2  3.7*   RBC  2.80*  2.59*   HEMOGLOBIN  8.2*  7.6*   HEMATOCRIT  26.1*  24.5*   MCV  93.2  94.6   MCH  29.3  29.3   MCHC  31.4*  31.0*   RDW  62.9*  63.0*   PLATELETCT  69*  54*   MPV  12.5  11.8     Recent Labs      06/29/17   1305  06/30/17   0413   SODIUM  141  141   POTASSIUM  3.2*  3.2*   CHLORIDE  118*  121*   CO2  11*  11*   GLUCOSE  106*  89   BUN  73*  71*   CREATININE  3.78*  3.36*   CALCIUM  8.1*  7.8*                      Assessment/Plan     * Near syncope  Assessment & Plan  Multifactorial, 2nd to above  Pt/ot eval, baseline generalized weakness, with 40lb weight loss per pt  No new focal deficits      Hepatic encephalopathy (CMS-HCC) (present on admission)  Assessment & Plan  Baseline  ammonia of 70, f/u am  Cont lactulose    UTI (urinary tract infection)  Assessment & Plan  Cont rocephin,   F/u cx  Cont gentle ivf hydration    Crohn's disease (HCC) (present on admission)  Assessment & Plan  H/o  S/p ostomy    Metabolic acidosis, NAG, bicarbonate losses (present on admission)  Assessment & Plan  Chronic  Acidosis resolved  Low bicarb  F/u am cmp    Essential hypertension, benign (present on admission)  Assessment & Plan  H/o,  Now borderline hypotensive, monitor  Likely 2nd to cirrhosis    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 (present on admission)  Assessment & Plan  Stable  Monitor  F/u cbc am    Liver cirrhosis (CMS-HCC) (present on admission)  Assessment & Plan  2nd to BAKER  Cont rifaximin and lactulose  Meld 21  - f/u INR    Anemia (present on admission)  Assessment & Plan  2nd to CD  monitor    GERD (gastroesophageal reflux disease) (present on admission)  Assessment & Plan  ppi    Obesity (BMI 30.0-34.9) (present on admission)  Assessment & Plan  Advise diet and exercise  RN to document wt  Reports 40lb weight loss, unintentional  Will need outpatient cancer screening    Chronic pain with narcotic dependence (present on admission)  Assessment & Plan  monitor    Chronic kidney disease, stage IV (severe) (CMS-HCC) (present on admission)  Assessment & Plan  Acute on chronic, improving  - cont gentle ivf hydration    Hx of portal hypertension (present on admission)  Assessment & Plan  2nd to cirrhosis    Decubitus ulcer of sacral region, stage 1  Assessment & Plan  Wound care f/u    TRAVIS on CPAP (present on admission)  Assessment & Plan  RT protocol    Labs reviewed, Medications reviewed and Radiology images reviewed          DVT prophylaxis - mechanical: SCDs  Ulcer prophylaxis: Yes  Antibiotics: Treating active infection/contamination beyond 24 hours perioperative coverage  Assessed for rehab: Patient was assess for and/or received rehabilitation services during this hospitalization

## 2017-07-01 NOTE — PROGRESS NOTES
Pt is A&Ox4, [place].  Family is at bedside. VSS.  Denies pain.  One assist up to BR  Calls appropriately.  Ileostomy to LUQ.  Pt updated on POC, needs met and questions answered. Call light within reach and working properly.

## 2017-07-01 NOTE — WOUND TEAM
Consult placed for ostomy care.  Patient seen yesterday for ostomy care, will see patient again Monday for appliance change since skin is denuded.  RN advised chunks of stool are clogging high output pouch.  Advised to leave barrier in place and replace pouch to lock and roll. Will order for RN to apply.  No other needs.

## 2017-07-01 NOTE — PROGRESS NOTES
PALLIATIVE CARE FOLLOW UP:  AD and POL scanned into EPIC. Originals and copies returned to patient.    Thank you for allowing Palliative Care to follow this patient. Please contact us at  with any questions.

## 2017-07-01 NOTE — DIETARY
Nutrition note:  Pt with poor po and wt loss PTA per nutrition admit screen. BMI 30.1. Labs and meds reviewed.  Pt is on a regular diet, has h/o cirrhosis. Pt has a pressure ulcer to coccyx.  Wound RN saw pt today, but only for ileostomy so don't know stage of pressure ulcer.    Pt states her appetite is fine and she eats well at home.  However, she does say that she has slowly lost 40# since September of last year.  Does not know why she lost as she eats well.  A 40# wt loss is ~20% of original body wt which is severe.  Suspect wt loss is d/t increased nutrient demands r/t liver ds.   Pt declines snacks/supplements at this time as she is eating well and does not feel she needs any more food.  Nursing recording 50-75% intake of meals.  RD will monitor per dept policy.  Please re-consult RD if po intake poor.

## 2017-07-01 NOTE — PROGRESS NOTES
[facility] Hospitalist Progress Note    Date of Service: 2017    Chief Complaint  76 y.o. female admitted 2017 with h/o Chrohn's s/p ostomy, with cirrhosis, pancytopenia, splenomegaly, h/o hepatic encephalopathy with near syncope 2nd to dehydration and uti    Interval Problem Update  No new complaints, decreasing bicarb, monitor  F/u am labs  No new complaints      Consultants/Specialty  none    Disposition  Home with home health in 1-2 days with clinical improvement  Pt/ot        Review of Systems   Constitutional: Negative for fever, chills and malaise/fatigue.   HENT: Negative for congestion and hearing loss.    Respiratory: Negative for cough and shortness of breath.    Cardiovascular: Positive for leg swelling. Negative for chest pain and palpitations.   Gastrointestinal: Negative for nausea and abdominal pain.   Genitourinary: Positive for dysuria. Negative for flank pain.   Musculoskeletal: Positive for myalgias. Negative for back pain and joint pain.   Neurological: Positive for tremors and weakness. Negative for sensory change and focal weakness.   Psychiatric/Behavioral: Negative for depression. The patient is not nervous/anxious.       Physical Exam  Laboratory/Imaging   Hemodynamics  Temp (24hrs), Av.3 °C (97.3 °F), Min:35.8 °C (96.5 °F), Max:36.6 °C (97.8 °F)   Temperature: 36.2 °C (97.2 °F)  Pulse  Av.1  Min: 73  Max: 95    Blood Pressure : 109/52 mmHg      Respiratory      Respiration: 14, Pulse Oximetry: 99 %        RUL Breath Sounds: Expiratory Wheezes, RML Breath Sounds: Clear, RLL Breath Sounds: Clear, REMY Breath Sounds: Expiratory Wheezes, LLL Breath Sounds: Clear    Fluids    Intake/Output Summary (Last 24 hours) at 17 1256  Last data filed at 17 1130   Gross per 24 hour   Intake    240 ml   Output   1100 ml   Net   -860 ml       Nutrition  Orders Placed This Encounter   Procedures   • Diet Order     Standing Status: Standing      Number of Occurrences: 1      Standing  Expiration Date:      Order Specific Question:  Diet:     Answer:  Regular [1]     Physical Exam   Constitutional: She is oriented to person, place, and time. She appears well-nourished. No distress.   obese   HENT:   Head: Normocephalic and atraumatic.   Nose: Nose normal.   Eyes: EOM are normal. Pupils are equal, round, and reactive to light. No scleral icterus.   Neck: Neck supple. No thyromegaly present.   Cardiovascular: Normal rate and intact distal pulses.    Pulmonary/Chest: Effort normal and breath sounds normal. No respiratory distress.   Abdominal: Soft. Bowel sounds are normal. She exhibits no distension. There is no tenderness.   + ostomy   Musculoskeletal: She exhibits edema. She exhibits no tenderness.   Neurological: She is alert and oriented to person, place, and time. No cranial nerve deficit. She exhibits normal muscle tone. Coordination normal.   Skin: Skin is warm and dry.   Psychiatric: She has a normal mood and affect. Her behavior is normal. Judgment and thought content normal.   Nursing note and vitals reviewed.      Recent Labs      06/29/17   1305  06/30/17   0413  07/01/17   0450   WBC  5.2  3.7*  4.0*   RBC  2.80*  2.59*  2.57*   HEMOGLOBIN  8.2*  7.6*  7.5*   HEMATOCRIT  26.1*  24.5*  25.1*   MCV  93.2  94.6  97.7   MCH  29.3  29.3  29.2   MCHC  31.4*  31.0*  29.9*   RDW  62.9*  63.0*  65.9*   PLATELETCT  69*  54*  54*   MPV  12.5  11.8  13.0*     Recent Labs      06/29/17   1305  06/30/17   0413  07/01/17   0246   SODIUM  141  141  140   POTASSIUM  3.2*  3.2*  3.7   CHLORIDE  118*  121*  122*   CO2  11*  11*  9*   GLUCOSE  106*  89  118*   BUN  73*  71*  64*   CREATININE  3.78*  3.36*  3.11*   CALCIUM  8.1*  7.8*  7.6*     Recent Labs      06/30/17   0955   INR  1.21*                  Assessment/Plan     * Near syncope  Assessment & Plan  Multifactorial, 2nd to above  Pt/ot eval, baseline generalized weakness, with 40lb weight loss per pt  No new focal deficits      Hepatic  encephalopathy (CMS-HCC) (present on admission)  Assessment & Plan  Baseline ammonia - increasing, a x o x 4  Increase lactulose    UTI (urinary tract infection)  Assessment & Plan  Cont rocephin,   cx pending  Cont gentle ivf hydration    Crohn's disease (HCC) (present on admission)  Assessment & Plan  H/o  S/p ostomy    Metabolic acidosis, NAG, bicarbonate losses (present on admission)  Assessment & Plan  Chronic-   Acidosis resolved  Low bicarb- s/p bicarb infusion      F/u am cmp    Essential hypertension, benign (present on admission)  Assessment & Plan  H/o,  Now borderline hypotensive, monitor  Likely 2nd to cirrhosis    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 (present on admission)  Assessment & Plan  Stable  Monitor  F/u cbc am    Liver cirrhosis (CMS-HCC) (present on admission)  Assessment & Plan  2nd to BAKER  Cont rifaximin and lactulose  Meld 21  -INR 1.2    Anemia (present on admission)  Assessment & Plan  2nd to CD  monitor    GERD (gastroesophageal reflux disease) (present on admission)  Assessment & Plan  ppi    Obesity (BMI 30.0-34.9) (present on admission)  Assessment & Plan  Advise diet and exercise  RN to document wt  Reports 40lb weight loss, unintentional  Will need outpatient cancer screening    Chronic pain with narcotic dependence (present on admission)  Assessment & Plan  monitor    Chronic kidney disease, stage IV (severe) (CMS-HCC) (present on admission)  Assessment & Plan  Acute on chronic, improving  - cont gentle ivf hydration    Hx of portal hypertension (present on admission)  Assessment & Plan  2nd to cirrhosis    Decubitus ulcer of sacral region, stage 1  Assessment & Plan  Wound care f/u    TRAVIS on CPAP (present on admission)  Assessment & Plan  RT protocol      Labs reviewed, Medications reviewed and Radiology images reviewed          DVT prophylaxis - mechanical: SCDs  Ulcer prophylaxis: Yes  Antibiotics: Treating active infection/contamination beyond 24 hours perioperative  coverage  Assessed for rehab: Patient was assess for and/or received rehabilitation services during this hospitalization

## 2017-07-01 NOTE — PROGRESS NOTES
Tae from Lab called with critical result of CO2 9.0 at 0332. Critical lab result read back to Tae.   Dr. Romero notified of critical lab result at CO2 9.0.  Critical lab result read back by Dr. Romero.

## 2017-07-01 NOTE — WOUND TEAM
Wound Team saw patient yesterday for her ostomy. This RN assessed the coccyx due to a wound LDA being open for a pressure injury there. Sacrococcygeal area intact. There is some hyperpigmentation to bilateral medial buttocks, but no wound. Patient has no advanced wound care needs at this time.

## 2017-07-01 NOTE — CARE PLAN
Problem: Communication  Goal: The ability to communicate needs accurately and effectively will improve  Fall precaution in place, mobility assessed and signs placed outside pt door.  Reminded pt to call for assistance when she needs to get OOB/assitance.  Pt verbalizes understanding.  Bed in the lowest locked position. Treaded socks on pt. Call light within reach.

## 2017-07-01 NOTE — CARE PLAN
Problem: Infection  Goal: Will remain free from infection  Outcome: PROGRESSING AS EXPECTED  IV abx infusing per MAR    Problem: Bowel/Gastric:  Goal: Normal bowel function is maintained or improved  Outcome: PROGRESSING AS EXPECTED  Ileostomy in place to LLQ, draining without difficulty     Problem: Discharge Barriers/Planning  Goal: Patient’s continuum of care needs will be met  Outcome: PROGRESSING AS EXPECTED  Pt tearful that she may not be able to discharge for another day or two. Providing emotional support and education on the importance of staying admitted to hospital until medically cleared

## 2017-07-01 NOTE — PROGRESS NOTES
Informed Dr. Romero CO2 9.0.  Dr. Romero orders Sodium Bicarbonate 650 mg PO TID.  Sodium Bicarbonate 2  Amp IV now.  Spoke to Ramírez (pharmacist) who states [facility] is on a shortage.  Informed Dr. Romero who states Ok to substitute Sodium Bicarbonate 2 amps IV with Sodium Acetate.  Informed Ramírez pharmacist.   TO/ Dr. Heather FOSTER/ Yolanda Medina RN

## 2017-07-01 NOTE — PROGRESS NOTES
Assumed care of pt. A/Ox4, plan of care discussed. Pt denies pain at this time. Ileostomy present to [facility], draining without difficulty. Sodium Acetate running at 200 ml/hr, once finished, NS to continue running at 50 ml/hr. Pt on room air, tolerating regular diet, up with one assist. Bed alarm on, bed in lowest position, treaded socks on, personal belongings and call light within reach, instructed to call for any assistance

## 2017-07-01 NOTE — WOUND TEAM
"[facility]    Inpatient Services    Established Ostomy Management/ troubleshooting  HPI: Reviewed  PMH: Reviewed   SH: Reviewed    Reason for Ostomy nurse consult:  Established ileostomy         Subjective:  \"normally I do everything myself.\"      Objective:  In bed, appliance changed this morning by RN.     Ostomy type:  Ileostomy    Stoma location:  [facility]    Stoma assessment:                Appearance:  Red, budded                  Size:  1 1/2                Protrusion:  <1\"              Output:  Heavy, watery, brown                MC jxn:  Intact                 Peristomal skin:  denuded, discolored purple (patient says this is normal for her.)     Ostomy Appliance (type and size):  2 piece 2 1/4 high output with paste ring attached to DD        Interventions and Education:  Previous dressing removed, cleansed with warm wash cloth.  Crusted faraz-stomal skin.  Cut barrier to approximately 1 1/2\" applied paste ring around stoma and applied barrier to skin.  Attached 2 1/4\" high output back connected to down drain since patient is getting lactulose and nursing is emptying bag frequently.                Evaluation:  Nursing to assist with appliance changes while inpatient.         Plan:  Nursing to continue ostomy changes while inpatient        Anticipated discharge needs:  No education needed, has own supplies at home.  "

## 2017-07-02 NOTE — CARE PLAN
Problem: Safety  Goal: Will remain free from injury  Outcome: PROGRESSING AS EXPECTED    Problem: Mobility  Goal: Risk for activity intolerance will decrease  Outcome: PROGRESSING AS EXPECTED

## 2017-07-02 NOTE — CARE PLAN
Problem: Communication  Goal: The ability to communicate needs accurately and effectively will improve  Outcome: PROGRESSING AS EXPECTED  Pt will be given d/c instructions and any questions will be answered.    Problem: Discharge Barriers/Planning  Goal: Patient’s continuum of care needs will be met  Outcome: MET Date Met:  07/02/17  Pt being d/c to home today.

## 2017-07-02 NOTE — DISCHARGE SUMMARY
CHIEF COMPLAINT ON ADMISSION  Chief Complaint   Patient presents with   • Malaise   • Weakness       CODE STATUS  Partial Code    HPI & HOSPITAL COURSE  This is a 76 y.o. female here with h/o Chrohn's s/p ostomy, with cirrhosis, pancytopenia, splenomegaly, h/o hepatic encephalopathy with near syncope 2nd to dehydration and uti.  Pt has completed a 3 day course of oral abx and has received IVF hydration.  Pt's symptoms have improved.      Pt is noted to have chronic metabolic acidosis with low bicarb.  She has be started on bicarb tablet supplementation with improvement of acidosis and CO2 of 11, which appears to be her baseline.    Pt stable for dc home today with family.    Therefore, she is discharged in good and stable condition with close outpatient follow-up.    SPECIFIC OUTPATIENT FOLLOW-UP  pcp in 1 week    DISCHARGE PROBLEM LIST  Principal Problem:    Near syncope POA: Unknown  Active Problems:    Hepatic encephalopathy (CMS-HCC) POA: Yes    UTI (urinary tract infection) POA: Unknown    Crohn's disease (HCC) POA: Yes      Overview: ICD-10 transition    Metabolic acidosis, NAG, bicarbonate losses POA: Yes    TRAVIS on CPAP POA: Yes    Essential hypertension, benign POA: Yes    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 POA: Yes    Liver cirrhosis (CMS-HCC) POA: Yes      Overview: DUE TO BAKER    Anemia POA: Yes    GERD (gastroesophageal reflux disease) POA: Yes    Obesity (BMI 30.0-34.9) POA: Yes    Chronic pain with narcotic dependence POA: Yes    Chronic kidney disease, stage IV (severe) (CMS-HCC) POA: Yes    Hx of portal hypertension POA: Yes    Decubitus ulcer of sacral region, stage 1 POA: Unknown  Resolved Problems:    * No resolved hospital problems. *      FOLLOW UP  Future Appointments  Date Time Provider Department Center   7/7/2017 2:30 PM Arik Vee M.D. [facility] [address]   7/14/2017 3:00 PM Jennifer Parkinson R.N. [facility] [address]   7/18/2017 3:30 PM Yandy Eagle R.N. [facility]   7/21/2017 3:30 PM Yandy ZARAGOZA  MIGUE Eagle [facility]     Roshni Morillo M.D.  [address]  [place] [address] [phone]    In 1 week        MEDICATIONS ON DISCHARGE   Cristina Tenorio   Home Medication Instructions VANDANA:[id number]    Printed on:07/02/17 1202   Medication Information                      ferrous gluconate (FERGON) 324 (38 FE) MG Tab  Take 324 mg by mouth every morning with breakfast.             lactulose 20 GM/30ML Solution  Take 30 mL by mouth 2 Times a Day.             omeprazole (PRILOSEC) 20 MG delayed-release capsule  Take 40 mg by mouth every day.             potassium chloride ER (K-TAB) 20 MEQ Tab CR tablet  Take 1 Tab by mouth every day.             Probiotic Cap  Take 2 Caps by mouth every morning.             rifaximin (XIFAXAN) 550 MG Tab tablet  Take 1 Tab by mouth 3 times a day.             sodium bicarbonate (SODIUM BICARBONATE) 650 MG Tab  Take 1 Tab by mouth 3 times a day.             vitamin D (CHOLECALCIFEROL) 1000 UNIT Tab  Take 1,000 Units by mouth every day.                 DIET  Orders Placed This Encounter   Procedures   • Diet Order     Standing Status: Standing      Number of Occurrences: 1      Standing Expiration Date:      Order Specific Question:  Diet:     Answer:  Regular [1]       ACTIVITY  As tolerated.  Weight bearing as tolerated      CONSULTATIONS  None     PROCEDURES  none    LABORATORY  Lab Results   Component Value Date/Time    SODIUM 140 07/02/2017 01:46 AM    POTASSIUM 3.3* 07/02/2017 01:46 AM    CHLORIDE 120* 07/02/2017 01:46 AM    CO2 11* 07/02/2017 01:46 AM    GLUCOSE 86 07/02/2017 01:46 AM    BUN 54* 07/02/2017 01:46 AM    CREATININE 2.68* 07/02/2017 01:46 AM        Lab Results   Component Value Date/Time    WBC 4.0* 07/01/2017 04:50 AM    HEMOGLOBIN 7.5* 07/01/2017 04:50 AM    HEMATOCRIT 25.1* 07/01/2017 04:50 AM    PLATELET COUNT 54* 07/01/2017 04:50 AM        Total time of the discharge process exceeds 45 minutes

## 2017-07-02 NOTE — PROGRESS NOTES
Fall bundle in place, call light within reach, bed in low and lock position. Ostomy bag intact, site is pink. Pt is able to call for help when needed. Will continue to monitor.

## 2017-07-02 NOTE — DISCHARGE INSTRUCTIONS
Urinary Tract Infection  A urinary tract infection (UTI) can occur any place along the urinary tract. The tract includes the kidneys, ureters, bladder, and urethra. A type of germ called bacteria often causes a UTI. UTIs are often helped with antibiotic medicine.   HOME CARE   · If given, take antibiotics as told by your doctor. Finish them even if you start to feel better.  · Drink enough fluids to keep your pee (urine) clear or pale yellow.  · Avoid tea, drinks with caffeine, and bubbly (carbonated) drinks.  · Pee often. Avoid holding your pee in for a long time.  · Pee before and after having sex (intercourse).  · Wipe from front to back after you poop (bowel movement) if you are a woman. Use each tissue only once.  GET HELP RIGHT AWAY IF:   · You have back pain.  · You have lower belly (abdominal) pain.  · You have chills.  · You feel sick to your stomach (nauseous).  · You throw up (vomit).  · Your burning or discomfort with peeing does not go away.  · You have a fever.  · Your symptoms are not better in 3 days.  MAKE SURE YOU:   · Understand these instructions.  · Will watch your condition.  · Will get help right away if you are not doing well or get worse.     This information is not intended to replace advice given to you by your health care provider. Make sure you discuss any questions you have with your health care provider.     Document Released: 06/05/2009 Document Revised: 01/08/2016 Document Reviewed: 07/18/2013  [employer] Interactive Patient Education ©2016 [employer]  Discharge Instructions    Discharged to home by car with relative. Discharged via wheelchair, hospital escort: Yes.  Special equipment needed: Not Applicable    Be sure to schedule a follow-up appointment with your primary care doctor or any specialists as instructed.     Discharge Plan:   Smoking Cessation Offered: Patient Refused  Influenza Vaccine Indication: Not indicated: Previously immunized this influenza season and > 8 years  of age    I understand that a diet low in cholesterol, fat, and sodium is recommended for good health. Unless I have been given specific instructions below for another diet, I accept this instruction as my diet prescription.   Other diet: regular    Special Instructions: None    · Is patient discharged on Warfarin / Coumadin?   No     · Is patient Post Blood Transfusion?  No    Depression / Suicide Risk    As you are discharged from this [facility] facility, it is important to learn how to keep safe from harming yourself.    Recognize the warning signs:  · Abrupt changes in personality, positive or negative- including increase in energy   · Giving away possessions  · Change in eating patterns- significant weight changes-  positive or negative  · Change in sleeping patterns- unable to sleep or sleeping all the time   · Unwillingness or inability to communicate  · Depression  · Unusual sadness, discouragement and loneliness  · Talk of wanting to die  · Neglect of personal appearance   · Rebelliousness- reckless behavior  · Withdrawal from people/activities they love  · Confusion- inability to concentrate     If you or a loved one observes any of these behaviors or has concerns about self-harm, here's what you can do:  · Talk about it- your feelings and reasons for harming yourself  · Remove any means that you might use to hurt yourself (examples: pills, rope, extension cords, firearm)  · Get professional help from the community (Mental Health, Substance Abuse, psychological counseling)  · Do not be alone:Call your Safe Contact- someone whom you trust who will be there for you.  · Call your local CRISIS HOTLINE 009-3716 or 309-971-7587  · Call your local Children's Mobile Crisis Response Team [place] (928) 970-1157 or www.[employer]  · Call the toll free National Suicide Prevention Hotlines   · National Suicide Prevention Lifeline 400-726-PFKW (0233)  · National Hope Line Network 800-EJELMDB  (842-1634)

## 2017-07-02 NOTE — PROGRESS NOTES
Pt resting in bed. AOX4. Morning meds given per MAR. Ostomy in place, CDI. Denies any pain. Fluids running to Lt. Wrist. Fall precautions in place: bed alarm, bed in lowest position with wheels locked, and socks on. Denies any needs at this time, will continue to monitor.

## 2017-07-02 NOTE — PROGRESS NOTES
Pt being d/c to home. D/C instructions given to pt, questions answered. IV d/c, catheter intact.  at bedside to take pt home. Transport called for w/c.

## 2017-07-03 NOTE — PROGRESS NOTES
PALLIATIVE CARE FOLLOW UP:  7/3/17 @ 2784 Received call from patient's sister Reina concerned about patient not having a financial will. Discussed means to complete financial will on own or with . Also answered questions regarding patient's AD. Reina encouraged to call with any questions, needs or concerns.     Thank you for allowing Palliative Care to follow this patient. Please contact us at  with any questions.

## 2017-07-07 PROBLEM — E87.6 HYPOKALEMIA: Status: ACTIVE | Noted: 2017-01-01

## 2017-07-07 NOTE — MR AVS SNAPSHOT
Cristina Dialloups   2017 2:30 PM   Office Visit   MRN: [id number]    Department:  [facility]   Dept Phone:  [phone]    Description:  Female : 1940   Provider:  Arik Vee M.D.           Reason for Visit     Follow-Up           Allergies as of 2017     Allergen Noted Reactions    Sulfa Drugs 2017   Hives, Itching      You were diagnosed with     Chronic kidney disease, stage IV (severe) (CMS-HCC)   [585.4.ICD-9-CM]       Hypokalemia   [953914]       Metabolic acidosis, NAG, bicarbonate losses   [779804]         Vital Signs     Blood Pressure Pulse Temperature Respirations Height Weight    126/76 mmHg 80 36.7 °C (98 °F) (Temporal) 16 1.524 m (5') 69.4 kg (153 lb)    Body Mass Index Last Menstrual Period Smoking Status             29.88 kg/m2 1995 Former Smoker         Basic Information     Date Of Birth Sex Race Ethnicity Preferred Language    1940 Female White Non- English      Your appointments     2017  2:30 PM   Follow Up Visit with Arik Vee M.D.   [facility] ([address] [place] [address])    [address] [place] [address] [address]  [place] [id number] [phone]           You will be receiving a confirmation call a few days before your appointment from our automated call confirmation system.            2017 11:00 AM   Follow Up Visit with Arik Vee M.D.   [facility] ([address] [place] [address])    [address] [place] [address] [address]  [place] [id number] [phone]           You will be receiving a confirmation call a few days before your appointment from our automated call confirmation system.            2017  3:00 PM   Ostomy New Evaluation with Jennifer Parkinson R.N.   [facility] ([address] [place] [address])    [address] [place] [address] [address]  [place] [address]   [phone]            2017  3:30 PM   Ostomy 30 Minute Follow Up with Yandy Eagle R.N.   [facility] ([facility])    [address] [facility] [address]  [facility] [address]   [phone]            Jul 21, 2017  3:30 PM   Ostomy 30 Minute Follow Up with Yandy Eagle R.N.   [facility] ([address] [place] [address])    [address] [facility] [address]  [place] [address]   [phone]              Problem List              ICD-10-CM Priority Class Noted - Resolved    Essential hypertension, benign I10   6/23/2011 - Present    Crohn's disease (HCC) K50.90 Medium  6/23/2011 - Present    Acute bronchitis J20.9   3/20/2013 - Present    Hypoxia R09.02   3/20/2013 - Present    COPD (chronic obstructive pulmonary disease) (HCC) J44.9   3/20/2013 - Present    Bronchitis J40   3/30/2014 - Present    Bandemia D72.825   3/30/2014 - Present    Splenomegaly R16.1   2/26/2016 - Present    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 D61.818   2/26/2016 - Present    TRAVIS on CPAP G47.33, Z99.89 Low  5/3/2016 - Present    Vitamin D deficiency E55.9   9/2/2016 - Present    Acidosis E87.2   9/2/2016 - Present    Liver cirrhosis (CMS-HCC) K74.60   10/5/2016 - Present    Hypomagnesemia E83.42   10/6/2016 - Present    Hypocalcemia E83.51   10/6/2016 - Present    Anemia D64.9   1/13/2017 - Present    Left humeral fracture S42.302A   1/26/2017 - Present    Hepatic cirrhosis (CMS-HCC) K74.60   1/26/2017 - Present    Tremor R25.1   1/28/2017 - Present    Cirrhosis with portal hypertension K74.60   4/1/2017 - Present    GERD (gastroesophageal reflux disease) K21.9   4/1/2017 - Present    Obesity (BMI 30.0-34.9) E66.9   4/1/2017 - Present    Chronic pain with narcotic dependence G89.29   4/1/2017 - Present    Hyperchloremic metabolic acidosis E87.2   4/7/2017 - Present    Chronic kidney disease, stage IV (severe) (CMS-HCC) N18.4   4/7/2017 - Present    Hx of portal hypertension Z86.79   4/11/2017 - Present    Transaminitis R74.0   4/11/2017 - Present    Left shoulder pain M25.512   4/11/2017 - Present    Metabolic acidosis, NAG, bicarbonate losses E87.2 Medium  4/12/2017 - Present    Hepatic  encephalopathy (CMS-[facility]) K72.90 High  5/24/2017 - Present    Bacteremia due to Gram-positive bacteria A49.9   5/25/2017 - Present    UTI (urinary tract infection) N39.0 High  6/29/2017 - Present    Near syncope R55 High  6/30/2017 - Present    Decubitus ulcer of sacral region, stage 1 L89.151   6/30/2017 - Present    Hypokalemia E87.6   7/7/2017 - Present      Health Maintenance        Date Due Completion Dates    IMM DTaP/Tdap/Td Vaccine (1 - Tdap) 10/6/1960 ---    PAP SMEAR 10/6/1962 ---    MAMMOGRAM 10/6/1981 ---    COLONOSCOPY 10/6/1991 ---    IMM ZOSTER VACCINE 10/6/2001 ---    BONE DENSITY 10/6/2006 ---    IMM INFLUENZA (1) 9/1/2017 10/6/2016, 11/15/2013, 11/20/2012            Current Immunizations     13-VALENT PCV PREVNAR 10/6/2016    Influenza TIV (IM) 11/15/2013, 11/20/2012    Influenza Vaccine Adult HD 10/6/2016  2:55 PM    Pneumococcal polysaccharide vaccine (PPSV-23) 11/20/2010      Below and/or attached are the medications your provider expects you to take. Review all of your home medications and newly ordered medications with your provider and/or pharmacist. Follow medication instructions as directed by your provider and/or pharmacist. Please keep your medication list with you and share with your provider. Update the information when medications are discontinued, doses are changed, or new medications (including over-the-counter products) are added; and carry medication information at all times in the event of emergency situations     Allergies:  SULFA DRUGS - Hives,Itching               Medications  Valid as of: July 07, 2017 -  2:22 PM    Generic Name Brand Name Tablet Size Instructions for use    Cholecalciferol (Tab) cholecalciferol 1000 UNIT Take 1,000 Units by mouth every day.        Ferrous Gluconate (Tab) FERGON 324 (38 FE) MG Take 324 mg by mouth every morning with breakfast.        Lactulose (Solution) lactulose 20 GM/30ML Take 30 mL by mouth 2 Times a Day.        Omeprazole (CAPSULE DELAYED  RELEASE) PRILOSEC 20 MG Take 40 mg by mouth every day.        Potassium Chloride (Tab CR) K-TAB 20 MEQ Take 1 Tab by mouth every day.        Probiotic Product (Cap) Probiotic  Take 2 Caps by mouth every morning.        RifAXIMin (Tab) XIFAXAN 550 MG Take 1 Tab by mouth 3 times a day.        Sodium Bicarbonate (Tab) SODIUM BICARBONATE 650 MG Take 2 Tabs by mouth 3 times a day.        .                 Medicines prescribed today were sent to:     [facility] [address] [place] [address], [place] [address] [facility] [place] [address]    Phone: [phone] Fax: [phone]    Open 24 Hours?: No      Medication refill instructions:       If your prescription bottle indicates you have medication refills left, it is not necessary to call your provider’s office. Please contact your pharmacy and they will refill your medication.    If your prescription bottle indicates you do not have any refills left, you may request refills at any time through one of the following ways: The online [facility] system (except Urgent Care), by calling your provider’s office, or by asking your pharmacy to contact your provider’s office with a refill request. Medication refills are processed only during regular business hours and may not be available until the next business day. Your provider may request additional information or to have a follow-up visit with you prior to refilling your medication.   *Please Note: Medication refills are assigned a new Rx number when refilled electronically. Your pharmacy may indicate that no refills were authorized even though a new prescription for the same medication is available at the pharmacy. Please request the medicine by name with the pharmacy before contacting your provider for a refill.        Your To Do List     Future Labs/Procedures Complete By Expires    BASIC METABOLIC PANEL  As directed 1/4/2018    CBC WITHOUT DIFFERENTIAL  As directed 1/4/2018    MAGNESIUM   As directed 7/7/2018    MICROALBUMIN CREAT RATIO URINE  As directed 1/6/2018    PTH INTACT (PTH ONLY)  As directed 7/8/2018    VITAMIN D,25 HYDROXY  As directed 1/7/2018         [employer] Access Code: Activation code not generated  Current [employer] Status: Active

## 2017-07-07 NOTE — PROGRESS NOTES
Subjective:      Cristina Tenorio is a 76 y.o. female who presents with CKD IV, and hypokalemia Follow-Up            HPI  Patient with with a history of Crohn's disease, CKD IV, cirrhosis, hepatic encephalopathy recently hospitalized for encephalopathy. Multiple hospitalizations for acidosis, volume depletion.    1. CKD stage IV - Cr climbing to 3, monitoring  2. HTN - BP at goal  3. Hyperchloremic acidosis - taking sodium bicarbonate  4. Hypokalemia - now on KCl     Review of Systems   Constitutional: Negative for fever and chills.   Cardiovascular: Negative for chest pain and palpitations.          Objective:     /76 mmHg  Pulse 80  Temp(Src) 36.7 °C (98 °F) (Temporal)  Resp 16  Ht 1.524 m (5')  Wt 69.4 kg (153 lb)  BMI 29.88 kg/m2  LMP 06/29/1995     Physical Exam   Constitutional: She is oriented to person, place, and time. She appears well-developed and well-nourished.   Cardiovascular: Normal rate and regular rhythm.    Pulmonary/Chest: Effort normal and breath sounds normal.   Neurological: She is alert and oriented to person, place, and time.   Skin: Skin is warm and dry.   Psychiatric: She has a normal mood and affect. Her behavior is normal.               Assessment/Plan:     1. Chronic kidney disease, stage IV (severe) (CMS-HCC)  Cr creeping up, appears volume depleted, recheck labs in 1 week    - BASIC METABOLIC PANEL; Future  - CBC WITHOUT DIFFERENTIAL; Future  - PTH INTACT (PTH ONLY); Future  - VITAMIN D,25 HYDROXY; Future  - MICROALBUMIN CREAT RATIO URINE; Future  - MAGNESIUM; Future    2. Hypokalemia  On KCl, improving    3. Metabolic acidosis, NAG, bicarbonate losses  Increase sodium bicarbonate

## 2017-07-14 NOTE — MR AVS SNAPSHOT
Cristina Dialloups   2017 11:00 AM   Office Visit   MRN: [id number]    Department:  [facility]   Dept Phone:  [phone]    Description:  Female : 1940   Provider:  Arik Vee M.D.           Reason for Visit     Follow-Up           Allergies as of 2017     Allergen Noted Reactions    Sulfa Drugs 2017   Hives, Itching      You were diagnosed with     Chronic kidney disease, stage IV (severe) (CMS-HCC)   [585.4.ICD-9-CM]       Cirrhosis of liver without ascites, unspecified hepatic cirrhosis type (CMS-HCC)   [7291580]       Metabolic acidosis, NAG, bicarbonate losses   [960451]         Vital Signs     Blood Pressure Pulse Temperature Respirations Height Weight    128/78 mmHg 72 36.6 °C (97.9 °F) (Temporal) 14 1.524 m (5') 67.586 kg (149 lb)    Body Mass Index Last Menstrual Period Smoking Status             29.10 kg/m2 1995 Former Smoker         Basic Information     Date Of Birth Sex Race Ethnicity Preferred Language    1940 Female White Non- English      Your appointments     2017  3:00 PM   Ostomy New Evaluation with Jennifer Parkinson R.N.   [facility] ([address] [place] [address])    [address]  [place] [id number] [phone]            2017  3:30 PM   Ostomy 30 Minute Follow Up with Yandy Eagle R.N.   [facility] ([address] [place] [address])    [address]  [place] [address]   [phone]            2017  3:30 PM   Ostomy 30 Minute Follow Up with Bethel Alva R.N.   [facility] ([address] [place] [address])    [address]  [place] [address]   [phone]            Aug 11, 2017  3:00 PM   Follow Up Visit with Arik Vee M.D.   [facility] ([address] [place] [address])    [address] [place] [address] [address]  [place] [address]   [phone]           You will be receiving a confirmation call a few days before your appointment from our automated call confirmation system.                 Problem List              ICD-10-CM Priority Class Noted - Resolved    Essential hypertension, benign I10   6/23/2011 - Present    Crohn's disease (HCC) K50.90 Medium  6/23/2011 - Present    Acute bronchitis J20.9   3/20/2013 - Present    Hypoxia R09.02   3/20/2013 - Present    COPD (chronic obstructive pulmonary disease) (HCC) J44.9   3/20/2013 - Present    Bronchitis J40   3/30/2014 - Present    Bandemia D72.825   3/30/2014 - Present    Splenomegaly R16.1   2/26/2016 - Present    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 D61.818   2/26/2016 - Present    TRAVIS on CPAP G47.33, Z99.89 Low  5/3/2016 - Present    Vitamin D deficiency E55.9   9/2/2016 - Present    Acidosis E87.2   9/2/2016 - Present    Liver cirrhosis (CMS-HCC) K74.60   10/5/2016 - Present    Hypomagnesemia E83.42   10/6/2016 - Present    Hypocalcemia E83.51   10/6/2016 - Present    Anemia D64.9   1/13/2017 - Present    Left humeral fracture S42.302A   1/26/2017 - Present    Hepatic cirrhosis (CMS-HCC) K74.60   1/26/2017 - Present    Tremor R25.1   1/28/2017 - Present    Cirrhosis with portal hypertension K74.60   4/1/2017 - Present    GERD (gastroesophageal reflux disease) K21.9   4/1/2017 - Present    Obesity (BMI 30.0-34.9) E66.9   4/1/2017 - Present    Chronic pain with narcotic dependence G89.29   4/1/2017 - Present    Hyperchloremic metabolic acidosis E87.2   4/7/2017 - Present    Chronic kidney disease, stage IV (severe) (CMS-HCC) N18.4   4/7/2017 - Present    Hx of portal hypertension Z86.79   4/11/2017 - Present    Transaminitis R74.0   4/11/2017 - Present    Left shoulder pain M25.512   4/11/2017 - Present    Metabolic acidosis, NAG, bicarbonate losses E87.2 Medium  4/12/2017 - Present    Hepatic encephalopathy (CMS-HCC) K72.90 High  5/24/2017 - Present    Bacteremia due to Gram-positive bacteria A49.9   5/25/2017 - Present    UTI (urinary tract infection) N39.0 High  6/29/2017 - Present    Near syncope R55 High  6/30/2017 - Present    Decubitus ulcer  of sacral region, stage 1 L89.151   6/30/2017 - Present    Hypokalemia E87.6   7/7/2017 - Present      Health Maintenance        Date Due Completion Dates    IMM DTaP/Tdap/Td Vaccine (1 - Tdap) 10/6/1960 ---    PAP SMEAR 10/6/1962 ---    MAMMOGRAM 10/6/1981 ---    COLONOSCOPY 10/6/1991 ---    IMM ZOSTER VACCINE 10/6/2001 ---    BONE DENSITY 10/6/2006 ---    IMM INFLUENZA (1) 9/1/2017 10/6/2016, 11/15/2013, 11/20/2012            Current Immunizations     13-VALENT PCV PREVNAR 10/6/2016    Influenza TIV (IM) 11/15/2013, 11/20/2012    Influenza Vaccine Adult HD 10/6/2016  2:55 PM    Pneumococcal polysaccharide vaccine (PPSV-23) 11/20/2010      Below and/or attached are the medications your provider expects you to take. Review all of your home medications and newly ordered medications with your provider and/or pharmacist. Follow medication instructions as directed by your provider and/or pharmacist. Please keep your medication list with you and share with your provider. Update the information when medications are discontinued, doses are changed, or new medications (including over-the-counter products) are added; and carry medication information at all times in the event of emergency situations     Allergies:  SULFA DRUGS - Hives,Itching               Medications  Valid as of: July 14, 2017 - 11:14 AM    Generic Name Brand Name Tablet Size Instructions for use    Cholecalciferol (Tab) cholecalciferol 1000 UNIT Take 1,000 Units by mouth every day.        Ferrous Gluconate (Tab) FERGON 324 (38 FE) MG Take 324 mg by mouth every morning with breakfast.        Lactulose (Solution) lactulose 20 GM/30ML Take 30 mL by mouth 2 Times a Day.        Omeprazole (CAPSULE DELAYED RELEASE) PRILOSEC 20 MG Take 40 mg by mouth every day.        Potassium Chloride (Tab CR) K-TAB 20 MEQ Take 1 Tab by mouth every day.        Probiotic Product (Cap) Probiotic  Take 2 Caps by mouth every morning.        RifAXIMin (Tab) XIFAXAN 550 MG Take 1 Tab  by mouth 3 times a day.        Sodium Bicarbonate (Tab) SODIUM BICARBONATE 650 MG Take 3 Tabs by mouth 3 times a day.        .                 Medicines prescribed today were sent to:     [facility] [address] [place] [address] [place], [place] - [address] [facility] [place]    [facility] [place] [place] [address]    Phone: [phone] Fax: [phone]    Open 24 Hours?: No      Medication refill instructions:       If your prescription bottle indicates you have medication refills left, it is not necessary to call your provider’s office. Please contact your pharmacy and they will refill your medication.    If your prescription bottle indicates you do not have any refills left, you may request refills at any time through one of the following ways: The online [employer] system (except Urgent Care), by calling your provider’s office, or by asking your pharmacy to contact your provider’s office with a refill request. Medication refills are processed only during regular business hours and may not be available until the next business day. Your provider may request additional information or to have a follow-up visit with you prior to refilling your medication.   *Please Note: Medication refills are assigned a new Rx number when refilled electronically. Your pharmacy may indicate that no refills were authorized even though a new prescription for the same medication is available at the pharmacy. Please request the medicine by name with the pharmacy before contacting your provider for a refill.        Your To Do List     Future Labs/Procedures Complete By Expires    BASIC METABOLIC PANEL  As directed 1/11/2018         [employer] Access Code: Activation code not generated  Current [employer] Status: Active

## 2017-07-18 NOTE — WOUND TEAM
Advanced Wound Care  [facility]  [address]  [place] [address]  [phone] Fax#     Ostomy Note  For 90 Day Certification Period:  7/14/17-10/14/17    Referring Provider:  Ashlee Dacosta  Primary Provider: Dr. Gilberto Morillo  Consulting Providers: KELLEN Mills  Surgeon: Dr. Gaviria  Start of Care: 7/14/17  Reason for referral: ileostomy eval      SUBJECTIVE:      HPI: 76 year old female pt with a history of Crohn's disease s/p ileostomy, CKD IV, and cirrhosis.  Pt had original ileostomy in [place] in 1992 and a revision was done by Dr. Gaviria (no longer at [facility]) in 2010.  Pt stated that she started to have problems with her skin around the stoma after the revision.  Pt has been getting a 3 day wear time with her ileostomy appliance.    Past Medical Hx:   Past Medical History   Diagnosis Date   • Bowel obstruction (CMS-HCC)    • Crohn's disease (CMS-HCC)    • Indigestion    • Obstruction    • Snoring    • Ileostomy in place (CMS-HCC)    • COPD (chronic obstructive pulmonary disease) (CMS-HCC)      per pt   • COPD (chronic obstructive pulmonary disease) (CMS-HCC) 3/20/2013   • Arthritis 12/30/16     to hands and feet   • Heart burn    • Emphysema of lung (CMS-HCC)      COPD   • Sleep apnea    • TRAVIS on CPAP      10/2016-CPAP DC'd and wears O2 @4L/NC   • Hemorrhagic disorder (CMS-HCC)      anemia of unkown etiology.   • Anemia 12/30/16     unknown etiology   • Renal disorder      Increased creatitine level due to meds.   • Breath shortness      uses O2@ at night and prn ([employer]). Uses inhaler prn. 12/30/16-reports no changes    • Cataract 2006,2007     bilat phaco with IOL   • Cirrhosis of liver (CMS-[facility]) 12/30/16     states dx at one time but no treatment for >10yrs   • Occasional tremors      Surgical Hx:   Medications:no changes  Allergies: Sulfa drugs  Social Hx:     Fall Risk Assessment (alli all that apply with an X): + fall risk,  "completed at initial eval    OBJECTIVE:     STOMA ASSESSMENT:   Type:  ___X_Ileostomy     ____Colostomy    ____Urostomy    ____Other     Location:   LLQ   Size: 1 3/8\" (pt currently using precut 1 1/4\" round barriers)   Shape: oval   Color: red, budded   Protrudes:      ___ >1 inch               __X_ <1 inch   [place]:  center   Mucocutaneous Junction:  intact   Gale-stomal Skin Problems: denudation (worse on inferior to stoma), has improved per pt   Effluent / Flatus: brown liquid   Photo  _X___ Yes ____ No    Pouching Procedure:   Old appliance removed.    Peristomal skin cleansed with: water and warm wash cloth   Peristomal skin treated with: crusting (stoma powder and no sting skin barrier) x 2   Ostomy appliance used: 2 1/4\" CTF convex jagdish 2 piece    Patient Education:   Verbal/demonstration instructions of above pouching procedure provided to patient/family.      Response: Pt verbalized understanding and is willing to participate with crusting on next follow up visit.        PLAN: Reinforce importance of crusting and cutting barrier to clear stoma.  Pt will return to clinic 1x/week until peristomal issues resolve.    Supplies Needed:   Appliance type:    Jagdish 2 piece  2 /14\" CTF convex (85866), pouch [id number]             Other: kendy barrier ring, stoma powder, skin barrier     Accessories:    Pt does not want a belt (pt has tried this in the past and does not like it)     Supplier:    Frequency:  1x/week and PRN       Professional Collaboration:  None today      At the time of each visit, a thorough assessment of the patient is completed to assure appropriateness of our plan of care.  The plan of care may need to be adapted from the original plan of care to address any significant changes in patient status.    Clinician Signature:  _________________________________  Date:  ____________    Physician Signature:  ________________________________  Date:  ____________       "

## 2017-07-19 NOTE — CERTIFICATION
"Advanced Wound Care  [facility]  [address]  [address]  [place] [address]  [phone] Fax: [phone]      Initial Evaluation  For Certification Period:7/18/2017 - 8/17/2017      Referring Physician: Dr. SAULO Harper  Primary Physician:     Roshni Morillo   Consulting Physicians:         Wound(s):RT elbow skin tear  Start of Care: 7/18/2017       Subjective:        HPI:    75 YO female living at home with , hit elbow on the wall. See below for medical history.              Pain:     \"not bad\"       Past Medical History:  Past Medical History   Diagnosis Date   • Bowel obstruction (CMS-HCC)    • Crohn's disease (CMS-HCC)    • Indigestion    • Obstruction    • Snoring    • Ileostomy in place (CMS-HCC)    • COPD (chronic obstructive pulmonary disease) (CMS-HCC)      per pt   • COPD (chronic obstructive pulmonary disease) (CMS-HCC) 3/20/2013   • Arthritis 12/30/16     to hands and feet   • Heart burn    • Emphysema of lung (CMS-HCC)      COPD   • Sleep apnea    • TRAVIS on CPAP      10/2016-CPAP DC'd and wears O2 @4L/NC   • Hemorrhagic disorder (CMS-HCC)      anemia of unkown etiology.   • Anemia 12/30/16     unknown etiology   • Renal disorder      Increased creatitine level due to meds.   • Breath shortness      uses O2@ at night and prn ([employer]). Uses inhaler prn. 12/30/16-reports no changes    • Cataract 2006,2007     bilat phaco with IOL   • Cirrhosis of liver (CMS-HCC) 12/30/16     states dx at one time but no treatment for >10yrs   • Occasional tremors      Current Medications:  Current outpatient prescriptions:   •  sodium bicarbonate (SODIUM BICARBONATE) 650 MG Tab, Take 3 Tabs by mouth 3 times a day., Disp: 270 Tab, Rfl: 11  •  potassium Chloride ER (K-TAB) 20 MEQ Tab CR tablet, Take 1 Tab by mouth every day., Disp: 30 Tab, Rfl: 11  •  lactulose 20 GM/30ML Solution, Take 30 mL by mouth 2 Times a Day., Disp: 30 Bottle, Rfl: 1  •  Probiotic Cap, Take 2 Caps by mouth every morning., " "Disp: , Rfl:   •  rifaximin (XIFAXAN) 550 MG Tab tablet, Take 1 Tab by mouth 3 times a day., Disp: 90 Tab, Rfl: 0  •  ferrous gluconate (FERGON) 324 (38 FE) MG Tab, Take 324 mg by mouth every morning with breakfast., Disp: , Rfl:   •  omeprazole (PRILOSEC) 20 MG delayed-release capsule, Take 40 mg by mouth every day., Disp: , Rfl:   •  vitamin D (CHOLECALCIFEROL) 1000 UNIT Tab, Take 1,000 Units by mouth every day., Disp: , Rfl:   Allergies: Sulfa drugs  Past Surgical History:   Past Surgical History   Procedure Laterality Date   • Cholecystectomy  2013     \"burst\"   • Bowel resection  1992     with ileostomy (right side)   • Ileostomy  2010     moved to left side   • Recovery  6/21/2010     Performed by SURGERY, IR-RECOVERY at SURGERY SAME DAY [facility]   • Sigmoidoscopy flex  9/27/2016     Procedure: SIGMOIDOSCOPY FLEX;  Surgeon: Aftab Alegre M.D.;  Location: [facility];  Service:    • Gastroscopy-endo  9/27/2016     Procedure: GASTROSCOPY-ENDO;  Surgeon: Aftab Alegre M.D.;  Location: [facility];  Service:    • Gastroscopy wiithsavary dilatation  9/28/2016     Procedure: GASTROSCOPY WIITH SAVARY DILATATION;  Surgeon: Aftab Alegre M.D.;  Location: [facility];  Service: Gastroenterology   • Gastroscopy w/push enterscopy  9/28/2016     Procedure: GASTROSCOPY W/PUSH ENTERSCOPY;  Surgeon: Aftab Alegre M.D.;  Location: [facility];  Service:    • Gastroscopy-endo N/A 10/5/2016     Procedure: GASTROSCOPY-ENDO;  Surgeon: Aravind Roman M.D.;  Location: [facility];  Service:    • Umbilical hernia repair  2000   • Colonoscopy       Hx of  several    • Other surgical procedure  1994     closed off rectum    • Gastroscopy N/A 1/13/2017     Procedure: GASTROSCOPY - ENTEROSCOPY PUSH;  Surgeon: Boni Brooks M.D.;  Location: [facility];  Service:      Social History:    Social History     Social History " "  • Marital Status:      Spouse Name: N/A   • Number of Children: N/A   • Years of Education: N/A     Occupational History   • Not on file.     Social History Main Topics   • Smoking status: Former Smoker -- 50 years     Types: Cigarettes, Cigars     Quit date: 03/30/2013   • Smokeless tobacco: Not on file      Comment: 50yrs 1.5 ppd quit 2009   • Alcohol Use: No      Comment: socially   • Drug Use: No   • Sexual Activity: Not on file     Other Topics Concern   • Not on file     Social History Narrative         Objective:      Tests and Measures:none today    Orthotic, protective, supportive devices: none    Fall Risk Assessment (alli all that apply with an X):completed at initial ostomy eval, + fall risk                     Wound Characteristics                                                    Location:Rt elbow   Initial Evaluation  Date:7/18/2017   Tissue Type and %: 100% red   Periwound: Intact, slight edema, erythema   Drainage: Slight bloody   Exposed structures none   Wound Edges:   flat   Odor: none   S&S of Infection:   none   Edema: slight   Sensation: intact               Measurements:Rt elbow Initial Evaluation  Date:   Length (cm) 2   Width (cm) 2.5   Depth (cm) 0.1   Area (cm2) 5   Tract/undermine none   During procedure, pt became nauseated and vomited several times. Denies chills, fever, feeling sick. VSS - 129/64, HR 89, SPO2 98%, temp 97.7. Pt stated that last night she took \"one Oxycontin for pain, and then another, and I can't do that.\" After vomiting, pt stated she felt \"so much better.\"      Procedures:     Debridement :  CSWD with scissors and forceps to remove <1 cm2 of loose, necrotic tissue   Cleansed with:          NS, gauze                                                                Periwound protected with: No Sting   Primary dressing:Adaptic   Secondary Dressing:Aquacel AG   Other: Kerlix roll wrapped around wound area, secured with tape     Patient Education: Instructed pt " on s/s infection - chills, fever, malaise, [place], increased redness/swelling/pain/exudate - and to go to ER/Urgent Care; to keep dressing D/I; rationale for dressing choice; to return 2x/week for appointments.     Professional Collaboration: certification sent to referring provider via EPIC.       Assessment:      Wound etiology: skin tear    Wound Progress:  Initial eval    Rationale for Treatment:Adaptic for non-adherence of dressing to wound bed, Aquacel for antimicrobial, absorption, Kerlix to protect dressing without tape to skin.     Patient tolerance/compliance: pt listens eagerly to instruction.     Complicating factors:age    Need for ongoing Advanced Wound Care services:Patient requires skilled therapeutic intervention for debridement, product selection and application, education, wound bed preparation and assessment.      Plan:      Treatment Plan and Recommendations:  Diagnosis/ICD9: S50.00XA    Procedures/CPT:CSWD    Frequency: 2x/week      Treatment Goals: STG 2 Weeks  LTG 4 Weeks   Granulation Tissue: 10% 20%   Decrease Necrotic Tissue to: na n a   Wound Phase:  proliferation proliferation   Decrease Size by: 5% 10%   Periwound:  intact intact   Decrease tracts/undermining by: na na   Decrease Pain:  zari Minimal  To minimal       At the time of each visit a thorough assessment of the patient is completed to assure the  appropriateness of our plan of care.  The dressings or modalities may need to be adapted   from the original plan to address any significant changes in the wound environment.          Clinician Signature:_______________________________Date__________________      Physician Signature:______________________________Date:__________________

## 2017-07-26 NOTE — WOUND TEAM
Advanced Wound Care  [facility]  [address]  [place] [address]  [phone] Fax#     Ostomy Note  For 90 Day Certification Period:  7/14/17-10/14/17    Referring Provider:  Ashlee Dacosta  Primary Provider: Dr. Gilberto Morillo  Consulting Providers: KELLEN Mills  Surgeon: Dr. Gaviria  Start of Care: 7/14/17  Reason for referral: ileostomy eval      SUBJECTIVE:      HPI: 76 year old female pt with a history of Crohn's disease s/p ileostomy, CKD IV, and cirrhosis.  Pt had original ileostomy in [place] in 1992 and a revision was done by Dr. Gaviria (no longer at [facility]) in 2010.  Pt stated that she started to have problems with her skin around the stoma after the revision.  Pt has been getting a 3-4 day wear time with her ileostomy appliance.    Past Medical Hx:   Past Medical History   Diagnosis Date   • Bowel obstruction (CMS-HCC)    • Crohn's disease (CMS-HCC)    • Indigestion    • Obstruction    • Snoring    • Ileostomy in place (CMS-HCC)    • COPD (chronic obstructive pulmonary disease) (CMS-HCC)      per pt   • COPD (chronic obstructive pulmonary disease) (CMS-HCC) 3/20/2013   • Arthritis 12/30/16     to hands and feet   • Heart burn    • Emphysema of lung (CMS-HCC)      COPD   • Sleep apnea    • TRAVIS on CPAP      10/2016-CPAP DC'd and wears O2 @4L/NC   • Hemorrhagic disorder (CMS-HCC)      anemia of unkown etiology.   • Anemia 12/30/16     unknown etiology   • Renal disorder      Increased creatitine level due to meds.   • Breath shortness      uses O2@ at night and prn ([employer]). Uses inhaler prn. 12/30/16-reports no changes    • Cataract 2006,2007     bilat phaco with IOL   • Cirrhosis of liver (CMS-[facility]) 12/30/16     states dx at one time but no treatment for >10yrs   • Occasional tremors      Surgical Hx:   Medications:no changes  Allergies: Sulfa drugs  Social Hx:     Fall Risk Assessment (alli all that apply with an X): + fall risk,  "completed at initial eval    OBJECTIVE:     STOMA ASSESSMENT:   Type:  ___X_Ileostomy     ____Colostomy    ____Urostomy    ____Other     Location:   LLQ   Size: 1 3/8\" (pt currently using precut 1 1/4\" round barriers)   Shape: oval   Color: red, budded   Protrudes:      ___ >1 inch               __X_ <1 inch   [place]:  center   Mucocutaneous Junction:  intact   Gale-stomal Skin Problems: scant denudation   Effluent / Flatus: yellow brown liquid   Photo  ____ Yes _X___ No    Pouching Procedure:   Old appliance removed.    Peristomal skin cleansed with: water and warm wash cloth   Peristomal skin treated with: crusting (stoma powder and no sting skin barrier)   Ostomy appliance used: 2 1/4\" CTF convex jagdish 2 piece with barrier ring    Patient Education:   Verbal/demonstration instructions of above pouching procedure provided to patient/family.  Provided information & sample to patient regarding gel packets for consolidating effluent in pouch if interested.    Response: Pt verbalized understanding and was able to participate with crusting.        PLAN: Reinforce importance of crusting and cutting barrier to clear stoma.  Pt will return to clinic 1x/week until peristomal issues resolve.    Supplies Needed:   Appliance type:    Jagdish 2 piece  2 /14\" CTF convex (85775), pouch [id number]             Other: kendy barrier ring, stoma powder, skin barrier     Accessories:    Pt does not want a belt (pt has tried this in the past and does not like it)     Supplier:    Frequency:  1x/week and PRN       Professional Collaboration:  None today      At the time of each visit, a thorough assessment of the patient is completed to assure appropriateness of our plan of care.  The plan of care may need to be adapted from the original plan of care to address any significant changes in patient status.    Clinician Signature:  _________________________________  Date:  ____________    Physician Signature:  " ________________________________  Date:  ____________

## 2017-07-26 NOTE — WOUND TEAM
Advanced Wound Care  [facility]  [address]  [address]  [place] [address]  [phone] Fax: [phone]    Encounter Note  For Certification Period:7/18/2017 - 8/17/2017      Referring Physician: Dr. SAULO Harper  Primary Physician:     Roshni Morillo   Consulting Physicians:         Wound(s):RT elbow skin tear  Start of Care: 7/18/2017       Subjective:        HPI:    77 YO female living at home with , hit elbow on the wall. See below for medical history.              Pain: denies      Past Medical History:  Past Medical History   Diagnosis Date   • Bowel obstruction (CMS-HCC)    • Crohn's disease (CMS-HCC)    • Indigestion    • Obstruction    • Snoring    • Ileostomy in place (CMS-HCC)    • COPD (chronic obstructive pulmonary disease) (CMS-HCC)      per pt   • COPD (chronic obstructive pulmonary disease) (CMS-HCC) 3/20/2013   • Arthritis 12/30/16     to hands and feet   • Heart burn    • Emphysema of lung (CMS-HCC)      COPD   • Sleep apnea    • TRAVIS on CPAP      10/2016-CPAP DC'd and wears O2 @4L/NC   • Hemorrhagic disorder (CMS-HCC)      anemia of unkown etiology.   • Anemia 12/30/16     unknown etiology   • Renal disorder      Increased creatitine level due to meds.   • Breath shortness      uses O2@ at night and prn ([employer]). Uses inhaler prn. 12/30/16-reports no changes    • Cataract 2006,2007     bilat phaco with IOL   • Cirrhosis of liver (CMS-HCC) 12/30/16     states dx at one time but no treatment for >10yrs   • Occasional tremors      Current Medications:  Current outpatient prescriptions:   •  sodium bicarbonate (SODIUM BICARBONATE) 650 MG Tab, Take 3 Tabs by mouth 3 times a day., Disp: 270 Tab, Rfl: 11  •  potassium Chloride ER (K-TAB) 20 MEQ Tab CR tablet, Take 1 Tab by mouth every day., Disp: 30 Tab, Rfl: 11  •  lactulose 20 GM/30ML Solution, Take 30 mL by mouth 2 Times a Day., Disp: 30 Bottle, Rfl: 1  •  Probiotic Cap, Take 2 Caps by mouth every morning., Disp: , Rfl:   •  " rifaximin (XIFAXAN) 550 MG Tab tablet, Take 1 Tab by mouth 3 times a day., Disp: 90 Tab, Rfl: 0  •  ferrous gluconate (FERGON) 324 (38 FE) MG Tab, Take 324 mg by mouth every morning with breakfast., Disp: , Rfl:   •  omeprazole (PRILOSEC) 20 MG delayed-release capsule, Take 40 mg by mouth every day., Disp: , Rfl:   •  vitamin D (CHOLECALCIFEROL) 1000 UNIT Tab, Take 1,000 Units by mouth every day., Disp: , Rfl:   Allergies: Sulfa drugs  Past Surgical History:   Past Surgical History   Procedure Laterality Date   • Cholecystectomy  2013     \"burst\"   • Bowel resection  1992     with ileostomy (right side)   • Ileostomy  2010     moved to left side   • Recovery  6/21/2010     Performed by SURGERY, IR-RECOVERY at SURGERY SAME DAY [facility]   • Sigmoidoscopy flex  9/27/2016     Procedure: SIGMOIDOSCOPY FLEX;  Surgeon: Aftab Alegre M.D.;  Location: [facility];  Service:    • Gastroscopy-endo  9/27/2016     Procedure: GASTROSCOPY-ENDO;  Surgeon: Aftab Alegre M.D.;  Location: [facility];  Service:    • Gastroscopy wiithsavary dilatation  9/28/2016     Procedure: GASTROSCOPY WIITH SAVARY DILATATION;  Surgeon: Aftab Alegre M.D.;  Location: [facility];  Service: Gastroenterology   • Gastroscopy w/push enterscopy  9/28/2016     Procedure: GASTROSCOPY W/PUSH ENTERSCOPY;  Surgeon: Aftab Alegre M.D.;  Location: ENDOSCOPY [facility];  Service:    • Gastroscopy-endo N/A 10/5/2016     Procedure: GASTROSCOPY-ENDO;  Surgeon: Aravind Roman M.D.;  Location: ENDOSCOPY [facility];  Service:    • Umbilical hernia repair  2000   • Colonoscopy       Hx of  several    • Other surgical procedure  1994     closed off rectum    • Gastroscopy N/A 1/13/2017     Procedure: GASTROSCOPY - ENTEROSCOPY PUSH;  Surgeon: Boni Brooks M.D.;  Location: [facility];  Service:      Social History:    Social History     Social History   • Marital Status: "      Spouse Name: N/A   • Number of Children: N/A   • Years of Education: N/A     Occupational History   • Not on file.     Social History Main Topics   • Smoking status: Former Smoker -- 50 years     Types: Cigarettes, Cigars     Quit date: 03/30/2013   • Smokeless tobacco: Not on file      Comment: 50yrs 1.5 ppd quit 2009   • Alcohol Use: No      Comment: socially   • Drug Use: No   • Sexual Activity: Not on file     Other Topics Concern   • Not on file     Social History Narrative         Objective:      Tests and Measures: none today    Orthotic, protective, supportive devices: none    Fall Risk Assessment (alli all that apply with an X):completed at initial ostomy eval, + fall risk                     Wound Characteristics                                                    Location:Rt elbow   Initial Evaluation  Date:7/18/2017 Encounter  Date: 7/26/2017   Tissue Type and %: 100% red 50% dried crust & 50% epithelize tissue   Periwound: Intact, slight edema, erythema Scar tissue   Drainage: Slight bloody none   Exposed structures none none   Wound Edges:   flat closed   Odor: none none   S&S of Infection:   none none   Edema: slight none   Sensation: intact intact               Measurements:Rt elbow Initial Evaluation  Date: Encounter  Date: 7/26/2017   Length (cm) 2 1.0   Width (cm) 2.5 0.5   Depth (cm) 0.1 NA   Area (cm2) 5 0.5   Tract/undermine none none         Procedures:     Debridement : none   Cleansed with:          NS, gauze                                                                Periwound protected with: No Sting   Primary dressing: brava hydrocolloid strip   Secondary Dressing: none   Other: none     Patient Education: Instructed pt on s/s infection - chills, fever, malaise, NV, increased redness/swelling/pain/exudate - and to go to ER/Urgent Care; to keep dressing D/I; rationale for dressing choice; to return 1x/week for appointments combined with ostomy evaluations. Keep dressing on  until falls off or next week appointment.     Professional Collaboration: none      Assessment:      Wound etiology: skin tear    Wound Progress:  Almost resolved    Rationale for Treatment: optimize moisture, remove crust gently    Patient tolerance/compliance: pt listens eagerly to instruction.     Complicating factors:age    Need for ongoing Advanced Wound Care services:Patient requires skilled therapeutic intervention for debridement, product selection and application, education, wound bed preparation and assessment.      Plan:      Treatment Plan and Recommendations:  Diagnosis/ICD9: S50.00XA    Procedures/CPT:CSWD    Frequency: 1x/week      Treatment Goals: STG 2 Weeks  LTG 4 Weeks   Granulation Tissue: 10% 20%   Decrease Necrotic Tissue to: na n a   Wound Phase:  proliferation proliferation   Decrease Size by: 5% 10%   Periwound:  intact intact   Decrease tracts/undermining by: na na   Decrease Pain:  zari Minimal  To minimal       At the time of each visit a thorough assessment of the patient is completed to assure the  appropriateness of our plan of care.  The dressings or modalities may need to be adapted   from the original plan to address any significant changes in the wound environment.          Clinician Signature:_______________________________Date__________________      Physician Signature:______________________________Date:__________________

## 2017-08-06 PROBLEM — R41.89 UNRESPONSIVE: Status: ACTIVE | Noted: 2017-01-01

## 2017-08-06 PROBLEM — K75.81 LIVER CIRRHOSIS SECONDARY TO NASH (HCC): Status: ACTIVE | Noted: 2017-01-01

## 2017-08-06 PROBLEM — J96.90 RESPIRATORY FAILURE (HCC): Status: ACTIVE | Noted: 2017-01-01

## 2017-08-06 PROBLEM — N17.9 AKI (ACUTE KIDNEY INJURY) (HCC): Status: ACTIVE | Noted: 2017-01-01

## 2017-08-06 PROBLEM — G89.4 CHRONIC PAIN SYNDROME: Status: ACTIVE | Noted: 2017-01-01

## 2017-08-06 PROBLEM — K74.60 LIVER CIRRHOSIS SECONDARY TO NASH (HCC): Status: ACTIVE | Noted: 2017-01-01

## 2017-08-06 PROBLEM — E72.20 HYPERAMMONEMIA (HCC): Status: ACTIVE | Noted: 2017-01-01

## 2017-08-06 NOTE — ED NOTES
Spoke to MRI in regards to checklist since pt is intubated and SO is unreliable historian. Told by MRI tech to order Abdomen 1-view xray for verification.

## 2017-08-06 NOTE — PROGRESS NOTES
1215 to MRI from ER with 2 RN's and RT, intubated on vent, Propofol infusing, unresponsive to name, withdraws to pain, moves spontaniously

## 2017-08-06 NOTE — ED NOTES
Pt arrived via [facility], per EMS pt was found altered at approx 0800 by significant other. EMS states last time seen normal was at approx 0700 this AM. Per EMS SO found pt with both legs over bed while rest of body was on bed. Pt was given 4 mg IV narcan PTA with no responsive, . On arrival to room pt does not waken with verbal stimulation. Appears to withdrawal with some painful stimuli, other times does not. Pt yawning persistently, VSS. MD Castillo at bedside.

## 2017-08-06 NOTE — DISCHARGE PLANNING
Pt arrived unresponsive from home. S.O Dave Brush arrived to ER. Escorted to family waiting area. S.O is unsure when he last noticed her at baseline, likely 3pm yesterday. S.O does not know much about her medical condition other than she has Crohns. Pt has POLST and Advance Directive in chart from June 30, 2017 that she completed with Palliative Care RN. Dave is named as agent. Pt's sister, Reina Hong [phone] and brother Anselmo King [phone] are listed as alternate. Pt is DNI but is agreeable to cardiac interventions. Pt was intubated prior to POLST being located. S.O stated he knows about her advance directive, but was not able to speak about her wishes. He also had an odd response to being told that she is very ill and possibly had a stroke. He responded with laughter and did not seem to understand the gravity of the situation. S.O asked how long SW thought this would last after MDs met with him to explain her grim prognosis. Update to MDs and RN.     POLST and Advance Directive placed on chart. S.SHIN does not recall seeing a POLST at home.

## 2017-08-06 NOTE — IP AVS SNAPSHOT
8/12/2017    Cristina Ev Coby  [address] [place] [address]  [address]  [place] [address]    Dear Cristina:    [facility] wants to ensure your discharge home is safe and you or your loved ones have had all of your questions answered regarding your care after you leave the hospital.    Below is a list of resources and contact information should you have any questions regarding your hospital stay, follow-up instructions, or active medical symptoms.    Questions or Concerns Regarding… Contact   Medical Questions Related to Your Discharge  (7 days a week, 8am-5pm) Contact a Nurse Care Coordinator   [phone]   Medical Questions Not Related to Your Discharge  (24 hours a day / 7 days a week)  Contact the Nurse Health Line   [phone]    Medications or Discharge Instructions Refer to your discharge packet   or contact your [facility] Primary Care Provider   [phone]   Follow-up Appointment(s) Schedule your appointment via [employer]   or contact Scheduling [phone]   Billing Review your statement via [employer]  or contact Billing [phone]   Medical Records Review your records via [employer]   or contact Medical Records [phone]     You may receive a telephone call within two days of discharge. This call is to make certain you understand your discharge instructions and have the opportunity to have any questions answered. You can also easily access your medical information, test results and upcoming appointments via the [employer] free online health management tool. You can learn more and sign up at [employer]. For assistance setting up your [employer] account, please call [phone].    Once again, we want to ensure your discharge home is safe and that you have a clear understanding of any next steps in your care. If you have any questions or concerns, please do not hesitate to contact us, we are here for you. Thank you for choosing [facility] for your healthcare needs.    Sincerely,    Your [facility] Healthcare Team

## 2017-08-06 NOTE — ASSESSMENT & PLAN NOTE
-likely multi-factorial, opiate abuse?, decompensated cirrhosis, occult infection?, dehydration, stroke?  -neuro is following  -MRI brain, MRA head/neck  -try to wean propofol later to assess neurological status  -check ASA and APAP levels  -UDS shows oxycodone, BAL was negative  -Q1 hour neuro checks  -consider EEG if does not improve in the next 24-48 hours

## 2017-08-06 NOTE — ED PROVIDER NOTES
"ED Provider Note    Scribed for Gonsalo Castillo M.D. by Layla Ding. 8/6/2017  8:43 AM    Primary Care Provider: Roshni Morillo M.D.  Means of arrival: Ambulance  History obtained from: Patient  History limited by: Unresponsive     CHIEF COMPLAINT  Unresponsive    HPI  Cristina Tenorio is a 76 y.o. female who presents to the ED by ambulance for unresponsiveness with an onset of today. Patient was last seen normal early yesterday afternoon.  Patient was seen resting in bed at 7:00 AM by her domestic partner. He checked on her 30 minutes prior to ED arrival and found the patient unresponsive and half on the bed. History of Hydrocodone use. EMS administered Narcan en route with no improvement. Domestic partner reported a history of a similar episode of unresponsiveness to EMS; however, he is unaware of what resulted from her medical evaluation. Further history is unobtainable secondary to the patient be unresponsive.  Talking to the male significant other once he arrived here. He said the last time he saw her normal was last afternoon. He said she went to bed early. He tried to wake her up this morning at 7 and she was \"unconscious unresponsive\" he could not give me other information that period of time as to what happened from last night until this morning.    REVIEW OF SYSTEMS  ROS is unobtainable secondary to patient being unresponsive. C.      PAST MEDICAL HISTORY  Past Medical History   Diagnosis Date   • Bowel obstruction (CMS-HCC)    • Crohn's disease (CMS-HCC)    • Indigestion    • Obstruction    • Snoring    • Ileostomy in place (CMS-HCC)    • COPD (chronic obstructive pulmonary disease) (CMS-HCC)      per pt   • COPD (chronic obstructive pulmonary disease) (CMS-HCC) 3/20/2013   • Arthritis 12/30/16     to hands and feet   • Heart burn    • Emphysema of lung (CMS-HCC)      COPD   • Sleep apnea    • TRAVIS on CPAP      10/2016-CPAP DC'd and wears O2 @4L/NC   • Hemorrhagic disorder (CMS-HCC)      anemia of " "unkown etiology.   • Anemia 12/30/16     unknown etiology   • Renal disorder      Increased creatitine level due to meds.   • Breath shortness      uses O2@ at night and prn ([employer]). Uses inhaler prn. 12/30/16-reports no changes    • Cataract 2006,2007     bilat phaco with IOL   • Cirrhosis of liver (CMS-HCC) 12/30/16     states dx at one time but no treatment for >10yrs   • Occasional tremors        FAMILY HISTORY  Unknown      SOCIAL HISTORY  Lives with a male significant other      SURGICAL HISTORY  Past Surgical History   Procedure Laterality Date   • Cholecystectomy  2013     \"burst\"   • Bowel resection  1992     with ileostomy (right side)   • Ileostomy  2010     moved to left side   • Recovery  6/21/2010     Performed by SURGERY, IR-RECOVERY at SURGERY SAME DAY [facility]   • Sigmoidoscopy flex  9/27/2016     Procedure: SIGMOIDOSCOPY FLEX;  Surgeon: Aftab Alegre M.D.;  Location: [facility];  Service:    • Gastroscopy-endo  9/27/2016     Procedure: GASTROSCOPY-ENDO;  Surgeon: Aftab Alegre M.D.;  Location: [facility];  Service:    • Gastroscopy wiithsavary dilatation  9/28/2016     Procedure: GASTROSCOPY WIITH SAVARY DILATATION;  Surgeon: Aftab Alegre M.D.;  Location: [facility];  Service: Gastroenterology   • Gastroscopy w/push enterscopy  9/28/2016     Procedure: GASTROSCOPY W/PUSH ENTERSCOPY;  Surgeon: Aftab Alegre M.D.;  Location: ENDOSCOPY [facility];  Service:    • Gastroscopy-endo N/A 10/5/2016     Procedure: GASTROSCOPY-ENDO;  Surgeon: Aravind Roman M.D.;  Location: ENDOSCOPY [facility];  Service:    • Umbilical hernia repair  2000   • Colonoscopy       Hx of  several    • Other surgical procedure  1994     closed off rectum    • Gastroscopy N/A 1/13/2017     Procedure: GASTROSCOPY - ENTEROSCOPY PUSH;  Surgeon: Boni Brooks M.D.;  [facility];  Service:        CURRENT MEDICATIONS  Home " Medications     Reviewed by Charlette Jacobson (Pharmacy Tech) on 08/06/17 at 1037  Med List Status: Complete    Medication Last Dose Status    ferrous gluconate (FERGON) 324 (38 FE) MG Tab Unknown Active    lactulose 20 GM/30ML Solution Unknown Active    omeprazole (PRILOSEC) 20 MG delayed-release capsule Unknown Active    potassium Chloride ER (K-TAB) 20 MEQ Tab CR tablet Unknown Active    Probiotic Cap Unknwn Active    sodium bicarbonate (SODIUM BICARBONATE) 650 MG Tab Unknown Active    vitamin D (CHOLECALCIFEROL) 1000 UNIT Tab Unknown Active            Hydrocodone use was reported to EMS from the patient's domestic partner.      ALLERGIES  Allergies   Allergen Reactions   • Sulfa Drugs Hives and Itching       PHYSICAL EXAM  VITAL SIGNS: Temp(Src) 36.1 °C (97 °F)  LMP 06/29/1995       Constitutional: Unconscious and unresponsive with difficulties maintaining her airway and secretions.  HENT: Normocephalic, Atraumatic, Bilateral external ears normal, Oropharynx pink dry with no exudates, Nose patent. Difficult time maintaining her airway.  Eyes:  Sclera and conjunctiva clear, No discharge.   Neck:  Anterior neck is midline. Supple no nuchal rigidity, no thyromegaly or mass.  Lymphatic: No supraclavicular lymph nodes.   Cardiovascular: Heart is regular rate and rhythm no murmu  Thorax & Lungs: Chest is symmetrical, with decreased breath sounds positive respiratory distress   Abdomen: Soft, No hepatosplenomegaly there is no guarding or rebound, No masses, No pulsatile masses.   Skin: Warm, Dry, no petechia, purpura, or rash.   Extremities: No edema.  Musculoskeletal: Pulses 2+ radially and femorally. No gross deformities noted.   Neurologic: Unconscious and unresponsive. Some withdrawal to painful stimuli to hands and feet only.  Psychiatric: Unconscious and unresponsive        EKG  08:37- 13 Lead EKG interpreted by me shows normal sinus rhythm at 89. Low voltage throughout. .  Axis QRS 50, No ST  elevations. No change from EKG on 05/24/17.      LABS  Results for orders placed or performed during the hospital encounter of 08/06/17   PROTHROMBIN TIME   Result Value Ref Range    PT 15.7 (H) 12.0 - 14.6 sec    INR 1.21 (H) 0.87 - 1.13   APTT   Result Value Ref Range    APTT 33.8 24.7 - 36.0 sec   CBC WITH DIFFERENTIAL   Result Value Ref Range    WBC 5.0 4.8 - 10.8 K/uL    RBC 3.39 (L) 4.20 - 5.40 M/uL    Hemoglobin 9.7 (L) 12.0 - 16.0 g/dL    Hematocrit 33.0 (L) 37.0 - 47.0 %    MCV 97.3 81.4 - 97.8 fL    MCH 28.6 27.0 - 33.0 pg    MCHC 29.4 (L) 33.6 - 35.0 g/dL    RDW 54.5 (H) 35.9 - 50.0 fL    Platelet Count 76 (L) 164 - 446 K/uL    Neutrophils-Polys 84.20 (H) 44.00 - 72.00 %    Lymphocytes 8.50 (L) 22.00 - 41.00 %    Monocytes 5.50 0.00 - 13.40 %    Eosinophils 0.60 0.00 - 6.90 %    Basophils 0.40 0.00 - 1.80 %    Immature Granulocytes 0.80 0.00 - 0.90 %    Nucleated RBC 0.00 /100 WBC    Lymphs (Absolute) 0.42 (L) 1.00 - 4.80 K/uL    Monos (Absolute) 0.27 0.00 - 0.85 K/uL    Eos (Absolute) 0.03 0.00 - 0.51 K/uL    Baso (Absolute) 0.02 0.00 - 0.12 K/uL    Immature Granulocytes (abs) 0.04 0.00 - 0.11 K/uL    NRBC (Absolute) 0.00 K/uL    Neutrophils (Absolute) 4.17 2.00 - 7.15 K/uL    Hypochromia 1+     Anisocytosis 1+     Macrocytosis 1+    AMMONIA   Result Value Ref Range    Ammonia 190 (HH) 11 - 45 umol/L   DIAGNOSTIC ALCOHOL   Result Value Ref Range    Diagnostic Alcohol 0.01 (H) 0.00 g/dL   URINE DRUG SCREEN   Result Value Ref Range    Amphetamines Urine Negative Negative    Barbiturates Negative Negative    Benzodiazepines Negative Negative    Cocaine Metabolite Negative Negative    Methadone Negative Negative    Opiates Negative Negative    Oxycodone Positive (A) Negative    Phencyclidine -Pcp Negative Negative    Propoxyphene Negative Negative    Cannabinoid Metab Negative Negative   URINALYSIS CULTURE, IF INDICATED   Result Value Ref Range    Micro Urine Req see below     Color Yellow     Character  Clear     Specific Gravity 1.010 <1.035    Ph 6.0 5.0-8.0    Glucose Negative Negative mg/dL    Ketones Negative Negative mg/dL    Protein Negative Negative mg/dL    Bilirubin Negative Negative    Urobilinogen, Urine Normal Negative    Nitrite Negative Negative    Leukocyte Esterase Negative Negative    Occult Blood Negative Negative    Culture Indicated No UA Culture   COMP METABOLIC PANEL   Result Value Ref Range    Sodium 141 135 - 145 mmol/L    Potassium 4.9 3.6 - 5.5 mmol/L    Chloride 112 96 - 112 mmol/L    Co2 16 (L) 20 - 33 mmol/L    Anion Gap 13.0 (H) 0.0 - 11.9    Glucose 146 (H) 65 - 99 mg/dL    Bun 56 (H) 8 - 22 mg/dL    Creatinine 3.21 (H) 0.50 - 1.40 mg/dL    Calcium 8.9 8.5 - 10.5 mg/dL    AST(SGOT) 55 (H) 12 - 45 U/L    ALT(SGPT) 27 2 - 50 U/L    Alkaline Phosphatase 334 (H) 30 - 99 U/L    Total Bilirubin 0.9 0.1 - 1.5 mg/dL    Albumin 3.5 3.2 - 4.9 g/dL    Total Protein 6.3 6.0 - 8.2 g/dL    Globulin 2.8 1.9 - 3.5 g/dL    A-G Ratio 1.3 g/dL   TROPONIN   Result Value Ref Range    Troponin I <0.01 0.00 - 0.04 ng/mL   ESTIMATED GFR   Result Value Ref Range    GFR If  17 (A) >60 mL/min/1.73 m 2    GFR If Non  14 (A) >60 mL/min/1.73 m 2   BLOOD CULTURE,HOLD   Result Value Ref Range    Blood Culture Hold Collected    PERIPHERAL SMEAR REVIEW   Result Value Ref Range    Peripheral Smear Review see below    PLATELET ESTIMATE   Result Value Ref Range    Plt Estimation Decreased    MORPHOLOGY   Result Value Ref Range    RBC Morphology Present     Poikilocytosis 2+     Ovalocytes 1+     Schistocytes 2+     Tear Drop Cells 1+     Echinocytes 1+    DIFFERENTIAL COMMENT   Result Value Ref Range    Comments-Diff see below    ISTAT ARTERIAL BLOOD GAS   Result Value Ref Range    Ph 7.303 (L) 7.400 - 7.500    Pco2 36.5 26.0 - 37.0 mmHg    Po2 190 (H) 64 - 87 mmHg    Tco2 19 (L) 20 - 33 mmol/L    S02 100 (H) 93 - 99 %    Hco3 18.0 17.0 - 25.0 mmol/L    BE -8 (L) -4 - 3 mmol/L    Body  Temp 96.6 F degrees    O2 Therapy 60 %    Ph Temp Humberto 7.318 (L) 7.400 - 7.500    Pco2 Temp Co 34.7 26.0 - 37.0 mmHg    Po2 Temp Cor 184 (H) 64 - 87 mmHg    Specimen Arterial       All labs reviewed by me.      RADIOLOGY/PROCEDURES  [id number] VIEW   Final Result      1.  Vascular clips in the right upper quadrant and surgical mesh anchors in the lower abdomen and pelvis.      2.  NG tube in place.      3.  No other metallic density identified.      DX-CHEST-LIMITED (1 VIEW)   Final Result      1.  ETT tip projects in satisfactory position.      2.  No focal consolidation.      CT-HEAD W/O   Final Result      1.  No acute intracranial process.      2.  Atrophy and small vessel ischemic change.      MR-BRAIN-W/O    (Results Pending)   MR-MRA HEAD-W/O    (Results Pending)   MR-MRA NECK-W/O    (Results Pending)   ECHOCARDIOGRAM-COMP W/ CONT    (Results Pending)     The radiologist's interpretations of all radiological studies have been reviewed by me.       Intubation Procedure Note    Indication: airway compromise    Consent: Unable to be obtained due to the emergent nature of this procedure.    Medications Used: propofol intravenously and succinycholine intravenously    Procedure: The patient was placed in the appropriate position.  Cricoid pressure was utilized.  Intubation was performed by direct laryngoscopy using a laryngoscope and an 8.0 cuffed endotracheal tube.  The cuff was then inflated and the tube was secured appropriately at a distance of 23 cm to the dental ridge.  Initial confirmation of placement included bilateral breath sounds, an end tidal CO2 detector, absence of sounds over the stomach, tube fogging, adequate chest rise and adequate pulse oximetry reading.  A chest x-ray to verify correct placement of the tube showed appropriate tube position.    The patient tolerated the procedure well.     Complications: None      COURSE & MEDICAL DECISION MAKING  Pertinent Labs & Imaging studies reviewed.  (See chart for details)    Differential Diagnosis include but are not limited to: Trauma, intracranial bleed, stroke, meningitis, hypoxia, drug overdose, electrolyte imbalances, seizures, medication side effects, infections,     8:35 AM Patient seen and examined at bedside. Patient presents for unresponsiveness. Stroke protocol was initiated.    Initial orders in the Emergency Department included XR chest, CT head, CT-CTA head, CT-CTA neck, EKG and laboratory testing: triglycerides, lactic acid, urine drug screen, UA, ammonia, diagnostic alcohol, CMP, estimated GFR, troponin, UA, prothrombin time, APTT and CBC with differential. Patient verbalized their understanding and agreement to this plan.    8:45 AM Reviewed patient's electronic medical record which indicates a history of chronic kidney disease and  Crohn's disease. Patient is receiving wound care.    8:50 AM Dr. Castillo, Neurology is at bedside. She was updated on the patient's history and presenting symptoms. She does not wish to complete the CTA secondary to her creatinine levels.  CTA head and neck were cancelled.    8:55 AM Domestic partner was called to confirm the patient was in fact normal at 7:00 AM.    9:00 AM Intubation completed as noted above.    9:06 AM Spoke with patient's domestic partner alongside Dr. Castillo, Neurology. Patient was last seen normal early afternoon yesterday. This morning the patient was unresponsive.    9:25 AM Dr. Castillo, Neurology, will order MRI scans.     9:30 AM Paged Hospitalist.    9:40 AM Spoke with Dr. Esteban, Hospitalist, regarding the patient's presentation, lab and imaging results. He will consult and admit the patient for further evaluation and care.    9:51 AM Consulted with Dr. Pete, Pulmonary, regarding the patient's presenting symptoms, intubation and lab results. He will consult.    10:21 AM Patient will be hydrated with 1 L of NS IV.    10:26 AM XR abdomen was ordered to further evaluate her  symptoms.    10:50 AM Dr. Pete spoke with patient's domestic partner to confirm the DNR status.        Decision Making:  Patient presented emergency department with decreased mental status. The best I can gather from talking to her significant other last seen normal yesterday afternoon. Presents to the emergency department here with decreased respiratory effort not protecting her airway. She was unconscious and unresponsive. Laboratory work was obtained. I felt that she needed to be intubated to protect her airway so that we could send her to the CAT scan to obtain CAT scan to rule out intracranial bleed or strokes. She was made a stroke protocol and appropriate lab work and scans were ordered. Also Dr. Carmita Castillo neurologist came and saw the patient at the bedside prior to intubation. Patient was then taken to CAT scan that did not show obvious intracranial bleed. She does have an elevated ammonia level and she has liver failure. Etiology still is unclear of her decreased mental status. I discussed the case with the [facility] hospitalists, pulmonary medicine Dr. Keegan Jimenez and neurology. They've all seen the patient consult on her. She'll be admitted to the hospital with critical care evaluation and further evaluation and care. Patient did have some episodes of hypotension here she was given IV fluids also adjusted the propofol and fentanyl drips.        CRITICAL CARE  The very real possibility of a deterioration of this patient's condition required the highest level of my preparedness for sudden, emergent intervention.  I provided critical care services, which included medication orders, frequent reevaluations of the patient's condition and response to treatment, ordering and reviewing test results, and discussing the case with various consultants.  The critical care time associated with the care of the patient was 45 minutes. Review chart for interventions. This time is exclusive of any other  billable procedures. Patient has a real risk of deterioration or death in the emergency department.        DISPOSITION  Patient will be admitted to Dr. Esteban, Hospitalist, in critical condition.      DIAGNOSIS  1. Mental status change evaluation  2. Respiratory distress  3. Rapid sequence intubation by me  4. Hepatic encephalopathy  5. Narcotic use   6. Anemia  7. Hyperglycemia  8. Renal failure  9. Positive alcohol level of 0.01  10. Critical care time 40 minutes    PLAN  1. Admission [facility] hospitalists  2. Consultation per neurology and pulmonary medicine  3. Continue workup and evaluation her decreased mental status, renal failure, anemia  4. Return to the emergency department for increased pains, fevers, vomiting or change in condition.      The note accurately reflects work and decisions made by me.  Gonsalo Castillo  8/6/2017  2:28 PM     Layla ANG (Scribe), am scribing for, and in the presence of, Gonsalo Castillo M.D.    Electronically signed by: Layla Ding (Scribe), 8/6/2017    Gonsalo ANG M.D. personally performed the services described in this documentation, as scribed by Layla Ding in my presence, and it is both accurate and complete.

## 2017-08-06 NOTE — IP AVS SNAPSHOT
" <p align=\"LEFT\"><IMG SRC=\"//[email or web address]$/[email or web address]\" alt=\"Image\" WIDTH=\"50%\" HEIGHT=\"200\" BORDER=\"\"></p>                   Name:Cristina Tenorio  Medical Record Number:[id number]  CSN: [id number]    YOB: 1940   Age: 76 y.o.  Sex: female  HT:1.524 m (5') WT: 75.1 kg (165 lb 9.1 oz)          Admit Date: 8/6/2017     Discharge Date:   Today's Date: 8/12/2017  Attending Doctor:  Star Salcido M.D.                  Allergies:  Sulfa drugs          Your appointments     Aug 16, 2017  1:30 PM   Ostomy 30 Minute Follow Up with Bethel Alva R.N.   [facility] ([address] [place] [address])    [address] [place] [id number]   [phone]            Aug 23, 2017  1:30 PM   Ostomy 30 Minute Follow Up with Yandy Eagle R.N.   [facility] ([address] [place] [address])    [address] [place] [id number]   [phone]            Aug 30, 2017  1:30 PM   Ostomy 30 Minute Follow Up with Yandy Eagle R.N.   [facility] ([address] [place] [address])    [address] [place] [id number]   [phone]                 Medication List      Take these Medications        Instructions    cefdinir 300 MG Caps   Commonly known as:  OMNICEF    Take 1 Cap by mouth every 12 hours for 2 days.   Dose:  300 mg       ferrous gluconate 324 (38 FE) MG Tabs   Commonly known as:  FERGON    Take 324 mg by mouth every morning with breakfast.   Dose:  324 mg       lactulose 20 GM/30ML Soln    Take 30 mL by mouth 2 Times a Day.   Dose:  30 mL       omeprazole 20 MG delayed-release capsule   Commonly known as:  PRILOSEC    Take 40 mg by mouth every day.   Dose:  40 mg       potassium Chloride ER 20 MEQ Tbcr tablet   Commonly known as:  K-TAB    Take 1 Tab by mouth every day.   Dose:  20 mEq       Probiotic Caps    Take 2 Caps by mouth every morning.   Dose:  2 Cap       sodium bicarbonate 650 MG Tabs   Commonly known as:  SODIUM BICARBONATE    Take 3 Tabs by mouth 3 times a day.   Dose:  1850 mg       vitamin D 1000 UNIT Tabs   Commonly " known as:  cholecalciferol    Take 1,000 Units by mouth every day.   Dose:  1000 Units

## 2017-08-06 NOTE — PROGRESS NOTES
1320 admit to RICU from ER post MRI, with transport, RN and RT by ana cristina, Propofol restarted, opens eyes spontaneously, does not follow requests, extremities stiff, assessment started

## 2017-08-06 NOTE — CONSULTS
NEUROLOGY CONSULT         CHIEF COMPLAINT:  Chief Complaint   Patient presents with   • ALOC         Date of Service:  08/06/2017    REQUESTING PHYSICIAN: Gonsalo Castillo M.D.,       HISTORY OF PRESENT ILLNESS:  Cristina Tenorio is a 76 y.o. female with history of cirrhosis and hepatic encephalopathy who I am asked to see as an acute stroke code for acute onset of unresponsiveness    She was last seen normal by her significant other of 20+ years yesterday evening. She had no complaints, and seemed cognitively intact per her SO.  She went to bed in the evening (does not know what time) but this morning, he could not wake up, so he called 911.  Her she had a GCS of  6 and was intubated for airway protection. Prior to her intubation, I examined her. She would not respond to any noxious stimuli with no spontaneous eye opening. She had roving eye movements, but pupils were 2 mm and reactive. She minimally withdrew to all 4 extremities.  NIHSS therefore was 31.  CT of the head was normal. A decision was made not to give TPA because of the time last known normal and her platelets were were 76.  Her ammonia did come back at 190.            ROS:  Unable to be obtained due to the patient's AMS>      PAST MEDICAL HISTORY:  Past Medical History   Diagnosis Date   • Bowel obstruction (CMS-HCC)    • Crohn's disease (CMS-HCC)    • Indigestion    • Obstruction    • Snoring    • Ileostomy in place (CMS-HCC)    • COPD (chronic obstructive pulmonary disease) (CMS-HCC)      per pt   • COPD (chronic obstructive pulmonary disease) (CMS-HCC) 3/20/2013   • Arthritis 12/30/16     to hands and feet   • Heart burn    • Emphysema of lung (CMS-HCC)      COPD   • Sleep apnea    • TRAVIS on CPAP      10/2016-CPAP DC'd and wears O2 @4L/NC   • Hemorrhagic disorder (CMS-HCC)      anemia of unkown etiology.   • Anemia 12/30/16     unknown etiology   • Renal disorder      Increased creatitine level due to meds.   • Breath shortness      uses  O2@ at night and prn ([employer]). Uses inhaler prn. 12/30/16-reports no changes    • Cataract 2006,2007     bilat [place]aco with IOL   • Cirrhosis of liver (CMS-HCC) 12/30/16     states dx at one time but no treatment for >10yrs   • Occasional tremors          ALLERGIES:  Allergies   Allergen Reactions   • Sulfa Drugs Hives and Itching         MEDICATIONS:  No current facility-administered medications on file prior to encounter.     Current Outpatient Prescriptions on File Prior to Encounter   Medication Sig Dispense Refill   • sodium bicarbonate (SODIUM BICARBONATE) 650 MG Tab Take 3 Tabs by mouth 3 times a day. 270 Tab 11   • potassium Chloride ER (K-TAB) 20 MEQ Tab CR tablet Take 1 Tab by mouth every day. 30 Tab 11   • lactulose 20 GM/30ML Solution Take 30 mL by mouth 2 Times a Day. 30 Bottle 1   • Probiotic Cap Take 2 Caps by mouth every morning.     • rifaximin (XIFAXAN) 550 MG Tab tablet Take 1 Tab by mouth 3 times a day. 90 Tab 0   • ferrous gluconate (FERGON) 324 (38 FE) MG Tab Take 324 mg by mouth every morning with breakfast.     • omeprazole (PRILOSEC) 20 MG delayed-release capsule Take 40 mg by mouth every day.     • vitamin D (CHOLECALCIFEROL) 1000 UNIT Tab Take 1,000 Units by mouth every day.         MEDICATIONS:    Current facility-administered medications:   •  Action is required: Protocol 452 Propofol Critical Care has been implemented, , , CONTINUOUS **AND** propofol (DIPRIVAN) injection, 0-80 mcg/kg/min, Intravenous, Continuous, Last Rate: 12.2 mL/hr at 08/06/17 0942, 30 mcg/kg/min at 08/06/17 0942 **AND** Propofol Critical Care Protocol - Patient Must Have an Advanced Airway with Mechanical Ventilation in Use., , , CONTINUOUS **AND** Propofol Critical Care Protocol - Spontaneous breathing trials may be attempted while receiving propofol, , , CONTINUOUS **AND** Propofol Critical Care Protocol - If patient meets extubation criteria, propofol MUST be discontinued prior to extubation, , , CONTINUOUS  **AND** Propofol Critical Care Protocol - No allergy to propofol, soybean oil, or eggs, , , CONTINUOUS **AND** Propofol Critical Care Protocol - Do not administer this medication to children under the age of 12, , , CONTINUOUS **AND** Propofol Critical Care Protocol - Dedicated IV line for administration (vehicle supports rapid growth of microorganisms and incompatibilities cannot be detected), , , CONTINUOUS **AND** Propofol Critical Care Protocol - Administration tubing and any unused emulsion must be changed out and discarded after 12 hours from spiking vial, , , CONTINUOUS **AND** Propofol Critical Care Protocol - RASS guildelines, , , CONTINUOUS **AND** Propofol Critical Care Protocol - Wake-up assessment as ordered by MD, , , CONTINUOUS **AND** Propofol Critical Care Protocol - Propofol is not an analgesic, concurrent analgesia (if patient experiencing pain) should continue while patient is on propofol, , , CONTINUOUS **AND** Triglycerides Starting now and then Every 3 Days, , , Every 3 Days (0300) **AND** Propofol Critical Care Protocol - Notify MD prior to exceeding 80 mcg/kg/min and obtain new order for higher limit, , , CONTINUOUS, Gonsalo Castillo M.D.    Current outpatient prescriptions:   •  sodium bicarbonate (SODIUM BICARBONATE) 650 MG Tab, Take 3 Tabs by mouth 3 times a day., Disp: 270 Tab, Rfl: 11  •  potassium Chloride ER (K-TAB) 20 MEQ Tab CR tablet, Take 1 Tab by mouth every day., Disp: 30 Tab, Rfl: 11  •  lactulose 20 GM/30ML Solution, Take 30 mL by mouth 2 Times a Day., Disp: 30 Bottle, Rfl: 1  •  Probiotic Cap, Take 2 Caps by mouth every morning., Disp: , Rfl:   •  rifaximin (XIFAXAN) 550 MG Tab tablet, Take 1 Tab by mouth 3 times a day., Disp: 90 Tab, Rfl: 0  •  ferrous gluconate (FERGON) 324 (38 FE) MG Tab, Take 324 mg by mouth every morning with breakfast., Disp: , Rfl:   •  omeprazole (PRILOSEC) 20 MG delayed-release capsule, Take 40 mg by mouth every day., Disp: , Rfl:   •  vitamin D  (CHOLECALCIFEROL) 1000 UNIT Tab, Take 1,000 Units by mouth every day., Disp: , Rfl:     FAMILY HISTORY:  No family history on file.    SOCIAL HISTORY:  Social History     Social History   • Marital Status:      Spouse Name: N/A   • Number of Children: N/A   • Years of Education: N/A     Occupational History   • Not on file.     Social History Main Topics   • Smoking status: Former Smoker -- 50 years     Types: Cigarettes, Cigars     Quit date: 03/30/2013   • Smokeless tobacco: Not on file      Comment: 50yrs 1.5 ppd quit 2009   • Alcohol Use: No      Comment: socially   • Drug Use: No   • Sexual Activity: Not on file     Other Topics Concern   • Not on file     Social History Narrative       EXAM:  Filed Vitals:    08/06/17 0924 08/06/17 0927 08/06/17 0930 08/06/17 0946   BP:       Pulse: 88  82 94   Temp:       Resp: 14  22 17   Height:       Weight:       SpO2: 100% 100% 96% 99%     General: pt is lying in bed, she is unresponsive to noxious stimuli.   HEENT: normocephalic, atraumatic, normal moist oral mucosa  Eyes: anicteric, PERRLA, roving eye movement.    Neck: supple, no carotid bruits  Chest: CTA b/l no wheezing or rales  Heart: regular rate and rhythm, no murmurs  Abd: soft, non-tender, non distended  Extremtiies: no edema  Skin: no rashes or lesions  Neuro  Mental statu: She would not respond to any noxious stimuli with no spontaneous eye opening. She did not follow command:  CN:  She had roving eye movements with a conjugate gaze, but pupils were 2 mm and reactive.  There was no obvious facial asymmetry  Motor: She minimally withdrew to all 4 extremities but there was no spontaneous movement.   DTR: unable to be elicited  Plantar reflexes: downgoing b/l      LABORATORY TESTING  Lab Results   Component Value Date/Time    WBC 5.4 07/13/2017 11:33 AM    RBC 2.97* 07/13/2017 11:33 AM    HEMOGLOBIN 8.7* 07/13/2017 11:33 AM    HEMATOCRIT 29.4* 07/13/2017 11:33 AM    MCV 99.0* 07/13/2017 11:33 AM    MCH  29.3 07/13/2017 11:33 AM    MCHC 29.6* 07/13/2017 11:33 AM    MPV 13.9* 07/13/2017 11:33 AM    NEUTROPHILS-POLYS 87.30* 07/06/2017 12:52 PM    LYMPHOCYTES 6.40* 07/06/2017 12:52 PM    MONOCYTES 2.70 07/06/2017 12:52 PM    EOSINOPHILS 3.60 07/06/2017 12:52 PM    BASOPHILS 0.00 07/06/2017 12:52 PM    HYPOCHROMIA 1+ 04/01/2014 03:11 AM    ANISOCYTOSIS 2+ 07/06/2017 12:52 PM        IMAGING:  CT of the Brain was personally reviewed by me and was unremarkable.       IMPRESSION AND PLAN: Cristina Tenorio is a 76 y.o. female with history of cirrhosis and hepatic encephalopathy who came in with  acute onset of unresponsiveness. Her GCS was 6, and so she was intubated for airway protection.  Her CT of the brain was unremarkable. She was not a candidate for iv-TPA because of her thrombocytopenia and time last known normal.  Her ammonia did come back elevated at 190., and this might be hepatic encephalopathy.  However, a stroke could still not be ruled out.  I am therefore ordered a stat MRI martín and MRA head and neck to see if there is a stroke and whether she might be a candidate for thrombectomy.  Will follow-up with the MRI results. If negative, will consider continuous EEG monitoring to r/o subclinical seizures.     This patient is critically ill with CNS failure. Total critical care time spent with the patient was 45 minutes.     Carmita Castillo M.D.     Neurology

## 2017-08-06 NOTE — IP AVS SNAPSHOT
netFactor Access Code: Activation code not generated  Current netFactor Status: Active    [employer]  A secure, online tool to manage your health information     [employer]’s netFactor® is a secure, online tool that connects you to your personalized health information from the privacy of your home -- day or night - making it very easy for you to manage your healthcare. Once the activation process is completed, you can even access your medical information using the netFactor claudia, which is available for free in the Apple Claudia store or Google Play store.     netFactor provides the following levels of access (as shown below):   My Chart Features   [facility] Primary Care Doctor [facility]  Specialists [facility]  Urgent  Care Non-[facility]  Primary Care  Doctor   Email your healthcare team securely and privately 24/7 X X X X   Manage appointments: schedule your next appointment; view details of past/upcoming appointments X      Request prescription refills. X      View recent personal medical records, including lab and immunizations X X X X   View health record, including health history, allergies, medications X X X X   Read reports about your outpatient visits, procedures, consult and ER notes X X X X   See your discharge summary, which is a recap of your hospital and/or ER visit that includes your diagnosis, lab results, and care plan. X X       How to register for netFactor:  1. Go to  [email or web address].  2. Click on the Sign Up Now box, which takes you to the New Member Sign Up page. You will need to provide the following information:  a. Enter your netFactor Access Code exactly as it appears at the top of this page. (You will not need to use this code after you’ve completed the sign-up process. If you do not sign up before the expiration date, you must request a new code.)   b. Enter your date of birth.   c. Enter your home email address.   d. Click Submit, and follow the next screen’s instructions.  3. Create a netFactor ID. This will  be your [employer] login ID and cannot be changed, so think of one that is secure and easy to remember.  4. Create a [employer] password. You can change your password at any time.  5. Enter your Password Reset Question and Answer. This can be used at a later time if you forget your password.   6. Enter your e-mail address. This allows you to receive e-mail notifications when new information is available in [employer].  7. Click Sign Up. You can now view your health information.    For assistance activating your [employer] account, call [phone]

## 2017-08-06 NOTE — CONSULTS
DATE OF SERVICE:  08/06/2017    REQUESTING PHYSICIAN:  Gonsalo Castillo MD    CONSULTING PHYSICIAN:  Keegan Pete MD    TYPE OF CONSULTATION:  Pulmonary medicine and critical care medicine.    REASON FOR CONSULTATION:  Evaluation and management of ventilator-dependent   respiratory failure and assist with critical care management.    CHIEF COMPLAINT:  The patient cannot provide.    HISTORY OF PRESENT ILLNESS:  The entire history is obtained from healthcare   providers, the medical record as well as the significant other at the bedside.    The significant other does not provide the best history, however.  This lady   cannot give me any history, as she is intubated on the ventilator.    This is a 76-year-old lady with a history of cirrhosis and hepatic   encephalopathy.  According to the significant other, who resides with her, she   seemed to be fine yesterday.  He checked on her this morning and again she   seemed to be okay, but then about 9:00, he had difficulty arousing her.  She   was in bed.  Her feet were somewhat off the bed.  She has not had any falls.    He was unable to arouse her.  EMS was activated and she was brought here to   [facility].  She was intubated for airway protection   because of a low GCS.    The significant other states that she has been in her usual state of health up   until this morning.  She has had no cough, fevers, chills, or sweats.    CURRENT MEDICATIONS:  Ferrous gluconate 324 mg daily, lactulose 30 mL twice a   day, omeprazole 40 mg a day, potassium 20 mEq a day, sodium bicarbonate 650 mg   tablets 3 tablets 3 times a day, vitamin D daily.    ALLERGIES:  TO SULFA.    PAST SURGICAL HISTORY:  Cholecystectomy, ileostomy, hernia repair.    ILLNESSES:  Cirrhosis, hepatic encephalopathy, Crohn's disease, chronic kidney   disease, systemic arterial hypertension, obstructive sleep apnea.  She uses   oxygen at night.    SOCIAL HISTORY:  She quit smoking in  2009.  She smoked for 75 pack years.    FAMILY HISTORY:  Not obtainable.    REVIEW OF SYSTEMS:  Not obtainable.    PHYSICAL EXAMINATION:  VITAL SIGNS:  Her temperature is 97.0, blood pressure 82/34, heart rate 71,   respiratory rate is 12.  GENERAL:  She is an unresponsive lady, on the ventilator.  She is on some   propofol, however.  HEENT:  Normocephalic, atraumatic.  Sinuses are nontender.  Nares patent.    Oropharynx with moist mucous membranes.  Her pupils are equal, round, react to   light.  They are 4 mm.  NECK:  Trachea midline, supple.  CHEST:  Symmetrical.  HEART:  Regular rhythm.  LUNGS:  Clear to auscultation and percussion.  ABDOMEN:  Soft, nondistended, nontender.  No masses.  She has an ileostomy   tube in place.  EXTREMITIES:  No clubbing or cyanosis.  NEUROLOGIC:  She is sedated.    DIAGNOSTIC DATA:  Her white blood cell count is 5000, hemoglobin 9.7,   hematocrit 33, platelet count 76,000.  Sodium is 141, potassium 4.9, chloride   112, CO2 of 16, BUN 56, creatinine 3.21, glucose 146.  Ammonia is 190.    Diagnostic alcohol is 0.01.  Troponin I is less than 0.01.  Urine drug screen   is positive for oxycodone.  INR is 1.21.  Arterial blood gas reveals a pH of   7.32, pCO2 of 35, pO2 of 184.  A CT scan of the head shows no acute pathology.    Her chest x-ray shows clear lungs.    IMPRESSION:  1.  Ventilator-dependent respiratory failure.  2.  Acute-on-chronic hypoxemic respiratory failure.  She uses oxygen at night   at home.  3.  Acute unspecified encephalopathy.  CT scan of the head shows no acute   pathology.  Her ammonia is elevated consistent with hepatic encephalopathy.  4.  Hepatic encephalopathy.  5.  Anemia with thrombocytopenia.  6.  Chronic kidney disease.  7.  Acute alcohol intoxication with blood alcohol level of 0.01.  8.  Positive urine drug screen for oxycodone.  9.  Cirrhosis with prior admissions for hepatic encephalopathy.  10.  Crohn's disease.  She has an ileostomy in place.  11.   Systemic arterial hypertension.  12.  Chronic obstructive pulmonary disease.  There is no bronchospasm on exam.  13.  History of obstructive sleep apnea, on oxygen at night.    PLAN/MEDICAL DECISION MAKING:  This lady is critically ill.  She appears to   have hepatic encephalopathy, but her blood alcohol is mildly elevated and her   urine drug screen is positive for oxycodone.  Narcan was administered by EMS   Personnel and there was no improvement in her mental status.  She may have an   acute stroke.  She is being seen by Dr. Carmita Castillo from neurology.  She   clearly has hepatic encephalopathy.  She is going to be admitted to the   intensive care unit.  She is on full mechanical ventilatory support.  Deep   venous thrombosis prophylaxis and stress ulcer prophylaxis will both be   provided.  I am going to place her on lactulose.  Dr. Castillo has ordered an MRI   of the brain.  We will follow her hemoglobin and platelet count very closely.    She will be hydrated with intravenous crystalloid solution and serial   neurologic exams will be performed.  Bronchodilators will be delivered in line,   and her blood pressure will be controlled.  At the current time, her blood   pressure is on the low side.  At this moment, there does not appear to be an   acute infectious process.  She does not have leukocytosis.  She is afebrile.    Her chest x-ray is clear.  Her urinalysis is unremarkable.    At the current time, her prognosis is quite guarded and she is critically ill.    I have spent 86 minutes providing direct critical care services at the   bedside.  There has been no time overlap.  The time spent excludes the time   spent performing procedures (76227, [id number]).    The case has been reviewed with Dr. Gonsalo Castillo, Dr. Carmita Castillo, nursing and   respiratory therapy as well as with the significant other at the bedside.    Thank you Dr. Castillo for allowing us to participate in the care of this lady.    We will continue to  follow her with great interest.       ____________________________________     MD CHIQUITA ANDERSON / ALYSON    DD:  08/06/2017 11:17:10  DT:  08/06/2017 12:08:31    D#:  [id number]  Job#:  [id number]    cc: BIANCA CASTILLO MD, Carmita Castillo MD

## 2017-08-06 NOTE — ED NOTES
Hospitalist at bedside. Notified him that bp is 71/25, titrated propofol down to 20 mcg/min. No new orders.

## 2017-08-06 NOTE — H&P
HOSPITAL MEDICINE HISTORY/ PHYSICAL    Date of Service:  8/6/2017   1:55 PM       Patient ID:   Name: Cristina Tenorio. YOB: 1940. Age: 76 y.o. female. MRN: [id number]    Admitting Attending:  Armando Esteban     PCP : Roshni Morillo M.D.          Chief Complaint:       ALOC, unresponsive    History of Present Illness:    Coby is a 76 y.o. female w/h/o multiple medical problems including Crohn's disease s/p colostomy and cirrhosis 2/2 BAKER who presents with above issue. All history is obtained from the ER staff, charts, and patients domestic partner as she is currently intubated and sedated.    Apparently, patient was in her usual state of health last night and then this AM her partner could not get her to get up or respond. He called 911 and they administered 4 IV doses of narcan with no success. She was brought to the [facility] ER where she was intubated for airway protection. Per her partner no new medications, but he is a poor historian as well and cannot really provide a meaningful history.    Review of Systems:    Has Review of Systems   Unable to perform ROS: acuity of condition     Please see HPI, all other systems were reviewed and are negative (AMA/CMS criteria)              Past Medical/ Family / Social history (PFSH):   Past Medical History   Diagnosis Date   • Bowel obstruction (CMS-[facility])    • Crohn's disease (CMS-[facility])    • Indigestion    • Obstruction    • Snoring    • Ileostomy in place (CMS-[facility])    • COPD (chronic obstructive pulmonary disease) (CMS-[facility])      per pt   • COPD (chronic obstructive pulmonary disease) (CMS-[facility]) 3/20/2013   • Arthritis 12/30/16     to hands and feet   • Heart burn    • Emphysema of lung (CMS-[facility])      COPD   • Sleep apnea    • TRAVIS on CPAP      10/2016-CPAP DC'd and wears O2 @4L/NC   • Hemorrhagic disorder (CMS-[facility])      anemia of unkown etiology.   • Anemia 12/30/16     unknown etiology   • Renal disorder      Increased creatitine level due to meds.  "  • Breath shortness      uses O2@ at night and prn ([employer]). Uses inhaler prn. 12/30/16-reports no changes    • Cataract 2006,2007     bilat phaco with IOL   • Cirrhosis of liver (CMS-HCC) 12/30/16     states dx at one time but no treatment for >10yrs   • Occasional tremors      Past Surgical History   Procedure Laterality Date   • Cholecystectomy  2013     \"burst\"   • Bowel resection  1992     with ileostomy (right side)   • Ileostomy  2010     moved to left side   • Recovery  6/21/2010     Performed by SURGERY, IR-RECOVERY at SURGERY SAME DAY [facility] ORS   • Sigmoidoscopy flex  9/27/2016     Procedure: SIGMOIDOSCOPY FLEX;  Surgeon: Aftab Alegre M.D.;  Location: [facility];  Service:    • Gastroscopy-endo  9/27/2016     Procedure: GASTROSCOPY-ENDO;  Surgeon: Aftab Alegre M.D.;  Location: [facility];  Service:    • Gastroscopy wiithsavary dilatation  9/28/2016     Procedure: GASTROSCOPY WIITH SAVARY DILATATION;  Surgeon: Aftab Alegre M.D.;  Location: [facility];  Service: Gastroenterology   • Gastroscopy w/push enterscopy  9/28/2016     Procedure: GASTROSCOPY W/PUSH ENTERSCOPY;  Surgeon: Aftab Alegre M.D.;  Location: ENDOSCOPY [facility];  Service:    • Gastroscopy-endo N/A 10/5/2016     Procedure: GASTROSCOPY-ENDO;  Surgeon: Aravind Roman M.D.;  Location: [facility];  Service:    • Umbilical hernia repair  2000   • Colonoscopy       Hx of  several    • Other surgical procedure  1994     closed off rectum    • Gastroscopy N/A 1/13/2017     Procedure: GASTROSCOPY - ENTEROSCOPY PUSH;  Surgeon: Boni Brooks M.D.;  Location: SURGERY [facility];  Service:      Current Outpatient Medications:  No current facility-administered medications on file prior to encounter.     Current Outpatient Prescriptions on File Prior to Encounter   Medication Sig Dispense Refill   • sodium bicarbonate (SODIUM BICARBONATE) 650 MG " Tab Take 3 Tabs by mouth 3 times a day. 270 Tab 11   • potassium Chloride ER (K-TAB) 20 MEQ Tab CR tablet Take 1 Tab by mouth every day. 30 Tab 11   • lactulose 20 GM/30ML Solution Take 30 mL by mouth 2 Times a Day. 30 Bottle 1   • Probiotic Cap Take 2 Caps by mouth every morning.     • ferrous gluconate (FERGON) 324 (38 FE) MG Tab Take 324 mg by mouth every morning with breakfast.     • omeprazole (PRILOSEC) 20 MG delayed-release capsule Take 40 mg by mouth every day.     • vitamin D (CHOLECALCIFEROL) 1000 UNIT Tab Take 1,000 Units by mouth every day.       Medication Allergy/Sensitivities:  Allergies   Allergen Reactions   • Sulfa Drugs Hives and Itching     Family History:  Unobtainable due to patients clinical condition    Social History:  Unobtainable due to patients clinical condition    #################################################################  Physical Exam:   Vitals/ General Appearance:   Weight/BMI: Body mass index is 29.3 kg/(m^2).  Blood pressure 123/53, pulse 63, temperature 36.1 °C (97 °F), resp. rate 23, height 1.524 m (5'), weight 68.04 kg (150 lb), last menstrual period 06/29/1995, SpO2 84 %.   Filed Vitals:    08/06/17 1225 08/06/17 1240 08/06/17 1250 08/06/17 1330   BP:       Pulse: 80 82 83 63   Temp:       Resp: 22 22 22 23   Height:       Weight:       SpO2:  100% 100% 84%    Oxygen Therapy:  Pulse Oximetry: (!) 84 %, O2 Delivery: Ventilator    Constitutional:  Intubated, sedated, unresponsive  HENMT: Normocephalic, atraumatic, b/l ears normal, nose normal, ETT and OG  Tube in place  Eyes:  EOMI, conjunctiva normal, no discharge, pupils are symmetric, sluggish response to light  Neck: no tracheal deviation, supple  Cardiovascular: normal heart rate, normal rhythm, no murmurs, no rubs or gallops; no cyanosis, clubbing or edema  Lungs: Respiratory effort is normal, normal breath sounds, breath sounds clear to auscultation b/l, no rales, rhonchi or wheezing  Abdomen: soft, non-tender, no  guarding or rebound, colostomy in place in the LLQ, normal appearing stool, no e/o blood present  Skin: warm, dry, no erythema, no rash  Neurologic: intubated, sedated, could not do neuro exam given on propofol  Psychiatric: Could not assess given intubated and sedated    #################################################################  Lab Data Review:    Objective  Recent Results (from the past 24 hour(s))   EKG (NOW)    Collection Time: 17  8:37 AM   Result Value Ref Range    Report       [facility]    Test Date:  2017  Pt Name:    TRUE MACARIO                Department: ER  MRN:        [id number]                      Room:       [facility]  Gender:     F                            Technician: [id number]  :        1940                   Requested By:ER TRIAGE PROTOCOL  Order #:    [id number]                    Reading MD:    Measurements  Intervals                                Axis  Rate:       89                           P:          72  MA:         164                          QRS:        50  QRSD:       82                           T:          63  QT:         376  QTc:        458    Interpretive Statements  SINUS RHYTHM  LOW VOLTAGE THROUGHOUT  BORDERLINE R WAVE PROGRESSION, ANTERIOR LEADS  Compared to ECG 2017 11:05:24  No significant changes     PROTHROMBIN TIME    Collection Time: 17  8:43 AM   Result Value Ref Range    PT 15.7 (H) 12.0 - 14.6 sec    INR 1.21 (H) 0.87 - 1.13   APTT    Collection Time: 17  8:43 AM   Result Value Ref Range    APTT 33.8 24.7 - 36.0 sec   CBC WITH DIFFERENTIAL    Collection Time: 17  8:43 AM   Result Value Ref Range    WBC 5.0 4.8 - 10.8 K/uL    RBC 3.39 (L) 4.20 - 5.40 M/uL    Hemoglobin 9.7 (L) 12.0 - 16.0 g/dL    Hematocrit 33.0 (L) 37.0 - 47.0 %    MCV 97.3 81.4 - 97.8 fL    MCH 28.6 27.0 - 33.0 pg    MCHC 29.4 (L) 33.6 - 35.0 g/dL    RDW 54.5 (H) 35.9 - 50.0 fL    Platelet Count 76 (L) 164 - 446 K/uL     Neutrophils-Polys 84.20 (H) 44.00 - 72.00 %    Lymphocytes 8.50 (L) 22.00 - 41.00 %    Monocytes 5.50 0.00 - 13.40 %    Eosinophils 0.60 0.00 - 6.90 %    Basophils 0.40 0.00 - 1.80 %    Immature Granulocytes 0.80 0.00 - 0.90 %    Nucleated RBC 0.00 /100 WBC    Lymphs (Absolute) 0.42 (L) 1.00 - 4.80 K/uL    Monos (Absolute) 0.27 0.00 - 0.85 K/uL    Eos (Absolute) 0.03 0.00 - 0.51 K/uL    Baso (Absolute) 0.02 0.00 - 0.12 K/uL    Immature Granulocytes (abs) 0.04 0.00 - 0.11 K/uL    NRBC (Absolute) 0.00 K/uL    Neutrophils (Absolute) 4.17 2.00 - 7.15 K/uL    Hypochromia 1+     Anisocytosis 1+     Macrocytosis 1+    AMMONIA    Collection Time: 08/06/17  8:43 AM   Result Value Ref Range    Ammonia 190 (HH) 11 - 45 umol/L   DIAGNOSTIC ALCOHOL    Collection Time: 08/06/17  8:43 AM   Result Value Ref Range    Diagnostic Alcohol 0.01 (H) 0.00 g/dL   COMP METABOLIC PANEL    Collection Time: 08/06/17  8:43 AM   Result Value Ref Range    Sodium 141 135 - 145 mmol/L    Potassium 4.9 3.6 - 5.5 mmol/L    Chloride 112 96 - 112 mmol/L    Co2 16 (L) 20 - 33 mmol/L    Anion Gap 13.0 (H) 0.0 - 11.9    Glucose 146 (H) 65 - 99 mg/dL    Bun 56 (H) 8 - 22 mg/dL    Creatinine 3.21 (H) 0.50 - 1.40 mg/dL    Calcium 8.9 8.5 - 10.5 mg/dL    AST(SGOT) 55 (H) 12 - 45 U/L    ALT(SGPT) 27 2 - 50 U/L    Alkaline Phosphatase 334 (H) 30 - 99 U/L    Total Bilirubin 0.9 0.1 - 1.5 mg/dL    Albumin 3.5 3.2 - 4.9 g/dL    Total Protein 6.3 6.0 - 8.2 g/dL    Globulin 2.8 1.9 - 3.5 g/dL    A-G Ratio 1.3 g/dL   TROPONIN    Collection Time: 08/06/17  8:43 AM   Result Value Ref Range    Troponin I <0.01 0.00 - 0.04 ng/mL   ESTIMATED GFR    Collection Time: 08/06/17  8:43 AM   Result Value Ref Range    GFR If  17 (A) >60 mL/min/1.73 m 2    GFR If Non  14 (A) >60 mL/min/1.73 m 2   BLOOD CULTURE,HOLD    Collection Time: 08/06/17  8:43 AM   Result Value Ref Range    Blood Culture Hold Collected    PERIPHERAL SMEAR REVIEW    Collection  Time: 08/06/17  8:43 AM   Result Value Ref Range    Peripheral Smear Review see below    PLATELET ESTIMATE    Collection Time: 08/06/17  8:43 AM   Result Value Ref Range    Plt Estimation Decreased    MORPHOLOGY    Collection Time: 08/06/17  8:43 AM   Result Value Ref Range    RBC Morphology Present     Poikilocytosis 2+     Ovalocytes 1+     Schistocytes 2+     Tear Drop Cells 1+     Echinocytes 1+    DIFFERENTIAL COMMENT    Collection Time: 08/06/17  8:43 AM   Result Value Ref Range    Comments-Diff see below    ACETAMINOPHEN    Collection Time: 08/06/17  8:53 AM   Result Value Ref Range    Acetaminophen -Tylenol 15 10 - 30 ug/mL   SALICYLATE    Collection Time: 08/06/17  8:53 AM   Result Value Ref Range    Salicylates, Quant. 0 (L) 15 - 25 mg/dL   TROPONIN    Collection Time: 08/06/17  8:53 AM   Result Value Ref Range    Troponin I <0.01 0.00 - 0.04 ng/mL   URINE DRUG SCREEN    Collection Time: 08/06/17  9:33 AM   Result Value Ref Range    Amphetamines Urine Negative Negative    Barbiturates Negative Negative    Benzodiazepines Negative Negative    Cocaine Metabolite Negative Negative    Methadone Negative Negative    Opiates Negative Negative    Oxycodone Positive (A) Negative    Phencyclidine -Pcp Negative Negative    Propoxyphene Negative Negative    Cannabinoid Metab Negative Negative   URINALYSIS CULTURE, IF INDICATED    Collection Time: 08/06/17  9:33 AM   Result Value Ref Range    Micro Urine Req see below     Color Yellow     Character Clear     Specific Gravity 1.010 <1.035    Ph 6.0 5.0-8.0    Glucose Negative Negative mg/dL    Ketones Negative Negative mg/dL    Protein Negative Negative mg/dL    Bilirubin Negative Negative    Urobilinogen, Urine Normal Negative    Nitrite Negative Negative    Leukocyte Esterase Negative Negative    Occult Blood Negative Negative    Culture Indicated No UA Culture   ISTAT ARTERIAL BLOOD GAS    Collection Time: 08/06/17  9:43 AM   Result Value Ref Range    Ph 7.303 (L)  7.400 - 7.500    Pco2 36.5 26.0 - 37.0 mmHg    Po2 190 (H) 64 - 87 mmHg    Tco2 19 (L) 20 - 33 mmol/L    S02 100 (H) 93 - 99 %    Hco3 18.0 17.0 - 25.0 mmol/L    BE -8 (L) -4 - 3 mmol/L    Body Temp 96.6 F degrees    O2 Therapy 60 %    Ph Temp Humberto 7.318 (L) 7.400 - 7.500    Pco2 Temp Co 34.7 26.0 - 37.0 mmHg    Po2 Temp Cor 184 (H) 64 - 87 mmHg    Specimen Arterial    LACTIC ACID    Collection Time: 08/06/17 11:05 AM   Result Value Ref Range    Lactic Acid 2.0 0.5 - 2.0 mmol/L   Triglycerides Starting now and then Every 3 Days    Collection Time: 08/06/17 11:05 AM   Result Value Ref Range    Triglycerides 109 0 - 149 mg/dL         Imaging/Procedures Review:    [id number] VIEW   Final Result      1.  Vascular clips in the right upper quadrant and surgical mesh anchors in the lower abdomen and pelvis.      2.  NG tube in place.      3.  No other metallic density identified.      DX-CHEST-LIMITED (1 VIEW)   Final Result      1.  ETT tip projects in satisfactory position.      2.  No focal consolidation.      CT-HEAD W/O   Final Result      1.  No acute intracranial process.      2.  Atrophy and small vessel ischemic change.      MR-BRAIN-W/O    (Results Pending)   MR-MRA HEAD-W/O    (Results Pending)   MR-MRA NECK-W/O    (Results Pending)   ECHOCARDIOGRAM-COMP W/ CONT    (Results Pending)     EKG:   per my independant read:  SR, HR 85, low voltage throughout pre-cordial leads, identical to prior    Assessment and Plan:      * Respiratory failure (CMS-HCC) (present on admission)  Assessment & Plan  -done to protect her airway, saturating well on the vent  -Vent per PMA  -Propofol gtt, fentanyl gtt, both titrated    Hepatic encephalopathy (CMS-HCC) (present on admission)  Assessment & Plan  -NH3 190 in the ER  -lactulose Q1 hour, through ostomy  -no e/o GIB at this time, no e/o clear infection, consider diagnostic paracentesis, though could not really detect a fluid wave on exam    Crohn's disease (HCC) (present on  admission)  Assessment & Plan  -ostomy appears to be working well, no bloody output, per patient's DP, no recent abdominal pain either  -monitor output closely    Metabolic acidosis, NAG, bicarbonate losses (present on admission)  Assessment & Plan  -likely multi-factorial  -2/2 dehydration, CKD III, possible infection?    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) (present on admission)  Assessment & Plan  -could be primary  of presentation, unclear if compliant with medications at home  -monitor closely    Hyperammonemia (CMS-HCC) (present on admission)  Assessment & Plan  -2/2 cirrhosis, as above    Chronic pain syndrome (present on admission)  Assessment & Plan  -was not discharged on narcotics on recent admission, but her UDS shows positivity for oxycodone    ORAL (acute kidney injury) (CMS-HCC) (present on admission)  Assessment & Plan  -Cr above baseline, wyatt in place, likely from dehydration versus ATN  -trend Cr  -avoid nephrotoxins  -monitor UOP  -IVF's  -wyatt in place    Unresponsive (present on admission)  Assessment & Plan  -likely multi-factorial, opiate abuse?, decompensated cirrhosis, occult infection?, dehydration, stroke?  -neuro is following  -MRI brain, MRA head/neck  -try to wean propofol later to assess neurological status  -check ASA and APAP levels  -UDS shows oxycodone, BAL was negative  -Q1 hour neuro checks  -consider EEG if does not improve in the next 24-48 hours      Patient is critically ill.   The patient continues to have: neurological and respiratory issues  The vital  organ system that is affected is the: neurological and respiratory systems  If untreated there is a high chance of deterioration into: worsening clinical condition  And eventually death.   The critical care that I am providing today is: advanced  The critical that has been undertaken is medically complex.   There has been no overlap in critical care time.   Critical Care Time not including procedures: 31 minutes      1. Prophylaxis: sc heparin  2. Code: Full code per patient, apparently has POLST, SW trying to get it now, her partner could not provide any history for me, apparently she has a sister in [place], but no records in our computer  3. Dispo: She will be admitted to inpatient for management that is expected to take greater than 2 midnights

## 2017-08-06 NOTE — ED NOTES
Med rec updated and complete per historical history on 7/14/2017.  Allergies reviewed per historical history   Pt is intubated  Pts significant other (Dave) is not sure of her medications.   Called [facility] @ [phone] to verify all prescription medications, and allergies.  Per pharmacy pt last picked up her XIFAXAN 550MG on 5/30/2017 for 30 day supply, pt has refill this medications since then.  Per pharmacy shows no known allergies.

## 2017-08-06 NOTE — IP AVS SNAPSHOT
Home Care Instructions                                                                                                                  Name:Cristina Tenorio  Medical Record Number:[id number]  CSN: [id number]    YOB: 1940   Age: 76 y.o.  Sex: female  HT:1.524 m (5') WT: 75.1 kg (165 lb 9.1 oz)          Admit Date: 8/6/2017     Discharge Date:   Today's Date: 8/12/2017  Attending Doctor:  Star Salcido M.D.                  Allergies:  Sulfa drugs            Discharge Instructions       Discharge Instructions    Discharged to home by car with relative. Discharged via wheelchair, hospital escort: Yes.  Special equipment needed: Not Applicable    Be sure to schedule a follow-up appointment with your primary care doctor or any specialists as instructed.     Discharge Plan:   Diet Plan: Discussed  Activity Level: Discussed  Confirmed Follow up Appointment: Patient to Call and Schedule Appointment  Confirmed Symptoms Management: Discussed  Medication Reconciliation Updated: Yes  Influenza Vaccine Indication: Indicated: Not available from distributor/    I understand that a diet low in cholesterol, fat, and sodium is recommended for good health. Unless I have been given specific instructions below for another diet, I accept this instruction as my diet prescription.   Other diet: dysphagia diet    Special Instructions: None    · Is patient discharged on Warfarin / Coumadin?   No     · Is patient Post Blood Transfusion?  No      Hepatic Encephalopathy  Hepatic encephalopathy is a loss of brain function from advanced liver disease. The effects of the condition depend on the type of liver damage and how severe it is. In some cases, hepatic encephalopathy can be reversed.  CAUSES  The exact cause of hepatic encephalopathy is not known.  RISK FACTORS  You have a higher risk of getting this condition if your liver is damaged. When the liver is damaged harmful substances called toxins can build up in the  body. Certain toxins, such as ammonia, can harm your brain. Conditions that can cause liver damage include:  · An infection.  · Dehydration.  · Intestinal bleeding.  · Drinking too much alcohol.  · Taking certain medicines, including tranquilizers, water pills (diuretics), antidepressants, or sleeping pills.  SIGNS AND SYMPTOMS  Signs and symptoms may develop suddenly. Or, they may develop slowly and get worse gradually. Symptoms can range from mild to severe.  Mild Hepatic Encephalopathy  · Mild confusion.  · Personality and mood changes.  · Anxiety and agitation.  · Drowsiness.  · Loss of mental abilities.  · Musty or sweet-smelling breath.  Worsening or Severe Hepatic Encephalopathy  · Slowed movement.  · Slurred speech.  · Extreme personality changes.  · Disorientation.  · Abnormal shaking or flapping of the hands.  · Coma.  DIAGNOSIS  To make a diagnosis, your health care provider will do a physical exam. To rule out other causes of your signs and symptoms, he or she may order tests. You may have:  · Blood tests. These may be done to check your ammonia level, measure how long it takes your blood to clot, and check for infection.  · Liver function tests. These may be done to check how well your liver is working.  · MRI and CT scans. These may be done to check for a brain disorder.  · Electroencephalogram (EEG). This may be done to measure the electrical activity in your brain.  TREATMENT  The first step in treatment is identifying and treating possible triggers. The next step is involves taking medicine to lower the level of toxins in the body and to prevent ammonia from building up. You may need to take:  · Antibiotics to reduce the ammonia-producing bacteria in your gut.  · Lactulose to help flush ammonia from the gut.  HOME CARE INSTRUCTIONS  Eating and Drinking  · Follow a low-protein diet that includes plenty of fruits, vegetables, and whole grains, as directed by your health care provider. Ammonia is  produced when you digest high-protein foods.  · Work with a dietitian or with your health care provider to make sure you are getting the right balance of protein and minerals.  · Drink enough fluids to keep your urine clear or pale yellow. Drinking plenty of water helps prevent constipation.  · Do not drink alcohol or use illegal drugs.  Medicines  · Only take medicine as directed by your health care provider.  · If you were prescribed an antibiotic medicine, finish it all even if you start to feel better.  · Do not start any new medicines, including over-the-counter medicines, without first checking with your health care provider.  SEEK MEDICAL CARE IF:  · You have new symptoms.  · Your symptoms change.  · Your symptoms get worse.  · You have a fever.  · You are constipated.  · You have persistent nausea, vomiting, or diarrhea.  SEEK IMMEDIATE MEDICAL CARE IF:  · You become very confused or drowsy.  · You vomit blood or material that looks like coffee grounds.  · Your stool is bloody or black or looks like tar.     This information is not intended to replace advice given to you by your health care provider. Make sure you discuss any questions you have with your health care provider.     Document Released: 02/27/2008 Document Revised: 01/08/2016 Document Reviewed: 08/05/2015  [employer] Interactive Patient Education ©2016 [employer]      Depression / Suicide Risk    As you are discharged from this [facility] facility, it is important to learn how to keep safe from harming yourself.    Recognize the warning signs:  · Abrupt changes in personality, positive or negative- including increase in energy   · Giving away possessions  · Change in eating patterns- significant weight changes-  positive or negative  · Change in sleeping patterns- unable to sleep or sleeping all the time   · Unwillingness or inability to communicate  · Depression  · Unusual sadness, discouragement and loneliness  · Talk of wanting to  die  · Neglect of personal appearance   · Rebelliousness- reckless behavior  · Withdrawal from people/activities they love  · Confusion- inability to concentrate     If you or a loved one observes any of these behaviors or has concerns about self-harm, here's what you can do:  · Talk about it- your feelings and reasons for harming yourself  · Remove any means that you might use to hurt yourself (examples: pills, rope, extension cords, firearm)  · Get professional help from the community (Mental Health, Substance Abuse, psychological counseling)  · Do not be alone:Call your Safe Contact- someone whom you trust who will be there for you.  · Call your local CRISIS HOTLINE 767-0643 or 281-023-9473  · Call your local Children's Mobile Crisis Response Team [place] (839) 320-7559 or www.Doctor Evidence  · Call the toll free National Suicide Prevention Hotlines   · National Suicide Prevention Lifeline 550-355-KUQK (1364)  · [employer] Line Network 800-SUICIDE (731-3064)    Urinary Tract Infection  A urinary tract infection (UTI) can occur any place along the urinary tract. The tract includes the kidneys, ureters, bladder, and urethra. A type of germ called bacteria often causes a UTI. UTIs are often helped with antibiotic medicine.   HOME CARE   · If given, take antibiotics as told by your doctor. Finish them even if you start to feel better.  · Drink enough fluids to keep your pee (urine) clear or pale yellow.  · Avoid tea, drinks with caffeine, and bubbly (carbonated) drinks.  · Pee often. Avoid holding your pee in for a long time.  · Pee before and after having sex (intercourse).  · Wipe from front to back after you poop (bowel movement) if you are a woman. Use each tissue only once.  GET HELP RIGHT AWAY IF:   · You have back pain.  · You have lower belly (abdominal) pain.  · You have chills.  · You feel sick to your stomach (nauseous).  · You throw up (vomit).  · Your burning or discomfort with peeing does not  go away.  · You have a fever.  · Your symptoms are not better in 3 days.  MAKE SURE YOU:   · Understand these instructions.  · Will watch your condition.  · Will get help right away if you are not doing well or get worse.     This information is not intended to replace advice given to you by your health care provider. Make sure you discuss any questions you have with your health care provider.     Document Released: 06/05/2009 Document Revised: 01/08/2016 Document Reviewed: 07/18/2013  [employer] (formerly [employer]) Interactive Patient Education ©2016 [employer]        Your appointments     Aug 16, 2017  1:30 PM   Ostomy 30 Minute Follow Up with Bethel Alva R.N.   [facility] ([address] [place] [address])    [address]  [place] [address]   [phone]            Aug 23, 2017  1:30 PM   Ostomy 30 Minute Follow Up with Yandy Eagle R.N.   [facility] ([address] [place] [address])    [address]  [place] [address]   [phone]            Aug 30, 2017  1:30 PM   Ostomy 30 Minute Follow Up with Yandy Eagle R.N.   [facility] ([address] [place] [address])    [address]  [place] [address]   [phone]                 Discharge Medication Instructions:    Below are the medications your physician expects you to take upon discharge:    Review all your home medications and newly ordered medications with your doctor and/or pharmacist. Follow medication instructions as directed by your doctor and/or pharmacist.    Please keep your medication list with you and share with your physician.               Medication List      START taking these medications        Instructions    Morning Afternoon Evening Bedtime    cefdinir 300 MG Caps   Last time this was given:  300 mg on 8/12/2017  8:24 AM   Commonly known as:  OMNICEF        Take 1 Cap by mouth every 12 hours for 2 days.   Dose:  300 mg                          CONTINUE taking these medications        Instructions    Morning Afternoon Evening Bedtime    ferrous gluconate 324 (38 FE) MG  Tabs   Commonly known as:  FERGON        Take 324 mg by mouth every morning with breakfast.   Dose:  324 mg                        lactulose 20 GM/30ML Soln   Last time this was given:  30 mL on 8/11/2017  1:53 PM        Take 30 mL by mouth 2 Times a Day.   Dose:  30 mL                        omeprazole 20 MG delayed-release capsule   Last time this was given:  40 mg on 8/12/2017  8:24 AM   Commonly known as:  PRILOSEC        Take 40 mg by mouth every day.   Dose:  40 mg                        potassium Chloride ER 20 MEQ Tbcr tablet   Commonly known as:  K-TAB        Take 1 Tab by mouth every day.   Dose:  20 mEq                        Probiotic Caps        Take 2 Caps by mouth every morning.   Dose:  2 Cap                        sodium bicarbonate 650 MG Tabs   Last time this was given:  1,787.5 mg on 8/12/2017  8:25 AM   Commonly known as:  SODIUM BICARBONATE        Take 3 Tabs by mouth 3 times a day.   Dose:  1850 mg                        vitamin D 1000 UNIT Tabs   Last time this was given:  1,000 Units on 8/12/2017  8:25 AM   Commonly known as:  cholecalciferol        Take 1,000 Units by mouth every day.   Dose:  1000 Units                             Where to Get Your Medications      Information about where to get these medications is not yet available     ! Ask your nurse or doctor about these medications    - cefdinir 300 MG Caps            Orders for after discharge     REFERRAL TO HOME HEALTH    Complete by:  As directed    Home health will create and establish a plan of care             Instructions           Diet / Nutrition:    Follow any diet instructions given to you by your doctor or the dietician, including how much salt (sodium) you are allowed each day.    If you are overweight, talk to your doctor about a weight reduction plan.    Activity:    Remain physically active following your doctor's instructions about exercise and activity.    Rest often.     Any time you become even a little tired or  short of breath, SIT DOWN and rest.    Worsening Symptoms:    Report any of the following signs and symptoms to the doctor's office immediately:    *Pain of jaw, arm, or neck  *Chest pain not relieved by medication                               *Dizziness or loss of consciousness  *Difficulty breathing even when at rest   *More tired than usual                                       *Bleeding drainage or swelling of surgical site  *Swelling of feet, ankles, legs or stomach                 *Fever (>100ºF)  *Pink or blood tinged sputum  *Weight gain (3lbs/day or 5lbs /week)           *Shock from internal defibrillator (if applicable)  *Palpitations or irregular heartbeats                *Cool and/or numb extremities    Stroke Awareness    Common Risk Factors for Stroke include:    Age  Atrial Fibrillation  Carotid Artery Stenosis  Diabetes Mellitus  Excessive alcohol consumption  High blood pressure  Overweight   Physical inactivity  Smoking    Warning signs and symptoms of a stroke include:    *Sudden numbness or weakness of the face, arm or leg (especially on one side of the body).  *Sudden confusion, trouble speaking or understanding.  *Sudden trouble seeing in one or both eyes.  *Sudden trouble walking, dizziness, loss of balance or coordination.Sudden severe headache with no known cause.    It is very important to get treatment quickly when a stroke occurs. If you experience any of the above warning signs, call 911 immediately.                   Disclaimer         Quit Smoking / Tobacco Use:    I understand the use of any tobacco products increases my chance of suffering from future heart disease or stroke and could cause other illnesses which may shorten my life. Quitting the use of tobacco products is the single most important thing I can do to improve my health. For further information on smoking / tobacco cessation call a Toll Free Quit Line at 1-707.927.1164 (*[employer] [place]) or 1-550.872.4143  (American Lung Association) or you can access the web based program at www.lungusa.org.    [place] Tobacco Users Help Line:  (791) 238-7552       Toll Free: 1-671.346.8211  Quit Tobacco Program [facility] (682)511-5541    Crisis Hotline:    [place] Crisis Hotline:  1-691-VBRTCXB or 1-666.196.5829    [place] Crisis Hotline:    1-880.522.6673 or 189-209-5276    Discharge Survey:   Thank you for choosing [facility]. We hope we did everything we could to make your hospital stay a pleasant one. You may be receiving a phone survey and we would appreciate your time and participation in answering the questions. Your input is very valuable to us in our efforts to improve our service to our patients and their families.        My signature on this form indicates that:    1. I have reviewed and understand the above information.  2. My questions regarding this information have been answered to my satisfaction.  3. I have formulated a plan with my discharge nurse to obtain my prescribed medications for home.                  Disclaimer         __________________________________                     __________       ________                       Patient Signature                                                 Date                    Time

## 2017-08-07 PROBLEM — D69.6 THROMBOCYTOPENIA (HCC): Status: ACTIVE | Noted: 2017-01-01

## 2017-08-07 PROBLEM — R41.89 UNRESPONSIVE: Status: RESOLVED | Noted: 2017-01-01 | Resolved: 2017-01-01

## 2017-08-07 PROBLEM — R79.89 ELEVATED TROPONIN: Status: ACTIVE | Noted: 2017-01-01

## 2017-08-07 PROBLEM — E72.20 HYPERAMMONEMIA (HCC): Status: RESOLVED | Noted: 2017-01-01 | Resolved: 2017-01-01

## 2017-08-07 NOTE — DISCHARGE PLANNING
Met with pt's SO and AD Designee, Dave at bedside. Dave reports that the pt is not  and does not have any children. The pt's closest NOK is her sister, Reina in [place]. Dave is named on the pt's AD as her designee. Assessed for needs. No needs at this time.

## 2017-08-07 NOTE — PROGRESS NOTES
1845- Report receveid from Stefano PICKARD, patient in bed opening eyes to verbal stimuli, unable to follow commands. Gtts/lines/vent settings verified.    1900- Fibersource started at 25mL/hour.     2000- Assessment complete. Patient now follows commands and is able to nod head and wiggle toes.     0000- Assessment complete.     0130- Patient hypotensive 70's/30's. 1L NS bolus given. Levophed ordered for SBP >90 and MAP >60mmHg    0200- Patient noted to have what appears to be undigested/crushed pills in ileostomy bag.       0400- Assessment complete, Patient awake, shakes head no when asked if she is in any pain. Propofol remains at 5mcg/kg/min. Patient is breathing in synchrony to ventilator.

## 2017-08-07 NOTE — PROGRESS NOTES
[facility] Hospitalist Progress Note    Date of Service: 2017    Chief Complaint  76 y.o. female admitted 2017 with altered mental status    Interval Problem Update  Ms. Tenorio has a hx of BAKER that presented to the ER with severe encephalopathy and an ammonia level of 190 requiring intubation. She has been admitted to the ICU on the vent for aggressive treatment of her hepatic encephalopathy.    Consultants/Specialty    Pulmonology  I discussed her condition with Dr. Fonseca on ICU Hot Rounds  Neurology: I discussed with Dr. Castillo, neurology  Disposition  ICU        Review of Systems   Unable to perform ROS: intubated      Physical Exam  Laboratory/Imaging   Hemodynamics  Temp (24hrs), Av.9 °C (96.7 °F), Min:35.8 °C (96.4 °F), Max:36.1 °C (97 °F)   Temperature: (!) 35.8 °C (96.4 °F), Monitored Temp: 36.8 °C (98.2 °F)  Pulse  Av.7  Min: 63  Max: 100 Heart Rate (Monitored): (!) 102  Blood Pressure : 123/53 mmHg, NIBP: (!) 97/49 mmHg      Respiratory  Chandler Vent Mode: Spont, Rate (breaths/min): 18, PEEP/CPAP: 8, PEEP/CPAP: 8, FiO2: 30, P Peak (PIP): 17, P MEAN: 9.7   Respiration: 18, Pulse Oximetry: 100 %     Work Of Breathing / Effort: Vented  RUL Breath Sounds: Clear, RML Breath Sounds: Clear, RLL Breath Sounds: Diminished, REMY Breath Sounds: Clear, LLL Breath Sounds: Diminished    Fluids    Intake/Output Summary (Last 24 hours) at 17 0736  Last data filed at 17 0600   Gross per 24 hour   Intake 2968.48 ml   Output   2540 ml   Net 428.48 ml       Nutrition  Orders Placed This Encounter   Procedures   • Diet NPO     Standing Status: Standing      Number of Occurrences: 1      Standing Expiration Date:      Order Specific Question:  Type:     Answer:  Now [1]     Order Specific Question:  Restrict to:     Answer:  Strict [1]     Physical Exam   Constitutional: No distress.   HENT:   Head: Normocephalic and atraumatic.   Cardiovascular:   Distant and regular   Pulmonary/Chest:   Vent,  good air movement. Few crackles.    Abdominal: Soft.   ostomy   Genitourinary:   Loaiza catheter.    Neurological:   Sedated, she opens her eyes though does not follow.   Skin: Skin is warm and dry. She is not diaphoretic. There is pallor.       Recent Labs      08/06/17   0843  08/07/17   0445   WBC  5.0  6.4   RBC  3.39*  2.71*   HEMOGLOBIN  9.7*  8.2*   HEMATOCRIT  33.0*  27.0*   MCV  97.3  99.6*   MCH  28.6  30.3   MCHC  29.4*  30.4*   RDW  54.5*  55.7*   PLATELETCT  76*  62*     Recent Labs      08/06/17   0843  08/07/17   0445   SODIUM  141  145   POTASSIUM  4.9  4.0   CHLORIDE  112  120*   CO2  16*  15*   GLUCOSE  146*  139*   BUN  56*  52*   CREATININE  3.21*  2.35*   CALCIUM  8.9  7.8*     Recent Labs      08/06/17   0843   APTT  33.8   INR  1.21*         Recent Labs      08/06/17   1105   TRIGLYCERIDE  109          Assessment/Plan     * Respiratory failure (CMS-HCC) (present on admission)  Assessment & Plan  -done to protect her airway, placed on the vent 8/6  -pulmonology consultation.  -Propofol gtt, fentanyl gtt    Hepatic encephalopathy (CMS-HCC) (present on admission)  Assessment & Plan  -Ammonia was 190 in the ER  -lactulose through ostomy and rifaximin. Mentation is improving as ammonia comes down.  MRI brain negative.       Crohn's disease (HCC) (present on admission)  Assessment & Plan  -with ostomy     Metabolic acidosis, NAG, bicarbonate losses (present on admission)  Assessment & Plan  -likely multi-factorial with dehydration    Essential hypertension, benign (present on admission)  Assessment & Plan  -BP is low, hold outpatient BP medications at this time    Cirrhosis with portal hypertension (present on admission)  Assessment & Plan  No apparent bleeding.    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 IV fluids. Follow daily labs.     Elevated troponin (present on admission)  Assessment & Plan  Troponin went up to 0.75 and has trended down.    Thrombocytopenia (CMS-HCC) (present on admission)  Assessment & Plan  Likely secondary to cirrhosis.       Labs reviewed, Medications reviewed and Radiology images reviewed  Loaiza catheter: Unconscious / Sedated Patient on a Ventilator      DVT Prophylaxis: Heparin

## 2017-08-07 NOTE — DISCHARGE PLANNING
"S: Pt is a 76 y.o. female who brought in the ER on, 8/6/2017 by EMS after her partner could not get her to get up or get her to respond. Her admit dx was, Hepathic Encephalpathy  The pt was then intubated for airway protection.     B: The pt's chart shows an extensive medical hx. Please review chart for a more detailed history. Pt is a full code and has an AD that names her SO, Dave Brush as her designee. The pt is flagged for, \"Recent Admission Warning\"         A: Discussed pt during Am Rounds. RN reports pt is able to \"move everything\" The pt is vent day 2. The pt will begin SBTs.     R: Pt's discharge disposition is unknown at this time. The pt lives in a single family home with her SO in [place]. The pt not yet been seen by any therapies.     "

## 2017-08-07 NOTE — CARE PLAN
Problem: Ventilation Defect:  Goal: Ability to achieve and maintain unassisted ventilation or tolerate decreased levels of ventilator support  Vent day 2  No SBT's done yet.  Titrated FiO2 to 40% this shift, no other changes made to vent.

## 2017-08-07 NOTE — CARE PLAN
Problem: Safety  Goal: Will remain free from injury  Outcome: PROGRESSING AS EXPECTED  Fall risk assessed and precautions implemented. Bed alarm set.    Problem: Respiratory:  Goal: Respiratory status will improve  Outcome: PROGRESSING AS EXPECTED  Patient is maintaining SPO2 on 40%FiO2.

## 2017-08-07 NOTE — CARE PLAN
Problem: Communication  Goal: The ability to communicate needs accurately and effectively will improve  Outcome: PROGRESSING SLOWER THAN EXPECTED  Reinforce need to call for assistance    Problem: Safety  Goal: Will remain free from injury  Outcome: PROGRESSING SLOWER THAN EXPECTED  Reinforce not getting out of bed without assistance     Problem: Skin Integrity  Goal: Risk for impaired skin integrity will decrease  Outcome: PROGRESSING SLOWER THAN EXPECTED  Reinforce need to turn and move frequently

## 2017-08-07 NOTE — PROCEDURES
DATE OF SERVICE:  08/06/2017    TITLE OF THE PROCEDURE:  Central venous catheter placement.    INDICATION FOR THE PROCEDURE:  A lady with acute unspecified encephalopathy   and ventilator dependent respiratory failure, requires central venous access   for adequate venous access.    NARRATIVE:  The right neck was prepped with chlorhexidine and draped in the   usual sterile fashion.  A triple lumen central venous catheter was easily   placed into the right internal jugular vein under ultrasound guidance on the   first attempt using the technique described by James without difficulty or   apparent complication.  The line was sutured into place and a sterile   dressing was placed over the line.  All 3 ports flushed and returned venous   blood easily.  The patient tolerated the procedure quite nicely.  No   complications are apparent.  A chest x-ray will be obtained in the next few   minutes to confirm placement.       ____________________________________     MD CHIQUITA ANDERSON / ALYSON    DD:  08/06/2017 16:48:12  DT:  08/06/2017 17:01:42    D#:  [id number]  Job#:  [id number]

## 2017-08-07 NOTE — ASSESSMENT & PLAN NOTE
Troponin went up to 0.75 and has trended down  Echo with normal ejection fraction, no report of wall motion abnormality  Denies chest pain.

## 2017-08-07 NOTE — PROGRESS NOTES
Pulmonary Critical Care Progress Note      Date of service:  8/7/2017      Interval Events:  24 hour interval history reviewed  Reason for visit:  Respiratory failure, acute metabolic encephalopathy,   Unable to provide CC or ROS due to intubation and vent support     - awake and follows   - ST   - CXR with improved LLL   - SBT  FVC 1.6  NIF -32  RSBI 20's      PFSH:  No change.    Respiratory:  Chandler Vent Mode: APVCMV, Rate (breaths/min): 18, Vt Target (mL): 350, PEEP/CPAP: 8, FiO2: 30, Static Compliance (ml / cm H2O): 106, Control VTE (exp VT): 964  Pulse Oximetry: 100 %  CXR with slightly improved edema  Few coarse crackles    Recent Labs      08/06/17   0943   ISTATAPH  7.303*   ISTATAPCO2  36.5   ISTATAPO2  190*   ISTATATCO2  19*   [id number]  100*   ISTATARTHCO3  18.0   ISTATARTBE  -8*   ISTATTEMP  96.6 F   ISTATFIO2  60   ISTATSPEC  Arterial   ISTATAPHTC  7.318*   [id number]  184*       HemoDynamics:  Pulse: 85, Heart Rate (Monitored): (!) 102  Blood Pressure : 123/53 mmHg, NIBP: 109/52 mmHg     SR    Recent Labs      08/06/17   0853  08/06/17   1810  08/07/17   0059   TROPONINI  <0.01  0.75*  0.48*       Neuro:  Arouses easily and nods  Propofol 10  Fentanyl 50    Fluids:  Intake/Output       08/05/17 0700 - 08/06/17 0659 (Not Admitted) 08/06/17 0700 - 08/07/17 0659 08/07/17 0700 - 08/08/17 0659      0264-8496 3505-4877 Total 0537-2405 0366-2546 Total 3161-0655 4909-6435 Total       Intake    I.V.  --  -- --  363.5  1700.9 2064.5  --  -- --    Propofol Volume -- -- -- 31.5 28.9 60.5 -- -- --    IV Volume (NS) -- -- -- 332 664 996 -- -- --    IV Volume (Fentanyl gtt) -- -- -- -- 8 8 -- -- --    IV Volume (NS BOLUS) -- -- -- -- 1000 1000 -- -- --    Other  --  -- --  --  90 90  --  -- --    Medications (P.O./ Enteral Liquids) -- -- -- -- 90 90 -- -- --    Enteral  --  -- --  --  220 220  --  -- --    Enteral Volume -- -- -- -- 100 100 -- -- --    Free Water / Tube Flush -- -- -- -- 120 120 -- -- --     Total Intake -- -- -- 363.5 2010.9 2374.5 -- -- --       Output    Urine  --  -- --  400  400 800  --  -- --    Indwelling Cathether -- -- -- 400 400 800 -- -- --    Stool/Urine  --  -- --  200  700 900  --  -- --    Measurable Stool (ml) -- -- -- 200 700 900 -- -- --    Total Output -- -- -- 600 1100 1700 -- -- --       Net I/O     -- -- -- -236.5 910.9 674.5 -- -- --        Weight: 70.7 kg (155 lb 13.8 oz)  Recent Labs      17   SODIUM  141   --   145   POTASSIUM  4.9   --   4.0   CHLORIDE  112   --   120*   CO2  16*   --   15*   BUN  56*   --   52*   CREATININE  3.21*   --   2.35*   MAGNESIUM   --   1.9  1.8   PHOSPHORUS   --    --   4.3   CALCIUM  8.9   --   7.8*       GI/Nutrition:  Abd soft ND/NT  Tolerating enteral TF    Liver Function  Recent Labs      17   ALTSGPT  27   --   25   ASTSGOT  55*   --   48*   ALKPHOSPHAT  334*   --   261*   TBILIRUBIN  0.9   --   0.7   PREALBUMIN   --   9.0*   --    GLUCOSE  146*   --   139*       Heme:  Recent Labs      17   RBC  3.39*   HEMOGLOBIN  9.7*   HEMATOCRIT  33.0*   PLATELETCT  76*   PROTHROMBTM  15.7*   APTT  33.8   INR  1.21*       Infectious Disease:  Temp  Av.9 °C (96.7 °F)  Min: 35.8 °C (96.4 °F)  Max: 36.1 °C (97 °F)  Monitored Temp  Av.1 °C (97 °F)  Min: 35.7 °C (96.3 °F)  Max: 36.8 °C (98.2 °F)    Recent Labs      17   WBC  5.0   --    NEUTSPOLYS  84.20*   --    LYMPHOCYTES  8.50*   --    MONOCYTES  5.50   --    EOSINOPHILS  0.60   --    BASOPHILS  0.40   --    ASTSGOT  55*  48*   ALTSGPT  27  25   ALKPHOSPHAT  334*  261*   TBILIRUBIN  0.9  0.7     Current Facility-Administered Medications   Medication Dose Frequency Provider Last Rate Last Dose   • norepinephrine (LEVOPHED) 8 mg in  mL Infusion  0-30 mcg/min Continuous Marshall Thakkar M.D.   Stopped at 17 0215   • propofol (DIPRIVAN) injection  0-80  mcg/kg/min Continuous Keegan Alcaraz M.D. 2 mL/hr at 08/07/17 0525 5 mcg/kg/min at 08/07/17 0525   • rifaximin (XIFAXAN) tablet 550 mg  550 mg TID Armando Esteban M.D.   550 mg at 08/06/17 2212   • sodium bicarbonate tablet 1,787.5 mg  1,787.5 mg TID Armando Esteban M.D.   1,787.5 mg at 08/06/17 2214   • vitamin D (cholecalciferol) tablet 1,000 Units  1,000 Units DAILY Armando Esteban M.D.       • ondansetron (ZOFRAN) syringe/vial injection 4 mg  4 mg Q4HRS PRN Armando Esteban M.D.       • ondansetron (ZOFRAN ODT) dispertab 4 mg  4 mg Q4HRS PRN Armando Esteban M.D.       • Respiratory Care per Protocol   Continuous RT Keegan Alcaraz M.D.       • senna-docusate (PERICOLACE or SENOKOT S) 8.6-50 MG per tablet 2 Tab  2 Tab BID Keegan Alcaraz M.D.   Stopped at 08/06/17 2100    And   • polyethylene glycol/lytes (MIRALAX) PACKET 1 Packet  1 Packet QDAY PRN Keegan Alcaraz M.D.        And   • magnesium hydroxide (MILK OF MAGNESIA) suspension 30 mL  30 mL QDAY PRN Keegan Alcaraz M.D.        And   • bisacodyl (DULCOLAX) suppository 10 mg  10 mg QDAY PRN Keegan Alcaraz M.D.       • chlorhexidine (PERIDEX) 0.12 % solution 15 mL  15 mL BID Keegan Alcaraz M.D.   15 mL at 08/06/17 2212   • lidocaine (XYLOCAINE) 1%  injection  1-2 mL Q30 MIN PRN Keegan Alcaraz M.D.       • MD ALERT...Adult ICU Electrolyte Replacement per Pharmacy Protocol   pharmacy to dose Keegan Alcaraz M.D.       • NS infusion   Continuous Keegan Alcaraz M.D. 83 mL/hr at 08/07/17 0248     • heparin injection 5,000 Units  5,000 Units Q8HRS Keegan Alcaraz M.D.   5,000 Units at 08/07/17 0526   • fentaNYL (SUBLIMAZE) injection 25 mcg  25 mcg Q HOUR PRN Keegan Alcaraz M.D.        Or   • fentaNYL (SUBLIMAZE) injection 50 mcg  50 mcg Q HOUR PRN Keegan Alcaraz M.D.        Or   • fentaNYL (SUBLIMAZE) injection 100 mcg  100 mcg Q HOUR PRN Keegan Alcaraz,  M.D.   100 mcg at 08/06/17 1122   • fentaNYL (SUBLIMAZE) 50 mcg/mL in 50mL   Continuous Keegan Alcaraz M.D. 1 mL/hr at 08/06/17 1425 50 mcg/hr at 08/06/17 1425   • ipratropium-albuterol (DUONEB) nebulizer solution 3 mL  3 mL Q2HRS PRN (RT) Keegan Alcaraz M.D.       • ipratropium-albuterol (DUONEB) nebulizer solution 3 mL  3 mL Q4HRS (RT) Keegan Alcaraz M.D.   3 mL at 08/07/17 0318   • famotidine (PEPCID) tablet 20 mg  20 mg DAILY Keegan Alcaraz M.D.        Or   • famotidine (PEPCID) injection 20 mg  20 mg DAILY Keegan Alcaraz M.D.   20 mg at 08/06/17 1122   • insulin lispro (HUMALOG) injection 2-9 Units  2-9 Units Q6HRS Keegan Alcaraz M.D.   2 Units at 08/07/17 0051   • glucose 4 g chewable tablet 16 g  16 g Q15 MIN PRN Keegan Alcaraz M.D.        And   • dextrose 50% (D50W) injection 25 mL  25 mL Q15 MIN PRN Keegan Alcaraz M.D.       • lactulose 20 GM/30ML solution 30 mL  30 mL Q8HRS Keegan Alcaraz M.D.   30 mL at 08/07/17 0526     Last reviewed on 8/6/2017 10:37 AM by Rosalie Adrian JESSICA    Quality  Measures:  Labs reviewed, Medications reviewed and Radiology images reviewed                        Assessment and Plan:    VDRF - intubated 8/6   - cont vent support  Acute-on-chronic hypoxemic respiratory failure   - oxygen at night at home  Acute metabolic encephalopathy   - CT scan and MRI of brain show no acute pathology   - hepatic encephalopathy   - cont lactulose  Anemia with thrombocytopenia   - follow  Chronic kidney disease  Acute alcohol intoxication with blood alcohol level of 0.01  Positive urine drug screen for oxycodone on presentation  Cirrhosis with prior admissions for hepatic encephalopathy  Crohn's disease   - ileostomy in place  Systemic arterial hypertension  Chronic obstructive pulmonary disease   - no acute exacerbation   - cont BDs  History of obstructive sleep apnea   - on oxygen at night    Critical Care Time:  89  minutes  [id number]  No time overlap  Time excludes procedures  Discussed with RN, RT, Team

## 2017-08-08 NOTE — CARE PLAN
Problem: Communication  Goal: The ability to communicate needs accurately and effectively will improve  Outcome: PROGRESSING AS EXPECTED  Discussed POC with patient , patient nods head to current POC.     Problem: Venous Thromboembolism (VTW)/Deep Vein Thrombosis (DVT) Prevention:  Goal: Patient will participate in Venous Thrombosis (VTE)/Deep Vein Thrombosis (DVT)Prevention Measures  Outcome: PROGRESSING AS EXPECTED  Patient is receiving subQ un fractioned heparin for DVT prophylaxis.

## 2017-08-08 NOTE — PROGRESS NOTES
NEUROLOGY INPATIENT PROGRESS NOTE      CHIEF COMPLAINT:  Chief Complaint   Patient presents with   • ALFLORIN       DOS  Pt was seen and examined on 08/07/2017    INTERVAL EVENTS:  Pt's ammonia is  51 today, and she is away and following commands.   MRI brain and MRA head and neck were unremarkable.      ROS:  Unable to be obtained.      MEDICATIONS    Current facility-administered medications:   •  norepinephrine (LEVOPHED) 8 mg in  mL Infusion, 0-30 mcg/min, Intravenous, Continuous, Marshall Thakkar M.D., Stopped at 08/07/17 0215  •  Action is required: Protocol 452 Propofol Critical Care has been implemented, , , CONTINUOUS **AND** propofol (DIPRIVAN) injection, 0-80 mcg/kg/min, Intravenous, Continuous, Last Rate: 4.1 mL/hr at 08/07/17 1445, 10 mcg/kg/min at 08/07/17 1445 **AND** Propofol Critical Care Protocol - Patient Must Have an Advanced Airway with Mechanical Ventilation in Use., , , CONTINUOUS **AND** Propofol Critical Care Protocol - Spontaneous breathing trials may be attempted while receiving propofol, , , CONTINUOUS **AND** Propofol Critical Care Protocol - If patient meets extubation criteria, propofol MUST be discontinued prior to extubation, , , CONTINUOUS **AND** Propofol Critical Care Protocol - No allergy to propofol, soybean oil, or eggs, , , CONTINUOUS **AND** Propofol Critical Care Protocol - Do not administer this medication to children under the age of 12, , , CONTINUOUS **AND** Propofol Critical Care Protocol - Dedicated IV line for administration (vehicle supports rapid growth of microorganisms and incompatibilities cannot be detected), , , CONTINUOUS **AND** Propofol Critical Care Protocol - Administration tubing and any unused emulsion must be changed out and discarded after 12 hours from spiking vial, , , CONTINUOUS **AND** Propofol Critical Care Protocol - RASS guildelines, , , CONTINUOUS **AND** Propofol Critical Care Protocol - Wake-up assessment as ordered by MD, , ,  CONTINUOUS **AND** Propofol Critical Care Protocol - Propofol is not an analgesic, concurrent analgesia (if patient experiencing pain) should continue while patient is on propofol, , , CONTINUOUS **AND** Triglycerides Starting now and then Every 3 Days, , , Every 3 Days (0300) **AND** Propofol Critical Care Protocol - Notify MD prior to exceeding 80 mcg/kg/min and obtain new order for higher limit, , , CONTINUOUS, Keegan Alcaraz M.D.  •  rifaximin (XIFAXAN) tablet 550 mg, 550 mg, Oral, TID, Armando Esteban M.D., 550 mg at 08/07/17 1507  •  sodium bicarbonate tablet 1,787.5 mg, 1,787.5 mg, Oral, TID, Armando Esteban M.D., 1,787.5 mg at 08/07/17 1507  •  vitamin D (cholecalciferol) tablet 1,000 Units, 1,000 Units, Oral, DAILY, Armando Esteban M.D., 1,000 Units at 08/07/17 0925  •  ondansetron (ZOFRAN) syringe/vial injection 4 mg, 4 mg, Intravenous, Q4HRS PRN, Armando Esteban M.D.  •  ondansetron (ZOFRAN ODT) dispertab 4 mg, 4 mg, Oral, Q4HRS PRN, Armando Esteban M.D.  •  Respiratory Care per Protocol, , Nebulization, Continuous RT, Keegan Alcaraz M.D.  •  senna-docusate (PERICOLACE or SENOKOT S) 8.6-50 MG per tablet 2 Tab, 2 Tab, Oral, BID, Stopped at 08/06/17 2100 **AND** polyethylene glycol/lytes (MIRALAX) PACKET 1 Packet, 1 Packet, Oral, QDAY PRN **AND** magnesium hydroxide (MILK OF MAGNESIA) suspension 30 mL, 30 mL, Oral, QDAY PRN **AND** bisacodyl (DULCOLAX) suppository 10 mg, 10 mg, Rectal, QDAY PRN, Keegan Alcaraz M.D.  •  chlorhexidine (PERIDEX) 0.12 % solution 15 mL, 15 mL, Mouth/Throat, BID, Keegan Alcaraz M.D., 15 mL at 08/07/17 0924  •  lidocaine (XYLOCAINE) 1%  injection, 1-2 mL, Tracheal Tube, Q30 MIN PRN, Keegan Alcaraz M.D.  •  NS infusion, , Intravenous, Continuous, Keegan Alcaraz M.D., Last Rate: 83 mL/hr at 08/07/17 1445  •  heparin injection 5,000 Units, 5,000 Units, Subcutaneous, Q8HRS, Keegan Alcaraz M.D., 5,000 Units at 08/07/17  1340  •  fentaNYL (SUBLIMAZE) injection 25 mcg, 25 mcg, Intravenous, Q HOUR PRN **OR** fentaNYL (SUBLIMAZE) injection 50 mcg, 50 mcg, Intravenous, Q HOUR PRN **OR** fentaNYL (SUBLIMAZE) injection 100 mcg, 100 mcg, Intravenous, Q HOUR PRN, Keegan Alcaraz M.D., 100 mcg at 08/06/17 1122  •  fentaNYL (SUBLIMAZE) 50 mcg/mL in 50mL, , Intravenous, Continuous, Keegan Alcaraz M.D., Last Rate: 1 mL/hr at 08/07/17 0700, 50 mcg/hr at 08/07/17 0700  •  ipratropium-albuterol (DUONEB) nebulizer solution 3 mL, 3 mL, Nebulization, Q2HRS PRN (RT), Keegan Alcaraz M.D.  •  ipratropium-albuterol (DUONEB) nebulizer solution 3 mL, 3 mL, Nebulization, Q4HRS (RT), Keegan Alcaraz M.D., 3 mL at 08/07/17 1520  •  famotidine (PEPCID) tablet 20 mg, 20 mg, Oral, DAILY **OR** famotidine (PEPCID) injection 20 mg, 20 mg, Intravenous, DAILY, Keegan Alcaraz M.D., 20 mg at 08/07/17 0925  •  insulin lispro (HUMALOG) injection 2-9 Units, 2-9 Units, Subcutaneous, Q6HRS, Keegan Alcaraz M.D., 2 Units at 08/07/17 1732  •  Action is required: Protocol 1073 Hypoglycemia has been implemented, , , Once **AND** Protocol 1073 Inclusion Criteria, , , CONTINUOUS **AND** Protocol 1073 NOTIFY, , , Once **AND** Protocol 1073 Initiate protocol immediately if FSBG is less than or equal to 70 mg/dL, , , CONTINUOUS **AND** glucose 4 g chewable tablet 16 g, 16 g, Oral, Q15 MIN PRN **AND** dextrose 50% (D50W) injection 25 mL, 25 mL, Intravenous, Q15 MIN PRN, Keegan P Alcaraz, M.D.  •  lactulose 20 GM/30ML solution 30 mL, 30 mL, Oral, Q8HRS, Keegan Alcaraz M.D., 30 mL at 08/07/17 1341      PHYSICAL EXAM  Filed Vitals:    08/07/17 1630 08/07/17 1645 08/07/17 1700 08/07/17 1715   BP:       Pulse: 105 112 101 101   Temp:       Resp: 20 18 18 18   Height:       Weight:       SpO2:   100%      PT is alert, intubated.    Neuro: pt is alert, nods yes and no appropriately and following commands.    CN: PERRLA, EOMI, no  facial asymemtry  Motor: moves all 4 extremities to command.    Sensation: intact to soft touch in all 4 extremities    LABS:    Lab Results   Component Value Date/Time    WBC 6.4 08/07/2017 04:45 AM    RBC 2.71* 08/07/2017 04:45 AM    HEMOGLOBIN 8.2* 08/07/2017 04:45 AM    HEMATOCRIT 27.0* 08/07/2017 04:45 AM    MCV 99.6* 08/07/2017 04:45 AM    MCH 30.3 08/07/2017 04:45 AM    MCHC 30.4* 08/07/2017 04:45 AM    MPV 13.9* 07/13/2017 11:33 AM    NEUTROPHILS-POLYS 91.10* 08/07/2017 04:45 AM    LYMPHOCYTES 5.30* 08/07/2017 04:45 AM    MONOCYTES 2.70 08/07/2017 04:45 AM    EOSINOPHILS 0.00 08/07/2017 04:45 AM    BASOPHILS 0.90 08/07/2017 04:45 AM    HYPOCHROMIA 1+ 08/06/2017 08:43 AM    ANISOCYTOSIS 2+ 08/07/2017 04:45 AM        Lab Results   Component Value Date/Time    SODIUM 145 08/07/2017 04:45 AM    POTASSIUM 4.0 08/07/2017 04:45 AM    CHLORIDE 120* 08/07/2017 04:45 AM    CO2 15* 08/07/2017 04:45 AM    GLUCOSE 139* 08/07/2017 04:45 AM    BUN 52* 08/07/2017 04:45 AM    CREATININE 2.35* 08/07/2017 04:45 AM        Lab Results   Component Value Date/Time    CHOLESTEROL, 01/02/2013 07:25 AM    LDL 74 01/02/2013 07:25 AM    HDL 49 01/02/2013 07:25 AM    TRIGLYCERIDES 109 08/06/2017 11:05 AM         Lab Results   Component Value Date/Time    ALKALINE PHOSPHATASE 261* 08/07/2017 04:45 AM    AST(SGOT) 48* 08/07/2017 04:45 AM    ALT(SGPT) 25 08/07/2017 04:45 AM    TOTAL BILIRUBIN 0.7 08/07/2017 04:45 AM          IMAGING STUDIES:  8/6/2017 12:27 PM    HISTORY/REASON FOR EXAM:  Acute onset of unresponsiveness.      TECHNIQUE/EXAM DESCRIPTION:  MRI of the brain without contrast.    T1 sagittal, T2 fast spin-echo axial, T1 coronal, FLAIR coronal, diffusion-weighted and apparent diffusion coefficient (ADC map) axial images were obtained of the whole brain.    The study was performed on a [employer] Signa 1.5 Mitzi MRI scanner.    COMPARISON:  None.    FINDINGS: Multiple abnormal T2 hyperintensities are noted in the subcortical and  periventricular white matter and right side of the inferior cerebellar peduncle. There is no acute infarct. There is no acute or chronic parenchymal hemorrhage. Mild   cerebral atrophy is seen.    The ventricles, cortical sulci and the basal cisterns are prominent consistent with mild cerebral atrophy. There is no sellar or juxtasellar lesion. There is no extra-axial fluid collection, hemorrhage or mass. The visualized flow voids of the cerebral   vasculature are unremarkable. The skull bones appear normal. The paranasal sinuses are clear. There is mucosal thickening in the right mastoid air cells. The extracranial soft tissue including orbits appear grossly normal.           Impression        1.  Multiple abnormal T2 hyperintensities are noted in the subcortical and periventricular white matter and right side of the inferior cerebellar peduncle. These findings likely represents moderate to severe chronic microvascular ischemic disease. The   other less likely differential diagnosis includes demyelinating plaques of multiple sclerosis.  2.  There is no acute infarct.  3.  Mild cerebral atrophy       IMPRESSION: Cristina Tenorio is a 76 y.o. Female with history of cirrhosis who was brought in when she was found unresponsive by her brother. Her MRI of the Brain showed no acute CVA. Her ammonia was 190 and so she was treated with lactulose for hepatic encephalopathy and has now improved.  Will therefore sign off.  Please reconsult if there are any additional questions.     Carmita Castillo M.D.    Neurology

## 2017-08-08 NOTE — CARE PLAN
Problem: Bowel/Gastric:  Goal: Normal bowel function is maintained or improved  Intervention: Educate patient and significant other/support system about diet, fluid intake, medications and activity to promote bowel function  completed

## 2017-08-08 NOTE — CARE PLAN
Problem: Ventilation Defect:  Goal: Ability to achieve and maintain unassisted ventilation or tolerate decreased levels of ventilator support  Vent day 3  Pt tolerated SBT for 1 hour in the last 24 hours. Good parameters.  No changes made to vent this shift.

## 2017-08-08 NOTE — CARE PLAN
Problem: Ventilation Defect:  Goal: Ability to achieve and maintain unassisted ventilation or tolerate decreased levels of ventilator support  Adult Ventilation Update    Total Vent Days: 2      Patient Lines/Drains/Airways Status    Active Airway      Name: Placement date: Placement time: Site: Days:     Airway Group ET Tube Oral 7.5 08/06/17  0910  Oral  1                 In the last 24 hours, the patient tolerated SBT for 1 hour on settings of 5/8.    #FVC / Vital Capacity (liters) : 1.6 (08/07/17 0755)  NIF (cm H2O) : -32 (08/07/17 0755)  Rapid Shallow Breathing Index (RR/VT): 22 (08/07/17 0755)  Plateau Pressure (Q Shift): 14 (08/07/17 0655)  Static Compliance (ml / cm H2O): 88 (08/07/17 1521)    Patient failed trials because of    Barriers to SBT Weaning Trial Stopped due to:: Pt weaned for 1 hour and returned to rest settings per protocol (08/07/17 0755)  Length of Weaning Trial Length of Weaning Trial (Hours): 1 (08/07/17 0755)    Cough: Non Productive (08/07/17 1600)  Sputum Amount: Scant (08/07/17 1600)  Sputum Color: Clear (08/07/17 1600)  Sputum Consistency: Thin (08/07/17 1600)    Mobility Group  Activity Performed: Edge of bed (08/07/17 1700)  Time Activity Tolerated: 5 min (08/07/17 1700)  Assistance / Tolerance: Assistance of Two or More;Tolerates Well;General Weakness (08/07/17 1700)  Pt Calls for Assistance: No (08/07/17 1700)  Staff Present for Mobilization: RN (X 2) (08/07/17 1700)      Events/Summary/Plan: Pt onAPV-CMV. Pt tolerated SBT x 1 hour on 5/8. Parameters obtained. Pt receiving duoneb Q4. Continue SBT trials. 08/07/17 1521)

## 2017-08-08 NOTE — PROGRESS NOTES
Pulmonary Critical Care Progress Note      Date of service:  8/8/2017      Interval Events:  24 hour interval history reviewed  Reason for visit:  Respiratory failure, acute metabolic encephalopathy,   Unable to provide CC or ROS due to intubation and vent support       - SR   - CXR with increased LLL   - prop   - fent   - stop heparin   - 1 unit PRBCs   - SBT  FVC 1.5  NIF -40  RSBI  18   - awake and alert      PFSH:  No change.    Respiratory:  [place] Vent Mode: APVCMV, Rate (breaths/min): 18, Vt Target (mL): 300, PEEP/CPAP: 8, FiO2: 30, Static Compliance (ml / cm H2O): 85, Control VTE (exp VT): 655  Pulse Oximetry: 100 %  Vent waveforms and airway mechanics reviewed in detail  CXR personally reviewed  CXR with increased LLL opacification  Few coarse crackles  No wheezing    Recent Labs      08/06/17   0943  08/07/17   0452  08/08/17   0537   ISTATAPH  7.303*  7.301*  7.327*   ISTATAPCO2  36.5  30.8  33.4   ISTATAPO2  190*  118*  85   ISTATATCO2  19*  16*  19*   [id number]  100*  98  96   ISTATARTHCO3  18.0  15.2*  17.5   ISTATARTBE  -8*  -10*  -8*   ISTATTEMP  96.6 F  98.2 F  97.2 F   ISTATFIO2  60  40  30   ISTATSPEC  Arterial  Arterial  Arterial   ISTATAPHTC  7.318*  7.304*  7.339*   [id number]  184*  117*  81       HemoDynamics:  Pulse: 82, Heart Rate (Monitored): 80  NIBP: (!) 90/51 mmHg     SR    Recent Labs      08/06/17   1810  08/07/17   0059  08/07/17   0445   TROPONINI  0.75*  0.48*  0.37*       Neuro:  Arouses easily and nods  Propofol 10  Fentanyl 50    Fluids:  Intake/Output       08/06/17 0700 - 08/07/17 0659 08/07/17 0700 - 08/08/17 0659 08/08/17 0700 - 08/09/17 0659      5222-7358 5196-0356 Total 5134-0509 7280-7983 Total 6144-4116 4172-7387 Total       Intake    I.V.  363.5  2044.9 2408.5  1088.8  1057.2 2146  --  -- --    Magnesium Sulfate Volume -- -- -- 50 -- 50 -- -- --    Propofol Volume 31.5 36.9 68.5 30.8 49.2 80 -- -- --    IV Volume (NS)   -- -- --    IV  Volume (Fentanyl gtt) -- 12 12 12 12 24 -- -- --    IV Volume (NS BOLUS) -- 1000 1000 -- -- -- -- -- --    Other  --  150 150  120  90 210  --  -- --    Medications (P.O./ Enteral Liquids) -- 150 150 120 90 210 -- -- --    Enteral  --  410 410  720  840 1560  --  -- --    Enteral Volume -- 200 200  -- -- --    Free Water / Tube Flush -- 210 210 120 240 360 -- -- --    Total Intake 363.5 2604.9 2968.5 1928.8 1987.2 3916 -- -- --       Output    Urine  400  540 940  350  405 755  --  -- --    Indwelling Cathether 400 540 940 350 405 755 -- -- --    Drains  --  -- --  0  -- 0  --  -- --    Residual Amount (ml) (Discarded) -- -- -- 0 -- 0 -- -- --    Stool/Urine  200  1400 1600  1000  1650 2650  --  -- --    Ostomy #1 (ml)  -- 1100 1100 1000 1650 2650 -- -- --    Measurable Stool (ml) 200 300 500 -- -- -- -- -- --    Total Output 600 1940 2540 1350 2055 3405 -- -- --       Net I/O     -236.5 664.9 428.5 578.8 -67.8 511 -- -- --           Recent Labs      08/06/17 0843 08/07/17 0059 08/07/17 0445 08/08/17 0445   SODIUM  141   --   145  149*   POTASSIUM  4.9   --   4.0  3.8   CHLORIDE  112   --   120*  125*   CO2  16*   --   15*  20   BUN  56*   --   52*  49*   CREATININE  3.21*   --   2.35*  2.35*   MAGNESIUM   --   1.9  1.8  2.3   PHOSPHORUS   --    --   4.3  3.2   CALCIUM  8.9   --   7.8*  7.6*       GI/Nutrition:  Abd soft ND/NT  Tolerating enteral TF    Liver Function  Recent Labs      08/06/17 0843 08/07/17 0059 08/07/17 0445 08/08/17 0445   ALTSGPT  27   --   25  24   ASTSGOT  55*   --   48*  46*   ALKPHOSPHAT  334*   --   261*  249*   TBILIRUBIN  0.9   --   0.7  0.6   PREALBUMIN   --   9.0*   --    --    GLUCOSE  146*   --   139*  139*       Heme:  Recent Labs      08/06/17   0843  08/07/17 0445 08/08/17 0445   RBC  3.39*  2.71*  2.35*   HEMOGLOBIN  9.7*  8.2*  6.9*   HEMATOCRIT  33.0*  27.0*  23.3*   PLATELETCT  76*  62*  35*   PROTHROMBTM  15.7*   --    --    APTT  33.8    --    --    INR  1.21*   --    --        Infectious Disease:  Monitored Temp  Av.7 °C (98.1 °F)  Min: 36.2 °C (97.2 °F)  Max: 37.2 °C (99 °F)    Recent Labs      17   0843  17   0445  17   WBC  5.0  6.4  3.2*   NEUTSPOLYS  84.20*  91.10*  73.00*   LYMPHOCYTES  8.50*  5.30*  12.60*   MONOCYTES  5.50  2.70  10.70   EOSINOPHILS  0.60  0.00  2.50   BASOPHILS  0.40  0.90  0.30   ASTSGOT  55*  48*  46*   ALTSGPT  27  25  24   ALKPHOSPHAT  334*  261*  249*   TBILIRUBIN  0.9  0.7  0.6     Current Facility-Administered Medications   Medication Dose Frequency Provider Last Rate Last Dose   • norepinephrine (LEVOPHED) 8 mg in  mL Infusion  0-30 mcg/min Continuous Marshall Thakkar M.D.   Stopped at 17 0215   • propofol (DIPRIVAN) injection  0-80 mcg/kg/min Continuous Keegan Alcaraz M.D. 4.1 mL/hr at 17 1445 10 mcg/kg/min at 17 1445   • rifaximin (XIFAXAN) tablet 550 mg  550 mg TID Armando Esteban M.D.   550 mg at 17 2109   • sodium bicarbonate tablet 1,787.5 mg  1,787.5 mg TID Armando Esteban M.D.   1,787.5 mg at 17 2110   • vitamin D (cholecalciferol) tablet 1,000 Units  1,000 Units DAILY Armando Esteban M.D.   1,000 Units at 17 0925   • ondansetron (ZOFRAN) syringe/vial injection 4 mg  4 mg Q4HRS PRN Armando Esteban M.D.       • ondansetron (ZOFRAN ODT) dispertab 4 mg  4 mg Q4HRS PRN Armando Esteban M.D.       • Respiratory Care per Protocol   Continuous RT Keegan Alcaraz M.D.       • senna-docusate (PERICOLACE or SENOKOT S) 8.6-50 MG per tablet 2 Tab  2 Tab BID Keegan Alcaraz M.D.   Stopped at 17 2100    And   • polyethylene glycol/lytes (MIRALAX) PACKET 1 Packet  1 Packet QDAY PRN Keegan Alcaraz M.D.        And   • magnesium hydroxide (MILK OF MAGNESIA) suspension 30 mL  30 mL QDAY PRN Keegan Alcaraz M.D.        And   • bisacodyl (DULCOLAX) suppository 10 mg  10 mg QDAY PRN Keegan Alcaraz,  M.D.       • chlorhexidine (PERIDEX) 0.12 % solution 15 mL  15 mL BID Keegan Alcaraz M.D.   15 mL at 08/07/17 2109   • lidocaine (XYLOCAINE) 1%  injection  1-2 mL Q30 MIN PRN Keegan Alcaraz M.D.       • NS infusion   Continuous Keegan Alcaraz M.D. 83 mL/hr at 08/08/17 0323     • fentaNYL (SUBLIMAZE) injection 25 mcg  25 mcg Q HOUR PRN Keegan Alcaraz M.D.        Or   • fentaNYL (SUBLIMAZE) injection 50 mcg  50 mcg Q HOUR PRN Keegan Alcaraz M.D.        Or   • fentaNYL (SUBLIMAZE) injection 100 mcg  100 mcg Q HOUR PRN Keegan Alcaraz M.D.   100 mcg at 08/06/17 1122   • fentaNYL (SUBLIMAZE) 50 mcg/mL in 50mL   Continuous Keegan Alcaraz M.D. 1 mL/hr at 08/07/17 1854 50 mcg/hr at 08/07/17 1854   • ipratropium-albuterol (DUONEB) nebulizer solution 3 mL  3 mL Q2HRS PRN (RT) Keegan Alcaraz M.D.       • ipratropium-albuterol (DUONEB) nebulizer solution 3 mL  3 mL Q4HRS (RT) Keegan Alcaraz M.D.   3 mL at 08/08/17 0330   • famotidine (PEPCID) tablet 20 mg  20 mg DAILY Keegan Alcaraz M.D.        Or   • famotidine (PEPCID) injection 20 mg  20 mg DAILY Keegan Alcaraz M.D.   20 mg at 08/07/17 0925   • insulin lispro (HUMALOG) injection 2-9 Units  2-9 Units Q6HRS Keegan Alcaraz M.D.   Stopped at 08/08/17 0600   • glucose 4 g chewable tablet 16 g  16 g Q15 MIN PRN Keegan Alcaraz M.D.        And   • dextrose 50% (D50W) injection 25 mL  25 mL Q15 MIN PRN Keegan Alcaraz M.D.       • lactulose 20 GM/30ML solution 30 mL  30 mL Q8HRS Keegan Alcaraz M.D.   30 mL at 08/08/17 0528     Last reviewed on 8/6/2017 10:37 AM by Charlette Jacobson    Quality  Measures:  Labs reviewed, Medications reviewed and Radiology images reviewed                        Assessment and Plan:    VDRF - intubated 8/6   - tolerating SBT - liberate  Acute-on-chronic hypoxemic respiratory failure   - oxygen at night at home  Acute metabolic  encephalopathy   - CT scan and MRI of brain show no acute pathology   - hepatic encephalopathy   - cont lactulose, rifaximin  Anemia with thrombocytopenia   - follow  Chronic kidney disease  Acute alcohol intoxication with blood alcohol level of 0.01  Positive urine drug screen for oxycodone on presentation  Cirrhosis with prior admissions for hepatic encephalopathy  Crohn's disease   - ileostomy in place  Systemic arterial hypertension  Chronic obstructive pulmonary disease   - no acute exacerbation   - cont BDs  History of obstructive sleep apnea   - on oxygen at night  Acute blood loss anemia   - stop SQ heparin   - transfuse PRBCs    Critical Care Time:  35 minutes  64281  No time overlap  Time excludes procedures  Discussed with RN, RT, Team

## 2017-08-08 NOTE — CARE PLAN
Problem: Nutritional:  Goal: Nutrition support tolerated and meeting greater than 85% of estimated needs  Outcome: MET Date Met:  08/08/17  TF at goal + tolerating

## 2017-08-08 NOTE — RESPIRATORY CARE
Extubation    Cuff leak noted yes  Stridor present no              Patient toleration ; Pt tolerated well.     RCP Complete? Yes   Events/Summary/Plan:  Pt SBt for 1,parameters obtained, pt weaning parameters all WNL. Physician notified, verbal order to extubated received. Upon assessment of patient cuff leak p[resent, pt extubated to 4 lpm NC. No stridor. Pts o2 titrated to 2 lpm NC. No signs of distress noted at this time.

## 2017-08-08 NOTE — WOUND TEAM
"[facility]  Inpatient Services  Initial Wound & Skin Care Evaluation    Admission Date:  8/6/2017   HPI, PMH, SH: Reviewed  Unit where seen by Wound Team: [id number]    WOUND CONSULT RELATED TO: pressure injury mgmt    SUBJECTIVE:  intubated    Self Report / Pain Level: shook head \"no\" when asked      OBJECTIVE: on MASON bed, heels floated off pillows  WOUND TYPE, LOCATION, CHARACTERISTICS (Pressure ulcers: location, stage, POA or date identified)  Pressure Ulcer  Unstageable POA Coccyx Midline  Periwound Skin: Discolored  Drainage : Scant, Serous     Tissue Type and %:    50% red edges, 50% yellow center  Wound Edges:    attached    Odor:     None   Exposed structure(s):   No   Signs and Symptoms of Infection: nm    Measurements: taken 8/7/17  Length (cm): 1  Width (cm): 0.8  Depth (cm):  (nm 50% yellow)      Tracts/undermining:   None     INTERVENTIONS BY WOUND TEAM: Ostomy care performed 1st pouch very full. Gloves and removed and hands washed. Pt then turned to R side, sacrococcygeal area viewed and measured. Wound cleaned with NS, dried. Applied mepilex drsg. Pt positioned R side lying.   Dressing Options: Mepilex    Interdisciplinary consultation:   With Nursing;With Patient / Family    EVALUATION: pt has POA unstageable to coccyx, faraz wound non-blanching and purplish, it starts @ anus and proceeds proximally to sacral area. There are small linear fissures to both heels with callused tissue present. Callus firmly adherent, left in place and pt's heels to be floated of boots applied.     Factors affecting wound healing:liver cirrhosis, COPD, Crohn's disease, decreased mobility  Goals: decreased non-viable tissue by autolytic debridement 2% each week      NURSING PLAN OF CARE ORDERS (X):    Dressing changes: See Dressing Maintenance orders: x  Skin care: See Skin Care orders: x  Rectal tube care: See Rectal Tube Care orders:   Other orders:    RSKIN: CURRENT (X) ORDERED (O)  Q shift Wan:  X  Q " "shift pressure point assessments:  X  Pressure redistribution mattress        MASON   x   Bariatric MASON      Bariatric foam        Heel float boots       Heels floated on pillows    x  Barrier wipes      Barrier Cream      Barrier paste      Sacral silicone dressing    As drsg  Padded O2 tubing      Anchorfast    x  Trach with Optifoam split foam       Waffle cushion      Rectal tube or BMS      Antifungal tx    Turn q 2 hours x  Up to chair  Ambulate   PT/OT     Dietician    x  PO     TF  x TPN     PVN    NPO   # days   Other       WOUND TEAM PLAN OF CARE (X):   NPWT change 3 x week:        Dressing changes by wound team:       Follow up as needed:       Other (explain):    Anticipated discharge plans (X):  SNF:           Home Care:           Outpatient Wound Center:            Self Care:            Other:             [facility]   Inpatient Services   Established Ostomy Management/ troubleshooting  HPI: Reviewed  PMH: Reviewed   SH: Reviewed   Reason for Ostomy nurse consult:  Ileostomy needs  Subjective: intubated  Objective: appliance intact, pouch full  Ostomy type: ileostomy  Stoma location: LLQ  Stoma assessment:    Appearance:  Red, moist, round   Size: 1\"   Protrusion: ~1\"   MC jxn: resolved   Peristomal skin: purplish     Ostomy Appliance (type and size): 2 piece 2.25\" with convex ring  Assessment: pt has functioning ileostomy, getting lactulose for ammonia level. High output pouch placed.  Interventions:   Removed appliance, cleaned skin, cut barrier to one inch, applied 1\" convex ring to barrier, applied barrier to ckin, attached pouch and connected to dd.  Pt education: none  Plan:Nsg to perform ostomy care. Ostomy nurses to follow for ostomy needs PRN.  Anticipated discharge needs:none    "

## 2017-08-08 NOTE — PROGRESS NOTES
Patients HGB came back at 6.9 and platelets at 35.    Hospitalist paged orders received to:     Infuse 1u PRBC's, hold subq heparin, repeat CBC at 1300.

## 2017-08-08 NOTE — CARE PLAN
Problem: Communication  Goal: The ability to communicate needs accurately and effectively will improve  Intervention: [place] patient and significant other/support system to call light to alert staff of needs  completed

## 2017-08-09 NOTE — THERAPY
"Speech Language Therapy Clinical Swallow Evaluation completed.  Functional Status: Clinical swallow evaluation completed on this date.  Patient asking for water upon entering the room.  She followed directives to the oral [facility] exam which revealed mild lingual weakness.  Presentation of PO included ice chips, nectars, purees, soft solids, and thin liquids.  The patient presented with delayed initiation of swallow trigger x2-3 seconds and adequate laryngeal elevation upon palpation.  Mastication was mildly prolonged but functional.  She had consistent reflexive throat clearing and 2-3 swallows per single sip of thins but no overt s/sx of aspiration with any other consistency consumed.  The patient was impulsive at times and required min-mod cues to reduce bolus size.  At this time, recommend Dysphagia II/NTL diet with close monitoring during all PO intake.  Consider holding tube feeding but keep Cortrak in place until reassment tomorrow by SLP to ensure diet tolerance.     Recommendations - Diet: Diet / Liquid Recommendation: Nectar Thick Full Liquid, Dysphagia II                          Strategies: Monitor during meals, No Straws and Head of Bed at 90 Degrees                          Medication Administration: Medication Administration : Float Whole with Puree, Crush all Medications in Puree  Plan of Care: Will benefit from Speech Therapy 3 times per week  Post-Acute Therapy: Discharge to a transitional care facility for continued skilled therapy services.    See \"Rehab Therapy-Acute\" Patient Summary Report for complete documentation.   "

## 2017-08-09 NOTE — PROGRESS NOTES
Pulmonary Critical Care Progress Note      Date of service:  8/9/2017      Interval Events:  24 hour interval history reviewed  Reason for visit:  Respiratory failure, acute metabolic encephalopathy       - CXR with improved edema   - SR   - 2 L NC   - transfer    Feels better today.  Throat a little sore.  No SOB or increased cough.    Review of Systems   Constitutional: Negative for fever, chills, weight loss, malaise/fatigue and diaphoresis.   HENT: Positive for sore throat. Negative for congestion, ear discharge, ear pain, hearing loss, nosebleeds and tinnitus.    Eyes: Negative for blurred vision, double vision, photophobia, pain, discharge and redness.   Respiratory: Positive for cough. Negative for hemoptysis, sputum production, shortness of breath, wheezing and stridor.    Cardiovascular: Negative for chest pain, palpitations, orthopnea, claudication and PND.   Gastrointestinal: Negative for heartburn, nausea, vomiting and blood in stool.   Genitourinary: Negative for dysuria, urgency, frequency and hematuria.   Musculoskeletal: Negative for myalgias, back pain, joint pain, falls and neck pain.   Skin: Negative for itching and rash.   Neurological: Negative for dizziness, tingling, tremors, focal weakness, seizures, loss of consciousness, weakness and headaches.   Endo/Heme/Allergies: Negative for environmental allergies and polydipsia. Does not bruise/bleed easily.   Psychiatric/Behavioral: Negative for suicidal ideas and hallucinations. The patient is not nervous/anxious and does not have insomnia.            PFSH:  No change.    Respiratory:     Pulse Oximetry: 97 %  CXR personally reviewed  CXR with increased LLL opacification  Few coarse crackles    Recent Labs      08/06/17   0943  08/07/17   0452  08/08/17   0537   ISTATAPH  7.303*  7.301*  7.327*   ISTATAPCO2  36.5  30.8  33.4   ISTATAPO2  190*  118*  85   ISTATATCO2  19*  16*  19*   [id number]  100*  98  96   ISTATARTHCO3  18.0  15.2*  17.5    ISTATARTBE  -8*  -10*  -8*   ISTATTEMP  96.6 F  98.2 F  97.2 F   ISTATFIO2  60  40  30   ISTATSPEC  Arterial  Arterial  Arterial   ISTATAPHTC  7.318*  7.304*  7.339*   [id number]  184*  117*  81       HemoDynamics:  Pulse: 89, Heart Rate (Monitored): 86  Blood Pressure : (!) 96/52 mmHg, NIBP: 104/57 mmHg  CVP (mm Hg): (!) 234 MM HG  SR  No JVD or increased edema    Recent Labs      08/06/17   1810  08/07/17   0059  08/07/17   0445   TROPONINI  0.75*  0.48*  0.37*       Neuro:  Awake and alert  No focal weakness  Fully oriented    Fluids:  Intake/Output       08/07/17 0700 - 08/08/17 0659 08/08/17 0700 - 08/09/17 0659 08/09/17 0700 - 08/10/17 0659      5329-6768 1325-8353 Total 7911-7668 1364-2242 Total 6437-7851 7728-6448 Total       Intake    P.O.  --  -- --  --  0 0  --  -- --    P.O. -- -- -- -- 0 0 -- -- --    I.V.  1088.8  1057.2 2146  352.4  -- 352.4  --  -- --    Magnesium Sulfate Volume 50 -- 50 -- -- -- -- -- --    Propofol Volume 30.8 49.2 80 16.4 -- 16.4 -- -- --    IV Volume (NS)  332 -- 332 -- -- --    IV Volume (Fentanyl gtt) 12 12 24 4 -- 4 -- -- --    Blood  --  -- --  350  -- 350  --  -- --    Volume (RELEASE RED BLOOD CELLS) -- -- -- 350 -- 350 -- -- --    Other  120  90 210  80  -- 80  --  -- --    Medications (P.O./ Enteral Liquids) 120 90 210 80 -- 80 -- -- --    Enteral  720  840 1560  640  550 1190  --  -- --    Enteral Volume  500 400 900 -- -- --    Free Water / Tube Flush 120 240 360 140 150 290 -- -- --    Total Intake 1928.8 1987.2 3916 1422.4 550 1972.4 -- -- --       Output    Urine  350  405 755  345  500 845  --  -- --    Indwelling Cathether 350 405 755 345 500 845 -- -- --    Drains  0  -- 0  --  0 0  --  -- --    Residual Amount (ml) (Discarded) 0 -- 0 -- 0 0 -- -- --    Stool/Urine  1000  1650 2650  1500  800 2300  --  -- --    Ostomy #1 (ml)  1000 1650 2650 6534 063 0513 -- -- --    Total Output 1350 2055 3405 1845 1300 3145 -- -- --       Net I/O      578.8 -67.8 511 -422.6 -750 -1172.6 -- -- --        Weight: 72.2 kg (159 lb 2.8 oz)  Recent Labs      17   0119   SODIUM  145  149*  150*   POTASSIUM  4.0  3.8  4.2   CHLORIDE  120*  125*  124*   CO2  15*  20  22   BUN  52*  49*  50*   CREATININE  2.35*  2.35*  2.05*   MAGNESIUM  1.8  2.3  2.1   PHOSPHORUS  4.3  3.2  2.7   CALCIUM  7.8*  7.6*  7.9*       GI/Nutrition:  Abd soft ND/NT    Liver Function  Recent Labs      17   0059  17   0119   ALTSGPT   --   25  24  28   ASTSGOT   --   48*  46*  49*   ALKPHOSPHAT   --   261*  249*  241*   TBILIRUBIN   --   0.7  0.6  1.2   PREALBUMIN  9.0*   --    --    --    GLUCOSE   --   139*  139*  145*       Heme:  Recent Labs      17   0119   RBC  3.39*   < >  2.35*  2.97*  2.90*   HEMOGLOBIN  9.7*   < >  6.9*  8.5*  8.5*   HEMATOCRIT  33.0*   < >  23.3*  28.6*  27.9*   PLATELETCT  76*   < >  35*  58*  37*   PROTHROMBTM  15.7*   --    --    --    --    APTT  33.8   --    --    --    --    INR  1.21*   --    --    --    --     < > = values in this interval not displayed.       Infectious Disease:  Temp  Av.1 °C (97 °F)  Min: 36.1 °C (97 °F)  Max: 36.1 °C (97 °F)  Monitored Temp  Av.4 °C (97.6 °F)  Min: 36.1 °C (97 °F)  Max: 37.1 °C (98.8 °F)    Recent Labs      17/17   0119   WBC  6.4  3.2*  4.1*  3.8*   NEUTSPOLYS  91.10*  73.00*  85.10*  77.20*   LYMPHOCYTES  5.30*  12.60*  3.50*  9.90*   MONOCYTES  2.70  10.70  5.30  9.70   EOSINOPHILS  0.00  2.50  2.60  2.60   BASOPHILS  0.90  0.30  0.90  0.30   ASTSGOT  48*  46*   --   49*   ALTSGPT  25  24   --   28   ALKPHOSPHAT  261*  249*   --   241*   TBILIRUBIN  0.7  0.6   --   1.2     Current Facility-Administered Medications   Medication Dose Frequency Provider Last Rate Last Dose   • lactulose 20 GM/30ML solution 30 mL  30 mL  Q8HRS Bethel Wilhelm M.D.   Stopped at 08/08/17 1400   • norepinephrine (LEVOPHED) 8 mg in  mL Infusion  0-30 mcg/min Continuous Marshall Thakkar M.D.   Stopped at 08/07/17 0215   • rifaximin (XIFAXAN) tablet 550 mg  550 mg TID Armando Esteban M.D.   550 mg at 08/08/17 2029   • sodium bicarbonate tablet 1,787.5 mg  1,787.5 mg TID Armando Esteban M.D.   1,787.5 mg at 08/08/17 2029   • vitamin D (cholecalciferol) tablet 1,000 Units  1,000 Units DAILY Armando Esteban M.D.   1,000 Units at 08/08/17 0825   • ondansetron (ZOFRAN) syringe/vial injection 4 mg  4 mg Q4HRS PRN Armando Esteban M.D.       • ondansetron (ZOFRAN ODT) dispertab 4 mg  4 mg Q4HRS PRN Armando Esteban M.D.       • Respiratory Care per Protocol   Continuous RT Keegan Alcaraz M.D.       • senna-docusate (PERICOLACE or SENOKOT S) 8.6-50 MG per tablet 2 Tab  2 Tab BID Keegan Alcaraz M.D.   Stopped at 08/08/17 1956    And   • polyethylene glycol/lytes (MIRALAX) PACKET 1 Packet  1 Packet QDAY PRN Keegan Alcaraz M.D.        And   • magnesium hydroxide (MILK OF MAGNESIA) suspension 30 mL  30 mL QDAY PRN Keegan Alcaraz M.D.        And   • bisacodyl (DULCOLAX) suppository 10 mg  10 mg QDAY PRN Keegan Alcaraz M.D.       • ipratropium-albuterol (DUONEB) nebulizer solution 3 mL  3 mL Q2HRS PRN (RT) Keegan Alcaraz M.D.       • famotidine (PEPCID) tablet 20 mg  20 mg DAILY Keegan Alcaraz M.D.        Or   • famotidine (PEPCID) injection 20 mg  20 mg DAILY Keegan Alcaraz M.D.   20 mg at 08/08/17 0825   • insulin lispro (HUMALOG) injection 2-9 Units  2-9 Units Q6HRS Keegan Alcaraz M.D.   Stopped at 08/08/17 0600   • glucose 4 g chewable tablet 16 g  16 g Q15 MIN PRN Keegan Alcaraz M.D.        And   • dextrose 50% (D50W) injection 25 mL  25 mL Q15 MIN PRN Keegan Alcaraz M.D.         Last reviewed on 8/6/2017 10:37 AM by Charlette Jacobson    Quality   Measures:  Labs reviewed, Medications reviewed and Radiology images reviewed                        Assessment and Plan:    S/P VDRF - intubated 8/6-8  Acute-on-chronic hypoxemic respiratory failure   - oxygen at night at home  Acute metabolic encephalopathy   - CT scan and MRI of brain show no acute pathology   - hepatic encephalopathy   - cont lactulose, rifaximin  Anemia with thrombocytopenia   - follow  Chronic kidney disease  Acute alcohol intoxication with blood alcohol level of 0.01  Positive urine drug screen for oxycodone on presentation  Cirrhosis with prior admissions for hepatic encephalopathy  Crohn's disease   - ileostomy in place  Systemic arterial hypertension  Chronic obstructive pulmonary disease   - no acute exacerbation   - cont BDs  History of obstructive sleep apnea   - on oxygen at night    OK to transfer out of ICU.  [facility] will sign off on transfer.    Discussed with RN, RT, Team

## 2017-08-09 NOTE — THERAPY
"Physical Therapy Evaluation completed.   Bed Mobility:  Supine to Sit: Minimal Assist  Transfers: Sit to Stand: Minimal Assist  Gait: Level Of Assist: Minimal Assist with Front-Wheel Walker       Plan of Care: Continue 3x week Discharge Recommendations: Equipment: Will Continue to Assess for Equipment Needs. Post-acute therapy Discharge to a transitional care facility for continued skilled therapy services.    See \"Rehab Therapy-Acute\" Patient Summary Report for complete documentation.     "

## 2017-08-09 NOTE — CARE PLAN
Problem: Safety  Goal: Will remain free from injury  Instruct pt to call for assistance.  Have call light within at all times.  Keep bed low with siderails up.  Activate bed alarm.    Problem: Mobility  Goal: Risk for activity intolerance will decrease  Assist with mobility as ida.  ROM and needed.

## 2017-08-09 NOTE — THERAPY
"Occupational Therapy Evaluation completed.   Functional Status: Pt seen today for OT eval. Pt is CGA for functional mobility with FWW. Max A LB dressing. SUpv grooming seated. Pt pleasant, delayed at times with odd affect. Pt limited by weakness, fatigue, and impaired balance which impacts independence in ADLs and functional mobility.  Plan of Care: Will benefit from Occupational Therapy 3 times per week  Discharge Recommendations:  Equipment: Will Continue to Assess for Equipment Needs. Post-acute therapy undetermined - depends on progress made.    See \"Rehab Therapy-Acute\" Patient Summary Report for complete documentation.    "

## 2017-08-09 NOTE — CARE PLAN
Problem: Venous Thromboembolism (VTW)/Deep Vein Thrombosis (DVT) Prevention:  Goal: Patient will participate in Venous Thrombosis (VTE)/Deep Vein Thrombosis (DVT)Prevention Measures  scd's in place. Platelet low. Generalized bruising noted. No active bleeding noted.     Problem: Respiratory:  Goal: Respiratory status will improve  Pt on 2l. Respirations shallow, unlabored, equal.

## 2017-08-09 NOTE — PROGRESS NOTES
"[facility] Hospitalist Progress Note    Date of Service: 2017    Chief Complaint  76 y.o. female admitted 2017 with altered mental status    Interval Problem Update  Ms. Tenorio has a hx of BAKER that presented to the ER with severe encephalopathy and an ammonia level of 190 requiring intubation. She has been admitted to the ICU on the vent for aggressive treatment of her hepatic encephalopathy.    The patient is intubated with ET tube in place  She is able to nod her head and follows simple commands  She indicates that ET tube is bothersome  Shakes head \"no\" to pain  BM that she is unable to write interval history  Significant liquid output through ostomy    Consultants/Specialty  Pulmonology    Disposition  ICU        Review of Systems   Unable to perform ROS: intubated      Physical Exam  Laboratory/Imaging   Hemodynamics  Temp (24hrs), Av.1 °C (97 °F), Min:36.1 °C (97 °F), Max:36.1 °C (97 °F)   Temperature: 36.1 °C (97 °F), Monitored Temp: 36.6 °C (97.9 °F)  Pulse  Av  Min: 63  Max: 161 Heart Rate (Monitored): (!) 104  Blood Pressure : (!) 96/52 mmHg, NIBP: 108/55 mmHg CVP (mm Hg): (!) 238 MM HG    Respiratory  Chandler Vent Mode: APVCMV, Rate (breaths/min): 18, PEEP/CPAP: 8, PEEP/CPAP: 8, FiO2: 30, P Peak (PIP): 16, P MEAN: 9.3   Respiration: (!) 11, Pulse Oximetry: 99 %     Work Of Breathing / Effort: Vented  RUL Breath Sounds: Clear, RML Breath Sounds: Clear, RLL Breath Sounds: Diminished, REMY Breath Sounds: Clear, LLL Breath Sounds: Diminished    Fluids    Intake/Output Summary (Last 24 hours) at 17 1734  Last data filed at 17 1400   Gross per 24 hour   Intake 3445.8 ml   Output   2770 ml   Net  675.8 ml       Nutrition  Orders Placed This Encounter   Procedures   • Diet NPO     Standing Status: Standing      Number of Occurrences: 1      Standing Expiration Date:      Order Specific Question:  Type:     Answer:  Now [1]     Order Specific Question:  Restrict to:     Answer:  Strict " [1]     Physical Exam   Constitutional: No distress.   HENT:   Head: Normocephalic and atraumatic.   Eyes: Conjunctivae are normal. Right eye exhibits no discharge. Left eye exhibits no discharge.   Cardiovascular:   Distant and regular   Pulmonary/Chest:   Equal chest rise and fall  Mechanical breath sounds bilaterally  No overt wheezing     Abdominal: Soft.   ostomy   Genitourinary:   Loaiza catheter.    Neurological:   Awake, moves all extremities   Skin: Skin is warm and dry. No rash noted. She is not diaphoretic. No erythema.       Recent Labs      08/06/17   0843 08/07/17 0445 08/08/17 0445   WBC  5.0  6.4  3.2*   RBC  3.39*  2.71*  2.35*   HEMOGLOBIN  9.7*  8.2*  6.9*   HEMATOCRIT  33.0*  27.0*  23.3*   MCV  97.3  99.6*  99.1*   MCH  28.6  30.3  29.4   MCHC  29.4*  30.4*  29.6*   RDW  54.5*  55.7*  56.0*   PLATELETCT  76*  62*  35*     Recent Labs      08/06/17   0843  08/07/17 0445  08/08/17 0445   SODIUM  141  145  149*   POTASSIUM  4.9  4.0  3.8   CHLORIDE  112  120*  125*   CO2  16*  15*  20   GLUCOSE  146*  139*  139*   BUN  56*  52*  49*   CREATININE  3.21*  2.35*  2.35*   CALCIUM  8.9  7.8*  7.6*     Recent Labs      08/06/17   0843   APTT  33.8   INR  1.21*         Recent Labs      08/06/17   1105   TRIGLYCERIDE  109          Assessment/Plan     * Respiratory failure (CMS-HCC) (present on admission)  Assessment & Plan  Done to protect her airway, placed on the vent 8/6  Mental status has improved, wean support as tolerated    Anemia (present on admission)  Assessment & Plan  Acute on chronic anemia  Transfuse 1 unit packed red blood cells    Hepatic encephalopathy (CMS-HCC) (present on admission)  Assessment & Plan  Ammonia was 190 in the ER  Continue lactulose, goals to output of 600 mL per day    Crohn's disease (HCC) (present on admission)  Assessment & Plan  Has ostomy in place    Chronic kidney disease, stage IV (severe) (CMS-HCC) (present on admission)  Assessment & Plan  baseline is  around 2.5    ORAL (acute kidney injury) (CMS-HCC) (present on admission)  Assessment & Plan  Acute on chronic renal failure  Is improving she is near baseline    Elevated troponin (present on admission)  Assessment & Plan  Troponin went up to 0.75 and has trended down  Echo with normal ejection fraction, no report of wall motion abnormality    Essential hypertension, benign (present on admission)  Assessment & Plan  Holding outpatient medications for hypotension    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    Metabolic acidosis, NAG, bicarbonate losses (present on admission)  Assessment & Plan  -likely multi-factorial with dehydration    Decubitus ulcer of sacral region, stage 1 (present on admission)  Assessment & Plan  Present on admission  Wound care    Thrombocytopenia (CMS-HCC) (present on admission)  Assessment & Plan  Likely secondary to cirrhosis.       Labs reviewed, Medications reviewed and Radiology images reviewed  Loaiza catheter: Unconscious / Sedated Patient on a Ventilator      DVT Prophylaxis: Heparin

## 2017-08-09 NOTE — PROGRESS NOTES
Indra from Lab called with critical result of Platelet 37 at 0253. Critical lab result read back to Indra.   Dr. Garcia notified of critical lab result at 0315.  Critical lab result read back by Dr. Garcia.

## 2017-08-09 NOTE — PROGRESS NOTES
Up to chair with 2 person assist. Ostomy appliance changed. Ostomy site with excoriation, redness, purplish around the site. Noticed more  Formed stool in the bag.

## 2017-08-10 NOTE — PROGRESS NOTES
[facility] Hospitalist Progress Note    Date of Service: 2017    Chief Complaint  76 y.o. Female with history of cirrhosis and also colitis admitted 2017 with altered mental status, she required intubation    Interval Problem Update    Extubated today  Oxygenation is stable on nasal cannula  She does not feel short of breath  Continues to have significant liquid stool output into ostomy  No longer confused or lethargic    Consultants/Specialty  Pulmonary    Disposition  Was just extubated today keep in ICU overnight      Review of Systems   Unable to perform ROS: intubated      Physical Exam  Laboratory/Imaging   Hemodynamics  No data recorded.   Monitored Temp: 36.5 °C (97.7 °F)  Pulse  Av.6  Min: 63  Max: 161 Heart Rate (Monitored): 86  NIBP: 107/53 mmHg     Respiratory      Respiration: 19, Pulse Oximetry: 99 %, O2 Daily Delivery Respiratory : Silicone Nasal Cannula     Work Of Breathing / Effort: Mild  RUL Breath Sounds: Clear, RML Breath Sounds: Diminished, RLL Breath Sounds: Diminished, REMY Breath Sounds: Clear, LLL Breath Sounds: Diminished    Fluids    Intake/Output Summary (Last 24 hours) at 17 1751  Last data filed at 17 1600   Gross per 24 hour   Intake   1770 ml   Output   2155 ml   Net   -385 ml       Nutrition  Orders Placed This Encounter   Procedures   • DIET ORDER     Standing Status: Standing      Number of Occurrences: 1      Standing Expiration Date:      Order Specific Question:  Diet:     Answer:  Regular [1]     Order Specific Question:  Texture/Fiber modifications:     Answer:  Dysphagia 2(Pureed/Chopped)specify fluid consistency(question 6) [2]     Order Specific Question:  Consistency/Fluid modifications:     Answer:  Nectar Thick [2]     Physical Exam   Constitutional: She is oriented to person, place, and time. No distress.   HENT:   Head: Normocephalic and atraumatic.   Eyes: Conjunctivae are normal. Right eye exhibits no discharge. Left eye exhibits no discharge. No  scleral icterus.   Cardiovascular:   Distant and regular   Pulmonary/Chest: Effort normal and breath sounds normal. No respiratory distress. She has no wheezes.        Abdominal: Soft. There is no rebound and no guarding.   ostomy   Genitourinary:   Loaiza catheter.    Neurological: She is alert and oriented to person, place, and time. No cranial nerve deficit.   Skin: Skin is warm and dry. No rash noted. She is not diaphoretic. No erythema.       Recent Labs      08/08/17   0445  08/08/17   1720  08/09/17   0119   WBC  3.2*  4.1*  3.8*   RBC  2.35*  2.97*  2.90*   HEMOGLOBIN  6.9*  8.5*  8.5*   HEMATOCRIT  23.3*  28.6*  27.9*   MCV  99.1*  96.3  96.2   MCH  29.4  28.6  29.3   MCHC  29.6*  29.7*  30.5*   RDW  56.0*  63.5*  62.0*   PLATELETCT  35*  58*  37*     Recent Labs      08/07/17   0445  08/08/17   0445  08/09/17   0119   SODIUM  145  149*  150*   POTASSIUM  4.0  3.8  4.2   CHLORIDE  120*  125*  124*   CO2  15*  20  22   GLUCOSE  139*  139*  145*   BUN  52*  49*  50*   CREATININE  2.35*  2.35*  2.05*   CALCIUM  7.8*  7.6*  7.9*                      Assessment/Plan     * Respiratory failure (CMS-HCC) (present on admission)  Assessment & Plan  Respiratory failure was secondary to hepatic encephalopathy  She has been extubated and is stable on nasal cannula    Anemia (present on admission)  Assessment & Plan  Improved    Hepatic encephalopathy (CMS-HCC) (present on admission)  Assessment & Plan  Ammonia was 190 in the ER  Continue lactulose, goal to output of 600 mL per day  Discussed with the patient today she's apparently had trouble affording outpatient rifaximin  We'll discuss with social work, I do not know if there is an assistance program available for this medication    Crohn's disease (HCC) (present on admission)  Assessment & Plan  Has ostomy in place    Chronic kidney disease, stage IV (severe) (CMS-HCC) (present on admission)  Assessment & Plan  baseline is around 2.5    ORAL (acute kidney injury)  (CMS-HCC) (present on admission)  Assessment & Plan  Acute on chronic renal failure  Near baseline creatinine at this point    Elevated troponin (present on admission)  Assessment & Plan  Troponin went up to 0.75 and has trended down  Echo with normal ejection fraction, no report of wall motion abnormality    Essential hypertension, benign (present on admission)  Assessment & Plan  Holding outpatient medications for hypotension    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    Metabolic acidosis, NAG, bicarbonate losses (present on admission)  Assessment & Plan  -likely multi-factorial with dehydration    Decubitus ulcer of sacral region, stage 1 (present on admission)  Assessment & Plan  Present on admission  Wound care    Thrombocytopenia (CMS-HCC) (present on admission)  Assessment & Plan  Likely secondary to cirrhosis.       Labs reviewed, Medications reviewed and Radiology images reviewed  Loaiza catheter: Unconscious / Sedated Patient on a Ventilator      DVT Prophylaxis: Heparin

## 2017-08-10 NOTE — PROGRESS NOTES
[facility]    Date of Service: 8/10/2017    Chief Complaint  76 y.o. female admitted 2017 with hepatic encephalopathy.    Interval Problem Update  Improved, alert and oriented.     Consultants/Specialty  none    Disposition  Continue monitoring for today, probably d/c in am.         Review of Systems   Constitutional: Negative for fever.   Eyes: Negative for blurred vision.   Respiratory: Negative for cough.    Cardiovascular: Negative for chest pain.   Gastrointestinal: Negative for heartburn.   Genitourinary: Negative for dysuria.   Musculoskeletal: Negative for myalgias.   Skin: Negative for rash.   Neurological: Negative for dizziness and headaches.   Endo/Heme/Allergies: Does not bruise/bleed easily.   Psychiatric/Behavioral: Negative for depression.      Physical Exam  Laboratory/Imaging   Hemodynamics  Temp (24hrs), Av.6 °C (97.8 °F), Min:36.4 °C (97.5 °F), Max:36.8 °C (98.2 °F)   Temperature: 36.5 °C (97.7 °F), Monitored Temp: 36.5 °C (97.7 °F)  Pulse  Av.1  Min: 62  Max: 161 Heart Rate (Monitored): 79  Blood Pressure : 111/58 mmHg, NIBP: 114/55 mmHg     Respiratory      Respiration: 16, Pulse Oximetry: 98 %, O2 Daily Delivery Respiratory : Silicone Nasal Cannula     Work Of Breathing / Effort: Mild  RUL Breath Sounds: Clear, RML Breath Sounds: Diminished, RLL Breath Sounds: Diminished, REMY Breath Sounds: Clear, LLL Breath Sounds: Diminished    Fluids    Intake/Output Summary (Last 24 hours) at 08/10/17 1331  Last data filed at 08/10/17 1100   Gross per 24 hour   Intake   1260 ml   Output   2650 ml   Net  -1390 ml       Nutrition  Orders Placed This Encounter   Procedures   • DIET ORDER     Standing Status: Standing      Number of Occurrences: 1      Standing Expiration Date:      Order Specific Question:  Diet:     Answer:  Regular [1]     Order Specific Question:  Texture/Fiber modifications:     Answer:  Dysphagia 2(Pureed/Chopped)specify fluid consistency(question 6)  [2]     Order Specific Question:  Consistency/Fluid modifications:     Answer:  Thin Liquids [3]     Physical Exam   Constitutional: She is oriented to person, place, and time. She appears well-developed.   HENT:   Head: Normocephalic.   Mouth/Throat: No oropharyngeal exudate.   Eyes: Right eye exhibits no discharge. Left eye exhibits no discharge.   Neck: Normal range of motion. Neck supple. No JVD present.   Cardiovascular: Normal rate and regular rhythm.    Pulmonary/Chest: Effort normal and breath sounds normal. No respiratory distress. She has no wheezes.   Abdominal: Soft. Bowel sounds are normal. She exhibits no distension.   Musculoskeletal: Normal range of motion.   Neurological: She is alert and oriented to person, place, and time.   Skin: No rash noted.   Nursing note and vitals reviewed.      Recent Labs      08/08/17   1720  08/09/17   0119  08/10/17   0306   WBC  4.1*  3.8*  3.7*   RBC  2.97*  2.90*  2.80*   HEMOGLOBIN  8.5*  8.5*  8.1*   HEMATOCRIT  28.6*  27.9*  26.9*   MCV  96.3  96.2  96.1   MCH  28.6  29.3  28.9   MCHC  29.7*  30.5*  30.1*   RDW  63.5*  62.0*  58.6*   PLATELETCT  58*  37*  53*     Recent Labs      08/08/17   0445  08/09/17   0119  08/10/17   0306   SODIUM  149*  150*  143   POTASSIUM  3.8  4.2  4.8   CHLORIDE  125*  124*  119*   CO2  20  22  20   GLUCOSE  139*  145*  83   BUN  49*  50*  50*   CREATININE  2.35*  2.05*  1.89*   CALCIUM  7.6*  7.9*  7.9*                      Assessment/Plan     * Respiratory failure (CMS-HCC) (present on admission)  Assessment & Plan  Respiratory failure was secondary to hepatic encephalopathy, resolved, extubated.     Anemia (present on admission)  Assessment & Plan  Improved, at baseline now.     Hepatic encephalopathy (CMS-HCC) (present on admission)  Assessment & Plan  Ammonia was 190 in the ER  Ammonia trending down,  Can't afford Rifaximin, S.W to see if can help with that.     Crohn's disease (HCC) (present on admission)  Assessment &  Plan  Has ostomy in place, stable.     Chronic kidney disease, stage IV (severe) (CMS-HCC) (present on admission)  Assessment & Plan  baseline is around 2.5, stable.     ORAL (acute kidney injury) (CMS-HCC) (present on admission)  Assessment & Plan  On CKD, Cr is trending down now.     Elevated troponin (present on admission)  Assessment & Plan  Troponin went up to 0.75 and has trended down  Echo with normal ejection fraction, no report of wall motion abnormality  Denies chest pain.     Essential hypertension, benign (present on admission)  Assessment & Plan  Keep holding po meds for now, keep monitoring VS.     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   Back to baseline.       Chronic pain syndrome (present on admission)  Assessment & Plan  UDS shows positivity for oxycodone. No complains today.     Cirrhosis with portal hypertension (present on admission)  Assessment & Plan  Stable, LFT's trending down, ammonia is improved.     Metabolic acidosis, NAG, bicarbonate losses (present on admission)  Assessment & Plan  -likely multi-factorial with dehydration, resolved.     Decubitus ulcer of sacral region, stage 1 (present on admission)  Assessment & Plan  Present on admission  Wound care, stable.     Thrombocytopenia (CMS-HCC) (present on admission)  Assessment & Plan  due to cirrhosis.     Labs reviewed, Medications reviewed and Radiology images reviewed        DVT Prophylaxis: Contraindicated - Anemia requiring blood transfusion

## 2017-08-10 NOTE — PROGRESS NOTES
During pt rounds at 0300, pt reports removing tape from nose. Cortrak noted to be dislodged at 50cm. Removed the whole cortrak tubing per pt request. Awaiting reevaluation from SLP. Pt tolerating diet and fluids without coughing or signs of aspirations. Pt is happy that she does not have the cortrak anymore.

## 2017-08-10 NOTE — CARE PLAN
Problem: Safety  Goal: Will remain free from injury  Outcome: PROGRESSING AS EXPECTED  Bed locked and in lowest position, non skid socks in place, bed alarm on, call light in reach    Problem: Knowledge Deficit  Goal: Knowledge of disease process/condition, treatment plan, diagnostic tests, and medications will improve  Outcome: PROGRESSING AS EXPECTED  Updated with plan of care, slp reeval today-tolerating dys 2 thin liqs

## 2017-08-10 NOTE — CARE PLAN
Problem: Safety  Goal: Will remain free from falls  Intervention: Assess risk factors for falls    08/10/17 0500   OTHER   Fall Risk High Risk to Fall - 2 or more points    Risk for Injury-Any positive answers results in the pt being at high risk for fall related injury Not Applicable   Mobility Status Assessment 2-2 Healthcare Providers Required for Assistance with Ambulation & Transfer   History of fall 2   Date of Last Fall 04/01/17   Pt Calls for Assistance Yes       Intervention: Implement fall precautions    08/09/17 0800 08/10/17 0500   OTHER   Environmental Precautions --  Report Given to Other Health Care Providers Regarding Fall Risk   IV Pole on Same Side of Bed as Bathroom --  Yes   Chair/Bed Strip Alarm --  Yes - Alarm On   Bed Alarm (Built in - for ICU ONLY) Yes - Alarm On --            Problem: Venous Thromboembolism (VTW)/Deep Vein Thrombosis (DVT) Prevention:  Goal: Patient will participate in Venous Thrombosis (VTE)/Deep Vein Thrombosis (DVT)Prevention Measures  Outcome: PROGRESSING AS EXPECTED

## 2017-08-10 NOTE — PROGRESS NOTES
Assessed pt to be alert and oriented x3-4. Observed to be pleasant and cooperative with care. S/p transfer from RICU. Noted pt has diet order as well as tube feeding order. Per pt, she has not been receiving tube feeding through her cortrak. Tolerated pudding and thickened liquids without coughing or signs of aspirations. Pt insisting that she needs to be reevaluated with her swallowing because she wants to drink regular consistency fluids. Will relay to day RN to discuss with MD. Held lactulose and stool softeners d/t excessive output from illeostomy. Changed appliance wafer and bag d/t leaking. Pt able to participate in turnings, coccyx wound noted, dressings changed. Pt's platelet critically low, no active bleeding. Injury precautions in placed. Q2hrs turning done. Continued on oxygen, encouraged deep breathing exercises. Pt has hearing deficits, modulated voice to communicate well. Pt does not have concern at this time. Kept safe and comfortable and provided needs.

## 2017-08-10 NOTE — PROGRESS NOTES
2-RN skin check done. Assessed to have poor skin condition, multiple major bruising for bilateral upper extremities and scabbings to bilateral lower extremities. Noted to have cracks with scabs to both heels. 1x1cm non-intact skin to coccyx area. Noted discolorations to sacrum. Noted redness to right abdominal fold (moisture related). Proper skin protocols and dressings in placed.

## 2017-08-10 NOTE — PROGRESS NOTES
Pt doing well, denies pain and no distress noted. Oriented x4 throughout day, tolerating thin liq diet. Ostomy with lrg liquid output.  Updated with plan of care, call light in reach and encourage to call for assistance.

## 2017-08-10 NOTE — THERAPY
"Speech Language Therapy dysphagia treatment completed.   Functional Status:  Patient seen for dysphagia therapy on this date during AM meal of dysphagia 2/nectars and PO trials of thin liquids.  Upon entering the room, the patient stated, \"Just because I don't swallow like you doesn't mean it's wrong!\"  She was upset that she had been on nectar thick liquids.  Provided education regarding aspiration risks following intubation, s/sx of aspiration during evaluation yesterday, and SLP POC.  The patient presented with functional mastication and bolus manipulation, timely initiation of swallow trigger and adequate laryngeal elevation upon palpation.  She had mild oral residue but cleared with a liquid wash.  The patient had no overt s/sx of aspiration with any consistency consumed.    Recommendations: At this time, recommend diet upgrade to dysphagia 2/thins as tolerated.  Please help the patient up to a chair for all meals.  SLP following    Plan of Care: Will benefit from Speech Therapy 3 times per week  Post-Acute Therapy: Discharge to home with outpatient or home health for additional skilled therapy services.    See \"Rehab Therapy-Acute\" Patient Summary Report for complete documentation.     "

## 2017-08-11 NOTE — PROGRESS NOTES
Received call from lab for critical lab of platelets 40. KELLEN Godoy notified. No new orders received.

## 2017-08-11 NOTE — FACE TO FACE
Face to Face Supporting Documentation - Home Health    The encounter with this patient was in whole or in part the primary reason for home health admission.    Date of encounter:   Patient:                    MRN:                       YOB: 2016  Cristina Tenorio  [id number]  1940     Home health to see patient for:  Skilled Nursing care for assessment, interventions & education  PT/OT.  Skilled need for:  Exacerbation of Chronic Disease State cirrhosis    Skilled nursing interventions to include:  Line/Drain/Airway education and care   Ostomy.     Homebound status evidenced by:  Need the aid of supportive devices such as crutches, canes, wheelchairs or walkers. Leaving home requires a considerable and taxing effort. There is a normal inability to leave the home.    Community Physician to provide follow up care: Roshni Morillo M.D.     Optional Interventions? No      I certify the face to face encounter for this home health care referral meets the CMS requirements and the encounter/clinical assessment with the patient was, in whole, or in part, for the medical condition(s) listed above, which is the primary reason for home health care. Based on my clinical findings: the service(s) are medically necessary, support the need for home health care, and the homebound criteria are met.  I certify that this patient has had a face to face encounter by myself.  Willam Herzog M.D. - NPI: [id number]

## 2017-08-11 NOTE — PROGRESS NOTES
Received report. Discussed plan of care. Denies pain at this time. Bed alarm on, call light within reach, bed in lowest position.

## 2017-08-11 NOTE — DISCHARGE PLANNING
Medical Social Work  Patient made it very clear she will not go back to a skilled, telling me she has been to them three times and will not go back again.  Patient has received H/H through [facility] before and would like to use them again.    Faxed [facility] to [facility].

## 2017-08-11 NOTE — PROGRESS NOTES
[facility] Hospitalist Progress Note    Date of Service: 2017    Chief Complaint  76 y.o. female admitted 2017 with hepatic encephalopathy.    Interval Problem Update  Improved, alert and oriented, follows commands, she wants to go home today. She will need HHC.     Consultants/Specialty  none    Disposition  Continue monitoring for today, probably d/c if HHC is arranged         Review of Systems   Constitutional: Negative for fever.   Eyes: Negative for blurred vision.   Respiratory: Negative for cough.    Cardiovascular: Negative for chest pain.   Gastrointestinal: Negative for heartburn.   Genitourinary: Negative for dysuria.   Musculoskeletal: Negative for myalgias.   Skin: Negative for rash.   Neurological: Negative for dizziness and headaches.   Endo/Heme/Allergies: Does not bruise/bleed easily.   Psychiatric/Behavioral: Negative for depression.      Physical Exam  Laboratory/Imaging   Hemodynamics  Temp (24hrs), Av.4 °C (97.6 °F), Min:36.3 °C (97.3 °F), Max:36.7 °C (98.1 °F)   Temperature: 36.4 °C (97.5 °F)  Pulse  Av.9  Min: 62  Max: 161    Blood Pressure : 101/44 mmHg (nurse aware)     Respiratory      Respiration: 18, Pulse Oximetry: 96 %, O2 Daily Delivery Respiratory : Room Air with O2 Available     Given By:: Mouthpiece, Work Of Breathing / Effort: Mild  RUL Breath Sounds: Clear, RML Breath Sounds: Diminished, RLL Breath Sounds: Diminished, REMY Breath Sounds: Clear, LLL Breath Sounds: Diminished    Fluids    Intake/Output Summary (Last 24 hours) at 17 1450  Last data filed at 17 0624   Gross per 24 hour   Intake      0 ml   Output   1600 ml   Net  -1600 ml       Nutrition  Orders Placed This Encounter   Procedures   • DIET ORDER     Standing Status: Standing      Number of Occurrences: 1      Standing Expiration Date:      Order Specific Question:  Diet:     Answer:  Regular [1]     Order Specific Question:  Texture/Fiber modifications:     Answer:  Dysphagia  2(Pureed/Chopped)specify fluid consistency(question 6) [2]     Order Specific Question:  Consistency/Fluid modifications:     Answer:  Thin Liquids [3]     Physical Exam   Constitutional: She is oriented to person, place, and time. She appears well-developed.   HENT:   Head: Normocephalic.   Mouth/Throat: No oropharyngeal exudate.   Eyes: Right eye exhibits no discharge. Left eye exhibits no discharge.   Neck: Normal range of motion. Neck supple. No JVD present.   Cardiovascular: Normal rate and regular rhythm.    Pulmonary/Chest: Effort normal and breath sounds normal. No respiratory distress. She has no wheezes.   Abdominal: Soft. Bowel sounds are normal. She exhibits no distension.   Musculoskeletal: Normal range of motion.   Neurological: She is alert and oriented to person, place, and time.   Skin: No rash noted.   Nursing note and vitals reviewed.      Recent Labs      08/09/17   0119  08/10/17   0306  08/11/17   0259   WBC  3.8*  3.7*  3.6*   RBC  2.90*  2.80*  2.94*   HEMOGLOBIN  8.5*  8.1*  8.4*   HEMATOCRIT  27.9*  26.9*  29.0*   MCV  96.2  96.1  98.6*   MCH  29.3  28.9  28.6   MCHC  30.5*  30.1*  29.0*   RDW  62.0*  58.6*  57.6*   PLATELETCT  37*  53*  40*     Recent Labs      08/09/17   0119  08/10/17   0306  08/11/17   0259   SODIUM  150*  143  138   POTASSIUM  4.2  4.8  5.0   CHLORIDE  124*  119*  115*   CO2  22  20  16*   GLUCOSE  145*  83  95   BUN  50*  50*  56*   CREATININE  2.05*  1.89*  1.82*   CALCIUM  7.9*  7.9*  7.7*                      Assessment/Plan     * Respiratory failure (CMS-HCC) (present on admission)  Assessment & Plan  Respiratory failure was secondary to hepatic encephalopathy, resolved, extubated.     Anemia (present on admission)  Assessment & Plan   at baseline now.     Hepatic encephalopathy (CMS-HCC) (present on admission)  Assessment & Plan  Ammonia was 190 in the ER  Ammonia trending down,   Rifaximin and lactulose.     Crohn's disease (HCC) (present on  admission)  Assessment & Plan  Has ostomy in place, stable.     Chronic kidney disease, stage IV (severe) (CMS-HCC) (present on admission)  Assessment & Plan  baseline is around 2.5, stable.     ORAL (acute kidney injury) (CMS-HCC) (present on admission)  Assessment & Plan  Has CKD stage 4, Cr is stable trending down.     Elevated troponin (present on admission)  Assessment & Plan  Troponin went up to 0.75 and has trended down  Echo with normal ejection fraction, no report of wall motion abnormality  Denies chest pain.     Essential hypertension, benign (present on admission)  Assessment & Plan   holding po meds for now, keep monitoring VS.     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       Chronic pain syndrome (present on admission)  Assessment & Plan  UDS shows positivity for oxycodone. No complains today.     Cirrhosis with portal hypertension (present on admission)  Assessment & Plan  Stable, LFT's trending down, ammonia is improving, she is alert and oriented.     Metabolic acidosis, NAG, bicarbonate losses (present on admission)  Assessment & Plan  -likely multi-factorial with dehydration, resolved.     Decubitus ulcer of sacral region, stage 1 (present on admission)  Assessment & Plan  Present on admission  Wound care, stable.     Thrombocytopenia (CMS-HCC) (present on admission)  Assessment & Plan  due to cirrhosis.       Labs reviewed and Medications reviewed        DVT Prophylaxis: Contraindicated - Anemia requiring blood transfusion

## 2017-08-11 NOTE — RESPIRATORY CARE
COPD EDUCATION by COPD CLINICAL EDUCATOR  8/11/2017 at 7:23 AM by Debora Inman     Patient interviewed by COPD education team. Patient refused COPD program at this time.

## 2017-08-11 NOTE — CARE PLAN
Problem: Knowledge Deficit  Goal: Knowledge of disease process/condition, treatment plan, diagnostic tests, and medications will improve  Intervention: Explain information regarding disease process/condition, treatment plan, diagnostic tests, and medications and document in education  Educated patient on the purpose of bicarb medication. Patient verbalized understanding.       Problem: Skin Integrity  Goal: Risk for impaired skin integrity will decrease  Intervention: Assess and monitor skin integrity, appearance and/or temperature  Assessed area around stoma for skin breakdown. No signs of skin breakdown.

## 2017-08-11 NOTE — DIETARY
Nutrition note:  Dave removed yesterday, as pt tolerating po diet.  Pt is on a dysphagia 2 diet with thin liquids per SLP.  Please re-consult RD if po intake poor.  RD will monitor per dept policy.

## 2017-08-11 NOTE — PROGRESS NOTES
Received bedside report and accepted care of patient.  Patient currently resting in bed in no visible or stated distress.  Bed controls on and bed in locked position.  Bed alarm on.  Call light and personal possessions within reach.  Plan of care to include order reconciliation as ICU orders still listed in patient's chart, assistance with ADL's, skin care per wound care instructions, and possible discharge today. Patient states that she believes she has a UTI.  Will discuss with MD in AM rounds. Patient verbalizes agreement with plan of care, and has no additional questions or concerns at this time.  Will continue to update notes/plan of care as needed throughout shift.

## 2017-08-11 NOTE — CARE PLAN
Problem: Safety  Goal: Will remain free from falls  Intervention: Implement fall precautions    08/09/17 0800 08/10/17 0500 08/11/17 0800   OTHER   Environmental Precautions --  --  Treaded Slipper Socks on Patient;Personal Belongings, Wastebasket, Call Bell etc. in Easy Reach;Report Given to Other Health Care Providers Regarding Fall Risk;Bed in Low Position;Communication Sign for Patients & Families;Mobility Assessed & Appropriate Sign Placed   IV Pole on Same Side of Bed as Bathroom --  Yes --    Bedrails --  --  Bedrails Closest to Bathroom Down   Chair/Bed Strip Alarm --  --  Yes - Alarm On   Bed Alarm (Built in - for ICU ONLY) Yes - Alarm On --  --            Problem: Bowel/Gastric:  Goal: Will not experience complications related to bowel motility  Intervention: Implement interventions to promote bowel evacuation if inadequate bowel movements in past 48 hours  Patient with ostomy in place.  Stool softener scheduled, as well as lactulose.  Patient refused stool softener, agreed to lactulose.  Flushed ostomy drainage bag PRN throughout shift.

## 2017-08-12 NOTE — PROGRESS NOTES
Pt and  educated regarding discharge instructions. Questions and concerns addressed at this time. IV removed. H/H referral in place. Transport via wheelchair and ride home with .

## 2017-08-12 NOTE — PROGRESS NOTES
Pt feeling well today and eager to discharge. SW stated that H/H referral response likely not until Mon. MD aware and ok with discharging pt so long as pt is all right with waiting until Mon for H/H response. Pt has been receiving H/H prior to this visit, no anticipated problems.

## 2017-08-12 NOTE — DISCHARGE SUMMARY
Hospital Medicine Discharge Note     Admit Date:  8/6/2017       Discharge Date:   8/12/2017    Attending Physician:  Star Salcido     Diagnoses (includes active and resolved):   Principal Problem:    Respiratory failure (CMS-HCC) POA: Yes  Active Problems:    Anemia POA: Yes    Hepatic encephalopathy (CMS-HCC) POA: Yes    Crohn's disease (HCC) POA: Yes      Overview: ICD-10 transition    Chronic kidney disease, stage IV (severe) (CMS-HCC) POA: Yes    ORAL (acute kidney injury) (CMS-HCC) POA: Yes    Elevated troponin POA: Yes    Cirrhosis with portal hypertension POA: Yes    Metabolic acidosis, NAG, bicarbonate losses POA: Yes    Decubitus ulcer of sacral region, stage 1 POA: Yes    Thrombocytopenia (CMS-HCC) POA: Yes    Essential hypertension, benign POA: Yes    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 female w/h/o multiple medical problems including Crohn's disease s/p colostomy and cirrhosis 2/2 BAKER presented with unresponsiveness. Patient was intubated and brought to the ICU. There, she was found to have hepatic encephalopathy. Her ammonia was 190 initially. It trended down with medications. Patient was previously prescribed rifaximin but could not afford it. Patient also had elevated troponin initially that was likely demand ischemia and this trended down. U tox was only positive for oxycodone. Patient did have metabolic acidosis non-anion gap likely due to bicarbonate loss, this resolved with IV fluids. Patient's mentation improved and was able to be transferred to the floor and then subsequently discharged home. She does not need any refills of her medications she says. She did have a UTI and will be given several days of oral antibiotics for this.      Consultants:      Pulmonologist Dr. [facility]    Procedures:        Central line  placement    Discharge Medications:           Medication List      START taking these medications       Instructions    cefdinir 300 MG Caps   Last time this was given:  300 mg on 8/12/2017  8:24 AM   Commonly known as:  OMNICEF    Take 1 Cap by mouth every 12 hours for 2 days.   Dose:  300 mg         CONTINUE taking these medications       Instructions    ferrous gluconate 324 (38 FE) MG Tabs   Commonly known as:  FERGON    Take 324 mg by mouth every morning with breakfast.   Dose:  324 mg       lactulose 20 GM/30ML Soln   Last time this was given:  30 mL on 8/11/2017  1:53 PM    Take 30 mL by mouth 2 Times a Day.   Dose:  30 mL       omeprazole 20 MG delayed-release capsule   Last time this was given:  40 mg on 8/12/2017  8:24 AM   Commonly known as:  PRILOSEC    Take 40 mg by mouth every day.   Dose:  40 mg       potassium Chloride ER 20 MEQ Tbcr tablet   Commonly known as:  K-TAB    Take 1 Tab by mouth every day.   Dose:  20 mEq       Probiotic Caps    Take 2 Caps by mouth every morning.   Dose:  2 Cap       sodium bicarbonate 650 MG Tabs   Last time this was given:  1,787.5 mg on 8/12/2017  8:25 AM   Commonly known as:  SODIUM BICARBONATE    Take 3 Tabs by mouth 3 times a day.   Dose:  1850 mg       vitamin D 1000 UNIT Tabs   Last time this was given:  1,000 Units on 8/12/2017  8:25 AM   Commonly known as:  cholecalciferol    Take 1,000 Units by mouth every day.   Dose:  1000 Units           Disposition:   Discharge home    Diet:   Regular    Activity:   As tolerated    Code status:   Full code    Primary Care Provider:    Roshni Morillo M.D.    Follow up appointment details :      PCP in 2 weeks  No follow-up provider specified.  Future Appointments  Date Time Provider Department Center   8/16/2017 1:30 PM [facility] [address]   8/23/2017 1:30 PM [facility] [address]   8/30/2017 1:30 PM [facility] [address]       Pending Studies:        None    Time spent on discharge day  patient visit: 43 minutes    #################################################    Interval history/exam for day of discharge:    Filed Vitals:    08/11/17 1605 08/11/17 1910 08/12/17 0410 08/12/17 0700   BP: 127/51 120/65 109/52 107/60   Pulse: 93 89 95 90   Temp: 36.8 °C (98.2 °F) 36.4 °C (97.5 °F) 36.3 °C (97.3 °F) 36.7 °C (98.1 °F)   Resp: 17 17 17 15   Height:       Weight:   75.1 kg (165 lb 9.1 oz)    SpO2: 95% 100% 94% 98%     Weight/BMI: Body mass index is 32.33 kg/(m^2).  Pulse Oximetry: 98 %, O2 (LPM): 2, O2 Delivery: Nasal Cannula    General:  NAD  CVS:  RRR  PULM:  CTAB, no respiratory distress    Most Recent Labs:    Lab Results   Component Value Date/Time    WBC 4.9 08/12/2017 09:04 AM    RBC 3.06* 08/12/2017 09:04 AM    HEMOGLOBIN 8.7* 08/12/2017 09:04 AM    HEMATOCRIT 28.4* 08/12/2017 09:04 AM    MCV 92.8 08/12/2017 09:04 AM    MCH 28.4 08/12/2017 09:04 AM    MCHC 30.6* 08/12/2017 09:04 AM    MPV 13.9* 07/13/2017 11:33 AM    NEUTROPHILS-POLYS 82.30* 08/10/2017 03:06 AM    LYMPHOCYTES 7.10* 08/10/2017 03:06 AM    MONOCYTES 3.50 08/10/2017 03:06 AM    EOSINOPHILS 5.30 08/10/2017 03:06 AM    BASOPHILS 0.00 08/10/2017 03:06 AM    HYPOCHROMIA 2+ 08/08/2017 05:20 PM    ANISOCYTOSIS 1+ 08/10/2017 03:06 AM      Lab Results   Component Value Date/Time    SODIUM 138 08/12/2017 09:04 AM    POTASSIUM 4.9 08/12/2017 09:04 AM    CHLORIDE 113* 08/12/2017 09:04 AM    CO2 17* 08/12/2017 09:04 AM    GLUCOSE 94 08/12/2017 09:04 AM    BUN 69* 08/12/2017 09:04 AM    CREATININE 1.93* 08/12/2017 09:04 AM      Lab Results   Component Value Date/Time    ALT(SGPT) 24 08/10/2017 03:06 AM    AST(SGOT) 38 08/10/2017 03:06 AM    ALKALINE PHOSPHATASE 207* 08/10/2017 03:06 AM    TOTAL BILIRUBIN 1.0 08/10/2017 03:06 AM    DIRECT BILIRUBIN 0.5 10/07/2016 03:11 AM    LIPASE 58 06/29/2017 01:05 PM    ALBUMIN 2.2* 08/10/2017 03:06 AM    GLOBULIN 2.2 08/10/2017 03:06 AM    PRE-ALBUMIN 9.0* 08/07/2017 12:59 AM    INR 1.21* 08/06/2017 08:43  AM    MACROCYTOSIS 1+ 08/07/2017 04:45 AM     Lab Results   Component Value Date/Time    PT 15.7* 08/06/2017 08:43 AM    INR 1.21* 08/06/2017 08:43 AM        Imaging/ Testing:      DX-CHEST-PORTABLE (1 VIEW)   Final Result      Decreased underinflation atelectasis. Superimposed air space disease not excluded.      DX-CHEST-PORTABLE (1 VIEW)   Final Result      Increased underinflation atelectasis. Superimposed air space disease not excluded.      DX-CHEST-PORTABLE (1 VIEW)   Final Result      Stable bibasilar atelectasis. No new abnormalities.      ECHOCARDIOGRAM-COMP W/ CONT   Final Result      DX-CHEST-LIMITED (1 VIEW)   Final Result      Right internal jugular catheter placement with the tip projecting over the superior vena cava. No pneumothorax is identified.      DX-ABDOMEN FOR TUBE PLACEMENT   Final Result      Feeding tube placement with the tip projecting over the distal stomach      MR-MRA HEAD-W/O   Final Result      There is no aneurysm, stenosis or vascular malformation in the visualized proximal cerebral vasculature.      MR-MRA NECK-W/O   Final Result      There is no evidence of significant abnormality in the visualized extracranial neck arteries.      MR-BRAIN-W/O   Final Result      1.  Multiple abnormal T2 hyperintensities are noted in the subcortical and periventricular white matter and right side of the inferior cerebellar peduncle. These findings likely represents moderate to severe chronic microvascular ischemic disease. The    other less likely differential diagnosis includes demyelinating plaques of multiple sclerosis.   2.  There is no acute infarct.   3.  Mild cerebral atrophy      BR-[id number]-5 VIEW   Final Result      1.  Vascular clips in the right upper quadrant and surgical mesh anchors in the lower abdomen and pelvis.      2.  NG tube in place.      3.  No other metallic density identified.      DX-CHEST-LIMITED (1 VIEW)   Final Result      1.  ETT tip projects in satisfactory  position.      2.  No focal consolidation.      CT-HEAD W/O   Final Result      1.  No acute intracranial process.      2.  Atrophy and small vessel ischemic change.          Instructions:      The patient was instructed to return to the ER in the event of worsening symptoms. I have counseled the patient on the importance of compliance and the patient has agreed to proceed with all medical recommendations and follow up plan indicated above.   The patient understands that all medications come with benefits and risks. Risks may include permanent injury or death and these risks can be minimized with close reassessment and monitoring.

## 2017-08-12 NOTE — CARE PLAN
Problem: Safety  Goal: Will remain free from injury  Bed alarm on bed.  Pt safety teaching reinforced

## 2017-08-14 NOTE — DISCHARGE PLANNING
Spoke with Bouchra at [facility] they have accepted patient on service.  Discharge Summary faxed to [facility] at [phone] as per request.

## 2017-08-15 NOTE — TELEPHONE ENCOUNTER
Pt called she had cancel her appt past Friday she was at the hospital she wants to come in sooner but your booked this Friday and the next as long its ok from you to book her this Friday I would ask you and I would call the pt back thank you

## 2017-08-16 NOTE — WOUND TEAM
"Advanced [facility]  [facility]  [address]  [place] [address]  [phone] Fax#     Ostomy Note  For 90 Day Certification Period:  7/14/17-10/14/17    Referring Provider:  Ashlee Dacosta  Primary Provider: Dr. Gilberto Morillo  Consulting Providers: KELLEN Mills  Surgeon: Dr. Gaviria  Start of Care: 7/14/17  Reason for referral: ileostomy eval      SUBJECTIVE:  \"The appliance is lasting about 3 days\"    HPI: 76 year old female pt with a history of Crohn's disease s/p ileostomy, CKD IV, and cirrhosis.  Pt had original ileostomy in [place] in 1992 and a revision was done by Dr. Gaviria (no longer at [facility]) in 2010.  Pt stated that she started to have problems with her skin around the stoma after the revision.  Pt has been getting a 3-4 day wear time with her ileostomy appliance.    Past Medical Hx:   Past Medical History   Diagnosis Date   • Bowel obstruction (CMS-HCC)    • Crohn's disease (CMS-HCC)    • Indigestion    • Obstruction    • Snoring    • Ileostomy in place (CMS-HCC)    • COPD (chronic obstructive pulmonary disease) (CMS-HCC)      per pt   • COPD (chronic obstructive pulmonary disease) (CMS-HCC) 3/20/2013   • Arthritis 12/30/16     to hands and feet   • Heart burn    • Emphysema of lung (CMS-HCC)      COPD   • Sleep apnea    • TRAVIS on CPAP      10/2016-CPAP DC'd and wears O2 @4L/NC   • Hemorrhagic disorder (CMS-HCC)      anemia of unkown etiology.   • Anemia 12/30/16     unknown etiology   • Renal disorder      Increased creatitine level due to meds.   • Breath shortness      uses O2@ at night and prn ([employer]). Uses inhaler prn. 12/30/16-reports no changes    • Cataract 2006,2007     bilat phaco with IOL   • Cirrhosis of liver (CMS-HCC) 12/30/16     states dx at one time but no treatment for >10yrs   • Occasional tremors        Medications:no changes  Allergies: Sulfa drugs      Fall Risk Assessment (alli all that apply with an X): + " "fall risk, completed at initial eval    OBJECTIVE:     STOMA ASSESSMENT:   Type:  ___X_Ileostomy     ____Colostomy    ____Urostomy    ____Other     Location:   LLQ   Size: 1 3/8\" (pt currently using precut 1 1/4\" round barriers)   Shape: oval   Color: red, budded   Protrudes:      ___ >1 inch               __X_ <1 inch   [place]:  center   Mucocutaneous Junction:  intact   Gale-stomal Skin Problems: scant denudation with oval of dark purple tissue peristomally, appears ecchymotic, consistent with shape of convex flange. Pt reports liver problems, suspect underlying varices. I asked Radha FOREMAN to see pt    Effluent / Flatus: yellow brown liquid   Photo  ____ Yes _X___ No    Pouching Procedure:   Old appliance removed.    Peristomal skin cleansed with: water and warm wash cloth   Peristomal skin treated with: jagdish medical adhesive spray   Ostomy appliance used: 2 1/4\" CTF softflex one piece FLAT - NO CONVEXITY due to probable underlying varices    Patient Education:   Verbal/demonstration instructions of above pouching procedure provided to patient    Response: Pt verbalized understanding       PLAN:  return to clinic 1x/week until peristomal issues resolve.    Supplies Needed:   Appliance type:    Jagdish 1 piece  2 /14\" CTF FLAT softflex             Other:      Accessories:    Pt does not want a belt (pt has tried this in the past and does not like it)     Supplier:    Frequency:  1x/week and PRN       Professional Collaboration:  None today      At the time of each visit, a thorough assessment of the patient is completed to assure appropriateness of our plan of care.  The plan of care may need to be adapted from the original plan of care to address any significant changes in patient status.    Clinician Signature:  _________________________________  Date:  ____________    Physician Signature:  ________________________________  Date:  ____________       "

## 2017-08-18 PROBLEM — S50.02XA CONTUSION OF LEFT ELBOW: Status: ACTIVE | Noted: 2017-01-01

## 2017-08-18 PROBLEM — M79.602 PAIN OF LEFT UPPER EXTREMITY: Status: ACTIVE | Noted: 2017-01-01

## 2017-08-18 PROBLEM — S52.125A CLOSED NONDISPLACED FRACTURE OF HEAD OF LEFT RADIUS: Status: ACTIVE | Noted: 2017-01-01

## 2017-08-18 NOTE — MR AVS SNAPSHOT
Cristina Carreno Coby   2017 1:15 PM   Office Visit   MRN: [id number]    Department:  [facility]   Dept Phone:  [phone]    Description:  Female : 1940   Provider:  Jigar Barragan PA-C           Reason for Visit     Fall left knee and left arm [id number], bruised and swollen      Allergies as of 2017     Allergen Noted Reactions    Sulfa Drugs 2017   Hives, Itching      You were diagnosed with     Fall, initial encounter   [160867]       Contusion of left elbow, initial encounter   [359418]       Nausea   [035586]       Closed nondisplaced fracture of head of left radius, initial encounter   [601808]       Head injury, initial encounter   [638202]       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   [facility] ([address] [place] [address])    [address]  [place] [address]   [phone]            Aug 30, 2017  1:30 PM   Ostomy 30 Minute Follow Up with Yandy Eagle R.N.   [facility] ([address] [place] [address])    [address]  [place] [address]   [phone]            Sep 06, 2017  2:00 PM   Ostomy 30 Minute Follow Up with Yandy Eagle R.N.   [facility] ([address] [place] [address])    [address]  [place] [address]   [phone]            Sep 13, 2017  1:00 PM   Ostomy 30 Minute Follow Up with Yandy Eagle R.N.   [facility] ([address] [place] [address])    [address] [facility] [address]  [place] [address]   [phone]            Sep 15, 2017  3:45 PM   Follow Up Visit  with Arik Vee M.D.   [facility] ([address])    [address] [place] [address], [address]  [place] [address] [phone]           You will be receiving a confirmation call a few days before your appointment from our automated call confirmation system.              Problem List              ICD-10-CM Priority Class Noted - Resolved    Essential hypertension, benign I10   6/23/2011 - Present    Crohn's disease (HCC) K50.90 Medium  6/23/2011 - Present    Acute bronchitis J20.9   3/20/2013 - Present    Hypoxia R09.02   3/20/2013 - Present    COPD (chronic obstructive pulmonary disease) (HCC) J44.9   3/20/2013 - Present    Bronchitis J40   3/30/2014 - Present    Bandemia D72.825   3/30/2014 - Present    Splenomegaly R16.1   2/26/2016 - Present    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 D61.818   2/26/2016 - Present    TRAVIS on CPAP G47.33, Z99.89 Low  5/3/2016 - Present    Vitamin D deficiency E55.9   9/2/2016 - Present    Acidosis E87.2   9/2/2016 - Present    Liver cirrhosis (CMS-HCC) K74.60   10/5/2016 - Present    Hypomagnesemia E83.42   10/6/2016 - Present    Hypocalcemia E83.51   10/6/2016 - Present    Anemia D64.9 High  1/13/2017 - Present    Left humeral fracture S42.302A   1/26/2017 - Present    Hepatic cirrhosis (CMS-HCC) K74.60   1/26/2017 - Present    Tremor R25.1   1/28/2017 - Present    Cirrhosis with portal hypertension K74.60 Low  4/1/2017 - Present    GERD (gastroesophageal reflux disease) K21.9   4/1/2017 - Present    Obesity (BMI 30.0-34.9) E66.9   4/1/2017 - Present    Chronic pain with narcotic dependence G89.29   4/1/2017 - Present    Hyperchloremic metabolic acidosis E87.2   4/7/2017 - Present    Chronic kidney disease, stage IV (severe) (CMS-HCC) N18.4 Medium  4/7/2017 - Present    Hx of portal hypertension Z86.79   4/11/2017 - Present    Transaminitis R74.0   4/11/2017 - Present    Left shoulder pain M25.512   4/11/2017 - Present    Metabolic acidosis, NAG,  bicarbonate losses E87.2 Low  4/12/2017 - Present    Hepatic encephalopathy (CMS-HCC) K72.90 High  5/24/2017 - Present    Bacteremia due to Gram-positive bacteria A49.9   5/25/2017 - Present    UTI (urinary tract infection) N39.0 High  6/29/2017 - Present    Near syncope R55 High  6/30/2017 - Present    Decubitus ulcer of sacral region, stage 1 L89.151 Low  6/30/2017 - Present    Hypokalemia E87.6   7/7/2017 - Present    Respiratory failure (CMS-HCC) J96.90 High  8/6/2017 - Present    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) K75.81, K74.60   8/6/2017 - Present    Chronic pain syndrome G89.4   8/6/2017 - Present    ORAL (acute kidney injury) (CMS-HCC) N17.9 Medium  8/6/2017 - Present    Elevated troponin R74.8 Medium  8/7/2017 - Present    Thrombocytopenia (CMS-HCC) D69.6 Low  8/7/2017 - Present    Contusion of left elbow S50.02XA   8/18/2017 - Present    Pain of left upper extremity M79.602   8/18/2017 - Present    Closed nondisplaced fracture of head of left radius S52.125A   8/18/2017 - Present      Health Maintenance        Date Due Completion Dates    IMM DTaP/Tdap/Td Vaccine (1 - Tdap) 10/6/1960 ---    PAP SMEAR 10/6/1962 ---    MAMMOGRAM 10/6/1981 ---    COLONOSCOPY 10/6/1991 ---    IMM ZOSTER VACCINE 10/6/2001 ---    BONE DENSITY 10/6/2006 ---    IMM INFLUENZA (1) 9/1/2017 10/6/2016, 11/15/2013, 11/20/2012            Current Immunizations     13-VALENT PCV PREVNAR 10/6/2016    Influenza TIV (IM) 11/15/2013, 11/20/2012    Influenza Vaccine Adult HD 10/6/2016  2:55 PM    Pneumococcal polysaccharide vaccine (PPSV-23) 11/20/2010      Below and/or attached are the medications your provider expects you to take. Review all of your home medications and newly ordered medications with your provider and/or pharmacist. Follow medication instructions as directed by your provider and/or pharmacist. Please keep your medication list with you and share with your provider. Update the information when medications are  discontinued, doses are changed, or new medications (including over-the-counter products) are added; and carry medication information at all times in the event of emergency situations     Allergies:  SULFA DRUGS - Hives,Itching               Medications  Valid as of: August 18, 2017 -  5:13 PM    Generic Name Brand Name Tablet Size Instructions for use    Cholecalciferol (Tab) cholecalciferol 1000 UNIT Take 1,000 Units by mouth every day.        Ferrous Gluconate (Tab) FERGON 324 (38 Fe) MG Take 324 mg by mouth every morning with breakfast.        Lactulose (Solution) lactulose 20 GM/30ML Take 30 mL by mouth 2 Times a Day.        Omeprazole (CAPSULE DELAYED RELEASE) PRILOSEC 20 MG Take 40 mg by mouth every day.        Potassium Chloride (Tab CR) K-TAB 20 MEQ Take 1 Tab by mouth every day.        Probiotic Product (Cap) Probiotic  Take 2 Caps by mouth every morning.        Sodium Bicarbonate (Tab) SODIUM BICARBONATE 650 MG Take 3 Tabs by mouth 3 times a day.        .                 Medicines prescribed today were sent to:     [facility] [address] [place] [address] [place] AT [facility] [employer] [address] [place] [address] [place] [address]    Phone: [phone] Fax: [phone]    Open 24 Hours?: No      Medication refill instructions:       If your prescription bottle indicates you have medication refills left, it is not necessary to call your provider’s office. Please contact your pharmacy and they will refill your medication.    If your prescription bottle indicates you do not have any refills left, you may request refills at any time through one of the following ways: The online [employer] system (except Urgent Care), by calling your provider’s office, or by asking your pharmacy to contact your provider’s office with a refill request. Medication refills are processed only during regular business hours and may not be available until the next business day. Your provider may request additional information  or to have a follow-up visit with you prior to refilling your medication.   *Please Note: Medication refills are assigned a new Rx number when refilled electronically. Your pharmacy may indicate that no refills were authorized even though a new prescription for the same medication is available at the pharmacy. Please request the medicine by name with the pharmacy before contacting your provider for a refill.        Your To Do List     Future Labs/Procedures Complete By Expires    DX-ELBOW-COMPLETE 3+ LEFT  As directed 8/18/2018    DX-FOREARM LEFT  As directed 8/18/2018      Referral     A referral request has been sent to our patient care coordination department. Please allow 3-5 business days for us to process this request and contact you either by phone or mail. If you do not hear from us by the 5th business day, please call us at [phone].           [facility] Access Code: Activation code not generated  Current [facility] Status: Active

## 2017-08-18 NOTE — PROGRESS NOTES
"Subjective:      Cristina Tenorio is a 76 y.o. female who presents with Fall            Fall  Associated symptoms include nausea and vomiting.     Chief Complaint   Patient presents with   • Fall     left knee and left arm [id number], bruised and swollen       HPI:  Cristina Tenorio is a 76 y.o. female who presents with fall that occurred yesterday.  Left elbow pain.  Also hit her head.  No dizziness but having nausea.  Fall was not witnessed, occurred in a restaurant bathroom.  Also having left knee pain anterior aspect.    Discharged from hospital: 8/6-8/12 due to being found unresponsive, patient was found to be acidotic with hepatic encephalopathy. She was initially sent to the ICU requiring intubation. She has not had any follow-up with her PCP.    She is dizzy. She feels unsteady with walking. She has nausea and active vomiting in front of me.  Past Medical History   Diagnosis Date   • Bowel obstruction (CMS-HCC)    • Crohn's disease (CMS-HCC)    • Indigestion    • Obstruction    • Snoring    • Ileostomy in place (CMS-HCC)    • COPD (chronic obstructive pulmonary disease) (CMS-HCC)      per pt   • COPD (chronic obstructive pulmonary disease) (CMS-HCC) 3/20/2013   • Arthritis 12/30/16     to hands and feet   • Heart burn    • Emphysema of lung (CMS-HCC)      COPD   • Sleep apnea    • TRAVIS on CPAP      10/2016-CPAP DC'd and wears O2 @4L/NC   • Hemorrhagic disorder (CMS-HCC)      anemia of unkown etiology.   • Anemia 12/30/16     unknown etiology   • Renal disorder      Increased creatitine level due to meds.   • Breath shortness      uses O2@ at night and prn ([employer]). Uses inhaler prn. 12/30/16-reports no changes    • Cataract 2006,2007     bilat phaco with IOL   • Cirrhosis of liver (CMS-HCC) 12/30/16     states dx at one time but no treatment for >10yrs   • Occasional tremors        Past Surgical History   Procedure Laterality Date   • Cholecystectomy  2013     \"burst\"   • Bowel resection  1992     " with ileostomy (right side)   • Ileostomy  2010     moved to left side   • Recovery  6/21/2010     Performed by SURGERY, IR-RECOVERY at SURGERY SAME DAY [facility] ORS   • Sigmoidoscopy flex  9/27/2016     Procedure: SIGMOIDOSCOPY FLEX;  Surgeon: Aftab Alegre M.D.;  Location: [facility];  Service:    • Gastroscopy-endo  9/27/2016     Procedure: GASTROSCOPY-ENDO;  Surgeon: Aftab Alegre M.D.;  Location: ENDOSCOPY [facility];  Service:    • Gastroscopy wiithsavary dilatation  9/28/2016     Procedure: GASTROSCOPY WIITH SAVARY DILATATION;  Surgeon: Aftab Alegre M.D.;  Location: ENDOSCOPY [facility];  Service: Gastroenterology   • Gastroscopy w/push enterscopy  9/28/2016     Procedure: GASTROSCOPY W/PUSH ENTERSCOPY;  Surgeon: Aftab Alegre M.D.;  Location: [facility];  Service:    • Gastroscopy-endo N/A 10/5/2016     Procedure: GASTROSCOPY-ENDO;  Surgeon: Aravind Roman M.D.;  Location: ENDOSCOPY [facility];  Service:    • Umbilical hernia repair  2000   • Colonoscopy       Hx of  several    • Other surgical procedure  1994     closed off rectum    • Gastroscopy N/A 1/13/2017     Procedure: GASTROSCOPY - ENTEROSCOPY PUSH;  Surgeon: Boni Brooks M.D.;  Location: SURGERY [facility];  Service:        No family history on file.    Social History     Social History   • Marital Status:      Spouse Name: N/A   • Number of Children: N/A   • Years of Education: N/A     Occupational History   • Not on file.     Social History Main Topics   • Smoking status: Former Smoker -- 50 years     Types: Cigarettes, Cigars     Quit date: 03/30/2013   • Smokeless tobacco: Not on file      Comment: 50yrs 1.5 ppd quit 2009   • Alcohol Use: No      Comment: socially   • Drug Use: No   • Sexual Activity: Not on file     Other Topics Concern   • Not on file     Social History Narrative         Current outpatient prescriptions:   •  sodium bicarbonate, 1,950  mg, Oral, TID, Unknown at Unknown  •  potassium Chloride ER, 20 mEq, Oral, DAILY, Unknown at Unknown  •  lactulose, 30 mL, Oral, BID, Unknown at Unknown  •  Probiotic, 2 Cap, Oral, QAM, Unknwn at Unknown  •  ferrous gluconate, 324 mg, Oral, QDAY with Breakfast, Unknown at Unknown  •  omeprazole, 40 mg, Oral, DAILY, Unknown at Unknown  •  vitamin D, 1,000 Units, Oral, DAILY, Unknown at Unknown    Allergies   Allergen Reactions   • Sulfa Drugs Hives and Itching            Review of Systems   Constitutional: Negative.    HENT: Negative.    Eyes: Negative.    Respiratory: Negative.    Cardiovascular: Negative.    Gastrointestinal: Positive for nausea and vomiting.   Genitourinary: Negative.    Musculoskeletal: Positive for joint pain and falls.   Skin:        Bruising   Neurological: Positive for dizziness.   Endo/Heme/Allergies: Negative.    Psychiatric/Behavioral: Negative.           Objective:     Pulse 86  Temp(Src) 36.9 °C (98.4 °F)  Resp 22  Ht 1.524 m (5')  Wt 74.39 kg (164 lb)  BMI 32.03 kg/m2  SpO2 99%  LMP 06/29/1995     Physical Exam       Constitutional:  Appropriately groomed, pleasant affect, well nourished, and in no acute distress. Presents with walking cane. Presents with .    HEENT:  Head: Atraumatic, normocephalic. Bruising to left upper forehead and right chin.    Ears:  Hearing grossly intact to voice.    Eyes:  Conjunctivae clear, sclera white, and medial canthus without exudate bilaterally.    Lungs:  Lungs with normal respiratory excursion and effort.      Left Knee:  Moderate swelling and ecchymosis involving the anterior aspect with spread to the left medial aspect. Point tenderness over the anterior aspect of the knee and heel joint line. Knee stable with varus and valgus stress.      ROM: Extension 0, Flexion 110.   Patella tracking without crepitus.   No calf tenderness or clinical evidence of a DVT.      Motor Examination: Quad/hamstring 5/5 without atrophy.   Sensation equal  bilaterally to light touch for L4, L5, and S1.    NVS intact, DP 2+.  Capillary refill <2 seconds.      Left elbow:    Significant ecchymosis over the left outer aspect of the elbow. Pain with palpation over the lateral aspect. Pain greatest with supination.   FROM: flexion and extension.    Strength reduced for ext/flexion, pinch ,  strength, and lumbricals/interossei.   Sensation equal bilaterally to light touch for C6, C7, and C8.  NVS intact, Radial and ulnar pulse 2+ and cap refill <2 seconds.    Gait and station antalgic.    Derm:  Multiple skin tears present over left arm. Bruising over her forehead and left chin.   Psychiatric:  Normal judgement, mood and affect.          Assessment/Plan:     1. Fall, initial encounter  DX-ELBOW-COMPLETE 3+ LEFT    DX-FOREARM LEFT    CANCELED: CT-HEAD W/O   2. Contusion of left elbow, initial encounter  DX-ELBOW-COMPLETE 3+ LEFT    DX-FOREARM LEFT    CANCELED: CT-HEAD W/O   3. Nausea  ondansetron (ZOFRAN) syringe/vial injection 4 mg    CANCELED: CT-HEAD W/O   4. Closed nondisplaced fracture of head of left radius, initial encounter     5. Head injury, initial encounter     6. 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 Patient states that she did strike her head and chin left knee, left elbow. She was recently discharged from the hospital on the 12th due to hepatic encephalopathy as she was found nonresponsive. She has a fairly complex past medical history. She is new to me and I do not know her baseline cognitive function.    On exam she does have multiple bruises and very friable skin. Left elbow with edema and pain with supination. X-ray reviewed by me with patient shows a left radial head fracture with a posterior sail sign. Discussed with patient concern for SDH and acid-base abnormality given her history of cirrhosis w secondary to Baker and chronic kidney disease stage IV. Advised to ER  presentation due to concern for delayed in care if managed in the outpatient setting. She is actively vomiting while in the room. She did improve post Zofran injection. Did splint patient's left elbow and referred to orthopedics. Patient is likely not a surgical.     Discussed patient's case with Dr. Canada at [facility] who agreed to transfer patient, she'll be arriving via POV with  driving.    Patient was in agreement with this treatment plan and seemed to understand without barriers.     Please note that this dictation was created using voice recognition software.  I have made every reasonable attempt to correct obvious errors, but I expect there are errors of juma and possibly content that I did not discover before finalizing the note.

## 2017-08-18 NOTE — ED NOTES
"Cristina Tenorio    76 y.o.  Chief Complaint   Patient presents with   • Extremity Fracture     pt states that she fractured her left elbow today because she fell, was seen at urgent care, her arm in in a slpint and sling. CMS intact   • Other     pt states that she has cirrhosis and sometimes the ammonia builds up and she starts falling more frequently \"they thought that a blood test would be in order\"      Pt was referred to ER for the fracture and for am ammonia check. Pt states \"they said they may not even do anything about the break, but to come anyway\" Pt was not referred to an orthopaedic MD at the . Pt assisted to senior lounge with family, instructed on red phone use for assistance.   "

## 2017-08-23 NOTE — WOUND TEAM
"[facility]  [facility]  [address]  [place] [address]  [phone] Fax#     Ostomy Note  For 90 Day Certification Period:  7/14/17-10/14/17    Referring Provider:  Ashlee Dacosta  Primary Provider: Dr. Gilberto Morillo  Consulting Providers: KELLEN Mills  Surgeon: Dr. Gaviria  Start of Care: 7/14/17  Reason for referral: ileostomy eval      SUBJECTIVE:  \"The appliance is lasting about 3 days\"    HPI: 76 year old female pt with a history of Crohn's disease s/p ileostomy, CKD IV, and cirrhosis.  Pt had original ileostomy in [place] in 1992 and a revision was done by Dr. Gaviria (no longer at [facility]) in 2010.  Pt stated that she started to have problems with her skin around the stoma after the revision.  Pt has been getting a 3-4 day wear time with her ileostomy appliance.    Past Medical Hx:   Past Medical History   Diagnosis Date   • Bowel obstruction (CMS-HCC)    • Crohn's disease (CMS-HCC)    • Indigestion    • Obstruction    • Snoring    • Ileostomy in place (CMS-HCC)    • COPD (chronic obstructive pulmonary disease) (CMS-HCC)      per pt   • COPD (chronic obstructive pulmonary disease) (CMS-HCC) 3/20/2013   • Arthritis 12/30/16     to hands and feet   • Heart burn    • Emphysema of lung (CMS-HCC)      COPD   • Sleep apnea    • TRAVIS on CPAP      10/2016-CPAP DC'd and wears O2 @4L/NC   • Hemorrhagic disorder (CMS-HCC)      anemia of unkown etiology.   • Anemia 12/30/16     unknown etiology   • Renal disorder      Increased creatitine level due to meds.   • Breath shortness      uses O2@ at night and prn ([employer]). Uses inhaler prn. 12/30/16-reports no changes    • Cataract 2006,2007     bilat phaco with IOL   • Cirrhosis of liver (CMS-HCC) 12/30/16     states dx at one time but no treatment for >10yrs   • Occasional tremors        Medications:no changes  Allergies: Sulfa drugs      Fall Risk Assessment (alli all that apply with an X): + " "fall risk, completed at initial eval    OBJECTIVE:     STOMA ASSESSMENT:   Type:  ___X_Ileostomy     ____Colostomy    ____Urostomy    ____Other     Location:   LLQ   Size: 1 3/8\" (pt currently using precut 1 1/4\" round barriers)   Shape: oval   Color: red, budded   Protrudes:      ___ >1 inch               __X_ <1 inch   [place]:  center   Mucocutaneous Junction:  intact   Gale-stomal Skin Problems: Previous appt:  scant denudation with oval of dark purple tissue peristomally, appears ecchymotic, consistent with shape of convex flange. Pt reports liver problems, suspect underlying varices. I asked Radha FOREMAN to see pt    Effluent / Flatus: yellow brown liquid   Photo  ____ Yes _X___ No    Pouching Procedure:   Old appliance removed.    Peristomal skin cleansed with: water and warm wash cloth   Peristomal skin treated with: jagdish medical adhesive spray   Ostomy appliance used: 2 1/4\" CTF flextend one piece FLAT - NO CONVEXITY due to probable underlying varices    Patient Education: PT still has purple area around stoma about 1.5 cm circumferentially, but no open areas.   Verbal/demonstration instructions of above pouching procedure provided to patient    Response: Pt verbalized understanding       PLAN:  return to clinic 1x/week until peristomal issues resolve.    Supplies Needed:   Appliance type:    Jagdish 1 piece  2 /14\" CTF FLAT flextend             Other:      Accessories:    Pt does not want a belt (pt has tried this in the past and does not like it)     Supplier:    Frequency:  1x/week and PRN       Professional Collaboration:  None today      At the time of each visit, a thorough assessment of the patient is completed to assure appropriateness of our plan of care.  The plan of care may need to be adapted from the original plan of care to address any significant changes in patient status.    Clinician Signature:  _________________________________  Date:  ____________    Physician Signature:  " ________________________________  Date:  ____________

## 2017-08-24 PROBLEM — K92.1 MELENA: Status: ACTIVE | Noted: 2017-01-01

## 2017-08-24 PROBLEM — D61.818 PANCYTOPENIA (HCC): Status: ACTIVE | Noted: 2017-01-01

## 2017-08-24 PROBLEM — N18.9 RENAL FAILURE (ARF), ACUTE ON CHRONIC (HCC): Status: ACTIVE | Noted: 2017-01-01

## 2017-08-24 PROBLEM — K75.81 NASH (NONALCOHOLIC STEATOHEPATITIS): Status: ACTIVE | Noted: 2017-01-01

## 2017-08-24 PROBLEM — N17.9 RENAL FAILURE (ARF), ACUTE ON CHRONIC (HCC): Status: ACTIVE | Noted: 2017-01-01

## 2017-08-24 PROBLEM — G93.40 ACUTE ENCEPHALOPATHY: Status: ACTIVE | Noted: 2017-01-01

## 2017-08-24 NOTE — ED NOTES
TAMARA DEVLIN FROM HOME, 911 CALLED PER , 2ND TO  ALOC since last night. [facility] reports  confused.  Pt O x2.  Answers questions aprop.  Bilat weakness.  LLE, forehead  bruised, Hx recent fall.FSBS 146.  106/54, hr70's  Pt cleansed of Stool from open colostomy.   arrives at bedside.

## 2017-08-24 NOTE — ED PROVIDER NOTES
ED Provider Note    Scribed for Ganga Castaneda M.D. by Leonardo Pederson. 8/24/2017  12:38 PM    Primary care provider: Roshni Morillo M.D.  Means of arrival: EMS  History obtained from: Patient  History limited by: Altered mental status    CHIEF COMPLAINT  Chief Complaint   Patient presents with   • ALOC       HPI  Cristina Tenorio is a 76 y.o. female with a history of Crohn's disease and cirrhosis who presents to the Emergency Department for evaluation of altered level of consciousness onset this morning. She denies any head pain or elbow pain. Her  is unsure if the patient has been taking her lactulose. She has not fallen since her fall on 8/18 after breaking her elbow. Her  states that she has been walking since her fall. Patient has not yet followed up with an orthopedist for her elbow, but she has made an appointment with Dr. Qureshi, Orthopedics. HPI is limited secondary to altered mental status.    REVIEW OF SYSTEMS  ROS could not be obtained secondary to altered mental status.    PAST MEDICAL HISTORY   has a past medical history of Bowel obstruction (CMS-[facility]); Crohn's disease (CMS-HCC); Indigestion; Obstruction; Snoring; Ileostomy in place (CMS-HCC); COPD (chronic obstructive pulmonary disease) (CMS-HCC); COPD (chronic obstructive pulmonary disease) (CMS-HCC) (3/20/2013); Arthritis (12/30/16); Heart burn; Emphysema of lung (CMS-HCC); Sleep apnea; TRAVIS on CPAP; Hemorrhagic disorder (CMS-HCC); Anemia (12/30/16); Renal disorder; Breath shortness; Cataract (2006,2007); Cirrhosis of liver (CMS-HCC) (12/30/16); and Occasional tremors.    SURGICAL HISTORY   has past surgical history that includes cholecystectomy (2013); bowel resection (1992); ileostomy (2010); recovery (6/21/2010); sigmoidoscopy flex (9/27/2016); gastroscopy-endo (9/27/2016); gastroscopy wiithsavary dilatation (9/28/2016); gastroscopy w/push enterscopy (9/28/2016); gastroscopy-endo (N/A, 10/5/2016); umbilical hernia  repair (2000); colonoscopy; other surgical procedure (1994); and gastroscopy (N/A, 1/13/2017).    SOCIAL HISTORY  Social History   Substance Use Topics   • Smoking status: Former Smoker -- 50 years     Types: Cigarettes, Cigars     Quit date: 03/30/2013   • Smokeless tobacco:       Comment: 50yrs 1.5 ppd quit 2009   • Alcohol Use: No      Comment: socially      History   Drug Use No       FAMILY HISTORY  None noted.    CURRENT MEDICATIONS  No current facility-administered medications on file prior to encounter.     Current Outpatient Prescriptions on File Prior to Encounter   Medication Sig Dispense Refill   • lactulose 20 GM/30ML Solution Take 30 mL by mouth 2 Times a Day. 30 Bottle 1   • Probiotic Cap Take 2 Caps by mouth every morning.     • ferrous gluconate (FERGON) 324 (38 FE) MG Tab Take 324 mg by mouth every morning with breakfast.     • omeprazole (PRILOSEC) 20 MG delayed-release capsule Take 40 mg by mouth every day.     • vitamin D (CHOLECALCIFEROL) 1000 UNIT Tab Take 1,000 Units by mouth every day.         ALLERGIES  Allergies   Allergen Reactions   • Sulfa Drugs Hives and Itching       PHYSICAL EXAM  VITAL SIGNS: /44 mmHg  Pulse 79  Temp(Src) 35.9 °C (96.6 °F)  Resp 15  Wt 74.844 kg (165 lb)  SpO2 97%  LMP 06/29/1995    Constitutional: Well developed, Well nourished, Confused, No acute distress, Non-toxic appearance.   HENT: Normocephalic, Old appearing ecchymosis on left forehead, Bilateral external ears normal, Oropharynx moist, No oral exudates.   Eyes: PERRLA, EOMI, Conjunctiva normal, No discharge.   Neck: No tenderness, Supple, No stridor.   Lymphatic: No lymphadenopathy noted.   Cardiovascular: Normal heart rate, Normal rhythm.   Thorax & Lungs: Clear to auscultation bilaterally, No respiratory distress, No wheezing, No crackles.   Abdomen: Soft, No tenderness, No masses, No pulsatile masses.   Skin: Warm, Dry, No erythema, No rash. Left knee thorough tib-fib area with soft tissue  swelling with a large amount of old ecchymosis.   Extremities:, No edema No cyanosis.   Musculoskeletal: No tenderness to palpation. Left elbow is in a splint.  Intact distal pulses  Neurologic: Awake, alert, Confused. Moves all extremities spontaneously.  Psychiatric: Affect normal, Judgment normal, Mood normal.       LABS  Labs Reviewed   CBC WITH DIFFERENTIAL - Abnormal; Notable for the following:     WBC 4.0 (*)     RBC 3.17 (*)     Hemoglobin 9.1 (*)     Hematocrit 29.3 (*)     MCHC 31.1 (*)     RDW 52.5 (*)     Platelet Count 84 (*)     Neutrophils-Polys 82.20 (*)     Lymphocytes 8.90 (*)     Immature Granulocytes 1.00 (*)     Lymphs (Absolute) 0.36 (*)     All other components within normal limits    Narrative:     Indicate which anticoagulants the patient is on:->UNKNOWN   COMP METABOLIC PANEL - Abnormal; Notable for the following:     Chloride 113 (*)     Co2 18 (*)     Anion Gap 12.0 (*)     Glucose 113 (*)     Bun 51 (*)     Creatinine 2.57 (*)     AST(SGOT) 53 (*)     Alkaline Phosphatase 325 (*)     Total Bilirubin 1.9 (*)     Albumin 3.1 (*)     All other components within normal limits    Narrative:     Indicate which anticoagulants the patient is on:->UNKNOWN   PROTHROMBIN TIME - Abnormal; Notable for the following:     PT 15.5 (*)     INR 1.19 (*)     All other components within normal limits    Narrative:     Indicate which anticoagulants the patient is on:->UNKNOWN   AMMONIA - Abnormal; Notable for the following:     Ammonia 60 (*)     All other components within normal limits    Narrative:     Indicate which anticoagulants the patient is on:->UNKNOWN   URINALYSIS,CULTURE IF INDICATED - Abnormal; Notable for the following:     Leukocyte Esterase Trace (*)     All other components within normal limits   ESTIMATED GFR - Abnormal; Notable for the following:     GFR If  22 (*)     GFR If Non  18 (*)     All other components within normal limits    Narrative:      Indicate which anticoagulants the patient is on:->UNKNOWN   URINE MICROSCOPIC (W/UA) - Abnormal; Notable for the following:     Bacteria Rare (*)     Epithelial Cells Renal Moderate (*)     Budding Yeast Present (*)     All other components within normal limits   LIPASE    Narrative:     Indicate which anticoagulants the patient is on:->UNKNOWN   APTT    Narrative:     Indicate which anticoagulants the patient is on:->UNKNOWN   URINE CULTURE(NEW)   CBC WITHOUT DIFFERENTIAL     All labs reviewed by me.      RADIOLOGY  DX-TIBIA AND FIBULA LEFT   Final Result      No evidence of acute fracture or dislocation.      Soft tissue swelling in the lower leg.      CT-HEAD W/O   Final Result      1.  No acute intracranial abnormality identified.      2.  Chronic findings include white matter change, cerebral volume loss, and internal carotid artery atherosclerotic plaque        The radiologist's interpretation of all radiological studies have been reviewed by me.    COURSE & MEDICAL DECISION MAKING  Pertinent Labs & Imaging studies reviewed. (See chart for details)    I reviewed the patient's medical records which showed a history of Crohn's disease and cirrhosis. She was seen on 8/18 at Urgent Care for a fall and found to have an elbow fracture. Patient was placed in a splint. She was also admitted to the hospital from 8/8-8/12 for hepatic encephalopathy.    12:38 PM - Patient seen and examined at bedside. Ordered CT head without contrast, DX tibia and fibula left, U/A culture if indicated, CBC with differential, CMP, Lipase, APTT, PT/INR, and Ammonia to evaluate her symptoms. The differential diagnoses include but are not limited to: hepatic encephalopathy vs knee fracture vs leg fracture    3:41 PM Nursing informed me of dark, heme positive stools in the ileostomy.    3:43 PM I discussed the patient's case and the above findings with Dr. Duval (Hospitalist) who agrees to admit the patient at this time.    3:50 PM I ordered  a Urine microscopic with U/A to evaluate.    Decision Making:  Patient with altered mental status likely has hepatic encephalopathy, patient has multiple contusions x-rays are unremarkable, CT scan of the head is negative, the patient is having some bloody bowel movements, discussed the case with Dr. Duval for admission to the hospital.    DISPOSITION:  Patient will be admitted to Dr. Duval in guarded condition.      FINAL IMPRESSION  1. Confusion    2. Hepatic encephalopathy (CMS-HCC)    3. Contusion of left lower leg, initial encounter    4. Closed head injury, initial encounter          ILeonardo (Scribe), am scribing for, and in the presence of, Ganga aCstaneda M.D..    Electronically signed by: Leonardo Pederson (Scribe), 8/24/2017    IGanga M.D. personally performed the services described in this documentation, as scribed by Leonardo Pederson in my presence, and it is both accurate and complete.    The note accurately reflects work and decisions made by me.  Ganga Castaneda  8/24/2017  6:27 PM

## 2017-08-25 NOTE — DIETARY
Nutrition Services: Patient seen for poor PO intake and weight loss prior to admission    76 y.o. female with admitting DX of Acute encephalopathy, Renal failure (ARF), acute on chronic, Melena, Pancytopenia   BAKER (nonalcoholic steatohepatitis)  Pertinent History: COPD, chronic kidney disease, remote history of Crohn's disease,  sleep apnea, and cryptogenic cirrhosis/BAKER cirrhosis.  Labs, medications and past medical history reviewed.    Wound on buttocks per nursing; not yet seen by wound team.  Height: 152.4 cm (5')  Weight: 74.9 kg (165 lb 2 oz)  Body mass index is 32.25 kg/(m^2).    Patient stated she had not lost weight and appetite is good.  She just advanced from clear liquid diet to regular and was hungry.    Recommend:  Encourage PO intake.    Nutrition Representative will continue to see her for ongoing meal and snack preferences.  RD following and will await wound staging to make recommendations if appropriate.

## 2017-08-25 NOTE — CARE PLAN
Problem: Safety  Goal: Will remain free from injury  Treaded socks in place, bed in the lowest position, bed alarm on, call light and belongings within reach, pt call for assistance appropriately    Problem: Venous Thromboembolism (VTW)/Deep Vein Thrombosis (DVT) Prevention:  Goal: Patient will participate in Venous Thrombosis (VTE)/Deep Vein Thrombosis (DVT)Prevention Measures  scds on    Problem: Skin Integrity  Goal: Risk for impaired skin integrity will decrease  q2 turns, lillie risk assessment, applied barrier cream

## 2017-08-25 NOTE — PROGRESS NOTES
GI Consults  Re: hepatic encephalopathy and ?melena    75yo with BAKER cirrhosis, remote hx Crohn's and s/p TPC with ileostomy last seen in our office June 2017.  She has a history of recurrent HE but we have been unsuccessful getting her Xifaxan due to cost.  She is taking lactulose BID and zinc sulfate daily.  She was found with decreased LOC today but currently awake and oriented.    Imp:  1.  Recurrent HE  2.  BAKER cirrhosis  3.  Recurrent bleeding from stoma.  ?stomal varices per Dr. Martinez  4.  Esophageal varices, grade II, on push enteroscopy Jan 2017  5.  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  CKD  7.  COPD  8.  Remote Crohn's with total proctocolectomy and ileostomy  9.  Sleep apnea  10.  Recent left elbow fracture    Recs:  She is alert and oriented without asterixis.  She will fill out paperwork at home to receive financial assistance with Xifaxan.  I am concerned that lactulose may be contributing to diarrhea rendering her volume depleted which could trigger HE.  No signs of infection.  I do not appreciate melena at this time. ? Home in am with GI follow up

## 2017-08-25 NOTE — ASSESSMENT & PLAN NOTE
-- improved, discussed with Dr. Sanchez.  Continue lactulose and Rifaximin.   The patient had financial difficulty to afford Rifaximin but this medication is needed per gastroenterologist..  Gastroenterology office is working to help the patient to obtain this medication for outpatient use. Discussed the patient's financial difficulty with .  -- The patient remains alert and oriented.

## 2017-08-25 NOTE — CONSULTS
DATE OF SERVICE:  08/24/2017    REFERRING PHYSICIAN:  Minerva Duval MD.    REASON FOR THE CONSULT:  Hepatic encephalopathy, possible melena.    HISTORY OF PRESENT ILLNESS:  The patient is a 76-year-old female, established   patient of GI consultants who has BAKER cirrhosis.  She has had recurrent   episodes of hepatic encephalopathy and was recently hospitalized for such on   May 31st.  Also, she has had recurrent bleeding from the ileostomy.  From the   records, it appears she may be bleeding from around the stoma and actually was   seen by Dr. Martinez of colorectal surgery in March.  He put a stitch into the   site that was bleeding and suggested the possibility of stomal varices.  She   did have an upper endoscopy earlier this year where she was found to have   grade II esophageal varices without stigmata of recent bleeding and they were   not treated with banding.    Her  has gone home, but she states she was doing fine yesterday and   this morning was found to be very lethargic.  She is now awake, alert, and   answering all her questions appropriately.  She is currently taking lactulose   twice daily and is not taking Xifaxan due to cost.  She admits she is supposed   to fill out some paperwork to try to get the Xifaxan at a discounted rate if   not free.  She also takes this Zinc sulfate at home.    PAST MEDICAL HISTORY:  ALLERGIES:  SULFA.    MEDICATIONS PRIOR TO ADMISSION:  Combivent, Dulera, iron sulfate, lactulose,   omeprazole, sodium bicarbonate, and vitamin D.    SURGERIES:  Total proctocolectomy with ileostomy.    ILLNESSES:  COPD, chronic kidney disease, remote history of Crohn's disease,   sleep apnea, and cryptogenic cirrhosis/BAKER cirrhosis.    SOCIAL HISTORY:  She does not drink alcohol and does not smoke.  She is   .    FAMILY HISTORY:  Notable for sister having ulcerative colitis.    REVIEW OF SYSTEMS:  CONSTITUTIONAL:  No fevers, chills, nausea, vomiting.  No chest pain, no    cough.  No abdominal pain.  She denies melena or hematochezia in the last few   days.  MUSCULOSKELETAL:  Recent elbow fracture on the left, extensive ecchymoses left   leg.  NEUROLOGIC:  Negative for seizure and stroke.  ENDOCRINE:  Negative for diabetes and thyroid disease.    PHYSICAL EXAMINATION:  VITAL SIGNS:  Temperature 36.2, pulse 74, respirations 17, and blood pressure   119/45.  GENERAL:  This is a 76-year-old female who is awake, alert, and appropriate.  HEENT:  Pupils are round.  Sclerae are anicteric.  She has mild lid leg.  No   oral lesions or exudates.  NECK:  Soft.  No adenopathy.  LUNGS:  Clear.  CARDIOVASCULAR:  Regular rate and rhythm.  ABDOMEN:  Multiple scars with mid ventral hernia to the right of the umbilical   region.  I did not appreciate liver edge or splenomegaly at this time.  She   has an ileostomy in the left mid abdomen.  I did not remove the appliance   tonight.  EXTREMITIES:  Extensive ecchymoses, left lower extremity with trace edema.  NEUROLOGIC:  She is awake, alert, oriented and no asterixis.    LABORATORY DATA:  WBC 4, hemoglobin 9.1, hematocrit 29.3, and platelets   84,000.  Sodium 143, potassium 3.8, chloride 113, bicarbonate 18, BUN 51,   creatinine 2.57, and glucose 113.  AST 53, ALT 33, alkaline phosphatase 325,   total bilirubin 1.9, albumin 3.1, and ammonia 60.  INR 1.19.  Urinalysis   positive for yeast.    IMPRESSION:  1.  Recurrent hepatic encephalopathy.  This could be due to infection, chronic   gastrointestinal bleeding, volume depletion, and hypokalemia.  I suspect   possible volume depletion related to the output from her ileostomy.  2.  Nonalcoholic steatohepatitis cirrhosis.  3.  Recurrent bleeding from stoma.  She may have stomal varices, although not   currently actively bleeding.  4.  Esophageal varices, grade II, noted on push enteroscopy, January 2017.  5.  Status post argon plasma coagulation to gastric, duodenal, and jejunal   angioectasias, January  2017.  6.  Chronic kidney disease.  7.  Chronic obstructive pulmonary disease.  8.  Remote Crohn's with total proctocolectomy and ileostomy.  Ileostomy has   been repositioned to the left abdomen  9.  Sleep apnea.  10.  Recent left elbow fracture.    RECOMMENDATIONS:  She is currently alert and oriented without any asterixis.    She states she will go home and fill out the paperwork so that she can receive   financial assistance for Xifaxan.  Perhaps with the Xifaxan we can stop the   lactulose as it may be contributing to diarrhea and volume depletion.  She   does not have any signs of infection.  I do not appreciate any melena in her   ostomy appliance.  Possibly, she will be discharged home tomorrow and we can   arrange GI followup.  My partner Dr. Lechuga will be assuming the GI service in   the morning.       ____________________________________     MD DANIEL SUAREZ / ALYSON    DD:  08/24/2017 22:25:44  DT:  08/25/2017 00:41:06    D#:  [id number]  Job#:  [id number]

## 2017-08-25 NOTE — ASSESSMENT & PLAN NOTE
Question melena as the patient hemoglobin didn't change much even with aggressive hydration.   Dark color output likely due the patient taking iron supplements.  Continue to monitor.  H&H stable

## 2017-08-25 NOTE — ASSESSMENT & PLAN NOTE
Acute change likely secondary to dehydration from laxative.  Creatinine improving but still remains much higher than baseline.  Continue iv fluid per nephrologist.

## 2017-08-25 NOTE — RESPIRATORY CARE
COPD EDUCATION by COPD CLINICAL EDUCATOR  8/25/2017 at 6:50 AM by Sulema Barragan     Patient reviewed by COPD education team. Patient does not qualify for COPD program.

## 2017-08-25 NOTE — PROGRESS NOTES
[facility]    Date of Service: 2017    Chief Complaint  76 y.o. female admitted 2017 with confusion and fall at home.  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    Interval Problem Update  17:  The patient is alert and oriented now and appears to be back to his baseline.    Consultants/Specialty  Gastroenterologist, Dr. Lechuga.    Disposition  Home.        Review of Systems   Constitutional: Negative for fever.   Eyes: Negative for blurred vision.   Respiratory: Negative for cough.    Cardiovascular: Negative for chest pain.   Gastrointestinal: Negative for heartburn, nausea and vomiting.   Genitourinary: Negative for dysuria.   Neurological: Negative for dizziness and headaches.      Physical Exam  Laboratory/Imaging   Hemodynamics  Temp (24hrs), Av.3 °C (97.3 °F), Min:36.2 °C (97.2 °F), Max:36.3 °C (97.4 °F)   Temperature: 36.3 °C (97.4 °F)  Pulse  Av.2  Min: 70  Max: 82 Heart Rate (Monitored): 71  Blood Pressure : (!) 99/40 mmHg, NIBP: (!) 94/39 mmHg      Respiratory      Respiration: 18, Pulse Oximetry: 98 %             Fluids    Intake/Output Summary (Last 24 hours) at 17 1455  Last data filed at 17 1300   Gross per 24 hour   Intake   1620 ml   Output   1780 ml   Net   -160 ml       Nutrition  Orders Placed This Encounter   Procedures   • Diet Order     Standing Status: Standing      Number of Occurrences: 1      Standing Expiration Date:      Order Specific Question:  Diet:     Answer:  Clear Liquids - No Red Foods [12]     Physical Exam   Constitutional: She is oriented to person, place, and time. She appears well-developed. No distress.   HENT:   Head: Normocephalic and atraumatic.   Mouth/Throat: Oropharynx is clear and moist. No oropharyngeal exudate.   Eyes: Right eye exhibits no discharge. Left eye exhibits no discharge.   Neck: Normal range of motion. No JVD present. No thyromegaly present.   Cardiovascular: Normal rate and regular  rhythm.    Pulmonary/Chest: Effort normal and breath sounds normal. No respiratory distress. She has no wheezes.   Abdominal: Soft. Bowel sounds are normal. She exhibits no distension.   Musculoskeletal: Normal range of motion. She exhibits no edema.   Neurological: She is alert and oriented to person, place, and time. No cranial nerve deficit.   Skin: Skin is warm and dry. She is not diaphoretic. No erythema.   Psychiatric: She has a normal mood and affect.       Recent Labs      08/24/17   2356  08/25/17   0801  08/25/17   1100   WBC  3.2*  3.3*  3.0*   RBC  2.82*  2.67*  2.64*   HEMOGLOBIN  8.0*  7.7*  7.8*   HEMATOCRIT  27.7*  24.5*  24.6*   MCV  98.2*  91.8  93.2   MCH  28.4  28.8  29.5   MCHC  28.9*  31.4*  31.7*   RDW  55.4*  51.4*  51.6*   PLATELETCT  69*  78*  75*   MPV  12.1  13.2*  13.3*     Recent Labs      08/24/17   1325  08/24/17   2356  08/25/17   1100   SODIUM  143  140  140   POTASSIUM  3.8  3.7  3.3*   CHLORIDE  113*  115*  114*   CO2  18*  14*  16*   GLUCOSE  113*  105*  119*   BUN  51*  53*  52*   CREATININE  2.57*  2.59*  2.29*   CALCIUM  8.8  8.3*  8.2*     Recent Labs      08/24/17   1325   APTT  26.4   INR  1.19*                  Assessment/Plan     Melena  Assessment & Plan  Question melena as the patient hemoglobin didn't change much even with aggressive hydration.   Awaiting for further gastroenterology input.    Acute encephalopathy  Assessment & Plan  -- continue lactulose.    -- I have seen this patient in previous hospital stay.  She appears to be at her baseline today.    BAKER (nonalcoholic steatohepatitis)  Assessment & Plan  Follow up with gastroenterologist in outpatient.    Renal failure (ARF), acute on chronic (CMS-HCC)  Assessment & Plan  Acute change likely secondary to dehydration from laxative.  Continue iv fluid.  Renal function improving.    Pancytopenia (CMS-HCC)  Assessment & Plan  Secondary to chronic illness.  Continue ferrous sulfate.                DVT prophylaxis -  mechanical: SCDs

## 2017-08-25 NOTE — PROGRESS NOTES
Report received. Assumed care. Pt in bed awake. A/O x4. VSS. Responds appropriately. Denies pain, SOB. Assessment complete. Sling to the left arm in place, cdi. Noted generalized brusings to the legs and arms. Colostomy bag to the left lower quadrant intact, with moderate loose watery brown stool, stoma beefy red in color. Discussed POC, monitor labs, mobility, safety, DC planning , pt verbalizes understanding. Explained importance of calling before getting OOB. Call light and belongings within reach. Bed alarm on. Bed in the lowest position. Treaded socks in place. Hourly rounding in progress. Will continue to monitor .

## 2017-08-25 NOTE — PROGRESS NOTES
Pt a/o x 4 overnight. No episodes of confusion or decreased LOC. Colostomy output high with watery brown stool. No instances of red or black stool.  Bed alarm on, call light within reach.

## 2017-08-25 NOTE — PROGRESS NOTES
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  By the time she was seen by Dr. Otero, her mental status was back to normal.  NH3 60.  K normal.  CT head neg.    8/25/2017: She is back to normal level of mentation and asks for a diet (presently on clears).  No pain, nausea, vomiting, fevers, chills or bleeding.    Review of Systems:  ROS    Physical Exam:  Physical Exam   Constitutional: She is oriented to person, place, and time. She appears distressed.   Abdominal: Soft. Bowel sounds are normal. She exhibits no mass. There is no tenderness.   Ileostomy present.   Neurological: She is alert and oriented to person, place, and time.   No asterixis.       Labs:        Invalid input(s): [id number]      Recent Labs      08/24/17   1325  08/24/17   2356 08/25/17   1100   SODIUM  143  140  140   POTASSIUM  3.8  3.7  3.3*   CHLORIDE  113*  115*  114*   CO2  18*  14*  16*   BUN  51*  53*  52*   CREATININE  2.57*  2.59*  2.29*   CALCIUM  8.8  8.3*  8.2*     Recent Labs      08/24/17   1325  08/24/17   2356  08/25/17   1100   ALTSGPT  33  24  27   ASTSGOT  53*  47*  44   ALKPHOSPHAT  325*  273*  298*   TBILIRUBIN  1.9*  1.4  1.3   LIPASE  34   --    --    GLUCOSE  113*  105*  119*     Recent Labs      08/24/17   1325  08/24/17   2356  08/25/17   0801  08/25/17   1100   RBC  3.17*  2.82*  2.67*  2.64*   HEMOGLOBIN  9.1*  8.0*  7.7*  7.8*   HEMATOCRIT  29.3*  27.7*  24.5*  24.6*   PLATELETCT  84*  69*  78*  75*   PROTHROMBTM  15.5*   --    --    --    APTT  26.4   --    --    --    INR  1.19*   --    --    --      Recent Labs      08/24/17   1325  08/24/17   2356  08/25/17   0801  08/25/17   1100  "  WBC  4.0*  3.2*  3.3*  3.0*   NEUTSPOLYS  82.20*   --    --    --    LYMPHOCYTES  8.90*   --    --    --    MONOCYTES  6.40   --    --    --    EOSINOPHILS  1.00   --    --    --    BASOPHILS  0.50   --    --    --    ASTSGOT  53*  47*   --   44   ALTSGPT  33  24   --   27   ALKPHOSPHAT  325*  273*   --   298*   TBILIRUBIN  1.9*  1.4   --   1.3     Hemodynamics:  Temp (24hrs), Av.3 °C (97.3 °F), Min:36.2 °C (97.2 °F), Max:36.3 °C (97.4 °F)  Temperature: 36.3 °C (97.4 °F)  Pulse  Av.2  Min: 70  Max: 82Heart Rate (Monitored): 71  Blood Pressure : (!) 99/40 mmHg, NIBP: (!) 94/39 mmHg     Respiratory:    Respiration: 18, Pulse Oximetry: 98 %           Fluids:    Intake/Output Summary (Last 24 hours) at 17 1452  Last data filed at 17 1300   Gross per 24 hour   Intake   1620 ml   Output   1780 ml   Net   -160 ml     Weight: 74.9 kg (165 lb 2 oz)  GI/Nutrition:  Orders Placed This Encounter   Procedures   • Diet Order     Standing Status: Standing      Number of Occurrences: 1      Standing Expiration Date:      Order Specific Question:  Diet:     Answer:  Clear Liquids - No Red Foods [12]     Medical Decision Making, by Problem:  Active Hospital Problems    Diagnosis   • Melena [K92.1]   • BAKER (nonalcoholic steatohepatitis) [K75.81]   • Renal failure (ARF), acute on chronic (CMS-HCC) [N17.9, N18.9]   • Acute encephalopathy [G93.40]   • Pancytopenia (CMS-HCC) [D61.818]     ASSESSMENT:  # Recurrent hepatic encephalopathy  - No inciting events noted.  - Suspect she may not be adequately treated with lactulose and can't afford rifaximin.  - Our staff will be submitting information for her to get rifaximin for \"compassionate use\".    #BAKER-cirrhosis    #Pancytopenia  - Stable    #Renal failure  - Acute on chronic    Plan:  Advance to regular diet.  Home in morning if stable.  To get compassionate use rifaximin prescription as soon as feasible - 550 gm BID.    Core Measures    "

## 2017-08-25 NOTE — H&P
DATE OF ADMISSION:  08/24/2017    PRIMARY CARE PHYSICIAN:  Roshni Morillo MD    CHIEF COMPLAINT:  Confusion and a fall at home.    HISTORY OF PRESENT ILLNESS:  Patient is a 76-year-old female who has a past   medical history of Crohn's disease for which she has had a colostomy done and   has an ileostomy in place.  She also has a history of cirrhosis secondary to   BAKER.  She presented to the hospital today due to confusion.  I am unable to   get much of a history from the patient at this time.  Her  has already   left the hospital, so a lot of the history has been obtained from the chart.    Apparently, the patient had a fall a few days ago and at that point, she   apparently broke her elbow.  I am not sure if she sought any treatment, but   when she was brought to the hospital, her  stated that she has become   increasingly confused.  While in the emergency department, she was found to   have melena in her ileostomy.  She has had multiple hospitalizations in the   past for hepatic encephalopathy.  She is taking lactulose at home.    REVIEW OF SYSTEMS:  Unable to obtain secondary to patient's medical condition.    ALLERGIES:  SULFA DRUGS.    PAST MEDICAL HISTORY:  1.  Crohn's disease, status post colectomy with colostomy in place.  2.  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  3.  Recurrent hepatic encephalopathy.  4.  COPD.    HOME MEDICATIONS:  1.  Potassium.  2.  Sodium bicarbonate.  3.  Probiotic.  4.  Fergon 325 mg daily.  5.  Prilosec 40 mg daily.  6.  Vitamin D 1000 units daily.  7.  Lactulose b.i.d.    PAST SURGICAL HISTORY:  1.  Cholecystectomy.  2.  Bowel resection with a right-sided ileostomy which was eventually moved to   the left side.  3.  Umbilical hernia repair.    FAMILY HISTORY:  Unable to obtain at this time.    SOCIAL HISTORY:  Unable to obtain.    PHYSICAL EXAMINATION:  VITAL SIGNS:  Blood pressure is 107/41, pulse is 74, respirations 17,   temperature 36.2, oxygen saturations 95% on  room air, weight is 75 kilograms.  GENERAL:  Patient appears chronically ill, currently not in any acute   distress.  HEENT:  Dry mucous membranes.  No oropharyngeal exudates.  She has a healing   ecchymosis on her left forehead.  Eyes:  EOMI, PERRLA.  NECK:  There is no lymphadenopathy, no JVD.  CARDIOVASCULAR:  Regular rate and rhythm.  No murmurs.  LUNGS:  Clear to auscultation bilaterally, decreased air entry at the bases.  ABDOMEN:  Positive bowel sounds, soft, nontender.  She has an ileostomy in   place with black stool.  EXTREMITIES:  No clubbing or cyanosis.  She does have edema and an extensive   area of ecchymosis which is in the healing process on her left lower   extremity, extending all the way from her thigh down to her ankle.  MUSCULOSKELETAL:  Left elbow is in a splint.  SKIN:  She has ecchymosis on her upper extremities as well as her left lower   extremity.  NEUROLOGIC:  She is awake and alert, oriented only to self.    LABORATORY DATA:  WBC is 4, hemoglobin 9.1, hematocrit 29.3, platelets are 84,   MCV 92.4.  Sodium 143, potassium 3.8, BUN is 51, creatinine 2.57.  Ammonia is   60, total bilirubin 1.9, albumin 3.1.  INR is 1.19.  Urinalysis is   unremarkable.    ASSESSMENT AND PLAN:  Patient is a 76-year-old female who presents to the   hospital with confusion.  1.  Melena.  She is on a proton pump inhibitor already.  I have discussed with   gastroenterology who will consult on the patient, Dr. Otero is aware.    Currently, she is hemodynamically stable.  If she becomes unstable or if her   hemoglobin drops to below 7, then at that point, we will consider transfusing.  2.  Acute encephalopathy.  This may be secondary to hepatic encephalopathy,   but her ammonia level is only 60, we will continue with lactulose t.i.d.  3.  Pancytopenia.  4.  Acute-on chronic renal failure; however, treat with intravenous fluids and   recheck labs in the morning, may need to have a nephrology consultation at    that point.  5.  Liver cirrhosis secondary to nonalcoholic steatohepatitis.  6.  Left elbow fracture.  Left arm is in a splint.  She will need to have this   followed up outpatient with orthopedics.  7.  Anemia of chronic disease.  8.  She is a full code.       ____________________________________     MD ALFA Allred / ALYSON    DD:  08/24/2017 21:27:34  DT:  08/24/2017 22:00:00    D#:  [id number]  Job#:  [id number]

## 2017-08-26 NOTE — PROGRESS NOTES
Romina Morrissey Fall Risk Assessment:     Last Known Fall: Within the last six months  Mobility: Dizziness/generalized weakness  Medications: Cardiovascular or central nervous system meds  Mental Status/LOC/Awareness: Awake, alert, and oriented to date, place, and person  Toileting Needs: Use of catheters or diversion devices  Volume/Electrolyte Status: Use of IV fluids/tube feeds  Communication/Sensory: Visual (Glasses)/hearing deficit  Behavior: Appropriate behavior  Romina Morrissey Fall Risk Total: 11  Fall Risk Level: MODERATE RISK    Universal Fall Precautions:  call light/belongings in reach, bed in low position and locked, wheelchairs and assistive devices out of sight, siderails up x 2, use non-slip footwear, adequate lighting, clutter free and spill free environment, educate on level of risk, educate to call for assistance    Fall Risk Level Interventions:    TRIAL (TELE 8, NEURO, MED RADHA 5) Moderate Fall Risk Interventions  Place yellow fall risk ID band on patient: verified  Provide patient/family education based on risk assessment : verified  Educate patient/family to call staff for assistance when getting out of bed: verified  Place fall precaution signage outside patient door: verified  Utilize bed/chair fall alarm: verified     Patient Specific Interventions:     Medication: provide patients who received diuretics/laxatives frequent toileting  Mental Status/LOC/Awareness: reinforce falls education, utilize bed/chair fall alarm and reinforce the use of call light  Toileting: provide frquent toileting  Volume/Electrolyte Status: ensure patient remains hydrated, advance diet as tolerated, monitor abnormal lab values and ensure IV fluids are appropriate  Communication/Sensory: update plan of care on whiteboard and ensure patient has glasses/contacts and hearing aids/dentures  Behavioral: administer medication as ordered  Mobility: utilize bed/chair fall alarm and ensure bed is locked and in lowest position

## 2017-08-26 NOTE — CARE PLAN
Problem: Discharge Barriers/Planning  Goal: Patient's continuum of care needs will be met    Intervention: Involve patient and significant other/support system in setting and prioritizing goals for hospital stay and discharge  Possible discharge home when medically clear.      Problem: Skin Integrity  Goal: Risk for impaired skin integrity will decrease    Intervention: Assess risk factors for impaired skin integrity and/or pressure ulcers  q 2 turn and kept skin dry and clean. Applied barrier cream.

## 2017-08-26 NOTE — ASSESSMENT & PLAN NOTE
Acidosis likely due to combination of gi bicarb loss and hyperchloremia. Per the patient she takes 3 tabs of sodium bicarb 650 mg TID at home. Appreciate nephrology input.

## 2017-08-26 NOTE — WOUND TEAM
"[facility]   Inpatient Services   Established Ostomy Management/ troubleshooting  HPI: Reviewed  PMH: Reviewed   SH: Reviewed   Reason for Ostomy nurse consult:  Appliance leaking, supplies.  Subjective: \"I go to the clinic once a week for ostomy care.\"  Objective: Pleasant, very [place],  at bedside.   Ostomy type: Ileostomy.  Stoma location: LLQ  Stoma assessment:    Appearance:  Red.   Size: 1.5\"   Protrusion: Budded less than 1\"    jxn: REsolved   Peristomal skin: Purple, varices according to outpatient note.     Ostomy Appliance (type and size): 2.25 2 piece with paste ring.     Assessment: Appliance leaking. Slight bulge in abdomen around stoma. Patient apparently performs ostomy care at home, but also goes to outpatient clinic for trouble shooting.   Interventions:  Removed appliance. Cleaned peristomal skin with warm water and wash cloth, dried. Made template from the plastic backing of barrier, cut out barrier, applied paste ring. Placed barrier around stoma, ran finger around barrier, attached edges of barrier. Attached high output pouch.   Pt education: Questions and concerns addressed.  Plan: Nursing to assist patient with ostomy care.  Anticipated discharge needs: Outpatient clinic.    "

## 2017-08-26 NOTE — PROGRESS NOTES
Romina Morrissey Fall Risk Assessment:     Last Known Fall: Within the last six months  Mobility: Dizziness/generalized weakness  Medications: Cardiovascular or central nervous system meds  Mental Status/LOC/Awareness: Awake, alert, and oriented to date, place, and person  Toileting Needs: Use of catheters or diversion devices  Volume/Electrolyte Status: Use of IV fluids/tube feeds  Communication/Sensory: Visual (Glasses)/hearing deficit  Behavior: Appropriate behavior  Romina Morrissey Fall Risk Total: 11  Fall Risk Level: MODERATE RISK    Universal Fall Precautions:  call light/belongings in reach, bed in low position and locked, wheelchairs and assistive devices out of sight, siderails up x 2, use non-slip footwear, adequate lighting, clutter free and spill free environment, educate on level of risk, educate to call for assistance    Fall Risk Level Interventions:    TRIAL (TELE 8, NEURO, MED RADHA 5) Moderate Fall Risk Interventions  Place yellow fall risk ID band on patient: verified  Provide patient/family education based on risk assessment : verified  Educate patient/family to call staff for assistance when getting out of bed: verified  Place fall precaution signage outside patient door: verified  Utilize bed/chair fall alarm: verified     Patient Specific Interventions:     Medication: review medications with patient and family  Mental Status/LOC/Awareness: utilize bed/chair fall alarm and reinforce the use of call light  Toileting: provide frquent toileting  Volume/Electrolyte Status: ensure patient remains hydrated and ensure IV fluids are appropriate  Communication/Sensory: update plan of care on whiteboard and ensure patient has glasses/contacts and hearing aids/dentures  Behavioral: administer medication as ordered and instruct/reinforce fall program rationale  Mobility: utilize bed/chair fall alarm, ensure bed is locked and in lowest position, instruct patient to exit bed on their strongest side and  collaborate with doctor for possible PT/OT consult

## 2017-08-26 NOTE — PROGRESS NOTES
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  By the time she was seen by Dr. Otero, her mental status was back to normal.  NH3 60.  K normal.  CT head neg.     8/25/2017: She is back to normal level of mentation and asks for a diet (presently on clears).  No pain, nausea, vomiting, fevers, chills or bleeding.  8/26/2017: She is alert and in a good mood.  She feels ready to go home.    Physical examination unchanged.    Impression:  # Recurrent hepatic encephalopathy  - No inciting events noted.  - Suspect she may not be adequately treated with lactulose and can't afford rifaximin, which is really needed.  - Our staff will be submitting information for her to get rifaximin for \"compassionate use\".     #BAKER-cirrhosis     #Pancytopenia  - Stable     #Renal failure  - Acute on chronic    Plan:  I feel she is safe for discharge.  She will follow-up with Dr. Brooks.  Our staff will help her get rifaximin on a compassionate use basis, since she can't afford it.  "

## 2017-08-26 NOTE — PROGRESS NOTES
Received alert and oriented x 4, [place] without hearing aid available, v/s stable, due med given as ordered, q 2 turn, empty ileostomy bag frequently, call light within reach, refused bed alarm despite health teaching given, needs attended, will continue to monitor.

## 2017-08-26 NOTE — PROGRESS NOTES
Assumed patient care at 0700. Received report from night shift. Assessment completed. POC discussed with pt, verbalizes understanding. A&Ox4. Declines pain and nausea. No SOB or in any acute distress. Ostomy to LLQ, brown, watery stool. Bed alarm refused despite education.Treaded socks on, personal possessions and call light placed within reach. Pt denies any additional needs at this time.

## 2017-08-26 NOTE — PROGRESS NOTES
[facility]    Date of Service: 2017    Chief Complaint  76 y.o. female admitted 2017 with confusion and fall at home.  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    Interval Problem Update  17:  The patient is alert and oriented now and appears to be back to his baseline.  17:  The patient states that she feels okay and wants to go home.    Consultants/Specialty  Gastroenterologist, Dr. Lechuga.    Disposition  Home.        Review of Systems   Constitutional: Negative for fever.   Eyes: Negative for blurred vision.   Respiratory: Negative for cough.    Cardiovascular: Negative for chest pain.   Gastrointestinal: Negative for heartburn, nausea and vomiting.   Genitourinary: Negative for dysuria.   Neurological: Negative for dizziness and headaches.      Physical Exam  Laboratory/Imaging   Hemodynamics  Temp (24hrs), Av.3 °C (97.3 °F), Min:36.1 °C (97 °F), Max:36.6 °C (97.9 °F)   Temperature: 36.2 °C (97.2 °F)  Pulse  Av.6  Min: 70  Max: 82    Blood Pressure : (!) 96/36 (rn notified )      Respiratory      Respiration: 16, Pulse Oximetry: 97 %             Fluids    Intake/Output Summary (Last 24 hours) at 17 1449  Last data filed at 17 1000   Gross per 24 hour   Intake             2300 ml   Output             3100 ml   Net             -800 ml       Nutrition  Orders Placed This Encounter   Procedures   • DIET ORDER     Standing Status:   Standing     Number of Occurrences:   1     Order Specific Question:   Diet:     Answer:   Regular [1]     Physical Exam   Constitutional: She is oriented to person, place, and time. She appears well-developed. No distress.   HENT:   Head: Normocephalic and atraumatic.   Mouth/Throat: Oropharynx is clear and moist. No oropharyngeal exudate.   Eyes: Right eye exhibits no discharge. Left eye exhibits no discharge.   Neck: Normal range of motion. No JVD present. No thyromegaly present.   Cardiovascular: Normal rate  and regular rhythm.    Pulmonary/Chest: Effort normal and breath sounds normal. No respiratory distress. She has no wheezes.   Abdominal: Soft. Bowel sounds are normal. She exhibits no distension.   Colostomy bag containing dark liquid    Musculoskeletal: Normal range of motion. She exhibits no edema.   Neurological: She is alert and oriented to person, place, and time. No cranial nerve deficit.   Skin: Skin is warm and dry. She is not diaphoretic. No erythema.   Psychiatric: She has a normal mood and affect.       Recent Labs      08/25/17   0801  08/25/17   1100  08/26/17   0233   WBC  3.3*  3.0*  2.8*   RBC  2.67*  2.64*  2.49*   HEMOGLOBIN  7.7*  7.8*  7.3*   HEMATOCRIT  24.5*  24.6*  23.1*   MCV  91.8  93.2  92.8   MCH  28.8  29.5  29.3   MCHC  31.4*  31.7*  31.6*   RDW  51.4*  51.6*  50.7*   PLATELETCT  78*  75*  65*   MPV  13.2*  13.3*  12.6     Recent Labs      08/25/17   1100  08/26/17   0232  08/26/17   1303   SODIUM  140  140  138   POTASSIUM  3.3*  3.4*  3.6   CHLORIDE  114*  117*  115*   CO2  16*  15*  14*   GLUCOSE  119*  100*  113*   BUN  52*  47*  44*   CREATININE  2.29*  2.37*  2.31*   CALCIUM  8.2*  7.5*  7.9*     Recent Labs      08/24/17   1325   APTT  26.4   INR  1.19*                  Assessment/Plan     Acute encephalopathy   Assessment & Plan    -- continue lactulose.  However, the patient patient's fluid output is greater than fluid intake.   Hopefully, the patient can obtain xifaxan through gastroenterology office in order to lower lactulose frequency to reduce fluid loss through gi tract.  -- I have seen this patient in previous hospital stay.  She appears to be at her baseline today.        Melena   Assessment & Plan    Question melena as the patient hemoglobin didn't change much even with aggressive hydration.   Dark color output likely due the patient taking iron supplements.  Continue to monitor.        Renal failure (ARF), acute on chronic (CMS-HCC)   Assessment & Plan    Acute change  likely secondary to dehydration from laxative.  Creatinine improving.  Increase iv fluid rate to 200 mL/hour.  Continue to monitor.        BAKER (nonalcoholic steatohepatitis)   Assessment & Plan    Follow up with gastroenterologist in outpatient.        Pancytopenia (CMS-HCC)   Assessment & Plan    Secondary to chronic illness.  Continue ferrous sulfate.        Metabolic acidosis, NAG, bicarbonate losses- (present on admission)   Assessment & Plan    Acidosis likely due to combination of gi bicarb loss and hyperchloremia.  Double bicarbonate dosage.   Switch IV fluid from normal saline to D5 half normal saline.                    DVT prophylaxis - mechanical: SCDs

## 2017-08-27 NOTE — PROGRESS NOTES
Lab called with critical result of ammonia level of 331 at 0415. Critical lab result read back to lab.   On call APN notified of critical lab result at 0415.  Critical lab result read back by APN.

## 2017-08-27 NOTE — PROGRESS NOTES
[facility]    Date of Service: 2017    Chief Complaint  76 y.o. female admitted 2017 with confusion and fall at home.  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    Interval Problem Update  17:  The patient is alert and oriented now and appears to be back to his baseline.  17:  The patient states that she feels okay and wants to go home.  17:  The patient states she feels okay.  However lab indicates worsening metabolic acidosis.  The patient remains in her baseline mental status despite markedly elevated ammonia level this morning.    Consultants/Specialty  Gastroenterologist, Dr. Lechuga. Dr. Ortega  Nephrologist, Dr. Dong.    Disposition  Home.        Review of Systems   Constitutional: Negative for fever.   Eyes: Negative for blurred vision.   Respiratory: Negative for cough.    Cardiovascular: Negative for chest pain.   Gastrointestinal: Negative for heartburn, nausea and vomiting.   Genitourinary: Negative for dysuria.   Neurological: Negative for dizziness and headaches.      Physical Exam  Laboratory/Imaging   Hemodynamics  Temp (24hrs), Av °C (96.8 °F), Min:35.8 °C (96.4 °F), Max:36.2 °C (97.1 °F)   Temperature: 36.1 °C (97 °F)  Pulse  Av.9  Min: 70  Max: 84    Blood Pressure : (!) 99/41 (rn notified )      Respiratory      Respiration: 18, Pulse Oximetry: 97 %             Fluids    Intake/Output Summary (Last 24 hours) at 17 0903  Last data filed at 17 0610   Gross per 24 hour   Intake             2200 ml   Output             3050 ml   Net             -850 ml       Nutrition  Orders Placed This Encounter   Procedures   • DIET ORDER     Standing Status:   Standing     Number of Occurrences:   1     Order Specific Question:   Diet:     Answer:   Regular [1]     Physical Exam   Constitutional: She is oriented to person, place, and time. She appears well-developed. No distress.   HENT:   Head: Normocephalic and atraumatic.    Mouth/Throat: Oropharynx is clear and moist. No oropharyngeal exudate.   Eyes: Right eye exhibits no discharge. Left eye exhibits no discharge.   Neck: Normal range of motion. No JVD present. No thyromegaly present.   Cardiovascular: Normal rate and regular rhythm.    Pulmonary/Chest: Effort normal and breath sounds normal. No respiratory distress. She has no wheezes.   Abdominal: Soft. Bowel sounds are normal. She exhibits no distension.   Colostomy bag containing dark liquid    Musculoskeletal: Normal range of motion. She exhibits no edema.   Neurological: She is alert and oriented to person, place, and time. No cranial nerve deficit.   Skin: Skin is warm and dry. She is not diaphoretic. No erythema.   Psychiatric: She has a normal mood and affect.       Recent Labs      08/25/17   1100  08/26/17   0233  08/27/17   0304   WBC  3.0*  2.8*  3.1*   RBC  2.64*  2.49*  2.52*   HEMOGLOBIN  7.8*  7.3*  7.2*   HEMATOCRIT  24.6*  23.1*  23.6*   MCV  93.2  92.8  93.7   MCH  29.5  29.3  28.6   MCHC  31.7*  31.6*  30.5*   RDW  51.6*  50.7*  50.7*   PLATELETCT  75*  65*  53*   MPV  13.3*  12.6  10.6     Recent Labs      08/26/17   0232  08/26/17   1303  08/27/17   0304   SODIUM  140  138  136   POTASSIUM  3.4*  3.6  3.8   CHLORIDE  117*  115*  116*   CO2  15*  14*  13*   GLUCOSE  100*  113*  143*   BUN  47*  44*  42*   CREATININE  2.37*  2.31*  2.28*   CALCIUM  7.5*  7.9*  7.2*     Recent Labs      08/24/17   1325   APTT  26.4   INR  1.19*                  Assessment/Plan     Acute encephalopathy   Assessment & Plan    -- the patient patient's fluid output is high on current lactulose dosage. Yet the patient's ammonia level is very high.  Discussed the patient's case with gastroenterologist Dr. Ortega.  Reduce lactulose frequency.  Add Rifaximin.   The patient had financial difficulty to afford this medication.  Gastroenterology office is working to help the patient to obtain this medication for outpatient use.  -- The patient  remains alert and oriented.        Melena   Assessment & Plan    Question melena as the patient hemoglobin didn't change much even with aggressive hydration.   Dark color output likely due the patient taking iron supplements.  Continue to monitor.        Renal failure (ARF), acute on chronic (CMS-HCC)   Assessment & Plan    Acute change likely secondary to dehydration from laxative.  Creatinine improving but still remains much higher than baseline.  Consult nephrologist.          BAKER (nonalcoholic steatohepatitis)   Assessment & Plan    Follow up with gastroenterologist in outpatient.        Pancytopenia (CMS-HCC)   Assessment & Plan    Secondary to chronic illness.  Continue ferrous sulfate.        Metabolic acidosis, NAG, bicarbonate losses- (present on admission)   Assessment & Plan    Acidosis likely due to combination of gi bicarb loss and hyperchloremia.  Doubled bicarbonate dosage.  But bicarb level is still trending downward.  Thus, check arterial blood gas.   If the patient's pH is significantly acidotic.  Then the patient will need to have bicarb drip.  Appreciate nephrologist's input.            Reviewed items::  Labs reviewed and Medications reviewed  DVT prophylaxis - mechanical:  SCDs

## 2017-08-27 NOTE — CARE PLAN
Problem: Bowel/Gastric:  Goal: Normal bowel function is maintained or improved  Outcome: PROGRESSING AS EXPECTED  Ostomy bag changed due to leak. Having heavy output of green watery stool.     Problem: Skin Integrity  Goal: Risk for impaired skin integrity will decrease    Intervention: Implement precautions to protect skin integrity in collaboration with the interdisciplinary team  Pt encouraged to turn while in bed.

## 2017-08-27 NOTE — PROGRESS NOTES
Romina Morrissey Fall Risk Assessment:     Last Known Fall: Within the last six months  Mobility: Dizziness/generalized weakness  Medications: Cardiovascular or central nervous system meds  Mental Status/LOC/Awareness: Awake, alert, and oriented to date, place, and person  Toileting Needs: Use of catheters or diversion devices  Volume/Electrolyte Status: Use of IV fluids/tube feeds  Communication/Sensory: Visual (Glasses)/hearing deficit  Behavior: Appropriate behavior  Romina Morrissey Fall Risk Total: 11  Fall Risk Level: MODERATE RISK    Universal Fall Precautions:  call light/belongings in reach, bed in low position and locked, siderails up x 2, use non-slip footwear, adequate lighting, clutter free and spill free environment, educate on level of risk, educate to call for assistance    Fall Risk Level Interventions:    TRIAL (TELE 8, NEURO, MED RADHA 5) Moderate Fall Risk Interventions  Place yellow fall risk ID band on patient: verified  Provide patient/family education based on risk assessment : completed  Educate patient/family to call staff for assistance when getting out of bed: completed  Place fall precaution signage outside patient door: verified  Utilize bed/chair fall alarm: verified     Patient Specific Interventions:     Medication: review medications with patient and family  Mental Status/LOC/Awareness: utilize bed/chair fall alarm  Toileting: provide frquent toileting  Volume/Electrolyte Status: ensure patient remains hydrated  Communication/Sensory: update plan of care on whiteboard and ensure patient has glasses/contacts and hearing aids/dentures  Behavioral: administer medication as ordered and instruct/reinforce fall program rationale  Mobility: utilize bed/chair fall alarm, ensure bed is locked and in lowest position, provide appropriate assistive device, instruct patient to exit bed on their strongest side and collaborate with doctor for possible PT/OT consult

## 2017-08-27 NOTE — PROGRESS NOTES
Assumed care of pt after receiving report from dayshift RN. Bedside rounding completed w/ dayshift RN. Pt resting in bed A&OX4 w/ no complaints of pain or discomfort, ostomy changed to LLQ, fluids running, pt medicated per MAR. Fall measures in place, call light within reach, personal belongings nearby, bed in lowest position, and hourly rounding in place. Will continue to monitor pt.

## 2017-08-27 NOTE — PROGRESS NOTES
Romina Morrissey Fall Risk Assessment:     Last Known Fall: Within the last six months  Mobility: Dizziness/generalized weakness  Medications: Cardiovascular or central nervous system meds  Mental Status/LOC/Awareness: Awake, alert, and oriented to date, place, and person  Toileting Needs: Use of catheters or diversion devices  Volume/Electrolyte Status: Use of IV fluids/tube feeds  Communication/Sensory: Visual (Glasses)/hearing deficit  Behavior: Appropriate behavior  oRmina Morrissey Fall Risk Total: 11  Fall Risk Level: MODERATE RISK    Universal Fall Precautions:  call light/belongings in reach, bed in low position and locked, wheelchairs and assistive devices out of sight, siderails up x 2, use non-slip footwear, adequate lighting, clutter free and spill free environment, educate on level of risk, educate to call for assistance    Fall Risk Level Interventions:    TRIAL (TELE 8, NEURO, MED RADHA 5) Moderate Fall Risk Interventions  Place yellow fall risk ID band on patient: verified  Provide patient/family education based on risk assessment : verified  Educate patient/family to call staff for assistance when getting out of bed: verified  Place fall precaution signage outside patient door: verified  Utilize bed/chair fall alarm: verified     Patient Specific Interventions:     Medication: review medications with patient and family  Mental Status/LOC/Awareness: utilize bed/chair fall alarm and reinforce the use of call light  Toileting: provide frquent toileting  Volume/Electrolyte Status: ensure patient remains hydrated and ensure IV fluids are appropriate  Communication/Sensory: update plan of care on whiteboard and ensure patient has glasses/contacts and hearing aids/dentures  Behavioral: administer medication as ordered and instruct/reinforce fall program rationale  Mobility: utilize bed/chair fall alarm, ensure bed is locked and in lowest position, instruct patient to exit bed on their strongest side and  collaborate with doctor for possible PT/OT consult

## 2017-08-27 NOTE — PROGRESS NOTES
Assumed patient care at 0700. Received report from night shift. Assessment completed. POC discussed with pt, verbalizes understanding. A&Ox4. Declines pain and nausea. No SOB or in any acute distress. Ostomy to LLQ, changed due to leak, heavy output of green watery stool. Bed alarm refused despite education.Treaded socks on, personal possessions and call light placed within reach. Pt denies any additional needs at this time.

## 2017-08-28 PROBLEM — R53.81 DEBILITY: Status: ACTIVE | Noted: 2017-01-01

## 2017-08-28 NOTE — PROGRESS NOTES
Received alert and oriented x 4, denies any pain and discomfort, due med given as ordered, new iv access in placed, ivf continued, q 2 turn, kept skin dry and clean, applied barrier cream, call light within reach, bed alarm in placed, needs attended, will continue to monitor.

## 2017-08-28 NOTE — PROGRESS NOTES
[facility]    Date of Service: 2017    Chief Complaint  76 y.o. female admitted 2017 with confusion and fall at home.  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    Interval Problem Update  17:  The patient is alert and oriented now and appears to be back to his baseline.  17:  The patient states that she feels okay and wants to go home.  17:  The patient states she feels okay.  However lab indicates worsening metabolic acidosis.  The patient remains in her baseline mental status despite markedly elevated ammonia level this morning.  17:  The patient reports of feeling weak.  Otherwise, the patient denies shortness of breath, abdominal pain, or chest pain.    Consultants/Specialty  Gastroenterologist, Dr. Lechuga. Dr. Ortega  Nephrologist, Dr. Dong.    Disposition  Home.        Review of Systems   Constitutional: Negative for fever.   Eyes: Negative for blurred vision.   Respiratory: Negative for cough.    Cardiovascular: Negative for chest pain.   Gastrointestinal: Negative for heartburn, nausea and vomiting.   Genitourinary: Negative for dysuria.   Neurological: Negative for dizziness and headaches.      Physical Exam  Laboratory/Imaging   Hemodynamics  Temp (24hrs), Av.2 °C (97.1 °F), Min:35.9 °C (96.6 °F), Max:36.4 °C (97.6 °F)   Temperature: 36.3 °C (97.3 °F)  Pulse  Av.8  Min: 70  Max: 102    Blood Pressure : 106/47      Respiratory      Respiration: 16, Pulse Oximetry: 98 %             Fluids    Intake/Output Summary (Last 24 hours) at 17 0824  Last data filed at 17 0600   Gross per 24 hour   Intake             1375 ml   Output             1800 ml   Net             -425 ml       Nutrition  Orders Placed This Encounter   Procedures   • DIET ORDER     Standing Status:   Standing     Number of Occurrences:   1     Order Specific Question:   Diet:     Answer:   Regular [1]     Physical Exam   Constitutional: She is oriented to  person, place, and time. She appears well-developed. No distress.   HENT:   Head: Normocephalic and atraumatic.   Mouth/Throat: Oropharynx is clear and moist. No oropharyngeal exudate.   Eyes: Right eye exhibits no discharge. Left eye exhibits no discharge.   Neck: Normal range of motion. No JVD present. No thyromegaly present.   Cardiovascular: Normal rate and regular rhythm.    Pulmonary/Chest: Effort normal and breath sounds normal. No respiratory distress. She has no wheezes.   Abdominal: Soft. Bowel sounds are normal. She exhibits no distension.   Colostomy bag containing dark liquid    Musculoskeletal: Normal range of motion. She exhibits no edema.   Neurological: She is alert and oriented to person, place, and time. No cranial nerve deficit.   Skin: Skin is warm and dry. She is not diaphoretic. No erythema.   Psychiatric: She has a normal mood and affect.       Recent Labs      08/26/17   0233  08/27/17   0304  08/28/17   0247   WBC  2.8*  3.1*  3.6*   RBC  2.49*  2.52*  2.69*   HEMOGLOBIN  7.3*  7.2*  7.6*   HEMATOCRIT  23.1*  23.6*  25.4*   MCV  92.8  93.7  94.4   MCH  29.3  28.6  28.3   MCHC  31.6*  30.5*  29.9*   RDW  50.7*  50.7*  49.8   PLATELETCT  65*  53*  69*   MPV  12.6  10.6  12.8     Recent Labs      08/26/17   1303  08/27/17   0304  08/28/17   0247   SODIUM  138  136  137   POTASSIUM  3.6  3.8  3.9   CHLORIDE  115*  116*  119*   CO2  14*  13*  12*   GLUCOSE  113*  143*  119*   BUN  44*  42*  43*   CREATININE  2.31*  2.28*  2.29*   CALCIUM  7.9*  7.2*  7.6*                      Assessment/Plan     Acute encephalopathy   Assessment & Plan    -- the patient patient's fluid output is high on current lactulose dosage. Yet the patient's ammonia level is very high.  Discussed with Dr. Sanchez.  Continue lactulose and Rifaximin.   The patient had financial difficulty to afford Rifaximin but this medication is needed per gastroenterologist..  Gastroenterology office is working to help the patient to  obtain this medication for outpatient use. Discussed the patient's financial difficulty with .  -- The patient remains alert and oriented.        Melena   Assessment & Plan    Question melena as the patient hemoglobin didn't change much even with aggressive hydration.   Dark color output likely due the patient taking iron supplements.  Continue to monitor.        Renal failure (ARF), acute on chronic (CMS-HCC)   Assessment & Plan    Acute change likely secondary to dehydration from laxative.  Creatinine improving but still remains much higher than baseline.  Continue iv fluid per nephrologist.          BAKER (nonalcoholic steatohepatitis)   Assessment & Plan    Follow up with gastroenterologist in outpatient.        Debility   Assessment & Plan    Encourage the patient to exercise in bed.  Order physical therapy.        Pancytopenia (CMS-HCC)   Assessment & Plan    Secondary to chronic illness.  Continue ferrous sulfate.        Metabolic acidosis, NAG, bicarbonate losses- (present on admission)   Assessment & Plan    Acidosis likely due to combination of gi bicarb loss and hyperchloremia. Per the patient she takes 3 tabs of sodium bicarb 650 mg TID at home.  Increase bicarb dosage again as the patient still has significant acidosis.   Appreciate nephrologist's input.            Reviewed items::  Labs reviewed and Medications reviewed  DVT prophylaxis - mechanical:  SCDs

## 2017-08-28 NOTE — DISCHARGE PLANNING
ALONDRA informed pt that [facility] is unable to accept. Pt would like to d/c home with HH services. Pt stated she would like SW to come back after breathing treatment to discuss HH options.

## 2017-08-28 NOTE — PROGRESS NOTES
Hospital Medicine Progress Note, Adult, Complex               Author: Fadi Najjar Date & Time created: 8/28/2017  1:40 PM     Interval History:  76-year-old lady, has prior history of Crohn's and has history of colectomy   with colostomy done in the past, also has history of cirrhosis secondary to   BAKER.    Developed ORAL, metabolic acidosis.  Doing better     Review of Systems:  Review of Systems   Constitutional: Negative for chills and fever.   Respiratory: Negative for shortness of breath.    Cardiovascular: Negative for chest pain and leg swelling.   Gastrointestinal: Negative for nausea and vomiting.   Genitourinary: Negative for dysuria.       Physical Exam:  Physical Exam   Constitutional: She is oriented to person, place, and time. She appears well-developed and well-nourished. No distress.   HENT:   Head: Normocephalic and atraumatic.   Nose: Nose normal.   Cardiovascular: Normal rate and regular rhythm.    No murmur heard.  Pulmonary/Chest: Effort normal and breath sounds normal. No respiratory distress.   Musculoskeletal: She exhibits no edema.   Neurological: She is alert and oriented to person, place, and time.   Skin: Skin is warm. She is not diaphoretic.   Nursing note and vitals reviewed.      Labs:  Recent Labs      08/27/17   1241   [id number]  7.35*   [id number]  22.0*   [id number]  82.1   [id number]  95.3   ARTHCO3  12*   ARTBE  -12*         Recent Labs      08/26/17   0232  08/26/17   1303  08/27/17   0304  08/28/17   0247   SODIUM  140  138  136  137   POTASSIUM  3.4*  3.6  3.8  3.9   CHLORIDE  117*  115*  116*  119*   CO2  15*  14*  13*  12*   BUN  47*  44*  42*  43*   CREATININE  2.37*  2.31*  2.28*  2.29*   MAGNESIUM  1.7  1.9   --    --    PHOSPHORUS  3.2   --   2.8  2.4*   CALCIUM  7.5*  7.9*  7.2*  7.6*     Recent Labs      08/26/17   1303  08/27/17   0304  08/28/17   0247   GLUCOSE  113*  143*  119*     Recent Labs      08/26/17   0233  08/27/17   0304  08/28/17   0247   RBC  2.49*  2.52*   2.69*   HEMOGLOBIN  7.3*  7.2*  7.6*   HEMATOCRIT  23.1*  23.6*  25.4*   PLATELETCT  65*  53*  69*     Recent Labs      17   0233  17   0304  17   0247   WBC  2.8*  3.1*  3.6*   NEUTSPOLYS  71.60  74.10*  76.70*   LYMPHOCYTES  14.40*  13.40*  11.10*   MONOCYTES  9.70  8.60  8.60   EOSINOPHILS  3.20  2.60  1.90   BASOPHILS  0.40  0.30  0.60           Hemodynamics:  Temp (24hrs), Av.2 °C (97.1 °F), Min:35.9 °C (96.6 °F), Max:36.4 °C (97.6 °F)  Temperature: 36.3 °C (97.3 °F)  Pulse  Av.8  Min: 70  Max: 102   Blood Pressure : 106/47     Respiratory:    Respiration: 16, Pulse Oximetry: 98 %           Fluids:    Intake/Output Summary (Last 24 hours) at 17 1340  Last data filed at 17 1141   Gross per 24 hour   Intake             1615 ml   Output             1800 ml   Net             -185 ml        GI/Nutrition:  Orders Placed This Encounter   Procedures   • DIET ORDER     Standing Status:   Standing     Number of Occurrences:   1     Order Specific Question:   Diet:     Answer:   Regular [1]     Medical Decision Making, by Problem:  Active Hospital Problems    Diagnosis   • Melena [K92.1]   • Acute encephalopathy [G93.40]   • BAKER (nonalcoholic steatohepatitis) [K75.81]   • Renal failure (ARF), acute on chronic (CMS-HCC) [N17.9, N18.9]   • Metabolic acidosis, NAG, bicarbonate losses [E87.2]   • Pancytopenia (CMS-HCC) [D61.818]         Reviewed items::  Labs reviewed and Medications reviewed  1 ORAL on CKD: cr is stable, no uremic symptoms.  2 metabolic acidosis  3 Anemia  Plan  no acute need for HD  Continue NaHco3  Renal dose all meds  Avoid nephrotoxins  Prognosis poor.

## 2017-08-28 NOTE — PROGRESS NOTES
Romina Morrissey Fall Risk Assessment:     Last Known Fall: Within the last six months  Mobility: Dizziness/generalized weakness  Medications: Cardiovascular and central nervous system meds  Mental Status/LOC/Awareness: Awake, alert, and oriented to date, place, and person  Toileting Needs: Use of catheters or diversion devices  Volume/Electrolyte Status: Use of IV fluids/tube feeds  Communication/Sensory: Visual (Glasses)/hearing deficit  Behavior: Appropriate behavior  Romina Morrissey Fall Risk Total: 12  Fall Risk Level: MODERATE RISK    Universal Fall Precautions:  call light/belongings in reach, bed in low position and locked, wheelchairs and assistive devices out of sight, siderails up x 2, use non-slip footwear, adequate lighting, clutter free and spill free environment, educate on level of risk, educate to call for assistance    Fall Risk Level Interventions:    TRIAL (TELE 8, NEURO, MED RADHA 5) Moderate Fall Risk Interventions  Place yellow fall risk ID band on patient: verified  Provide patient/family education based on risk assessment : completed  Educate patient/family to call staff for assistance when getting out of bed: completed  Place fall precaution signage outside patient door: verified  Utilize bed/chair fall alarm: refused     Patient Specific Interventions:     Medication: review medications with patient and family  Mental Status/LOC/Awareness: utilize bed/chair fall alarm  Toileting: provide frquent toileting, instruct patient/family on the use of grab bars and instruct patient/family on the need to call for assistance when toileting  Volume/Electrolyte Status: ensure patient remains hydrated, monitor abnormal lab values and ensure IV fluids are appropriate  Communication/Sensory: update plan of care on whiteboard and ensure patient has glasses/contacts and hearing aids/dentures  Behavioral: administer medication as ordered and instruct/reinforce fall program rationale  Mobility: utilize bed/chair  fall alarm, ensure bed is locked and in lowest position, instruct patient to exit bed on their strongest side and collaborate with doctor for possible PT/OT consult

## 2017-08-28 NOTE — CONSULTS
DATE OF SERVICE:  08/27/2017    REQUESTING PHYSICIAN:  Dr. Montez.    REASON FOR CONSULTATION:  Metabolic acidosis.    HISTORY OF PRESENT ILLNESS:  Patient is a 76-year-old lady who was admitted on   August 24.  Patient has prior history of Crohn's and has history of colectomy   with colostomy done in the past, also has history of cirrhosis secondary to   BAKER.  Patient had presented with mental status changes, questionable hepatic   encephalopathy had been started on lactulose with increased bowel movements   with progressively worsening acidosis with low bicarbonate.  Patient had been   on bicarbonate replacement at 650 mg 3 times a day, which has been increased   as of yesterday.  Patient has elevated ammonia levels.  GI being consulted for   possible transition from lactulose.  Patient also at present is awake,   sitting up in chair, though tends to be confused, unable to provide any   details of the history.   at the bedside who is helping with the   history and also obtained from review of records.    PAST MEDICAL HISTORY:  History of Crohn's disease, history of liver cirrhosis   secondary to BAKER, history of recurrent hepatic encephalopathy and COPD.    ALLERGIES:  TO SULFA DRUGS.    PAST SURGICAL HISTORY:  Significant for cholecystectomy, bowel resection with   right-sided ileostomy,  left side umbilical hernia repair.    SOCIAL HISTORY:  Patient is .  No smoking, alcohol or drug use.    FAMILY HISTORY:  Noncontributory.    MEDICATIONS:  Include patient to be on iron and lactulose, omeprazole, and   Zofran p.r.n.  The patient is on sodium bicarbonate and potassium, rifaximin   has been started today as per GI recommendation and is on vitamin D, lactulose   does is being decreased.    REVIEW OF SYSTEMS:  Unable to obtain.    PHYSICAL EXAMINATION:  GENERAL:  Currently, is awake, alert, sitting up in chair though not oriented   to self and knows her , but not oriented to place and time.   Patient in   no apparent distress.  VITAL SIGNS:  Patient's vital signs showed patient to have temp of 35.9, blood   pressure 111/49, heart rate 102, respiratory rate 18, sats 93%.  HEENT:  Anicteric sclerae, extraocular movement intact.  Pupils are reactive   to light and accommodation.  Oral mucosa was moist.  NECK:  Supple.  Midline trachea.  LUNGS:  Clear to auscultation bilaterally.  CARDIOVASCULAR:  Regular rate and rhythm.  ABDOMEN:  With ileostomy in place.  ABDOMEN:  Positive bowel sounds, soft, nontender.  EXTREMITIES:  With edema, more on the left lower extremity, with   ecchymosis on the left lower extremity with edema, trace on the right.  SKIN:  With ecchymosis on the left lower extremity and upper extremities with   no rashes or nodules.  NEUROLOGIC:  The patient is awake, oriented to self only.  Patient moving all   4 extremities.    LABORATORY DATA:  Showed patient to have a sodium 136, potassium 3.8, chloride   116, bicarbonate 13.  BUN and creatinine is 42 and 2.28, glucose is 143,   calcium is 7.2, albumin is 2.2, phosphate is 2.8.  Ammonia was 331.  Patient   had a white count of 3.1, hemoglobin 7.2, hematocrit is 23.6, platelet count   is 53.  HEENT:  Showed a platelet count of 53.    IMPRESSION AND PLAN:  1.  Patient with the renal insufficiency, did have elevate creatinine on   admission with slight improvement.  Patient is questionable nonoliguric.    Patient with possible prerenal azotemia.  We will continue with IV fluids.    I will change half NS to D5NS to avoid hyponatremia as patient has history of  liver disease.  2.  Electrolytes, the patient had evidence of hypokalemia, is on potassium   replacement.  3.  Low bicarbonate indicating metabolic acidosis, blood gas done with pH of   7.35.  I tried to order IV bicarbonate which is unavailable including IV   sodium acetate, currently on oral sodium bicarbonate, does of which has been   increased, also GI has been consulted in view of  lactulose being given for   hepatic encephalopathy.  Recommend decreasing lactulose doze, may not be   helping with the prior history of colostomy and Crohn's and ileostomy in   place.  Patient will be changed to rifaximin and improving diarrhea may help   improve metabolic acidosis in addition. Care discussed with Dr. Ortega.  4.  Anemia.  Monitor hemoglobin and hematocrit.  Transfusion as per   hospitalist.  Patient will be followed closely.    Thank you for involving me in care of the patient.       ____________________________________     MD MINE RAMIREZ / NTS    DD:  08/27/2017 17:16:50  DT:  08/27/2017 22:53:32    D#:  [id number]  Job#:  [id number]

## 2017-08-28 NOTE — PROGRESS NOTES
Assumed patient care at 0700. Received report from night shift. Assessment completed. POC discussed with pt, verbalizes understanding. A&Ox4. Declines pain and nausea. No SOB or in any acute distress. Ostomy to LLQ, heavy output of green watery stool. IV fluids running as ordered. Q2h turns in place. Bed alarm in use. Treaded socks on, personal possessions and call light placed within reach. Pt denies any additional needs at this time.

## 2017-08-29 NOTE — PROGRESS NOTES
[facility] Progress Note    Date of Service: 2017    Chief Complaint  76 y.o. female admitted 2017 with confusion and fall at home.  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    Interval Problem Update  Patient is AAOx3 this am. Pt denies any complaints. PT/OT eval ordered.     Consultants/Specialty  Gastroenterologist, Dr. Lechuga. Dr. Ortega  Nephrologist, Dr. Dong.    Disposition  Home likely in 1-2 days based on Pt/Ot eval         Review of Systems   Constitutional: Negative for chills and fever.   Eyes: Negative for blurred vision.   Respiratory: Negative for cough and shortness of breath.    Cardiovascular: Negative for chest pain, palpitations and leg swelling.   Gastrointestinal: Negative for diarrhea, nausea and vomiting.   Genitourinary: Negative for dysuria.   Neurological: Negative for dizziness and headaches.   Psychiatric/Behavioral: Negative for depression.      Physical Exam  Laboratory/Imaging   Hemodynamics  Temp (24hrs), Av.4 °C (97.6 °F), Min:36.3 °C (97.3 °F), Max:36.6 °C (97.9 °F)   Temperature: 36.3 °C (97.3 °F)  Pulse  Av.9  Min: 70  Max: 102    Blood Pressure : 103/41      Respiratory      Respiration: 16, Pulse Oximetry: 97 %             Fluids    Intake/Output Summary (Last 24 hours) at 17 1319  Last data filed at 17 0900   Gross per 24 hour   Intake             1550 ml   Output             1350 ml   Net              200 ml       Nutrition  Orders Placed This Encounter   Procedures   • DIET ORDER     Standing Status:   Standing     Number of Occurrences:   1     Order Specific Question:   Diet:     Answer:   Regular [1]     Physical Exam   Constitutional: She is oriented to person, place, and time. She appears well-developed. No distress.   HENT:   Head: Normocephalic and atraumatic.   Eyes: Conjunctivae and EOM are normal. Right eye exhibits no discharge. Left eye exhibits no discharge.   Neck: Normal range of motion. No JVD present.    Cardiovascular: Normal rate and regular rhythm.    Pulmonary/Chest: Effort normal and breath sounds normal. No respiratory distress. She has no wheezes.   Abdominal: Soft. Bowel sounds are normal. She exhibits no distension.   Colostomy bag containing dark liquid    Musculoskeletal: Normal range of motion. She exhibits no edema.   LUE in cast  LLE with ecchymosis     Neurological: She is alert and oriented to person, place, and time. No cranial nerve deficit.   Skin: Skin is warm and dry. She is not diaphoretic. No erythema.   Psychiatric: She has a normal mood and affect.   Nursing note and vitals reviewed.      Recent Labs      08/27/17   0304  08/28/17   0247 08/29/17   0254   WBC  3.1*  3.6*  4.0*   RBC  2.52*  2.69*  2.60*   HEMOGLOBIN  7.2*  7.6*  7.5*   HEMATOCRIT  23.6*  25.4*  24.5*   MCV  93.7  94.4  94.2   MCH  28.6  28.3  28.8   MCHC  30.5*  29.9*  30.6*   RDW  50.7*  49.8  50.7*   PLATELETCT  53*  69*  55*   MPV  10.6  12.8  10.9     Recent Labs      08/27/17   0304  08/28/17 0247  08/29/17   0254   SODIUM  136  137  135   POTASSIUM  3.8  3.9  3.8   CHLORIDE  116*  119*  118*   CO2  13*  12*  13*   GLUCOSE  143*  119*  111*   BUN  42*  43*  39*   CREATININE  2.28*  2.29*  2.13*   CALCIUM  7.2*  7.6*  7.3*                      Assessment/Plan     Acute encephalopathy   Assessment & Plan    -- improved, discussed with Dr. Sanchez.  Continue lactulose and Rifaximin.   The patient had financial difficulty to afford Rifaximin but this medication is needed per gastroenterologist..  Gastroenterology office is working to help the patient to obtain this medication for outpatient use. Discussed the patient's financial difficulty with .  -- The patient remains alert and oriented.        Melena   Assessment & Plan    Question melena as the patient hemoglobin didn't change much even with aggressive hydration.   Dark color output likely due the patient taking iron supplements.  Continue to  monitor.  H&H stable        Renal failure (ARF), acute on chronic (CMS-HCC)   Assessment & Plan    Acute change likely secondary to dehydration from laxative.  Creatinine improving but still remains much higher than baseline.  Continue iv fluid per nephrologist.          BAKER (nonalcoholic steatohepatitis)   Assessment & Plan    Follow up with gastroenterologist in outpatient.        Debility   Assessment & Plan    Encourage the patient to exercise in bed.    PT/OT eval ordered        Pancytopenia (CMS-[facility])   Assessment & Plan    Secondary to chronic illness.  Continue ferrous sulfate.        Metabolic acidosis, NAG, bicarbonate losses- (present on admission)   Assessment & Plan    Acidosis likely due to combination of gi bicarb loss and hyperchloremia. Per the patient she takes 3 tabs of sodium bicarb 650 mg TID at home.  Increase bicarb dosage again as the patient still has significant acidosis.   Appreciate nephrology input.            Reviewed items::  Labs reviewed and Medications reviewed  Loaiza catheter::  No Loaiza  DVT prophylaxis - mechanical:  SCDs  Ulcer Prophylaxis::  Yes

## 2017-08-29 NOTE — CARE PLAN
Problem: Infection  Goal: Will remain free from infection    Intervention: Assess signs and symptoms of infection  VSS. No s/s of infection at this time.       Problem: Skin Integrity  Goal: Risk for impaired skin integrity will decrease    Intervention: Assess and monitor skin integrity, appearance and/or temperature  Ostomy bag leaking, wound care consulted to assess site and current supplies in use. Wound care changed patient's ostomy bag from a two-piece to a one-piece for a better fit. Currently intact and is functioning well.

## 2017-08-29 NOTE — DOCUMENTATION QUERY
"DOCUMENTATION QUERY    PROVIDERS: Please select “Cosign w/ note”to reply to query.    To better represent the severity of illness of your patient, please review the following information and exercise your independent professional judgment in responding to this query.     \"Chronic Kidney Disease\" is documented in the Gastroenterology Consult. Based upon the clinical findings, risk factors, and treatment, can this diagnosis be further specified?    • Chronic Kidney Disease Stage I      • Chronic Kidney Disease Stage II     • Chronic Kidney Disease Stage III    • Chronic Kidney Disease Stage IV    • Chronic Kidney Disease Stage V     • End Stage Renal Disease  • Other explanation of clinical findings  • Unable to determine     The medical record reflects the following:   Clinical Findings  BUN 51 on admission; Creatinine 2.57 on admission; GFR 18 on admission; dx ORLA   Treatment  CBC; BMP; NS Infusion; D5 1/2NS Infusion; D5 NS Infusion; Sodium BiCarb Infusion   Risk Factors  Age; dx Dehydration; dx Metabolic Acidosis; dx ORAL; documented \"CKD\"; dx Hypokalemia; dx Crohn's Disease; dx Recurrent Hepatic Encephalopathy w/BAKER Cirrhosis   Location within medical record  History and Physical, Progress Notes, Lab Results and MAR     Thank you,     Alex Barlow  Clinical   P: [phone]  "

## 2017-08-29 NOTE — PROGRESS NOTES
Hospital Medicine Progress Note, Adult, Complex               Author: Fadi Najjar Date & Time created: 8/29/2017  3:22 PM     Interval History:  76-year-old lady, has prior history of Crohn's and has history of colectomy   with colostomy done in the past, also has history of cirrhosis secondary to   BAKER.    Developed ORAL, metabolic acidosis.  Doing better     Review of Systems:  Review of Systems   Constitutional: Negative for chills and fever.   Respiratory: Negative for shortness of breath.    Cardiovascular: Negative for chest pain and leg swelling.   Gastrointestinal: Negative for nausea and vomiting.   Genitourinary: Negative for dysuria.   Neurological:        Decreased hearing       Physical Exam:  Physical Exam   Constitutional: She is oriented to person, place, and time. She appears well-developed and well-nourished. No distress.   HENT:   Head: Normocephalic and atraumatic.   Nose: Nose normal.   Cardiovascular: Normal rate and regular rhythm.    No murmur heard.  Pulmonary/Chest: Effort normal and breath sounds normal. No respiratory distress.   Musculoskeletal: She exhibits no edema.   Neurological: She is alert and oriented to person, place, and time.   Skin: Skin is warm. She is not diaphoretic.   Nursing note and vitals reviewed.      Labs:  Recent Labs      08/27/17   1241   [id number]  7.35*   [id number]  22.0*   [id number]  82.1   [id number]  95.3   ARTHCO3  12*   ARTBE  -12*         Recent Labs      08/27/17   0304  08/28/17   0247  08/29/17   0254   SODIUM  136  137  135   POTASSIUM  3.8  3.9  3.8   CHLORIDE  116*  119*  118*   CO2  13*  12*  13*   BUN  42*  43*  39*   CREATININE  2.28*  2.29*  2.13*   PHOSPHORUS  2.8  2.4*   --    CALCIUM  7.2*  7.6*  7.3*     Recent Labs      08/27/17   0304  08/28/17   0247  08/29/17   0254   ALTSGPT   --    --   22   ASTSGOT   --    --   40   ALKPHOSPHAT   --    --   253*   TBILIRUBIN   --    --   0.9   GLUCOSE  143*  119*  111*     Recent Labs      08/27/17    0304  17   0247  17   0254   RBC  2.52*  2.69*  2.60*   HEMOGLOBIN  7.2*  7.6*  7.5*   HEMATOCRIT  23.6*  25.4*  24.5*   PLATELETCT  53*  69*  55*     Recent Labs      17   0304  17   0247  17   0254   WBC  3.1*  3.6*  4.0*   NEUTSPOLYS  74.10*  76.70*  78.40*   LYMPHOCYTES  13.40*  11.10*  9.00*   MONOCYTES  8.60  8.60  9.00   EOSINOPHILS  2.60  1.90  2.20   BASOPHILS  0.30  0.60  0.20   ASTSGOT   --    --   40   ALTSGPT   --    --   22   ALKPHOSPHAT   --    --   253*   TBILIRUBIN   --    --   0.9           Hemodynamics:  Temp (24hrs), Av.4 °C (97.6 °F), Min:36.3 °C (97.3 °F), Max:36.6 °C (97.9 °F)  Temperature: 36.3 °C (97.3 °F)  Pulse  Av.9  Min: 70  Max: 102   Blood Pressure : 103/41     Respiratory:    Respiration: 16, Pulse Oximetry: 97 %           Fluids:    Intake/Output Summary (Last 24 hours) at 17 1522  Last data filed at 17 0900   Gross per 24 hour   Intake             1550 ml   Output             1350 ml   Net              200 ml        GI/Nutrition:  Orders Placed This Encounter   Procedures   • DIET ORDER     Standing Status:   Standing     Number of Occurrences:   1     Order Specific Question:   Diet:     Answer:   Regular [1]     Medical Decision Making, by Problem:  Active Hospital Problems    Diagnosis   • Melena [K92.1]   • Acute encephalopathy [G93.40]   • BAKER (nonalcoholic steatohepatitis) [K75.81]   • Renal failure (ARF), acute on chronic (CMS-HCC) [N17.9, N18.9]   • Metabolic acidosis, NAG, bicarbonate losses [E87.2]   • Pancytopenia (CMS-HCC) [D61.818]         Reviewed items::  Labs reviewed and Medications reviewed  1 ORAL on CKD: cr is stable, no uremic symptoms.  2 metabolic acidosis:stable  3 Anemia  Plan  no acute need for HD  Continue NaHco3  Renal dose all meds  Avoid nephrotoxins  Prognosis poor.

## 2017-08-29 NOTE — WOUND TEAM
Expand All Collapse All    Advanced Wound [facility]  [facility]  [address]  [place] [address]  [phone] Fax#      Ostomy Note  For 90 Day Certification Period:  7/14/17-10/14/17     Referring Provider:  Ashlee Dacosta  Primary Provider: Dr. Gilberto Morillo  Consulting Providers: KELLEN Mills  Surgeon: Dr. Gaviria  Start of Care: 7/14/17  Reason for referral: ileostomy eval        SUBJECTIVE:       HPI: 76 year old female pt with a history of Crohn's disease s/p ileostomy, CKD IV, and cirrhosis.  Pt had original ileostomy in [place] in 1992 and a revision was done by Dr. Gaviria (no longer at [facility]) in 2010.  Pt stated that she started to have problems with her skin around the stoma after the revision.  Pt has been getting a 3-4 day wear time with her ileostomy appliance.     Past Medical Hx:   Past Medical History         Past Medical History   Diagnosis Date   • Bowel obstruction (CMS-HCC)     • Crohn's disease (CMS-HCC)     • Indigestion     • Obstruction     • Snoring     • Ileostomy in place (CMS-HCC)     • COPD (chronic obstructive pulmonary disease) (CMS-HCC)         per pt   • COPD (chronic obstructive pulmonary disease) (CMS-HCC) 3/20/2013   • Arthritis 12/30/16       to hands and feet   • Heart burn     • Emphysema of lung (CMS-HCC)         COPD   • Sleep apnea     • TRAVIS on CPAP         10/2016-CPAP DC'd and wears O2 @4L/NC   • Hemorrhagic disorder (CMS-HCC)         anemia of unkown etiology.   • Anemia 12/30/16       unknown etiology   • Renal disorder         Increased creatitine level due to meds.   • Breath shortness         uses O2@ at night and prn ([employer]). Uses inhaler prn. 12/30/16-reports no changes    • Cataract 2006,2007       bilat phaco with IOL   • Cirrhosis of liver (CMS-HCC) 12/30/16       states dx at one time but no treatment for >10yrs   • Occasional tremors           Surgical Hx:   Medications:no  "changes  Allergies: Sulfa drugs  Social Hx:      Fall Risk Assessment (alli all that apply with an X): + fall risk, completed at initial eval     OBJECTIVE:      STOMA ASSESSMENT:                        Type:  ___X_Ileostomy     ____Colostomy    ____Urostomy    ____Other                           Location:   LLQ                        Size: 1 3/8\" (pt currently using precut 1 1/4\" round barriers)                        Shape: oval                        Color: red, budded                        Protrudes:      ___ >1 inch               __X_ <1 inch                        [place]:  center                        Mucocutaneous Junction:  intact                        Gale-stomal Skin Problems: scant denudation                        Effluent / Flatus: yellow brown liquid                        Photo  ____ Yes _X___ No     Pouching Procedure:                        Old appliance removed.                         Peristomal skin cleansed with: water and warm wash cloth                        Peristomal skin treated with: crusting (stoma powder and no sting skin barrier)                        Ostomy appliance used: 2 1/4\" CTF convex heather 2 piece with barrier ring     8/1/17- Appliance changed per POC    Patient Education:   Verbal/demonstration instructions of above pouching procedure provided to patient.  Provided information & sample to patient regarding gel packets for consolidating effluent in pouch if interested.                                              Response: Pt verbalized understanding and was able to participate with crusting.          PLAN: Reinforce importance of crusting and cutting barrier to clear stoma.  Pt will return to clinic 1x/week until peristomal issues resolve.     Supplies Needed:                        Appliance type:    Cascade 2 piece  2 /14\" CTF convex (98894), pouch [id number]                                                                            Other: kendy barrier ring, stoma " powder, skin barrier                           Accessories:    Pt does not want a belt (pt has tried this in the past and does not like it)                           Supplier:     Frequency:  1x/week and PRN        Professional Collaboration:  None today        At the time of each visit, a thorough assessment of the patient is completed to assure appropriateness of our plan of care.  The plan of care may need to be adapted from the original plan of care to address any significant changes in patient status.     Clinician Signature:  _________________________________  Date:  ____________     Physician Signature:  ________________________________  Date:  ____________

## 2017-08-29 NOTE — PROGRESS NOTES
Received report from day RN. Assumed pt care at 1915, 8/28. Discussed call light/phone system, communication board and POC. Pt is aao x 4 and verbalizes understanding. Pt is hard of hearing. Pt's SO brings in hearing aids daily. Pt has a colostomy in place to LLQ. This RN changes bag and cleanses site. Pt has redness/excoriation to area. Pt has high output, RN records output. Pt mobility assessed. Pt is a one assist with a shuffling gait and hand held assist. Bed alarm on. Fall precautions in place. Bed is locked and in low position, call light within reach. Treaded slippers in place. Pt needs met at this time. Hourly rounding in place.

## 2017-08-29 NOTE — WOUND TEAM
"[facility]   Inpatient Services   Established Ostomy Management/ troubleshooting  HPI: Reviewed  PMH: Reviewed   SH: Reviewed   Reason for Ostomy nurse consult:  Appliance leaked several times over shift.    Subjective: \"They are having a hard time with it.\"    Objective: Pleasant and engaging.    Ostomy type: Ileostomy.  Stoma location: LLQ.  Stoma assessment:    Appearance:  Red.   Size:  1.5\"   Protrusion: Budded less than 1\"   Output: Small, soft, watery. Brown.    jxn: Resolved.   Peristomal skin: Red due to varices.     Ostomy Appliance (type and size): [place] # [id number], 1 piece soft flex with paste ring.     Interventions: RN stated that they have been having trouble since night shift with the appliance leaking despite repeated changes. The abdomen where the stoma is bulges, making pouching challenging. Decided to try a 1 piece soft flex to see if it would shape to that bulge and stay in place. Removed old appliance, cleaned peristomal skin with warm water and washcloth. Dried peristomal skin. Crusted peristomal skin 1x. Cut appliance to fit around stoma, applied paste ring. Rubbed both products between palms to warm up. Place appliance around stoma, ran finger around stoma to adhere barrier. Made sure the edges of appliance well adhered. Closed bottom of pouch. Cut back up appliance for nursing to use if current appliance leaks overnight, left it in ostomy supply bag.      Evaluation:  Soft flex appears to mold to the shape of patient's peristomal skin and hopefully will stay intact.     Plan: Monitor for pouching needs.    Anticipated discharge needs: Out patient clinic.     "

## 2017-08-30 NOTE — PROGRESS NOTES
Assumed care at 0700. Received report from RN. Patient is AOx4. Pt is hard of hearing. Pt's  brings in hearing aids daily. Pt has colostomy in [facility] with high output. Assessment complete. Labs reviewed. Patient and RN discussed plan of care. Patient questions answered. Patient needs are met at this time. Bed in lowest and locked position. Call light is within reach. Hourly rounding in place. /45   Pulse 72   Temp 37 °C (98.6 °F)   Resp 18   Ht 1.524 m (5')   Wt 68.3 kg (150 lb 9.2 oz)   LMP 06/29/1995   SpO2 97%   Breastfeeding? No   BMI 29.41 kg/m²

## 2017-08-30 NOTE — THERAPY
"Occupational Therapy Evaluation completed.   Functional Status: Pt seen today for OT evaluation. Pt currently requiring CGA for functional mobility with IV pole for support - assume NWB on LUE due to elbow fx. CGA for toileting/toilet txf. MaxA LB dressing. CGA grooming in stance. Pt became SOB during activity. Pt limited by weakness, fatigue, impaired balance which impacts independence in ADLs and functional mobility.   Plan of Care: Will benefit from Occupational Therapy 3 times per week  Discharge Recommendations:  Equipment: Will Continue to Assess for Equipment Needs. Post-acute therapy undetermined - depends on progress made in next 24-48 hrs.     See \"Rehab Therapy-Acute\" Patient Summary Report for complete documentation.    "

## 2017-08-30 NOTE — WOUND TEAM
Wound consult placed for evaluation of ostomy and wound noted coccyx.  Discussed with Carlos Harris RN who is experienced with ostomy care and he reports that night shift changed appliance due to leaking and report that peristomal skin is red.  He reports peristomal skin was crusted.  When looking at current ostomy orders, note that two piece appliance was placed.  Patient reports that she uses a one piece appliance at home.  These were ordered and ostomy care orders updated.  Discussed with staff RN.  Changed to fecal pouch versus high output pouch as patient has chunks of food which obstructs flow and pouch was very full.  Also examined coccyx wound and found very tiny moisture fissue very low near rectum which is chronic (LDA opened last year and completed) and no wound care other than barrier paste indicated.  Correct ostomy supplies ordered.

## 2017-08-30 NOTE — THERAPY
"Physical Therapy Evaluation completed.   Bed Mobility:  Supine to Sit:  (Nt: pre/post in chair)  Transfers: Sit to Stand: Stand by Assist  Gait: Level Of Assist: Supervised with Front-Wheel Walker       Plan of Care: Will benefit from Physical Therapy 3 times per week  Discharge Recommendations: Equipment: Will Continue to Assess for Equipment Needs. Post-acute therapy Discharge to a transitional care facility for continued skilled therapy services or Discharge to home with outpatient or home health for additional skilled therapy services pending improvement.    Pt presents with decreased activity tolerance s/p fall. Pt demonstrated SOB with increase activity. Pt is most limited by confusion;  seeks answers from wife when confirming prior function and history. Anticipate would benefit from homehealth and care retrun home when medicall appropriate. Anticipate one more session for bed mobility. Will assume NWB L UE until further clarification obtained.     See \"Rehab Therapy-Acute\" Patient Summary Report for complete documentation.     "

## 2017-08-30 NOTE — PROGRESS NOTES
[facility] Hospitalist Progress Note    Date of Service: 2017    Chief Complaint  76 y.o. female admitted 2017 with confusion and fall at home.  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    Interval Problem Update   PT recommends SNF or HH, pt refuses SNF and wants to go home with HH, orders placed. SW assisting. OT eval pending. Pt hypotensive this am, responded to IV bolus.     Consultants/Specialty  Gastroenterologist, Dr. Lechuga. Dr. Ortega  Nephrologist, Dr. Dong.    Disposition  Home likely tomm based on Pt/Ot eval         Review of Systems   Constitutional: Negative for chills and fever.   Eyes: Negative for blurred vision.   Respiratory: Negative for cough and shortness of breath.    Cardiovascular: Negative for chest pain and leg swelling.   Gastrointestinal: Negative for nausea and vomiting.   Neurological: Positive for weakness.   Psychiatric/Behavioral: Negative for depression.      Physical Exam  Laboratory/Imaging   Hemodynamics  Temp (24hrs), Av.7 °C (98 °F), Min:35.9 °C (96.7 °F), Max:37 °C (98.6 °F)   Temperature: 36.8 °C (98.3 °F)  Pulse  Av.3  Min: 70  Max: 117    Blood Pressure : (!) 85/40      Respiratory      Respiration: 18, Pulse Oximetry: 99 %        RUL Breath Sounds: Expiratory Wheezes, RLL Breath Sounds: Diminished, REMY Breath Sounds: Expiratory Wheezes, LLL Breath Sounds: Diminished    Fluids    Intake/Output Summary (Last 24 hours) at 17 1421  Last data filed at 17 1057   Gross per 24 hour   Intake              118 ml   Output             2600 ml   Net            -2482 ml       Nutrition  Orders Placed This Encounter   Procedures   • DIET ORDER     Standing Status:   Standing     Number of Occurrences:   1     Order Specific Question:   Diet:     Answer:   Regular [1]     Physical Exam   Constitutional: She is oriented to person, place, and time. She appears well-developed. No distress.   HENT:   Head: Normocephalic and atraumatic.   Eyes:  Conjunctivae are normal. Right eye exhibits no discharge. Left eye exhibits no discharge.   Neck: Normal range of motion. No JVD present.   Cardiovascular: Normal rate and regular rhythm.    No murmur heard.  Pulmonary/Chest: Effort normal and breath sounds normal. No respiratory distress. She has no wheezes.   Abdominal: Soft. Bowel sounds are normal. She exhibits no distension. There is no tenderness.   Colostomy bag containing dark liquid    Musculoskeletal: Normal range of motion. She exhibits no edema.   LUE in cast  LLE with ecchymosis     Neurological: She is alert and oriented to person, place, and time. No cranial nerve deficit.   Skin: Skin is warm and dry. She is not diaphoretic. No erythema.   Psychiatric: She has a normal mood and affect.   Nursing note and vitals reviewed.      Recent Labs      08/28/17 0247 08/29/17   0254   WBC  3.6*  4.0*   RBC  2.69*  2.60*   HEMOGLOBIN  7.6*  7.5*   HEMATOCRIT  25.4*  24.5*   MCV  94.4  94.2   MCH  28.3  28.8   MCHC  29.9*  30.6*   RDW  49.8  50.7*   PLATELETCT  69*  55*   MPV  12.8  10.9     Recent Labs      08/28/17 0247 08/29/17   0254  08/30/17   0054   SODIUM  137  135  137   POTASSIUM  3.9  3.8  3.9   CHLORIDE  119*  118*  119*   CO2  12*  13*  12*   GLUCOSE  119*  111*  113*   BUN  43*  39*  39*   CREATININE  2.29*  2.13*  2.30*   CALCIUM  7.6*  7.3*  7.3*                      Assessment/Plan     Acute encephalopathy- (present on admission)   Assessment & Plan    -- improved, discussed with Dr. Sanchez.  Continue lactulose and Rifaximin.   The patient had financial difficulty to afford Rifaximin but this medication is needed per gastroenterologist..  Gastroenterology office is working to help the patient to obtain this medication for outpatient use. Discussed the patient's financial difficulty with .  -- The patient remains alert and oriented.        Melena- (present on admission)   Assessment & Plan    Question melena as the patient  hemoglobin didn't change much even with aggressive hydration.   Dark color output likely due the patient taking iron supplements.  Continue to monitor.  H&H stable        Renal failure (ARF), acute on chronic (CMS-HCC)- (present on admission)   Assessment & Plan    Acute change likely secondary to dehydration from laxative.  Creatinine improving but still remains much higher than baseline.  Continue iv fluid per nephrologist.          BAKER (nonalcoholic steatohepatitis)- (present on admission)   Assessment & Plan    Follow up with gastroenterologist in outpatient.        Debility   Assessment & Plan    Encourage the patient to exercise in bed.    PT recommend SNF vs HH, pt refused SNF and wants to go home with HH, orders placed /OT eval pending.        Pancytopenia (CMS-HCC)- (present on admission)   Assessment & Plan    Secondary to chronic illness.  Continue ferrous sulfate.        Metabolic acidosis, NAG, bicarbonate losses- (present on admission)   Assessment & Plan    Acidosis likely due to combination of gi bicarb loss and hyperchloremia. Per the patient she takes 3 tabs of sodium bicarb 650 mg TID at home.  Increase bicarb dosage again as the patient still has significant acidosis.   Appreciate nephrology input.            Reviewed items::  Labs reviewed and Medications reviewed  Loaiza catheter::  No Loaiza  DVT prophylaxis - mechanical:  SCDs  Ulcer Prophylaxis::  Yes

## 2017-08-30 NOTE — PROGRESS NOTES
Hospital Medicine Progress Note, Adult, Complex               Author: Fadi Najjar Date & Time created: 8/30/2017  3:40 PM     Interval History:  76-year-old lady, has prior history of Crohn's and has history of colectomy   with colostomy done in the past, also has history of cirrhosis secondary to   BAKER.    Developed ORAL, metabolic acidosis.  Doing better    No new events  Review of Systems:  Review of Systems   Constitutional: Negative for chills and fever.   Respiratory: Negative for shortness of breath.    Cardiovascular: Negative for chest pain and leg swelling.   Gastrointestinal: Negative for nausea and vomiting.   Genitourinary: Negative for dysuria.   Neurological:        Decreased hearing       Physical Exam:  Physical Exam   Constitutional: She is oriented to person, place, and time. She appears well-developed and well-nourished. No distress.   HENT:   Head: Normocephalic and atraumatic.   Nose: Nose normal.   Cardiovascular: Normal rate and regular rhythm.    No murmur heard.  Pulmonary/Chest: Effort normal and breath sounds normal. No respiratory distress.   Musculoskeletal: She exhibits no edema.   Neurological: She is alert and oriented to person, place, and time.   Skin: Skin is warm. She is not diaphoretic.   Nursing note and vitals reviewed.      Labs:        Invalid input(s): [id number]      Recent Labs      08/28/17 0247 08/29/17 0254 08/30/17   0054   SODIUM  137  135  137   POTASSIUM  3.9  3.8  3.9   CHLORIDE  119*  118*  119*   CO2  12*  13*  12*   BUN  43*  39*  39*   CREATININE  2.29*  2.13*  2.30*   PHOSPHORUS  2.4*   --    --    CALCIUM  7.6*  7.3*  7.3*     Recent Labs      08/28/17 0247 08/29/17 0254  08/30/17   0054   ALTSGPT   --   22  20   ASTSGOT   --   40  39   ALKPHOSPHAT   --   253*  251*   TBILIRUBIN   --   0.9  0.8   GLUCOSE  119*  111*  113*     Recent Labs      08/28/17 0247 08/29/17   0254   RBC  2.69*  2.60*   HEMOGLOBIN  7.6*  7.5*   HEMATOCRIT  25.4*   24.5*   PLATELETCT  69*  55*     Recent Labs      17   0247  17   0254  17   0054   WBC  3.6*  4.0*   --    NEUTSPOLYS  76.70*  78.40*   --    LYMPHOCYTES  11.10*  9.00*   --    MONOCYTES  8.60  9.00   --    EOSINOPHILS  1.90  2.20   --    BASOPHILS  0.60  0.20   --    ASTSGOT   --   40  39   ALTSGPT   --   22  20   ALKPHOSPHAT   --   253*  251*   TBILIRUBIN   --   0.9  0.8           Hemodynamics:  Temp (24hrs), Av.7 °C (98 °F), Min:35.9 °C (96.7 °F), Max:37 °C (98.6 °F)  Temperature: 36.8 °C (98.3 °F)  Pulse  Av.3  Min: 70  Max: 117   Blood Pressure : (!) 85/40     Respiratory:    Respiration: 18, Pulse Oximetry: 99 %        RUL Breath Sounds: Expiratory Wheezes, RLL Breath Sounds: Diminished, REMY Breath Sounds: Expiratory Wheezes, LLL Breath Sounds: Diminished  Fluids:    Intake/Output Summary (Last 24 hours) at 17 1540  Last data filed at 17 1057   Gross per 24 hour   Intake              118 ml   Output             2600 ml   Net            -2482 ml        GI/Nutrition:  Orders Placed This Encounter   Procedures   • DIET ORDER     Standing Status:   Standing     Number of Occurrences:   1     Order Specific Question:   Diet:     Answer:   Regular [1]     Medical Decision Making, by Problem:  Active Hospital Problems    Diagnosis   • Melena [K92.1]   • Acute encephalopathy [G93.40]   • BAKER (nonalcoholic steatohepatitis) [K75.81]   • Renal failure (ARF), acute on chronic (CMS-HCC) [N17.9, N18.9]   • Metabolic acidosis, NAG, bicarbonate losses [E87.2]   • Pancytopenia (CMS-HCC) [D61.818]         Reviewed items::  Labs reviewed and Medications reviewed  1 ORAL on CKD: cr is stable, no uremic symptoms.  2 metabolic acidosis:stable  3 Anemia  Plan  no acute need for HD  Continue NaHco3  Renal dose all meds  Avoid nephrotoxins  Prognosis poor.  D/W Dr Collins

## 2017-08-30 NOTE — FACE TO FACE
Face to Face Supporting Documentation - Home Health    The encounter with this patient was in whole or in part the primary reason for home health admission.    Date of encounter:   Patient:                    MRN:                       YOB: 2016  Cristina Tenorio  [id number]  1940     Home health to see patient for:  Skilled Nursing care for assessment, interventions & education, Physical Therapy evaluation and treatment and Occupational therapy evaluation and treatment    Skilled need for:  Exacerbation of Chronic Disease State Acute Encephalopathy secondary to cirrhosis from BAKER    Skilled nursing interventions to include:  Comment: Medication Management     Homebound status evidenced by:  Need the aid of supportive devices such as crutches, canes, wheelchairs or walkers. Leaving home requires a considerable and taxing effort. There is a normal inability to leave the home.    Community Physician to provide follow up care: Roshni Morillo M.D.     Optional Interventions? No      I certify the face to face encounter for this home health care referral meets the CMS requirements and the encounter/clinical assessment with the patient was, in whole, or in part, for the medical condition(s) listed above, which is the primary reason for home health care. Based on my clinical findings: the service(s) are medically necessary, support the need for home health care, and the homebound criteria are met.  I certify that this patient has had a face to face encounter by myself.  Honey Collins M.D. - NPI: [id number]

## 2017-08-31 NOTE — PROGRESS NOTES
Romina Morrissey Fall Risk Assessment:     Last Known Fall: Within the last six months  Mobility: Immobilized/requires assist of one person  Medications: Cardiovascular or central nervous system meds  Mental Status/LOC/Awareness: Awake, alert, and oriented to date, place, and person  Toileting Needs: Use of assistive device (Bedside commode, bedpan, urinal)  Volume/Electrolyte Status: Use of IV fluids/tube feeds  Communication/Sensory: Visual (Glasses)/hearing deficit  Behavior: Appropriate behavior  Romina Morrissey Fall Risk Total: 13  Fall Risk Level: MODERATE RISK    Universal Fall Precautions:  call light/belongings in reach, bed in low position and locked, siderails up x 2, use non-slip footwear, adequate lighting, clutter free and spill free environment    Fall Risk Level Interventions:    TRIAL (TELE 8, NEURO, MED RADHA 5) Moderate Fall Risk Interventions  Place yellow fall risk ID band on patient: verified  Provide patient/family education based on risk assessment : completed  Educate patient/family to call staff for assistance when getting out of bed: completed  Place fall precaution signage outside patient door: verified  Utilize bed/chair fall alarm: verified     Patient Specific Interventions:     Medication: review medications with patient and family and provide patients who received diuretics/laxatives frequent toileting  Mental Status/LOC/Awareness: reinforce falls education, check on patient hourly and reinforce the use of call light  Toileting: provide frquent toileting, monitor intake and output/use of appropriate interventions and instruct patient/family on the need to call for assistance when toileting  Volume/Electrolyte Status: ensure patient remains hydrated, monitor abnormal lab values and ensure IV fluids are appropriate  Communication/Sensory: update plan of care on whiteboard and ensure patient has glasses/contacts and hearing aids/dentures  Behavioral: encourage patient to voice  feelings  Mobility: dangle prior to standing and utilize bed/chair fall alarm

## 2017-08-31 NOTE — PROGRESS NOTES
Unable to obtain IV access. Dr. Collins notified and she stated it is ok for patient not to have IV access and changed her abx to PO.

## 2017-08-31 NOTE — PROGRESS NOTES
Patient Awake alert and oriented X4, pleasant with appropriate mood and affect for the situation.  No complaints of pain or anxiety for the duration.  Strip alarm on, call light within reach.  Patient states she will call when the need arises.

## 2017-08-31 NOTE — PROGRESS NOTES
Assumed care at 0700 Received report from RN. Patient is AOx4. Assessment complete. Labs reviewed. Patient and RN discussed plan of care. Patient questions answered. Patient needs are met at this time. Bed in lowest and locked position. Call light is within reach. Hourly rounding in place. BP (!) 92/40 Comment: second bp:  94/32  Pulse 92   Temp 36.6 °C (97.9 °F)   Resp 20   Ht 1.524 m (5')   Wt 68.3 kg (150 lb 9.2 oz)   LMP 06/29/1995   SpO2 98%   Breastfeeding? No   BMI 29.41 kg/m²

## 2017-08-31 NOTE — PROGRESS NOTES
[facility] Progress Note    Date of Service: 2017    Chief Complaint  76 y.o. female admitted 2017 with confusion and fall at home.  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    Interval Problem Update   Home health orders placed. Social work to obtain choice from patient. Home health approval pending. Patient noted to have UTI, on IV ceftriaxone, urine culture pending. No acute events overnight. Patient eager to go home.    Consultants/Specialty  Gastroenterologist, Dr. Lechuga. Dr. Ortega - signed off   Nephrologist, Dr. Najjar.    Disposition  Home with  in 1-2 days.        Review of Systems   Constitutional: Negative for chills and fever.   Eyes: Negative for blurred vision.   Respiratory: Negative for cough and shortness of breath.    Cardiovascular: Negative for chest pain and leg swelling.   Gastrointestinal: Negative for nausea and vomiting.   Genitourinary: Positive for frequency and urgency. Negative for dysuria, flank pain and hematuria.   Neurological: Positive for weakness.   Psychiatric/Behavioral: Negative for depression.      Physical Exam  Laboratory/Imaging   Hemodynamics  Temp (24hrs), Av.7 °C (98 °F), Min:36.4 °C (97.6 °F), Max:36.9 °C (98.4 °F)   Temperature: 36.4 °C (97.6 °F)  Pulse  Av.3  Min: 70  Max: 117    Blood Pressure : 112/45      Respiratory      Respiration: 18, Pulse Oximetry: 98 %        RUL Breath Sounds: Clear, RML Breath Sounds: Diminished, RLL Breath Sounds: Diminished, REMY Breath Sounds: Clear, LLL Breath Sounds: Diminished    Fluids    Intake/Output Summary (Last 24 hours) at 17 1319  Last data filed at 17 0400   Gross per 24 hour   Intake              480 ml   Output             1125 ml   Net             -645 ml       Nutrition  Orders Placed This Encounter   Procedures   • DIET ORDER     Standing Status:   Standing     Number of Occurrences:   1     Order Specific Question:   Diet:     Answer:   Regular [1]     Physical Exam    Constitutional: She is oriented to person, place, and time. She appears well-developed. No distress.   HENT:   Head: Normocephalic and atraumatic.   Right Ear: External ear normal.   Left Ear: External ear normal.   Eyes: Conjunctivae are normal. Right eye exhibits no discharge. Left eye exhibits no discharge.   Neck: Normal range of motion. No JVD present.   Cardiovascular: Normal rate and regular rhythm.    No murmur heard.  Pulmonary/Chest: Effort normal and breath sounds normal. No respiratory distress. She has no wheezes.   Abdominal: Soft. Bowel sounds are normal. She exhibits no distension. There is no tenderness.   Colostomy bag containing dark liquid    Musculoskeletal: Normal range of motion. She exhibits no edema.   LUE in cast  LLE with ecchymosis     Neurological: She is alert and oriented to person, place, and time. No cranial nerve deficit.   Skin: Skin is warm and dry. She is not diaphoretic. No erythema.   Psychiatric: She has a normal mood and affect.   Nursing note and vitals reviewed.      Recent Labs      08/29/17   0254   WBC  4.0*   RBC  2.60*   HEMOGLOBIN  7.5*   HEMATOCRIT  24.5*   MCV  94.2   MCH  28.8   MCHC  30.6*   RDW  50.7*   PLATELETCT  55*   MPV  10.9     Recent Labs      08/29/17   0254  08/30/17   0054  08/31/17   0034   SODIUM  135  137  136   POTASSIUM  3.8  3.9  3.5*   CHLORIDE  118*  119*  119*   CO2  13*  12*  10*   GLUCOSE  111*  113*  112*   BUN  39*  39*  41*   CREATININE  2.13*  2.30*  2.32*   CALCIUM  7.3*  7.3*  7.3*                      Assessment/Plan     Acute encephalopathy- (present on admission)   Assessment & Plan    -- improved, discussed with Dr. Sanchez.  Continue lactulose and Rifaximin.   The patient had financial difficulty to afford Rifaximin but this medication is needed per gastroenterologist..  Gastroenterology office is working to help the patient to obtain this medication for outpatient use. Discussed the patient's financial difficulty with social  worker.  -- The patient remains alert and oriented.        Melena- (present on admission)   Assessment & Plan    Question melena as the patient hemoglobin didn't change much even with aggressive hydration.   Dark color output likely due the patient taking iron supplements.  Continue to monitor.  H&H stable        UTI (urinary tract infection)- (present on admission)   Assessment & Plan    Patient's urinalysis shows large leukocyte esterase, negative nitrite. Urine culture pending. Patient is on ceftriaxone.        Renal failure (ARF), acute on chronic (CMS-HCC)- (present on admission)   Assessment & Plan    Acute change likely secondary to dehydration from laxative.  Creatinine improving but still remains much higher than baseline.  Continue iv fluid per nephrologist.          BAKER (nonalcoholic steatohepatitis)- (present on admission)   Assessment & Plan    Follow up with gastroenterologist in outpatient.        Debility   Assessment & Plan    Encourage the patient to exercise in bed.    PT recommend SNF vs HH, pt refused SNF and wants to go home with HH, orders placed /OT eval pending.        Pancytopenia (CMS-HCC)- (present on admission)   Assessment & Plan    Secondary to chronic illness.  Continue ferrous sulfate.        Metabolic acidosis, NAG, bicarbonate losses- (present on admission)   Assessment & Plan    Acidosis likely due to combination of gi bicarb loss and hyperchloremia. Per the patient she takes 3 tabs of sodium bicarb 650 mg TID at home.  Increase bicarb dosage again as the patient still has significant acidosis.   Appreciate nephrology input.            Reviewed items::  Labs reviewed and Medications reviewed  Loaiza catheter::  No Loaiza  DVT prophylaxis - mechanical:  SCDs  Ulcer Prophylaxis::  Yes

## 2017-08-31 NOTE — PROGRESS NOTES
Hospital Medicine Progress Note, Adult, Complex               Author: Fadi Najjar Date & Time created: 8/31/2017  12:50 PM     Interval History:  76-year-old lady, has prior history of Crohn's and has history of colectomy   with colostomy done in the past, also has history of cirrhosis secondary to   BAKER.    Developed ORAL, metabolic acidosis.   No new events  Review of Systems:  Review of Systems   Constitutional: Negative for chills and fever.   Respiratory: Negative for shortness of breath.    Cardiovascular: Negative for chest pain and leg swelling.   Gastrointestinal: Negative for nausea and vomiting.   Genitourinary: Negative for dysuria.   Neurological:        Decreased hearing       Physical Exam:  Physical Exam   Constitutional: She is oriented to person, place, and time. She appears well-developed and well-nourished. No distress.   HENT:   Head: Normocephalic and atraumatic.   Nose: Nose normal.   Cardiovascular: Normal rate and regular rhythm.    No murmur heard.  Pulmonary/Chest: Effort normal and breath sounds normal. No respiratory distress.   Musculoskeletal: She exhibits no edema.   Neurological: She is alert and oriented to person, place, and time.   Skin: Skin is warm. She is not diaphoretic.   Nursing note and vitals reviewed.      Labs:        Invalid input(s): [id number]      Recent Labs      08/29/17 0254 08/30/17 0054  08/31/17   0034   SODIUM  135  137  136   POTASSIUM  3.8  3.9  3.5*   CHLORIDE  118*  119*  119*   CO2  13*  12*  10*   BUN  39*  39*  41*   CREATININE  2.13*  2.30*  2.32*   CALCIUM  7.3*  7.3*  7.3*     Recent Labs      08/29/17 0254  08/30/17   0054  08/31/17   0034   ALTSGPT  22  20  21   ASTSGOT  40  39  41   ALKPHOSPHAT  253*  251*  256*   TBILIRUBIN  0.9  0.8  0.8   GLUCOSE  111*  113*  112*     Recent Labs      08/29/17 0254   RBC  2.60*   HEMOGLOBIN  7.5*   HEMATOCRIT  24.5*   PLATELETCT  55*     Recent Labs      08/29/17   0254  08/30/17   0054  08/31/17    0034   WBC  4.0*   --    --    NEUTSPOLYS  78.40*   --    --    LYMPHOCYTES  9.00*   --    --    MONOCYTES  9.00   --    --    EOSINOPHILS  2.20   --    --    BASOPHILS  0.20   --    --    ASTSGOT  40  39  41   ALTSGPT  22  20  21   ALKPHOSPHAT  253*  251*  256*   TBILIRUBIN  0.9  0.8  0.8           Hemodynamics:  Temp (24hrs), Av.7 °C (98 °F), Min:36.4 °C (97.6 °F), Max:36.9 °C (98.4 °F)  Temperature: 36.4 °C (97.6 °F)  Pulse  Av.3  Min: 70  Max: 117   Blood Pressure : 112/45     Respiratory:    Respiration: 18, Pulse Oximetry: 98 %        RUL Breath Sounds: Clear, RML Breath Sounds: Diminished, RLL Breath Sounds: Diminished, REMY Breath Sounds: Clear, LLL Breath Sounds: Diminished  Fluids:    Intake/Output Summary (Last 24 hours) at 17 1250  Last data filed at 17 0400   Gross per 24 hour   Intake              480 ml   Output             1125 ml   Net             -645 ml        GI/Nutrition:  Orders Placed This Encounter   Procedures   • DIET ORDER     Standing Status:   Standing     Number of Occurrences:   1     Order Specific Question:   Diet:     Answer:   Regular [1]     Medical Decision Making, by Problem:  Active Hospital Problems    Diagnosis   • Melena [K92.1]   • Acute encephalopathy [G93.40]   • BAKER (nonalcoholic steatohepatitis) [K75.81]   • Renal failure (ARF), acute on chronic (CMS-HCC) [N17.9, N18.9]   • Metabolic acidosis, NAG, bicarbonate losses [E87.2]   • Pancytopenia (CMS-HCC) [D61.818]         Reviewed items::  Labs reviewed and Medications reviewed  1 ORAL on CKD: cr is stable, no uremic symptoms.  2 metabolic acidosis:pt refused her NaHco3 this morning, pt was advised about the benefits of the NaHco3 supplements  3 Anemia  Plan  no acute need for HD  Continue NaHco3  Renal dose all meds  Avoid nephrotoxins  Prognosis guarded

## 2017-08-31 NOTE — CARE PLAN
Problem: Safety  Goal: Will remain free from falls  Outcome: PROGRESSING AS EXPECTED  Bed on lowest position, call light within reach, patient educated about use of call light and safety precautions.     Problem: Bowel/Gastric:  Goal: Normal bowel function is maintained or improved  Outcome: PROGRESSING AS EXPECTED    Goal: Will not experience complications related to bowel motility  Outcome: PROGRESSING AS EXPECTED  Ostomy bag draining brown watery stool.

## 2017-08-31 NOTE — ASSESSMENT & PLAN NOTE
Patient's urinalysis shows large leukocyte esterase, negative nitrite. Urine culture + group D enterococcus, sensitivities pending. Patient is on augmentin

## 2017-08-31 NOTE — CARE PLAN
Problem: Safety  Goal: Will remain free from injury  Outcome: PROGRESSING AS EXPECTED  Bed on lowest position, call light within reach, patient educated about use of call light and safety precautions.       Problem: Skin Integrity  Goal: Risk for impaired skin integrity will decrease  Outcome: PROGRESSING AS EXPECTED  No new redness or sores. Skin around stoma has no increase in discoloration.

## 2017-08-31 NOTE — CARE PLAN
Problem: Communication  Goal: The ability to communicate needs accurately and effectively will improve  Outcome: PROGRESSING AS EXPECTED  Patient was educated on proper use of call light.  She displayed understanding throughout the shift as she would call whenever she required assistance from the nursing staff    Problem: Knowledge Deficit  Goal: Knowledge of disease process/condition, treatment plan, diagnostic tests, and medications will improve  Outcome: PROGRESSING AS EXPECTED  Patient asked for her ostomy bag to be emptied multiple times during the shift, she did not seem ashamed and even suggested using a spray so her bedside neighbor would not complain of the odor.  Understood as soon as IV access could be established she will continue antibiotic therapy

## 2017-08-31 NOTE — PROGRESS NOTES
Assumed care of patient at 0700. Received report from RN. Patient is AOX 4. Assessment complete. Labs reviewed.Patient and RN discussed plan of care. Patient questions answered. Patient needs are met at this time. Bed in lowest and locked position. Upper bed rails up.  Call light is within reach. Hourly rounding in place.  /45   Pulse 87   Temp 36.4 °C (97.6 °F)   Resp 18   Ht 1.524 m (5')   Wt 68.3 kg (150 lb 9.2 oz)   LMP 06/29/1995   SpO2 98%   Breastfeeding? No   BMI 29.41 kg/m²

## 2017-08-31 NOTE — DISCHARGE PLANNING
Received choice form for patient HH services from [facility], referral has been sent to [facility] per patient request.

## 2017-08-31 NOTE — PROGRESS NOTES
Romina Morrissey Fall Risk Assessment:     Last Known Fall: Within the last six months  Mobility: Immobilized/requires assist of one person  Medications: Cardiovascular or central nervous system meds  Mental Status/LOC/Awareness: Awake, alert, and oriented to date, place, and person  Toileting Needs: Use of assistive device (Bedside commode, bedpan, urinal)  Volume/Electrolyte Status: No problems  Communication/Sensory: Visual (Glasses)/hearing deficit  Behavior: Appropriate behavior  Romina Morrissey Fall Risk Total: 11  Fall Risk Level: MODERATE RISK    Universal Fall Precautions:  call light/belongings in reach, bed in low position and locked, wheelchairs and assistive devices out of sight, educate on level of risk, educate to call for assistance    Fall Risk Level Interventions:    TRIAL (TELE 8, NEURO, MED RADHA 5) Moderate Fall Risk Interventions  Place yellow fall risk ID band on patient: verified  Provide patient/family education based on risk assessment : completed  Educate patient/family to call staff for assistance when getting out of bed: completed  Place fall precaution signage outside patient door: verified  Utilize bed/chair fall alarm: verified     Patient Specific Interventions:     Medication: review medications with patient and family and provide patients who received diuretics/laxatives frequent toileting  Mental Status/LOC/Awareness: reinforce falls education, check on patient hourly, utilize bed/chair fall alarm and reinforce the use of call light  Toileting: provide frquent toileting and do not leave patient unattended in bathroom/refer to toileting scripting  Volume/Electrolyte Status: not applicable  Communication/Sensory: update plan of care on whiteboard, ensure patient has glasses/contacts and hearing aids/dentures and have patients with hearing deficit repeat information back to you to ensure proper understanding  Behavioral: encourage patient to voice feelings, engage patient in daily  activities, administer medication as ordered and instruct/reinforce fall program rationale  Mobility: utilize bed/chair fall alarm, ensure bed is locked and in lowest position, provide appropriate assistive device and instruct patient to exit bed on their strongest side

## 2017-08-31 NOTE — DISCHARGE PLANNING
ALONDRA obtained choice for this pt for [facility] as they have had this service before. ALONDRA faxed CHOICE to [facility].

## 2017-08-31 NOTE — PROGRESS NOTES
Romina Morrissey Fall Risk Assessment:     Last Known Fall: Within the last six months  Mobility: Immobilized/requires assist of one person  Medications: Cardiovascular or central nervous system meds  Mental Status/LOC/Awareness: Awake, alert, and oriented to date, place, and person  Toileting Needs: No needs  Volume/Electrolyte Status: Use of IV fluids/tube feeds  Communication/Sensory: Visual (Glasses)/hearing deficit  Behavior: Appropriate behavior  Romina Morrissey Fall Risk Total: 11  Fall Risk Level: MODERATE RISK    Universal Fall Precautions:  call light/belongings in reach, bed in low position and locked, siderails up x 2, use non-slip footwear, adequate lighting, clutter free and spill free environment    Fall Risk Level Interventions:    TRIAL (TELE 8, NEURO, MED RADHA 5) Moderate Fall Risk Interventions  Place yellow fall risk ID band on patient: verified  Provide patient/family education based on risk assessment : completed  Educate patient/family to call staff for assistance when getting out of bed: completed  Place fall precaution signage outside patient door: verified  Utilize bed/chair fall alarm: verified     Patient Specific Interventions:     Medication: review medications with patient and family  Mental Status/LOC/Awareness: reinforce falls education, check on patient hourly, utilize bed/chair fall alarm and reinforce the use of call light  Toileting: provide frquent toileting and instruct patient/family on the need to call for assistance when toileting  Volume/Electrolyte Status: ensure patient remains hydrated, administer medications as ordered for nausea and vomiting, monitor abnormal lab values and ensure IV fluids are appropriate  Communication/Sensory: update plan of care on whiteboard  Behavioral: encourage patient to voice feelings and administer medication as ordered  Mobility: provide comfort measures during transport, dangle prior to standing, utilize bed/chair fall alarm, ensure bed is  locked and in lowest position and provide appropriate assistive device

## 2017-09-01 NOTE — PROGRESS NOTES
[facility] Hospitalist Progress Note    Date of Service: 2017    Chief Complaint  76 y.o. female admitted 2017 with confusion and fall at home.  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    Interval Problem Update   Pt accepted to Home Health. No acute events overnight, pt eager to go home.     Consultants/Specialty  Gastroenterologist, Dr. Lechuga. Dr. Ortega - signed off   Nephrologist, Dr. Najjar.    Disposition  Home with HH in 1-2 days once urine cx results available.        Review of Systems   Constitutional: Negative for chills and fever.   Eyes: Negative for blurred vision.   Respiratory: Negative for cough and shortness of breath.    Cardiovascular: Negative for chest pain and leg swelling.   Gastrointestinal: Negative for nausea and vomiting.   Genitourinary: Positive for frequency and urgency. Negative for dysuria, flank pain and hematuria.   Musculoskeletal: Negative for myalgias.   Neurological: Positive for weakness. Negative for headaches.   Psychiatric/Behavioral: Negative for depression.      Physical Exam  Laboratory/Imaging   Hemodynamics  Temp (24hrs), Av.2 °C (97.2 °F), Min:36 °C (96.8 °F), Max:36.4 °C (97.6 °F)   Temperature: 36.4 °C (97.6 °F)  Pulse  Av  Min: 70  Max: 117    Blood Pressure : 127/46      Respiratory      Respiration: 18, Pulse Oximetry: 98 %        RUL Breath Sounds: Clear, RML Breath Sounds: Diminished, RLL Breath Sounds: Diminished, REMY Breath Sounds: Clear, LLL Breath Sounds: Diminished    Fluids    Intake/Output Summary (Last 24 hours) at 17 1059  Last data filed at 17 0600   Gross per 24 hour   Intake                0 ml   Output             1450 ml   Net            -1450 ml       Nutrition  Orders Placed This Encounter   Procedures   • DIET ORDER     Standing Status:   Standing     Number of Occurrences:   1     Order Specific Question:   Diet:     Answer:   Regular [1]     Physical Exam   Constitutional: She is oriented to person,  place, and time. She appears well-developed. No distress.   HENT:   Head: Normocephalic and atraumatic.   Right Ear: External ear normal.   Left Ear: External ear normal.   Eyes: Conjunctivae are normal.   Neck: Normal range of motion. No JVD present.   Cardiovascular: Normal rate and regular rhythm.    No murmur heard.  Pulmonary/Chest: Effort normal and breath sounds normal. No respiratory distress. She has no wheezes.   Abdominal: Soft. Bowel sounds are normal. She exhibits no distension. There is no tenderness.   Colostomy bag containing dark liquid    Musculoskeletal: Normal range of motion. She exhibits no edema.   LUE in cast  LLE with bruising     Neurological: She is alert and oriented to person, place, and time. No cranial nerve deficit.   Skin: Skin is warm and dry. She is not diaphoretic. No erythema.   Psychiatric: She has a normal mood and affect.   Nursing note and vitals reviewed.          Recent Labs      08/30/17   0054  08/31/17   0034   SODIUM  137  136   POTASSIUM  3.9  3.5*   CHLORIDE  119*  119*   CO2  12*  10*   GLUCOSE  113*  112*   BUN  39*  41*   CREATININE  2.30*  2.32*   CALCIUM  7.3*  7.3*                      Assessment/Plan     Acute encephalopathy- (present on admission)   Assessment & Plan    -- improved, discussed with Dr. Sanchez.  Continue lactulose and Rifaximin.   The patient had financial difficulty to afford Rifaximin but this medication is needed per gastroenterologist..  Gastroenterology office is working to help the patient to obtain this medication for outpatient use. Discussed the patient's financial difficulty with .  -- The patient remains alert and oriented.        Melena- (present on admission)   Assessment & Plan    Question melena as the patient hemoglobin didn't change much even with aggressive hydration.   Dark color output likely due the patient taking iron supplements.  Continue to monitor.  H&H stable        UTI (urinary tract infection)-  (present on admission)   Assessment & Plan    Patient's urinalysis shows large leukocyte esterase, negative nitrite. Urine culture results pending. Patient is on augmentin        Renal failure (ARF), acute on chronic (CMS-HCC)- (present on admission)   Assessment & Plan    Acute change likely secondary to dehydration from laxative.  Creatinine improving but still remains much higher than baseline.  Continue iv fluid per nephrologist.          BAKER (nonalcoholic steatohepatitis)- (present on admission)   Assessment & Plan    Follow up with gastroenterologist in outpatient.        Debility- (present on admission)   Assessment & Plan    Encourage the patient to exercise in bed.    PT recommend SNF vs HH, pt refused SNF and wants to go home with HH, orders placed, accepted to HH.        Pancytopenia (CMS-HCC)- (present on admission)   Assessment & Plan    Secondary to chronic illness.  Continue ferrous sulfate.        Metabolic acidosis, NAG, bicarbonate losses- (present on admission)   Assessment & Plan    Acidosis likely due to combination of gi bicarb loss and hyperchloremia. Per the patient she takes 3 tabs of sodium bicarb 650 mg TID at home.  Increase bicarb dosage again as the patient still has significant acidosis.   Appreciate nephrology input.            Reviewed items::  Labs reviewed and Medications reviewed  Loaiza catheter::  No Loaiza  DVT prophylaxis - mechanical:  SCDs  Ulcer Prophylaxis::  Yes

## 2017-09-01 NOTE — PROGRESS NOTES
Assumed care of pt after receiving report from dayshift RN. Bedside rounding completed w/ demetris RN. Pt resting in bed A&OX4 w/ no complaints of pain or discomfort. Pt has an ostomy in LLQ, pt medicated per MAR, pressure ulcer prevention protocol in place. Fall measures in place, call light within reach, personal belongings nearby, bed in lowest position, and hourly rounding in place. Will continue to monitor pt.

## 2017-09-01 NOTE — WOUND TEAM
"[facility]   Inpatient Services   Established Ostomy Management/ troubleshooting  HPI: Reviewed  PMH: Reviewed   SH: Reviewed   Reason for Ostomy nurse consult:  Assist with staff education for ostomy appliance change    Subjective: \"That one stayed on since the other night\"    Objective: appliance leaking    Ostomy type:  colostomy  Stoma location:  LLQ  Stoma assessment:    Appearance:  Red, bleeds easily   Size:  ~ 1 1/2\"   Protrusion:  budded   Output:  Moderate watery   MC jxn:  intact   Peristomal skin: intact and discolored; not denuded    Ostomy Appliance (type and size):  Placed in 2 1/2\" one piece with paste ring     Interventions and Education: Met with staff RN and reviewed updated ostomy instructions which she reports are clear.  She traced and cut the barrier and applied paste ring to barrier opening.  We removed old appliance and cleansed peristomal skin noting that the stoma bleeds easily which patient reports is usual for her.  I demonstrated how to crust peristomal skin noting not denuded but discolored.  I applied appliance and RN closed end of pouch.  Assisted how to order supplies.     Evaluation: established colostomy which has had leaking issues unclear etiology    Plan: assist PRN ostomy needs    Anticipated discharge needs:  none    "

## 2017-09-01 NOTE — PROGRESS NOTES
Hospital Medicine Progress Note, Adult, Complex               Author: Fadi Najjar Date & Time created: 9/1/2017  12:28 PM     Interval History:  76-year-old lady, has prior history of Crohn's and has history of colectomy   with colostomy done in the past, also has history of cirrhosis secondary to   BAKER.    Developed ORAL, metabolic acidosis.   Doing better  Review of Systems:  Review of Systems   Constitutional: Negative for chills and fever.   Respiratory: Negative for shortness of breath.    Cardiovascular: Negative for chest pain and leg swelling.   Gastrointestinal: Negative for nausea and vomiting.   Genitourinary: Negative for dysuria.   Neurological:        Decreased hearing       Physical Exam:  Physical Exam   Constitutional: She is oriented to person, place, and time. She appears well-developed and well-nourished. No distress.   HENT:   Head: Normocephalic and atraumatic.   Nose: Nose normal.   Cardiovascular: Normal rate and regular rhythm.    No murmur heard.  Pulmonary/Chest: Effort normal and breath sounds normal. No respiratory distress.   Musculoskeletal: She exhibits no edema.   Neurological: She is alert and oriented to person, place, and time.   Skin: Skin is warm. She is not diaphoretic.   Nursing note and vitals reviewed.      Labs:        Invalid input(s): [id number]      Recent Labs      08/30/17 0054 08/31/17 0034 09/01/17   0924   SODIUM  137  136  137   POTASSIUM  3.9  3.5*  3.7   CHLORIDE  119*  119*  121*   CO2  12*  10*  10*   BUN  39*  41*  45*   CREATININE  2.30*  2.32*  2.14*   CALCIUM  7.3*  7.3*  7.7*     Recent Labs      08/30/17 0054 08/31/17   0034  09/01/17   0924   ALTSGPT  20  21   --    ASTSGOT  39  41   --    ALKPHOSPHAT  251*  256*   --    TBILIRUBIN  0.8  0.8   --    GLUCOSE  113*  112*  148*     No results for input(s): RBC, HEMOGLOBIN, HEMATOCRIT, PLATELETCT, PROTHROMBTM, APTT, INR, IRON, FERRITIN, TOTIRONBC in the last 72 hours.  Recent Labs       17   0054  17   0034   ASTSGOT  39  41   ALTSGPT  20  21   ALKPHOSPHAT  251*  256*   TBILIRUBIN  0.8  0.8           Hemodynamics:  Temp (24hrs), Av.2 °C (97.2 °F), Min:36 °C (96.8 °F), Max:36.4 °C (97.6 °F)  Temperature: 36.4 °C (97.6 °F)  Pulse  Av  Min: 70  Max: 117   Blood Pressure : 127/46     Respiratory:    Respiration: 18, Pulse Oximetry: 98 %        RUL Breath Sounds: Clear, RML Breath Sounds: Diminished, RLL Breath Sounds: Diminished, REMY Breath Sounds: Clear, LLL Breath Sounds: Diminished  Fluids:    Intake/Output Summary (Last 24 hours) at 17 1228  Last data filed at 17 0600   Gross per 24 hour   Intake                0 ml   Output             1450 ml   Net            -1450 ml        GI/Nutrition:  Orders Placed This Encounter   Procedures   • DIET ORDER     Standing Status:   Standing     Number of Occurrences:   1     Order Specific Question:   Diet:     Answer:   Regular [1]     Medical Decision Making, by Problem:  Active Hospital Problems    Diagnosis   • Melena [K92.1]   • Acute encephalopathy [G93.40]   • BAKER (nonalcoholic steatohepatitis) [K75.81]   • Renal failure (ARF), acute on chronic (CMS-HCC) [N17.9, N18.9]   • Metabolic acidosis, NAG, bicarbonate losses [E87.2]   • Pancytopenia (CMS-HCC) [D61.818]         Reviewed items::  Labs reviewed and Medications reviewed  1 ORAL on CKD: cr is stable, no uremic symptoms.  2 metabolic acidosis:stable  3 Anemia  Plan  no acute need for HD  Continue NaHco3  Renal dose all meds  Avoid nephrotoxins  Prognosis guarded  D/W Dr Collins

## 2017-09-01 NOTE — THERAPY
"Physical Therapy Treatment completed.   Bed Mobility:  Supine to Sit: Supervised  Transfers: Sit to Stand: Modified Independent  Gait: Level Of Assist: Supervised with Front-Wheel Walker       Plan of Care: Patient with no further skilled PT needs in the acute care setting at this time and Patient demonstrates safety with mobility in this environment at this time.   Discharge Recommendations: Equipment: No Equipment Needed. Post-acute therapy Discharge to home with outpatient or home health for additional skilled therapy services.     See \"Rehab Therapy-Acute\" Patient Summary Report for complete documentation.     Pt seen today to work on bed mobility. Pt successful once provided compensatory strategies and recommendation to sleep on the other side of the bed to make it easier while L UE cast in place.  Pt does not require further skilled acute PT services while in the hospital as her functional mobility is near baseline. Pt has FWW and SPC at home and reports that her  is able to assist her as needed. Due to these factors, pt able to discharge home with  services to improve activity tolerance and ensure safe transition to community living.   "

## 2017-09-02 NOTE — CARE PLAN
Problem: Knowledge Deficit  Goal: Knowledge of disease process/condition, treatment plan, diagnostic tests, and medications will improve    Intervention: Explain information regarding disease process/condition, treatment plan, diagnostic tests, and medications and document in education  Patient educated about medications and rationale for prescribed medication.      Problem: Pain Management  Goal: Pain level will decrease to patient's comfort goal    Intervention: Follow pain managment plan developed in collaboration with patient and Interdisciplinary Team  Patient medicated per MAR for pain management.

## 2017-09-02 NOTE — CARE PLAN
Problem: Safety  Goal: Will remain free from injury  Bed alarm in place and in working order, bed in lowest locked position, call light within reach, non slip socks in place when OOB, RN extension provided. Verbalizes understanding to call for assistance prior to independent attempts at mobility. Hourly rounding in effect.     Problem: Pain Management  Goal: Pain level will decrease to patient's comfort goal  Will medicate for pain PRN see MAR

## 2017-09-02 NOTE — PROGRESS NOTES
[facility] Hospitalist Progress Note    Date of Service: 2017    Chief Complaint  76 y.o. female admitted 2017 with confusion and fall at home.  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    Interval Problem Update   Pt accepted to Home Health. No acute events overnight, pt eager to go home. Pt's urine cx + group D enterococcus, pending sensitivities prior to dc home. Pt understands she has to await for sensitivities but is anxious to go home.     Consultants/Specialty  Gastroenterologist, Dr. Lechuga. Dr. Ortega - signed off   Nephrologist, Dr. Najjar.    Disposition  Home with HH likely tomm once urine cx sensitivities results available.        Review of Systems   Constitutional: Negative for chills and fever.   Eyes: Negative for blurred vision.   Respiratory: Negative for cough and shortness of breath.    Cardiovascular: Negative for chest pain and leg swelling.   Gastrointestinal: Negative for nausea and vomiting.   Genitourinary: Negative for dysuria, flank pain, frequency, hematuria and urgency.   Musculoskeletal: Negative for myalgias.   Neurological: Positive for weakness.      Physical Exam  Laboratory/Imaging   Hemodynamics  Temp (24hrs), Av.4 °C (97.5 °F), Min:35.8 °C (96.5 °F), Max:36.9 °C (98.5 °F)   Temperature: 36.6 °C (97.8 °F)  Pulse  Av.7  Min: 70  Max: 117    Blood Pressure : 102/48      Respiratory      Respiration: 18, Pulse Oximetry: 100 %        RUL Breath Sounds: Clear, RML Breath Sounds: Diminished, RLL Breath Sounds: Diminished, REMY Breath Sounds: Clear, LLL Breath Sounds: Diminished    Fluids    Intake/Output Summary (Last 24 hours) at 17 1053  Last data filed at 17 1700   Gross per 24 hour   Intake                0 ml   Output              600 ml   Net             -600 ml       Nutrition  Orders Placed This Encounter   Procedures   • DIET ORDER     Standing Status:   Standing     Number of Occurrences:   1     Order Specific Question:   Diet:      Answer:   Regular [1]     Physical Exam   Constitutional: She is oriented to person, place, and time. She appears well-developed. No distress.   HENT:   Head: Normocephalic and atraumatic.   Right Ear: External ear normal.   Left Ear: External ear normal.   Eyes: Conjunctivae are normal. Pupils are equal, round, and reactive to light.   Neck: Normal range of motion. No JVD present.   Cardiovascular: Normal rate and regular rhythm.    No murmur heard.  Pulmonary/Chest: Effort normal and breath sounds normal. No respiratory distress. She has no wheezes.   Abdominal: Soft. Bowel sounds are normal. She exhibits no distension. There is no tenderness.   Colostomy bag containing dark liquid    Musculoskeletal: Normal range of motion. She exhibits no edema.   LUE in cast  LLE with bruising     Neurological: She is alert and oriented to person, place, and time.   Skin: Skin is warm and dry. She is not diaphoretic.   Psychiatric: She has a normal mood and affect. Her behavior is normal. Thought content normal.   Nursing note and vitals reviewed.          Recent Labs      08/31/17   0034  09/01/17   0924  09/02/17   0917   SODIUM  136  137  135   POTASSIUM  3.5*  3.7  3.6   CHLORIDE  119*  121*  116*   CO2  10*  10*  12*   GLUCOSE  112*  148*  146*   BUN  41*  45*  46*   CREATININE  2.32*  2.14*  2.21*   CALCIUM  7.3*  7.7*  7.9*                      Assessment/Plan     Acute encephalopathy- (present on admission)   Assessment & Plan    -- improved, discussed with Dr. Sanchez.  Continue lactulose and Rifaximin.   The patient had financial difficulty to afford Rifaximin but this medication is needed per gastroenterologist..  Gastroenterology office is working to help the patient to obtain this medication for outpatient use. Discussed the patient's financial difficulty with .  -- The patient remains alert and oriented.        Melena- (present on admission)   Assessment & Plan    Question melena as the patient  hemoglobin didn't change much even with aggressive hydration.   Dark color output likely due the patient taking iron supplements.  Continue to monitor.  H&H stable        UTI (urinary tract infection)- (present on admission)   Assessment & Plan    Patient's urinalysis shows large leukocyte esterase, negative nitrite. Urine culture + group D enterococcus, sensitivities pending. Patient is on augmentin        Renal failure (ARF), acute on chronic (CMS-HCC)- (present on admission)   Assessment & Plan    Acute change likely secondary to dehydration from laxative.  Creatinine improving but still remains much higher than baseline.  Continue iv fluid per nephrologist.          BAKER (nonalcoholic steatohepatitis)- (present on admission)   Assessment & Plan    Follow up with gastroenterologist in outpatient.        Debility- (present on admission)   Assessment & Plan    Encourage the patient to exercise in bed.    PT recommend SNF vs HH, pt refused SNF and wants to go home with HH, orders placed, accepted to HH.        Pancytopenia (CMS-HCC)- (present on admission)   Assessment & Plan    Secondary to chronic illness.  Continue ferrous sulfate.        Metabolic acidosis, NAG, bicarbonate losses- (present on admission)   Assessment & Plan    Acidosis likely due to combination of gi bicarb loss and hyperchloremia. Per the patient she takes 3 tabs of sodium bicarb 650 mg TID at home. Appreciate nephrology input.            Reviewed items::  Labs reviewed and Medications reviewed  Loaiza catheter::  No Loaiza  DVT prophylaxis - mechanical:  SCDs  Ulcer Prophylaxis::  Yes

## 2017-09-02 NOTE — PROGRESS NOTES
Hospital Medicine Progress Note, Adult, Complex               Author: Fadi Najjar Date & Time created: 9/2/2017  1:22 PM     Interval History:  76-year-old lady, has prior history of Crohn's and has history of colectomy   with colostomy done in the past, also has history of cirrhosis secondary to   BAKER.    Developed ORAL, metabolic acidosis.   Doing better  Review of Systems:  Review of Systems   Constitutional: Negative for chills and fever.   Respiratory: Negative for shortness of breath.    Cardiovascular: Negative for chest pain and leg swelling.   Gastrointestinal: Negative for nausea and vomiting.   Genitourinary: Negative for dysuria.   Neurological:        Decreased hearing       Physical Exam:  Physical Exam   Constitutional: She is oriented to person, place, and time. She appears well-developed and well-nourished. No distress.   HENT:   Head: Normocephalic and atraumatic.   Nose: Nose normal.   Cardiovascular: Normal rate and regular rhythm.    No murmur heard.  Pulmonary/Chest: Effort normal and breath sounds normal. No respiratory distress.   Musculoskeletal: She exhibits no edema.   Neurological: She is alert and oriented to person, place, and time.   Skin: Skin is warm. She is not diaphoretic.   Nursing note and vitals reviewed.      Labs:        Invalid input(s): [id number]      Recent Labs      08/31/17 0034 09/01/17 0924 09/02/17 0917   SODIUM  136  137  135   POTASSIUM  3.5*  3.7  3.6   CHLORIDE  119*  121*  116*   CO2  10*  10*  12*   BUN  41*  45*  46*   CREATININE  2.32*  2.14*  2.21*   CALCIUM  7.3*  7.7*  7.9*     Recent Labs      08/31/17   0034  09/01/17 0924 09/02/17 0917   ALTSGPT  21   --    --    ASTSGOT  41   --    --    ALKPHOSPHAT  256*   --    --    TBILIRUBIN  0.8   --    --    GLUCOSE  112*  148*  146*     No results for input(s): RBC, HEMOGLOBIN, HEMATOCRIT, PLATELETCT, PROTHROMBTM, APTT, INR, IRON, FERRITIN, TOTIRONBC in the last 72 hours.  Recent Labs       17   0034   ASTSGOT  41   ALTSGPT  21   ALKPHOSPHAT  256*   TBILIRUBIN  0.8           Hemodynamics:  Temp (24hrs), Av.4 °C (97.5 °F), Min:35.8 °C (96.5 °F), Max:36.9 °C (98.5 °F)  Temperature: 36.6 °C (97.8 °F)  Pulse  Av.7  Min: 70  Max: 117   Blood Pressure : 102/48     Respiratory:    Respiration: 18, Pulse Oximetry: 100 %        RUL Breath Sounds: Clear, RML Breath Sounds: Diminished, RLL Breath Sounds: Diminished, REMY Breath Sounds: Clear, LLL Breath Sounds: Diminished  Fluids:    Intake/Output Summary (Last 24 hours) at 17 1322  Last data filed at 17 1700   Gross per 24 hour   Intake                0 ml   Output              600 ml   Net             -600 ml     Weight: 72.9 kg (160 lb 11.5 oz)  GI/Nutrition:  Orders Placed This Encounter   Procedures   • DIET ORDER     Standing Status:   Standing     Number of Occurrences:   1     Order Specific Question:   Diet:     Answer:   Regular [1]     Medical Decision Making, by Problem:  Active Hospital Problems    Diagnosis   • Melena [K92.1]   • Acute encephalopathy [G93.40]   • BAKER (nonalcoholic steatohepatitis) [K75.81]   • Renal failure (ARF), acute on chronic (CMS-HCC) [N17.9, N18.9]   • Metabolic acidosis, NAG, bicarbonate losses [E87.2]   • Pancytopenia (CMS-HCC) [D61.818]         Reviewed items::  Labs reviewed and Medications reviewed  1 ORAL on CKD: cr is stable, no uremic symptoms.  2 metabolic acidosis:improving  3 Anemia  Plan  no acute need for HD  Continue NaHco3  Renal dose all meds  Avoid nephrotoxins  Prognosis guarded  D/W Dr Collins

## 2017-09-02 NOTE — PROGRESS NOTES
Notified MICHAEL Amaya that the pt is complaining of leg cramps.  MICHAEL Amaya gave telephone with read back orders, this RN placed them in [facility].

## 2017-09-02 NOTE — PROGRESS NOTES
Received report from night RN, assumed care at 0700. Pt A&OX4, able to specify needs. Pt with ostomy to LLQ, CDI. Splint to left arm, CDI. Pt medicated per MAR for acute pain. Bed alarm in use, bed in lowest and locked position, non-skid socks in place. POC discussed, communication board updated. Call light in reach, hourly rounding in place.

## 2017-09-02 NOTE — PROGRESS NOTES
Romina Morrissey Fall Risk Assessment:     Last Known Fall: Within the last six months  Mobility: Immobilized/requires assist of one person  Medications: Cardiovascular or central nervous system meds  Mental Status/LOC/Awareness: Awake, alert, and oriented to date, place, and person  Toileting Needs: Use of assistive device (Bedside commode, bedpan, urinal)  Volume/Electrolyte Status: No problems  Communication/Sensory: Visual (Glasses)/hearing deficit  Behavior: Appropriate behavior  Romina Morrissey Fall Risk Total: 11  Fall Risk Level: MODERATE RISK    Universal Fall Precautions:  call light/belongings in reach, bed in low position and locked, wheelchairs and assistive devices out of sight, siderails up x 2, use non-slip footwear, adequate lighting, clutter free and spill free environment, educate on level of risk, educate to call for assistance    Fall Risk Level Interventions:    TRIAL (TELE 8, NEURO, MED RADHA 5) Moderate Fall Risk Interventions  Place yellow fall risk ID band on patient: verified  Provide patient/family education based on risk assessment : completed  Educate patient/family to call staff for assistance when getting out of bed: completed  Place fall precaution signage outside patient door: verified  Utilize bed/chair fall alarm: verified     Patient Specific Interventions:     Medication: review medications with patient and family, assess for medications that can be discontinued or dosage decreased, limit combination of prn medications, provide patients who received diuretics/laxatives frequent toileting and assess need for orthostatic hypotension evaluation  Mental Status/LOC/Awareness: reinforce falls education, encourage family to stay with patient, check on patient hourly, utilize bed/chair fall alarm, reinforce the use of call light and provide activity  Toileting: provide frquent toileting, monitor intake and output/use of appropriate interventions, instruct patient/family on the use of grab  bars and do not leave patient unattended in bathroom/refer to toileting scripting  Volume/Electrolyte Status: ensure patient remains hydrated, monitor abnormal lab values, ensure IV fluids are appropriate and teach patients to dangle before rising if hypotensive  Communication/Sensory: update plan of care on whiteboard, ensure proper positioning when transferrng/ambulating and ensure patient has glasses/contacts and hearing aids/dentures  Behavioral: encourage patient to voice feelings, engage patient in daily activities, administer medication as ordered and instruct/reinforce fall program rationale  Mobility: schedule physical activity throughout the day, provide comfort measures during transport, dangle prior to standing, utilize bed/chair fall alarm, ensure bed is locked and in lowest position, provide appropriate assistive device, instruct patient to exit bed on their strongest side and collaborate with doctor for possible PT/OT consult

## 2017-09-03 PROBLEM — G93.40 ACUTE ENCEPHALOPATHY: Status: RESOLVED | Noted: 2017-01-01 | Resolved: 2017-01-01

## 2017-09-03 PROBLEM — N18.9 RENAL FAILURE (ARF), ACUTE ON CHRONIC (HCC): Status: RESOLVED | Noted: 2017-01-01 | Resolved: 2017-01-01

## 2017-09-03 PROBLEM — N39.0 UTI (URINARY TRACT INFECTION): Status: RESOLVED | Noted: 2017-01-01 | Resolved: 2017-01-01

## 2017-09-03 PROBLEM — K92.1 MELENA: Status: RESOLVED | Noted: 2017-01-01 | Resolved: 2017-01-01

## 2017-09-03 PROBLEM — N17.9 RENAL FAILURE (ARF), ACUTE ON CHRONIC (HCC): Status: RESOLVED | Noted: 2017-01-01 | Resolved: 2017-01-01

## 2017-09-03 NOTE — CARE PLAN
Problem: Knowledge Deficit  Goal: Knowledge of the prescribed therapeutic regimen will improve  Outcome: PROGRESSING AS EXPECTED  Pt updated on POC and educated on medication regimen. Pt verbalized back medications and why she is on them.     Problem: Mobility  Goal: Risk for activity intolerance will decrease  Outcome: PROGRESSING SLOWER THAN EXPECTED  Pt refusing to ambulate. Wants to use bedpan despite education.

## 2017-09-03 NOTE — PROGRESS NOTES
Hospital Medicine Progress Note, Adult, Complex               Author: Fadi Najjar Date & Time created: 9/3/2017  1:27 PM     Interval History:  76-year-old lady, has prior history of Crohn's and has history of colectomy   with colostomy done in the past, also has history of cirrhosis secondary to   BAKER.    Developed ORAL, metabolic acidosis.   Doing better  Review of Systems:  Review of Systems   Constitutional: Negative for chills and fever.   Respiratory: Negative for shortness of breath.    Cardiovascular: Negative for chest pain and leg swelling.   Gastrointestinal: Positive for nausea. Negative for vomiting.   Genitourinary: Negative for dysuria.   Neurological:        Decreased hearing       Physical Exam:  Physical Exam   Constitutional: She is oriented to person, place, and time. She appears well-developed and well-nourished. No distress.   HENT:   Head: Normocephalic and atraumatic.   Nose: Nose normal.   Eyes: Right eye exhibits no discharge. Left eye exhibits no discharge. No scleral icterus.   Cardiovascular: Normal rate and regular rhythm.    No murmur heard.  Pulmonary/Chest: Effort normal and breath sounds normal. No respiratory distress.   Musculoskeletal: She exhibits no edema.   Neurological: She is alert and oriented to person, place, and time.   Skin: Skin is warm. She is not diaphoretic.   Psychiatric: She has a normal mood and affect. Her behavior is normal.   Nursing note and vitals reviewed.      Labs:        Invalid input(s): [id number]      Recent Labs      09/01/17 0924 09/02/17 0917 09/03/17   1233   SODIUM  137  135  133*   POTASSIUM  3.7  3.6  3.9   CHLORIDE  121*  116*  114*   CO2  10*  12*  14*   BUN  45*  46*  49*   CREATININE  2.14*  2.21*  2.37*   CALCIUM  7.7*  7.9*  8.4*     Recent Labs      09/01/17 0924 09/02/17 0917 09/03/17   1233   GLUCOSE  148*  146*  113*     No results for input(s): RBC, HEMOGLOBIN, HEMATOCRIT, PLATELETCT, PROTHROMBTM, APTT, INR, IRON,  FERRITIN, TOTIRONBC in the last 72 hours.            Hemodynamics:  Temp (24hrs), Av.2 °C (97.2 °F), Min:36.1 °C (96.9 °F), Max:36.3 °C (97.3 °F)  Temperature: 36.2 °C (97.2 °F)  Pulse  Av.6  Min: 70  Max: 117   Blood Pressure : (!) 95/47     Respiratory:    Respiration: 17, Pulse Oximetry: 99 %     Work Of Breathing / Effort: Mild  RUL Breath Sounds: Clear, RML Breath Sounds: Diminished, RLL Breath Sounds: Diminished, REMY Breath Sounds: Clear, LLL Breath Sounds: Diminished  Fluids:    Intake/Output Summary (Last 24 hours) at 17 1327  Last data filed at 17 1132   Gross per 24 hour   Intake              740 ml   Output             1600 ml   Net             -860 ml        GI/Nutrition:  Orders Placed This Encounter   Procedures   • DIET ORDER     Standing Status:   Standing     Number of Occurrences:   1     Order Specific Question:   Diet:     Answer:   Regular [1]     Medical Decision Making, by Problem:  Active Hospital Problems    Diagnosis   • Melena [K92.1]   • Acute encephalopathy [G93.40]   • BAKER (nonalcoholic steatohepatitis) [K75.81]   • Renal failure (ARF), acute on chronic (CMS-HCC) [N17.9, N18.9]   • Metabolic acidosis, NAG, bicarbonate losses [E87.2]   • Pancytopenia (CMS-HCC) [D61.818]         Reviewed items::  Labs reviewed and Medications reviewed  1 ORAL on CKD: cr is stable, no uremic symptoms.  2 metabolic acidosis:improving  3 Anemia  Plan  no acute need for HD  Continue NaHco3  Renal dose all meds  Avoid nephrotoxins  Prognosis guarded  OK to D/C home, f/u as outpt

## 2017-09-03 NOTE — PROGRESS NOTES
Pt and pt family instructed on all discharge information, medications, prescriptions, diagnosis. No questions at discharge. Prescriptions sent to pharmacy. All belongings sent with pt. Colostomy emptied prior to discharge, appliance intact, no iv, pt denies further needs. Pt assisted off unit via wheelchair by transport.

## 2017-09-03 NOTE — DISCHARGE INSTRUCTIONS
Discharge Instructions    Discharged to home by car with relative. Discharged via wheelchair, hospital escort: Yes.  Special equipment needed: Not Applicable    Be sure to schedule a follow-up appointment with your primary care doctor or any specialists as instructed.     Discharge Plan:   Influenza Vaccine Indication: Not indicated: Previously immunized this influenza season and > 8 years of age    I understand that a diet low in cholesterol, fat, and sodium is recommended for good health. Unless I have been given specific instructions below for another diet, I accept this instruction as my diet prescription.   Other diet: Regular    Special Instructions:   Amoxicillin; Clavulanic Acid tablets  What is this medicine?  AMOXICILLIN; CLAVULANIC ACID (a mox i ADAM in; [facility] id) is a penicillin antibiotic. It is used to treat certain kinds of bacterial infections. It will not work for colds, flu, or other viral infections.  This medicine may be used for other purposes; ask your health care provider or pharmacist if you have questions.  COMMON BRAND NAME(S): Augmentin  What should I tell my health care provider before I take this medicine?  They need to know if you have any of these conditions:  -bowel disease, like colitis  -kidney disease  -liver disease  -mononucleosis  -an unusual or allergic reaction to amoxicillin, penicillin, cephalosporin, other antibiotics, clavulanic acid, other medicines, foods, dyes, or preservatives  -pregnant or trying to get pregnant  -breast-feeding  How should I use this medicine?  Take this medicine by mouth with a full glass of water. Follow the directions on the prescription label. Take at the start of a meal. Do not crush or chew. If the tablet has a score line, you may cut it in half at the score line for easier swallowing. Take your medicine at regular intervals. Do not take your medicine more often than directed. Take all of your medicine as directed even if you think  you are better. Do not skip doses or stop your medicine early.  Talk to your pediatrician regarding the use of this medicine in children. Special care may be needed.  Overdosage: If you think you have taken too much of this medicine contact a poison control center or emergency room at once.  NOTE: This medicine is only for you. Do not share this medicine with others.  What if I miss a dose?  If you miss a dose, take it as soon as you can. If it is almost time for your next dose, take only that dose. Do not take double or extra doses.  What may interact with this medicine?  -allopurinol  -anticoagulants  -birth control pills  -methotrexate  -probenecid  This list may not describe all possible interactions. Give your health care provider a list of all the medicines, herbs, non-prescription drugs, or dietary supplements you use. Also tell them if you smoke, drink alcohol, or use illegal drugs. Some items may interact with your medicine.  What should I watch for while using this medicine?  Tell your doctor or health care professional if your symptoms do not improve.  Do not treat diarrhea with over the counter products. Contact your doctor if you have diarrhea that lasts more than 2 days or if it is severe and watery.  If you have diabetes, you may get a false-positive result for sugar in your urine. Check with your doctor or health care professional.  Birth control pills may not work properly while you are taking this medicine. Talk to your doctor about using an extra method of birth control.  What side effects may I notice from receiving this medicine?  Side effects that you should report to your doctor or health care professional as soon as possible:  -allergic reactions like skin rash, itching or hives, swelling of the face, lips, or tongue  -breathing problems  -dark urine  -fever or chills, sore throat  -redness, blistering, peeling or loosening of the skin, including inside the mouth  -seizures  -trouble passing  urine or change in the amount of urine  -unusual bleeding, bruising  -unusually weak or tired  -white patches or sores in the mouth or throat  Side effects that usually do not require medical attention (report to your doctor or health care professional if they continue or are bothersome):  -diarrhea  -dizziness  -headache  -nausea, vomiting  -stomach upset  -vaginal or anal irritation  This list may not describe all possible side effects. Call your doctor for medical advice about side effects. You may report side effects to FDA at 9-787-FDA-0813.  Where should I keep my medicine?  Keep out of the reach of children.  Store at room temperature below 25 degrees C (77 degrees F). Keep container tightly closed. Throw away any unused medicine after the expiration date.  NOTE: This sheet is a summary. It may not cover all possible information. If you have questions about this medicine, talk to your doctor, pharmacist, or health care provider.  © 2014, [employer]. (3/12/2009 12:04:30 PM)      Aplastic Anemia  Aplastic anemia occurs when the soft material that makes up the hollow insides of your bones (bone marrow) stops making enough blood cells. There are three types of blood cells:   · Red blood cells. These carry oxygen to the tissues of the body.    · White blood cells. These fight infection.    · Platelets. These help your blood clot when you have an injury.    Aplastic anemia is a rare and serious condition that may develop slowly or rapidly. Even after successful treatment, those with aplastic anemia must be monitored for possible recurrent problems.   CAUSES   Anything that hurts or injures your bone marrow can cause aplastic anemia. Things that may injure marrow include:   · Radiation and chemotherapy treatment for cancer. These treatments are used to kill cancer cells but also damage other cells.    · Exposure to toxic chemicals used in some pesticides and insecticides.    · Some medicines, such as  those used to treat rheumatoid arthritis.    · Autoimmune disorders in which your immune system begins attacking your own body cells.    · Viral infections, including hepatitis.    · Pregnancy.    Sometimes the cause is not known.   SYMPTOMS   · Shortness of breath.    · Fatigue.    · Lightheadedness or fainting.    · Shortness of breath and rapid heart rate, especially with exertion.    · Pale skin and lips.    · Frequent infections.    · Easy bruising and bleeding.    · Nosebleeds and bleeding gums.    · Prolonged bleeding from cuts.    · Severe bleeding during menstrual periods in women.  · Sore mouth.    · Bacterial or fungal infections.  DIAGNOSIS   Blood tests and a bone marrow biopsy are used to diagnose the condition. Additional testing may be done to find the underlying cause of the anemia.   TREATMENT   For mild cases, observation is needed. In severe cases or if complications develop, hospitalization may be necessary. Severe aplastic anemia is life threatening. Treatment may include:   · Blood transfusions.    · Medicines. Medicines may be given to suppress the immune system if you have an autoimmune disorder. They may also be given to stimulate marrow to make more blood cells.   · A procedure where healthy marrow from a donor is given to the person with aplastic anemia (bone marrow transplant). A bone marrow transplant is used to treat severe aplastic anemia. After the transplant, drugs are needed to help prevent the body from rejecting the new marrow. If the procedure is successful, the healthy marrow will begin producing new blood cells. Bone marrow transplants carry risk, and not everyone is a candidate for transplantation. If the body rejects the transplant, it can be life threatening.  HOME CARE INSTRUCTIONS   · Get plenty of rest and eat a well-balanced diet.    · Avoid excessive exercise. Long-term anemia can stress the heart.    · When platelets are at low levels, avoid all activities that risk  injury. This is important because the risk of bleeding is greater.    · Only take over-the-counter or prescription medicines for pain, fever, or discomfort as directed by your health care provider.    · Take antibiotics as directed by your health care provider. Make sure you finish them even if you start to feel better.    · Protect yourself from infections by washing your hands often. Avoid crowds. Avoid being around sick people.    · Keep all follow-up appointments.  · To prevent aplastic anemia from returning, avoid exposure to toxic chemicals such as:    ¨ Insecticides.    ¨ Herbicides.    ¨ Organic solvents.    ¨ Paint removers.  SEEK IMMEDIATE MEDICAL CARE IF:  · You have a fever or persistent symptoms for more than 2-3 days.    · You have a fever and your symptoms suddenly get worse.    · You develop flu-like symptoms.    · You develop signs of infection.    · You develop infections more frequently.    · You have blood in your urine or bowel movements.    · You are bruising easily.    · You are bleeding from your gums or nose.    · You have prolonged bleeding from cuts.    · You have increasing shortness of breath or chest pain with exertion.    · You develop a rapid heart rate with exertion.    · You have increasing fatigue and tiredness.    · You develop lightheadedness or fainting.    · You develop pale skin and lips.    · You develop a sore mouth.  MAKE SURE YOU:   · Understand these instructions.  · Will watch your condition.  · Will get help right away if you are not doing well or get worse.     This information is not intended to replace advice given to you by your health care provider. Make sure you discuss any questions you have with your health care provider.     Document Released: 10/14/2008 Document Revised: 12/23/2014 Document Reviewed: 06/13/2014  [employer] Interactive Patient Education ©2016 [employer]      Acute Kidney Injury  Acute kidney injury is any condition in which there is sudden  (acute) damage to the kidneys. Acute kidney injury was previously known as acute kidney failure or acute renal failure. The kidneys are two organs that lie on either side of the spine between the middle of the back and the front of the abdomen. The kidneys:  · Remove wastes and extra water from the blood.    · Produce important hormones. These help keep bones strong, regulate blood pressure, and help create red blood cells.    · Balance the fluids and chemicals in the blood and tissues.  A small amount of kidney damage may not cause problems, but a large amount of damage may make it difficult or impossible for the kidneys to work the way they should. Acute kidney injury may develop into long-lasting (chronic) kidney disease. It may also develop into a life-threatening disease called end-stage kidney disease. Acute kidney injury can get worse very quickly, so it should be treated right away. Early treatment may prevent other kidney diseases from developing.  CAUSES   · A problem with blood flow to the kidneys. This may be caused by:    ¨ Blood loss.    ¨ Heart disease.    ¨ Severe burns.    ¨ Liver disease.  · Direct damage to the kidneys. This may be caused by:  ¨ Some medicines.    ¨ A kidney infection.    ¨ Poisoning or consuming toxic substances.    ¨ A surgical wound.    ¨ A blow to the kidney area.    · A problem with urine flow. This may be caused by:    ¨ Cancer.    ¨ Kidney stones.    ¨ An enlarged prostate.  SIGNS AND SYMPTOMS   · Swelling (edema) of the legs, ankles, or feet.    · Tiredness (lethargy).    · Nausea or vomiting.    · Confusion.    · Problems with urination, such as:    ¨ Painful or burning feeling during urination.    ¨ Decreased urine production.    ¨ Frequent accidents in children who are potty trained.    ¨ Bloody urine.    · Muscle twitches and cramps.    · Shortness of breath.    · Seizures.    · Chest pain or pressure.  Sometimes, no symptoms are present.   DIAGNOSIS  Acute kidney  injury may be detected and diagnosed by tests, including blood, urine, imaging, or kidney biopsy tests.   TREATMENT  Treatment of acute kidney injury varies depending on the cause and severity of the kidney damage. In mild cases, no treatment may be needed. The kidneys may heal on their own. If acute kidney injury is more severe, your health care provider will treat the cause of the kidney damage, help the kidneys heal, and prevent complications from occurring. Severe cases may require a procedure to remove toxic wastes from the body (dialysis) or surgery to repair kidney damage. Surgery may involve:   · Repair of a torn kidney.    · Removal of an obstruction.  HOME CARE INSTRUCTIONS  · Follow your prescribed diet.  · Take medicines only as directed by your health care provider.   · Do not take any new medicines (prescription, over-the-counter, or nutritional supplements) unless approved by your health care provider. Many medicines can worsen your kidney damage or may need to have the dose adjusted.    · Keep all follow-up visits as directed by your health care provider. This is important.  · Observe your condition to make sure you are healing as expected.  SEEK IMMEDIATE MEDICAL CARE IF:  · You are feeling ill or have severe pain in the back or side.    · Your symptoms return or you have new symptoms.  · You have any symptoms of end-stage kidney disease. These include:    ¨ Persistent itchiness.    ¨ Loss of appetite.    ¨ Headaches.    ¨ Abnormally dark or light skin.  ¨ Numbness in the hands or feet.    ¨ Easy bruising.    ¨ Frequent hiccups.    ¨ Menstruation stops.    · You have a fever.  · You have increased urine production.  · You have pain or bleeding when urinating.  MAKE SURE YOU:   · Understand these instructions.  · Will watch your condition.  · Will get help right away if you are not doing well or get worse.     This information is not intended to replace advice given to you by your health care  provider. Make sure you discuss any questions you have with your health care provider.     Document Released: 07/02/2012 Document Revised: 01/08/2016 Document Reviewed: 08/16/2013  [employer] Interactive Patient Education ©2016 [employer]      Hepatic Encephalopathy  Hepatic encephalopathy is a loss of brain function from advanced liver disease. The effects of the condition depend on the type of liver damage and how severe it is. In some cases, hepatic encephalopathy can be reversed.  CAUSES  The exact cause of hepatic encephalopathy is not known.  RISK FACTORS  You have a higher risk of getting this condition if your liver is damaged. When the liver is damaged harmful substances called toxins can build up in the body. Certain toxins, such as ammonia, can harm your brain. Conditions that can cause liver damage include:  · An infection.  · Dehydration.  · Intestinal bleeding.  · Drinking too much alcohol.  · Taking certain medicines, including tranquilizers, water pills (diuretics), antidepressants, or sleeping pills.  SIGNS AND SYMPTOMS  Signs and symptoms may develop suddenly. Or, they may develop slowly and get worse gradually. Symptoms can range from mild to severe.  Mild Hepatic Encephalopathy  · Mild confusion.  · Personality and mood changes.  · Anxiety and agitation.  · Drowsiness.  · Loss of mental abilities.  · Musty or sweet-smelling breath.  Worsening or Severe Hepatic Encephalopathy  · Slowed movement.  · Slurred speech.  · Extreme personality changes.  · Disorientation.  · Abnormal shaking or flapping of the hands.  · Coma.  DIAGNOSIS  To make a diagnosis, your health care provider will do a physical exam. To rule out other causes of your signs and symptoms, he or she may order tests. You may have:  · Blood tests. These may be done to check your ammonia level, measure how long it takes your blood to clot, and check for infection.  · Liver function tests. These may be done to check how well your liver  is working.  · MRI and CT scans. These may be done to check for a brain disorder.  · Electroencephalogram (EEG). This may be done to measure the electrical activity in your brain.  TREATMENT  The first step in treatment is identifying and treating possible triggers. The next step is involves taking medicine to lower the level of toxins in the body and to prevent ammonia from building up. You may need to take:  · Antibiotics to reduce the ammonia-producing bacteria in your gut.  · Lactulose to help flush ammonia from the gut.  HOME CARE INSTRUCTIONS  Eating and Drinking  · Follow a low-protein diet that includes plenty of fruits, vegetables, and whole grains, as directed by your health care provider. Ammonia is produced when you digest high-protein foods.  · Work with a dietitian or with your health care provider to make sure you are getting the right balance of protein and minerals.  · Drink enough fluids to keep your urine clear or pale yellow. Drinking plenty of water helps prevent constipation.  · Do not drink alcohol or use illegal drugs.  Medicines  · Only take medicine as directed by your health care provider.  · If you were prescribed an antibiotic medicine, finish it all even if you start to feel better.  · Do not start any new medicines, including over-the-counter medicines, without first checking with your health care provider.  SEEK MEDICAL CARE IF:  · You have new symptoms.  · Your symptoms change.  · Your symptoms get worse.  · You have a fever.  · You are constipated.  · You have persistent nausea, vomiting, or diarrhea.  SEEK IMMEDIATE MEDICAL CARE IF:  · You become very confused or drowsy.  · You vomit blood or material that looks like coffee grounds.  · Your stool is bloody or black or looks like tar.     This information is not intended to replace advice given to you by your health care provider. Make sure you discuss any questions you have with your health care provider.     Document Released:  02/27/2008 Document Revised: 01/08/2016 Document Reviewed: 08/05/2015  [employer] Interactive Patient Education ©2016 [employer]      · Is patient discharged on Warfarin / Coumadin?   No     · Is patient Post Blood Transfusion?  No    Depression / Suicide Risk    As you are discharged from this [facility] facility, it is important to learn how to keep safe from harming yourself.    Recognize the warning signs:  · Abrupt changes in personality, positive or negative- including increase in energy   · Giving away possessions  · Change in eating patterns- significant weight changes-  positive or negative  · Change in sleeping patterns- unable to sleep or sleeping all the time   · Unwillingness or inability to communicate  · Depression  · Unusual sadness, discouragement and loneliness  · Talk of wanting to die  · Neglect of personal appearance   · Rebelliousness- reckless behavior  · Withdrawal from people/activities they love  · Confusion- inability to concentrate     If you or a loved one observes any of these behaviors or has concerns about self-harm, here's what you can do:  · Talk about it- your feelings and reasons for harming yourself  · Remove any means that you might use to hurt yourself (examples: pills, rope, extension cords, firearm)  · Get professional help from the community (Mental Health, Substance Abuse, psychological counseling)  · Do not be alone:Call your Safe Contact- someone whom you trust who will be there for you.  · Call your local CRISIS HOTLINE 595-9496 or 445-794-9092  · Call your local Children's Mobile Crisis Response Team [place] (082) 724-8411 or www.[employer]  · Call the toll free National Suicide Prevention Hotlines   · National Suicide Prevention Lifeline 751-615-RHNV (4401)  · National Hope Line Network 800-SUICIDE (506-2992)

## 2017-09-03 NOTE — DISCHARGE SUMMARY
CHIEF COMPLAINT ON ADMISSION  Chief Complaint   Patient presents with   • ALOC       CODE STATUS  Full Code    HPI & HOSPITAL COURSE  Please see H&P dictated by Dr. Duval. This is a 76 y.o. female here with acute encephalopathy secondary to hepatic encephalopathy. Patient was continued on lactulose and started on rifaxamin. Patient presented with melena and her hemoglobin remained stable. Patient did not require transfusion therapy. Gastroenterology was consulted and recommended continued management with PPI therapy. Patient is to complete paperwork with Dr. Otero's office to receive assistance with rifaximin prescription. Patient was noted to have Enterococcus Faecalis UTI and started on empiric antibiotics. She is discharged home on Augmentin for 3 days to complete her antibiotic course. She was evaluated by physical and occupational therapy and refused skilled nursing facility and is discharged home with home health. Patient was also noted to have metabolic acidosis during her hospital stay and nephrology was consulted. Patient's sodium bicarbonate dose was increased to 1950 mg TID. She was also noted to have renal insufficiency which improved during her hospital stay. She is to follow up with nephrology as scheduled.    Therefore, she is discharged in good and stable condition with close outpatient follow-up.    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 [facility] [address] [place] [address]    9/13/2017 1:00 PM Yandy Eagle R.N. [facility] [address]   9/15/2017 3:45 PM Arik Vee M.D. [facility] [address]     Roshni Morillo M.D.  [address]  [place] [address]  [phone]    Go on 8/31/2017  Please arrive at 11am for your appointment.    [facility] ([facility])  [address]  [address]  [facility] [address]  [phone]          MEDICATIONS ON DISCHARGE   Cristina Tenorio   Home Medication Instructions VANDANA:[id number]    Printed on:09/03/17 0949   Medication Information                      amoxicillin-clavulanate (AUGMENTIN) 500-125 MG Tab  Take 1 Tab by mouth every 12 hours for 3 days.             ferrous gluconate (FERGON) 324 (38 FE) MG Tab  Take 324 mg by mouth every morning with breakfast.             lactulose 20 GM/30ML Solution  Take 30 mL by mouth 2 Times a Day.             omeprazole (PRILOSEC) 20 MG delayed-release capsule  Take 40 mg by mouth every day.             potassium Chloride ER (K-TAB) 20 MEQ Tab CR tablet  Take 20 mEq by mouth 4 times a day.             Probiotic Cap  Take 2 Caps by mouth every morning.             rifaximin (XIFAXAN) 550 MG Tab tablet  Take 1 Tab by mouth 2 Times a Day for 30 days.             sodium bicarbonate (SODIUM BICARBONATE) 650 MG Tab  Take 3 Tabs by mouth 3 times a day.             vitamin D (CHOLECALCIFEROL) 1000 UNIT Tab  Take 1,000 Units by mouth every day.                 DIET  Orders Placed This Encounter   Procedures   • DIET ORDER     Standing Status:   Standing     Number of Occurrences:   1     Order Specific Question:   Diet:     Answer:   Regular [1]       ACTIVITY  As tolerated.        CONSULTATIONS  Dr. Otero - [facility] - Nephrology    PROCEDURES  None    LABORATORY  Lab Results   Component Value Date/Time    SODIUM 135 09/02/2017 09:17 AM    POTASSIUM 3.6 09/02/2017 09:17 AM    CHLORIDE 116 (H) 09/02/2017 09:17 AM    CO2 12 (L) 09/02/2017 09:17 AM    GLUCOSE 146 (H) 09/02/2017 09:17 AM    BUN 46 (H)  09/02/2017 09:17 AM    CREATININE 2.21 (H) 09/02/2017 09:17 AM        Lab Results   Component Value Date/Time    WBC 4.0 (L) 08/29/2017 02:54 AM    HEMOGLOBIN 7.5 (L) 08/29/2017 02:54 AM    HEMATOCRIT 24.5 (L) 08/29/2017 02:54 AM    PLATELETCT 55 (L) 08/29/2017 02:54 AM        Total time of the discharge process exceeds 38 minutes

## 2017-09-03 NOTE — PROGRESS NOTES
Romina Morrissey Fall Risk Assessment:     Last Known Fall: Within the last six months  Mobility: Immobilized/requires assist of one person  Medications: Cardiovascular or central nervous system meds  Mental Status/LOC/Awareness: Awake, alert, and oriented to date, place, and person  Toileting Needs: Use of assistive device (Bedside commode, bedpan, urinal)  Volume/Electrolyte Status: No problems  Communication/Sensory: Visual (Glasses)/hearing deficit  Behavior: Appropriate behavior  Romina Morrissey Fall Risk Total: 11  Fall Risk Level: MODERATE RISK     Universal Fall Precautions:  call light/belongings in reach, bed in low position and locked, wheelchairs and assistive devices out of sight, siderails up x 2, use non-slip footwear, clutter free and spill free environment, adequate lighting, educate on level of risk, educate to call for assistance     Fall Risk Level Interventions:    TRIAL (TELE 8, NEURO, MED RADHA 5) Moderate Fall Risk Interventions  Place yellow fall risk ID band on patient: verified  Provide patient/family education based on risk assessment : completed  Educate patient/family to call staff for assistance when getting out of bed: completed  Place fall precaution signage outside patient door: verified  Utilize bed/chair fall alarm: verified      Patient Specific Interventions:      Medication: review medications with patient and family, assess for medications that can be discontinued or dosage decreased and limit combination of prn medications  Mental Status/LOC/Awareness: not applicable  Toileting: provide frquent toileting, instruct patient/family on the need to call for assistance when toileting, do not leave patient unattended in bathroom/refer to toileting scripting and toilet prior to giving pain medications  Volume/Electrolyte Status: ensure patient remains hydrated  Communication/Sensory: update plan of care on whiteboard and have patients with hearing deficit repeat information back to you to  ensure proper understanding  Behavioral: encourage patient to voice feelings  Mobility: utilize bed/chair fall alarm and ensure bed is locked and in lowest position

## 2017-09-03 NOTE — PROGRESS NOTES
Received bedside report from day RN and assumed care of patient at 1900. Patient is alert and oriented x4 with no signs of labored breathing or distress. Patient updated on plan of care; verbalizes understanding. Safety precautions in place including patient call light within reach, bed alarm on, personal possessions nearby, bed in low position and locked, hourly rounding in practice, and non-skid socks in place. IS brought to bedside. Instructed on use and pt demonstrated back.

## 2017-09-06 NOTE — CERTIFICATION
"[facility]  [facility]  [address]  [place] [address]  [phone] Fax#     Ostomy Note Discharge 9/6/17    For 90 Day Certification Period:  7/14/17-10/14/17          Referring Provider:  Ashlee Dacosta  Primary Provider: Dr. Gilberto Morillo  Consulting Providers: KELLEN Mills  Surgeon: Dr. Gaviria  Start of Care: 7/14/17  Reason for referral: ileostomy eval      SUBJECTIVE:  \"The appliance is lasting about 3 days\"    HPI: 76 year old female pt with a history of Crohn's disease s/p ileostomy, CKD IV, and cirrhosis.  Pt had original ileostomy in [place] in 1992 and a revision was done by Dr. Gaviria (no longer at [facility]) in 2010.  Pt stated that she started to have problems with her skin around the stoma after the revision.  Pt has been getting a 3-4 day wear time with her ileostomy appliance.    Past Medical Hx:   Past Medical History:   Diagnosis Date   • Arthritis 12/30/16    to hands and feet   • Anemia 12/30/16    unknown etiology   • Cirrhosis of liver (CMS-HCC) 12/30/16    states dx at one time but no treatment for >10yrs   • COPD (chronic obstructive pulmonary disease) (CMS-[facility]) 3/20/2013   • Bowel obstruction (CMS-[facility])    • Breath shortness     uses O2@ at night and prn ([employer]). Uses inhaler prn. 12/30/16-reports no changes    • Cataract 2006,2007    bilat phaco with IOL   • COPD (chronic obstructive pulmonary disease) (CMS-[facility])     per pt   • Crohn's disease (CMS-[facility])    • Emphysema of lung (CMS-[facility])     COPD   • Heart burn    • Hemorrhagic disorder (CMS-HCC)     anemia of unkown etiology.   • Ileostomy in place (CMS-[facility])    • Indigestion    • Obstruction    • Occasional tremors    • TRAVIS on CPAP     10/2016-CPAP DC'd and wears O2 @4L/NC   • Renal disorder     Increased creatitine level due to meds.   • Sleep apnea    • Snoring        Medications:no changes  Allergies: Sulfa drugs      Fall Risk Assessment (alli all that " "apply with an X): + fall risk, completed at initial eval    OBJECTIVE:     STOMA ASSESSMENT:   Type:  ___X_Ileostomy     ____Colostomy    ____Urostomy    ____Other     Location:   LLQ   Size: 1 3/8\" (pt currently using precut 1 1/4\" round barriers)   Shape: oval   Color: red, budded   Protrudes:      ___ >1 inch               __X_ <1 inch   [place]:  center   Mucocutaneous Junction:  intact   Gale-stomal Skin Problems:  Pt reports liver problems, suspect underlying varices peristomally.    Effluent / Flatus: green brown liquid   Photo  ____ Yes _X___ No    Pouching Procedure: 9/6/17   Old appliance removed.    Peristomal skin cleansed with: water and warm wash cloth   Peristomal skin treated with:    Ostomy appliance used: 2 1/4\" CTF flextend one piece FLAT - and 2\" Deidra ring brought in by pt.     Patient Education:  Pt was recently hospitalized for non-alcoholic cirrhosis of the liver. After returning home, she was admitted to home health (9/4/17). Pt understands she is being discharged from [facility], because she has home health and they will care for her ostomy as needed and provide her supplies.      PT still has purple area around stoma about 1.5 cm circumferentially, but no open areas.   Verbal/demonstration instructions of above pouching procedure provided to patient    Response: Pt verbalized understanding. Pt did the last appliance change on 9/3/17 and there was no leakage.      PLAN: Discharged to care of [facility]    Supplies Needed:   Appliance type:    Carpenter 1 piece  2 /14\" CTF FLAT flextend             Other: 2\" Deidra ring     Accessories:    Pt does not want a belt (pt has tried this in the past and does not like it)     Supplier:    Frequency:  1x/week and PRN       Professional Collaboration:  None today      At the time of each visit, a thorough assessment of the patient is completed to assure appropriateness of our plan of care.  The plan of care may need to be adapted from the original plan of " care to address any significant changes in patient status.    Clinician Signature:  _________________________________  Date:  ____________    Physician Signature:  ________________________________  Date:  ____________

## 2017-09-15 NOTE — PROGRESS NOTES
Subjective:      Cristina Tenorio is a 76 y.o. female who presents with CKD IV Follow-Up            HPI  Patient with with a history of Crohn's disease, CKD IV, cirrhosis, hepatic encephalopathy. Now with loose stools from her medication resulting in electrolyte abnormalities.    1. CKD stage IV - Cr up slightly from last, good UOP, slightly dry from diarrhea  2. Hypokalemia - K 3.5, not on supplements  3. Acidosis - Currently on sodium bicarbonate    Review of Systems   Constitutional: Negative for chills and fever.   Cardiovascular: Negative for chest pain and palpitations.          Objective:     /58   Pulse 82   Temp 36.5 °C (97.7 °F)   Resp 16   Wt 68.9 kg (152 lb)   LMP 06/29/1995   SpO2 97%   BMI 29.69 kg/m²      Physical Exam   Constitutional: She is oriented to person, place, and time. She appears well-developed and well-nourished.   Cardiovascular: Normal rate and regular rhythm.    Pulmonary/Chest: Effort normal and breath sounds normal.   Musculoskeletal: She exhibits edema. She exhibits no deformity.   2-3+   Neurological: She is alert and oriented to person, place, and time.   Skin: Skin is warm and dry.   Psychiatric: She has a normal mood and affect. Her behavior is normal.               Assessment/Plan:     1. Chronic kidney disease, stage IV (severe) (CMS-HCC)  Cr up slightly from baseline, appears a bit dry. At this point, will follow CKD labs.    2. Hypokalemia  Given level of K and CKD, will ask her to increase K in diet, discussed foods rich in K    3. Acidosis  On 9 tabs sodium bicarbonate daily, given liver disease, would not want to increase from this. Will monitor. Given liver issues, would not give citrate.

## 2017-09-25 PROBLEM — Z87.19 HISTORY OF CROHN'S DISEASE: Status: ACTIVE | Noted: 2017-01-01

## 2017-09-25 PROBLEM — Z90.49 STATUS POST COLECTOMY: Status: ACTIVE | Noted: 2017-01-01

## 2017-09-25 PROBLEM — N18.9 ACUTE ON CHRONIC RENAL FAILURE (HCC): Status: ACTIVE | Noted: 2017-01-01

## 2017-09-25 PROBLEM — Z87.19 HISTORY OF CIRRHOSIS OF LIVER: Status: ACTIVE | Noted: 2017-01-01

## 2017-09-25 PROBLEM — R41.82 ALTERED MENTAL STATE: Status: ACTIVE | Noted: 2017-01-01

## 2017-09-25 PROBLEM — N17.9 ACUTE ON CHRONIC RENAL FAILURE (HCC): Status: ACTIVE | Noted: 2017-01-01

## 2017-09-25 PROBLEM — K92.2 ACUTE UPPER GI BLEED: Status: ACTIVE | Noted: 2017-01-01

## 2017-09-25 NOTE — ED PROVIDER NOTES
"ED Provider Note    Scribed for Jose Manuel Archuleta M.D. by Mirela Nicolas. 9/25/2017, 10:01 AM.    Primary care provider: Roshni Morillo M.D.  Means of arrival: EMS  History obtained from: / EMS  History limited by: AMS    CHIEF COMPLAINT  Chief Complaint   Patient presents with   • ALOC   • Bloody Stools       HPI  Cristina Tenorio is a 76 y.o. female who presents to the Emergency Department by ambulance for evaluation of altered mental status. Patient's  called the ambulance after noting the patient's mental status was different than her baseline. He states the patient has been \"not right\" for the past 4-5 days but is unable to specify these changes. Per , the patient was doing relatively well and was normal prior to going to bed last night. She has not complained of pain and has been afebrile. She has history of Crohn's disease and she has had part of her colon removed. She denies being on blood thinners. No further details of history of present illness can be obtained within the constraints of the patient's altered mental status.    REVIEW OF SYSTEMS  Review of Systems   Unable to perform ROS: Mental status change   Denies current pain.     PAST MEDICAL HISTORY   has a past medical history of Anemia (12/30/16); Arthritis (12/30/16); Bowel obstruction (CMS-HCC); Breath shortness; Cataract (2006,2007); Cirrhosis of liver (CMS-HCC) (12/30/16); COPD (chronic obstructive pulmonary disease) (CMS-HCC); COPD (chronic obstructive pulmonary disease) (CMS-HCC) (3/20/2013); Crohn's disease (CMS-HCC); Emphysema of lung (CMS-HCC); Heart burn; Hemorrhagic disorder (CMS-HCC); Ileostomy in place (CMS-HCC); Indigestion; Obstruction; Occasional tremors; TRAVIS on CPAP; Renal disorder; Sleep apnea; and Snoring.    SURGICAL HISTORY   has a past surgical history that includes cholecystectomy (2013); bowel resection (1992); ileostomy (2010); recovery (6/21/2010); sigmoidoscopy flex (9/27/2016); gastroscopy-endo " (9/27/2016); gastroscopy wiithsavary dilatation (9/28/2016); gastroscopy w/push enterscopy (9/28/2016); gastroscopy-endo (N/A, 10/5/2016); umbilical hernia repair (2000); colonoscopy; other surgical procedure (1994); and gastroscopy (N/A, 1/13/2017).    SOCIAL HISTORY  Social History   Substance Use Topics   • Smoking status: Former Smoker     Years: 50.00     Types: Cigarettes, Cigars     Quit date: 3/30/2013      Comment: 50yrs 1.5 ppd quit 2009   • Alcohol use No      Comment: socially      History   Drug Use No       FAMILY HISTORY  None noted during this encounter.     CURRENT MEDICATIONS  Home Medications     Reviewed by Charlette Quesada (Pharmacy Tech) on 09/25/17 at 1058  Med List Status: Complete   Medication Last Dose Status   amoxicillin-clavulanate (AUGMENTIN) 500-125 MG Tab 9/5/2017 Active   ferrous gluconate (FERGON) 324 (38 FE) MG Tab unknown Active   lactulose 20 GM/30ML Solution unknown Active   omeprazole (PRILOSEC) 20 MG delayed-release capsule unknown Active   potassium Chloride ER (K-TAB) 20 MEQ Tab CR tablet unknown Active   Probiotic Cap unknown Active   rifaximin (XIFAXAN) 550 MG Tab tablet unknown Active   sodium bicarbonate (SODIUM BICARBONATE) 650 MG Tab unknown Active   vitamin D (CHOLECALCIFEROL) 1000 UNIT Tab unknown Active                ALLERGIES  Allergies   Allergen Reactions   • Sulfa Drugs Hives and Itching       PHYSICAL EXAM  VITAL SIGNS: /46   Pulse 98   Temp 36.3 °C (97.3 °F)   Resp (!) 32   Wt 68.9 kg (152 lb)   LMP 06/29/1995   SpO2 99%   BMI 29.69 kg/m²   Vitals reviewed.  Constitutional: Awake, ill-appearing, slow respond..   HENT: Pallor. Normocephalic. Subacute small bruise to forehead. Bilateral external ears normal, Oropharynx dry t, No oral exudates, Nose normal.   Eyes: Conjunctiva pale. PERRL, EOMI. No discharge.   Neck: Normal range of motion, No tenderness, Supple, No stridor.   Cardiovascular: Normal heart rate, Normal rhythm, No murmurs, No  rubs, No gallops.   Thorax & Lungs: Normal breath sounds, No respiratory distress, No wheezing,   Abdomen: Bowel sounds normal, Soft, No tenderness. Ileostomy with black output.   Skin: Warm, Dry, No erythema, No rash.     Musculoskeletal: No gross deformity.  Good passive range of motion, mild edema..   Neurologic: Alert, slow respond, does answer questions, moves all extremities.  No gross focal deficit  Psychiatric: Affect unable to assess    LABS  Results for orders placed or performed during the hospital encounter of 09/25/17   CBC WITH DIFFERENTIAL   Result Value Ref Range    WBC 8.4 4.8 - 10.8 K/uL    RBC 3.22 (L) 4.20 - 5.40 M/uL    Hemoglobin 9.2 (L) 12.0 - 16.0 g/dL    Hematocrit 30.3 (L) 37.0 - 47.0 %    MCV 94.1 81.4 - 97.8 fL    MCH 28.6 27.0 - 33.0 pg    MCHC 30.4 (L) 33.6 - 35.0 g/dL    RDW 53.4 (H) 35.9 - 50.0 fL    Platelet Count 74 (L) 164 - 446 K/uL    MPV 13.3 (H) 9.0 - 12.9 fL    Neutrophils-Polys 87.80 (H) 44.00 - 72.00 %    Lymphocytes 5.30 (L) 22.00 - 41.00 %    Monocytes 4.90 0.00 - 13.40 %    Eosinophils 0.70 0.00 - 6.90 %    Basophils 0.50 0.00 - 1.80 %    Immature Granulocytes 0.80 0.00 - 0.90 %    Nucleated RBC 0.00 /100 WBC    Neutrophils (Absolute) 7.36 (H) 2.00 - 7.15 K/uL    Lymphs (Absolute) 0.44 (L) 1.00 - 4.80 K/uL    Monos (Absolute) 0.41 0.00 - 0.85 K/uL    Eos (Absolute) 0.06 0.00 - 0.51 K/uL    Baso (Absolute) 0.04 0.00 - 0.12 K/uL    Immature Granulocytes (abs) 0.07 0.00 - 0.11 K/uL    NRBC (Absolute) 0.00 K/uL   COMP METABOLIC PANEL   Result Value Ref Range    Sodium 141 135 - 145 mmol/L    Potassium 4.5 3.6 - 5.5 mmol/L    Chloride 114 (H) 96 - 112 mmol/L    Co2 15 (L) 20 - 33 mmol/L    Anion Gap 12.0 (H) 0.0 - 11.9    Glucose 166 (H) 65 - 99 mg/dL    Bun 93 (HH) 8 - 22 mg/dL    Creatinine 3.21 (H) 0.50 - 1.40 mg/dL    Calcium 8.8 8.5 - 10.5 mg/dL    AST(SGOT) 57 (H) 12 - 45 U/L    ALT(SGPT) 29 2 - 50 U/L    Alkaline Phosphatase 326 (H) 30 - 99 U/L    Total Bilirubin 1.7  (H) 0.1 - 1.5 mg/dL    Albumin 3.1 (L) 3.2 - 4.9 g/dL    Total Protein 5.9 (L) 6.0 - 8.2 g/dL    Globulin 2.8 1.9 - 3.5 g/dL    A-G Ratio 1.1 g/dL   LIPASE   Result Value Ref Range    Lipase 28 11 - 82 U/L   TROPONIN   Result Value Ref Range    Troponin I <0.01 0.00 - 0.04 ng/mL   LACTIC ACID   Result Value Ref Range    Lactic Acid 1.9 0.5 - 2.0 mmol/L   AMMONIA   Result Value Ref Range    Ammonia 62 (H) 11 - 45 umol/L   APTT   Result Value Ref Range    APTT 32.5 24.7 - 36.0 sec   ESTIMATED GFR   Result Value Ref Range    GFR If  17 (A) >60 mL/min/1.73 m 2    GFR If Non  14 (A) >60 mL/min/1.73 m 2   EKG (NOW)   Result Value Ref Range    Report       [facility] Emergency Dept.    Test Date:  2017  Pt Name:    TRUE MACARIO                Department: ER  MRN:        [id number]                      Room:       [facility]  Gender:     F                            Technician: [id number]  :        [id number]                   Requested By:MICHELLE SON  Order #:    [id number]                    Reading MD:    Measurements  Intervals                                Axis  Rate:       94                           P:          82  KY:         132                          QRS:        15  QRSD:       80                           T:          72  QT:         368  QTc:        461    Interpretive Statements  SINUS RHYTHM  PROBABLE INFERIOR INFARCT, OLD  CONSIDER POSTERIOR WALL INVOLVEMENT  Compared to ECG 2017 08:37:09  Myocardial infarct finding now present        All labs reviewed by me.    EKG Interpretation  Interpreted by me    Rhythm:  Normal sinus rhythm   Rate: 94  Axis: normal  Ectopy: none  Conduction: normal  ST Segments: no acute change  T Waves: no acute change  Q Waves: none  Clinical Impression: Normal EKG without acute changes      RADIOLOGY  DX-CHEST-PORTABLE (1 VIEW)   Final Result      No acute cardiopulmonary disease evident.      CT-HEAD W/O   Final Result       1.  No evidence of acute territorial infarct, intracranial hemorrhage or mass lesion.   2.  Moderate diffuse cerebral and cerebellar substance loss.   3.  Moderate microangiopathic ischemic change versus demyelination or gliosis.        The radiologist's interpretation of all radiological studies have been reviewed by me.    L      COURSE & MEDICAL DECISION MAKING  Pertinent Labs & Imaging studies reviewed. (See chart for details)    Obtained and reviewed past medical records which reveals the patient has history of hepatic encephalopathy and UTI.  Reviewed her previous workup, baseline labs, previous diagnosis.  Similar recent admission.    10:01 AM Patient seen and examined at bedside. The patient presents with altered mental status, and the differential diagnosis includes but is not limited to  Ordered for chest x-ray, CT head, CBC, CMP, lipase, troponin, lactic acid, urinalysis, ammonia, COD, APTT and EKG to evaluate. Patient will be treated with IV fluids for her symptoms.      Patient started on Protonix drip and infusion.  She is started on some IV fluids.  Broad visual diagnosis was considered including but not limited to encephalopathy, UTI, sepsis, GI bleed, polypharmacy, which show abnormality renal failure.    10:30 AM Ordered 80 mg Protonix in 100 mL NS.    11:19 AM Reviewed patient's lab and radiology results, which are shown above.     11:29 AM Consulted with hospitalist, Dr. Ocampo and discussed patient's condition. He will admit the patient.     11:40 AM Consulted with hospitalist, Dr. Ocampo who informed me the patient has sadiq blood coming from her ostomy bag.     11:42 AM back to bedside for possible bleeding from the ostomy.  Re-evaluated patient at bedside with hospitalist. Lower portion of patient's gown is saturated with blood secondary to brisk arterial bleeding.  Artery exposed that is bleeding  Next to the ostomy.     There is no status.  1% lidocaine without epinephrine and 2  figure-of-eight sutures are placed.  Bleeding is controlled.  Hospitalis is a bedside.3 .    Sutured opening and bleeding stopped. Advised tech to continue to hold direct pressure to the area. See procudre note above.     11:43 AM Called general surgery, Dr. Srivastava. Hospitalist, Dr. Ocampo will consult with Dr. Srivastava.  bleeding  was controlled.  Patient is admitted in guarded condition.    12:52 PM Consulted with hospitalist, Dr. Ocampo. He states Dr. Srivastava, general surgery, will see the patient.     CRITICAL CARE  The very real possibility of a deterioration of this patient's condition required the highest level of my preparedness for sudden, emergent intervention.  I provided critical care services, which included medication orders, frequent reevaluations of the patient's condition and response to treatment, ordering and reviewing test results, and discussing the case with various consultants.  The critical care time associated with the care of the patient was *40 minutes. Review chart for interventions. This time is exclusive of any other billable procedures.         DISPOSITION:  Patient will be admitted to hospitalist, Dr. Ocampo in guarded condition.    FINAL IMPRESSION  1. Melena    2. Bleeding    3. Altered mental status, unspecified altered mental status type    4. Acute renal failure, unspecified acute renal failure type (CMS-HCC)     5.  Acute encephalopathy  6.  Critical care time 40 minutes.  Non-clean procedures     Mirela ANG (Lesteribbradley), am scribing for, and in the presence of, Jose Manuel Archuleta M.D..    Electronically signed by: Mirela Nicolas (Abdirizak), 9/25/2017    Jose Manuel ANG M.D. personally performed the services described in this documentation, as scribed by Mirela Nicolas in my presence, and it is both accurate and complete.    The note accurately reflects work and decisions made by me.  Jose Manuel Archuleta  9/25/2017  2:44 PM

## 2017-09-25 NOTE — DOCUMENTATION QUERY
"DOCUMENTATION QUERY     PROVIDERS: Please select “Cosign w/ note”to reply to query.     To better represent the severity of illness of your patient, please review the following information and exercise your independent professional judgment in responding to this query.      \"Chronic Kidney Disease\" is documented in the Gastroenterology Consult. Based upon the clinical findings, risk factors, and treatment, can this diagnosis be further specified?     · Chronic Kidney Disease Stage I      · Chronic Kidney Disease Stage II     · Chronic Kidney Disease Stage III    · Chronic Kidney Disease Stage IV    · Chronic Kidney Disease Stage V     · End Stage Renal Disease  · Other explanation of clinical findings  · Unable to determine     The medical record reflects the following:   Clinical Findings  BUN 51 on admission; Creatinine 2.57 on admission; GFR 18 on admission; dx ORAL   Treatment  CBC; BMP; NS Infusion; D5 1/2NS Infusion; D5 NS Infusion; Sodium BiCarb Infusion   Risk Factors  Age; dx Dehydration; dx Metabolic Acidosis; dx ORAL; documented \"CKD\"; dx Hypokalemia; dx Crohn's Disease; dx Recurrent Hepatic Encephalopathy w/BAKER Cirrhosis   Location within medical record  History and Physical, Progress Notes, Lab Results and MAR      Thank you,      Alex Barlow  Clinical   P: [phone]    "

## 2017-09-25 NOTE — PROGRESS NOTES
2 RN skin check w Ne PICKARD. Petichea to left thight, redness to right groin , left heal non blanching, bilat upper ext hands eccy. Coccyx small open tear and redness blanching, prev puncture wound from hx surg per pt.

## 2017-09-25 NOTE — ED NOTES
Pt noted to have large amount of blood along with clots from and around ostomy site. Bleeding from site not controlled with pressure. ERP to bedside, suture cart to bedside. Bleeding artery identified and sutures placed by ERP. Pt cleaned, new sheets and gown again provided. New blankets provided. Ostomy RN now at bedside to place new ostomy appliance.

## 2017-09-25 NOTE — ED NOTES
"Pt noted to have a bruise on the left side of her forehead, unknown if pt has had a recent fall.  now at bedside, he is unsure if she has fallen recently, also unsure if the blood in the ostomy bag is new stating \"I don't usually deal with that thing\".  "

## 2017-09-25 NOTE — ED NOTES
75 y/o female bib ambulance from home for evaluation of ALOC. Pt was reportedly found by her  laying in bed and confused this morning. Pt A&O x 1 (person only) upon arrival to ED. Pt also noted to have large amounts of black and bloody stools in and around her colostomy bag which has detached and is leaking. Pt cleaned up on arrival to ED, she is able to answer some questions appropriately.

## 2017-09-25 NOTE — WOUND TEAM
"[facility]   Inpatient Services   Established Ostomy Management/ troubleshooting  HPI: Reviewed  PMH: Reviewed   SH: Reviewed   Reason for Ostomy nurse consult:  Needed ostomy supplies  Subjective: \"Okay\"  Objective: appliance not in place, pt getting cleaned  Ostomy type: ileostomy  Stoma location: LLQ  Stoma assessment:    Appearance:  Red, moist, oval    Size: 1.2\"   Protrusion: flush with peristomal skin raised distally   MC jxn: resolved   Peristomal skin: purple    Ostomy Appliance (type and size): 2 piece 2 3/4\" with paste ring  Assessment: pt in without an appliance, blood and stool in process of being cleaned from pt. Suture present medial edge r/t bleeding.  Reading note from last admit, pt was placed in one piece pouch r/t raised skin. Pt couldn't/wouldn't verbalize what she uses @ home.   Interventions:  Made pattern, traced and cut barrier slightly larger than stoma and pattern. Placed paste ring directly on the skin making sure it covered skin where barrier larger. Attached pouch and closed. One set extra supplies left with pt.   Pt education: none  Plan: Nsg to perform ostomy care. Ostomy nurses to follow for ostomy needs PRN difficulties.  Anticipated discharge needs:none    "

## 2017-09-25 NOTE — PROGRESS NOTES
Consulted for bleeding LLQ ostomy.  Suture placed in ER and bleeding appears to have abated.  Appliance changed this afternoon, no bleeding noted per Wound Care note.  No active, red bleeding at this time.  Ostomy bag contains black tarry output c/w GI hemorrhage  Appliance left in place    No intervention needed

## 2017-09-25 NOTE — ED NOTES
Med rec complete per Pt and SO at bedside  SO doesn't know Pt's medication history very well  Pt did nod yes or no to this techs questions  Pt and SO do not remember last doses  Allergies reviewed  Pt completed a 3 day course of Augmentin on 9/5

## 2017-09-26 PROBLEM — R58 BLEEDING: Status: ACTIVE | Noted: 2017-01-01

## 2017-09-26 NOTE — RESPIRATORY CARE
COPD EDUCATION by COPD CLINICAL EDUCATOR  9/26/2017 at 7:36 AM by Debora Inman     Patient reviewed by COPD education team. Patient does not qualify for COPD program at this time

## 2017-09-26 NOTE — PROGRESS NOTES
Bed linen changed, ostomy care provided. Patient tolerated procedures well and was very cooperative. Call light within reach, bed in lowest position.

## 2017-09-26 NOTE — ASSESSMENT & PLAN NOTE
Most likely secondary to acute on chronic hepatic encephalopathy, her ammonia levels were noted to be elevated, she is supposed to be on lactulose but question compliance  Restarted on rifaximin and lactulose.  Mentation improving

## 2017-09-26 NOTE — H&P
Hospital Medicine History and Physical    Date of Service  9/25/2017    Chief Complaint  Chief Complaint   Patient presents with   • ALOC   • Bloody Stools       History of Presenting Illness  76 y.o. femaleWho recently was discharged several weeks ago for acute hepatic encephalopathy as well as upper GI bleeding , underwent evaluation by gastroenterology which PPIs were recommended. In addition she had a urinary tract infection which she was treated with antibiotics. Hence patient Presented 9/25/2017 with altered mental status per patient's . She said that patient appears a little bit different than her baseline doesn't appear to be feeling right. When asked if she is compliant with her medications patient was unsure and says sometimes she forgets her medications. Hence on admission patient was alert but did have mental slowing in terms of orientation, an emergent CT head was done which was negative for any acute intracranial processes.  In addition there was mild noted lack tarry stools coming out of her ostomy but more significantly there was an arterial acute bleed, and around her ostomy site warranting suture placement to the halt the bleeding.  At this point patient will be admitted to telemetry for closer monitoring including supportive therapy as well as surgical consultation to alleviate the bleeding.      Primary Care Physician  Roshni Morillo M.D.    Consultants  General Surgery    Code Status  FULL    Review of Systems  Review of Systems   Unable to perform ROS: Medical condition        Past Medical History    1.  Crohn's disease, status post colectomy with colostomy in place.  2.  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  3.  Recurrent hepatic encephalopathy.  4.  COPD.    Past Medical History:   Diagnosis Date   • Arthritis 12/30/16    to hands and feet   • Anemia 12/30/16    unknown etiology   • Cirrhosis of liver (CMS-HCC) 12/30/16    states dx at one time but no treatment for >10yrs   • COPD  "(chronic obstructive pulmonary disease) (CMS-HCC) 3/20/2013   • Bowel obstruction (CMS-HCC)    • Breath shortness     uses O2@ at night and prn ([employer]). Uses inhaler prn. 12/30/16-reports no changes    • Cataract 2006,2007    bilat phaco with IOL   • COPD (chronic obstructive pulmonary disease) (CMS-HCC)     per pt   • Crohn's disease (CMS-HCC)    • Emphysema of lung (CMS-HCC)     COPD   • Heart burn    • Hemorrhagic disorder (CMS-HCC)     anemia of unkown etiology.   • Ileostomy in place (CMS-HCC)    • Indigestion    • Obstruction    • Occasional tremors    • TRAVIS on CPAP     10/2016-CPAP DC'd and wears O2 @4L/NC   • Renal disorder     Increased creatitine level due to meds.   • Sleep apnea    • Snoring        Surgical History  Past Surgical History:   Procedure Laterality Date   • GASTROSCOPY N/A 1/13/2017    Procedure: GASTROSCOPY - ENTEROSCOPY PUSH;  Surgeon: Boni Brooks M.D.;  Location: SURGERY [facility];  Service:    • GASTROSCOPY-ENDO N/A 10/5/2016    Procedure: GASTROSCOPY-ENDO;  Surgeon: Aravind Roman M.D.;  Location: ENDOSCOPY [facility];  Service:    • GASTROSCOPY WIITHSAVARY DILATATION  9/28/2016    Procedure: GASTROSCOPY WIITH SAVARY DILATATION;  Surgeon: Aftab Alegre M.D.;  Location: [facility];  Service: Gastroenterology   • GASTROSCOPY W/PUSH ENTERSCOPY  9/28/2016    Procedure: GASTROSCOPY W/PUSH ENTERSCOPY;  Surgeon: Aftab Alegre M.D.;  Location: ENDOSCOPY [facility];  Service:    • SIGMOIDOSCOPY FLEX  9/27/2016    Procedure: SIGMOIDOSCOPY FLEX;  Surgeon: Aftab Alegre M.D.;  Location: ENDOSCOPY [facility];  Service:    • GASTROSCOPY-ENDO  9/27/2016    Procedure: GASTROSCOPY-ENDO;  Surgeon: Aftab Alegre M.D.;  Location: ENDOSCOPY [facility];  Service:    • CHOLECYSTECTOMY  2013    \"burst\"   • RECOVERY  6/21/2010    Performed by SURGERY, IR-RECOVERY at SURGERY SAME DAY [facility] ORS   • ILEOSTOMY  2010    moved to left " side   • UMBILICAL HERNIA REPAIR     • OTHER SURGICAL PROCEDURE      closed off rectum    • BOWEL RESECTION      with ileostomy (right side)   • COLONOSCOPY      Hx of  several        Medications  No current facility-administered medications on file prior to encounter.      Current Outpatient Prescriptions on File Prior to Encounter   Medication Sig Dispense Refill   • sodium bicarbonate (SODIUM BICARBONATE) 650 MG Tab Take 3 Tabs by mouth 3 times a day. 120 Tab 1   • rifaximin (XIFAXAN) 550 MG Tab tablet Take 1 Tab by mouth 2 Times a Day for 30 days. 60 Tab 1   • potassium Chloride ER (K-TAB) 20 MEQ Tab CR tablet Take 20 mEq by mouth 4 times a day.     • lactulose 20 GM/30ML Solution Take 30 mL by mouth 2 Times a Day. 30 Bottle 1   • Probiotic Cap Take 2 Caps by mouth every morning.     • ferrous gluconate (FERGON) 324 (38 FE) MG Tab Take 324 mg by mouth every morning with breakfast.     • omeprazole (PRILOSEC) 20 MG delayed-release capsule Take 40 mg by mouth every day.     • vitamin D (CHOLECALCIFEROL) 1000 UNIT Tab Take 1,000 Units by mouth every day.         Family History  No family history on file.    Social History  Social History   Substance Use Topics   • Smoking status: Former Smoker     Years: 50.00     Types: Cigarettes, Cigars     Quit date: 3/30/2013   • Smokeless tobacco: Not on file      Comment: 50yrs 1.5 ppd quit    • Alcohol use No      Comment: socially       Allergies  Allergies   Allergen Reactions   • Sulfa Drugs Hives and Itching        Physical Exam  Laboratory   Hemodynamics  Temp (24hrs), Av.3 °C (97.3 °F), Min:35.8 °C (96.5 °F), Max:36.6 °C (97.9 °F)   Temperature: 36.5 °C (97.7 °F)  Pulse  Av.9  Min: 85  Max: 101 Heart Rate (Monitored): 100  Blood Pressure : (!) 93/41, NIBP: 111/51      Respiratory      Respiration: 15, Pulse Oximetry: 94 %        RUL Breath Sounds: Diminished, RML Breath Sounds: Diminished, RLL Breath Sounds: Diminished, REMY Breath Sounds:  Diminished, LLL Breath Sounds: Diminished    Physical Exam   HENT:   Head: Normocephalic and atraumatic.   Mouth/Throat: No oropharyngeal exudate.   Eyes: Conjunctivae are normal. Pupils are equal, round, and reactive to light. Right eye exhibits no discharge. No scleral icterus.   Neck: Neck supple. No JVD present. No thyromegaly present.   Cardiovascular: Intact distal pulses.    No murmur heard.  Pulmonary/Chest: Effort normal and breath sounds normal. No stridor. No respiratory distress. She has no wheezes. She has no rales.   Abdominal: Soft. Bowel sounds are normal. She exhibits no distension. There is no tenderness. There is no rebound.   Arterial bleeding noted around ostomy site,   Mild black tarry stools coming from ileostomy.   Musculoskeletal: She exhibits no edema.   Neurological: She is alert. No cranial nerve deficit.   Skin: Skin is warm. She is not diaphoretic. No erythema.   Psychiatric: Her behavior is normal.   Confused       Recent Labs      09/25/17   1015  09/25/17   1949  09/26/17   0414   WBC  8.4   --   5.6   RBC  3.22*   --   2.37*   HEMOGLOBIN  9.2*  7.2*  6.9*   HEMATOCRIT  30.3*   --   21.1*   MCV  94.1   --   91.1   MCH  28.6   --   28.7   MCHC  30.4*   --   31.5*   RDW  53.4*   --   51.6*   PLATELETCT  74*   --   47*   MPV  13.3*   --   11.8     Recent Labs      09/25/17   1015  09/26/17   0414   SODIUM  141  140   POTASSIUM  4.5  4.0   CHLORIDE  114*  113*   CO2  15*  15*   GLUCOSE  166*  92   BUN  93*  84*   CREATININE  3.21*  3.06*   CALCIUM  8.8  7.5*     Recent Labs      09/25/17   1015  09/26/17   0414   ALTSGPT  29  21   ASTSGOT  57*  37   ALKPHOSPHAT  326*  224*   TBILIRUBIN  1.7*  1.1   LIPASE  28   --    GLUCOSE  166*  92     Recent Labs      09/25/17   1015   APTT  32.5             Lab Results   Component Value Date    TROPONINI <0.01 09/25/2017     Urinalysis:    Lab Results  Component Value Date/Time   SPECGRAVITY 1.016 09/25/2017 1120   GLUCOSEUR Negative 09/25/2017 1120    KETONES Negative 09/25/2017 1120   NITRITE Negative 09/25/2017 1120   WBCURINE 10-20 (A) 09/25/2017 1120   RBCURINE 5-10 (A) 09/25/2017 1120   BACTERIA Negative 09/25/2017 1120   EPITHELCELL Few 09/25/2017 1120        Imaging  CT HEAD  1.  No evidence of acute territorial infarct, intracranial hemorrhage or mass lesion.  2.  Moderate diffuse cerebral and cerebellar substance loss.  3.  Moderate microangiopathic ischemic change versus demyelination or gliosis.       Assessment/Plan     I anticipate this patient will require at least two midnights for appropriate medical management, necessitating inpatient admission.    * Acute upper GI bleed   Assessment & Plan    Black tarry stools noted in his ostomy, she was recently evaluated by GI 3 weeks ago was recommended to place on PPI therapy however I question patient's compliance as she is forgetful and has lack of support at home. The amount of discharge was not significant, hence at this time given her hemoglobin was stable at placed her on Protonix IV twice a day for which she can be transitioned to oral Lasix, her output worsens. In that case gastroenterology will be consulted.  Continue to monitor hemoglobin and hematocrit every 8 hours if hemoglobin drops below 7 or transfuse        Altered mental state   Assessment & Plan    Most likely secondary to acute on chronic hepatic encephalopathy, her ammonia levels were noted to be elevated, she is supposed to be on lactulose but again question compliance  I will resume her rifaximin and lactulose.        Bleeding- (present on admission)   Assessment & Plan    Acute arterial bleeding around her ostomy site, ERP placed several sutures which temporarily slowed the bleeding.  Gen. surgery was consulted for further intervention appreciate their assistance.        Acute on chronic renal failure (CMS-HCC)   Assessment & Plan    Most likely due to prerenal azotemia, possible hepatorenal as well. I will provide her with IV  fluid challenge and recheck BMP in the morning        Status post colectomy   Assessment & Plan    Continue ostomy care        Thrombocytopenia (CMS-HCC)- (present on admission)   Assessment & Plan    Results of hepatic insufficiency her platelets are within her baseline  On her daily CBC for any gross changes            VTE prophylaxis: SCDS

## 2017-09-26 NOTE — CARE PLAN
Problem: Safety  Goal: Will remain free from injury  Outcome: PROGRESSING AS EXPECTED  Fall precautions in place. Treaded socks on pt. Appropriate signs on doorway. IV pole on same side as bathroom. Bedrails up. Bed in lowest position and locked.  Call light and phone within reach. Patient educated on importance of calling nurses before getting out of bed, verbalizes understanding. Bed alarm active    Problem: Knowledge Deficit  Goal: Knowledge of disease process/condition, treatment plan, diagnostic tests, and medications will improve  Outcome: PROGRESSING AS EXPECTED  Patient educated about POC.  All questions answered in regards to disease process, treatment, and diet.  Patient  verbalized understanding and voiced no further questions at this time.

## 2017-09-26 NOTE — CARE PLAN
Problem: Safety  Goal: Will remain free from injury    Intervention: Provide assistance with mobility  Encouraged pt to call for assistance, pt verbalized understanding. Bed alarm activated. Belongings and call light in reach. Safety measures ongoing.       Problem: Knowledge Deficit  Goal: Knowledge of disease process/condition, treatment plan, diagnostic tests, and medications will improve    Intervention: Assess knowledge level of disease process/condition, treatment plan, diagnostic tests, and medications  Plan of care and medication administration discussed with pt.      Problem: Pain Management  Goal: Pain level will decrease to patient's comfort goal    Intervention: Follow pain managment plan developed in collaboration with patient and Interdisciplinary Team  Encouraged pt to notify staff of any pain before it becomes too severe, pt verbalized understanding.

## 2017-09-26 NOTE — PROGRESS NOTES
Dr. Collins in to see patient for rounds.  Received verbal order to pull wyatt and for 500 mL NS bolus

## 2017-09-26 NOTE — DIETARY
Nutrition Services: Pt seen for wounds. Discussed with RN, pt appears to have old pressure ulcer to coccyx that is slightly open; wound consult to be ordered. RN explains pt has confusion occasionally but mostly alert and oriented X 4, doing well with PO and ate ~ 100% of breakfast this am. Labs/Meds reviewed. Wt is 65.3 kg/ 143 lbs and BMI of 28.12. Plan/Rec: RD to follow for wound care assessment for vitamin therapy recommendations.  Nutrition Representative to see pt daily for snacks/meal preferences as appropriate with diet order.

## 2017-09-26 NOTE — PROGRESS NOTES
[facility] Hospitalist Progress Note    Date of Service: 2017    Chief Complaint  76 y.o. female admitted 2017 with past medical history cirrhosis of liver secondary to [place] admitted with altered mental status secondary to medication noncompliance, elevated ammonia and acute GI bleed    Interval Problem Update  Patient's hemoglobin is 6.9 this morning, transfuse one unit PRBC, repeat hemoglobin improved. Patient had suture placed in ER by Dr. Srivastava and since then no further bleeding noted from ostomy site, brown liquid stool noted in ostomy bag    Consultants/Specialty  Dr. Srivastava/ Surgery    Disposition  TBD         Review of Systems   Constitutional: Negative for chills and fever.   Respiratory: Negative for cough, sputum production and shortness of breath.    Cardiovascular: Negative for chest pain and leg swelling.   Gastrointestinal: Negative for abdominal pain, blood in stool, melena and nausea.   Genitourinary: Negative for dysuria, frequency and hematuria.   Musculoskeletal: Negative for myalgias.   Neurological: Negative for headaches.      Physical Exam  Laboratory/Imaging   Hemodynamics  Temp (24hrs), Av.3 °C (97.3 °F), Min:35.8 °C (96.5 °F), Max:36.6 °C (97.9 °F)   Temperature: 36.5 °C (97.7 °F)  Pulse  Av.9  Min: 85  Max: 101 Heart Rate (Monitored): 100  Blood Pressure : (!) 93/41, NIBP: 111/51      Respiratory      Respiration: 15, Pulse Oximetry: 94 %        RUL Breath Sounds: Diminished, RML Breath Sounds: Diminished, RLL Breath Sounds: Inspiratory Wheezes, REMY Breath Sounds: Diminished, LLL Breath Sounds: Inspiratory Wheezes    Fluids    Intake/Output Summary (Last 24 hours) at 17 1216  Last data filed at 17 1100   Gross per 24 hour   Intake             2220 ml   Output             2375 ml   Net             -155 ml       Nutrition  Orders Placed This Encounter   Procedures   • DIET ORDER     Standing Status:   Standing     Number of Occurrences:   1     Order Specific  Question:   Diet:     Answer:   Regular [1]     Order Specific Question:   Texture/Fiber modifications:     Answer:   Dysphagia 3(Mechanical Soft)specify fluid consistency(question 6) [3]     Physical Exam   Constitutional: No distress.   HENT:   Head: Normocephalic and atraumatic.   Eyes: Conjunctivae and EOM are normal. Right eye exhibits no discharge. Left eye exhibits no discharge.   Neck: Normal range of motion. Neck supple.   Cardiovascular: Normal rate, regular rhythm and normal heart sounds.    Pulmonary/Chest: Effort normal and breath sounds normal. No respiratory distress. She has no wheezes. She has no rales.   Abdominal: Soft. She exhibits no distension. There is no tenderness. There is no rebound.   Colostomy with brown liquid stool LLQ     Musculoskeletal: She exhibits no edema.   Neurological: She is alert.   Skin: Skin is warm and dry. No rash noted. She is not diaphoretic. No erythema.       Recent Labs      09/25/17   1015  09/25/17   1949  09/26/17   0414  09/26/17   1148   WBC  8.4   --   5.6   --    RBC  3.22*   --   2.37*   --    HEMOGLOBIN  9.2*  7.2*  6.9*  8.1*   HEMATOCRIT  30.3*   --   21.1*   --    MCV  94.1   --   91.1   --    MCH  28.6   --   28.7   --    MCHC  30.4*   --   31.5*   --    RDW  53.4*   --   51.6*   --    PLATELETCT  74*   --   47*   --    MPV  13.3*   --   11.8   --      Recent Labs      09/25/17   1015  09/26/17   0414   SODIUM  141  140   POTASSIUM  4.5  4.0   CHLORIDE  114*  113*   CO2  15*  15*   GLUCOSE  166*  92   BUN  93*  84*   CREATININE  3.21*  3.06*   CALCIUM  8.8  7.5*     Recent Labs      09/25/17   1015   APTT  32.5                  Assessment/Plan     * Acute upper GI bleed- (present on admission)   Assessment & Plan    Pt seen by Dr. Srivastava (surgery), suture placed  Hb 6.9 this am, transfused one unit PRBC, repeat Hb 8.1, monitor hemoglobin closely and transfuse as needed if hemoglobin less than 7 or hematocrit less than 21  Patient was recently evaluated  by gastroenterology on recent admission recommended oral PPI therapy        Altered mental state- (present on admission)   Assessment & Plan    Most likely secondary to acute on chronic hepatic encephalopathy, her ammonia levels were noted to be elevated, she is supposed to be on lactulose but question compliance  Restarted on rifaximin and lactulose.  Mentation improving        Bleeding- (present on admission)   Assessment & Plan    Refer to above        Acute on chronic renal failure (CMS-HCC)- (present on admission)   Assessment & Plan    Most likely due to prerenal azotemia, on IV fluids, improved  Avoid nephrotoxins         Status post colectomy- (present on admission)   Assessment & Plan    Continue ostomy care        Thrombocytopenia (CMS-HCC)- (present on admission)   Assessment & Plan    Secondary to hepatic insufficiency, platelet count at baseline  Monitor for signs of bleeding            Reviewed items::  Labs reviewed and Medications reviewed  Loaiza catheter::  No Loaiza  DVT prophylaxis pharmacological::  Contraindicated - High bleeding risk  DVT prophylaxis - mechanical:  SCDs

## 2017-09-26 NOTE — ASSESSMENT & PLAN NOTE
Pt seen by Dr. Srivastava (surgery), suture placed  Hb 7.1 this am, decreased from Hb 8.2, last night, monitor hemoglobin closely and transfuse as needed if hemoglobin less than 7 or hematocrit less than 21  Patient was recently evaluated by gastroenterology on recent admission recommended oral PPI therapy, Gi consulting recommends outpt EGD

## 2017-09-26 NOTE — PROGRESS NOTES
Report received ad pt assessed. LLQ ostomy with watery black stool noted. Pt cleaned and linen changed at this time. Pt denies any further needs. Bed in lowest position and locked. Upper side rails up x2 and bed alarm activated. Belongings and call light in reach. Safety maintained, will continue to monitor.

## 2017-09-26 NOTE — PROGRESS NOTES
0626-Notified Dr. Teixeira pt BP 84/41 before blood transfusion started, per MD rice to continue and administer blood transfusion.    0630-Blood transfusion started. Will stay with pt first 15 minutes to monitor for any reactions. Discussed with pt s/s to notify RN of, pt verbalized understanding.    0645-Pt BP 89/40 15 minutes after transfusion started. Pt asymptomatic and denies any needs at this time.

## 2017-09-27 NOTE — THERAPY
"Physical Therapy Evaluation completed.   Bed Mobility:  Supine to Sit: Stand by Assist  Transfers: Sit to Stand: Contact Guard Assist  Gait: Level Of Assist: Minimal Assist (due to single LOB requiring assist for correction) with Front-Wheel Walker (otherwise required CGA for rest of gait bout)      Plan of Care: Will benefit from Physical Therapy 3 times per week  Discharge Recommendations: Equipment: No Equipment Needed. Post-acute therapy Discharge to home with home health for additional skilled therapy services.    Pt presents with mild instability during standing/gait with single LOB requiring PT assist for correction. Her gait distance appeared mostly limited by fatigue. She will benefit from further acute skilled PT services to improve functional mobility and anticipate she can return home with HHPT services and family assist.     See \"Rehab Therapy-Acute\" Patient Summary Report for complete documentation.     "

## 2017-09-27 NOTE — DISCHARGE PLANNING
Medical Social Work    Pt expressed interest in hospice with [facility]. SW obtained choice for [facility] and faxed choice form to [facility].

## 2017-09-27 NOTE — CARE PLAN
Problem: Safety  Goal: Will remain free from injury  Bed alarms in place, call light in reach, patient calls for assist.

## 2017-09-27 NOTE — DISCHARGE PLANNING
[facility] received a Hospice choice form. Per the choice form the referral has been sent to [facility].

## 2017-09-27 NOTE — PROGRESS NOTES
[facility] Hospitalist Progress Note    Date of Service: 2017    Chief Complaint  76 y.o. female admitted 2017 with past medical history cirrhosis of liver secondary to Brun[place] admitted with altered mental status secondary to medication noncompliance, elevated ammonia and acute GI bleed    Interval Problem Update  Patient's hemoglobin is 7.1 this morning decreased from 8.2 yesterday evening.  Patient's ostomy site with brown liquid stool. Patient denies any hematuria, hematochezia/melena. Gastroenterology, Dr. Otero consulted for evaluation and consideration for colonoscopy. Appreciate input.    Consultants/Specialty  Dr. Srivastava/ Surgery    Disposition  TBD         Review of Systems   Constitutional: Negative for chills and fever.   Eyes: Negative for blurred vision and double vision.   Respiratory: Negative for cough and shortness of breath.    Cardiovascular: Negative for chest pain and palpitations.   Gastrointestinal: Negative for abdominal pain, blood in stool, melena and nausea.   Genitourinary: Negative for dysuria, frequency and hematuria.   Musculoskeletal: Negative for myalgias.   Neurological: Negative for dizziness and sensory change.      Physical Exam  Laboratory/Imaging   Hemodynamics  Temp (24hrs), Av.3 °C (97.4 °F), Min:35.9 °C (96.6 °F), Max:36.6 °C (97.9 °F)   Temperature: 35.9 °C (96.6 °F)  Pulse  Av.9  Min: 73  Max: 102    Blood Pressure : 103/48      Respiratory      Respiration: 18, Pulse Oximetry: 97 %        RUL Breath Sounds: Diminished, RML Breath Sounds: Diminished, RLL Breath Sounds: Diminished, REMY Breath Sounds: Diminished, LLL Breath Sounds: Diminished    Fluids    Intake/Output Summary (Last 24 hours) at 17 1118  Last data filed at 17 0900   Gross per 24 hour   Intake             4800 ml   Output             2900 ml   Net             1900 ml       Nutrition  Orders Placed This Encounter   Procedures   • DIET ORDER     Standing Status:   Standing     Number  of Occurrences:   1     Order Specific Question:   Diet:     Answer:   Regular [1]     Order Specific Question:   Texture/Fiber modifications:     Answer:   Dysphagia 3(Mechanical Soft)specify fluid consistency(question 6) [3]     Physical Exam   Constitutional: No distress.   HENT:   Head: Normocephalic and atraumatic.   Right Ear: External ear normal.   Left Ear: External ear normal.   Mouth/Throat: Oropharynx is clear and moist.   Eyes: Conjunctivae are normal. Right eye exhibits no discharge. Left eye exhibits no discharge.   Neck: Normal range of motion. Neck supple.   Cardiovascular: Normal rate, regular rhythm and normal heart sounds.    Pulmonary/Chest: Effort normal and breath sounds normal. No respiratory distress. She has no wheezes. She has no rales.   Abdominal: Soft. She exhibits no distension. There is no tenderness. There is no rebound.   LLQ Colostomy with brown liquid stool     Musculoskeletal: She exhibits no edema.   Neurological: She is alert.   Skin: Skin is warm and dry. No rash noted. She is not diaphoretic. No erythema.       Recent Labs      09/25/17 1015 09/26/17 0414 09/26/17 1148 09/26/17 2006 09/27/17 0345   WBC  8.4   --   5.6   --    --    --    RBC  3.22*   --   2.37*   --    --    --    HEMOGLOBIN  9.2*   < >  6.9*  8.1*  8.2*  7.1*   HEMATOCRIT  30.3*   --   21.1*   --    --    --    MCV  94.1   --   91.1   --    --    --    MCH  28.6   --   28.7   --    --    --    MCHC  30.4*   --   31.5*   --    --    --    RDW  53.4*   --   51.6*   --    --    --    PLATELETCT  74*   --   47*   --    --    --    MPV  13.3*   --   11.8   --    --    --     < > = values in this interval not displayed.     Recent Labs      09/25/17 1015 09/26/17 0414 09/27/17 0345   SODIUM  141  140  134*   POTASSIUM  4.5  4.0  3.3*   CHLORIDE  114*  113*  113*   CO2  15*  15*  14*   GLUCOSE  166*  92  112*   BUN  93*  84*  65*   CREATININE  3.21*  3.06*  2.69*   CALCIUM  8.8  7.5*  7.0*      Recent Labs      09/25/17   1015   APTT  32.5                  Assessment/Plan     * Acute upper GI bleed- (present on admission)   Assessment & Plan    Pt seen by Dr. Srivastava (surgery), suture placed  Hb 7.1 this am, decreased from Hb 8.2, last night, monitor hemoglobin closely and transfuse as needed if hemoglobin less than 7 or hematocrit less than 21  Patient was recently evaluated by gastroenterology on recent admission recommended oral PPI therapy, Dr. Otero consulted, appreciate input        Altered mental state- (present on admission)   Assessment & Plan    Most likely secondary to acute on chronic hepatic encephalopathy, her ammonia levels were noted to be elevated, she is supposed to be on lactulose but question compliance  Restarted on rifaximin and lactulose.  Mentation improving        Bleeding- (present on admission)   Assessment & Plan    Refer to above        History of Crohn's disease- (present on admission)   Assessment & Plan    Stable, no acute exacerbation        History of cirrhosis of liver- (present on admission)   Assessment & Plan    Secondary to cryptogenic/BAKER  Follows with GI consultants as outpt        Acute on chronic renal failure (CMS-HCC)- (present on admission)   Assessment & Plan    Most likely due to prerenal azotemia, on IV fluids, improved  Avoid nephrotoxins         Status post colectomy- (present on admission)   Assessment & Plan    Continue ostomy care        Debility- (present on admission)   Assessment & Plan    PT/OT eval pending        Thrombocytopenia (CMS-HCC)- (present on admission)   Assessment & Plan    Secondary to hepatic insufficiency, platelet count at baseline  Monitor for signs of bleeding            Reviewed items::  Labs reviewed and Medications reviewed  Loaiza catheter::  No Loaiza  DVT prophylaxis pharmacological::  Contraindicated - High bleeding risk  DVT prophylaxis - mechanical:  SCDs  Ulcer Prophylaxis::  Yes

## 2017-09-27 NOTE — CARE PLAN
Problem: Pain Management  Goal: Pain level will decrease to patient's comfort goal  No c/o pain.

## 2017-09-27 NOTE — PROGRESS NOTES
2210-Notified on call Hospitalist pt has not voided since wyatt was removed at 1030 this morning. Pt bladder scan at 1632 showed 60 ml. Pt bladder scanned again at 2153 showed 110ml. Order given for 500cc NS bolus. MD also made aware at this time pt BP has been running in the lows 90-100s range. Will continue to monitor pt.     0414-Pt voided 100ml. Another order place for pt to be bladder scanned.     5342-Bladder scan show 30ml

## 2017-09-27 NOTE — PROGRESS NOTES
Patient has expiratory wheezes in bilateral upper lobes with auscultation.  Patient has only had 100 mL urine output this AM prior to pulling wyatt, and bladder scan shows 60 mL of urine.  Paged Dr. Collins and received order for RT protocol.  MD aware of low urine output.  Will continue to monitor.

## 2017-09-27 NOTE — PROGRESS NOTES
Report received and pt assessed. Ostomy secure and intact with dark brown watery stool. Pt placed on bedpan in attempt to void. No other needs voiced. Bed in lowest position and locked. Bed alarm on. Belongings and call light in reach. Safety maintained, will continue to monitor.

## 2017-09-27 NOTE — CARE PLAN
Problem: Bowel/Gastric:  Goal: Normal bowel function is maintained or improved  Colostomy device changed.

## 2017-09-27 NOTE — CARE PLAN
Problem: Safety  Goal: Will remain free from injury  Outcome: PROGRESSING AS EXPECTED  Pt utilizes the call light to call for help. Bed in lowest position and locked, upper side rails up x2, and bed alarm activated. Belongings and call light in reach. Safety ongoing.    Problem: Pain Management  Goal: Pain level will decrease to patient's comfort goal    Intervention: Follow pain managment plan developed in collaboration with patient and Interdisciplinary Team  Encouraged pt to notify staff of any pain before it becomes too severe, pt verbalized understanding. Medicated with Tylenol for back pain x1 during shift.       Problem: Skin Integrity  Goal: Risk for impaired skin integrity will decrease    Intervention: Assess risk factors for impaired skin integrity and/or pressure ulcers  Skin assessment completed. Mepilex placed on sacrum clean, dry, and intact. Pt turned and repositioned every 2 hours.

## 2017-09-28 NOTE — PROGRESS NOTES
Report received and pt assessed. Pt A&O x4. No acute s/s of distress noted. Pt 95% on room air. Denies SOB. No needs voiced at this time. Belongings and call light in reach. Safety maintained, will continue to monitor.

## 2017-09-28 NOTE — PROGRESS NOTES
A/Ox4. Denies pain. Barrier placed on healing pressure ulcer on sacrum. IV dislodged, seeking US guided IV insertion. Will continue to monitor.

## 2017-09-28 NOTE — PROGRESS NOTES
GI Progress Notes  Re: GI bleeding noted in ileostomy    Admitted 9/25 for ALOC and red and black blood in ileostomy. In ER she had brisk arterial bleed and Dr. Srivastava from Gen Surg consulted and placed stitch with no further bleeding.    Was seen by us in August for HE and melena.  Was also seen by Dr Martinez of [facility] for stomal bleeding and thought she might have stomal varices.    Has had several admissions for HE.  Paperwork for compassionate use of Xifaxan has been submitted but hasn't received yet.      Impression:  1.  Recurrent hepatic encephalopathy.  ? GI bleeding  2.  Recent stomal bleeding.  Responded to suture placed. ?stomal varices  3.  Grade II esophageal varices on EGD Jan 2017  4.  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  BAKER cirrhosis  6.  S/p total proctocolectomy with ileostomy for Crohn's  7.  Remote Crohn's, not currently active  8.  Acute on chronic kidney disease    Recs:  1.  Will have Dr. Melara see her tomorrow for EGD and poss ileostomy on Friday  2.  Continue pantoprazole IV  3.  Continue lactulose and Xifaxan

## 2017-09-28 NOTE — THERAPY
"Occupational Therapy Evaluation completed.   Functional Status:  Supervised/SBA with ADLs and txfs  Plan of Care: Patient with no further skilled OT needs in the acute care setting at this time  Discharge Recommendations:   Post-acute therapy Discharge to home with outpatient or home health for additional skilled therapy services. and Currently anticipate no further skilled therapy needs once patient is discharged from the inpatient setting.    See \"Rehab Therapy-Acute\" Patient Summary Report for complete documentation.    "

## 2017-09-28 NOTE — CARE PLAN
Problem: Bowel/Gastric:  Goal: Normal bowel function is maintained or improved    Intervention: Collaborate with Interdisciplinary Team for optimal positioning for bowel evacuation  Loose/watery stool output noted from ostomy. No tarry stools noted.       Problem: Mobility  Goal: Risk for activity intolerance will decrease    Intervention: Encourage patient to increase activity level in collaboration with Interdisciplinary Team  Pt ambulate to and from the bathroom with FWW. Encouraged pt to ambulate 3x.       Problem: Urinary Elimination:  Goal: Ability to reestablish a normal urinary elimination pattern will improve  Outcome: PROGRESSING SLOWER THAN EXPECTED  Pt voids in small increments at a time. No bladder distention noted.

## 2017-09-28 NOTE — CONSULTS
"DATE OF SERVICE:  09/27/2017    REASON FOR THE CONSULT:  GI bleeding noted in ileostomy.    REFERRING PHYSICIAN:  Honey Collins MD    HISTORY OF PRESENT ILLNESS:  The patient was admitted on September 25th for   altered level of consciousness and in the emergency room was noted to have   both red and black blood in her ileostomy.  While she was in the ER, she was   described to have a brisk \"arterial\" bleed and Dr. Srivastava from general   surgery was consulted.  He placed a stitch and the bleeding ceased.  She was   seen in August for hepatic encephalopathy and melena.  At that admission, she   was seen by Dr. Martinez, colorectal surgery for stomal bleeding and he felt she   might have stomal varices.    The patient has history of BAKER cirrhosis who has been suffering from   recurrent encephalopathy.  She does very well when she comes to the hospital   and received Xifaxan.  Unfortunately, she has been unable to obtain this as an   outpatient and capable for compassionate use has been submitted.  Trigger for   the hepatic encephalopathy has not been identified.    In January, she underwent upper endoscopy where she was found to have grade II   varices, but not banded at that time, but she did receive argon plasma   coagulation to gastric, duodenal, and jejunal AVMs.    PAST MEDICAL HISTORY:  ALLERGIES:  SULFA.    MEDICATIONS:  Sodium bicarbonate 650 mg 3 tablets 3 times a day, potassium   chloride 20 mEq 4 times a day, lactulose 30 mL twice a day, probiotics, iron   gluconate 324 mg every morning, omeprazole 40 mg every day, vitamin D 1000   units every day.    PAST SURGICAL HISTORY:  Multiple upper endoscopies, cholecystectomy, ileostomy   reposition, umbilical hernia repair, total proctocolectomy with ileostomy,   bilateral cataract extraction with intraocular lens placement.    PAST MEDICAL HISTORY:  BAKER cirrhosis, COPD, remote Crohn's disease,   obstructive sleep apnea.    SOCIAL HISTORY:  She is .  She is a " nondrinker, currently a nonsmoker.    FAMILY HISTORY:  Noncontributory.    REVIEW OF SYSTEMS:  CONSTITUTIONAL:  No fevers or chills.  No nausea or vomiting.  PULMONARY:  No worsening of shortness of breath.  CARDIOVASCULAR:  No chest pain.  GASTROINTESTINAL:  As above.  ENDOCRINE:  No diabetes or thyroid disease.  NEUROLOGIC:  No syncope, seizure, stroke.    PHYSICAL EXAMINATION:  VITAL SIGNS:  Temperature 36.2, pulse 83, respirations 16, blood pressure   117/57.  GENERAL:  The patient is a 76-year-old female who was sitting up in bed, very   alert, oriented, good with details.  HEENT:  Pupils are equal, round, react to light.  Sclerae anicteric.  NECK:  Soft.  No adenopathy.  LUNGS:  Clear.  CARDIOVASCULAR:  S1, S2, without murmur, gallop or rub.  ABDOMEN:  Bowel sounds are present, soft, nontender.  Her ileostomy appliance   is without any blood, but has green stool.  Her stoma appears healthy.    LABORATORY DATA:  Hemoglobin 7.9, hematocrit 24.7, on September 26th, WBC 5.6,   hemoglobin 6.9, hematocrit 21.1, platelets 47.  Sodium 134, potassium 3.3,   chloride 113, bicarbonate is 14, BUN is 65, creatinine 2.69.  On September 25th, AST 37, ALT 21, alkaline phosphatase 224, total bilirubin 1.1, albumin   2.3.    IMPRESSION:  1.  Recurrent hepatic encephalopathy.  I suspect this may be related to a   low-grade gastrointestinal bleeding whetherr it is from varices, portal   hypertensive gastropathy, AVMs.  2.  Recurrent stomal bleeding.  She did respond to having this stitch placed.    She may have stomal varices.  I did not remove the appliance this evening to   examine.  3.  Grade II esophageal varices on EGD in January 2017.  4.  Gastric, duodenal and jejunal AVMs on push enteroscopy in January 2017.  5.  BAKER cirrhosis.  6.  Status post total proctocolectomy with ileostomy for Crohn's disease.  7.  Remote Crohn's disease, not currently active.  8.  Acute on chronic kidney disease.  9.  Anemia, acute on chronic  blood loss.  10.  Thrombocytopenia.    RECOMMENDATIONS:  She is a Dr. Brooks's patient and he just saw her in the   office 1 week ago.  I will have him evaluate for an upper endoscopy, possible   ileostomy this admission.  Meanwhile, we will continue with IV pantoprazole.    We will also continue her lactulose and Xifaxan.    Thank you for involving us in the care of this patient.       ____________________________________     MD DANIEL SUAREZ / NTS    DD:  09/28/2017 04:59:38  DT:  09/28/2017 05:45:04    D#:  [id number]  Job#:  [id number]

## 2017-09-28 NOTE — FACE TO FACE
Face to Face Supporting Documentation - Home Health    The encounter with this patient was in whole or in part the primary reason for home health admission.    Date of encounter:   Patient:                    MRN:                       YOB: 2016  Cristina Tenorio  [id number]  1940     Home health to see patient for:  Skilled Nursing care for assessment, interventions & education, Physical Therapy evaluation and treatment and Occupational therapy evaluation and treatment    Skilled need for:  Comment: GI Bleed    Skilled nursing interventions to include:  Comment: Medication Management     Homebound status evidenced by:  Need the aid of supportive devices such as crutches, canes, wheelchairs or walkers. Leaving home requires a considerable and taxing effort. There is a normal inability to leave the home.    Community Physician to provide follow up care: Roshni Morillo M.D.     Optional Interventions? No      I certify the face to face encounter for this home health care referral meets the CMS requirements and the encounter/clinical assessment with the patient was, in whole, or in part, for the medical condition(s) listed above, which is the primary reason for home health care. Based on my clinical findings: the service(s) are medically necessary, support the need for home health care, and the homebound criteria are met.  I certify that this patient has had a face to face encounter by myself.  Honey Collins M.D. - NPI: [id number]

## 2017-09-28 NOTE — DISCHARGE PLANNING
Medical Social Work     ALONDRA spoke with pt at bedside to verify that pt declined [facility]. Pt stated she was still interested in [facility] and that when they called she had the RN providing care to her so she stated she could not talk at that time. SW called and left  for [facility] advised her that the pt is still interested in [facility] and did not decline.     Plan: ALONDRA will call and follow up with [facility] to see if a new referral need to be faxed.

## 2017-09-28 NOTE — DISCHARGE PLANNING
[facility] called [facility]. The referral has been denied. The patient declined hospice stating the they are not ready.

## 2017-09-28 NOTE — PROGRESS NOTES
Gastroenterology Progress Note     Author: Boni Brooks   Date & Time Created: 9/28/2017 10:36 AM    CC: Hepatic enceophalopathy, acute GI blood loss anemia    Interval History:  Admitted 9/25 for ALOC and red and black blood in ileostomy. In ER she had brisk arterial bleed and Dr. Srivastava from Gen Surg consulted and placed stitch with no further bleeding.  Was seen by us in August for HE and melena.  [facility] and thought she might have stomal varices.   Has had several admissions for HE.  Paperwork for compassionate use of Xifaxan has been submitted but hasn't received yet.    9/28/2017 - Now completely mentally clear.  Appetite good.  No signs of active bleeding.  I contacted my office and Xifaxan has been approved and she will be contacted to arrange delivery, but approval is only good through calendar year so she will have to submit paperwork again after 12/31/17 for next year.    Review of Systems:  Review of Systems   Constitutional: Negative.    HENT: Negative.    Respiratory: Negative.    Cardiovascular: Negative.    Gastrointestinal: Negative.    Musculoskeletal: Negative for back pain and joint pain.   Skin: Negative.    Neurological: Negative.    Psychiatric/Behavioral: Negative for depression and hallucinations.       Physical Exam:  Physical Exam   Constitutional: She is oriented to person, place, and time. She appears well-developed and well-nourished.   HENT:   Head: Normocephalic and atraumatic.   Eyes: Conjunctivae are normal.   Neck: Neck supple.   Cardiovascular: Normal rate and regular rhythm.    Pulmonary/Chest: Effort normal and breath sounds normal.   Abdominal: Soft. Bowel sounds are normal.   Musculoskeletal: Normal range of motion.   Neurological: She is alert and oriented to person, place, and time.   Skin: Skin is warm and dry.   Psychiatric: She has a normal mood and affect. Her behavior is normal. Thought content normal.   Nursing  note and vitals reviewed.      Labs:        Invalid input(s): [id number]      Recent Labs      17   SODIUM  140  134*  135   POTASSIUM  4.0  3.3*  3.1*   CHLORIDE  113*  113*  116*   CO2  15*  14*  12*   BUN  84*  65*  60*   CREATININE  3.06*  2.69*  2.45*   MAGNESIUM  2.0   --   1.6   CALCIUM  7.5*  7.0*  7.3*     Recent Labs      17   ALTSGPT  21   --    --    ASTSGOT  37   --    --    ALKPHOSPHAT  224*   --    --    TBILIRUBIN  1.1   --    --    GLUCOSE  92  112*  86     Recent Labs      17   1609  17   RBC  2.37*   --    --    --   2.51*   HEMOGLOBIN  6.9*   < >  7.1*  7.9*  7.2*   HEMATOCRIT  21.1*   --    --   24.7*  23.4*   PLATELETCT  47*   --    --    --   56*    < > = values in this interval not displayed.     Recent Labs      17   WBC  5.6  4.1*   NEUTSPOLYS  73.10*   --    LYMPHOCYTES  14.50*   --    MONOCYTES  6.50   --    EOSINOPHILS  3.90   --    BASOPHILS  0.40   --    ASTSGOT  37   --    ALTSGPT  21   --    ALKPHOSPHAT  224*   --    TBILIRUBIN  1.1   --      Hemodynamics:  Temp (24hrs), Av.4 °C (97.5 °F), Min:36.2 °C (97.1 °F), Max:36.9 °C (98.5 °F)  Temperature: 36.9 °C (98.5 °F)  Pulse  Av.2  Min: 21  Max: 102   Blood Pressure : 102/58     Respiratory:    Respiration: 18, Pulse Oximetry: 91 %        RUL Breath Sounds: Diminished, RML Breath Sounds: Diminished, RLL Breath Sounds: Diminished, REMY Breath Sounds: Diminished, LLL Breath Sounds: Diminished  Fluids:    Intake/Output Summary (Last 24 hours) at 17 1036  Last data filed at 17 0624   Gross per 24 hour   Intake             4860 ml   Output             2400 ml   Net             2460 ml     Weight: 73 kg (160 lb 15 oz)  GI/Nutrition:  Orders Placed This Encounter   Procedures   • DIET ORDER     Standing Status:   Standing     Number of  Occurrences:   1     Order Specific Question:   Diet:     Answer:   Regular [1]     Order Specific Question:   Texture/Fiber modifications:     Answer:   Dysphagia 3(Mechanical Soft)specify fluid consistency(question 6) [3]     Medical Decision Making, by Problem:  Active Hospital Problems    Diagnosis   • *Acute upper GI bleed [K92.2]   • Altered mental state [R41.82]   • Thrombocytopenia (CMS-HCC) [D69.6]   • Bleeding [R58]   • Status post colectomy [Z90.49]   • Acute on chronic renal failure (CMS-HCC) [N17.9, N18.9]   • History of cirrhosis of liver [Z87.19]   • History of Crohn's disease [Z87.19]   • Debility [R53.81]       Impression / Plan:    1.  Recurrent hepatic encephalopathy.  ? GI bleeding - no overt signs  2.  Recent stomal bleeding.  Responded to suture placed. ?stomal varices  3.  Grade II esophageal varices on EGD Jan 2017  4.  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  BAKER cirrhosis  6.  S/p total proctocolectomy with ileostomy for Crohn's  7.  Remote Crohn's, not currently active  8.  Acute on chronic kidney disease       1.  Will plan for EGD and poss ileostomy as outpatient  2.  Continue pantoprazole - may change to twice daily PO  3.  Continue lactulose and Xifaxan        Reviewed items::  Labs reviewed, Medications reviewed and Radiology images reviewed

## 2017-09-28 NOTE — PROGRESS NOTES
[facility] Hospitalist Progress Note    Date of Service: 2017    Chief Complaint  76 y.o. female admitted 2017 with past medical history cirrhosis of liver secondary to Brunner admitted with altered mental status secondary to medication noncompliance, elevated ammonia and acute GI bleed    Interval Problem Update  Patient's H&H stable overnight. No acute bleeding noted.     Consultants/Specialty  Dr. Srivastava/ Surgery  Dr. Melara/GI    Disposition  Likely home tomorrow if medically stable.         Review of Systems   Constitutional: Negative for chills and fever.   Eyes: Negative for blurred vision and double vision.   Respiratory: Negative for cough and shortness of breath.    Cardiovascular: Negative for chest pain and palpitations.   Gastrointestinal: Negative for abdominal pain, blood in stool, melena and nausea.   Genitourinary: Negative for dysuria, frequency and hematuria.   Musculoskeletal: Negative for myalgias.   Neurological: Negative for dizziness and sensory change.      Physical Exam  Laboratory/Imaging   Hemodynamics  Temp (24hrs), Av.5 °C (97.7 °F), Min:36.2 °C (97.1 °F), Max:36.9 °C (98.5 °F)   Temperature: 36.9 °C (98.4 °F)  Pulse  Av.1  Min: 21  Max: 102    Blood Pressure : 105/61      Respiratory      Respiration: 18, Pulse Oximetry: 92 %        RUL Breath Sounds: Diminished, RML Breath Sounds: Diminished, RLL Breath Sounds: Diminished, REMY Breath Sounds: Diminished, LLL Breath Sounds: Diminished    Fluids    Intake/Output Summary (Last 24 hours) at 17 1600  Last data filed at 17 1420   Gross per 24 hour   Intake             4380 ml   Output             3350 ml   Net             1030 ml       Nutrition  Orders Placed This Encounter   Procedures   • DIET ORDER     Standing Status:   Standing     Number of Occurrences:   1     Order Specific Question:   Diet:     Answer:   Regular [1]     Order Specific Question:   Texture/Fiber modifications:     Answer:   Dysphagia  3(Mechanical Soft)specify fluid consistency(question 6) [3]     Physical Exam   Constitutional: No distress.   HENT:   Head: Normocephalic and atraumatic.   Right Ear: External ear normal.   Left Ear: External ear normal.   Mouth/Throat: Oropharynx is clear and moist.   Eyes: Conjunctivae are normal. Right eye exhibits no discharge. Left eye exhibits no discharge.   Neck: Normal range of motion. Neck supple.   Cardiovascular: Normal rate, regular rhythm and normal heart sounds.    Pulmonary/Chest: Effort normal and breath sounds normal. No respiratory distress. She has no wheezes. She has no rales.   Abdominal: Soft. She exhibits no distension. There is no tenderness. There is no rebound.   LLQ Colostomy with brown liquid stool     Musculoskeletal: She exhibits no edema.   Neurological: She is alert.   Skin: Skin is warm and dry. No rash noted. She is not diaphoretic. No erythema.       Recent Labs      09/26/17   0414   09/27/17   0345  09/27/17   1609  09/28/17   0222   WBC  5.6   --    --    --   4.1*   RBC  2.37*   --    --    --   2.51*   HEMOGLOBIN  6.9*   < >  7.1*  7.9*  7.2*   HEMATOCRIT  21.1*   --    --   24.7*  23.4*   MCV  91.1   --    --    --   93.2   MCH  28.7   --    --    --   28.7   MCHC  31.5*   --    --    --   30.8*   RDW  51.6*   --    --    --   56.3*   PLATELETCT  47*   --    --    --   56*   MPV  11.8   --    --    --   13.1*    < > = values in this interval not displayed.     Recent Labs      09/26/17   0414  09/27/17   0345  09/28/17   0222   SODIUM  140  134*  135   POTASSIUM  4.0  3.3*  3.1*   CHLORIDE  113*  113*  116*   CO2  15*  14*  12*   GLUCOSE  92  112*  86   BUN  84*  65*  60*   CREATININE  3.06*  2.69*  2.45*   CALCIUM  7.5*  7.0*  7.3*                      Assessment/Plan     * Acute upper GI bleed- (present on admission)   Assessment & Plan    Pt seen by Dr. Uccelli (surgery), suture placed  Hb 7.1 this am, decreased from Hb 8.2, last night, monitor hemoglobin closely and  transfuse as needed if hemoglobin less than 7 or hematocrit less than 21  Patient was recently evaluated by gastroenterology on recent admission recommended oral PPI therapy, Gi consulting recommends outpt EGD        Altered mental state- (present on admission)   Assessment & Plan    Most likely secondary to acute on chronic hepatic encephalopathy, her ammonia levels were noted to be elevated, she is supposed to be on lactulose but question compliance  Restarted on rifaximin and lactulose.  Mentation improving        Bleeding- (present on admission)   Assessment & Plan    Refer to above        History of Crohn's disease- (present on admission)   Assessment & Plan    Stable, no acute exacerbation        History of cirrhosis of liver- (present on admission)   Assessment & Plan    Secondary to cryptogenic/BAKER  Follows with GI consultants as outpt        Acute on chronic renal failure (CMS-HCC)- (present on admission)   Assessment & Plan    Most likely due to prerenal azotemia, on IV fluids, improved  Avoid nephrotoxins         Status post colectomy- (present on admission)   Assessment & Plan    Continue ostomy care        Debility- (present on admission)   Assessment & Plan    PT/OT following,  orders placed        Thrombocytopenia (CMS-HCC)- (present on admission)   Assessment & Plan    Secondary to hepatic insufficiency, platelet count at baseline  Monitor for signs of bleeding            Reviewed items::  Labs reviewed and Medications reviewed  Loaiza catheter::  No Loaiza  DVT prophylaxis pharmacological::  Contraindicated - High bleeding risk  DVT prophylaxis - mechanical:  SCDs  Ulcer Prophylaxis::  Yes

## 2017-09-28 NOTE — DISCHARGE PLANNING
Medical Social Work     SW spoke with pt and  at bedside and pt stated that they would prefer to continue with the aggressive medical treatment. SW explained that they could not be on hospice and continue with the aggressive medical treatment. Pt stated she is not ready to give up and she wants to live life so she will decline the hospice at this time. SW updated the [facility].

## 2017-09-29 PROBLEM — N17.9 ACUTE ON CHRONIC RENAL FAILURE (HCC): Status: RESOLVED | Noted: 2017-01-01 | Resolved: 2017-01-01

## 2017-09-29 PROBLEM — R53.81 DEBILITY: Status: RESOLVED | Noted: 2017-01-01 | Resolved: 2017-01-01

## 2017-09-29 PROBLEM — N18.9 ACUTE ON CHRONIC RENAL FAILURE (HCC): Status: RESOLVED | Noted: 2017-01-01 | Resolved: 2017-01-01

## 2017-09-29 PROBLEM — R41.82 ALTERED MENTAL STATE: Status: RESOLVED | Noted: 2017-01-01 | Resolved: 2017-01-01

## 2017-09-29 PROBLEM — R58 BLEEDING: Status: RESOLVED | Noted: 2017-01-01 | Resolved: 2017-01-01

## 2017-09-29 PROBLEM — Z90.49 STATUS POST COLECTOMY: Status: RESOLVED | Noted: 2017-01-01 | Resolved: 2017-01-01

## 2017-09-29 PROBLEM — K92.2 ACUTE UPPER GI BLEED: Status: RESOLVED | Noted: 2017-01-01 | Resolved: 2017-01-01

## 2017-09-29 NOTE — DISCHARGE PLANNING
[facility] received a HH choice form. Per the choice from the referral has been sent to [facility]

## 2017-09-29 NOTE — PROGRESS NOTES
Discharge orders received. All lines and monitors discontinued. Reviewed discharge paperwork with patient.   Checked room for personal belongings and patient verified all personal belongings accounted for and leaving with patient. Discussed diet, activity, medications, follow up care and worsening symptoms. No questions at this time. Pt to be discharged to home via car.

## 2017-09-29 NOTE — DISCHARGE INSTRUCTIONS
Discharge Instructions    Discharged to home by car with relative. Discharged via wheelchair, hospital escort: Yes.  Special equipment needed: Not Applicable    Be sure to schedule a follow-up appointment with your primary care doctor or any specialists as instructed.     Discharge Plan:   Diet Plan: Discussed  Activity Level: Discussed  Confirmed Follow up Appointment: Appointment Scheduled  Confirmed Symptoms Management: Discussed  Medication Reconciliation Updated: Yes  Influenza Vaccine Indication: Patient Refuses    I understand that a diet low in cholesterol, fat, and sodium is recommended for good health. Unless I have been given specific instructions below for another diet, I accept this instruction as my diet prescription.   Other diet:     Special Instructions: None    · Is patient discharged on Warfarin / Coumadin?   No     · Is patient Post Blood Transfusion?  Yes  POST BLOOD TRANSFUSION INFORMATION (ADULT)    The purpose of blood transfusion is to correct anemia, low levels of blood clotting factors or to correct acute blood loss.   Blood transfusion is very safe but occasionally unexpected adverse reactions do occur. Most adverse reactions occur during transfusion, within one to two days following transfusion or one to two weeks following transfusion. Some adverse reactions can occur one to six months after transfusion and some even years later.             If any of the symptoms listed below happen to you during your transfusion,                                 please notify your nurse immediately.   · Fever or Chills  · Flushing of the Face  · Hives, rash or itching  · Difficulty in breathing or shortness of breath  · Pain or oozing of blood from the IV needle site  · Low back pain  · Nausea or vomiting  · Weakness or fainting  · Chest pain  · Blood in the urine  · Decreased frequency of urination    The above symptoms may also occur within 24 to 48 after transfusion; if so, notify your physician.      · Yellowing of the skin    This can happen one to six months after transfusion; if so, notify your physician    Depression / Suicide Risk    As you are discharged from this [facility] facility, it is important to learn how to keep safe from harming yourself.    Recognize the warning signs:  · Abrupt changes in personality, positive or negative- including increase in energy   · Giving away possessions  · Change in eating patterns- significant weight changes-  positive or negative  · Change in sleeping patterns- unable to sleep or sleeping all the time   · Unwillingness or inability to communicate  · Depression  · Unusual sadness, discouragement and loneliness  · Talk of wanting to die  · Neglect of personal appearance   · Rebelliousness- reckless behavior  · Withdrawal from people/activities they love  · Confusion- inability to concentrate     If you or a loved one observes any of these behaviors or has concerns about self-harm, here's what you can do:  · Talk about it- your feelings and reasons for harming yourself  · Remove any means that you might use to hurt yourself (examples: pills, rope, extension cords, firearm)  · Get professional help from the community (Mental Health, Substance Abuse, psychological counseling)  · Do not be alone:Call your Safe Contact- someone whom you trust who will be there for you.  · Call your local CRISIS HOTLINE 550-2974 or 150-768-9893  · Call your local Children's Mobile Crisis Response Team [place] (133) 553-8029 or www."[employer]"  · Call the toll free National Suicide Prevention Hotlines   · National Suicide Prevention Lifeline 291-825-TTDY (2096)  · National Hope Line Network 800-SUICIDE (197-9980)        Gastrointestinal Bleeding  Gastrointestinal (GI) bleeding means there is bleeding somewhere along the digestive tract, between the mouth and anus.  CAUSES   There are many different problems that can cause GI bleeding. Possible causes include:  · Esophagitis.  This is inflammation, irritation, or swelling of the esophagus.  · Hemorrhoids. These are veins that are full of blood (engorged) in the rectum. They cause pain, inflammation, and may bleed.  · Anal fissures. These are areas of painful tearing which may bleed. They are often caused by passing hard stool.  · Diverticulosis. These are pouches that form on the colon over time, with age, and may bleed significantly.  · Diverticulitis. This is inflammation in areas with diverticulosis. It can cause pain, fever, and bloody stools, although bleeding is rare.  · Polyps and cancer. Colon cancer often starts out as precancerous polyps.  · Gastritis and ulcers. Bleeding from the upper gastrointestinal tract (near the stomach) may travel through the intestines and produce black, sometimes tarry, often bad smelling stools. In certain cases, if the bleeding is fast enough, the stools may not be black, but red. This condition may be life-threatening.  SYMPTOMS   · Vomiting bright red blood or material that looks like coffee grounds.  · Bloody, black, or tarry stools.  DIAGNOSIS   Your caregiver may diagnose your condition by taking your history and performing a physical exam. More tests may be needed, including:  · X-rays and other imaging tests.  · Esophagogastroduodenoscopy (EGD). This test uses a flexible, lighted tube to look at your esophagus, stomach, and small intestine.  · Colonoscopy. This test uses a flexible, lighted tube to look at your colon.  TREATMENT   Treatment depends on the cause of your bleeding.   · For bleeding from the esophagus, stomach, small intestine, or colon, the caregiver doing your EGD or colonoscopy may be able to stop the bleeding as part of the procedure.  · Inflammation or infection of the colon can be treated with medicines.  · Many rectal problems can be treated with creams, suppositories, or warm baths.  · Surgery is sometimes needed.  · Blood transfusions are sometimes needed if you have lost  a lot of blood.  If bleeding is slow, you may be allowed to go home. If there is a lot of bleeding, you will need to stay in the hospital for observation.  HOME CARE INSTRUCTIONS   · Take any medicines exactly as prescribed.  · Keep your stools soft by eating foods that are high in fiber. These foods include whole grains, legumes, fruits, and vegetables. Prunes (1 to 3 a day) work well for many people.  · Drink enough fluids to keep your urine clear or pale yellow.  SEEK IMMEDIATE MEDICAL CARE IF:   · Your bleeding increases.  · You feel lightheaded, weak, or you faint.  · You have severe cramps in your back or abdomen.  · You pass large blood clots in your stool.  · Your problems are getting worse.  MAKE SURE YOU:   · Understand these instructions.  · Will watch your condition.  · Will get help right away if you are not doing well or get worse.     This information is not intended to replace advice given to you by your health care provider. Make sure you discuss any questions you have with your health care provider.     Document Released: 12/15/2001 Document Revised: 12/04/2013 Document Reviewed: 11/26/2012  [employer] Interactive Patient Education ©2016 [employer]

## 2017-09-29 NOTE — DISCHARGE PLANNING
Medical Social Work    Referral: HH Order    Intervention: SW received order to arrange Home Health. SW met with pt at bedside to obtain choice for HH. Pt signed choice form for [facility]. Pt knows that Home Health should contact her within 2 days of dc to arrange appointment or she will contact them. Copy of choice form given to pt and faxed to [facility]. Original placed in pt's chart.     Plan: Awaiting accepting Home Health Agency.

## 2017-09-29 NOTE — CARE PLAN
Problem: Safety  Goal: Will remain free from injury  Outcome: PROGRESSING AS EXPECTED  Fall precautions in place. Treaded socks on pt. Appropriate signs on doorway. IV pole on same side as bathroom. Bedrails up. Bed in lowest position and locked.  Call light and phone within reach. Patient educated on importance of calling nurses before getting out of bed, verbalizes understanding. Bed alarm active    Problem: Knowledge Deficit  Goal: Knowledge of disease process/condition, treatment plan, diagnostic tests, and medications will improve  Outcome: PROGRESSING AS EXPECTED  Patient and spouse educated about POC.  All questions answered in regards to disease process, treatment, and diet.  Patient and spouse verbalized understanding and voiced no further questions at this time.

## 2017-09-29 NOTE — PROGRESS NOTES
Gastroenterology Progress Note     Author: Boni Brooks   Date & Time Created: 9/29/2017 8:08 AM    CC: Hepatic enceophalopathy, acute GI blood loss anemia    Interval History:  Admitted 9/25 for ALOC and red and black blood in ileostomy. In ER she had brisk arterial bleed and Dr. Srivastava from Gen Surg consulted and placed stitch with no further bleeding.  Was seen by us in August for HE and melena.  [facility] and thought she might have stomal varices.   Has had several admissions for HE.  Paperwork for compassionate use of Xifaxan has been submitted but hasn't received yet.    9/28/2017 - Now completely mentally clear.  Appetite good.  No signs of active bleeding.  I contacted my office and Xifaxan has been approved and she will be contacted to arrange delivery, but approval is only good through calendar year so she will have to submit paperwork again after 12/31/17 for next year.  9/29/2017 - Continues to be mentally clear.  Hb stable.  No further signs of bleeding.  Appetite good.  No complaints    Review of Systems:  Review of Systems   Constitutional: Negative.    HENT: Negative.    Respiratory: Negative.    Cardiovascular: Negative.    Gastrointestinal: Negative.    Musculoskeletal: Negative for back pain and joint pain.   Skin: Negative.    Neurological: Negative.    Psychiatric/Behavioral: Negative for depression and hallucinations.       Physical Exam:  Physical Exam   Constitutional: She is oriented to person, place, and time. She appears well-developed and well-nourished.   HENT:   Head: Normocephalic and atraumatic.   Eyes: Conjunctivae are normal.   Neck: Neck supple.   Cardiovascular: Normal rate and regular rhythm.    Pulmonary/Chest: Effort normal and breath sounds normal.   Abdominal: Soft. Bowel sounds are normal.   Musculoskeletal: Normal range of motion.   Neurological: She is alert and oriented to person, place, and time.   Skin: Skin is  warm and dry.   Psychiatric: She has a normal mood and affect. Her behavior is normal. Thought content normal.   Nursing note and vitals reviewed.      Labs:        Invalid input(s): [id number]      Recent Labs      17   SODIUM  134*  135   POTASSIUM  3.3*  3.1*   CHLORIDE  113*  116*   CO2  14*  12*   BUN  65*  60*   CREATININE  2.69*  2.45*   MAGNESIUM   --   1.6   CALCIUM  7.0*  7.3*     Recent Labs      17   0345  17   GLUCOSE  112*  86     Recent Labs      17   1609  17   02217   0351   RBC   --   2.51*  2.56*   HEMOGLOBIN  7.9*  7.2*  7.4*   HEMATOCRIT  24.7*  23.4*  23.9*   PLATELETCT   --   56*  59*     Recent Labs      17   0351   WBC  4.1*  4.9     Hemodynamics:  Temp (24hrs), Av.8 °C (98.2 °F), Min:36.5 °C (97.7 °F), Max:37 °C (98.6 °F)  Temperature: 36.7 °C (98 °F)  Pulse  Av.5  Min: 21  Max: 102   Blood Pressure : (!) 89/46 (RN notified)     Respiratory:    Respiration: 16, Pulse Oximetry: 95 %        RUL Breath Sounds: Expiratory Wheezes, RML Breath Sounds: Expiratory Wheezes, RLL Breath Sounds: Diminished, REMY Breath Sounds: Expiratory Wheezes, LLL Breath Sounds: Diminished  Fluids:    Intake/Output Summary (Last 24 hours) at 17 1036  Last data filed at 17 0624   Gross per 24 hour   Intake             4860 ml   Output             2400 ml   Net             2460 ml     Weight: 75.2 kg (165 lb 12.6 oz)  GI/Nutrition:  Orders Placed This Encounter   Procedures   • DIET ORDER     Standing Status:   Standing     Number of Occurrences:   1     Order Specific Question:   Diet:     Answer:   Regular [1]     Order Specific Question:   Texture/Fiber modifications:     Answer:   Dysphagia 3(Mechanical Soft)specify fluid consistency(question 6) [3]     Medical Decision Making, by Problem:  Active Hospital Problems    Diagnosis   • *Acute upper GI bleed [K92.2]   • Altered mental state [R41.82]   •  Thrombocytopenia (CMS-HCC) [D69.6]   • Bleeding [R58]   • Status post colectomy [Z90.49]   • Acute on chronic renal failure (CMS-HCC) [N17.9, N18.9]   • History of cirrhosis of liver [Z87.19]   • History of Crohn's disease [Z87.19]   • Debility [R53.81]       Impression / Plan:    1.  Recurrent hepatic encephalopathy.  ? GI bleeding - no overt signs  2.  Recent stomal bleeding.  Responded to suture placed. ?stomal varices  3.  Grade II esophageal varices on EGD Jan 2017  4.  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  BAKER cirrhosis  6.  S/p total proctocolectomy with ileostomy for Crohn's  7.  Remote Crohn's, not currently active  8.  Acute on chronic kidney disease       1.  Will plan for EGD and poss ileoscopy as outpatient  2.  Continue pantoprazole - may change to twice daily PO  3.  Continue lactulose and Xifaxan    GI TO SIGN OFF / STAND BY - PLEASE CALL IF ANY CONCERNS        Reviewed items::  Labs reviewed, Medications reviewed and Radiology images reviewed

## 2017-09-29 NOTE — DISCHARGE SUMMARY
CHIEF COMPLAINT ON ADMISSION  Chief Complaint   Patient presents with   • ALOC   • Bloody Stools       CODE STATUS  Prior    HPI & HOSPITAL COURSE  Please see H&P dictated by Dr. Roger. This is a 76 y.o. female here with black tarry stools noted from her ostomy and acute on chronic hepatic encephalopathy. Patient was admitted to telemetry. Surgery was consulted and patient had a suture placed around her ostomy site and bleeding was abated. Patient's hemoglobin and hematocrit were monitored sleep and she required to be transfused one unit of packed red blood cells during her hospital stay. Gastroenterology was consulted and as patient no longer had black tarry stools from her ostomy site was having brown output from her ostomy site is recommended that patient have outpatient EGD and possible ileoscopy. Patient was continued on pantoprazole. For patient's acute on chronic hepatic encephalopathy she was restarted on lactulose and rifaximin. Patient's encephalopathy resolved during her hospital stay. She was evaluated by physical and occupation therapy during her hospital stay and home health orders were placed. Patient's symptoms improved and was at baseline prior to discharge.    Therefore, she is discharged in good and stable condition with close outpatient follow-up.      DISCHARGE PROBLEM LIST  Principal Problem (Resolved):    Acute upper GI bleed POA: Yes  Active Problems:    Thrombocytopenia (CMS-HCC) POA: Yes    History of cirrhosis of liver POA: Yes    History of Crohn's disease POA: Yes  Resolved Problems:    Altered mental state POA: Yes    Debility POA: Yes    Status post colectomy POA: Yes    Acute on chronic renal failure (CMS-HCC) POA: Yes    Bleeding POA: Yes      FOLLOW UP  Future Appointments  Date Time Provider Department [place]   10/3/2017 8:30 AM CARE MANAGER MONY RUSHING   10/5/2017 1:40 PM LUMA Canales   12/1/2017 10:45 AM Arik Vee M.D. NEPH [address] [place] [address]  LUMA Morillo  [address]  [place] [address]  [phone]    Schedule an appointment as soon as possible for a visit in 1 week   was unable to contact office.    Boni Brooks M.D.  [id number]  [place] [address]  [phone]       spoke with the office; they will contact patient to schedule an appointment.      MEDICATIONS ON DISCHARGE   Cristina Tenorio   Home Medication Instructions VANDANA:[id number]    Printed on:09/29/17 1602   Medication Information                      amoxicillin-clavulanate (AUGMENTIN) 500-125 MG Tab  Take 1 Tab by mouth 2 times a day. Pt started a 3 day course on 9/3             ferrous gluconate (FERGON) 324 (38 FE) MG Tab  Take 324 mg by mouth every morning with breakfast.             lactulose 20 GM/30ML Solution  Take 30 mL by mouth 2 Times a Day.             omeprazole (PRILOSEC) 20 MG delayed-release capsule  Take 40 mg by mouth every day.             potassium Chloride ER (K-TAB) 20 MEQ Tab CR tablet  Take 20 mEq by mouth 4 times a day.             Probiotic Cap  Take 2 Caps by mouth every morning.             rifaximin (XIFAXAN) 550 MG Tab tablet  Take 1 Tab by mouth 2 Times a Day for 30 days.             sodium bicarbonate (SODIUM BICARBONATE) 650 MG Tab  Take 3 Tabs by mouth 3 times a day.             vitamin D (CHOLECALCIFEROL) 1000 UNIT Tab  Take 1,000 Units by mouth every day.                 DIET  No orders of the defined types were placed in this encounter.      ACTIVITY  As tolerated.        CONSULTATIONS  Dr. Srivastava - Surgery  Dr. Brooks - GI     PROCEDURES  None    LABORATORY  Lab Results   Component Value Date/Time    SODIUM 136 09/29/2017 07:58 AM    POTASSIUM 3.7 09/29/2017 07:58 AM    CHLORIDE 117 (H) 09/29/2017 07:58 AM    CO2 11 (L) 09/29/2017 07:58 AM    GLUCOSE 99 09/29/2017 07:58 AM    BUN 55 (H) 09/29/2017 07:58 AM    CREATININE 2.68 (H) 09/29/2017 07:58 AM        Lab Results   Component Value Date/Time     WBC 4.9 09/29/2017 03:51 AM    HEMOGLOBIN 7.4 (L) 09/29/2017 03:51 AM    HEMATOCRIT 23.9 (L) 09/29/2017 03:51 AM    PLATELETCT 59 (L) 09/29/2017 03:51 AM      This dictation was created using voice recognition software. The accuracy of the dictation is limited to the abilities of the software. I expect there may be some errors of grammar and possibly content.    Total time of the discharge process exceeds 40 minutes

## 2017-09-30 NOTE — ED NOTES
Area of redness noted on right hip in a skin fold. Open wound on coccyx with a large area of redness . Per  he had no idea the wound was there

## 2017-09-30 NOTE — ED PROVIDER NOTES
"ED Provider Note    CHIEF COMPLAINT  Chief Complaint   Patient presents with   • ALOC     BIB EMS, discharged from [facility] yesterday. when home health came to see her today she told ems that pt was \"beyond her scope to treat.\"  pt arrives AAOx2 disoriented to event and date.       HPI  Cristina Tenorio is a 76 y.o. female who presents by ambulance from home at the request of a home health nurse. The home health nurse told EMS that this patient was beyond the scope of her ability to treat the patient at home. Patient was just discharged from [facility] yesterday to home. She was admitted for COPD, hepatic encephalopathy, and various other medical issues.Patient is very confused and disoriented firm her hepatic encephalopathy. Certainly she'll need nursing home placement.    REVIEW OF SYSTEMS  Unable to obtain because of the patient's severe altered level consciousness from the hepatic encephalopathy.  ALL OTHER SYSTEMS NEGATIVE    ALLERGIES  Allergies   Allergen Reactions   • Sulfa Drugs Hives and Itching       CURRENT MEDICATIONS  Home Medications     Reviewed by Bela Botello R.N. (Registered Nurse) on 09/30/17 at 1238  Med List Status: Partial   Medication Last Dose Status   amoxicillin-clavulanate (AUGMENTIN) 500-125 MG Tab 9/5/2017 Active   ferrous gluconate (FERGON) 324 (38 FE) MG Tab unknown Active   lactulose 20 GM/30ML Solution unknown Active   omeprazole (PRILOSEC) 20 MG delayed-release capsule unknown Active   potassium Chloride ER (K-TAB) 20 MEQ Tab CR tablet unknown Active   Probiotic Cap unknown Active   rifaximin (XIFAXAN) 550 MG Tab tablet unknown Active   sodium bicarbonate (SODIUM BICARBONATE) 650 MG Tab unknown Active   vitamin D (CHOLECALCIFEROL) 1000 UNIT Tab unknown Active                PAST MEDICAL HISTORY  Past Medical History:   Diagnosis Date   • Arthritis 12/30/16    to hands and feet   • Anemia 12/30/16    unknown etiology   • Cirrhosis of liver (CMS-HCC) 12/30/16    states dx at " "one time but no treatment for >10yrs   • COPD (chronic obstructive pulmonary disease) (CMS-HCC) 3/20/2013   • Bowel obstruction    • Breath shortness     uses O2@ at night and prn ([employer]). Uses inhaler prn. 12/30/16-reports no changes    • Cataract 2006,2007    bilat phaco with IOL   • COPD (chronic obstructive pulmonary disease) (CMS-HCC)     per pt   • Crohn's disease (CMS-HCC)    • Emphysema of lung (CMS-HCC)     COPD   • Heart burn    • Hemorrhagic disorder (CMS-HCC)     anemia of unkown etiology.   • Ileostomy in place (CMS-HCC)    • Indigestion    • Obstruction    • Occasional tremors    • TRAVIS on CPAP     10/2016-CPAP DC'd and wears O2 @4L/NC   • Renal disorder     Increased creatitine level due to meds.   • Sleep apnea    • Snoring        SURGICAL HISTORY  Past Surgical History:   Procedure Laterality Date   • GASTROSCOPY N/A 1/13/2017    Procedure: GASTROSCOPY - ENTEROSCOPY PUSH;  Surgeon: Boni Brooks M.D.;  Location: SURGERY [facility];  Service:    • GASTROSCOPY-ENDO N/A 10/5/2016    Procedure: GASTROSCOPY-ENDO;  Surgeon: Aravind Roman M.D.;  Location: ENDOSCOPY [facility];  Service:    • GASTROSCOPY WIITHSAVARY DILATATION  9/28/2016    Procedure: GASTROSCOPY WIITH SAVARY DILATATION;  Surgeon: Aftab Alegre M.D.;  Location: ENDOSCOPY [facility];  Service: Gastroenterology   • GASTROSCOPY W/PUSH ENTERSCOPY  9/28/2016    Procedure: GASTROSCOPY W/PUSH ENTERSCOPY;  Surgeon: Aftab Alegre M.D.;  Location: ENDOSCOPY [facility];  Service:    • SIGMOIDOSCOPY FLEX  9/27/2016    Procedure: SIGMOIDOSCOPY FLEX;  Surgeon: Aftab Alegre M.D.;  Location: ENDOSCOPY [facility];  Service:    • GASTROSCOPY-ENDO  9/27/2016    Procedure: GASTROSCOPY-ENDO;  Surgeon: Aftab Alegre M.D.;  Location: ENDOSCOPY [facility];  Service:    • CHOLECYSTECTOMY  2013    \"burst\"   • RECOVERY  6/21/2010    Performed by SURGERY, IR-RECOVERY at SURGERY SAME DAY [facility]   • " ILEOSTOMY  2010    moved to left side   • UMBILICAL HERNIA REPAIR  2000   • OTHER SURGICAL PROCEDURE  1994    closed off rectum    • BOWEL RESECTION  1992    with ileostomy (right side)   • COLONOSCOPY      Hx of  several        FAMILY HISTORY  Unavailable    SOCIAL HISTORY  Years of heavy drinking and smoking.    PHYSICAL EXAM  GENERAL: He is disoriented female adult  VITAL SIGNS: /49   Pulse (!) 102   Temp 36.6 °C (97.9 °F)   Resp 20   Wt 75.2 kg (165 lb 12.6 oz)   LMP 06/29/1995   SpO2 96%   BMI 32.38 kg/m²   Constitutional: Confused and disoriented adult   HENT: Scalp is normal size and nontender. Ears are clear. Nose is clear. Throat is clear with no stridor no drooling no trismus. Teeth are all intact.  Eyes: Pupils equal round and reactive to light, extraocular motor fall. There is no scleral icterus.  Neck: Neck is supple and nontender. There is no meningismus. No adenitis. No thyromegaly.  Lymphatic: No adenopathy.   Cardiovascular: Heart regular rhythm without murmurs or gallops   Thorax & Lungs: No chest wall tenderness. Lungs are clear. Patient has good breath sounds bilateral. No rales, no rhonchi, no wheezes.  Abdomen: Abdomen is soft, nontender, not rigid, no guarding, and no organomegaly. There is no palpable hernia   Skin: Warm, pink, and dry with no erythema and no rash.   Back: Nontender, no midline bony tenderness, no flank tenderness.                                              Extremities: Full range of motion  No tenderness to palpation and no deformities noted. No calf or thigh swelling. No calf or thigh tenderness. No clinical DVT.  Neurologic: Infused and disoriented Cranial nerves are difficult to assess. Patient .   Psychiatric: Patient is confused and disoriented and can't answer questions.    EKG  EKG Interpretation    Interpreted by me    Rhythm: normal sinus   Rate: normal  Axis: normal  Ectopy: none  Conduction: normal  ST Segments: no acute change  T Waves: no acute  change  Q Waves: none    Clinical Impression: normal sinus rhythm, no specific ST-T wave change. No change from the prior EKG        COURSE & MEDICAL DECISION MAKING  The patient has multiple medical issues including COPD, anemia, cirrhosis of the liver, hepatic encephalopathy. She was admitted recently here and discharged from the hospital yesterday. They sent her home rather than transferring her to a nursing home. She was seen by home health nurse today who states the patient is much too ill to be at home and is beyond the scope of her care to treat the patient at home.    Differential diagnosis: COPD, cirrhosis of the liver, dehydration, hepatic encephalopathy, anemia, infection, other metabolic problems, etc.    Plan: #1 IV #2 cardiac monitor, pulse ox monitor, blood pressure monitor. #3. Laboratory including CBC, CMP, troponin, BNP, ammonia level, pro time, chest x-ray and EKG. #4. Readmission to the hospital after ED evaluation #[facility]. [facility]  or [facility] service consult to arrange for nursing home placement after discharge from the hospital after this evaluation.    Laboratory and reexamination: Hemoglobin is low at 8.1. She has a history of anemia from her liver disease. Platelet count is low at 50,000. CO2 is low at 12 with a significant metabolic acidosis. INR is elevated at 1.25. Elevated liver enzymes.  Results for orders placed or performed during the hospital encounter of 09/30/17   CBC WITH DIFFERENTIAL   Result Value Ref Range    WBC 4.0 (L) 4.8 - 10.8 K/uL    RBC 2.69 (L) 4.20 - 5.40 M/uL    Hemoglobin 8.1 (L) 12.0 - 16.0 g/dL    Hematocrit 25.4 (L) 37.0 - 47.0 %    MCV 94.4 81.4 - 97.8 fL    MCH 30.1 27.0 - 33.0 pg    MCHC 31.9 (L) 33.6 - 35.0 g/dL    RDW 58.0 (H) 35.9 - 50.0 fL    Platelet Count 50 (L) 164 - 446 K/uL    MPV 10.4 9.0 - 12.9 fL    Neutrophils-Polys 76.20 (H) 44.00 - 72.00 %    Lymphocytes 11.70 (L) 22.00 - 41.00 %    Monocytes 6.20 0.00 - 13.40 %    Eosinophils 3.70 0.00 -  6.90 %    Basophils 0.50 0.00 - 1.80 %    Immature Granulocytes 1.70 (H) 0.00 - 0.90 %    Nucleated RBC 0.00 /100 WBC    Neutrophils (Absolute) 3.06 2.00 - 7.15 K/uL    Lymphs (Absolute) 0.47 (L) 1.00 - 4.80 K/uL    Monos (Absolute) 0.25 0.00 - 0.85 K/uL    Eos (Absolute) 0.15 0.00 - 0.51 K/uL    Baso (Absolute) 0.02 0.00 - 0.12 K/uL    Immature Granulocytes (abs) 0.07 0.00 - 0.11 K/uL    NRBC (Absolute) 0.00 K/uL   COMP METABOLIC PANEL   Result Value Ref Range    Sodium 139 135 - 145 mmol/L    Potassium 3.5 (L) 3.6 - 5.5 mmol/L    Chloride 119 (H) 96 - 112 mmol/L    Co2 12 (L) 20 - 33 mmol/L    Anion Gap 8.0 0.0 - 11.9    Glucose 119 (H) 65 - 99 mg/dL    Bun 59 (H) 8 - 22 mg/dL    Creatinine 2.86 (H) 0.50 - 1.40 mg/dL    Calcium 8.3 (L) 8.5 - 10.5 mg/dL    AST(SGOT) 57 (H) 12 - 45 U/L    ALT(SGPT) 31 2 - 50 U/L    Alkaline Phosphatase 270 (H) 30 - 99 U/L    Total Bilirubin 1.2 0.1 - 1.5 mg/dL    Albumin 2.7 (L) 3.2 - 4.9 g/dL    Total Protein 5.1 (L) 6.0 - 8.2 g/dL    Globulin 2.4 1.9 - 3.5 g/dL    A-G Ratio 1.1 g/dL   LIPASE   Result Value Ref Range    Lipase 46 11 - 82 U/L   PROTHROMBIN TIME   Result Value Ref Range    PT 16.1 (H) 12.0 - 14.6 sec    INR 1.25 (H) 0.87 - 1.13   TROPONIN   Result Value Ref Range    Troponin I <0.01 0.00 - 0.04 ng/mL   ESTIMATED GFR   Result Value Ref Range    GFR If  19 (A) >60 mL/min/1.73 m 2    GFR If Non  16 (A) >60 mL/min/1.73 m 2   EKG (NOW)   Result Value Ref Range    Report       [facility] Emergency Dept.    Test Date:  2017  Pt Name:    TRUE MACARIO                Department: ER  MRN:        [id number]                      Room:       [facility]  Gender:     F                            Technician: [id number]  :        [id number]                   Requested By:GARY GANSERT  Order #:    [id number]                    Reading MD:    Measurements  Intervals                                Axis  Rate:       91                            P:          75  IN:         167                          QRS:        2  QRSD:       78                           T:          72  QT:         376  QTc:        463    Interpretive Statements  SINUS RHYTHM  LOW VOLTAGE THROUGHOUT  BORDERLINE R WAVE PROGRESSION, ANTERIOR LEADS  Compared to ECG 09/25/2017 10:25:27  Low QRS voltage now present  Myocardial infarct finding no longer present          The patient is severely altered. She is anemic. The hospitalist Dr. Dickens has been called and case discussed. The patient stable on admission after 2:30 PM. She'll need nursing home placement after evaluation here.  FINAL IMPRESSION  1. Altered level of consciousness  2. Cirrhosis of the liver  3. Hepatic encephalopathy   4. COPD  5. ` unable to be cared for at home.    Electronically signed by: Gary Gansert, 9/30/2017/2:30 PM

## 2017-09-30 NOTE — H&P
" Hospital Medicine History and Physical    Date of Service  9/30/2017    Chief Complaint  Chief Complaint   Patient presents with   • ALOC     BIB EMS, discharged from [facility] yesterday. when home health came to see her today she told ems that pt was \"beyond her scope to treat.\"  pt arrives AAOx2 disoriented to event and date.       History of Presenting Illness  76 y.o. female who presented 9/30/2017 with confusion. She was just discharged yesterday but was confused. She has not taken her medications since discharge. Her  does not give a great history but says that since yesterday she has not changed dramatically but continues to be confused and cannot participate in her care, nor has she eaten much. She tells me she feels fine but gives me no further history.     Primary Care Physician  Roshni Morillo M.D.    Consultants  None    Code Status  DNR, discussed with patient and  at bedside, who says this is consistent with previously stated wishes    Review of Systems  Review of Systems   Unable to perform ROS: Medical condition        Past Medical History  Past Medical History:   Diagnosis Date   • Arthritis 12/30/16    to hands and feet   • Anemia 12/30/16    unknown etiology   • Cirrhosis of liver (CMS-HCC) 12/30/16    states dx at one time but no treatment for >10yrs   • COPD (chronic obstructive pulmonary disease) (CMS-[facility]) 3/20/2013   • Bowel obstruction    • Breath shortness     uses O2@ at night and prn ([employer]). Uses inhaler prn. 12/30/16-reports no changes    • Cataract 2006,2007    bilat phaco with IOL   • COPD (chronic obstructive pulmonary disease) (CMS-[facility])     per pt   • Crohn's disease (CMS-[facility])    • Emphysema of lung (CMS-HCC)     COPD   • Heart burn    • Hemorrhagic disorder (CMS-HCC)     anemia of unkown etiology.   • Ileostomy in place (CMS-[facility])    • Indigestion    • Obstruction    • Occasional tremors    • TRAVIS on CPAP     10/2016-CPAP DC'd and wears O2 @4L/NC   • Renal disorder  " "   Increased creatitine level due to meds.   • Sleep apnea    • Snoring        Surgical History  Past Surgical History:   Procedure Laterality Date   • GASTROSCOPY N/A 1/13/2017    Procedure: GASTROSCOPY - ENTEROSCOPY PUSH;  Surgeon: Boni Brooks M.D.;  Location: SURGERY [facility];  Service:    • GASTROSCOPY-ENDO N/A 10/5/2016    Procedure: GASTROSCOPY-ENDO;  Surgeon: Aravind Roman M.D.;  Location: ENDOSCOPY [facility];  Service:    • GASTROSCOPY WIITHSAVARY DILATATION  9/28/2016    Procedure: GASTROSCOPY WIITH SAVARY DILATATION;  Surgeon: Aftab Alegre M.D.;  Location: ENDOSCOPY [facility];  Service: Gastroenterology   • GASTROSCOPY W/PUSH ENTERSCOPY  9/28/2016    Procedure: GASTROSCOPY W/PUSH ENTERSCOPY;  Surgeon: Aftab Alegre M.D.;  Location: ENDOSCOPY [facility];  Service:    • SIGMOIDOSCOPY FLEX  9/27/2016    Procedure: SIGMOIDOSCOPY FLEX;  Surgeon: Aftab Alegre M.D.;  Location: ENDOSCOPY [facility];  Service:    • GASTROSCOPY-ENDO  9/27/2016    Procedure: GASTROSCOPY-ENDO;  Surgeon: Aftab Alegre M.D.;  Location: ENDOSCOPY [facility];  Service:    • CHOLECYSTECTOMY  2013    \"burst\"   • RECOVERY  6/21/2010    Performed by SURGERY, IR-RECOVERY at SURGERY SAME DAY [facility]   • ILEOSTOMY  2010    moved to left side   • UMBILICAL HERNIA REPAIR  2000   • OTHER SURGICAL PROCEDURE  1994    closed off rectum    • BOWEL RESECTION  1992    with ileostomy (right side)   • COLONOSCOPY      Hx of  several        Medications  No current facility-administered medications on file prior to encounter.      Current Outpatient Prescriptions on File Prior to Encounter   Medication Sig Dispense Refill   • sodium bicarbonate (SODIUM BICARBONATE) 650 MG Tab Take 3 Tabs by mouth 3 times a day. 120 Tab 1   • rifaximin (XIFAXAN) 550 MG Tab tablet Take 1 Tab by mouth 2 Times a Day for 30 days. 60 Tab 1   • potassium Chloride ER (K-TAB) 20 MEQ Tab CR tablet Take 20 mEq " by mouth 4 times a day.     • lactulose 20 GM/30ML Solution Take 30 mL by mouth 2 Times a Day. 30 Bottle 1   • Probiotic Cap Take 2 Caps by mouth every morning.     • ferrous gluconate (FERGON) 324 (38 FE) MG Tab Take 324 mg by mouth every morning with breakfast.     • omeprazole (PRILOSEC) 20 MG delayed-release capsule Take 40 mg by mouth every day.     • vitamin D (CHOLECALCIFEROL) 1000 UNIT Tab Take 1,000 Units by mouth every day.         Family History  History reviewed. No pertinent family history.    Social History  Social History   Substance Use Topics   • Smoking status: Former Smoker     Years: 50.00     Types: Cigarettes, Cigars     Quit date: 3/30/2013   • Smokeless tobacco: Not on file      Comment: 50yrs 1.5 ppd quit    • Alcohol use No      Comment: socially       Allergies  Allergies   Allergen Reactions   • Sulfa Drugs Hives and Itching        Physical Exam  Laboratory   Hemodynamics  Temp (24hrs), Av.6 °C (97.9 °F), Min:36.6 °C (97.9 °F), Max:36.6 °C (97.9 °F)   Temperature: 36.6 °C (97.9 °F)  Pulse  Av  Min: 102  Max: 102    Blood Pressure : 109/49      Respiratory      Respiration: 20, Pulse Oximetry: 96 %             Physical Exam    Recent Labs      17   0351  17   1315   WBC  4.1*  4.9  4.0*   RBC  2.51*  2.56*  2.69*   HEMOGLOBIN  7.2*  7.4*  8.1*   HEMATOCRIT  23.4*  23.9*  25.4*   MCV  93.2  93.4  94.4   MCH  28.7  28.9  30.1   MCHC  30.8*  31.0*  31.9*   RDW  56.3*  55.9*  58.0*   PLATELETCT  56*  59*  50*   MPV  13.1*   --   10.4     Recent Labs      17   0758  17   1315   SODIUM  135  136  139   POTASSIUM  3.1*  3.7  3.5*   CHLORIDE  116*  117*  119*   CO2  12*  11*  12*   GLUCOSE  86  99  119*   BUN  60*  55*  59*   CREATININE  2.45*  2.68*  2.86*   CALCIUM  7.3*  7.4*  8.3*     Recent Labs      172  17   0758  17   1315   ALTLARRYPT   --    --   31   ROMANA   --    --   57*   ALKPHOSPHAT   --     --   270*   TBILIRUBIN   --    --   1.2   LIPASE   --    --   46   GLUCOSE  86  99  119*     Recent Labs      09/30/17   1315   INR  1.25*     Recent Labs      09/30/17   1315   BNPBTYPENAT  95         Lab Results   Component Value Date    TROPONINI <0.01 09/30/2017     Urinalysis:    Lab Results  Component Value Date/Time   SPECGRAVITY 1.016 09/25/2017 1120   GLUCOSEUR Negative 09/25/2017 1120   KETONES Negative 09/25/2017 1120   NITRITE Negative 09/25/2017 1120   WBCURINE 10-20 (A) 09/25/2017 1120   RBCURINE 5-10 (A) 09/25/2017 1120   BACTERIA Negative 09/25/2017 1120   EPITHELCELL Few 09/25/2017 1120        Imaging  None on this admission   Assessment/Plan     I anticipate this patient is appropriate for observation status at this time.    * Hepatic encephalopathy (CMS-HCC)- (present on admission)   Assessment & Plan    Will resume home medications which she has not been taking. Advised  he should probably take ownership of her medications as he was letting her do her own medications but this is her third admission for the same.         Chronic kidney disease, stage IV (severe) (CMS-HCC)- (present on admission)   Assessment & Plan    Lab Results   Component Value Date    BUN 59 (H) 09/30/2017    CREATININE 2.86 (H) 09/30/2017   Baseline Cr 2.6-2.8  Maintaining hydration  Avoiding nephrotoxics: NSAIDs etc  Following Cr closely; to keep near baseline as possible         Chronic pain with narcotic dependence- (present on admission)   Assessment & Plan    Resuming home meds without escalating        Hepatic cirrhosis (CMS-HCC)- (present on admission)   Assessment & Plan    Stable at baseline        Crohn's disease (HCC)- (present on admission)   Assessment & Plan    S/p ostomy placement        Thrombocytopenia (CMS-HCC)- (present on admission)   Assessment & Plan    Stable at baseline            VTE prophylaxis: heparin q12.

## 2017-09-30 NOTE — ASSESSMENT & PLAN NOTE
Lab Results   Component Value Date    BUN 59 (H) 09/30/2017    CREATININE 2.86 (H) 09/30/2017   Baseline Cr 2.6-2.8  Maintaining hydration  Avoiding nephrotoxics: NSAIDs etc  Improving

## 2017-09-30 NOTE — ED NOTES
"Chief Complaint   Patient presents with   • ALOC     BIB EMS, discharged from [facility] yesterday. when home health came to see her today she told ems that pt was \"beyond her scope to treat.\"  pt arrives AAOx2 disoriented to event and date.     Pt placed on monitor. Chart up for ERP.   "

## 2017-09-30 NOTE — ASSESSMENT & PLAN NOTE
Will resume home medications which she has not been taking. Advised  he should probably take ownership of her medications as he was letting her do her own medications but this is her third admission for the same.   Lactulose but titrate to soft bowel movements not loose  - resolving    Met with palliative care, Yanira, discuss with family and patient's , will need further assistance with assisted living placement, family aware, will assist with locating long-term care.  DNR

## 2017-09-30 NOTE — ED NOTES
Med Rec cannot be completed,  does not know when she took her meds, she came home yesterday from being IP.  No new meds were given at discharge per .

## 2017-10-01 PROBLEM — G93.40 ACUTE ENCEPHALOPATHY: Status: ACTIVE | Noted: 2017-01-01

## 2017-10-01 PROBLEM — N39.0 UTI (URINARY TRACT INFECTION): Status: ACTIVE | Noted: 2017-01-01

## 2017-10-01 NOTE — ASSESSMENT & PLAN NOTE
Multifactorial  May be secondary to the UTI in addition to some component of hepatic encephalopathy  Baseline?

## 2017-10-01 NOTE — PROGRESS NOTES
[facility]ist Progress Note    Date of Service: 10/1/2017    Chief Complaint  76 y.o. female admitted 2017 with altered level of consciousness.    Interval Problem Update  Confusion is better  Not sure what her baseline is  Afebrile overnight  Has a very small amount of blood in the ostomy    Consultants/Specialty  None    Disposition  To be determined        Review of Systems   Unable to perform ROS: Mental acuity      Physical Exam  Laboratory/Imaging   Hemodynamics  Temp (24hrs), Av.4 °C (97.6 °F), Min:36.2 °C (97.2 °F), Max:36.7 °C (98 °F)   Temperature: 36.2 °C (97.2 °F)  Pulse  Av.2  Min: 89  Max: 105 Heart Rate (Monitored): 95  Blood Pressure : 109/49, NIBP: 123/47      Respiratory      Respiration: 17, Pulse Oximetry: 96 %, O2 Daily Delivery Respiratory : Room Air with O2 Available     Work Of Breathing / Effort: Mild  RUL Breath Sounds: Crackles, RML Breath Sounds: Crackles, RLL Breath Sounds: Diminished, REMY Breath Sounds: Clear, LLL Breath Sounds: Diminished    Fluids    Intake/Output Summary (Last 24 hours) at 10/01/17 1115  Last data filed at 10/01/17 0900   Gross per 24 hour   Intake             1390 ml   Output             1700 ml   Net             -310 ml       Nutrition  Orders Placed This Encounter   Procedures   • DIET ORDER     Standing Status:   Standing     Number of Occurrences:   1     Order Specific Question:   Diet:     Answer:   Hepatic [9]     Order Specific Question:   Texture/Fiber modifications:     Answer:   Dysphagia 3(Mechanical Soft)specify fluid consistency(question 6) [3]     Physical Exam   Constitutional: No distress.   Appears chronically ill  Pleasant  Frail and elderly   HENT:   Head: Normocephalic and atraumatic.   Eyes: Conjunctivae are normal. Pupils are equal, round, and reactive to light.   Neck: Normal range of motion. Neck supple.   Cardiovascular: Normal rate and regular rhythm.    Pulmonary/Chest: Effort normal and breath sounds normal. No  respiratory distress.   Abdominal: Soft. Bowel sounds are normal.   Ostomy in place   Musculoskeletal: She exhibits no edema or tenderness.   Neurological: She is alert. She is disoriented (oriented to person place and disoriented to time and situation).   Skin: Skin is warm and dry. She is not diaphoretic. No erythema.   Psychiatric: She has a normal mood and affect. Her behavior is normal.   Nursing note and vitals reviewed.      Recent Labs      09/29/17   0351  09/30/17   1315   WBC  4.9  4.0*   RBC  2.56*  2.69*   HEMOGLOBIN  7.4*  8.1*   HEMATOCRIT  23.9*  25.4*   MCV  93.4  94.4   MCH  28.9  30.1   MCHC  31.0*  31.9*   RDW  55.9*  58.0*   PLATELETCT  59*  50*   MPV   --   10.4     Recent Labs      09/29/17   0758  09/30/17   1315   SODIUM  136  139   POTASSIUM  3.7  3.5*   CHLORIDE  117*  119*   CO2  11*  12*   GLUCOSE  99  119*   BUN  55*  59*   CREATININE  2.68*  2.86*   CALCIUM  7.4*  8.3*     Recent Labs      09/30/17   1315   INR  1.25*     Recent Labs      09/30/17   1315   BNPBTYPENAT  95              Assessment/Plan     * UTI (urinary tract infection)   Assessment & Plan    Started Rocephin  Follow culture results  No evidence of sepsis        Hepatic encephalopathy (CMS-HCC)- (present on admission)   Assessment & Plan    Will resume home medications which she has not been taking. Advised  he should probably take ownership of her medications as he was letting her do her own medications but this is her third admission for the same.   Lactulose but titrate to soft bowel movements not loose        Chronic kidney disease, stage IV (severe) (CMS-[facility])- (present on admission)   Assessment & Plan    Lab Results   Component Value Date    BUN 59 (H) 09/30/2017    CREATININE 2.86 (H) 09/30/2017   Baseline Cr 2.6-2.8  Maintaining hydration  Avoiding nephrotoxics: NSAIDs etc  Following Cr closely; to keep near baseline as possible         Chronic pain with narcotic dependence- (present on admission)    Assessment & Plan    Resuming home meds without escalating        Hepatic cirrhosis (CMS-HCC)- (present on admission)   Assessment & Plan    Stable at baseline        Crohn's disease (HCC)- (present on admission)   Assessment & Plan    S/p ostomy placement        Acute encephalopathy   Assessment & Plan    Multifactorial  May be secondary to the UTI in addition to some component of hepatic encephalopathy        Thrombocytopenia (CMS-HCC)- (present on admission)   Assessment & Plan    Stable at baseline            Reviewed items::  Radiology images reviewed, Labs reviewed and Medications reviewed  Antibiotics:  Treating active infection/contamination beyond 24 hours perioperative coverage

## 2017-10-01 NOTE — ASSESSMENT & PLAN NOTE
E faecalis  Continue rocephin for 3 days, to complete a full 7 day course, plan to transition to by mouth antibiotics on discharge  No evidence of sepsis

## 2017-10-01 NOTE — DISCHARGE PLANNING
"SW met with pt at bedside, spouse was also present. SW inquired about why the pt was brought to the hospital. The pt states she \"passes out\" and has had about 3 instances of loss of consciousness this year. Pt states she uses a cane mostly and has a walker, but would like a walker with a seat. SW explained that her PCP would be able to assist her in obtaining one of these.   SW inquired about pt taking her medication. Both the patient and her spouse state she is good about taking medications, however MD notes state otherwise.  Pt has been on services with [facility] in the past and would like to resume services with them. Pt also receives help at home with [facility] on Fridays. SW inquired about the possibility about receiving more help from [facility], and pt indicated that would be a good option. Pt would like to remain at home and states she has adequate income for caregivers. SW will f/u with care team regarding appropriate placement based on this pt's medical needs although pt does not meet criteria for SNF placement at this time.       Care Transition Team Assessment    Information Source  Information Given By: Patient  Who is responsible for making decisions for patient? : Patient    Readmission Evaluation  Is this a readmission?: Yes - unplanned readmission  Why do you think you were readmitted?: Falling; loss of consiousness  Was an appointment arranged for you prior to discharge?: Yes, did not attend appointment  Why didn't you go to your appointment?: Other (comment)  Were there new prescriptions you were supposed to fill after you were discharged?: Yes, prescriptions filled  Did you understand your discharge instructions?: Yes  Did you have enough support after your last discharge?: Yes    Elopement Risk  Legal Hold: No  Ambulatory or Self Mobile in Wheelchair: No-Not an Elopement Risk         Discharge Preparedness  What is your plan after discharge?: Home with help, Uncertain - pending medical team " collaboration  What are your discharge supports?: Spouse  Prior Functional Level: Needs Assist with Activities of Daily Living, Needs Assist with Medication Management, Uses Cane, Uses Walker, Bowel Incontinence  Difficulity with ADLs: Bathing, Walking  Difficulity with IADLs: Cooking, Driving, Laundry, Managing medication, Shopping    Functional Assesment  Prior Functional Level: Needs Assist with Activities of Daily Living, Needs Assist with Medication Management, Uses Cane, Uses Walker, Bowel Incontinence    Finances  Financial Barriers to Discharge: No  Prescription Coverage: Yes    Vision / Hearing Impairment  Hearing Impairment: Hearing Device Not Available                   Psychological Assessment  History of Psychiatric Problems: No  Non-compliant with Treatment: Yes  Newly Diagnosed Illness: No         Anticipated Discharge Information  Anticipated discharge disposition: Home

## 2017-10-01 NOTE — PROGRESS NOTES
Ostomy leaking again.  Removed, surrounding skin raw and bleeding.  Sutures present.  MD notified.

## 2017-10-01 NOTE — PROGRESS NOTES
Pt admitted to 519-1  Alert and oriented, c/o thirsty, water given pt ida without difficulty. VSS, ostomy putting out moderate amt of liquid stool, dark green. Pt skin with stage 2 on sacrum, will photo and consult wound care. mepilex applied.  Left heel with dark spot photographed and mepilex applied, right heel with blanchable redness, mepilex applied. Will place waffle mattress and float heels, turn q2. Using bedpan, pt continent at this time. Arms with large dark bruises. IVF continued.

## 2017-10-01 NOTE — WOUND TEAM
"[facility]   Inpatient Services   Established Ostomy Management/ troubleshooting    HPI: Reviewed  PMH: Reviewed   SH: Reviewed   Reason for Ostomy nurse consult:  re-admitted, nursing unable to get appliance to stay.     Subjective: \"Yes, it's leaking.\"  Objective: appliance leaking at distal end  Ostomy type: ileostomy  Stoma location: LLQ  Stoma assessment:                         Appearance:  Red, moist, oval                         Size:   1.2\"                        Protrusion:  flush with peristomal skin raised distally                         jxn:   resolved                        Peristomal skin: purple     Ostomy Appliance (type and size): 2 piece 2 1/4\" with paste ring  Assessment:  Patient discharged yesterday. Per Dr note, home health came to see her today she told ems that pt was \"beyond her scope to treat.\"  pt arrives AAOx2 disoriented to event and date.  No supplies with patient.      Interventions:  Previous appliance removed, cleansed with warm wash cloth.  No sting skin prep applied, barrier cut to approximately 1 1/2 - 1 1/4 oval.  Paste ring applied to barrier, and applied to skin, closed and attached pouch.  Will order high output pouch for nursing to attach and connect to DD since output is very watery.   Pt education: none  Plan: Nsg to perform ostomy care. Ostomy nurses to follow for ostomy needs PRN difficulties.  Anticipated discharge needs:none  "

## 2017-10-02 PROBLEM — D62 ACUTE BLOOD LOSS ANEMIA: Status: ACTIVE | Noted: 2017-01-01

## 2017-10-02 PROBLEM — N39.0 PSEUDOMONAS URINARY TRACT INFECTION: Status: ACTIVE | Noted: 2017-01-01

## 2017-10-02 PROBLEM — B96.5 PSEUDOMONAS URINARY TRACT INFECTION: Status: ACTIVE | Noted: 2017-01-01

## 2017-10-02 NOTE — CARE PLAN
Problem: Safety  Goal: Will remain free from falls    Intervention: Assess risk factors for falls  Safety and fall precautions in place.  Bed in low position, upper side rails X 2, treaded socks applied.  Hourly rounding, bed alarm activated.  Call light within reach.      Problem: Skin Integrity  Goal: Risk for impaired skin integrity will decrease    Intervention: Assess risk factors for impaired skin integrity and/or pressure ulcers  Patient turns and repositions self.  Barrier cream applied to buttocks. Heel protector boots in place.  Extra pillows provided for comfort.

## 2017-10-02 NOTE — PROGRESS NOTES
[facility]    Date of Service: 10/2/2017    Chief Complaint  76 y.o. female admitted 2017 with altered level of consciousness.    Interval Problem Update  Confusion better  Some blood in her ileostomy    Consultants/Specialty  None    Disposition  Family with like palliative care consult        Review of Systems   Unable to perform ROS: Mental acuity      Physical Exam  Laboratory/Imaging   Hemodynamics  Temp (24hrs), Av.8 °C (98.2 °F), Min:36.6 °C (97.8 °F), Max:37.3 °C (99.1 °F)   Temperature: 37.1 °C (98.8 °F)  Pulse  Av.9  Min: 75  Max: 105    Blood Pressure : 107/44      Respiratory      Respiration: 18, Pulse Oximetry: 100 %     Work Of Breathing / Effort: Mild  RUL Breath Sounds: Diminished, RML Breath Sounds: Diminished, RLL Breath Sounds: Diminished, REMY Breath Sounds: Diminished, LLL Breath Sounds: Diminished    Fluids    Intake/Output Summary (Last 24 hours) at 10/02/17 1255  Last data filed at 10/02/17 1228   Gross per 24 hour   Intake             1940 ml   Output             2050 ml   Net             -110 ml       Nutrition  Orders Placed This Encounter   Procedures   • DIET ORDER     Standing Status:   Standing     Number of Occurrences:   1     Order Specific Question:   Diet:     Answer:   Hepatic [9]     Order Specific Question:   Texture/Fiber modifications:     Answer:   Dysphagia 3(Mechanical Soft)specify fluid consistency(question 6) [3]     Physical Exam   Constitutional: No distress.   Appears chronically ill  Pleasant  Frail and elderly   HENT:   Head: Normocephalic and atraumatic.   Mouth/Throat: No oropharyngeal exudate.   Eyes: EOM are normal. Pupils are equal, round, and reactive to light.   Neck: Normal range of motion. Neck supple.   Cardiovascular: Normal rate and regular rhythm.    Pulmonary/Chest: Effort normal and breath sounds normal. No respiratory distress.   Abdominal: Soft. Bowel sounds are normal. She exhibits no distension.   Ostomy in  place   Musculoskeletal: She exhibits no edema or tenderness.   Neurological: She is alert. She is disoriented (oriented to person place and disoriented to time and situation).   Skin: Skin is warm and dry. She is not diaphoretic. No erythema.   Psychiatric: She has a normal mood and affect. Her behavior is normal.   Nursing note and vitals reviewed.      Recent Labs      09/30/17   1315  10/02/17   0224   WBC  4.0*  3.2*   RBC  2.69*  2.18*   HEMOGLOBIN  8.1*  6.6*   HEMATOCRIT  25.4*  21.1*   MCV  94.4  96.8   MCH  30.1  30.3   MCHC  31.9*  31.3*   RDW  58.0*  63.3*   PLATELETCT  50*  57*   MPV  10.4  12.7     Recent Labs      09/30/17   1315  10/02/17   0224   SODIUM  139  139   POTASSIUM  3.5*  3.8   CHLORIDE  119*  121*   CO2  12*  11*   GLUCOSE  119*  109*   BUN  59*  49*   CREATININE  2.86*  2.44*   CALCIUM  8.3*  7.7*     Recent Labs      09/30/17   1315   INR  1.25*     Recent Labs      09/30/17   1315   BNPBTYPENAT  95              Assessment/Plan     * UTI (urinary tract infection)   Assessment & Plan    E faecalis  Continue rocephin for 3 days  No evidence of sepsis        Hepatic encephalopathy (CMS-HCC)- (present on admission)   Assessment & Plan    Will resume home medications which she has not been taking. Advised  he should probably take ownership of her medications as he was letting her do her own medications but this is her third admission for the same.   Lactulose but titrate to soft bowel movements not loose        Chronic kidney disease, stage IV (severe) (CMS-[facility])- (present on admission)   Assessment & Plan    Lab Results   Component Value Date    BUN 59 (H) 09/30/2017    CREATININE 2.86 (H) 09/30/2017   Baseline Cr 2.6-2.8  Maintaining hydration  Avoiding nephrotoxics: NSAIDs etc  Following Cr closely; to keep near baseline as possible         Chronic pain with narcotic dependence- (present on admission)   Assessment & Plan    Resuming home meds without escalating        Hepatic  cirrhosis (CMS-HCC)- (present on admission)   Assessment & Plan    Stable at baseline        Crohn's disease (HCC)- (present on admission)   Assessment & Plan    S/p ostomy placement        Acute blood loss anemia   Assessment & Plan    Transfuse 1 unit prbc  Likely GI tract  C/s GI for recs  Transfuse if unstable or hg<7        Acute encephalopathy   Assessment & Plan    Multifactorial  May be secondary to the UTI in addition to some component of hepatic encephalopathy  Baseline?        Thrombocytopenia (CMS-HCC)- (present on admission)   Assessment & Plan    Stable at baseline            Reviewed items::  Radiology images reviewed, Labs reviewed and Medications reviewed  Antibiotics:  Treating active infection/contamination beyond 24 hours perioperative coverage

## 2017-10-02 NOTE — RESPIRATORY CARE
COPD EDUCATION by COPD CLINICAL EDUCATOR  10/2/2017 at 6:41 AM by Sulema Barragan     Patient reviewed by COPD education team. Patient does not qualify for COPD program.

## 2017-10-02 NOTE — CARE PLAN
Problem: Communication  Goal: The ability to communicate needs accurately and effectively will improve  Outcome: PROGRESSING AS EXPECTED  Patient able to communicate verbally and in English.  Patient is alert and oriented.    Problem: Safety  Goal: Will remain free from injury  Patient remains free from falls.  Safety precautions in place.  Bed alarm is on.    Problem: Infection  Goal: Will remain free from infection  Patient shows no s/s of infection at this time.  Afebrile.  No complaints of chills.    Problem: Venous Thromboembolism (VTW)/Deep Vein Thrombosis (DVT) Prevention:  Goal: Patient will participate in Venous Thrombosis (VTE)/Deep Vein Thrombosis (DVT)Prevention Measures  Patient has active hemorrhage.  Blood transfusion this am.  Heparin subq discontinued.

## 2017-10-02 NOTE — PROGRESS NOTES
Patient observed in bed resting comfortable.  Turns and repositions self. Barrier cream applied to buttocks. New IV site started at right hand, patent, clean, dry.  Left arm has redness and swelling, maintained elevated on pillow. Bilateral heel protector boots in place.  Ostomy to right upper quadrant draining dark green liquid into a wyatt bag.  Small amount of soft light brown stool noted in ostomy bag. Safety and comfort measures maintained.  Call light within reach.

## 2017-10-02 NOTE — PROGRESS NOTES
Received orders for blood transfusion.  Placed COD order.  Obtained consent for blood transfusion from patient.

## 2017-10-02 NOTE — THERAPY
PT orders received and acknowledged. Attempted PT eval this am. Hold per RN as pt receiving blood transfusion. Will complete PT eval as able.

## 2017-10-02 NOTE — PROGRESS NOTES
Romina Morrissey Fall Risk Assessment:     Last Known Fall: Within the last six months  Mobility: Immobilized/requires assist of one person  Medications: Cardiovascular or central nervous system meds  Mental Status/LOC/Awareness: Awake, alert, and oriented to date, place, and person  Toileting Needs: Use of catheters or diversion devices, Use of assistive device (Bedside commode, bedpan, urinal)  Volume/Electrolyte Status: No problems  Communication/Sensory: Visual (Glasses)/hearing deficit  Behavior: Appropriate behavior  Romina Morrissey Fall Risk Total: 12  Fall Risk Level: MODERATE RISK    Universal Fall Precautions:  call light/belongings in reach, bed in low position and locked, wheelchairs and assistive devices out of sight, siderails up x 2, use non-slip footwear, adequate lighting, educate to call for assistance, clutter free and spill free environment, educate on level of risk    Fall Risk Level Interventions:    TRIAL (TELE 8, NEURO, MED RADHA 5) Moderate Fall Risk Interventions  Place yellow fall risk ID band on patient: completed  Provide patient/family education based on risk assessment : completed  Educate patient/family to call staff for assistance when getting out of bed: verified  Place fall precaution signage outside patient door: verified  Utilize bed/chair fall alarm: verified     Patient Specific Interventions:     Medication: review medications with patient and family, assess for medications that can be discontinued or dosage decreased, limit combination of prn medications and provide patients who received diuretics/laxatives frequent toileting  Mental Status/LOC/Awareness: reinforce falls education, check on patient hourly, utilize bed/chair fall alarm and reinforce the use of call light  Toileting: provide frquent toileting, monitor intake and output/use of appropriate interventions, instruct patient/family on the need to call for assistance when toileting, do not leave patient unattended in  bathroom/refer to toileting scripting and toilet prior to giving pain medications  Volume/Electrolyte Status: ensure patient remains hydrated, advance diet as tolerated, monitor abnormal lab values, ensure IV fluids are appropriate and teach patients to dangle before rising if hypotensive  Communication/Sensory:   Behavioral: encourage patient to voice feelings, engage patient in daily activities, administer medication as ordered, initiate plan of care for alcohol withdrawal and instruct/reinforce fall program rationale  Mobility: schedule physical activity throughout the day, provide comfort measures during transport, dangle prior to standing, utilize bed/chair fall alarm, ensure bed is locked and in lowest position, provide appropriate assistive device, instruct patient to exit bed on their strongest side and collaborate with doctor for possible PT/OT consult

## 2017-10-02 NOTE — PROGRESS NOTES
Received bedside report from night shift RN. Assumed care of patient. Pt assessed and stable. VSS. Patient reports 0/10 pain at this time.  Discussed plan of care for day with patient and received verbal understanding. Call light within reach, strip alarm active, bed in low position. Paged Dr. Duval re hemoglobin 6.6.

## 2017-10-02 NOTE — DOCUMENTATION QUERY
DOCUMENTATION QUERY    PROVIDERS: Please select “Cosign w/ note”to reply to query.    To better represent the severity of illness of your patient, please review the following information and exercise your independent professional judgment in responding to this query. Please reference the information at the bottom of this query for clinical criteria to support CKD staging per [employer].     Chronic kidney disease is documented in the Progress Notes and Discharge Summary. Based upon the clinical findings, risk factors, and treatment, can this diagnosis be further specified?     • Chronic Kidney Disease Stage I      • Chronic Kidney Disease Stage II     • Chronic Kidney Disease Stage III    • Chronic Kidney Disease Stage IV    • Chronic Kidney Disease Stage V     • End Stage Renal Disease  • Other explanation of clinical findings  • Unable to determine     The medical record reflects the following:   Clinical Findings BUN 93, 84, 65, 60, 55  Cr 3.21, 3.06, 2.69, 2.45, 2.68  GFR 14, 15, 17, 19, 17    Treatment IV NS, avoid nephrotoxins, & monitor labs   Risk Factors Age, acute on chronic kidney disease, liver cirrhosis secondary to BAKER, hepatic encephalopathy, & acute upper GI bleed   Location within medical record History & Physical, Progress Notes, Discharge Summary, & Lab Results      Thank you,   Anne Mccarty RN  Clinical   Phone [phone]

## 2017-10-03 NOTE — PROGRESS NOTES
Romina Morrissey Fall Risk Assessment:     Last Known Fall: Within the last six months  Mobility: Dizziness/generalized weakness  Medications: Cardiovascular or central nervous system meds  Mental Status/LOC/Awareness: Awake, alert, and oriented to date, place, and person  Toileting Needs: Use of assistive device (Bedside commode, bedpan, urinal)  Volume/Electrolyte Status: No problems  Communication/Sensory: Visual (Glasses)/hearing deficit  Behavior: Appropriate behavior  Romina Morrissey Fall Risk Total: 10  Fall Risk Level: LOW RISK    Universal Fall Precautions:  call light/belongings in reach, bed in low position and locked, wheelchairs and assistive devices out of sight, siderails up x 2, use non-slip footwear, adequate lighting, educate on level of risk    Fall Risk Level Interventions:   TRIAL ([employer], NEURO, MED RADHA 5) Low Fall Risk Interventions  Place yellow fall risk ID band on patient: verified  Provide patient/family education based on risk assessment: completed  Educate patient/family to call staff for assistance when getting out of bed: completed  Place fall precaution signage outside patient door: completedTRIAL ([employer] 8, NEURO, MED RADHA 5) Moderate Fall Risk Interventions  Place yellow fall risk ID band on patient: completed  Provide patient/family education based on risk assessment : completed  Educate patient/family to call staff for assistance when getting out of bed: verified  Place fall precaution signage outside patient door: verified  Utilize bed/chair fall alarm: verified     Patient Specific Interventions:     Medication: review medications with patient and family  Mental Status/LOC/Awareness: reinforce falls education  Toileting: provide frquent toileting  Volume/Electrolyte Status: ensure patient remains hydrated  Communication/Sensory: update plan of care on whiteboard  Behavioral: instruct/reinforce fall program rationale  Mobility: utilize bed/chair fall alarm

## 2017-10-03 NOTE — CONSULTS
"Reason for PC Consult: Advance Care Planning    Consulted by: Dr. Duval, Chayo; currently Dr. Lara    Assessment:  General: 77 y/o woman with hx of multiple admissions for hepatic encephalopathy /UTI/AMS; PMHX of cirrhosis, COPD, CKD IV, Crohn's with ileostomy    Consults: N/A    Dyspnea: No-    Last BM: 10/03/17-    Pain: No-    Depression: Mood appropriate for situation-    Dementia: No;       Spiritual:  Is Scientology or spirituality important for coping with this illness? No-    Has a  or spiritual provider visit been requested? No    Palliative Performance Scale: 70%    Advance Directive: Advance Directive- new one completed today  DPOA: Yes- initially Dave; now sister Reina  POL: Yes- will complete new one    Code Status: DNR-      Outcome:  PC RN met with Cristina, her step-daughter Violette and her  Christian from [place] at bedside and explained the role of PC. They explained the need for PC consult to assist with planning upon returning home as Violette's dad (Cristina'madison TRAN) has reasonably progressive dementia and unable to manage much of their care anymore. He can drive to 3 places in town with Cristina's guidance but can not navigate much else, he doesn't take his medications because he \"doesn't need them,\" and can be verbally abusive/behavioral at times. Cristina is in agreement with and wants to move to an assisted living facility but fearful he will not be in agreement with that. PC RN provided education and guidance on ADs and financial POA; AD packet provided to Violette for her dad. Cristina agreed that Dave is not the best decision maker for her at this time. She wishes to change her POA to her sister Reina with Violette as first alternate; new AD completed reflecting these wishes. Violette asked if PC could meet with them again later with Dave to help him understand the need for assisted living and the need to complete financial documents. PC RN agreed and set a time of 1300 to meet with the family again. Cristina fully " "understands the concerns of her family and expressed concern for her 's condition, noting that \"he won't want to go.\" PC RN suggested finding a place with an assisted living and memory unit to ease the transition if needed to memory care for Dave later. They also have an appt with Dave's PCP to discuss the extent of his dementia and work on a plan.     While talking to Cristina and her family, PC RN provided active listening, normalization, validation of thoughts and feelings, as well as reinforced her goals of care. PC contact information provided to Violette and encouraged to call with any questions, concerns, or change in plans.    Updated: BS RN/MDSW    Plan: certificate of capacity signed by MD and faxed to [place] for notary; message left for [place] and AD on the hard chart. F/U family meeting at 1300    Thank you for allowing Palliative Care to participate in this patient's care. Please feel free to call [phone] with any questions or concerns.  "

## 2017-10-03 NOTE — PROGRESS NOTES
Family Violette and Christian at bedside tonight visiting patient. Discussed regarding family meeting with palliative tomorrow. Family states they are available at 0900 tomorrow. Left message to palliative regarding time.

## 2017-10-03 NOTE — DISCHARGE PLANNING
Received choice form from ALONDRA Walker for Home Health services, referral has been sent to [facility] per patient request.

## 2017-10-03 NOTE — PROGRESS NOTES
Romina Morrissey Fall Risk Assessment:     Last Known Fall: Within the last six months  Mobility: Dizziness/generalized weakness  Medications: Cardiovascular or central nervous system meds  Mental Status/LOC/Awareness: Awake, alert, and oriented to date, place, and person  Toileting Needs: Use of assistive device (Bedside commode, bedpan, urinal)  Volume/Electrolyte Status: No problems  Communication/Sensory: Visual (Glasses)/hearing deficit  Behavior: Appropriate behavior  Romina Morrissey Fall Risk Total: 10  Fall Risk Level: LOW RISK    Universal Fall Precautions:  call light/belongings in reach, bed in low position and locked, wheelchairs and assistive devices out of sight, siderails up x 2, use non-slip footwear, adequate lighting, clutter free and spill free environment, educate on level of risk, educate to call for assistance    Fall Risk Level Interventions:   TRIAL ([employer] 8, NEURO, MED RADHA 5) Low Fall Risk Interventions  Place yellow fall risk ID band on patient: verified  Provide patient/family education based on risk assessment: completed  Educate patient/family to call staff for assistance when getting out of bed: completed  Place fall precaution signage outside patient door: completedTRIAL ([employer] 8, NEURO, MED RADHA 5) Moderate Fall Risk Interventions  Place yellow fall risk ID band on patient: completed  Provide patient/family education based on risk assessment : completed  Educate patient/family to call staff for assistance when getting out of bed: verified  Place fall precaution signage outside patient door: verified  Utilize bed/chair fall alarm: verified     Patient Specific Interventions:     Medication: review medications with patient and family and assess for medications that can be discontinued or dosage decreased  Mental Status/LOC/Awareness: reorient patient, reinforce falls education, check on patient hourly, reinforce the use of call light and provide activity  Toileting: provide frquent  toileting, instruct male patients prone to dizziness to void while sitting, instruct patient/family on the use of grab bars, instruct patient/family on the need to call for assistance when toileting and do not leave patient unattended in bathroom/refer to toileting scripting  Volume/Electrolyte Status: ensure patient remains hydrated, advance diet as tolerated, administer medications as ordered for nausea and vomiting, monitor abnormal lab values and ensure IV fluids are appropriate  Communication/Sensory: update plan of care on whiteboard, provide communication alternatives/, ensure patient has glasses/contacts and hearing aids/dentures and have patients with hearing deficit repeat information back to you to ensure proper understanding  Behavioral: encourage patient to voice feelings, engage patient in daily activities, administer medication as ordered and instruct/reinforce fall program rationale  Mobility: provide comfort measures during transport, dangle prior to standing, utilize bed/chair fall alarm, ensure bed is locked and in lowest position, provide appropriate assistive device, instruct patient to exit bed on their strongest side and collaborate with doctor for possible PT/OT consult

## 2017-10-03 NOTE — PROGRESS NOTES
Called Palliative Care RN to obtain a time for meeting tomorrow with patient and family.  Palliative can meet any time between 8:30 and 1600.  Called Violette Brown (patient's daughter) at the number she provided earlier today.  There was no answer.  Left message requesting that she call me back with a convenient time for them to meet with Palliative Care RN tomorrow 10/3/17.

## 2017-10-03 NOTE — CARE PLAN
Problem: Safety  Goal: Will remain free from falls    Intervention: Implement fall precautions  Pt educated on using call light when needing to get out of bed. Bedside commode used at this time.

## 2017-10-03 NOTE — CARE PLAN
Problem: Communication  Goal: The ability to communicate needs accurately and effectively will improve    Intervention: [place] patient and significant other/support system to call light to alert staff of needs  Pt educated on how to use call light and oriented to room and bathroom.

## 2017-10-03 NOTE — DISCHARGE PLANNING
ALONDRA faxed choice form indicating [facility], this will be resumation of services.  Per pt's family, pt hopes to d/c tonight or tomorrow AM as she has a Dr's appt 10/4 at 1700 that apparently cannot be missed.   ALONDRA will notify MD.

## 2017-10-03 NOTE — PALLIATIVE CARE
"Palliative Care follow-up  PC RN met again with the patient, family, and  at bedside. Long discussion with them, especially the , about the need and desire from Cristina to seek out an JAMES. He is reluctant to \"give up our home\" but expressed understanding of the need for safety, medication help and extra assistance down the road. He does have notable dementia and asked the same questions repeatedly. PC RN discussed in-home caregivers as a way to bridge the gap between returning home and locating an JAMES in addition to Cristina's home healthcare. PC RN suggested checking with [facility] for suggestions with in-home  Caregiver support. PC RN reminded them that the goal of J[facility] is to provide additional resources and assistance but allow them to remain as independent as possible. PC RN also discussed the role of financial and medical POA paperwork, encouraging them to complete while \"they are as good as they can be\" to ensure they \"have a back-up plan\" if something happens or if they need help. Dave agreed. Violette and Christian are very willing to assist with this process. Cristina asked about estate sales and the JAMES/memory list was provided to Violette; PC RN encouraged Dave to look at the places with Violette and Christian to gain an understanding of what they're like. They denied any further needs or questions at this time, but Violette has this RN's number if anything arises.    Updated: BS team    Plan: home with home health/caregiver with plan for long-term in the future    Thank you for allowing Palliative Care to participate in this patient's care. Please feel free to call [phone] with any questions or concerns.  "

## 2017-10-03 NOTE — PROGRESS NOTES
[facility]    Date of Service: 10/3/2017    Chief Complaint  76 y.o. female admitted 2017 with altered level of consciousness.    Interval Problem Update  Oriented ×3  Denies pain  Up in chair, eating breakfast    Consultants/Specialty  None    Disposition  To home with home health in 1-2 days with clinical improvement        Review of Systems   Unable to perform ROS: Mental acuity   Constitutional: Negative for chills, diaphoresis, fever and malaise/fatigue.   HENT: Positive for hearing loss. Negative for congestion.    Respiratory: Negative for cough and shortness of breath.    Cardiovascular: Negative for palpitations and leg swelling.   Gastrointestinal: Negative for abdominal pain and nausea.   Genitourinary: Negative for dysuria, flank pain and frequency.   Musculoskeletal: Negative for back pain, joint pain and myalgias.   Neurological: Positive for weakness. Negative for dizziness, sensory change and focal weakness.   Psychiatric/Behavioral: Negative for depression and memory loss. The patient is not nervous/anxious.       Physical Exam  Laboratory/Imaging   Hemodynamics  Temp (24hrs), Av.5 °C (97.7 °F), Min:36.4 °C (97.5 °F), Max:36.6 °C (97.8 °F)   Temperature: 36.4 °C (97.6 °F)  Pulse  Av.6  Min: 67  Max: 105    Blood Pressure : (!) 102/22      Respiratory      Respiration: 18, Pulse Oximetry: 99 %     Work Of Breathing / Effort: Mild  RUL Breath Sounds: Expiratory Wheezes, RML Breath Sounds: Diminished, RLL Breath Sounds: Diminished, REMY Breath Sounds: Expiratory Wheezes, LLL Breath Sounds: Diminished    Fluids    Intake/Output Summary (Last 24 hours) at 10/03/17 1414  Last data filed at 10/03/17 1100   Gross per 24 hour   Intake              560 ml   Output             1200 ml   Net             -640 ml       Nutrition  Orders Placed This Encounter   Procedures   • DIET ORDER     Standing Status:   Standing     Number of Occurrences:   1     Order Specific Question:    Diet:     Answer:   Regular [1]     Order Specific Question:   Texture/Fiber modifications:     Answer:   Dysphagia 3(Mechanical Soft)specify fluid consistency(question 6) [3]     Physical Exam   Constitutional: No distress.   Appears chronically ill  Pleasant   elderly   HENT:   Head: Normocephalic and atraumatic.   Mouth/Throat: No oropharyngeal exudate.   Eyes: EOM are normal. Pupils are equal, round, and reactive to light. No scleral icterus.   Neck: Normal range of motion.   Cardiovascular: Normal rate, regular rhythm and intact distal pulses.    Pulmonary/Chest: Effort normal and breath sounds normal. No respiratory distress. She has no wheezes.   Abdominal: Soft. Bowel sounds are normal. She exhibits no distension and no mass. There is no tenderness.   Ostomy in place   Musculoskeletal: She exhibits no edema or tenderness.   Neurological: She is alert. She is disoriented (oriented to person place and disoriented to time and situation). No cranial nerve deficit. She exhibits normal muscle tone. Coordination normal.   Skin: Skin is warm and dry. No rash noted. She is not diaphoretic. No erythema. There is pallor.   Psychiatric: She has a normal mood and affect. Her behavior is normal. Judgment normal.   Nursing note and vitals reviewed.      Recent Labs      10/02/17   0224  10/02/17   1341   WBC  3.2*   --    RBC  2.18*   --    HEMOGLOBIN  6.6*  9.1*   HEMATOCRIT  21.1*  28.9*   MCV  96.8   --    MCH  30.3   --    MCHC  31.3*   --    RDW  63.3*   --    PLATELETCT  57*   --    MPV  12.7   --      Recent Labs      10/02/17   0224   SODIUM  139   POTASSIUM  3.8   CHLORIDE  121*   CO2  11*   GLUCOSE  109*   BUN  49*   CREATININE  2.44*   CALCIUM  7.7*                      Assessment/Plan     * UTI (urinary tract infection)   Assessment & Plan    E faecalis  Continue rocephin for 3 days, to complete a full 7 day course, plan to transition to by mouth antibiotics on discharge  No evidence of sepsis        Hepatic  encephalopathy (CMS-HCC)- (present on admission)   Assessment & Plan    Will resume home medications which she has not been taking. Advised  he should probably take ownership of her medications as he was letting her do her own medications but this is her third admission for the same.   Lactulose but titrate to soft bowel movements not loose  - resolving    Met with palliative care, Yanira, discuss with family and patient's , will need further assistance with assisted living placement, family aware, will assist with locating long-term care.  DNR        Chronic kidney disease, stage IV (severe) (CMS-HCC)- (present on admission)   Assessment & Plan    Lab Results   Component Value Date    BUN 59 (H) 09/30/2017    CREATININE 2.86 (H) 09/30/2017   Baseline Cr 2.6-2.8  Maintaining hydration  Avoiding nephrotoxics: NSAIDs etc  Improving        Chronic pain with narcotic dependence- (present on admission)   Assessment & Plan    Resuming home meds without escalating        Hepatic cirrhosis (CMS-HCC)- (present on admission)   Assessment & Plan    Stable at baseline        Crohn's disease (HCC)- (present on admission)   Assessment & Plan    S/p ostomy placement        Acute blood loss anemia   Assessment & Plan    Status post 1 unit prbc  Likely GI tract  C/s GI for recs  Transfuse if unstable or hg<7        Acute encephalopathy   Assessment & Plan    Multifactorial  May be secondary to the UTI in addition to some component of hepatic encephalopathy  Baseline?        Thrombocytopenia (CMS-HCC)- (present on admission)   Assessment & Plan    Stable at baseline            Reviewed items::  Radiology images reviewed, Labs reviewed and Medications reviewed  DVT prophylaxis - mechanical:  SCDs  Antibiotics:  Treating active infection/contamination beyond 24 hours perioperative coverage

## 2017-10-03 NOTE — DIETARY
Nutrition Services: Patient seen for wound     76 y.o. female with admitting DX of Hepatic encephalopathy, Pseudomonas urinary tract infection  History:  cirrhosis, COPD, CKD IV, Crohn's with ileostomy    Weight: 75.2 kg (165 lb 12.6 oz)  Body mass index is 32.38 kg/m².    Patient stated her appetite is great.  No wt loss noted.  Patient is on a regular, Dysphagia 3 diet  Labs, medications and past medical history reviewed.  Skin:  Deep tissue injury POA heel left lateral, coccyx; buttock wound (per wound team)    Recommend:  Vitamin therapy for wound healing:  Multivitamin with minerals daily, 500 mg Vitamin C BID for 14 days  Nutrition Representative will continue to see her for ongoing meal and snack preferences.  RD following per department policy.

## 2017-10-03 NOTE — FACE TO FACE
Face to Face Supporting Documentation - Home Health    The encounter with this patient was in whole or in part the primary reason for home health admission.    Date of encounter:   Patient:                    MRN:                       YOB: 2016  Cristina Tenorio  [id number]  1940     Home health to see patient for:  Skilled Nursing care for assessment, interventions & education, Physical Therapy evaluation and treatment and Occupational therapy evaluation and treatment    Skilled need for:  Exacerbation of Chronic Disease State hepatic encephalopathy, uti    Skilled nursing interventions to include:  Comment: pt/ot    Homebound status evidenced by:  Need the aid of supportive devices such as crutches, canes, wheelchairs or walkers or Needs the assistance of another person in order to leave the home. Leaving home requires a considerable and taxing effort. There is a normal inability to leave the home.    Community Physician to provide follow up care: Roshni Morillo M.D.     Optional Interventions? No      I certify the face to face encounter for this home health care referral meets the CMS requirements and the encounter/clinical assessment with the patient was, in whole, or in part, for the medical condition(s) listed above, which is the primary reason for home health care. Based on my clinical findings: the service(s) are medically necessary, support the need for home health care, and the homebound criteria are met.  I certify that this patient has had a face to face encounter by myself.  Thea Lara D.O. - NPI: [id number]

## 2017-10-03 NOTE — WOUND TEAM
"[facility]  Inpatient Services  Initial Wound & Skin Care Evaluation    Admission Date:  9/30/2017   HPI, PMH, SH: Reviewed  Unit where seen by Wound Team: [id number]    WOUND CONSULT RELATED TO: pressure injury mgmt and ostomy care    SUBJECTIVE:  \"I used my own supplies, don't change it if it isn't leaking.\"     Self Report / Pain Level: no c/o pain      OBJECTIVE:on pressure redistribution mattress, heel float boots  WOUND TYPE, LOCATION, CHARACTERISTICS (Pressure ulcers: location, stage, POA or date identified)  Pressure Ulcer  Deep Tissue Injury POA Heel Left Lateral  Periwound Skin: Intact  Drainage : None  Tissue Type and %:    100% red/purple  Wound Edges:    attached    Odor:     None   Exposed structure(s):   No   Signs and Symptoms of Infection: none    Wound POA Other (opening surgical incision) Coccyx;Buttock Midline-Periwound Skin: Intact  Drainage : Scant, Serosanguinous      Tissue Type and %:    100% pink  Wound Edges:    attached    Odor:     None   Exposed structure(s):   No   Signs and Symptoms of Infection: none    Measurements: taken 10/2    Heel Left Coccyx/buttocks  Length (cm):  1.3  0.7 (prox 0.5)  Width (cm):  2  0.5 (prox 0.4)  Depth (cm):  (nm)  0.1 both  Wound POA Other (comment) Coccyx;Buttock Midline-Depth (cm): 0.1      Tracts/undermining:  None     INTERVENTIONS BY WOUND TEAM:removed foam drsg from L heel, palpated and viewed wounds. Discussion with RN if pt continent, she is using bedpan. Please apply adhesive foam over coccyx/buttock incision.   Dressing Options: Open to Air    Interdisciplinary consultation:   With Nursing;With Patient     EVALUATION: pt has incision where anus had been removed and skin sewn together, scar has a couple of openings present. Coccyx/sacrum with small POA St 2 pressure injury. There is POA DTI to lateral L heel.     Factors affecting wound healing:  Decreased mobility, anemia, COPD, Crohn's disease, chronic kidney disease  Goals: " "decreased wound size 1% each week      NURSING PLAN OF CARE ORDERS (X):    Dressing changes: See Dressing Maintenance orders:   Skin care: See Skin Care orders:   Rectal tube care: See Rectal Tube Care orders:   Other orders:    RSKIN: CURRENT (X) ORDERED (O)  Q shift Wan:  X  Q shift pressure point assessments:  X  Pressure redistribution mattress   x   MASON      Bariatric MASON      Bariatric foam        Heel float boots       Heels floated on pillows      Barrier wipes      Barrier Cream      Barrier paste      Sacral silicone dressing    o  Padded O2 tubing      Anchorfast      Trach with Optifoam split foam       Waffle cushion      Rectal tube or BMS      Antifungal tx    Turn q 2 hours  Up to chair  Ambulate   PT/OT     Dietician      PO  x   TF   TPN     PVN    NPO   # days   Other       WOUND TEAM PLAN OF CARE (X):   NPWT change 3 x week:        Dressing changes by wound team:       Follow up as needed:  PRN wound worsening and failure to progress     Other (explain):    Anticipated discharge plans (X):  SNF:           Home Care:           Outpatient Wound Center:            Self Care:            Other:   tbd          [facility]   Inpatient Services   Established Ostomy Management/ troubleshooting  HPI: Reviewed  PMH: Reviewed   SH: Reviewed   Reason for Ostomy nurse consult:  Leaking ostomy appliance  Subjective: see above  Objective: appliance lifting distal edge  Ostomy type: ileostomy  Stoma location: Q  Stoma assessment:    Appearance:  Red, moist, oval   Size: 1 3/8\"   Protrusion: ~1\"   MC jxn: sutures medial, some bleeding distal after cleaning   Peristomal skin: purple    Ostomy Appliance (type and size): 2 piece 2 1/4\" with paste ring and high output pouch  Assessment: pt has established ileostomy, sutures from last admit, pt uses circular precut 1 piece convex appliance. Gale-stomal skin purple color, intact.   Interventions:  Cut barrier in oval shape, removed appliance, cleaned " skin, checked fit then applied paste ring to barrier, No Sting with stoma powder x 2 layers, applied barrier, attached to dd bag, clamped to pad.   Pt education: Questions and concerns addressed.  Plan: Nsg to change appliance. Ostomy nurses to follow for ostomy needs PRN.  Anticipated discharge needs:none

## 2017-10-03 NOTE — PROGRESS NOTES
Assumed care of pt at 0700. Received report from RN. Pt A&Ox4. Assessment complete. Labs reviewed. Pt and RN discussed plan of care. Pt questions answered. Pt needs are met at this time. Pt denies pain at this time. Left ostomy to high ostomy bag drainage with liquid to semi-soft brown stool, no blood noted at this time. Bruising on left arm. Good PO intake of meds this AM. Pt sitting at bedside chair for breakfast. Call light within reach. Upper bed rails up. Hourly rounding in place.

## 2017-10-03 NOTE — PROGRESS NOTES
Pt a&o. Very [place]. On ra during the day and uses 2L oxygen at night. SOB with exertion tonight and expiratory wheezing noted. Albuterol given as ordered. Up to bedside commode to void. Left ostomy to high ostomy bag drainage with liquid brown stool. No blood noted tonight. Generalized swelling and bruising noted, left arm bruising greater than right. Float boots in place. Foam dressing to midcoccyx replaced tonight. Denies pain. Good po intake. Plan of care reviewed including fall preventions. Verbalized understanding and agrees. Bed alarm active.

## 2017-10-04 NOTE — PROGRESS NOTES
Received report from day RN. Assumed pt care. Discussed call light/phone system, communication board and POC. Pt is aao x 4 and verbalizes understanding. Pt mobility assessed. Pt is a one assist with the use of a FWW. Bed alarm on. Fall precautions in place. Bed is locked and in low position, call light within reach. Treaded slippers in place. Pt needs met at this time. Hourly rounding in place.

## 2017-10-04 NOTE — PROGRESS NOTES
Assumed pt care after report received from night Rn. Pt is resting comfortably at this time. No c/o pain or distress noted. Call light and belongings in reach. Bed in low position.  Will continue to monitor.

## 2017-10-04 NOTE — PALLIATIVE CARE
Palliative Care follow-up  Call placed to barrett to f/u on completion of AD today given patient is DC'ing home this morning. Melida states she will get the AD completed prior to DC.     Plan: Home today with HH    Thank you for allowing Palliative Care to participate in this patient's care. Please feel free to call [phone] with any questions or concerns.

## 2017-10-04 NOTE — PROGRESS NOTES
Romina Morrissey Fall Risk Assessment:     Last Known Fall: Within the last six months  Mobility: Immobilized/requires assist of one person  Medications: No meds  Mental Status/LOC/Awareness: Awake, alert, and oriented to date, place, and person  Toileting Needs: Use of assistive device (Bedside commode, bedpan, urinal)  Volume/Electrolyte Status: No problems  Communication/Sensory: Visual (Glasses)/hearing deficit  Behavior: Appropriate behavior  Romina Morrissey Fall Risk Total: 10  Fall Risk Level: LOW RISK    Universal Fall Precautions:  call light/belongings in reach, bed in low position and locked, wheelchairs and assistive devices out of sight, siderails up x 2, use non-slip footwear, adequate lighting, clutter free and spill free environment, educate on level of risk, educate to call for assistance    Fall Risk Level Interventions:   TRIAL ([employer], NEURO, MED RADHA 5) Low Fall Risk Interventions  Place yellow fall risk ID band on patient: verified  Provide patient/family education based on risk assessment: completed  Educate patient/family to call staff for assistance when getting out of bed: completed  Place fall precaution signage outside patient door: verifiedTRIAL ([employer] 8, NEURO, MED RADHA 5) Moderate Fall Risk Interventions  Place yellow fall risk ID band on patient: completed  Provide patient/family education based on risk assessment : completed  Educate patient/family to call staff for assistance when getting out of bed: verified  Place fall precaution signage outside patient door: verified  Utilize bed/chair fall alarm: verified     Patient Specific Interventions:     Medication: review medications with patient and family  Mental Status/LOC/Awareness: reinforce falls education, check on patient hourly and reinforce the use of call light  Toileting: provide frquent toileting and monitor intake and output/use of appropriate interventions  Volume/Electrolyte Status: ensure patient remains hydrated and monitor  abnormal lab values  Communication/Sensory: update plan of care on whiteboard and ensure proper positioning when transferrng/ambulating  Behavioral: encourage patient to voice feelings  Mobility: utilize bed/chair fall alarm, ensure bed is locked and in lowest position and provide appropriate assistive device.

## 2017-10-04 NOTE — DISCHARGE PLANNING
Received referral to see patient re: Advance Directives along with Certificate of Competency from [facility] . Met with patient - she has one on file but does not remember who she named POA. Pulled document and went over it with patient. She is fine with the way it is and does not want to change anything. Provided her with a copy of document.

## 2017-10-04 NOTE — DISCHARGE INSTRUCTIONS
Discharge Instructions    Discharged to home by car with relative. Discharged via wheelchair, hospital escort: Yes.  Special equipment needed: Not Applicable    Be sure to schedule a follow-up appointment with your primary care doctor or any specialists as instructed.     Discharge Plan:   Diet Plan: Discussed  Activity Level: Discussed  Confirmed Follow up Appointment: Appointment Scheduled  Confirmed Symptoms Management: Discussed  Medication Reconciliation Updated: Yes  Influenza Vaccine Indication: Patient Refuses (wants it from her primary md office)    I understand that a diet low in cholesterol, fat, and sodium is recommended for good health. Unless I have been given specific instructions below for another diet, I accept this instruction as my diet prescription.   Other diet: regular      Urinary Tract Infection  Urinary tract infections (UTIs) can develop anywhere along your urinary tract. Your urinary tract is your body's drainage system for removing wastes and extra water. Your urinary tract includes two kidneys, two ureters, a bladder, and a urethra. Your kidneys are a pair of bean-shaped organs. Each kidney is about the size of your fist. They are located below your ribs, one on each side of your spine.  CAUSES  Infections are caused by microbes, which are microscopic organisms, including fungi, viruses, and bacteria. These organisms are so small that they can only be seen through a microscope. Bacteria are the microbes that most commonly cause UTIs.  SYMPTOMS   Symptoms of UTIs may vary by age and gender of the patient and by the location of the infection. Symptoms in young women typically include a frequent and intense urge to urinate and a painful, burning feeling in the bladder or urethra during urination. Older women and men are more likely to be tired, shaky, and weak and have muscle aches and abdominal pain. A fever may mean the infection is in your kidneys. Other symptoms of a kidney infection  include pain in your back or sides below the ribs, nausea, and vomiting.  DIAGNOSIS  To diagnose a UTI, your caregiver will ask you about your symptoms. Your caregiver also will ask to provide a urine sample. The urine sample will be tested for bacteria and white blood cells. White blood cells are made by your body to help fight infection.  TREATMENT   Typically, UTIs can be treated with medication. Because most UTIs are caused by a bacterial infection, they usually can be treated with the use of antibiotics. The choice of antibiotic and length of treatment depend on your symptoms and the type of bacteria causing your infection.  HOME CARE INSTRUCTIONS  · If you were prescribed antibiotics, take them exactly as your caregiver instructs you. Finish the medication even if you feel better after you have only taken some of the medication.  · Drink enough water and fluids to keep your urine clear or pale yellow.  · Avoid caffeine, tea, and carbonated beverages. They tend to irritate your bladder.  · Empty your bladder often. Avoid holding urine for long periods of time.  · Empty your bladder before and after sexual intercourse.  · After a bowel movement, women should cleanse from front to back. Use each tissue only once.  SEEK MEDICAL CARE IF:   · You have back pain.  · You develop a fever.  · Your symptoms do not begin to resolve within 3 days.  SEEK IMMEDIATE MEDICAL CARE IF:   · You have severe back pain or lower abdominal pain.  · You develop chills.  · You have nausea or vomiting.  · You have continued burning or discomfort with urination.  MAKE SURE YOU:   · Understand these instructions.  · Will watch your condition.  · Will get help right away if you are not doing well or get worse.     This information is not intended to replace advice given to you by your health care provider. Make sure you discuss any questions you have with your health care provider.     Document Released: 09/27/2006 Document Revised:  01/08/2016 Document Reviewed: 01/25/2013  [employer] Interactive Patient Education ©2016 [employer]  Altered Mental Status  Altered mental status most often refers to an abnormal change in your responsiveness and awareness. It can affect your speech, thought, mobility, memory, attention span, or alertness. It can range from slight confusion to complete unresponsiveness (coma). Altered mental status can be a sign of a serious underlying medical condition. Rapid evaluation and medical treatment is necessary for patients having an altered mental status.  CAUSES   · Low blood sugar (hypoglycemia) or diabetes.  · Severe loss of body fluids (dehydration) or a body salt (electrolyte) imbalance.  · A stroke or other neurologic problem, such as dementia or delirium.  · A head injury or tumor.  · A drug or alcohol overdose.  · Exposure to toxins or poisons.  · Depression, anxiety, and stress.  · A low oxygen level (hypoxia).  · An infection.  · Blood loss.  · Twitching or shaking (seizure).  · Heart problems, such as heart attack or heart rhythm problems (arrhythmias).  · A body temperature that is too low or too high (hypothermia or hyperthermia).  DIAGNOSIS   A diagnosis is based on your history, symptoms, physical and neurologic examinations, and diagnostic tests. Diagnostic tests may include:  · Measurement of your blood pressure, pulse, breathing, and oxygen levels (vital signs).  · Blood tests.  · Urine tests.  · X-ray exams.  · A computerized magnetic scan (magnetic resonance imaging, MRI).  · A computerized X-ray scan (computed tomography, CT scan).  TREATMENT   Treatment will depend on the cause. Treatment may include:  · Management of an underlying medical or mental health condition.  · Critical care or support in the hospital.  HOME CARE INSTRUCTIONS   · Only take over-the-counter or prescription medicines for pain, discomfort, or fever as directed by your caregiver.  · Manage underlying conditions as directed by  your caregiver.  · Eat a healthy, well-balanced diet to maintain strength.  · Join a support group or prevention program to cope with the condition or trauma that caused the altered mental status. Ask your caregiver to help choose a program that works for you.  · Follow up with your caregiver for further examination, therapy, or testing as directed.  SEEK MEDICAL CARE IF:   · You feel unwell or have chills.  · You or your family notice a change in your behavior or your alertness.  · You have trouble following your caregiver's treatment plan.  · You have questions or concerns.  SEEK IMMEDIATE MEDICAL CARE IF:   · You have a rapid heartbeat or have chest pain.  · You have difficulty breathing.  · You have a fever.  · You have a headache with a stiff neck.  · You cough up blood.  · You have blood in your urine or stool.  · You have severe agitation or confusion.  MAKE SURE YOU:   · Understand these instructions.  · Will watch your condition.  · Will get help right away if you are not doing well or get worse.     This information is not intended to replace advice given to you by your health care provider. Make sure you discuss any questions you have with your health care provider.     Document Released: 06/07/2011 Document Revised: 03/11/2013 Document Reviewed: 02/11/2016  [employer] Interactive Patient Education ©2016 [employer]      · Is patient discharged on Warfarin / Coumadin?   No     · Is patient Post Blood Transfusion?  Yes  POST BLOOD TRANSFUSION INFORMATION (ADULT)    The purpose of blood transfusion is to correct anemia, low levels of blood clotting factors or to correct acute blood loss.   Blood transfusion is very safe but occasionally unexpected adverse reactions do occur. Most adverse reactions occur during transfusion, within one to two days following transfusion or one to two weeks following transfusion. Some adverse reactions can occur one to six months after transfusion and some even years later.              If any of the symptoms listed below happen to you during your transfusion,                                 please notify your nurse immediately.   · Fever or Chills  · Flushing of the Face  · Hives, rash or itching  · Difficulty in breathing or shortness of breath  · Pain or oozing of blood from the IV needle site  · Low back pain  · Nausea or vomiting  · Weakness or fainting  · Chest pain  · Blood in the urine  · Decreased frequency of urination    The above symptoms may also occur within 24 to 48 after transfusion; if so, notify your physician.     · Yellowing of the skin    This can happen one to six months after transfusion; if so, notify your physician    Depression / Suicide Risk    As you are discharged from this [facility] facility, it is important to learn how to keep safe from harming yourself.    Recognize the warning signs:  · Abrupt changes in personality, positive or negative- including increase in energy   · Giving away possessions  · Change in eating patterns- significant weight changes-  positive or negative  · Change in sleeping patterns- unable to sleep or sleeping all the time   · Unwillingness or inability to communicate  · Depression  · Unusual sadness, discouragement and loneliness  · Talk of wanting to die  · Neglect of personal appearance   · Rebelliousness- reckless behavior  · Withdrawal from people/activities they love  · Confusion- inability to concentrate     If you or a loved one observes any of these behaviors or has concerns about self-harm, here's what you can do:  · Talk about it- your feelings and reasons for harming yourself  · Remove any means that you might use to hurt yourself (examples: pills, rope, extension cords, firearm)  · Get professional help from the community (Mental Health, Substance Abuse, psychological counseling)  · Do not be alone:Call your Safe Contact- someone whom you trust who will be there for you.  · Call your local CRISIS HOTLINE 532-1399  or 253-268-1664  · Call your local Children's Mobile Crisis Response Team [place] (487) 790-5245 or www.[employer].[place]  · Call the toll free National Suicide Prevention Hotlines   · National Suicide Prevention Lifeline 485-462-HMDT (1327)  · National [employer] 800-SUICIDE (763-3987)

## 2017-10-04 NOTE — PROGRESS NOTES
Pt discharged via w/c after discharge instructions and prescriptions given. All questions answered. IV d/c'd. Pt in stable condition.

## 2017-10-04 NOTE — DISCHARGE PLANNING
SW spoke with barrett Montez. She states she will come see this pt next if she has not d/c'ed yet.   HH confirmed for this pt.

## 2017-10-04 NOTE — CARE PLAN
Problem: Communication  Goal: The ability to communicate needs accurately and effectively will improve  Outcome: PROGRESSING AS EXPECTED  Pt calls for assistance appropriately, able to verbalize needs.

## 2017-10-04 NOTE — DISCHARGE SUMMARY
"CHIEF COMPLAINT ON ADMISSION  Chief Complaint   Patient presents with   • ALOC     BIB EMS, discharged from [facility] yesterday. when home health came to see her today she told ems that pt was \"beyond her scope to treat.\"  pt arrives AAOx2 disoriented to event and date.       CODE STATUS  DNR    HPI & HOSPITAL COURSE  This is a 76 y.o. female here with altered level of consciousness.Patient was admitted for hepatic encephalopathy with component of urinary tract infection. Patient has been on antibiotics with resolution of symptoms. Patient does have a mild degree of dementia and will benefit from further assistance with home medication. Multiple discussions with family as well as patient's  regarding prescription monitoring. Patient has returned to baseline function and mentation. Patient did have a home health with eating in the past and will benefit from further assistance with home care. Patient has met with palliative care, family is looking for assisted living placement for both patient and  who has severe dementia. At this time patient is stable for discharge to home with family.     Therefore, she is discharged in fair and stable condition with close outpatient follow-up.    SPECIFIC OUTPATIENT FOLLOW-UP  Primary care provider in one week    DISCHARGE PROBLEM LIST  Principal Problem:    UTI (urinary tract infection) POA: Unknown  Active Problems:    Hepatic encephalopathy (CMS-HCC) POA: Yes    Crohn's disease (HCC) POA: Yes      Overview: ICD-10 transition    Hepatic cirrhosis (CMS-HCC) POA: Yes    Chronic pain with narcotic dependence POA: Yes    Chronic kidney disease, stage IV (severe) (CMS-HCC) POA: Yes    Thrombocytopenia (CMS-HCC) POA: Yes    Acute encephalopathy POA: Unknown    Acute blood loss anemia POA: Unknown    Pseudomonas urinary tract infection POA: Unknown  Resolved Problems:    * No resolved hospital problems. *      FOLLOW UP  Future Appointments  Date Time Provider Department " [place]   10/5/2017 9:00 AM CARE MANAGER SALONI LOPESKRISTAN   10/10/2017 11:20 AM MELISSA Traore [facility]   12/1/2017 10:45 AM Arik Vee M.D. [address]     No follow-up provider specified.    MEDICATIONS ON DISCHARGE   Cristina Tenorio   Home Medication Instructions VANDANA:[id number]    Printed on:10/04/17 1113   Medication Information                      ascorbic acid (VITAMIN C) 500 MG tablet  Take 1 Tab by mouth 2 Times a Day.             ferrous gluconate (FERGON) 324 (38 FE) MG Tab  Take 324 mg by mouth every morning with breakfast.             lactulose 20 GM/30ML Solution  Take 30 mL by mouth 2 Times a Day.             multivitamin (THERAGRAN) Tab  Take 1 Tab by mouth every day.             omeprazole (PRILOSEC) 20 MG delayed-release capsule  Take 40 mg by mouth every day.             potassium Chloride ER (K-TAB) 20 MEQ Tab CR tablet  Take 20 mEq by mouth 4 times a day.             Probiotic Cap  Take 2 Caps by mouth every morning.             rifaximin (XIFAXAN) 550 MG Tab tablet  Take 1 Tab by mouth 2 Times a Day.             sodium bicarbonate (SODIUM BICARBONATE) 650 MG Tab  Take 3 Tabs by mouth 3 times a day.             vitamin D (CHOLECALCIFEROL) 1000 UNIT Tab  Take 1,000 Units by mouth every day.                 DIET  Orders Placed This Encounter   Procedures   • DIET ORDER     Standing Status:   Standing     Number of Occurrences:   1     Order Specific Question:   Diet:     Answer:   Regular [1]     Order Specific Question:   Texture/Fiber modifications:     Answer:   Dysphagia 3(Mechanical Soft)specify fluid consistency(question 6) [3]       ACTIVITY  As tolerated.  Weight bearing as tolerated      CONSULTATIONS  Palliative care    PROCEDURES  None    LABORATORY  Lab Results   Component Value Date/Time    SODIUM 139 10/02/2017 02:24 AM    POTASSIUM 4.6 10/04/2017 02:24 AM    CHLORIDE 121 (H) 10/02/2017 02:24 AM    CO2 11 (L) 10/02/2017 02:24 AM    GLUCOSE 109 (H) 10/02/2017 02:24 AM     BUN 49 (H) 10/02/2017 02:24 AM    CREATININE 2.44 (H) 10/02/2017 02:24 AM        Lab Results   Component Value Date/Time    WBC 3.2 (L) 10/02/2017 02:24 AM    HEMOGLOBIN 9.1 (L) 10/02/2017 01:41 PM    HEMATOCRIT 28.9 (L) 10/02/2017 01:41 PM    PLATELETCT 57 (L) 10/02/2017 02:24 AM        Total time of the discharge process exceeds 45 minutes

## 2017-10-05 NOTE — DOCUMENTATION QUERY
"DOCUMENTATION QUERY     Thank you for signing the previous query, but please select a response by selecting “Cosign w/ note\" in order to reply to the query.     To better represent the severity of illness of your patient, please review the following information and exercise your independent professional judgment in responding to this query.      \"Chronic Kidney Disease\" is documented in the Gastroenterology Consult. Based upon the clinical findings, risk factors, and treatment, can this diagnosis be further specified?     · Chronic Kidney Disease Stage I      · Chronic Kidney Disease Stage II     · Chronic Kidney Disease Stage III    · Chronic Kidney Disease Stage IV    · Chronic Kidney Disease Stage V     · End Stage Renal Disease  · Other explanation of clinical findings  · Unable to determine     The medical record reflects the following:   Clinical Findings  BUN 51 on admission; Creatinine 2.57 on admission; GFR 18 on admission; dx ORAL   Treatment  CBC; BMP; NS Infusion; D5 1/2NS Infusion; D5 NS Infusion; Sodium BiCarb Infusion   Risk Factors  Age; dx Dehydration; dx Metabolic Acidosis; dx ORAL; documented \"CKD\"; dx Hypokalemia; dx Crohn's Disease; dx Recurrent Hepatic Encephalopathy w/BAKER Cirrhosis   Location within medical record  History and Physical, Progress Notes, Lab Results and MAR      Thank you,      Alex Barlow  Clinical   P: [phone]    "

## 2017-10-10 PROBLEM — N17.9 AKI (ACUTE KIDNEY INJURY) (HCC): Status: RESOLVED | Noted: 2017-01-01 | Resolved: 2017-01-01

## 2017-10-10 PROBLEM — R78.81 BACTEREMIA DUE TO GRAM-POSITIVE BACTERIA: Status: RESOLVED | Noted: 2017-01-01 | Resolved: 2017-01-01

## 2017-10-10 PROBLEM — E87.6 HYPOKALEMIA: Status: RESOLVED | Noted: 2017-01-01 | Resolved: 2017-01-01

## 2017-10-10 PROBLEM — G93.40 ACUTE ENCEPHALOPATHY: Status: RESOLVED | Noted: 2017-01-01 | Resolved: 2017-01-01

## 2017-10-10 PROBLEM — R79.89 ELEVATED TROPONIN: Status: RESOLVED | Noted: 2017-01-01 | Resolved: 2017-01-01

## 2017-10-10 NOTE — PATIENT INSTRUCTIONS
If you need further assistance, or have any questions; concerns or lingering symptoms before seeing your Primary Care Provider or specialist.     Do not hesitate to contact us.     Please contact us at the Post-Hospital Follow Up Program at [phone].   Our offices hours are Monday-Friday 8 am-5 pm.

## 2017-10-10 NOTE — ASSESSMENT & PLAN NOTE
Underlying liver cirrhosis, on lactulose, BM every day. No flapping tremor noted.  Recent UTI s/p abx --> persistent symptoms.   A/O X3.

## 2017-10-10 NOTE — ASSESSMENT & PLAN NOTE
S/p ostomy.   GI follow up. Surgeon also saw pt in the hospital in September (suture placed for arterior bleeding at ostomy site.

## 2017-10-10 NOTE — ASSESSMENT & PLAN NOTE
Pt has completed antibiotic but she continues to have symptoms, dysuria. She cannot even give us a urine sample today. Her urine culture is reviewed and it is sensitive to augmentin. Pt reports no fever, no chills. She is here with  who does not noticed any altered level of conscious to pt.

## 2017-10-10 NOTE — PROGRESS NOTES
Subjective:     Cristina Tenorio is a 77 y.o. female who presents for Hospital Follow-up.  Chart reviewed. Discharge summary available for review: Yes   Date of discharge 10/4/2017.    48- hour post discharge RN call completed on 10/5/2017 and documented in the medical record by Chacho Polanco.    HPI: Recently hospitalized (9/30-10/4, readmitted after discharged on 9/29) for ALOC, found to have hepatic encephalopathy with UTI s/p po augmentin. Discharged with HH. Palliative consulted in the hospital. AD completed.     9/25-9/29 hospitalized for bloody stool from ostomy and ALOC.     Liver cirrhosis (CMS-HCC)  Secondary to BAKER  GI Dr. Brooks   Last seen in the hospital in September. Planning outpatient EGD for GI bleeding.  On omeprazole. Pt requested refill.   Recurrent encephalopathy, on lactulose 30 ml bid and rifaximin 550 mg bid. Pt reports that rifaximin is very expensive and she needs to talk to GI about this. She has no follow up appointment at this time. Will have MA call GI to obtain follow up appointment.     Crohn's disease (HCC)  S/p ostomy.   GI follow up. Surgeon also saw pt in the hospital in September (suture placed for arterior bleeding at ostomy site.    UTI (urinary tract infection)  Pt has completed antibiotic but she continues to have symptoms, dysuria. She cannot even give us a urine sample today. Her urine culture is reviewed and it is sensitive to augmentin. Pt reports no fever, no chills. She is here with  who does not noticed any altered level of conscious to pt.     Acute blood loss anemia  H/H has improved. Pt does not have any episodes of blood seen in ostomy after last discharge on 9/29.   On iron supplement    Acute encephalopathy  Underlying liver cirrhosis, on lactulose, BM every day. No flapping tremor noted.  Recent UTI s/p abx --> persistent symptoms.   A/O X3.     No PCP appointment. PCP is outside of [facility] but pt would like us to call them for appointment to  see if we could get a sooner one for her.     Patient Active Problem List    Diagnosis Date Noted   • Respiratory failure (CMS-HCC) 08/06/2017     Priority: High   • Near syncope 06/30/2017     Priority: High   • Hepatic encephalopathy (CMS-HCC) 05/24/2017     Priority: High   • Anemia 01/13/2017     Priority: High   • BAKER (nonalcoholic steatohepatitis) 08/24/2017     Priority: Medium   • Chronic kidney disease, stage IV (severe) (CMS-HCC) 04/07/2017     Priority: Medium   • Chronic pain with narcotic dependence 04/01/2017     Priority: Medium   • Crohn's disease (HCC) 06/23/2011     Priority: Medium   • Thrombocytopenia (CMS-HCC) 08/07/2017     Priority: Low   • Decubitus ulcer of sacral region, stage 1 06/30/2017     Priority: Low   • Metabolic acidosis, NAG, bicarbonate losses 04/12/2017     Priority: Low   • Cirrhosis with portal hypertension 04/01/2017     Priority: Low   • TRAVIS on CPAP 05/03/2016     Priority: Low   • Acute blood loss anemia 10/02/2017   • UTI (urinary tract infection) 10/01/2017   • History of cirrhosis of liver 09/25/2017   • History of Crohn's disease 09/25/2017   • Pancytopenia (CMS-HCC) 08/24/2017   • Contusion of left elbow 08/18/2017   • Pain of left upper extremity 08/18/2017   • Closed nondisplaced fracture of head of left radius 08/18/2017   • 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) 08/06/2017   • Chronic pain syndrome 08/06/2017   • Hx of portal hypertension 04/11/2017   • Transaminitis 04/11/2017   • Left shoulder pain 04/11/2017   • Hyperchloremic metabolic acidosis 04/07/2017   • GERD (gastroesophageal reflux disease) 04/01/2017   • Obesity (BMI 30.0-34.9) 04/01/2017   • Tremor 01/28/2017   • Left humeral fracture 01/26/2017   • Hypomagnesemia 10/06/2016   • Hypocalcemia 10/06/2016   • Liver cirrhosis (CMS-HCC) 10/05/2016   • Vitamin D deficiency 09/02/2016   • Splenomegaly 02/26/2016   • COPD (chronic obstructive pulmonary disease) (HCC) 03/20/2013   • Essential  hypertension, benign 06/23/2011         Allergies:   Sulfa drugs    Social History:  Social History   Substance Use Topics   • Smoking status: Former Smoker     Years: 50.00     Types: Cigarettes, Cigars     Quit date: 3/30/2013   • Smokeless tobacco: Never Used      Comment: 50yrs 1.5 ppd quit 2009   • Alcohol use No      Comment: socially        ROS:  Review of Systems   Constitutional: Positive for malaise/fatigue. Negative for chills and fever.   HENT: Negative for hearing loss and tinnitus.    Eyes: Negative for blurred vision and pain.   Respiratory: Negative for cough, sputum production, shortness of breath and wheezing.    Cardiovascular: Negative for chest pain, palpitations and leg swelling.   Gastrointestinal: Negative for abdominal pain, heartburn, nausea and vomiting.        Ostomy in place, no blood seen after discharge   Genitourinary: Positive for dysuria. Negative for frequency and urgency.   Musculoskeletal: Negative for falls.   Skin: Negative for rash.   Neurological: Positive for tremors (sometimes but no tremor today) and weakness (walk with cane). Negative for dizziness, focal weakness and headaches.   Psychiatric/Behavioral: Negative for depression. The patient is not nervous/anxious.         Objective:     Blood pressure (!) 90/50, pulse 84, temperature 36.2 °C (97.1 °F), resp. rate 16, height 1.524 m (5'), weight 67.1 kg (148 lb), last menstrual period 06/29/1995, SpO2 100 %.     Physical Exam:  Physical Exam   Constitutional: She is oriented to person, place, and time and well-developed, well-nourished, and in no distress.   HENT:   Head: Normocephalic and atraumatic.   Eyes: Conjunctivae are normal.   Neck: Neck supple. No JVD present. No thyromegaly present.   Cardiovascular: Normal rate and regular rhythm.    Pulmonary/Chest: Effort normal and breath sounds normal. No respiratory distress. She has no wheezes.   Abdominal: Soft. Bowel sounds are normal. She exhibits no distension. There  is no tenderness.   Ostomy is in place, no blood seen, no leaking   Musculoskeletal: Normal range of motion. She exhibits no edema.   Neurological: She is alert and oriented to person, place, and time.   Gait slow but steady with cane   Skin: Skin is warm. No erythema.   Nursing note and vitals reviewed.        Assessment and Plan:     1. Hospital discharge follow-up  Hospitalization and results reviewed with patient. High risk conditions requiring teaching or care coordination were identified and addressed.The patient demonstrate understanding of admission and underlying conditions. The patient understands discharge instructions and when to seek medical attention. Medications reviewed including instructions regarding high risk medications, dosing and side effects.    The patient is able to safely adhere to ADL/IADL, treatment and medication regimen, self-manage of high-risk conditions? No   The patient requires physical therapy/home health/[facility] referral? Yes HH has been set up  The patient requires referral to care coordination/behavioral health/social work?  No on HH now  Patient requires referral for pharmacy consult? No   Advance directive/POLST on file?  Yes   Required counseled on advance directive?  No      2. Cirrhosis of liver without ascites, unspecified hepatic cirrhosis type (CMS-HCC)  - MA has called GI to obtain the appointment. The GI office will call pt for appointment.   - omeprazole (PRILOSEC) 20 MG delayed-release capsule; Take 2 Caps by mouth every day.  Dispense: 30 Cap; Refill: 3    3. Crohn's disease of large intestine with other complication (CMS-HCC)  - Follow up with GI    4. UTI symptoms  Ongoing symptoms, will treat with another three days of abx empirically. Repeat UC.   - amoxicillin-clavulanate (AUGMENTIN) 500-125 MG Tab; Take 1 Tab by mouth every 12 hours for 3 days.  Dispense: 6 Tab; Refill: 0  - URINALYSIS,CULTURE IF INDICATED; Future    5. Gastroesophageal reflux disease,  esophagitis presence not specified  - Omeprazole refilled    6. Acute cystitis without hematuria  - see #4    7. Acute blood loss anemia  - no more bleeding. On iron supplement.    8. Acute encephalopathy  - resolved    9. At risk for falls  - no fall after discharge.  - Patient identified as fall risk.  Appropriate orders and counseling given.    Medication Reconciliation  Medication list at end of encounter:   Current Outpatient Prescriptions   Medication Sig Dispense Refill   • zinc sulfate (ZINCATE) 220 (50 Zn) MG Cap Take 220 mg by mouth every day.     • amoxicillin-clavulanate (AUGMENTIN) 500-125 MG Tab Take 1 Tab by mouth every 12 hours for 3 days. 6 Tab 0   • omeprazole (PRILOSEC) 20 MG delayed-release capsule Take 2 Caps by mouth every day. 30 Cap 3   • multivitamin (THERAGRAN) Tab Take 1 Tab by mouth every day. 30 Tab 3   • rifaximin (XIFAXAN) 550 MG Tab tablet Take 1 Tab by mouth 2 Times a Day. 42 Tab 0   • sodium bicarbonate (SODIUM BICARBONATE) 650 MG Tab Take 3 Tabs by mouth 3 times a day. 120 Tab 1   • potassium Chloride ER (K-TAB) 20 MEQ Tab CR tablet Take 20 mEq by mouth 4 times a day.     • lactulose 20 GM/30ML Solution Take 30 mL by mouth 2 Times a Day. 30 Bottle 1   • ferrous gluconate (FERGON) 324 (38 FE) MG Tab Take 324 mg by mouth every morning with breakfast.     • vitamin D (CHOLECALCIFEROL) 1000 UNIT Tab Take 1,000 Units by mouth every day.       No current facility-administered medications for this visit.        Primary care follow-up:  New health conditions identified during hospitalization? Yes   Labs/pathology/imaging requires future PCP follow-up?  No   Changes to medications during hospitalization or today? Yes     Recommended followup: No Follow-up on file. with Roshni Morillo M.D.   Future Appointments       Provider Department Center    12/1/2017 10:45 AM Arik Vee M.D. [facility] [address] [place] [address]          Patient Instruction  Patient offered educational material on  discharge diagnosis and management of symptoms/red flags. Patient instructed to keep follow-up appointments and to bring written questions and and actual medications to each office visit. Patient instructed to call PCP/specialist with any problems/questions/concerns. Patient verbalizes understanding and has no further questions at this time.    Face-to-face transitional care management services with high complexity medical decision making.

## 2017-10-10 NOTE — ASSESSMENT & PLAN NOTE
H/H has improved. Pt does not have any episodes of blood seen in ostomy after last discharge on 9/29.   On iron supplement

## 2017-10-10 NOTE — ASSESSMENT & PLAN NOTE
Secondary to BAKER  GI Dr. Brooks   Last seen in the hospital in September. Planning outpatient EGD for GI bleeding.  On omeprazole. Pt requested refill.   Recurrent encephalopathy, on lactulose 30 ml bid and rifaximin 550 mg bid. Pt reports that rifaximin is very expensive and she needs to talk to GI about this. She has no follow up appointment at this time. Will have MA call GI to obtain follow up appointment.

## 2017-10-10 NOTE — PROGRESS NOTES
POST DISCHARGE CALL MADE BY Chacho Polanco.   Discharge Date:10/4/2017   Date of Outreach Call: 10/5/2017  9:00 AM  Now that you're home, how are you doing? Fair  Comment:Pt states she is doing fair s/p hospital  discharge.  She states she is moving slowly and trying to  get her strength back.  Denies any new or worsening s/s.   [facility] is ordered for patient.  Do you have questions about your medications? No    Did you fill your medications? No  Comment:Pt states she will go to pharmacy today to check  on the approval of rifaximin Rx.  Pt states she is taking  all other Rx as prescribed.    Do you have a follow-up appointment scheduled?Yes  Comment:Pt will f/u at d/c clinic on 10/10 at 11:20 AM.   Pt states her SO Dave will drive and accompany her to appt.   Pt states she is familiar with location of [place].    Discharging Department: [place] [id number]    Number of Attempts: 1  Current or previous attempts completed within two business days of discharge? Yes  Provided education regarding treatment plan, medication, self-management, ADLs? Yes  Comment:Explained d/c clinic to pt and discussed the  importance of attending d/c clinic appt.  Has patient completed Advance Directive? If yes, advise them to bring to appointment. Yes  Comment:Completed AD onfile and scanned into pt's Epic  record.  Care Manager phone number provided? Yes  Comment:Rena at [phone]  Is there anything else I can help you with? No

## 2017-10-16 NOTE — PROGRESS NOTES
Subjective:      Cristina Tenorio is a 77 y.o. female who presents with No chief complaint on file.            HPI    ROS       Objective:     LMP 06/29/1995      Physical Exam            Assessment/Plan:     1. UTI (urinary tract infection) due to Enterococcus  ***  - clindamycin (CLEOCIN) 300 MG Cap; Take 1 Cap by mouth 3 times a day for 7 days.  Dispense: 21 Cap; Refill: 0

## 2017-10-16 NOTE — TELEPHONE ENCOUNTER
Lab called to inform provider regarding + Enterococcus faecium on urine culture. Please review and advise

## 2017-10-16 NOTE — TELEPHONE ENCOUNTER
Left generic message for patient to contact office at [phone], if she has any questions regarding test results sent via [employer].

## 2017-10-16 NOTE — TELEPHONE ENCOUNTER
MELISSA Traore 23 minutes ago (3:59 PM)      Please call pt to let her know that her urine culture result shows same infection organism but resistant to augmentin this time. I have placed different antibiotic to her pharmacy. If she continues to have symptoms, have her call us back and I will placed order for her to see infection disease doctor for second opinion. If she develops fever, chills, she has to go to ER for IV antibiotics.      The urine culture does show sensitive to macrobid which means this medication would also work for her infection. However, some patient who are allergic to sulfa would also have allergic reaction to macrobid so that I did not choose this one but if she has taken this medication before without any adverse reaction, let us know so that we can document this.     Thanks.

## 2017-10-16 NOTE — PROGRESS NOTES
Please call pt to let her know that her urine culture result shows same infection organism but resistant to augmentin this time. I have placed different antibiotic to her pharmacy. If she continues to have symptoms, have her call us back and I will placed order for her to see infection disease doctor for second opinion. If she develops fever, chills, she has to go to ER for IV antibiotics.     The urine culture does show sensitive to macrobid which means this medication would also work for her infection. However, some patient who are allergic to sulfa would also have allergic reaction to macrobid so that I did not choose this one but if she has taken this medication before without any adverse reaction, let us know so that we can document this.    Thanks.

## 2017-10-18 NOTE — TELEPHONE ENCOUNTER
Called patient and she was not able to hear me over the phone, so she requested for this writer to speak to her SO (Dave).  Results given to Dave and informed him of Rx sent to preferred pharmacy for patient to start taking med and to call us if she still has symptoms once antibiotics are done.

## 2017-10-19 NOTE — TELEPHONE ENCOUNTER
Per Christian at [facility], patient has not  clindamycin (CLEOCIN) 300 MG Cap.  Cancelled order for Clindamycin and informed pharmacist to fill Rx for Doxycycline instead.    Called patient no answer left a message to call us back.

## 2017-10-19 NOTE — TELEPHONE ENCOUNTER
----- Message from MELISSA Traore sent at 10/19/2017  1:13 PM PDT -----  Regarding: follow up UTI symptoms  Please call pt today to follow up her symptoms. Need to know if new antibiotic working for her or not by the end of today for the next step.    Thanks.

## 2017-11-11 NOTE — ED PROVIDER NOTES
ED Provider Note    Scribed for Ganga Castaneda M.D. by Ana Rosa Tena. 11/11/2017  12:55 PM    Primary care provider: Roshni Morillo M.D.  Means of arrival: Wheel Chair  History obtained from: Patient  History limited by: None    CHIEF COMPLAINT  Chief Complaint   Patient presents with   • T-5000 GLF     tripped and fell getting into the car, fell and hit her face, bruisinf and swelling to L side of face, -LOC, not on blood thinners.        HPI  Cristina Tenorio is a 77 y.o. Female with a past medical history of cirrhosis and hepatic encephalopathy who presents to the Emergency Department for evaluation of left sided face swelling and bruising s/p GLF occurring around 10:30 AM this morning. Patients  is in a room upstairs at [facility] and she reports driving to the ED this morning to visit him. Her wheel chair was in the back seat which she was able to retrieve however while removed a bag of clothes she lost her balance and fell onto the parking garage floor hitting the left side of her face. Additionally, patient states she has had blood problems this past week associated with her osteotomy. Denies loss of consciousness, headache. Last tetanus shot was less than 5 years ago.    REVIEW OF SYSTEMS - C  Pertinent positives include GLF, face swelling and bruising. Pertinent negatives include no nausea, emesis, LOC, headache. All other systems reviewed and negative.    PAST MEDICAL HISTORY   has a past medical history of Anemia (12/30/16); Arthritis (12/30/16); Bowel obstruction; Breath shortness; Cataract (2006,2007); Cirrhosis of liver (CMS-[facility]) (12/30/16); COPD (chronic obstructive pulmonary disease) (CMS-HCC); COPD (chronic obstructive pulmonary disease) (CMS-[facility]) (3/20/2013); Crohn's disease (CMS-HCC); Emphysema of lung (CMS-HCC); Heart burn; Hemorrhagic disorder (CMS-HCC); Ileostomy in place (CMS-HCC); Indigestion; Obstruction; Occasional tremors; TRAVIS on CPAP; Renal disorder; Sleep apnea; and  Snoring.    SURGICAL HISTORY   has a past surgical history that includes cholecystectomy (2013); bowel resection (1992); ileostomy (2010); recovery (6/21/2010); sigmoidoscopy flex (9/27/2016); gastroscopy-endo (9/27/2016); gastroscopy wiithsavary dilatation (9/28/2016); gastroscopy w/push enterscopy (9/28/2016); gastroscopy-endo (N/A, 10/5/2016); umbilical hernia repair (2000); colonoscopy; other surgical procedure (1994); and gastroscopy (N/A, 1/13/2017).    SOCIAL HISTORY  Social History   Substance Use Topics   • Smoking status: Former Smoker     Years: 50.00     Types: Cigarettes, Cigars     Quit date: 3/30/2013   • Smokeless tobacco: Never Used      Comment: 50yrs 1.5 ppd quit 2009   • Alcohol use No      Comment: socially      History   Drug Use No       FAMILY HISTORY  No family history on file.    CURRENT MEDICATIONS  No current facility-administered medications on file prior to encounter.      Current Outpatient Prescriptions on File Prior to Encounter   Medication Sig Dispense Refill   • zinc sulfate (ZINCATE) 220 (50 Zn) MG Cap Take 220 mg by mouth every day.     • omeprazole (PRILOSEC) 20 MG delayed-release capsule Take 2 Caps by mouth every day. 30 Cap 3   • multivitamin (THERAGRAN) Tab Take 1 Tab by mouth every day. 30 Tab 3   • rifaximin (XIFAXAN) 550 MG Tab tablet Take 1 Tab by mouth 2 Times a Day. 42 Tab 0   • sodium bicarbonate (SODIUM BICARBONATE) 650 MG Tab Take 3 Tabs by mouth 3 times a day. 120 Tab 1   • potassium Chloride ER (K-TAB) 20 MEQ Tab CR tablet Take 20 mEq by mouth 4 times a day.     • lactulose 20 GM/30ML Solution Take 30 mL by mouth 2 Times a Day. 30 Bottle 1   • ferrous gluconate (FERGON) 324 (38 FE) MG Tab Take 324 mg by mouth every morning with breakfast.     • vitamin D (CHOLECALCIFEROL) 1000 UNIT Tab Take 1,000 Units by mouth every day.         ALLERGIES  Allergies   Allergen Reactions   • Sulfa Drugs Hives and Itching       PHYSICAL EXAM  VITAL SIGNS: BP (!) 99/44   Pulse 96    Temp 36.6 °C (97.9 °F) (Temporal)   Resp 16   Ht 1.524 m (5')   Wt 70.5 kg (155 lb 6.8 oz)   LMP 06/29/1995   SpO2 98%   BMI 30.35 kg/m²     Constitutional: Well developed, Well nourished, no acute distress, Non-toxic appearance.   HENT: Large amount of ecchymosis and soft tissue swelling to the left side of face. Normocephalic, Bilateral external ears normal, Oropharynx moist, No oral exudates.   Eyes: Left eye with slight subconjunctival hematoma. PERRLA, EOMI, No discharge.   Neck: No tenderness, Supple, No stridor.   Lymphatic: No lymphadenopathy noted.   Cardiovascular: Normal heart rate, Normal rhythm.   Thorax & Lungs: Clear to auscultation bilaterally, No respiratory distress, No wheezing, No crackles.   Abdomen: Soft, No tenderness, No masses, No pulsatile masses.   Skin: Warm, Dry, No erythema, No rash.   Extremities: No edema No cyanosis.   Musculoskeletal: Left knee with bruising and skin tear. No T or L spine tenderness. Slightly decreased ROM. No major deformities noted.  Intact distal pulses  Neurologic: Awake, alert. Moves all extremities spontaneously.  Psychiatric: Affect normal, Judgment normal, Mood normal.       LABS  Labs Reviewed   CBC WITH DIFFERENTIAL - Abnormal; Notable for the following:        Result Value    RBC 2.77 (*)     Hemoglobin 8.4 (*)     Hematocrit 26.6 (*)     MCHC 31.6 (*)     RDW 64.0 (*)     Platelet Count 66 (*)     MPV 14.0 (*)     Neutrophils-Polys 78.60 (*)     Lymphocytes 9.10 (*)     Immature Granulocytes 1.40 (*)     Lymphs (Absolute) 0.46 (*)     All other components within normal limits    Narrative:     Indicate which anticoagulants the patient is on:->UNKNOWN   COMP METABOLIC PANEL - Abnormal; Notable for the following:     Chloride 117 (*)     Co2 11 (*)     Glucose 139 (*)     Bun 54 (*)     Creatinine 2.94 (*)     Calcium 8.0 (*)     AST(SGOT) 54 (*)     Alkaline Phosphatase 317 (*)     Albumin 2.8 (*)     Total Protein 5.3 (*)     All other components  within normal limits    Narrative:     Indicate which anticoagulants the patient is on:->UNKNOWN   APTT - Abnormal; Notable for the following:     APTT 37.1 (*)     All other components within normal limits    Narrative:     Indicate which anticoagulants the patient is on:->UNKNOWN   PROTHROMBIN TIME - Abnormal; Notable for the following:     PT 16.7 (*)     INR 1.38 (*)     All other components within normal limits    Narrative:     Indicate which anticoagulants the patient is on:->UNKNOWN   ESTIMATED GFR - Abnormal; Notable for the following:     GFR If  19 (*)     GFR If Non  16 (*)     All other components within normal limits    Narrative:     Indicate which anticoagulants the patient is on:->UNKNOWN     All labs reviewed by me.    RADIOLOGY  DX-KNEE COMPLETE 4+ LEFT   Final Result      Negative LEFT knee series.      CT-CSPINE WITHOUT PLUS RECONS   Final Result         1.  Negative CT scan of the cervical spine.  No fracture or subluxation.      2.  Mild degenerative change at C5-6 and C6-7.      CT-MAXILLOFACIAL W/O PLUS RECONS   Final Result         1.  No evidence of maxillofacial fracture.      2.  Left-sided pre-orbital and maxillary soft tissue swelling.      CT-HEAD W/O   Final Result      1.  Diffuse atrophy and white matter changes.   2.  No acute intracranial hemorrhage or territorial infarct.           The radiologist's interpretation of all radiological studies have been reviewed by me.    COURSE & MEDICAL DECISION MAKING  Nursing notes, VS, PMSFHx reviewed in chart.    12:50 PM - Reviewed patients past medical records which shows a history of cirrhosis and hepatic encephalopathy.     12:55 PM - Patient seen and examined at bedside. Helped patient into gown and into bed to further assess. Discussed plan of care with patient which is to order labs to check clotting factors and CT the head and face to rule out fractures. Ordered DX-knee, CT-C spine, CT-head,  CT-maxillofacial, estimated GFR, CBC with differential, CMP, APTT, prothrombin time to evaluate her symptoms. Differentials include but are not limited too: trauma.    Decision Making:  Large hematoma to the left side of the face, laboratory tests do not show any new anemia, the patient does have some coagulopathy secondary to cirrhosis. CT scans of the head and the face and the C-spine are negative, plain x-rays of the left knee are negative. We'll discharge the patient home, have the patient return with any concerns.    The patient will return for new or worsening symptoms and is stable at the time of discharge.    The patient is referred to a primary physician for blood pressure management, diabetic screening, and for all other preventative health concerns.    DISPOSITION:  Patient will be discharged home in stable condition with an information sheet on contusion.    FOLLOW UP:  [facility], Emergency Dept  [address] [id number] [phone]    If symptoms worsen    Roshni Morillo M.D.  [address] [facility] [address]  [facility] [address]  [phone]            OUTPATIENT MEDICATIONS:  Discharge Medication List as of 11/11/2017  3:47 PM            FINAL IMPRESSION  1. Contusion of face, initial encounter    2. Contusion of right knee, initial encounter          Ana Rosa ANG (Abdirizak), am scribing for, and in the presence of, Ganga Castaneda M.D..    Electronically signed by: Ana Rosa Tena (Abdirizak), 11/11/2017    IGanga M.D. personally performed the services described in this documentation, as scribed by Ana Rosa Tena in my presence, and it is both accurate and complete.    The note accurately reflects work and decisions made by me.  Ganga Castaneda  11/11/2017  4:31 PM

## 2017-11-11 NOTE — ED NOTES
Wheeled to triage  Chief Complaint   Patient presents with   • T-5000 GLF     tripped and fell getting into the car, fell and hit her face, bruisinf and swelling to L side of face, -LOC, not on blood thinners.      Also hit her L knee which is bleeding.  Was able to drive herself here and can put weight on L side.

## 2017-11-11 NOTE — DISCHARGE INSTRUCTIONS
Contusion  A contusion is a deep bruise. Contusions happen when an injury causes bleeding under the skin. Signs of bruising include pain, puffiness (swelling), and discolored skin. The contusion may turn blue, purple, or yellow.  HOME CARE   · Put ice on the injured area.  ¨ Put ice in a plastic bag.  ¨ Place a towel between your skin and the bag.  ¨ Leave the ice on for 15-20 minutes, 03-04 times a day.  · Only take medicine as told by your doctor.  · Rest the injured area.  · If possible, raise (elevate) the injured area to lessen puffiness.  GET HELP RIGHT AWAY IF:   · You have more bruising or puffiness.  · You have pain that is getting worse.  · Your puffiness or pain is not helped by medicine.  MAKE SURE YOU:   · Understand these instructions.  · Will watch your condition.  · Will get help right away if you are not doing well or get worse.     This information is not intended to replace advice given to you by your health care provider. Make sure you discuss any questions you have with your health care provider.     Document Released: 06/05/2009 Document Revised: 03/11/2013 Document Reviewed: 10/22/2012  [employer] Interactive Patient Education ©2016 [employer]

## 2017-11-11 NOTE — ED NOTES
Pt assisted in transferring from wheelchair to gurney.  poc explained to pt.  called for blood draw.  Waiting for imaging.

## 2017-11-12 NOTE — ED NOTES
Discharge instructions given to pt including follow up w/pcp or returning for any worsening symptoms  Or any signs and symptoms of infection. Non-stick dressing applied to right knee. Questions answered by RN.  No new complaints made. Pt discharged to [facility] and will be wheeled to T3rd floor by one of the ED tech.

## 2017-12-05 NOTE — TELEPHONE ENCOUNTER
Was the patient seen in the last year in this department? Yes     Does patient have an active prescription for medications requested? Yes     Received Request Via: Patient

## 2017-12-20 PROBLEM — N18.9 ACUTE ON CHRONIC RENAL FAILURE (HCC): Status: ACTIVE | Noted: 2017-01-01

## 2017-12-20 PROBLEM — N17.9 ACUTE ON CHRONIC RENAL FAILURE (HCC): Status: ACTIVE | Noted: 2017-01-01

## 2017-12-20 PROBLEM — Z66 DNR (DO NOT RESUSCITATE): Chronic | Status: ACTIVE | Noted: 2017-01-01

## 2017-12-20 NOTE — ED PROVIDER NOTES
ED Provider Note    CHIEF COMPLAINT  Chief Complaint   Patient presents with   • ALOC     BIB EMS from home, per EMS home health nurse was concerned that pt did not get OOB today and was confused. pt arrives with open stoma, no ostomy device in place. stoma is red and macerated. pt reports that it is very sore.    • Malaise       HPI  Cristina Tenorio is a 77 y.o. female who presentsTo the ED secondary to altered mental status. The patient is a very poor historian, cannot tell me much information. She is confused. Patient states that she does not feel well she has abdominal pains, nausea vomiting, dysuria. She states that she fell a couple nights ago and hit her head. Apparently sent the patient here after the patient did not get up this morning and was fatigued and confused.    REVIEW OF SYSTEMS  See HPI for further details. All other systems are negative.     PAST MEDICAL HISTORY  Past Medical History:   Diagnosis Date   • Anemia 12/30/16    unknown etiology   • Arthritis 12/30/16    to hands and feet   • Bowel obstruction    • Breath shortness     uses O2@ at night and prn ([employer]). Uses inhaler prn. 12/30/16-reports no changes    • Cataract 2006,2007    bilat phaco with IOL   • Cirrhosis of liver (CMS-HCC) 12/30/16    states dx at one time but no treatment for >10yrs   • COPD (chronic obstructive pulmonary disease) (CMS-[facility])     per pt   • COPD (chronic obstructive pulmonary disease) (CMS-[facility]) 3/20/2013   • Crohn's disease (CMS-HCC)    • Emphysema of lung (CMS-HCC)     COPD   • Heart burn    • Hemorrhagic disorder (CMS-HCC)     anemia of unkown etiology.   • Ileostomy in place (CMS-HCC)    • Indigestion    • Obstruction    • Occasional tremors    • TRAVIS on CPAP     10/2016-CPAP DC'd and wears O2 @4L/NC   • Renal disorder     Increased creatitine level due to meds.   • Sleep apnea    • Snoring        FAMILY HISTORY  No family history on file.    SOCIAL HISTORY  Social History     Social History   • Marital  "status:      Spouse name: N/A   • Number of children: N/A   • Years of education: N/A     Social History Main Topics   • Smoking status: Former Smoker     Years: 50.00     Types: Cigarettes, Cigars     Quit date: 3/30/2013   • Smokeless tobacco: Never Used      Comment: 50yrs 1.5 ppd quit 2009   • Alcohol use No      Comment: socially   • Drug use: No   • Sexual activity: Not on file     Other Topics Concern   • Not on file     Social History Narrative   • No narrative on file       SURGICAL HISTORY  Past Surgical History:   Procedure Laterality Date   • GASTROSCOPY N/A 1/13/2017    Procedure: GASTROSCOPY - ENTEROSCOPY PUSH;  Surgeon: Boni Brooks M.D.;  Location: SURGERY [facility];  Service:    • GASTROSCOPY-ENDO N/A 10/5/2016    Procedure: GASTROSCOPY-ENDO;  Surgeon: Aravind Roman M.D.;  Location: [facility];  Service:    • GASTROSCOPY WIITHSAVARY DILATATION  9/28/2016    Procedure: GASTROSCOPY WIITH SAVARY DILATATION;  Surgeon: Aftab Alegre M.D.;  Location: ENDOSCOPY [facility];  Service: Gastroenterology   • GASTROSCOPY W/PUSH ENTERSCOPY  9/28/2016    Procedure: GASTROSCOPY W/PUSH ENTERSCOPY;  Surgeon: Aftab Alegre M.D.;  Location: [facility];  Service:    • SIGMOIDOSCOPY FLEX  9/27/2016    Procedure: SIGMOIDOSCOPY FLEX;  Surgeon: Aftab Alegre M.D.;  Location: [facility];  Service:    • GASTROSCOPY-ENDO  9/27/2016    Procedure: GASTROSCOPY-ENDO;  Surgeon: Aftab Alegre M.D.;  Location: [facility];  Service:    • CHOLECYSTECTOMY  2013    \"burst\"   • RECOVERY  6/21/2010    Performed by SURGERY, IR-RECOVERY at SURGERY SAME DAY [facility]   • ILEOSTOMY  2010    moved to left side   • UMBILICAL HERNIA REPAIR  2000   • OTHER SURGICAL PROCEDURE  1994    closed off rectum    • BOWEL RESECTION  1992    with ileostomy (right side)   • COLONOSCOPY      Hx of  several        CURRENT MEDICATIONS  Home " Medications     Reviewed by Charlette Hagan (Pharmacy Tech) on 12/20/17 at 1818  Med List Status: Complete   Medication Last Dose Status   ferrous gluconate (FERGON) 324 (38 FE) MG Tab 12/19/2017 Active   lactulose 10 GM/15ML Solution 12/19/2017 Active   omeprazole (PRILOSEC) 20 MG delayed-release capsule 12/19/2017 Active   potassium Chloride ER (K-TAB) 20 MEQ Tab CR tablet 12/19/2017 Active   rifaximin (XIFAXAN) 550 MG Tab tablet 12/19/2017 Active   sodium bicarbonate (SODIUM BICARBONATE) 650 MG Tab 12/19/2017 Active   vitamin D (CHOLECALCIFEROL) 1000 UNIT Tab 12/19/2017 Active   zinc sulfate (ZINCATE) 220 (50 Zn) MG Cap 12/19/2017 Active                ALLERGIES  Allergies   Allergen Reactions   • Sulfa Drugs Hives and Itching       PHYSICAL EXAM  VITAL SIGNS: /42   Pulse 75   Temp 37.1 °C (98.7 °F)   Resp 18   Ht 1.524 m (5')   Wt 70.5 kg (155 lb 6.8 oz)   LMP 06/29/1995   SpO2 97%   BMI 30.35 kg/m²   Constitutional: Well developed, Well nourished,Mild distress, Non-toxic appearance.   HENT: Atraumatic, normal spell, oropharynx is dry  Eyes: EOMI, PERRLA  Neck: Normal range of motion, No tenderness, Supple, No stridor.   Cardiovascular: Normal heart rate, Normal rhythm.   Thorax & Lungs: Normal breath sounds, No respiratory distress, No wheezing, No chest tenderness.   Abdomen: Bowel sounds normal, Soft, ostomy in the left lower quadrant but has no ostomy bag is slightly tender around the area but no diffuse abdominal tenderness to palpation  Skin: Warm, Dry, No erythema, No rash.   Back: No tenderness, No CVA tenderness.   Extremities: Intact distal pulses, 1-2+ edema, No tenderness.   Neurologic: Awake alert confused, seems to have good muscle strength throughout  Psychiatric: Affect normal, Judgment normal, Mood normal.       Results for orders placed or performed during the hospital encounter of 12/20/17   Lactic acid (lactate)   Result Value Ref Range    Lactic Acid 1.7 0.5 - 2.0 mmol/L    Lactic acid (lactate)   Result Value Ref Range    Lactic Acid 1.8 0.5 - 2.0 mmol/L   CBC WITH DIFFERENTIAL   Result Value Ref Range    WBC 3.0 (L) 4.8 - 10.8 K/uL    RBC 2.81 (L) 4.20 - 5.40 M/uL    Hemoglobin 8.3 (L) 12.0 - 16.0 g/dL    Hematocrit 27.6 (L) 37.0 - 47.0 %    MCV 98.2 (H) 81.4 - 97.8 fL    MCH 29.5 27.0 - 33.0 pg    MCHC 30.1 (L) 33.6 - 35.0 g/dL    RDW 66.5 (H) 35.9 - 50.0 fL    Platelet Count 56 (L) 164 - 446 K/uL    Nucleated RBC 0.00 /100 WBC    NRBC (Absolute) 0.00 K/uL    Neutrophils-Polys 75.90 (H) 44.00 - 72.00 %    Lymphocytes 11.50 (L) 22.00 - 41.00 %    Monocytes 7.60 0.00 - 13.40 %    Eosinophils 3.60 0.00 - 6.90 %    Basophils 0.70 0.00 - 1.80 %    Immature Granulocytes 0.70 0.00 - 0.90 %    Neutrophils (Absolute) 2.31 2.00 - 7.15 K/uL    Lymphs (Absolute) 0.35 (L) 1.00 - 4.80 K/uL    Monos (Absolute) 0.23 0.00 - 0.85 K/uL    Eos (Absolute) 0.11 0.00 - 0.51 K/uL    Baso (Absolute) 0.02 0.00 - 0.12 K/uL    Immature Granulocytes (abs) 0.02 0.00 - 0.11 K/uL    Anisocytosis 1+     Macrocytosis 1+     Microcytosis 1+    COMP METABOLIC PANEL   Result Value Ref Range    Sodium 142 135 - 145 mmol/L    Potassium 5.0 3.6 - 5.5 mmol/L    Chloride 120 (H) 96 - 112 mmol/L    Co2 13 (L) 20 - 33 mmol/L    Anion Gap 9.0 0.0 - 11.9    Glucose 104 (H) 65 - 99 mg/dL    Bun 47 (H) 8 - 22 mg/dL    Creatinine 3.29 (H) 0.50 - 1.40 mg/dL    Calcium 8.3 (L) 8.5 - 10.5 mg/dL    AST(SGOT) 50 (H) 12 - 45 U/L    ALT(SGPT) 26 2 - 50 U/L    Alkaline Phosphatase 353 (H) 30 - 99 U/L    Total Bilirubin 1.2 0.1 - 1.5 mg/dL    Albumin 2.8 (L) 3.2 - 4.9 g/dL    Total Protein 5.4 (L) 6.0 - 8.2 g/dL    Globulin 2.6 1.9 - 3.5 g/dL    A-G Ratio 1.1 g/dL   URINALYSIS   Result Value Ref Range    Color Yellow     Character Clear     Specific Gravity 1.015 <1.035    Ph 5.0 5.0 - 8.0    Glucose Negative Negative mg/dL    Ketones Negative Negative mg/dL    Protein Negative Negative mg/dL    Bilirubin Negative Negative     Urobilinogen, Urine 0.2 Negative    Nitrite Negative Negative    Leukocyte Esterase Trace (A) Negative    Occult Blood Small (A) Negative    Micro Urine Req Microscopic    BTYPE NATRIURETIC PEPTIDE   Result Value Ref Range    B Natriuretic Peptide 82 0 - 100 pg/mL   TROPONIN   Result Value Ref Range    Troponin I <0.01 0.00 - 0.04 ng/mL   APTT   Result Value Ref Range    APTT 33.5 24.7 - 36.0 sec   PROTHROMBIN TIME   Result Value Ref Range    PT 16.6 (H) 12.0 - 14.6 sec    INR 1.37 (H) 0.87 - 1.13   MAGNESIUM   Result Value Ref Range    Magnesium 1.7 1.5 - 2.5 mg/dL   PHOSPHORUS   Result Value Ref Range    Phosphorus 3.8 2.5 - 4.5 mg/dL   ESTIMATED GFR   Result Value Ref Range    GFR If  16 (A) >60 mL/min/1.73 m 2    GFR If Non  14 (A) >60 mL/min/1.73 m 2   URINE MICROSCOPIC (W/UA)   Result Value Ref Range    WBC 0-2 /hpf    RBC 5-10 (A) /hpf    Bacteria Negative None /hpf    Epithelial Cells Negative /hpf    Hyaline Cast 3-5 (A) /lpf   PERIPHERAL SMEAR REVIEW   Result Value Ref Range    Peripheral Smear Review see below    PLATELET ESTIMATE   Result Value Ref Range    Plt Estimation Decreased    MORPHOLOGY   Result Value Ref Range    RBC Morphology Present     Poikilocytosis 2+     Ovalocytes 1+     Tear Drop Cells 1+     Echinocytes 2+    DIFFERENTIAL COMMENT   Result Value Ref Range    Comments-Diff see below         RADIOLOGY/PROCEDURES  CT-HEAD W/O   Final Result      1.  No evidence of acute intracranial process.      2.  Cerebral atrophy as well as periventricular chronic small vessel ischemic change.      DX-CHEST-PORTABLE (1 VIEW)   Final Result         1. No acute cardiopulmonary abnormalities are identified.            COURSE & MEDICAL DECISION MAKING  Pertinent Labs & Imaging studies reviewed. (See chart for details)  Patient with weakness, confusion, malaise, fatigue, workup here shows worsening chronic renal insufficiency, CT scan of the head is negative, chest x-ray is  negative. Discussed the case with the hospitalist for admission to hospital.    FINAL IMPRESSION  1. Confusion    2. Fatigue, unspecified type    3. Weakness            This dictation was created using voice recognition software. The accuracy of the dictation is limited to the abilities of the software. I expect there may be some errors of grammar and possibly content. The nursing notes were reviewed and certain aspects of this information were incorporated into this note.    Electronically signed by: Ganga Castaneda, 12/20/2017 2:29 PM

## 2017-12-20 NOTE — ED NOTES
Chief Complaint   Patient presents with   • ALOC     BIB EMS from home, per EMS home health nurse was concerned that pt did not get OOB today and was confused. pt arrives with open stoma, no ostomy device in place. stoma is red and macerated. pt reports that it is very sore.    • Malaise     EMS reports that pt was initially AAOx1 on scene but became AAOx4 in transport.   Pt placed on monitor.   Chart up for ERP.

## 2017-12-20 NOTE — ED NOTES
from Lab called with critical result of platelet 56 at 1530. Critical lab result read back to .   Dr. carreon notified of critical lab result at 1501.  Critical lab result read back by Dr. carreon.

## 2017-12-21 PROBLEM — N18.4 ACUTE RENAL FAILURE SUPERIMPOSED ON STAGE 4 CHRONIC KIDNEY DISEASE (HCC): Status: ACTIVE | Noted: 2017-01-01

## 2017-12-21 NOTE — CARE PLAN
Problem: Pain Management  Goal: Pain level will decrease to patient's comfort goal    Intervention: Follow pain managment plan developed in collaboration with patient and Interdisciplinary Team  Patient encouraged to verbalize need for pain control; pt verbalizes understanding

## 2017-12-21 NOTE — ASSESSMENT & PLAN NOTE
- Checking ammonia level  - Normally takes rifaximin and lactulose at home, will continue those here

## 2017-12-21 NOTE — ASSESSMENT & PLAN NOTE
"\"- Creatinine somewhat elevated at 3.5 on admission  - Possibly prerenal, will treat with gentle IV fluids\"  Prerenal vs hepatorenal  If not better, could be hepatorenal (worsening of syndrome)  Actually did get better 12/22  Follow up with Roshni Morillo M.D. In 1 week  Follow up with Dr. Andrews in 1 week  Folow up with Dr. Najjar in 1 week  "

## 2017-12-21 NOTE — PROGRESS NOTES
"Pt transferred to [facility] via gurney; walked x2 assist to bed; unsteady gait, weak. Pt extremely hard of hearing without hearing aids. Pt has altered level of consciousness. Alert and oriented x 3. x4 at times. Pt states she was \"scheduled for surgery this week or next week\" regarding her ostomy.    Skin assessment reveals general bruising all over body, namely trunk and extremities. Two remarkable areas needing additional treatment include LLQ stoma--macerated, red and bruised, and at times weeping serosanguinous fluid. Wound consult ordered, wound photographed and uploaded. Second area of concern is her sacrum. Assessment reveals a 1.5 cm by 0.5 cm and 0.2 cm deep stage 2 pressure ulcer present on admission. Wound photographed and a sacral mepilex put in place. Waffle mattress put in place.   "

## 2017-12-21 NOTE — CARE PLAN
Problem: Skin Integrity  Goal: Risk for impaired skin integrity will decrease    Intervention: Assess and monitor skin integrity, appearance and/or temperature  Wound consult ordered; will be initiated this am. Cavilon lotion being applied to LLQ dermatitis and stoma being covered with dry gauze and abd pads. Dressing changes hourly and as needed until alternative treatment is initiated.

## 2017-12-21 NOTE — DISCHARGE PLANNING
Updated Clinical note: via chart review    Update Discharge Planning:  MD to talk to pt about code status. H/P reflects that pt wants DNR but order is Full Code. Pt also needs Rifaximin so I will work on auth.    Upon utilization review, patient noted to be on the following medications that could potentially require prior authorization if prescribed at discharge: Rifaximin.  If it is anticipated that patient will require these medications at discharge, beginning the prescription prior auth process in advance to anticipated discharge could assist in preventing delays when patient is medically cleared to be discharged from the hospital.

## 2017-12-21 NOTE — WOUND TEAM
[facility]  Inpatient Services  Initial Wound and Skin Care Evaluation    Admission Date:   12/20/2017   HPI, PMH, SH: Reviewed  Unit where seen by Wound Team: [id number],[id number]    WOUND CONSULT RELATED TO: POA Pressure injury to coccyx     SUBJECTIVE:  Pleasant, agreeable    Self Report / Pain Level: 10/10 at stoma    OBJECTIVE: rossy    WOUND TYPE, LOCATION, CHARACTERISTICS (Pressure ulcers: location, stage, POA or date identified)    Location and type of wound:  Pressure Ulcer, Stage 2, POA        Periwound:    Intact  Drainage:    min ss  Tissue Type and %:   100% pink  Wound Edges:   open  Odor:     none  Exposed structure(s): none  S&S of Infection:  none      Measurements:         Length:  2cm    Width:   0.3cm    Depth:   0.2      INTERVENTIONS BY WOUND TEAM: assessed sacral/coccyx region, confirmed findings of a stage 2 pressure injury. Orders written for sacral mepilex for treatment with changes every    hours and prn saturation.     Interdisciplinary consultation: RN, MD    EVALUATION: Stage 2 pressure injury to coccyx, mepilex to promote moist wound healing and provide protection.   Factors affecting ound healing: pressure, friction, shear  Goals: wound to decrease in size by 5% per week    NURSING PLAN OF CARE ORDERS (X):    Dressing changes: See Dressing Maintenance orders: x  Skin care: See Skin Care orders:   Rectal tube care: See Rectal Tube Care orders:   Other orders:    RSKIN: CURRENT (X) ORDERED (O)  Q shift Wan:  X  Q shift pressure point assessments:  X  Pressure redistribution mattress     x   MASON      Bariatric MASON      Bariatric foam        Heel float boots       Heels floated on pillows      Barrier wipes      Barrier Cream      Barrier paste      Sacral silicone dressing      Silicone O2 tubing      Anchorfast      Trach with Optifoam split foam       Waffle cushion      Rectal tube or BMS      Antifungal tx    Turn q 2 hours o    Up to chair     Ambulate   PT/OT     Dietician       PO x    TF   TPN     PVN    NPO   # days   Other       WOUND TEAM PLAN OF CARE (X):   NPWT change 3 x week:        Dressing changes by wound team:       Follow up as needed: x      Other (explain):    Anticipated discharge plans (X):  SNF:           Home Care:        x   Outpatient Wound Center:            Self Care:            Other:               [facility]   Inpatient Services   Established Ostomy Management/ troubleshooting  HPI: Reviewed  PMH: Reviewed   SH: Reviewed   Reason for Ostomy nurse consult:  Patient unable to keep pouch on stoma  Subjective: patient reports she has had ostomy since 1984  Objective: no appliance over stoma, driflow pad being used to collect stool  Ostomy type: ileostomy  Stoma location: LLQ  Stoma assessment:    Appearance:  pink   Size:   1 inch   Protrusion:     flush   MC jxn:    Peristomal skin:  pyoderma    Ostomy Appliance (type and size):  Coloplast 2 3/4 2 Piece with 2 inch paste ring, hfb, brava strips x3  Assessment: Patient reports she has been unable to keep appliance on for over one month. Patient has a purple leasion to peristomal skin consistent with pyoderma.   Interventions:  Cleansed skin with warm water and patted dry. Crusted peristomal skin with stoma powder and no sting skin prep. Applied hydrofera blue ring to peristomal wound, secured with brava strips x3, applied paste ring over brava strips. Cut barrier to fit and placed to skin, applied pouch.   Pt education: Questions and concerns addressed.  Plan: Ostomy nurses to follow up on outcomes of pouching interventions. Patient will dc with home care who will continue ostomy care post dc.  Anticipated discharge needs:tbd

## 2017-12-21 NOTE — ASSESSMENT & PLAN NOTE
Does have h/o Crohns, autoimmune hepatitis or PBC/PSC possible.  Follow up with Roshni Morillo M.D. In 1 week  Follow up with Dr. Andrews in 1 week

## 2017-12-21 NOTE — RESPIRATORY CARE
COPD EDUCATION by COPD CLINICAL EDUCATOR  12/21/2017 at 8:07 AM by Sulema Barragan     Patient reviewed by COPD education team. Patient does not qualify for COPD program.

## 2017-12-21 NOTE — PROGRESS NOTES
Dressing left alone while pt slept. After 3 hours a complete bed change was in order. Stool leaked under gauze and abd pad, ran down pt's right side and began pooling on draw sheet pad. Linens changed and hourly dressing changes initiated. Pt seems comfortable after being cleaned and maintains a strong positive attitude. Alert and oriented x 4.

## 2017-12-21 NOTE — ASSESSMENT & PLAN NOTE
Presented with altered mentation  2 months ago was hospitalized for hepatic encephalopathy and UTI, discharged home with plan for assisted living; had [facility] before.  Elevated ammonia level  On lactulose, zinc and rifaximin  Ensure he has at least 2-3 BMs a day   Switch her to hepatic diet  Discussed with Dr. Alegre, who works with Dr. Tomlin, her GI doctor. At this time she is maximized on her medications, therefore will need to be on rifaximin. If prior authorzation can;t be obtained inpatient, she is to [facility].  At discharge date, mentation baseline, afebrile, hemodynamically stable  Follow up with Roshni Morillo M.D. In 1 week  Follow up with Dr. Andrews in 1 week

## 2017-12-21 NOTE — DOCUMENTATION QUERY
"DOCUMENTATION QUERY    PROVIDERS: Please select “Cosign w/ note”to reply to query.    To better represent the severity of illness of your patient, please review the following information and exercise your independent professional judgment in responding to this query. Please reference the information at the bottom of this query for clinical criteria to support CKD staging per [employer].     \"Chronic renal failure\" is documented in the History and Physical. Based upon the clinical findings, risk factors, and treatment, can this diagnosis be further specified?     • Chronic Kidney Disease Stage I      • Chronic Kidney Disease Stage II     • Chronic Kidney Disease Stage III    • Chronic Kidney Disease Stage IV    • Chronic Kidney Disease Stage V     • End Stage Renal Disease  • Other explanation of clinical findings  • Unable to determine     The medical record reflects the following:   Clinical Findings - BUN/Cr on this admission was 47/3.29  - GFR on this admission was 14  - BUN/Cr on 10/12/17 was 47/2.59  - GFR on 10/12/17 was 18   Treatment - NS Infusion  - Zinc Sulfate  - Potassium Chloride  - Ferrous Gluconate  - Sodium Bicarbonate  - CBC / BMP   Risk Factors - dx ORAL  - hx CKD IV noted on prior admissions  - hx Anemia   Location within medical record  History and Physical, Lab Results and MAR.     Thank you,     Alex Barlow  Clinical   P: [phone]                "

## 2017-12-21 NOTE — DOCUMENTATION QUERY
"DOCUMENTATION QUERY    PROVIDERS: Please select “Cosign w/ note”to reply to query.    To better represent the severity of illness of your patient, please review the following information and exercise your independent professional judgment in responding to this query.     \"ALOC\" is documented in the History and Physical. Based upon the clinical findings, risk factors, and treatment, can this diagnosis be further specified?    • Acute encephalopathy (if so, please specify type [metabolic, hepatic, toxic, alcoholic, etc.])  • Psychosis (if so, please specify type [acute hysterical, alcoholic, etc.])  • Delirium (if so, please specify underlying cause [alcohol intoxication, alcohol withdrawal, multiple etiologies, unknown etiology])   • Other explanation of clinical findings  • Unable to determine    The medical record reflects the following:   Clinical Findings - Patient noted to be confused with altered mental status in H&P  - \"Unable to provide much history\" noted in H&P  - Ammonia: 76 on this admission   Treatment - CT Head  - CXR  - CBC / BMP   - Lactulose   Risk Factors - dx Acute on Chronic Renal Failure  - dx Pancytopenia  - hx Anemia  - hx Liver Cirrhosis  - hx Crohn's  - hx COPD requiring nocturnal O2   Location within medical record  History and Physical, Lab Results and MAR.     Thank you,     Alex Barlow  Clinical   P: [phone]    "

## 2017-12-21 NOTE — H&P
Hospital Medicine History and Physical      Date of Service  12/20/2017    Chief Complaint  Chief Complaint   Patient presents with   • ALOC     BIB EMS from home, per EMS home health nurse was concerned that pt did not get OOB today and was confused. pt arrives with open stoma, no ostomy device in place. stoma is red and macerated. pt reports that it is very sore.    • Malaise       History of Presenting Illness  Coby is a 77 y.o. female w/h/oArthritis, anemia, cirrhosis, COPD, TRAVIS on nocturnal O2 who presents with altered mental status. Patient was seen by home health. The nurse so that patient did not get out of bed today was confused. Patient also had no ostomy device on her open stoma and was confused and thus was brought to the hospital. On the way to the hospital she became more alert. She is unable to provide much history.    Primary Care Physician  Roshni Morillo M.D.    Code Status  DNR per patient Who says she also has this on the side of her fridge    Review of Systems  Review of Systems   Unable to perform ROS: Mental status change   Constitutional: Negative for chills and fever.   Respiratory: Negative for cough, shortness of breath and wheezing.    Cardiovascular: Negative for chest pain.   Gastrointestinal: Negative for constipation, diarrhea, nausea and vomiting.   Neurological: Negative for headaches.      Past Medical History  Past Medical History:   Diagnosis Date   • Arthritis 12/30/16    to hands and feet   • Anemia 12/30/16    unknown etiology   • Cirrhosis of liver (CMS-HCC) 12/30/16    states dx at one time but no treatment for >10yrs   • COPD (chronic obstructive pulmonary disease) (CMS-[facility]) 3/20/2013   • Bowel obstruction    • Breath shortness     uses O2@ at night and prn ([employer]). Uses inhaler prn. 12/30/16-reports no changes    • Cataract 2006,2007    bilat phaco with IOL   • COPD (chronic obstructive pulmonary disease) (CMS-[facility])     per pt   • Crohn's disease (CMS-[facility])    •  "Emphysema of lung (CMS-HCC)     COPD   • Heart burn    • Hemorrhagic disorder (CMS-HCC)     anemia of unkown etiology.   • Ileostomy in place (CMS-HCC)    • Indigestion    • Obstruction    • Occasional tremors    • TRAVIS on CPAP     10/2016-CPAP DC'd and wears O2 @4L/NC   • Renal disorder     Increased creatitine level due to meds.   • Sleep apnea    • Snoring        Surgical History  Past Surgical History:   Procedure Laterality Date   • GASTROSCOPY N/A 1/13/2017    Procedure: GASTROSCOPY - ENTEROSCOPY PUSH;  Surgeon: Boni Brooks M.D.;  Location: SURGERY [facility];  Service:    • GASTROSCOPY-ENDO N/A 10/5/2016    Procedure: GASTROSCOPY-ENDO;  Surgeon: Aravind Roman M.D.;  Location: ENDOSCOPY [facility];  Service:    • GASTROSCOPY WIITHSAVARY DILATATION  9/28/2016    Procedure: GASTROSCOPY WIITH SAVARY DILATATION;  Surgeon: Aftab Alegre M.D.;  Location: ENDOSCOPY [facility];  Service: Gastroenterology   • GASTROSCOPY W/PUSH ENTERSCOPY  9/28/2016    Procedure: GASTROSCOPY W/PUSH ENTERSCOPY;  Surgeon: Aftab Alegre M.D.;  Location: ENDOSCOPY [facility];  Service:    • SIGMOIDOSCOPY FLEX  9/27/2016    Procedure: SIGMOIDOSCOPY FLEX;  Surgeon: Aftab Alegre M.D.;  Location: [facility];  Service:    • GASTROSCOPY-ENDO  9/27/2016    Procedure: GASTROSCOPY-ENDO;  Surgeon: Aftab Alegre M.D.;  Location: ENDOSCOPY [facility];  Service:    • CHOLECYSTECTOMY  2013    \"burst\"   • RECOVERY  6/21/2010    Performed by SURGERY, IR-RECOVERY at SURGERY SAME DAY [facility]   • ILEOSTOMY  2010    moved to left side   • UMBILICAL HERNIA REPAIR  2000   • OTHER SURGICAL PROCEDURE  1994    closed off rectum    • BOWEL RESECTION  1992    with ileostomy (right side)   • COLONOSCOPY      Hx of  several        Medications  No current facility-administered medications on file prior to encounter.      Current Outpatient Prescriptions on File Prior to Encounter "   Medication Sig Dispense Refill   • potassium Chloride ER (K-TAB) 20 MEQ Tab CR tablet Take 1 Tab by mouth 4 times a day. 120 Tab 3   • zinc sulfate (ZINCATE) 220 (50 Zn) MG Cap Take 220 mg by mouth every day.     • omeprazole (PRILOSEC) 20 MG delayed-release capsule Take 2 Caps by mouth every day. 30 Cap 3   • rifaximin (XIFAXAN) 550 MG Tab tablet Take 1 Tab by mouth 2 Times a Day. 42 Tab 0   • sodium bicarbonate (SODIUM BICARBONATE) 650 MG Tab Take 3 Tabs by mouth 3 times a day. 120 Tab 1   • ferrous gluconate (FERGON) 324 (38 FE) MG Tab Take 324 mg by mouth every morning with breakfast.     • vitamin D (CHOLECALCIFEROL) 1000 UNIT Tab Take 1,000 Units by mouth every day.       Family History  No family history on file.      Social History  Social History   Substance Use Topics   • Smoking status: Former Smoker     Years: 50.00     Types: Cigarettes, Cigars     Quit date: 3/30/2013   • Smokeless tobacco: Never Used      Comment: 50yrs 1.5 ppd quit    • Alcohol use No      Comment: socially       Allergies  Allergies   Allergen Reactions   • Sulfa Drugs Hives and Itching        Physical Exam  Laboratory   Hemodynamics  Temp (24hrs), Av.8 °C (98.3 °F), Min:36.6 °C (97.8 °F), Max:37.1 °C (98.7 °F)   Temperature: 36.6 °C (97.8 °F)  Pulse  Av.3  Min: 67  Max: 96 Heart Rate (Monitored): 89  Blood Pressure : 110/53, NIBP: 110/39      Respiratory      Respiration: 19, Pulse Oximetry: 98 %             Physical Exam   Constitutional: She appears well-developed and well-nourished.   HENT:   Head: Normocephalic.   Right Ear: External ear normal.   Left Ear: External ear normal.   Nose: Nose normal.   Eyes: Conjunctivae and EOM are normal. Pupils are equal, round, and reactive to light. Right eye exhibits no discharge. Left eye exhibits no discharge. No scleral icterus.   Neck: Neck supple. No tracheal deviation present.   Cardiovascular: Normal rate.  Exam reveals no gallop and no friction rub.     Pulmonary/Chest: No respiratory distress. She has no wheezes. She has no rales.   Abdominal: Soft. She exhibits no distension. There is tenderness. There is no rebound and no guarding.   Stoma red and macerated   Musculoskeletal: She exhibits edema (1+).   Neurological: She is alert.   Confused   Skin: Skin is warm and dry. No rash noted. No erythema.   Psychiatric: She has a normal mood and affect.       Recent Labs      12/20/17   1410   WBC  3.0*   RBC  2.81*   HEMOGLOBIN  8.3*   HEMATOCRIT  27.6*   MCV  98.2*   MCH  29.5   MCHC  30.1*   RDW  66.5*   PLATELETCT  56*     Recent Labs      12/20/17   1410   SODIUM  142   POTASSIUM  5.0   CHLORIDE  120*   CO2  13*   GLUCOSE  104*   BUN  47*   CREATININE  3.29*   CALCIUM  8.3*     Recent Labs      12/20/17   1410   ALTSGPT  26   ASTSGOT  50*   ALKPHOSPHAT  353*   TBILIRUBIN  1.2   GLUCOSE  104*     Recent Labs      12/20/17   1410   APTT  33.5   INR  1.37*     Recent Labs      12/20/17   1410   BNPBTYPENAT  82         Lab Results   Component Value Date    TROPONINI <0.01 12/20/2017       Imaging  CT-HEAD W/O   Final Result      1.  No evidence of acute intracranial process.      2.  Cerebral atrophy as well as periventricular chronic small vessel ischemic change.      DX-CHEST-PORTABLE (1 VIEW)   Final Result         1. No acute cardiopulmonary abnormalities are identified.         Assessment/Plan     I anticipate this patient will require at least two midnights for appropriate medical management, necessitating inpatient admission.    Altered mental status- (present on admission)   Assessment & Plan    - Checking ammonia level  - Normally takes rifaximin and lactulose at home, will continue those here          DNR (do not resuscitate)- (present on admission)   Assessment & Plan    - Patient says this is posted on the side of her fridge        Acute on chronic renal failure (CMS-HCC)- (present on admission)   Assessment & Plan    - Creatinine somewhat elevated at 3.5  on admission  - Possibly prerenal, will treat with gentle IV fluids        Pancytopenia (CMS-HCC)- (present on admission)   Assessment & Plan    - Chronic  - Mild, no significant bleeding              Prophylaxis:  SCDs

## 2017-12-22 NOTE — PROGRESS NOTES
[facility] Hospitalist Progress Note    Date of Service: 2017    Chief Complaint  77 y.o. female admitted 2017 with ALOC (BIB EMS from home, per EMS home health nurse was concerned that pt did not get OOB today and was confused. pt arrives with open stoma, no ostomy device in place. stoma is red and macerated. pt reports that it is very sore. ) and Malaise        Interval Problem Update  She is definitely clear now. She mentions she is unable to fill her rifaximin due to insurance issues and she feels this is the cause.    Consultants/Specialty      Disposition  PT/OT ordered.        Review of Systems   Unable to perform ROS: Medical condition      Physical Exam  Laboratory/Imaging   Hemodynamics  Temp (24hrs), Av.4 °C (97.6 °F), Min:36.1 °C (97 °F), Max:36.7 °C (98 °F)   Temperature: 36.7 °C (98 °F)  Pulse  Av.7  Min: 67  Max: 96    Blood Pressure : 114/47, NIBP: 110/39      Respiratory      Respiration: 17, Pulse Oximetry: 100 %             Fluids    Intake/Output Summary (Last 24 hours) at 17 1839  Last data filed at 17 1831   Gross per 24 hour   Intake             1080 ml   Output              500 ml   Net              580 ml       Nutrition  Orders Placed This Encounter   Procedures   • Diet Order     Standing Status:   Standing     Number of Occurrences:   1     Order Specific Question:   Diet:     Answer:   Hepatic [9]     Comments:   limit animal protein     Order Specific Question:   Diet:     Answer:   Regular [1]     Physical Exam   Constitutional: She appears well-developed and well-nourished.   fatigued   HENT:   Head: Normocephalic and atraumatic.   Eyes: Conjunctivae and EOM are normal. No scleral icterus.   Neck: Normal range of motion. Neck supple.   Cardiovascular: Normal rate and regular rhythm.  Exam reveals no gallop and no friction rub.    No murmur heard.  Pulmonary/Chest: Effort normal and breath sounds normal. No respiratory distress. She has no wheezes. She has  no rales.   Abdominal: Soft. Bowel sounds are normal. She exhibits no distension. There is tenderness (mild). There is no rebound and no guarding.   Stoma red and macerated    Musculoskeletal: She exhibits no edema or tenderness.   Neurological: She is alert.   Skin: Skin is warm.   Psychiatric: She has a normal mood and affect. Her behavior is normal.       Recent Labs      12/20/17   1410  12/21/17   0241   WBC  3.0*  3.6*   RBC  2.81*  2.65*   HEMOGLOBIN  8.3*  7.8*   HEMATOCRIT  27.6*  26.2*   MCV  98.2*  98.9*   MCH  29.5  29.4   MCHC  30.1*  29.8*   RDW  66.5*  66.7*   PLATELETCT  56*  37*     Recent Labs      12/20/17   1410  12/21/17   0241   SODIUM  142  140   POTASSIUM  5.0  4.8   CHLORIDE  120*  119*   CO2  13*  13*   GLUCOSE  104*  109*   BUN  47*  47*   CREATININE  3.29*  3.15*   CALCIUM  8.3*  8.1*     Recent Labs      12/20/17   1410  12/21/17   1023   APTT  33.5   --    INR  1.37*  1.39*     Recent Labs      12/20/17   1410   BNPBTYPENAT  82              Assessment/Plan     * Hepatic encephalopathy (CMS-HCC)- (present on admission)   Assessment & Plan    Presented with altered mentation  2 months ago was hospitalized for hepatic encephalopathy and UTI, discharged home with plan for assisted living; had [facility] before.  Elevated ammonia level  On lactulose, zinc and rifaximin  Ensure he has at least 2-3 BMs a day   Switch her to hepatic diet  Discussed with Dr. Alegre, who works with Dr. Tomlin, her GI doctor. At this time she is maximized on her medications, therefore will need to be on rifaximin., If prior authorzation can;t be obtained inpatient, she is to [facility] office.        BAKER (nonalcoholic steatohepatitis)- (present on admission)   Assessment & Plan    Does have h/o Crohns, autoimmune hepatitis or PBC/PSC possible.        DNR (do not resuscitate)- (present on admission)   Assessment & Plan    - Patient says this is posted on the side of her fridge        Acute renal failure superimposed  "on stage 4 chronic kidney disease (CMS-HCC)- (present on admission)   Assessment & Plan    \"- Creatinine somewhat elevated at 3.5 on admission  - Possibly prerenal, will treat with gentle IV fluids\"  Prerenal vs hepatorenal  If not better, could be hepatorenal (worsening of syndrome)  Follow [facility], will consult them        Pancytopenia (CMS-HCC)- (present on admission)   Assessment & Plan    - Chronic  - Mild, no significant bleeding          I spent 38 minutes, reviewing the chart, notes, vitals, labs, imaging, ordering labs, evaluating Cristina Tenorio for assessment, enacting the plan above. 50% of the time was spent in counseling Cristina Tenorio and answering questions. Discussed with SW Medical decision making is therefore complex. Time was devoted to counseling and coordinating care including review of records, pertinent lab data and studies, as well as discussing diagnostic evaluation and work up, planned therapeutic interventions and future disposition of care. Where indicated, the assessment and plan reflect discussion of patient with consultants, other healthcare providers, family members, and additional research needed to obtain further information in formulating the plan of care for Cristina Tenorio.       DVT prophylaxis - mechanical:  SCDs        "

## 2017-12-22 NOTE — PROGRESS NOTES
Spoke to Dr. Graves regarding hgb and platelet level.  No orders noted.  Pt. Has no complaints of shortness of breath or dizziness at this time.  Will continue to monitor

## 2017-12-22 NOTE — PROGRESS NOTES
Romina Morrissey Fall Risk Assessment:     Last Known Fall: Within the last year  Mobility: Immobilized/requires assist of one person  Medications: No meds  Mental Status/LOC/Awareness: Awake, alert, and oriented to date, place, and person  Toileting Needs: Use of assistive device (Bedside commode, bedpan, urinal), Incontinence  Volume/Electrolyte Status: Use of IV fluids/tube feeds  Communication/Sensory: Visual (Glasses)/hearing deficit  Behavior: Appropriate behavior  Romina Morrissey Fall Risk Total: 14  Fall Risk Level: MODERATE RISK    Universal Fall Precautions:       Fall Risk Level Interventions:    TRIAL (TELE 8, NEURO, MED RADHA 5) Moderate Fall Risk Interventions  Place yellow fall risk ID band on patient: completed  Provide patient/family education based on risk assessment : completed  Educate patient/family to call staff for assistance when getting out of bed: completed  Place fall precaution signage outside patient door: completed  Utilize bed/chair fall alarm: completed     Patient Specific Interventions:     Medication: review medications with patient and family  Mental Status/LOC/Awareness: reorient patient, reinforce falls education, utilize bed/chair fall alarm and reinforce the use of call light  Toileting: instruct patient/family on the need to call for assistance when toileting  Volume/Electrolyte Status: ensure patient remains hydrated and monitor abnormal lab values  Communication/Sensory: update plan of care on whiteboard  Behavioral: not applicable  Mobility: ensure bed is locked and in lowest position, provide appropriate assistive device and instruct patient to exit bed on their strongest side

## 2017-12-22 NOTE — DISCHARGE PLANNING
Updated Clinical note: via chart review    Update Discharge Planning:  I faxed the prescription to [facility] at [address] and was told by the Pharmacist that the copay is $405.00 a month. Pt said that she cannot afford the copay.     Talked to Alyx at Dr. Paul's nurse named Alyx. She said that pt is enrolled under the pt assistance program under [facility] for the Xifaxan. Per Alyx, pt has been enrolled in September and it is effective until Dec 31. So pt has to enroll again for the 2018 patient assistance.    I called [phone] and was transferred to the [facility] patient assistance. I obtained the application form from the website and assisted  pt with completing her part of the application. I will ask MD to complete his part.      Addendum:  TATI completed [employer] application and I faxed to [phone].  Received complete/successful fax confirmation to [facility].   Called pt assistance  to confirm receipt of application.   Anne from [facility] confirmed   Addendum:  Received choice for [facility] .  Pt said that she does not enough supply of the Xifaxan at home.     Addendum:  Anne from [email or web address] at [phone] said that they will mail the order to pt today and she will get her medication in 2-3 business days.  The mail will go to [address] [place] [address]. Which I also confirmed with pt.   Received approval from  manager for assistance to pay for 4 days supply of the Rifaximin. Faxed to [facility] pharmacy.     Addendum:  Pharmacist from [facility] said that Rifaximin is $94.48. CM manager aware and approved assistance. Gave map, prescription and   Instructions to pt. Pt said that her friend will pick her up.

## 2017-12-22 NOTE — CARE PLAN
Problem: Pain Management  Goal: Pain level will decrease to patient's comfort goal    Intervention: Follow pain managment plan developed in collaboration with patient and Interdisciplinary Team  Educated pt. On pain medications--due to elevated liver enzymes and kidney disease--informed pt that she will be unable to have Tylenol at this time as it is hepatotoxic medication--pt. Verbalized understanding.  No complaints of pain at this time.  Will continue to monitor

## 2017-12-22 NOTE — DISCHARGE PLANNING
Call placed to with Esper[facility] CHOU, patient has been be accepted for  services. Patient will be seen Saturday but no later then Sunday.     MELLY Ward has been notified.

## 2017-12-22 NOTE — CARE PLAN
Problem: Skin Integrity  Goal: Risk for impaired skin integrity will decrease    Intervention: Assess risk factors for impaired skin integrity and/or pressure ulcers  Pt. Has open to air left lower quadrant stoma--bleeding and ulcerations noted.  Ostomy RN placed ostomy bag--intact at this time.  No signs of leaking noted.

## 2017-12-22 NOTE — FACE TO FACE
Face to Face Supporting Documentation - Home Health    The encounter with this patient was in whole or in part the primary reason for home health admission.    Date of encounter:   Patient:                    MRN:                       YOB: 2016  Cristina Tenorio  [id number]  1940     Home health to see patient for:  Skilled Nursing care for assessment, interventions & education  PT/OT  Skilled need for:  Medication Management contue HHC as at home    Skilled nursing interventions to include:  Comment: continue home health care as she was    Homebound status evidenced by:  Needs the assistance of another person in order to leave the home. Leaving home requires a considerable and taxing effort. There is a normal inability to leave the home.    Community Physician to provide follow up care: Roshni Morillo M.D.     Optional Interventions? No      I certify the face to face encounter for this home health care referral meets the CMS requirements and the encounter/clinical assessment with the patient was, in whole, or in part, for the medical condition(s) listed above, which is the primary reason for home health care. Based on my clinical findings: the service(s) are medically necessary, support the need for home health care, and the homebound criteria are met.  I certify that this patient has had a face to face encounter by myself.  Myke Magana M.D. - NPI: [id number]

## 2017-12-22 NOTE — PROGRESS NOTES
Colostomy appliance was leaking. Appliance was taken off and stoma site has been cleaned and is being reinforced with gauze, abd pad, and driflo pad. Will continue to monitor and change. Pt stated from wound care for nursing to take off appliance and reinforce it until wound care can come to put the appliance on.

## 2017-12-22 NOTE — CARE PLAN
Problem: Safety  Goal: Will remain free from injury  Outcome: PROGRESSING AS EXPECTED  Bed on lowest position, call light within reach, patient educated about use of call light and safety precautions.     Problem: Skin Integrity  Goal: Risk for impaired skin integrity will decrease  Outcome: PROGRESSING AS EXPECTED   Left lower ostomy draining yellow-brown stool. Skin around area is red and macerated. Wound care is following.

## 2017-12-22 NOTE — PROGRESS NOTES
Mercedes from Lab called with critical result of Platelets of 36 at 0556. Critical lab result read back to Mercedes.   This critical lab result is within parameters established by Hospitalist for this patient.     Will continue to monitor

## 2017-12-22 NOTE — PROGRESS NOTES
Received report from Rn.  Pt. Is alert, awake, and oriented to self, time, date, and situation.  Pt. Is lying in semi-toth's position, non labored breathing on room air, no complaints of shortness of breath, chest pain, nausea or vomiting at this time.  Educated pt. Need to call for assistance prior to getting up, pt. Verbalized understanding.  Call light and belongings within reach.  Will continue to monitor

## 2017-12-22 NOTE — DISCHARGE SUMMARY
CHIEF COMPLAINT ON ADMISSION  Chief Complaint   Patient presents with   • ALOC     BIB EMS from home, per EMS home health nurse was concerned that pt did not get OOB today and was confused. pt arrives with open stoma, no ostomy device in place. stoma is red and macerated. pt reports that it is very sore.    • Malaise       CODE STATUS  DNR    HPI & HOSPITAL COURSE  Please review Dr. Star Salcido M.D. notes for further details of history of present illness, past medical/social/family histories, allergies and medications.    * Hepatic encephalopathy (CMS-HCC)- (present on admission)   Assessment & Plan    Presented with altered mentation  2 months ago was hospitalized for hepatic encephalopathy and UTI, discharged home with plan for assisted living; had HHC before.  Elevated ammonia level  When I inherited her care, her mentation is baseline and see is sharp. She blamed her maladies for not having her rifaximin.  On lactulose, zinc and rifaximin  Ensure she has at least 2-3 BMs a day   Switch her to hepatic diet  Discussed with Dr. Alegre, who works with Dr. Tomlin, her GI doctor. At this time she is maximized on her medications, therefore will need to be on rifaximin. If prior authorzation can;t be obtained inpatient, she is to franca their office.  At discharge date, mentation baseline, afebrile, hemodynamically stable  Discharge and continue home health care  Follow up with Roshni Morillo M.D. In 1 week  Follow up with Dr. Andrews in 1 week  Follow up on wound clinic for ostomy care        BAKER (nonalcoholic steatohepatitis)- (present on admission)   Assessment & Plan    Does have h/o Crohns, autoimmune hepatitis or PBC/PSC possible.  Follow up with Roshni Morillo M.D. In 1 week  Follow up with Dr. Andrews in 1 week        DNR (do not resuscitate)- (present on admission)   Assessment & Plan    - Patient says this is posted on the side of her fridge        Acute renal failure superimposed on stage 4  "chronic kidney disease (CMS-HCC)- (present on admission)   Assessment & Plan    \"- Creatinine somewhat elevated at 3.5 on admission  - Possibly prerenal, will treat with gentle IV fluids\"  Prerenal vs hepatorenal  If not better, could be hepatorenal (worsening of syndrome)  Actually did get better 12/22  Follow up with Roshni Morillo M.D. In 1 week  Follow up with Dr. Andrews in 1 week  Folow up with Dr. Najjar in 1 week        Pancytopenia (CMS-HCC)- (present on admission)   Assessment & Plan    - Chronic  - Mild, no significant bleeding  Secondary to liver disease.              Discharge Physical Exam  Constitutional: She appears well-developed and well-nourished.   fatigued   HENT:   Head: Normocephalic and atraumatic.   Eyes: Conjunctivae and EOM are normal. No scleral icterus.   Neck: Normal range of motion. Neck supple.   Cardiovascular: Normal rate and regular rhythm.  Exam reveals no gallop and no friction rub.    No murmur heard.  Pulmonary/Chest: Effort normal and breath sounds normal. No respiratory distress. She has no wheezes. She has no rales.   Abdominal: Soft. Bowel sounds are normal. She exhibits no distension. There is no tenderness now. There is no rebound and no guarding.   Unchanged stoma  Musculoskeletal: She exhibits no edema or tenderness.   Neurological: She is alert.   Skin: Skin is warm.   Psychiatric: She has a normal mood and affect. Her behavior is normal.       Therefore, she is discharged in fair and stable condition with close outpatient follow-up.    SPECIFIC OUTPATIENT FOLLOW-UP  Follow up with Roshni Morillo M.D. In 1 week  Follow up with Dr. Andrews in 1 week  Follow up Dr. Najjar in 1 week  Follow up on wound clinic for ostomy care    DISCHARGE PROBLEM LIST  Principal Problem:    Hepatic encephalopathy (CMS-HCC) POA: Yes  Active Problems:    BAKER (nonalcoholic steatohepatitis) POA: Yes    Pancytopenia (CMS-HCC) POA: Yes    Acute renal failure superimposed on stage 4 " chronic kidney disease (CMS-HCC) POA: Yes    DNR (do not resuscitate) (Chronic) POA: Yes  Resolved Problems:    * No resolved hospital problems. *      FOLLOW UP  No future appointments.  No follow-up provider specified.   Follow up with Roshni Morillo M.D. In 1 week  Follow up with Dr. Andrews in 1 week  Follow up Dr. Najjar in 1 week  Follow up on wound clinic for ostomy care    MEDICATIONS ON DISCHARGE   Cristina Tenorio   Home Medication Instructions VANDANA:[id number]    Printed on:12/22/17 0996   Medication Information                      ferrous gluconate (FERGON) 324 (38 FE) MG Tab  Take 324 mg by mouth every morning with breakfast.             lactulose 10 GM/15ML Solution  Take 20 g by mouth every morning.             omeprazole (PRILOSEC) 20 MG delayed-release capsule  Take 2 Caps by mouth every day.             potassium Chloride ER (K-TAB) 20 MEQ Tab CR tablet  Take 1 Tab by mouth 4 times a day.             rifaximin (XIFAXAN) 550 MG Tab tablet  Take 1 Tab by mouth 2 Times a Day.             sodium bicarbonate (SODIUM BICARBONATE) 650 MG Tab  Take 3 Tabs by mouth 3 times a day.             vitamin D (CHOLECALCIFEROL) 1000 UNIT Tab  Take 1,000 Units by mouth every day.             zinc sulfate (ZINCATE) 220 (50 Zn) MG Cap  Take 220 mg by mouth every day.                 DIET  Orders Placed This Encounter   Procedures   • Diet Order     Standing Status:   Standing     Number of Occurrences:   1     Order Specific Question:   Diet:     Answer:   Hepatic [9]     Comments:   limit animal protein     Order Specific Question:   Diet:     Answer:   Regular [1]       ACTIVITY  As per home health PT/OT    CONSULTATIONS  Spke with Dr. Alegre, GI    PROCEDURES  Ct-head W/o    Result Date: 12/20/2017 12/20/2017 4:18 PM HISTORY/REASON FOR EXAM: Altered mental status. TECHNIQUE/EXAM DESCRIPTION AND NUMBER OF VIEWS: CT of the head without contrast. Up to date radiation dose reduction adjustments have been  utilized to meet ALARA standards for radiation dose reduction. COMPARISON: None available FINDINGS: There is no evidence of mass or mass effect. CSF spaces are prominent. There is moderate periventricular chronic small vessel ischemic change present. There is no intracranial hemorrhage seen. The calvarium is intact. There is no scalp injury. There is no evidence of sinus disease.     1.  No evidence of acute intracranial process. 2.  Cerebral atrophy as well as periventricular chronic small vessel ischemic change.    Dx-chest-portable (1 View)    Result Date: 12/20/2017 12/20/2017 2:10 PM HISTORY/REASON FOR EXAM:  Sepsis TECHNIQUE/EXAM DESCRIPTION AND NUMBER OF VIEWS: Single portable view of the chest. COMPARISON: 9/25/2017 FINDINGS: No pulmonary infiltrates or consolidations are noted. No pleural effusion. No pneumothorax. Stable cardiopericardial silhouette.     1. No acute cardiopulmonary abnormalities are identified.    Us-renal    Result Date: 12/21/2017 12/21/2017 3:12 PM HISTORY/REASON FOR EXAM:  Abnormal Labs TECHNIQUE/EXAM DESCRIPTION: Renal ultrasound. COMPARISON:  None FINDINGS: The right kidney measures 10.40 cm.  The right kidney appears normal in contour and parenchymal echotexture. The corticomedullary differentiation is preserved. The right renal collecting system is not dilated. There are no renal calculi or masses. The left kidney measures 7.31 cm. The left kidney appears small and echogenic. The left renal collecting system is not dilated. There is a 6.0 x 6.3 x 4.4 cm cyst in the lower pole left kidney. The bladder demonstrates no focal wall abnormality.     Unremarkable right kidney. Atrophic left kidney with large cyst in the lower pole left kidney.      LABORATORY  Lab Results   Component Value Date/Time    SODIUM 139 12/22/2017 03:36 AM    POTASSIUM 4.2 12/22/2017 03:36 AM    CHLORIDE 120 (H) 12/22/2017 03:36 AM    CO2 11 (L) 12/22/2017 03:36 AM    GLUCOSE 89 12/22/2017 03:36 AM    BUN 41  (H) 12/22/2017 03:36 AM    CREATININE 2.72 (H) 12/22/2017 03:36 AM        Lab Results   Component Value Date/Time    WBC 4.1 (L) 12/22/2017 03:36 AM    HEMOGLOBIN 7.9 (L) 12/22/2017 03:36 AM    HEMATOCRIT 26.4 (L) 12/22/2017 03:36 AM    PLATELETCT 36 (LL) 12/22/2017 03:36 AM        Total time of the discharge process exceeds 40 minutes

## 2017-12-22 NOTE — PROGRESS NOTES
Assumed care at 1900. Received report from RN. Patient is AOx4. Patient is hard of hearing. Patient is sitting up in bed and appears calm and in no distress. Assessment complete. Labs reviewed. Patient and RN discussed plan of care. Patient questions answered. Patient needs are met at this time. Bed in lowest and locked position. Call light is within reach. Hourly rounding in place. /54   Pulse 89   Temp 36.2 °C (97.1 °F)   Resp 16   Ht 1.524 m (5')   Wt 70.5 kg (155 lb 6.8 oz)   LMP 06/29/1995   SpO2 100%   BMI 30.35 kg/m²

## 2017-12-23 NOTE — WOUND TEAM
[facility]   Inpatient Services   Established Ostomy Management/ troubleshooting  HPI: Reviewed  PMH: Reviewed   SH: Reviewed   Reason for Ostomy nurse consult:  Patient unable to keep pouch on stoma, difficult to pouch,   Subjective: patient reports she has had ostomy since 1984  Objective: no appliance over stoma, driflow pad being used to collect stool, previous appliance leaked.  Ostomy type: ileostomy  Stoma location: Q  Stoma assessment:    Appearance:  pink   Size:   1 inch   Protrusion:     flush   MC jxn: reoslved   Peristomal skin:  pyoderma    Ostomy Appliance (type and size):  Coloplast 2 3/4 2 Piece with 2 inch paste ring, hfb, brava strips x3  Assessment: Patient reports she has been unable to keep appliance on for over one month. Patient has purple lesions to peristomal skin consistent with pyoderma.   Interventions:  Cleansed skin with warm water and patted dry. Crusted peristomal skin with stoma powder and no sting skin prep. Applied hydrofera blue ring to peristomal wound, secured with brava strips x3, applied paste ring over brava strips. Cut barrier to fit and placed to skin, applied pouch.   Pt education: Questions and concerns addressed.  Plan: Ostomy nurses to follow up on outcomes of pouching interventions. Patient will dc with home care who will continue ostomy care post dc.  Anticipated discharge needs:tbd

## 2017-12-23 NOTE — PROGRESS NOTES
Educated pt. On discharge information and prescribed medications.  Ostomy appliance changed prior to discharge.  IV removed with tip intact.  Pt. Wheeled down from unit in stable condition.

## 2017-12-27 NOTE — DISCHARGE PLANNING
Follow up PC made to pt. She said that she has not received the Xifaxan shipment yet. PC made to [facility] Pt assistance program @ [phone] for follow up.

## 2018-01-01 ENCOUNTER — HOSPITAL ENCOUNTER (OUTPATIENT)
Facility: MEDICAL CENTER | Age: 78
DRG: 871 | End: 2018-01-08
Attending: FAMILY MEDICINE
Payer: MEDICARE

## 2018-01-01 ENCOUNTER — APPOINTMENT (OUTPATIENT)
Dept: NEPHROLOGY | Facility: MEDICAL CENTER | Age: 78
End: 2018-01-01
Payer: MEDICARE

## 2018-01-01 ENCOUNTER — APPOINTMENT (OUTPATIENT)
Dept: RADIOLOGY | Facility: MEDICAL CENTER | Age: 78
DRG: 871 | End: 2018-01-01
Attending: EMERGENCY MEDICINE
Payer: MEDICARE

## 2018-01-01 ENCOUNTER — APPOINTMENT (OUTPATIENT)
Dept: RADIOLOGY | Facility: MEDICAL CENTER | Age: 78
DRG: 871 | End: 2018-01-01
Attending: INTERNAL MEDICINE
Payer: MEDICARE

## 2018-01-01 ENCOUNTER — APPOINTMENT (OUTPATIENT)
Dept: RADIOLOGY | Facility: MEDICAL CENTER | Age: 78
DRG: 871 | End: 2018-01-01
Attending: HOSPITALIST
Payer: MEDICARE

## 2018-01-01 ENCOUNTER — HOSPITAL ENCOUNTER (INPATIENT)
Facility: MEDICAL CENTER | Age: 78
LOS: 7 days | DRG: 871 | End: 2018-01-16
Attending: EMERGENCY MEDICINE | Admitting: INTERNAL MEDICINE
Payer: MEDICARE

## 2018-01-01 ENCOUNTER — RESOLUTE PROFESSIONAL BILLING HOSPITAL PROF FEE (OUTPATIENT)
Dept: HOSPITALIST | Facility: MEDICAL CENTER | Age: 78
End: 2018-01-01
Payer: MEDICARE

## 2018-01-01 ENCOUNTER — HOSPITAL ENCOUNTER (EMERGENCY)
Facility: MEDICAL CENTER | Age: 78
End: 2018-01-17
Attending: EMERGENCY MEDICINE
Payer: MEDICARE

## 2018-01-01 ENCOUNTER — APPOINTMENT (OUTPATIENT)
Dept: WOUND CARE | Facility: MEDICAL CENTER | Age: 78
End: 2018-01-01
Attending: SURGERY
Payer: MEDICARE

## 2018-01-01 VITALS
TEMPERATURE: 97.7 F | HEART RATE: 99 BPM | BODY MASS INDEX: 37.14 KG/M2 | WEIGHT: 189.15 LBS | OXYGEN SATURATION: 95 % | RESPIRATION RATE: 18 BRPM | HEIGHT: 60 IN | SYSTOLIC BLOOD PRESSURE: 98 MMHG | DIASTOLIC BLOOD PRESSURE: 48 MMHG

## 2018-01-01 VITALS — BODY MASS INDEX: 37.11 KG/M2 | WEIGHT: 189 LBS | OXYGEN SATURATION: 85 % | RESPIRATION RATE: 7 BRPM | HEIGHT: 60 IN

## 2018-01-01 DIAGNOSIS — R40.4 ALTERED LEVEL OF CONSCIOUSNESS: ICD-10-CM

## 2018-01-01 DIAGNOSIS — N39.0 URINARY TRACT INFECTION WITHOUT HEMATURIA, SITE UNSPECIFIED: ICD-10-CM

## 2018-01-01 DIAGNOSIS — D64.9 ANEMIA, UNSPECIFIED TYPE: ICD-10-CM

## 2018-01-01 DIAGNOSIS — N28.9 RENAL INSUFFICIENCY: ICD-10-CM

## 2018-01-01 DIAGNOSIS — R11.2 NON-INTRACTABLE VOMITING WITH NAUSEA, UNSPECIFIED VOMITING TYPE: ICD-10-CM

## 2018-01-01 DIAGNOSIS — K70.30 ALCOHOLIC CIRRHOSIS OF LIVER WITHOUT ASCITES (HCC): ICD-10-CM

## 2018-01-01 DIAGNOSIS — R58 BLEEDING: ICD-10-CM

## 2018-01-01 LAB
25(OH)D3 SERPL-MCNC: 19 NG/ML (ref 30–100)
ABO GROUP BLD: NORMAL
ALBUMIN SERPL BCP-MCNC: 2.1 G/DL (ref 3.2–4.9)
ALBUMIN SERPL BCP-MCNC: 2.4 G/DL (ref 3.2–4.9)
ALBUMIN SERPL BCP-MCNC: 2.9 G/DL (ref 3.2–4.9)
ALBUMIN SERPL BCP-MCNC: 2.9 G/DL (ref 3.2–4.9)
ALBUMIN/GLOB SERPL: 1 G/DL
ALBUMIN/GLOB SERPL: 1.4 G/DL
ALBUMIN/GLOB SERPL: 1.8 G/DL
ALBUMIN/GLOB SERPL: 2.1 G/DL
ALP SERPL-CCNC: 166 U/L (ref 30–99)
ALP SERPL-CCNC: 184 U/L (ref 30–99)
ALP SERPL-CCNC: 267 U/L (ref 30–99)
ALP SERPL-CCNC: 382 U/L (ref 30–99)
ALT SERPL-CCNC: 14 U/L (ref 2–50)
ALT SERPL-CCNC: 16 U/L (ref 2–50)
ALT SERPL-CCNC: 21 U/L (ref 2–50)
ALT SERPL-CCNC: 31 U/L (ref 2–50)
AMMONIA PLAS-SCNC: 42 UMOL/L (ref 11–45)
AMMONIA PLAS-SCNC: 55 UMOL/L (ref 11–45)
ANION GAP SERPL CALC-SCNC: 10 MMOL/L (ref 0–11.9)
ANION GAP SERPL CALC-SCNC: 11 MMOL/L (ref 0–11.9)
ANION GAP SERPL CALC-SCNC: 7 MMOL/L (ref 0–11.9)
ANION GAP SERPL CALC-SCNC: 8 MMOL/L (ref 0–11.9)
ANION GAP SERPL CALC-SCNC: 9 MMOL/L (ref 0–11.9)
ANISOCYTOSIS BLD QL SMEAR: ABNORMAL
APPEARANCE UR: ABNORMAL
APPEARANCE UR: ABNORMAL
APTT PPP: 33.6 SEC (ref 24.7–36)
AST SERPL-CCNC: 31 U/L (ref 12–45)
AST SERPL-CCNC: 38 U/L (ref 12–45)
AST SERPL-CCNC: 43 U/L (ref 12–45)
AST SERPL-CCNC: 64 U/L (ref 12–45)
BACTERIA #/AREA URNS HPF: ABNORMAL /HPF
BACTERIA #/AREA URNS HPF: ABNORMAL /HPF
BACTERIA BLD CULT: ABNORMAL
BACTERIA BLD CULT: ABNORMAL
BACTERIA BLD CULT: NORMAL
BACTERIA UR CULT: ABNORMAL
BARCODED ABORH UBTYP: 8400
BARCODED ABORH UBTYP: 8400
BARCODED PRD CODE UBPRD: NORMAL
BARCODED PRD CODE UBPRD: NORMAL
BARCODED UNIT NUM UBUNT: NORMAL
BARCODED UNIT NUM UBUNT: NORMAL
BASOPHILS # BLD AUTO: 0.2 % (ref 0–1.8)
BASOPHILS # BLD AUTO: 0.3 % (ref 0–1.8)
BASOPHILS # BLD AUTO: 0.3 % (ref 0–1.8)
BASOPHILS # BLD AUTO: 0.4 % (ref 0–1.8)
BASOPHILS # BLD AUTO: 0.5 % (ref 0–1.8)
BASOPHILS # BLD AUTO: 0.7 % (ref 0–1.8)
BASOPHILS # BLD: 0.01 K/UL (ref 0–0.12)
BASOPHILS # BLD: 0.02 K/UL (ref 0–0.12)
BILIRUB SERPL-MCNC: 0.8 MG/DL (ref 0.1–1.5)
BILIRUB SERPL-MCNC: 0.8 MG/DL (ref 0.1–1.5)
BILIRUB SERPL-MCNC: 1.5 MG/DL (ref 0.1–1.5)
BILIRUB SERPL-MCNC: 1.8 MG/DL (ref 0.1–1.5)
BILIRUB UR QL STRIP.AUTO: NEGATIVE
BILIRUB UR QL STRIP.AUTO: NEGATIVE
BLD GP AB SCN SERPL QL: NORMAL
BUN SERPL-MCNC: 49 MG/DL (ref 8–22)
BUN SERPL-MCNC: 49 MG/DL (ref 8–22)
BUN SERPL-MCNC: 50 MG/DL (ref 8–22)
BUN SERPL-MCNC: 52 MG/DL (ref 8–22)
BUN SERPL-MCNC: 53 MG/DL (ref 8–22)
BUN SERPL-MCNC: 55 MG/DL (ref 8–22)
BUN SERPL-MCNC: 57 MG/DL (ref 8–22)
CALCIUM SERPL-MCNC: 6.7 MG/DL (ref 8.5–10.5)
CALCIUM SERPL-MCNC: 6.8 MG/DL (ref 8.5–10.5)
CALCIUM SERPL-MCNC: 6.8 MG/DL (ref 8.5–10.5)
CALCIUM SERPL-MCNC: 6.9 MG/DL (ref 8.5–10.5)
CALCIUM SERPL-MCNC: 7.3 MG/DL (ref 8.5–10.5)
CALCIUM SERPL-MCNC: 7.4 MG/DL (ref 8.5–10.5)
CALCIUM SERPL-MCNC: 8 MG/DL (ref 8.4–10.2)
CALCIUM SERPL-MCNC: 8.3 MG/DL (ref 8.5–10.5)
CALCIUM SERPL-MCNC: 8.3 MG/DL (ref 8.5–10.5)
CFT BLD TEG: 7.3 MIN (ref 5–10)
CHLORIDE SERPL-SCNC: 107 MMOL/L (ref 96–112)
CHLORIDE SERPL-SCNC: 108 MMOL/L (ref 96–112)
CHLORIDE SERPL-SCNC: 110 MMOL/L (ref 96–112)
CHLORIDE SERPL-SCNC: 113 MMOL/L (ref 96–112)
CHLORIDE SERPL-SCNC: 117 MMOL/L (ref 96–112)
CHLORIDE SERPL-SCNC: 118 MMOL/L (ref 96–112)
CHLORIDE SERPL-SCNC: 118 MMOL/L (ref 96–112)
CLOT ANGLE BLD TEG: 58.8 DEGREES (ref 53–72)
CLOT LYSIS 30M P MA LENFR BLD TEG: 0 % (ref 0–8)
CO2 SERPL-SCNC: 12 MMOL/L (ref 20–33)
CO2 SERPL-SCNC: 13 MMOL/L (ref 20–33)
CO2 SERPL-SCNC: 14 MMOL/L (ref 20–33)
CO2 SERPL-SCNC: 17 MMOL/L (ref 20–33)
CO2 SERPL-SCNC: 19 MMOL/L (ref 20–33)
CO2 SERPL-SCNC: 20 MMOL/L (ref 20–33)
CO2 SERPL-SCNC: 21 MMOL/L (ref 20–33)
COLOR UR: YELLOW
COLOR UR: YELLOW
COMMENT 1642: NORMAL
COMPONENT R 8504R: NORMAL
COMPONENT R 8504R: NORMAL
CORTIS SERPL-MCNC: 16.5 UG/DL (ref 0–23)
CREAT SERPL-MCNC: 3.3 MG/DL (ref 0.5–1.4)
CREAT SERPL-MCNC: 3.39 MG/DL (ref 0.5–1.4)
CREAT SERPL-MCNC: 3.45 MG/DL (ref 0.5–1.4)
CREAT SERPL-MCNC: 3.51 MG/DL (ref 0.5–1.4)
CREAT SERPL-MCNC: 3.53 MG/DL (ref 0.5–1.4)
CREAT SERPL-MCNC: 3.55 MG/DL (ref 0.5–1.4)
CREAT SERPL-MCNC: 3.84 MG/DL (ref 0.5–1.4)
CREAT SERPL-MCNC: 3.87 MG/DL (ref 0.5–1.4)
CREAT SERPL-MCNC: 4.04 MG/DL (ref 0.5–1.4)
CT.EXTRINSIC BLD ROTEM: 2.5 MIN (ref 1–3)
EKG IMPRESSION: NORMAL
EOSINOPHIL # BLD AUTO: 0.01 K/UL (ref 0–0.51)
EOSINOPHIL # BLD AUTO: 0.07 K/UL (ref 0–0.51)
EOSINOPHIL # BLD AUTO: 0.09 K/UL (ref 0–0.51)
EOSINOPHIL # BLD AUTO: 0.11 K/UL (ref 0–0.51)
EOSINOPHIL # BLD AUTO: 0.14 K/UL (ref 0–0.51)
EOSINOPHIL # BLD AUTO: 0.15 K/UL (ref 0–0.51)
EOSINOPHIL NFR BLD: 0.2 % (ref 0–6.9)
EOSINOPHIL NFR BLD: 1.5 % (ref 0–6.9)
EOSINOPHIL NFR BLD: 2.6 % (ref 0–6.9)
EOSINOPHIL NFR BLD: 3.4 % (ref 0–6.9)
EOSINOPHIL NFR BLD: 3.9 % (ref 0–6.9)
EOSINOPHIL NFR BLD: 5 % (ref 0–6.9)
EPI CELLS #/AREA URNS HPF: ABNORMAL /HPF
EPI CELLS #/AREA URNS HPF: NEGATIVE /HPF
ERYTHROCYTE [DISTWIDTH] IN BLOOD BY AUTOMATED COUNT: 54.6 FL (ref 35.9–50)
ERYTHROCYTE [DISTWIDTH] IN BLOOD BY AUTOMATED COUNT: 54.9 FL (ref 35.9–50)
ERYTHROCYTE [DISTWIDTH] IN BLOOD BY AUTOMATED COUNT: 54.9 FL (ref 35.9–50)
ERYTHROCYTE [DISTWIDTH] IN BLOOD BY AUTOMATED COUNT: 56.4 FL (ref 35.9–50)
ERYTHROCYTE [DISTWIDTH] IN BLOOD BY AUTOMATED COUNT: 57.6 FL (ref 35.9–50)
ERYTHROCYTE [DISTWIDTH] IN BLOOD BY AUTOMATED COUNT: 57.9 FL (ref 35.9–50)
ERYTHROCYTE [DISTWIDTH] IN BLOOD BY AUTOMATED COUNT: 58.2 FL (ref 35.9–50)
ERYTHROCYTE [DISTWIDTH] IN BLOOD BY AUTOMATED COUNT: 58.4 FL (ref 35.9–50)
ERYTHROCYTE [DISTWIDTH] IN BLOOD BY AUTOMATED COUNT: 59.4 FL (ref 35.9–50)
ERYTHROCYTE [DISTWIDTH] IN BLOOD BY AUTOMATED COUNT: 59.7 FL (ref 35.9–50)
FERRITIN SERPL-MCNC: 31.6 NG/ML (ref 10–291)
FOLATE SERPL-MCNC: 21.4 NG/ML
GLOBULIN SER CALC-MCNC: 1.4 G/DL (ref 1.9–3.5)
GLOBULIN SER CALC-MCNC: 1.5 G/DL (ref 1.9–3.5)
GLOBULIN SER CALC-MCNC: 1.6 G/DL (ref 1.9–3.5)
GLOBULIN SER CALC-MCNC: 2.4 G/DL (ref 1.9–3.5)
GLUCOSE SERPL-MCNC: 101 MG/DL (ref 65–99)
GLUCOSE SERPL-MCNC: 112 MG/DL (ref 65–99)
GLUCOSE SERPL-MCNC: 112 MG/DL (ref 65–99)
GLUCOSE SERPL-MCNC: 129 MG/DL (ref 65–99)
GLUCOSE SERPL-MCNC: 132 MG/DL (ref 65–99)
GLUCOSE SERPL-MCNC: 149 MG/DL (ref 65–99)
GLUCOSE SERPL-MCNC: 80 MG/DL (ref 65–99)
GLUCOSE SERPL-MCNC: 83 MG/DL (ref 65–99)
GLUCOSE SERPL-MCNC: 99 MG/DL (ref 65–99)
GLUCOSE UR STRIP.AUTO-MCNC: NEGATIVE MG/DL
GLUCOSE UR STRIP.AUTO-MCNC: NEGATIVE MG/DL
HCT VFR BLD AUTO: 18.7 % (ref 37–47)
HCT VFR BLD AUTO: 20.7 % (ref 37–47)
HCT VFR BLD AUTO: 20.9 % (ref 37–47)
HCT VFR BLD AUTO: 22.9 % (ref 37–47)
HCT VFR BLD AUTO: 23.5 % (ref 37–47)
HCT VFR BLD AUTO: 23.8 % (ref 37–47)
HCT VFR BLD AUTO: 24.4 % (ref 37–47)
HCT VFR BLD AUTO: 26.4 % (ref 37–47)
HCT VFR BLD AUTO: 26.7 % (ref 37–47)
HCT VFR BLD AUTO: 27.1 % (ref 37–47)
HGB BLD-MCNC: 5.8 G/DL (ref 12–16)
HGB BLD-MCNC: 6.6 G/DL (ref 12–16)
HGB BLD-MCNC: 6.9 G/DL (ref 12–16)
HGB BLD-MCNC: 7.1 G/DL (ref 12–16)
HGB BLD-MCNC: 7.5 G/DL (ref 12–16)
HGB BLD-MCNC: 7.6 G/DL (ref 12–16)
HGB BLD-MCNC: 7.7 G/DL (ref 12–16)
HGB BLD-MCNC: 8.3 G/DL (ref 12–16)
HGB RETIC QN AUTO: 32.3 PG/CELL (ref 29–35)
HGB RETIC QN AUTO: 34.1 PG/CELL (ref 29–35)
HYALINE CASTS #/AREA URNS LPF: ABNORMAL /LPF
IMM GRANULOCYTES # BLD AUTO: 0.01 K/UL (ref 0–0.11)
IMM GRANULOCYTES # BLD AUTO: 0.02 K/UL (ref 0–0.11)
IMM GRANULOCYTES # BLD AUTO: 0.04 K/UL (ref 0–0.11)
IMM GRANULOCYTES # BLD AUTO: 0.05 K/UL (ref 0–0.11)
IMM GRANULOCYTES # BLD AUTO: 0.05 K/UL (ref 0–0.11)
IMM GRANULOCYTES # BLD AUTO: 0.06 K/UL (ref 0–0.11)
IMM GRANULOCYTES NFR BLD AUTO: 0.4 % (ref 0–0.9)
IMM GRANULOCYTES NFR BLD AUTO: 0.4 % (ref 0–0.9)
IMM GRANULOCYTES NFR BLD AUTO: 0.8 % (ref 0–0.9)
IMM GRANULOCYTES NFR BLD AUTO: 1.2 % (ref 0–0.9)
IMM GRANULOCYTES NFR BLD AUTO: 1.4 % (ref 0–0.9)
IMM GRANULOCYTES NFR BLD AUTO: 2 % (ref 0–0.9)
IMM RETICS NFR: 20.6 % (ref 9.3–17.4)
IMM RETICS NFR: 4.7 % (ref 9.3–17.4)
INR PPP: 1.36 (ref 0.87–1.13)
INR PPP: 1.78 (ref 0.87–1.13)
IRON SATN MFR SERPL: 10 % (ref 15–55)
IRON SATN MFR SERPL: 25 % (ref 15–55)
IRON SERPL-MCNC: 31 UG/DL (ref 40–170)
IRON SERPL-MCNC: 53 UG/DL (ref 40–170)
KETONES UR STRIP.AUTO-MCNC: ABNORMAL MG/DL
KETONES UR STRIP.AUTO-MCNC: ABNORMAL MG/DL
LACTATE BLD-SCNC: 1.4 MMOL/L (ref 0.5–2)
LACTATE BLD-SCNC: 1.6 MMOL/L (ref 0.5–2)
LACTATE BLD-SCNC: 1.8 MMOL/L (ref 0.5–2)
LEUKOCYTE ESTERASE UR QL STRIP.AUTO: ABNORMAL
LEUKOCYTE ESTERASE UR QL STRIP.AUTO: ABNORMAL
LV EJECT FRACT  99904: 70
LV EJECT FRACT MOD 2C 99903: 73.24
LV EJECT FRACT MOD 4C 99902: 82.63
LV EJECT FRACT MOD BP 99901: 78.38
LYMPHOCYTES # BLD AUTO: 0.22 K/UL (ref 1–4.8)
LYMPHOCYTES # BLD AUTO: 0.31 K/UL (ref 1–4.8)
LYMPHOCYTES # BLD AUTO: 0.35 K/UL (ref 1–4.8)
LYMPHOCYTES # BLD AUTO: 0.36 K/UL (ref 1–4.8)
LYMPHOCYTES # BLD AUTO: 0.45 K/UL (ref 1–4.8)
LYMPHOCYTES # BLD AUTO: 0.63 K/UL (ref 1–4.8)
LYMPHOCYTES NFR BLD: 10.4 % (ref 22–41)
LYMPHOCYTES NFR BLD: 11.1 % (ref 22–41)
LYMPHOCYTES NFR BLD: 11.6 % (ref 22–41)
LYMPHOCYTES NFR BLD: 16.4 % (ref 22–41)
LYMPHOCYTES NFR BLD: 4.3 % (ref 22–41)
LYMPHOCYTES NFR BLD: 8.8 % (ref 22–41)
MACROCYTES BLD QL SMEAR: ABNORMAL
MAGNESIUM SERPL-MCNC: 1.1 MG/DL (ref 1.5–2.5)
MAGNESIUM SERPL-MCNC: 1.5 MG/DL (ref 1.5–2.5)
MAGNESIUM SERPL-MCNC: 1.5 MG/DL (ref 1.5–2.5)
MAGNESIUM SERPL-MCNC: 2 MG/DL (ref 1.5–2.5)
MCF BLD TEG: 48.9 MM (ref 50–70)
MCH RBC QN AUTO: 28.5 PG (ref 27–33)
MCH RBC QN AUTO: 28.8 PG (ref 27–33)
MCH RBC QN AUTO: 28.9 PG (ref 27–33)
MCH RBC QN AUTO: 28.9 PG (ref 27–33)
MCH RBC QN AUTO: 29 PG (ref 27–33)
MCH RBC QN AUTO: 29.1 PG (ref 27–33)
MCH RBC QN AUTO: 29.2 PG (ref 27–33)
MCH RBC QN AUTO: 29.3 PG (ref 27–33)
MCH RBC QN AUTO: 29.6 PG (ref 27–33)
MCH RBC QN AUTO: 30.1 PG (ref 27–33)
MCHC RBC AUTO-ENTMCNC: 30.2 G/DL (ref 33.6–35)
MCHC RBC AUTO-ENTMCNC: 30.6 G/DL (ref 33.6–35)
MCHC RBC AUTO-ENTMCNC: 31 G/DL (ref 33.6–35)
MCHC RBC AUTO-ENTMCNC: 31.1 G/DL (ref 33.6–35)
MCHC RBC AUTO-ENTMCNC: 31.4 G/DL (ref 33.6–35)
MCHC RBC AUTO-ENTMCNC: 31.6 G/DL (ref 33.6–35)
MCHC RBC AUTO-ENTMCNC: 31.6 G/DL (ref 33.6–35)
MCHC RBC AUTO-ENTMCNC: 31.9 G/DL (ref 33.6–35)
MCHC RBC AUTO-ENTMCNC: 32.8 G/DL (ref 33.6–35)
MCHC RBC AUTO-ENTMCNC: 33.3 G/DL (ref 33.6–35)
MCV RBC AUTO: 90.4 FL (ref 81.4–97.8)
MCV RBC AUTO: 90.4 FL (ref 81.4–97.8)
MCV RBC AUTO: 90.5 FL (ref 81.4–97.8)
MCV RBC AUTO: 90.5 FL (ref 81.4–97.8)
MCV RBC AUTO: 92.5 FL (ref 81.4–97.8)
MCV RBC AUTO: 92.6 FL (ref 81.4–97.8)
MCV RBC AUTO: 93 FL (ref 81.4–97.8)
MCV RBC AUTO: 94.1 FL (ref 81.4–97.8)
MCV RBC AUTO: 94.4 FL (ref 81.4–97.8)
MCV RBC AUTO: 95.9 FL (ref 81.4–97.8)
MICRO URNS: ABNORMAL
MICRO URNS: ABNORMAL
MONOCYTES # BLD AUTO: 0.21 K/UL (ref 0–0.85)
MONOCYTES # BLD AUTO: 0.22 K/UL (ref 0–0.85)
MONOCYTES # BLD AUTO: 0.35 K/UL (ref 0–0.85)
MONOCYTES # BLD AUTO: 0.37 K/UL (ref 0–0.85)
MONOCYTES # BLD AUTO: 0.42 K/UL (ref 0–0.85)
MONOCYTES # BLD AUTO: 0.5 K/UL (ref 0–0.85)
MONOCYTES NFR BLD AUTO: 12.2 % (ref 0–13.4)
MONOCYTES NFR BLD AUTO: 12.5 % (ref 0–13.4)
MONOCYTES NFR BLD AUTO: 13.9 % (ref 0–13.4)
MONOCYTES NFR BLD AUTO: 4.1 % (ref 0–13.4)
MONOCYTES NFR BLD AUTO: 6.1 % (ref 0–13.4)
MONOCYTES NFR BLD AUTO: 8 % (ref 0–13.4)
MORPHOLOGY BLD-IMP: NORMAL
NEUTROPHILS # BLD AUTO: 1.96 K/UL (ref 2–7.15)
NEUTROPHILS # BLD AUTO: 1.98 K/UL (ref 2–7.15)
NEUTROPHILS # BLD AUTO: 2.03 K/UL (ref 2–7.15)
NEUTROPHILS # BLD AUTO: 3.03 K/UL (ref 2–7.15)
NEUTROPHILS # BLD AUTO: 4.62 K/UL (ref 2–7.15)
NEUTROPHILS # BLD AUTO: 4.9 K/UL (ref 2–7.15)
NEUTROPHILS NFR BLD: 67.2 % (ref 44–72)
NEUTROPHILS NFR BLD: 70.4 % (ref 44–72)
NEUTROPHILS NFR BLD: 72.2 % (ref 44–72)
NEUTROPHILS NFR BLD: 73.9 % (ref 44–72)
NEUTROPHILS NFR BLD: 80.9 % (ref 44–72)
NEUTROPHILS NFR BLD: 90.8 % (ref 44–72)
NITRITE UR QL STRIP.AUTO: NEGATIVE
NITRITE UR QL STRIP.AUTO: NEGATIVE
NRBC # BLD AUTO: 0 K/UL
NRBC BLD-RTO: 0 /100 WBC
OVALOCYTES BLD QL SMEAR: NORMAL
PH UR STRIP.AUTO: 6 [PH]
PH UR STRIP.AUTO: 6.5 [PH]
PHOSPHATE SERPL-MCNC: 2.5 MG/DL (ref 2.5–4.5)
PHOSPHATE SERPL-MCNC: 4.3 MG/DL (ref 2.5–4.5)
PHOSPHATE SERPL-MCNC: 5 MG/DL (ref 2.5–4.5)
PLATELET # BLD AUTO: 28 K/UL (ref 164–446)
PLATELET # BLD AUTO: 29 K/UL (ref 164–446)
PLATELET # BLD AUTO: 29 K/UL (ref 164–446)
PLATELET # BLD AUTO: 31 K/UL (ref 164–446)
PLATELET # BLD AUTO: 32 K/UL (ref 164–446)
PLATELET # BLD AUTO: 37 K/UL (ref 164–446)
PLATELET # BLD AUTO: 37 K/UL (ref 164–446)
PLATELET # BLD AUTO: 38 K/UL (ref 164–446)
PLATELET # BLD AUTO: 45 K/UL (ref 164–446)
PLATELET # BLD AUTO: 89 K/UL (ref 164–446)
PLATELET BLD QL SMEAR: NORMAL
POIKILOCYTOSIS BLD QL SMEAR: NORMAL
POTASSIUM SERPL-SCNC: 3.1 MMOL/L (ref 3.6–5.5)
POTASSIUM SERPL-SCNC: 3.3 MMOL/L (ref 3.6–5.5)
POTASSIUM SERPL-SCNC: 3.4 MMOL/L (ref 3.6–5.5)
POTASSIUM SERPL-SCNC: 3.7 MMOL/L (ref 3.6–5.5)
POTASSIUM SERPL-SCNC: 3.7 MMOL/L (ref 3.6–5.5)
POTASSIUM SERPL-SCNC: 3.8 MMOL/L (ref 3.6–5.5)
POTASSIUM SERPL-SCNC: 4.2 MMOL/L (ref 3.6–5.5)
POTASSIUM SERPL-SCNC: 4.3 MMOL/L (ref 3.6–5.5)
POTASSIUM SERPL-SCNC: 5.3 MMOL/L (ref 3.6–5.5)
PREALB SERPL-MCNC: 6 MG/DL (ref 18–38)
PROCALCITONIN SERPL-MCNC: 0.64 NG/ML
PROCALCITONIN SERPL-MCNC: 0.73 NG/ML
PRODUCT TYPE UPROD: NORMAL
PRODUCT TYPE UPROD: NORMAL
PROT SERPL-MCNC: 3.6 G/DL (ref 6–8.2)
PROT SERPL-MCNC: 4.3 G/DL (ref 6–8.2)
PROT SERPL-MCNC: 4.5 G/DL (ref 6–8.2)
PROT SERPL-MCNC: 4.8 G/DL (ref 6–8.2)
PROT UR QL STRIP: 30 MG/DL
PROT UR QL STRIP: NEGATIVE MG/DL
PROTHROMBIN TIME: 16.5 SEC (ref 12–14.6)
PROTHROMBIN TIME: 20.4 SEC (ref 12–14.6)
PTH-INTACT SERPL-MCNC: 102.2 PG/ML (ref 14–72)
RBC # BLD AUTO: 1.98 M/UL (ref 4.2–5.4)
RBC # BLD AUTO: 2.26 M/UL (ref 4.2–5.4)
RBC # BLD AUTO: 2.29 M/UL (ref 4.2–5.4)
RBC # BLD AUTO: 2.45 M/UL (ref 4.2–5.4)
RBC # BLD AUTO: 2.53 M/UL (ref 4.2–5.4)
RBC # BLD AUTO: 2.63 M/UL (ref 4.2–5.4)
RBC # BLD AUTO: 2.7 M/UL (ref 4.2–5.4)
RBC # BLD AUTO: 2.85 M/UL (ref 4.2–5.4)
RBC # BLD AUTO: 2.87 M/UL (ref 4.2–5.4)
RBC # BLD AUTO: 2.88 M/UL (ref 4.2–5.4)
RBC # URNS HPF: ABNORMAL /HPF
RBC # URNS HPF: ABNORMAL /HPF
RBC BLD AUTO: PRESENT
RBC UR QL AUTO: ABNORMAL
RBC UR QL AUTO: ABNORMAL
RETICS # AUTO: 0.06 M/UL (ref 0.04–0.06)
RETICS # AUTO: 0.08 M/UL (ref 0.04–0.06)
RETICS/RBC NFR: 2 % (ref 0.8–2.1)
RETICS/RBC NFR: 2.7 % (ref 0.8–2.1)
RH BLD: NORMAL
SCHISTOCYTES BLD QL SMEAR: NORMAL
SIGNIFICANT IND 70042: ABNORMAL
SIGNIFICANT IND 70042: NORMAL
SITE SITE: ABNORMAL
SITE SITE: NORMAL
SODIUM SERPL-SCNC: 135 MMOL/L (ref 135–145)
SODIUM SERPL-SCNC: 136 MMOL/L (ref 135–145)
SODIUM SERPL-SCNC: 137 MMOL/L (ref 135–145)
SODIUM SERPL-SCNC: 138 MMOL/L (ref 135–145)
SODIUM SERPL-SCNC: 140 MMOL/L (ref 135–145)
SODIUM SERPL-SCNC: 141 MMOL/L (ref 135–145)
SODIUM SERPL-SCNC: 143 MMOL/L (ref 135–145)
SOURCE SOURCE: ABNORMAL
SOURCE SOURCE: NORMAL
SP GR UR STRIP.AUTO: 1.01
SP GR UR STRIP.AUTO: 1.02
TEG ALGORITHM TGALG: ABNORMAL
TIBC SERPL-MCNC: 211 UG/DL (ref 250–450)
TIBC SERPL-MCNC: 316 UG/DL (ref 250–450)
TROPONIN I SERPL-MCNC: <0.01 NG/ML (ref 0–0.04)
TSH SERPL DL<=0.005 MIU/L-ACNC: 3.56 UIU/ML (ref 0.38–5.33)
UNIT STATUS USTAT: NORMAL
UNIT STATUS USTAT: NORMAL
UROBILINOGEN UR STRIP.AUTO-MCNC: 0.2 MG/DL
UROBILINOGEN UR STRIP.AUTO-MCNC: 0.2 MG/DL
VIT B12 SERPL-MCNC: 1435 PG/ML (ref 211–911)
WBC # BLD AUTO: 2.7 K/UL (ref 4.8–10.8)
WBC # BLD AUTO: 2.8 K/UL (ref 4.8–10.8)
WBC # BLD AUTO: 2.8 K/UL (ref 4.8–10.8)
WBC # BLD AUTO: 2.9 K/UL (ref 4.8–10.8)
WBC # BLD AUTO: 3 K/UL (ref 4.8–10.8)
WBC # BLD AUTO: 3 K/UL (ref 4.8–10.8)
WBC # BLD AUTO: 3.7 K/UL (ref 4.8–10.8)
WBC # BLD AUTO: 4.1 K/UL (ref 4.8–10.8)
WBC # BLD AUTO: 5.1 K/UL (ref 4.8–10.8)
WBC # BLD AUTO: 6.1 K/UL (ref 4.8–10.8)
WBC #/AREA URNS HPF: ABNORMAL /HPF
WBC #/AREA URNS HPF: ABNORMAL /HPF

## 2018-01-01 PROCEDURE — 85025 COMPLETE CBC W/AUTO DIFF WBC: CPT

## 2018-01-01 PROCEDURE — 700111 HCHG RX REV CODE 636 W/ 250 OVERRIDE (IP): Performed by: HOSPITALIST

## 2018-01-01 PROCEDURE — B544ZZA ULTRASONOGRAPHY OF LEFT JUGULAR VEINS, GUIDANCE: ICD-10-PCS | Performed by: HOSPITALIST

## 2018-01-01 PROCEDURE — 302097 BARRIER RING,CONVEX 40MM: Performed by: STUDENT IN AN ORGANIZED HEALTH CARE EDUCATION/TRAINING PROGRAM

## 2018-01-01 PROCEDURE — 99291 CRITICAL CARE FIRST HOUR: CPT

## 2018-01-01 PROCEDURE — 36415 COLL VENOUS BLD VENIPUNCTURE: CPT

## 2018-01-01 PROCEDURE — 97162 PT EVAL MOD COMPLEX 30 MIN: CPT

## 2018-01-01 PROCEDURE — 97165 OT EVAL LOW COMPLEX 30 MIN: CPT

## 2018-01-01 PROCEDURE — 76700 US EXAM ABDOM COMPLETE: CPT

## 2018-01-01 PROCEDURE — 80053 COMPREHEN METABOLIC PANEL: CPT

## 2018-01-01 PROCEDURE — 74176 CT ABD & PELVIS W/O CONTRAST: CPT

## 2018-01-01 PROCEDURE — 82140 ASSAY OF AMMONIA: CPT

## 2018-01-01 PROCEDURE — 700105 HCHG RX REV CODE 258: Performed by: EMERGENCY MEDICINE

## 2018-01-01 PROCEDURE — 83540 ASSAY OF IRON: CPT

## 2018-01-01 PROCEDURE — 700105 HCHG RX REV CODE 258: Performed by: INTERNAL MEDICINE

## 2018-01-01 PROCEDURE — 99233 SBSQ HOSP IP/OBS HIGH 50: CPT | Performed by: HOSPITALIST

## 2018-01-01 PROCEDURE — 770022 HCHG ROOM/CARE - ICU (200)

## 2018-01-01 PROCEDURE — 700111 HCHG RX REV CODE 636 W/ 250 OVERRIDE (IP): Performed by: INTERNAL MEDICINE

## 2018-01-01 PROCEDURE — 81001 URINALYSIS AUTO W/SCOPE: CPT

## 2018-01-01 PROCEDURE — 70450 CT HEAD/BRAIN W/O DYE: CPT

## 2018-01-01 PROCEDURE — 93005 ELECTROCARDIOGRAM TRACING: CPT | Performed by: INTERNAL MEDICINE

## 2018-01-01 PROCEDURE — 85576 BLOOD PLATELET AGGREGATION: CPT

## 2018-01-01 PROCEDURE — 99285 EMERGENCY DEPT VISIT HI MDM: CPT

## 2018-01-01 PROCEDURE — 02HV33Z INSERTION OF INFUSION DEVICE INTO SUPERIOR VENA CAVA, PERCUTANEOUS APPROACH: ICD-10-PCS | Performed by: HOSPITALIST

## 2018-01-01 PROCEDURE — 85027 COMPLETE CBC AUTOMATED: CPT

## 2018-01-01 PROCEDURE — 700111 HCHG RX REV CODE 636 W/ 250 OVERRIDE (IP): Mod: JG | Performed by: INTERNAL MEDICINE

## 2018-01-01 PROCEDURE — G8978 MOBILITY CURRENT STATUS: HCPCS | Mod: CJ

## 2018-01-01 PROCEDURE — A9270 NON-COVERED ITEM OR SERVICE: HCPCS | Performed by: HOSPITALIST

## 2018-01-01 PROCEDURE — 700102 HCHG RX REV CODE 250 W/ 637 OVERRIDE(OP): Performed by: INTERNAL MEDICINE

## 2018-01-01 PROCEDURE — A9270 NON-COVERED ITEM OR SERVICE: HCPCS | Performed by: INTERNAL MEDICINE

## 2018-01-01 PROCEDURE — 84484 ASSAY OF TROPONIN QUANT: CPT

## 2018-01-01 PROCEDURE — 82607 VITAMIN B-12: CPT

## 2018-01-01 PROCEDURE — 84100 ASSAY OF PHOSPHORUS: CPT

## 2018-01-01 PROCEDURE — P9016 RBC LEUKOCYTES REDUCED: HCPCS

## 2018-01-01 PROCEDURE — 83735 ASSAY OF MAGNESIUM: CPT

## 2018-01-01 PROCEDURE — 85610 PROTHROMBIN TIME: CPT

## 2018-01-01 PROCEDURE — 51702 INSERT TEMP BLADDER CATH: CPT

## 2018-01-01 PROCEDURE — 71045 X-RAY EXAM CHEST 1 VIEW: CPT

## 2018-01-01 PROCEDURE — 87040 BLOOD CULTURE FOR BACTERIA: CPT | Mod: 91

## 2018-01-01 PROCEDURE — 82728 ASSAY OF FERRITIN: CPT

## 2018-01-01 PROCEDURE — 303105 HCHG CATHETER EXTRA

## 2018-01-01 PROCEDURE — 85384 FIBRINOGEN ACTIVITY: CPT

## 2018-01-01 PROCEDURE — P9047 ALBUMIN (HUMAN), 25%, 50ML: HCPCS | Mod: JG | Performed by: INTERNAL MEDICINE

## 2018-01-01 PROCEDURE — 96365 THER/PROPH/DIAG IV INF INIT: CPT

## 2018-01-01 PROCEDURE — 96361 HYDRATE IV INFUSION ADD-ON: CPT

## 2018-01-01 PROCEDURE — 82306 VITAMIN D 25 HYDROXY: CPT

## 2018-01-01 PROCEDURE — 83550 IRON BINDING TEST: CPT

## 2018-01-01 PROCEDURE — 87086 URINE CULTURE/COLONY COUNT: CPT

## 2018-01-01 PROCEDURE — G8979 MOBILITY GOAL STATUS: HCPCS | Mod: CI

## 2018-01-01 PROCEDURE — 93970 EXTREMITY STUDY: CPT | Mod: 26 | Performed by: SURGERY

## 2018-01-01 PROCEDURE — 80048 BASIC METABOLIC PNL TOTAL CA: CPT

## 2018-01-01 PROCEDURE — 93306 TTE W/DOPPLER COMPLETE: CPT

## 2018-01-01 PROCEDURE — 84145 PROCALCITONIN (PCT): CPT | Mod: 91

## 2018-01-01 PROCEDURE — 36556 INSERT NON-TUNNEL CV CATH: CPT

## 2018-01-01 PROCEDURE — 85347 COAGULATION TIME ACTIVATED: CPT

## 2018-01-01 PROCEDURE — A4389 DRAINABLE PCH W ST WEAR BARR: HCPCS | Performed by: STUDENT IN AN ORGANIZED HEALTH CARE EDUCATION/TRAINING PROGRAM

## 2018-01-01 PROCEDURE — 86900 BLOOD TYPING SEROLOGIC ABO: CPT

## 2018-01-01 PROCEDURE — 99233 SBSQ HOSP IP/OBS HIGH 50: CPT | Mod: 25 | Performed by: HOSPITALIST

## 2018-01-01 PROCEDURE — 770006 HCHG ROOM/CARE - MED/SURG/GYN SEMI*

## 2018-01-01 PROCEDURE — 83970 ASSAY OF PARATHORMONE: CPT

## 2018-01-01 PROCEDURE — C1751 CATH, INF, PER/CENT/MIDLINE: HCPCS

## 2018-01-01 PROCEDURE — 85046 RETICYTE/HGB CONCENTRATE: CPT

## 2018-01-01 PROCEDURE — 700101 HCHG RX REV CODE 250: Performed by: HOSPITALIST

## 2018-01-01 PROCEDURE — 99232 SBSQ HOSP IP/OBS MODERATE 35: CPT | Performed by: FAMILY MEDICINE

## 2018-01-01 PROCEDURE — 700102 HCHG RX REV CODE 250 W/ 637 OVERRIDE(OP): Performed by: HOSPITALIST

## 2018-01-01 PROCEDURE — 86923 COMPATIBILITY TEST ELECTRIC: CPT

## 2018-01-01 PROCEDURE — 93306 TTE W/DOPPLER COMPLETE: CPT | Mod: 26 | Performed by: INTERNAL MEDICINE

## 2018-01-01 PROCEDURE — 99152 MOD SED SAME PHYS/QHP 5/>YRS: CPT

## 2018-01-01 PROCEDURE — 87077 CULTURE AEROBIC IDENTIFY: CPT

## 2018-01-01 PROCEDURE — 700105 HCHG RX REV CODE 258: Performed by: HOSPITALIST

## 2018-01-01 PROCEDURE — 36430 TRANSFUSION BLD/BLD COMPNT: CPT

## 2018-01-01 PROCEDURE — G8987 SELF CARE CURRENT STATUS: HCPCS | Mod: CJ

## 2018-01-01 PROCEDURE — 87040 BLOOD CULTURE FOR BACTERIA: CPT

## 2018-01-01 PROCEDURE — 30233N1 TRANSFUSION OF NONAUTOLOGOUS RED BLOOD CELLS INTO PERIPHERAL VEIN, PERCUTANEOUS APPROACH: ICD-10-PCS | Performed by: HOSPITALIST

## 2018-01-01 PROCEDURE — 82746 ASSAY OF FOLIC ACID SERUM: CPT

## 2018-01-01 PROCEDURE — P9045 ALBUMIN (HUMAN), 5%, 250 ML: HCPCS | Mod: JG | Performed by: INTERNAL MEDICINE

## 2018-01-01 PROCEDURE — 36556 INSERT NON-TUNNEL CV CATH: CPT | Performed by: HOSPITALIST

## 2018-01-01 PROCEDURE — 84443 ASSAY THYROID STIM HORMONE: CPT

## 2018-01-01 PROCEDURE — 82533 TOTAL CORTISOL: CPT

## 2018-01-01 PROCEDURE — 99239 HOSP IP/OBS DSCHRG MGMT >30: CPT | Performed by: FAMILY MEDICINE

## 2018-01-01 PROCEDURE — 85730 THROMBOPLASTIN TIME PARTIAL: CPT

## 2018-01-01 PROCEDURE — 84134 ASSAY OF PREALBUMIN: CPT

## 2018-01-01 PROCEDURE — G8988 SELF CARE GOAL STATUS: HCPCS | Mod: CI

## 2018-01-01 PROCEDURE — 86850 RBC ANTIBODY SCREEN: CPT

## 2018-01-01 PROCEDURE — 87186 SC STD MICRODIL/AGAR DIL: CPT | Mod: 91

## 2018-01-01 PROCEDURE — 86901 BLOOD TYPING SEROLOGIC RH(D): CPT

## 2018-01-01 PROCEDURE — 87077 CULTURE AEROBIC IDENTIFY: CPT | Mod: 91

## 2018-01-01 PROCEDURE — 99291 CRITICAL CARE FIRST HOUR: CPT | Performed by: INTERNAL MEDICINE

## 2018-01-01 PROCEDURE — 96367 TX/PROPH/DG ADDL SEQ IV INF: CPT

## 2018-01-01 PROCEDURE — 87186 SC STD MICRODIL/AGAR DIL: CPT

## 2018-01-01 PROCEDURE — 83605 ASSAY OF LACTIC ACID: CPT | Mod: 91

## 2018-01-01 PROCEDURE — 93970 EXTREMITY STUDY: CPT

## 2018-01-01 RX ORDER — POTASSIUM CHLORIDE 20 MEQ/1
20 TABLET, EXTENDED RELEASE ORAL 2 TIMES DAILY
Status: COMPLETED | OUTPATIENT
Start: 2018-01-01 | End: 2018-01-01

## 2018-01-01 RX ORDER — SODIUM CHLORIDE 9 MG/ML
1400 INJECTION, SOLUTION INTRAVENOUS ONCE
Status: COMPLETED | OUTPATIENT
Start: 2018-01-01 | End: 2018-01-01

## 2018-01-01 RX ORDER — VITAMIN C
1 TAB ORAL DAILY
Status: DISCONTINUED | OUTPATIENT
Start: 2018-01-01 | End: 2018-01-01 | Stop reason: HOSPADM

## 2018-01-01 RX ORDER — OMEPRAZOLE 20 MG/1
20 CAPSULE, DELAYED RELEASE ORAL EVERY 12 HOURS
Status: DISCONTINUED | OUTPATIENT
Start: 2018-01-01 | End: 2018-01-01 | Stop reason: HOSPADM

## 2018-01-01 RX ORDER — LACTULOSE 20 G/30ML
60 SOLUTION ORAL ONCE
Status: COMPLETED | OUTPATIENT
Start: 2018-01-01 | End: 2018-01-01

## 2018-01-01 RX ORDER — ONDANSETRON 4 MG/1
4 TABLET, ORALLY DISINTEGRATING ORAL EVERY 4 HOURS PRN
Status: DISCONTINUED | OUTPATIENT
Start: 2018-01-01 | End: 2018-01-01 | Stop reason: HOSPADM

## 2018-01-01 RX ORDER — ALBUMIN, HUMAN INJ 5% 5 %
12.5 SOLUTION INTRAVENOUS ONCE
Status: COMPLETED | OUTPATIENT
Start: 2018-01-01 | End: 2018-01-01

## 2018-01-01 RX ORDER — ALBUMIN (HUMAN) 12.5 G/50ML
12.5 SOLUTION INTRAVENOUS EVERY 6 HOURS
Status: COMPLETED | OUTPATIENT
Start: 2018-01-01 | End: 2018-01-01

## 2018-01-01 RX ORDER — POTASSIUM CHLORIDE 20 MEQ/1
40 TABLET, EXTENDED RELEASE ORAL 2 TIMES DAILY
Status: DISCONTINUED | OUTPATIENT
Start: 2018-01-01 | End: 2018-01-01 | Stop reason: HOSPADM

## 2018-01-01 RX ORDER — ALBUMIN (HUMAN) 12.5 G/50ML
25 SOLUTION INTRAVENOUS ONCE
Status: COMPLETED | OUTPATIENT
Start: 2018-01-01 | End: 2018-01-01

## 2018-01-01 RX ORDER — ZINC SULFATE 50(220)MG
220 CAPSULE ORAL EVERY 12 HOURS
Status: DISCONTINUED | OUTPATIENT
Start: 2018-01-01 | End: 2018-01-01 | Stop reason: HOSPADM

## 2018-01-01 RX ORDER — MAGNESIUM SULFATE HEPTAHYDRATE 40 MG/ML
4 INJECTION, SOLUTION INTRAVENOUS ONCE
Status: COMPLETED | OUTPATIENT
Start: 2018-01-01 | End: 2018-01-01

## 2018-01-01 RX ORDER — FUROSEMIDE 80 MG
80 TABLET ORAL 2 TIMES DAILY
Qty: 60 TAB | Refills: 0
Start: 2018-01-01

## 2018-01-01 RX ORDER — SODIUM CHLORIDE 9 MG/ML
INJECTION, SOLUTION INTRAVENOUS CONTINUOUS
Status: DISCONTINUED | OUTPATIENT
Start: 2018-01-01 | End: 2018-01-01

## 2018-01-01 RX ORDER — POTASSIUM CHLORIDE 20 MEQ/1
40 TABLET, EXTENDED RELEASE ORAL ONCE
Status: COMPLETED | OUTPATIENT
Start: 2018-01-01 | End: 2018-01-01

## 2018-01-01 RX ORDER — FUROSEMIDE 10 MG/ML
80 INJECTION INTRAMUSCULAR; INTRAVENOUS
Status: DISCONTINUED | OUTPATIENT
Start: 2018-01-01 | End: 2018-01-01 | Stop reason: HOSPADM

## 2018-01-01 RX ORDER — NYSTATIN 100000 [USP'U]/G
POWDER TOPICAL 2 TIMES DAILY
Status: DISCONTINUED | OUTPATIENT
Start: 2018-01-01 | End: 2018-01-01 | Stop reason: HOSPADM

## 2018-01-01 RX ORDER — CEFDINIR 300 MG/1
300 CAPSULE ORAL DAILY
Qty: 6 CAP | Refills: 0
Start: 2018-01-01

## 2018-01-01 RX ORDER — LACTULOSE 20 G/30ML
30 SOLUTION ORAL 3 TIMES DAILY
Status: DISCONTINUED | OUTPATIENT
Start: 2018-01-01 | End: 2018-01-01

## 2018-01-01 RX ORDER — HEPARIN SODIUM 5000 [USP'U]/ML
5000 INJECTION, SOLUTION INTRAVENOUS; SUBCUTANEOUS EVERY 8 HOURS
Status: DISCONTINUED | OUTPATIENT
Start: 2018-01-01 | End: 2018-01-01

## 2018-01-01 RX ORDER — ACETAMINOPHEN 325 MG/1
650 TABLET ORAL EVERY 6 HOURS PRN
Status: DISCONTINUED | OUTPATIENT
Start: 2018-01-01 | End: 2018-01-01 | Stop reason: HOSPADM

## 2018-01-01 RX ORDER — SODIUM CHLORIDE 9 MG/ML
1000 INJECTION, SOLUTION INTRAVENOUS ONCE
Status: COMPLETED | OUTPATIENT
Start: 2018-01-01 | End: 2018-01-01

## 2018-01-01 RX ORDER — FUROSEMIDE 10 MG/ML
80 INJECTION INTRAMUSCULAR; INTRAVENOUS ONCE
Status: COMPLETED | OUTPATIENT
Start: 2018-01-01 | End: 2018-01-01

## 2018-01-01 RX ORDER — POTASSIUM CHLORIDE 20 MEQ/1
40 TABLET, EXTENDED RELEASE ORAL DAILY
Qty: 30 TAB | Refills: 0
Start: 2018-01-01

## 2018-01-01 RX ORDER — SODIUM CHLORIDE 9 MG/ML
1000 INJECTION, SOLUTION INTRAVENOUS
Status: DISCONTINUED | OUTPATIENT
Start: 2018-01-01 | End: 2018-01-01

## 2018-01-01 RX ORDER — VITAMIN C
1 TAB ORAL DAILY
Qty: 30 TAB | Refills: 0 | Status: SHIPPED | OUTPATIENT
Start: 2018-01-01

## 2018-01-01 RX ORDER — ONDANSETRON 2 MG/ML
4 INJECTION INTRAMUSCULAR; INTRAVENOUS EVERY 4 HOURS PRN
Status: DISCONTINUED | OUTPATIENT
Start: 2018-01-01 | End: 2018-01-01 | Stop reason: HOSPADM

## 2018-01-01 RX ADMIN — RIFAXIMIN 550 MG: 550 TABLET ORAL at 08:20

## 2018-01-01 RX ADMIN — VITAMIN C 1 TABLET: TAB at 08:43

## 2018-01-01 RX ADMIN — RIFAXIMIN 550 MG: 550 TABLET ORAL at 07:43

## 2018-01-01 RX ADMIN — TAZOBACTAM SODIUM AND PIPERACILLIN SODIUM 3.38 G: 375; 3 INJECTION, SOLUTION INTRAVENOUS at 20:14

## 2018-01-01 RX ADMIN — SODIUM BICARBONATE 150 MEQ: 84 INJECTION, SOLUTION INTRAVENOUS at 08:07

## 2018-01-01 RX ADMIN — ACETAMINOPHEN 650 MG: 325 TABLET, FILM COATED ORAL at 04:44

## 2018-01-01 RX ADMIN — RIFAXIMIN 550 MG: 550 TABLET ORAL at 08:09

## 2018-01-01 RX ADMIN — ALBUMIN (HUMAN) 12.5 G: 0.25 INJECTION, SOLUTION INTRAVENOUS at 18:27

## 2018-01-01 RX ADMIN — POTASSIUM CHLORIDE 40 MEQ: 1500 TABLET, EXTENDED RELEASE ORAL at 22:33

## 2018-01-01 RX ADMIN — OMEPRAZOLE 20 MG: 20 CAPSULE, DELAYED RELEASE ORAL at 22:33

## 2018-01-01 RX ADMIN — Medication 220 MG: at 08:25

## 2018-01-01 RX ADMIN — OMEPRAZOLE 20 MG: 20 CAPSULE, DELAYED RELEASE ORAL at 08:20

## 2018-01-01 RX ADMIN — CEFTRIAXONE 2 G: 2 INJECTION, POWDER, FOR SOLUTION INTRAMUSCULAR; INTRAVENOUS at 12:38

## 2018-01-01 RX ADMIN — CEFTRIAXONE 2 G: 2 INJECTION, POWDER, FOR SOLUTION INTRAMUSCULAR; INTRAVENOUS at 08:41

## 2018-01-01 RX ADMIN — RIFAXIMIN 550 MG: 550 TABLET ORAL at 08:19

## 2018-01-01 RX ADMIN — NYSTATIN: 100000 POWDER TOPICAL at 12:30

## 2018-01-01 RX ADMIN — ALBUMIN (HUMAN) 12.5 G: 0.25 INJECTION, SOLUTION INTRAVENOUS at 18:25

## 2018-01-01 RX ADMIN — Medication 220 MG: at 08:09

## 2018-01-01 RX ADMIN — VITAMIN D, TAB 1000IU (100/BT) 5000 UNITS: 25 TAB at 08:19

## 2018-01-01 RX ADMIN — NYSTATIN 1500000 UNITS: 100000 POWDER TOPICAL at 20:35

## 2018-01-01 RX ADMIN — TAZOBACTAM SODIUM AND PIPERACILLIN SODIUM 3.38 G: 375; 3 INJECTION, SOLUTION INTRAVENOUS at 18:33

## 2018-01-01 RX ADMIN — POTASSIUM CHLORIDE 40 MEQ: 1500 TABLET, EXTENDED RELEASE ORAL at 12:37

## 2018-01-01 RX ADMIN — FUROSEMIDE 80 MG: 10 INJECTION, SOLUTION INTRAMUSCULAR; INTRAVENOUS at 12:48

## 2018-01-01 RX ADMIN — PIPERACILLIN SODIUM AND TAZOBACTAM SODIUM 3.38 G: 3; .375 INJECTION, POWDER, FOR SOLUTION INTRAVENOUS at 20:17

## 2018-01-01 RX ADMIN — CEFTRIAXONE 2 G: 2 INJECTION, POWDER, FOR SOLUTION INTRAMUSCULAR; INTRAVENOUS at 08:19

## 2018-01-01 RX ADMIN — POTASSIUM CHLORIDE 40 MEQ: 1500 TABLET, EXTENDED RELEASE ORAL at 20:35

## 2018-01-01 RX ADMIN — ALBUMIN (HUMAN) 12.5 G: 0.25 INJECTION, SOLUTION INTRAVENOUS at 08:44

## 2018-01-01 RX ADMIN — SODIUM BICARBONATE 150 MEQ: 84 INJECTION, SOLUTION INTRAVENOUS at 00:31

## 2018-01-01 RX ADMIN — Medication 220 MG: at 21:40

## 2018-01-01 RX ADMIN — Medication 220 MG: at 08:42

## 2018-01-01 RX ADMIN — Medication 220 MG: at 07:44

## 2018-01-01 RX ADMIN — MAGNESIUM SULFATE IN WATER 4 G: 40 INJECTION, SOLUTION INTRAVENOUS at 18:26

## 2018-01-01 RX ADMIN — NYSTATIN 1500000 UNITS: 100000 POWDER TOPICAL at 20:14

## 2018-01-01 RX ADMIN — RIFAXIMIN 550 MG: 550 TABLET ORAL at 20:35

## 2018-01-01 RX ADMIN — OMEPRAZOLE 20 MG: 20 CAPSULE, DELAYED RELEASE ORAL at 20:14

## 2018-01-01 RX ADMIN — LACTULOSE 30 ML: 20 SOLUTION ORAL at 21:13

## 2018-01-01 RX ADMIN — TAZOBACTAM SODIUM AND PIPERACILLIN SODIUM 3.38 G: 375; 3 INJECTION, SOLUTION INTRAVENOUS at 08:25

## 2018-01-01 RX ADMIN — ALBUMIN (HUMAN) 12.5 G: 0.25 INJECTION, SOLUTION INTRAVENOUS at 13:21

## 2018-01-01 RX ADMIN — ALBUMIN (HUMAN) 12.5 G: 0.25 INJECTION, SOLUTION INTRAVENOUS at 12:15

## 2018-01-01 RX ADMIN — ALBUMIN (HUMAN) 12.5 G: 0.25 INJECTION, SOLUTION INTRAVENOUS at 03:45

## 2018-01-01 RX ADMIN — HEPARIN SODIUM 5000 UNITS: 5000 INJECTION, SOLUTION INTRAVENOUS; SUBCUTANEOUS at 21:13

## 2018-01-01 RX ADMIN — OMEPRAZOLE 20 MG: 20 CAPSULE, DELAYED RELEASE ORAL at 08:25

## 2018-01-01 RX ADMIN — RIFAXIMIN 550 MG: 550 TABLET ORAL at 08:25

## 2018-01-01 RX ADMIN — VITAMIN C 1 TABLET: TAB at 08:31

## 2018-01-01 RX ADMIN — SODIUM BICARBONATE 150 MEQ: 84 INJECTION, SOLUTION INTRAVENOUS at 22:52

## 2018-01-01 RX ADMIN — POTASSIUM CHLORIDE 40 MEQ: 1500 TABLET, EXTENDED RELEASE ORAL at 08:20

## 2018-01-01 RX ADMIN — OMEPRAZOLE 20 MG: 20 CAPSULE, DELAYED RELEASE ORAL at 20:12

## 2018-01-01 RX ADMIN — Medication 220 MG: at 21:13

## 2018-01-01 RX ADMIN — RIFAXIMIN 550 MG: 550 TABLET ORAL at 20:12

## 2018-01-01 RX ADMIN — Medication 220 MG: at 20:14

## 2018-01-01 RX ADMIN — LACTULOSE 60 ML: 20 SOLUTION ORAL at 17:02

## 2018-01-01 RX ADMIN — NYSTATIN 1500000 UNITS: 100000 POWDER TOPICAL at 08:25

## 2018-01-01 RX ADMIN — Medication 220 MG: at 08:20

## 2018-01-01 RX ADMIN — ALBUMIN (HUMAN) 12.5 G: 2.5 SOLUTION INTRAVENOUS at 03:24

## 2018-01-01 RX ADMIN — VITAMIN C 1 TABLET: TAB at 08:20

## 2018-01-01 RX ADMIN — Medication 220 MG: at 22:34

## 2018-01-01 RX ADMIN — ALBUMIN (HUMAN) 12.5 G: 0.25 INJECTION, SOLUTION INTRAVENOUS at 12:00

## 2018-01-01 RX ADMIN — ALBUMIN (HUMAN) 25 G: 0.25 INJECTION, SOLUTION INTRAVENOUS at 14:01

## 2018-01-01 RX ADMIN — FUROSEMIDE 80 MG: 10 INJECTION, SOLUTION INTRAMUSCULAR; INTRAVENOUS at 17:39

## 2018-01-01 RX ADMIN — OMEPRAZOLE 20 MG: 20 CAPSULE, DELAYED RELEASE ORAL at 21:40

## 2018-01-01 RX ADMIN — SODIUM CHLORIDE 1000 ML: 9 INJECTION, SOLUTION INTRAVENOUS at 11:45

## 2018-01-01 RX ADMIN — PIPERACILLIN SODIUM AND TAZOBACTAM SODIUM 3.38 G: 3; .375 INJECTION, POWDER, FOR SOLUTION INTRAVENOUS at 00:32

## 2018-01-01 RX ADMIN — RIFAXIMIN 550 MG: 550 TABLET ORAL at 21:40

## 2018-01-01 RX ADMIN — TAZOBACTAM SODIUM AND PIPERACILLIN SODIUM 3.38 G: 375; 3 INJECTION, SOLUTION INTRAVENOUS at 13:10

## 2018-01-01 RX ADMIN — POTASSIUM CHLORIDE 40 MEQ: 1500 TABLET, EXTENDED RELEASE ORAL at 08:42

## 2018-01-01 RX ADMIN — VITAMIN D, TAB 1000IU (100/BT) 5000 UNITS: 25 TAB at 08:42

## 2018-01-01 RX ADMIN — POTASSIUM CHLORIDE 40 MEQ: 1500 TABLET, EXTENDED RELEASE ORAL at 21:40

## 2018-01-01 RX ADMIN — Medication 220 MG: at 20:35

## 2018-01-01 RX ADMIN — OMEPRAZOLE 20 MG: 20 CAPSULE, DELAYED RELEASE ORAL at 08:42

## 2018-01-01 RX ADMIN — SODIUM BICARBONATE 150 MEQ: 84 INJECTION, SOLUTION INTRAVENOUS at 20:19

## 2018-01-01 RX ADMIN — SODIUM CHLORIDE 1400 ML: 9 INJECTION, SOLUTION INTRAVENOUS at 13:19

## 2018-01-01 RX ADMIN — NOREPINEPHRINE BITARTRATE 2 MCG/MIN: 1 INJECTION INTRAVENOUS at 10:38

## 2018-01-01 RX ADMIN — RIFAXIMIN 550 MG: 550 TABLET ORAL at 20:17

## 2018-01-01 RX ADMIN — POTASSIUM CHLORIDE 20 MEQ: 1500 TABLET, EXTENDED RELEASE ORAL at 09:36

## 2018-01-01 RX ADMIN — Medication 220 MG: at 20:13

## 2018-01-01 RX ADMIN — OMEPRAZOLE 20 MG: 20 CAPSULE, DELAYED RELEASE ORAL at 07:43

## 2018-01-01 RX ADMIN — CALCIUM GLUCONATE 1 G: 94 INJECTION, SOLUTION INTRAVENOUS at 06:06

## 2018-01-01 RX ADMIN — RIFAXIMIN 550 MG: 550 TABLET ORAL at 21:13

## 2018-01-01 RX ADMIN — NYSTATIN 1500000 UNITS: 100000 POWDER TOPICAL at 07:43

## 2018-01-01 RX ADMIN — OMEPRAZOLE 20 MG: 20 CAPSULE, DELAYED RELEASE ORAL at 20:35

## 2018-01-01 RX ADMIN — OMEPRAZOLE 20 MG: 20 CAPSULE, DELAYED RELEASE ORAL at 08:09

## 2018-01-01 RX ADMIN — NYSTATIN: 100000 POWDER TOPICAL at 09:00

## 2018-01-01 RX ADMIN — OMEPRAZOLE 20 MG: 20 CAPSULE, DELAYED RELEASE ORAL at 20:17

## 2018-01-01 RX ADMIN — RIFAXIMIN 550 MG: 550 TABLET ORAL at 22:35

## 2018-01-01 RX ADMIN — ACETAMINOPHEN 650 MG: 325 TABLET, FILM COATED ORAL at 22:47

## 2018-01-01 RX ADMIN — SODIUM CHLORIDE: 9 INJECTION, SOLUTION INTRAVENOUS at 17:00

## 2018-01-01 RX ADMIN — CEFTRIAXONE 2 G: 2 INJECTION, POWDER, FOR SOLUTION INTRAMUSCULAR; INTRAVENOUS at 08:20

## 2018-01-01 RX ADMIN — NYSTATIN 1500000 UNITS: 100000 POWDER TOPICAL at 20:18

## 2018-01-01 RX ADMIN — SODIUM BICARBONATE 150 MEQ: 84 INJECTION, SOLUTION INTRAVENOUS at 07:44

## 2018-01-01 RX ADMIN — POTASSIUM CHLORIDE 40 MEQ: 1500 TABLET, EXTENDED RELEASE ORAL at 13:20

## 2018-01-01 RX ADMIN — RIFAXIMIN 550 MG: 550 TABLET ORAL at 20:14

## 2018-01-01 RX ADMIN — TAZOBACTAM SODIUM AND PIPERACILLIN SODIUM 3.38 G: 375; 3 INJECTION, SOLUTION INTRAVENOUS at 13:20

## 2018-01-01 RX ADMIN — ALBUMIN (HUMAN) 12.5 G: 0.25 INJECTION, SOLUTION INTRAVENOUS at 01:30

## 2018-01-01 RX ADMIN — RIFAXIMIN 550 MG: 550 TABLET ORAL at 08:42

## 2018-01-01 RX ADMIN — NYSTATIN 1500000 UNITS: 100000 POWDER TOPICAL at 08:20

## 2018-01-01 RX ADMIN — ACETAMINOPHEN 650 MG: 325 TABLET, FILM COATED ORAL at 20:26

## 2018-01-01 RX ADMIN — OMEPRAZOLE 20 MG: 20 CAPSULE, DELAYED RELEASE ORAL at 08:19

## 2018-01-01 RX ADMIN — VITAMIN C 1 TABLET: TAB at 20:14

## 2018-01-01 RX ADMIN — VITAMIN C 1 TABLET: TAB at 08:25

## 2018-01-01 RX ADMIN — ALBUMIN (HUMAN) 12.5 G: 0.25 INJECTION, SOLUTION INTRAVENOUS at 18:00

## 2018-01-01 RX ADMIN — SODIUM BICARBONATE 150 MEQ: 84 INJECTION, SOLUTION INTRAVENOUS at 18:27

## 2018-01-01 RX ADMIN — ACETAMINOPHEN 650 MG: 325 TABLET, FILM COATED ORAL at 21:14

## 2018-01-01 RX ADMIN — POTASSIUM CHLORIDE 20 MEQ: 1500 TABLET, EXTENDED RELEASE ORAL at 20:14

## 2018-01-01 RX ADMIN — SODIUM CHLORIDE 1000 ML: 9 INJECTION, SOLUTION INTRAVENOUS at 12:30

## 2018-01-01 RX ADMIN — POTASSIUM CHLORIDE 40 MEQ: 1500 TABLET, EXTENDED RELEASE ORAL at 08:19

## 2018-01-01 RX ADMIN — ALBUMIN (HUMAN) 12.5 G: 12.5 INJECTION, SOLUTION INTRAVENOUS at 00:32

## 2018-01-01 RX ADMIN — FUROSEMIDE 80 MG: 10 INJECTION, SOLUTION INTRAMUSCULAR; INTRAVENOUS at 06:24

## 2018-01-01 RX ADMIN — NYSTATIN 1500000 UNITS: 100000 POWDER TOPICAL at 09:39

## 2018-01-01 RX ADMIN — Medication 220 MG: at 20:17

## 2018-01-01 RX ADMIN — ALBUMIN (HUMAN) 12.5 G: 0.25 INJECTION, SOLUTION INTRAVENOUS at 01:10

## 2018-01-01 RX ADMIN — TAZOBACTAM SODIUM AND PIPERACILLIN SODIUM 3.38 G: 375; 3 INJECTION, SOLUTION INTRAVENOUS at 09:36

## 2018-01-01 RX ADMIN — FUROSEMIDE 80 MG: 10 INJECTION, SOLUTION INTRAMUSCULAR; INTRAVENOUS at 15:41

## 2018-01-01 RX ADMIN — VITAMIN D, TAB 1000IU (100/BT) 5000 UNITS: 25 TAB at 12:15

## 2018-01-01 ASSESSMENT — COGNITIVE AND FUNCTIONAL STATUS - GENERAL
CLIMB 3 TO 5 STEPS WITH RAILING: A LITTLE
DRESSING REGULAR LOWER BODY CLOTHING: A LITTLE
SUGGESTED CMS G CODE MODIFIER DAILY ACTIVITY: CJ
HELP NEEDED FOR BATHING: A LITTLE
STANDING UP FROM CHAIR USING ARMS: A LITTLE
WALKING IN HOSPITAL ROOM: A LITTLE
DAILY ACTIVITIY SCORE: 21
SUGGESTED CMS G CODE MODIFIER MOBILITY: CK
MOVING TO AND FROM BED TO CHAIR: UNABLE
MOBILITY SCORE: 17
TOILETING: A LITTLE
TURNING FROM BACK TO SIDE WHILE IN FLAT BAD: A LITTLE

## 2018-01-01 ASSESSMENT — PAIN SCALES - GENERAL
PAINLEVEL_OUTOF10: 5
PAINLEVEL_OUTOF10: 0
PAINLEVEL_OUTOF10: 5
PAINLEVEL_OUTOF10: 0
PAINLEVEL_OUTOF10: 3
PAINLEVEL_OUTOF10: 4
PAINLEVEL_OUTOF10: 0
PAINLEVEL_OUTOF10: 3
PAINLEVEL_OUTOF10: 0
PAINLEVEL_OUTOF10: 5
PAINLEVEL_OUTOF10: 0
PAINLEVEL_OUTOF10: 0
PAINLEVEL_OUTOF10: 3
PAINLEVEL_OUTOF10: 0
PAINLEVEL_OUTOF10: 5
PAINLEVEL_OUTOF10: 5
PAINLEVEL_OUTOF10: 0

## 2018-01-01 ASSESSMENT — ENCOUNTER SYMPTOMS
VOMITING: 0
CHILLS: 0
TINGLING: 0
PALPITATIONS: 0
FEVER: 0
CHILLS: 0
DIARRHEA: 0
WEAKNESS: 1
SHORTNESS OF BREATH: 0
BLURRED VISION: 0
HEADACHES: 0
ORTHOPNEA: 0
DIZZINESS: 0
SHORTNESS OF BREATH: 0
LOSS OF CONSCIOUSNESS: 0
NECK PAIN: 0
FEVER: 0
VOMITING: 0
PALPITATIONS: 0
PALPITATIONS: 0
DIZZINESS: 0
MEMORY LOSS: 1
ABDOMINAL PAIN: 0
SHORTNESS OF BREATH: 1
BACK PAIN: 0
NAUSEA: 0
ABDOMINAL PAIN: 0
WEAKNESS: 0
FEVER: 0
VOMITING: 0
COUGH: 0
NAUSEA: 0
DIZZINESS: 0
HEMOPTYSIS: 0
DIAPHORESIS: 0
SHORTNESS OF BREATH: 0
DIARRHEA: 0
WEAKNESS: 1
MYALGIAS: 0
VOMITING: 0
NAUSEA: 0
FEVER: 0
ABDOMINAL PAIN: 0
PHOTOPHOBIA: 0
CHILLS: 0
SHORTNESS OF BREATH: 0
HEADACHES: 0
CHILLS: 0
BACK PAIN: 0
NAUSEA: 0
DOUBLE VISION: 0
WEAKNESS: 1
FEVER: 0
BACK PAIN: 0
COUGH: 0
PALPITATIONS: 0
COUGH: 0
CHILLS: 0
HEADACHES: 0
LOSS OF CONSCIOUSNESS: 0
MEMORY LOSS: 1

## 2018-01-01 ASSESSMENT — COPD QUESTIONNAIRES
DURING THE PAST 4 WEEKS HOW MUCH DID YOU FEEL SHORT OF BREATH: NONE/LITTLE OF THE TIME
HAVE YOU SMOKED AT LEAST 100 CIGARETTES IN YOUR ENTIRE LIFE: NO/DON'T KNOW
COPD SCREENING SCORE: 2
COPD SCREENING SCORE: 2
DO YOU EVER COUGH UP ANY MUCUS OR PHLEGM?: NO/ONLY WITH OCCASIONAL COLDS OR INFECTIONS
DURING THE PAST 4 WEEKS HOW MUCH DID YOU FEEL SHORT OF BREATH: NONE/LITTLE OF THE TIME
HAVE YOU SMOKED AT LEAST 100 CIGARETTES IN YOUR ENTIRE LIFE: NO/DON'T KNOW
DO YOU EVER COUGH UP ANY MUCUS OR PHLEGM?: NO/ONLY WITH OCCASIONAL COLDS OR INFECTIONS

## 2018-01-01 ASSESSMENT — GAIT ASSESSMENTS
DEVIATION: BRADYKINETIC
DISTANCE (FEET): 110
GAIT LEVEL OF ASSIST: STAND BY ASSIST
ASSISTIVE DEVICE: FRONT WHEEL WALKER

## 2018-01-01 ASSESSMENT — LIFESTYLE VARIABLES
EVER_SMOKED: YES
DO YOU DRINK ALCOHOL: NO

## 2018-01-01 ASSESSMENT — ACTIVITIES OF DAILY LIVING (ADL): TOILETING: REQUIRES ASSIST

## 2018-01-03 NOTE — ADDENDUM NOTE
Encounter addended by: Sylvia Sanchez on: 1/3/2018  1:08 PM<BR>    Actions taken: Sign clinical note

## 2018-01-03 NOTE — WOUND CENTER: COLLABORATIVE IDT
Pt and caregiver into lobby with Dr. Martinez's card requesting to be seen after a visit at Dr. Martinez's office for a leaking ileostomy. Called and spoke with Dr. Martinez who stated he had just seen pt and she has had trouble with the pouching system staying on and her skin is quite macerated around the stoma site. Informed Dr. Martinez a new referral is needed since pt hasn't been seen in the clinic since 9/9/17. He stated he will send over a referral for ostomy care. He is aware pt most likely won't be seen until early next week due to no ostomy openings, he verbalized understanding.  Pt looked to be receiving  care through [facility] . Called [facility]  and was told pt was WA'ed from their care the beginning of Dec.  Spoke with pt and her caregiver. Stated I had spoken to Dr. Martinez and he requests ostomy care and he will send over a referral. Once referral is received, an appt can be scheduled. Pt provided number to clinic and was asked to call tomorrow late morning/early afternoon to see if referral was received. If not, told pt I will contact MD office again. Pt and caregiver expressed understanding.

## 2018-01-09 PROBLEM — A41.9 SEVERE SEPSIS (HCC): Status: ACTIVE | Noted: 2018-01-01

## 2018-01-09 PROBLEM — N17.9 AKI (ACUTE KIDNEY INJURY) (HCC): Status: ACTIVE | Noted: 2018-01-01

## 2018-01-09 PROBLEM — R60.9 EDEMA: Status: ACTIVE | Noted: 2018-01-01

## 2018-01-09 PROBLEM — D64.9 ANEMIA: Status: RESOLVED | Noted: 2017-01-13 | Resolved: 2018-01-01

## 2018-01-09 PROBLEM — R65.20 SEVERE SEPSIS (HCC): Status: ACTIVE | Noted: 2018-01-01

## 2018-01-09 PROBLEM — J96.90 RESPIRATORY FAILURE (HCC): Status: RESOLVED | Noted: 2017-01-01 | Resolved: 2018-01-01

## 2018-01-09 PROBLEM — E43 SEVERE PROTEIN-CALORIE MALNUTRITION (HCC): Status: ACTIVE | Noted: 2018-01-01

## 2018-01-09 NOTE — ED PROVIDER NOTES
"ED Provider Note    CHIEF COMPLAINT  Chief Complaint   Patient presents with   • N/V     nausea and vomiting onset yesterday. unable to tolerate pills at home per family. hx colostomy, arrives without bag on colostomy (as directed by GI)   • ALOC     patient \"not herself\" this morning per family. A&Ox4 but hard of hearing        HPI  Cristina Tenorio is a 77 y.o. female who presentsWith history of home health care worker today to find this patient unable to care for himself.. Evidently the patient lives by herself able to care for self but was unable to ambulate normally according to the home health care person. Nausea and vomiting for 24 hours last vomiting at home prior to arrival here.. Evidently has been unable to keep her medications down. On lactulose. Evidently she has a history of elevated ammonia levels and not keeping her medications down. No trauma,  Dictation from recent discharge, 3 weeks ago;  Presented with altered mentation  2 months ago was hospitalized for hepatic encephalopathy and UTI, discharged home with plan for assisted living; had C before.  Elevated ammonia level  REVIEW OF SYSTEMS  See HPI for further details. History of a colostomy, anemia arthritis cirrhosis COPD Crohn's disease ileostomy sleep apnea snoring Denies other G.I., G.U.. endrocine, cardiovascular, respriatory or neurological problems. All other systems are negative.     PAST MEDICAL HISTORY  Past Medical History:   Diagnosis Date   • Anemia 12/30/16    unknown etiology   • Arthritis 12/30/16    to hands and feet   • Bowel obstruction    • Breath shortness     uses O2@ at night and prn ([employer]). Uses inhaler prn. 12/30/16-reports no changes    • Cataract 2006,2007    bilat [place]aco with IOL   • Cirrhosis of liver (CMS-HCC) 12/30/16    states dx at one time but no treatment for >10yrs   • COPD (chronic obstructive pulmonary disease) (CMS-HCC)     per pt   • COPD (chronic obstructive pulmonary disease) (CMS-HCC) 3/20/2013 "   • Crohn's disease (CMS-[facility])    • Emphysema of lung (CMS-HCC)     COPD   • Heart burn    • Hemorrhagic disorder (CMS-HCC)     anemia of unkown etiology.   • Ileostomy in place (CMS-HCC)    • Indigestion    • Obstruction    • Occasional tremors    • TRAVIS on CPAP     10/2016-CPAP DC'd and wears O2 @4L/NC   • Renal disorder     Increased creatitine level due to meds.   • Sleep apnea    • Snoring        FAMILY HISTORY  No family history on file.    SOCIAL HISTORY  Social History     Social History   • Marital status:      Spouse name: N/A   • Number of children: N/A   • Years of education: N/A     Social History Main Topics   • Smoking status: Former Smoker     Years: 50.00     Types: Cigarettes, Cigars     Quit date: 3/30/2013   • Smokeless tobacco: Never Used      Comment: 50yrs 1.5 ppd quit 2009   • Alcohol use No      Comment: socially   • Drug use: No   • Sexual activity: Not on file     Other Topics Concern   • Not on file     Social History Narrative   • No narrative on file       SURGICAL HISTORY  Past Surgical History:   Procedure Laterality Date   • GASTROSCOPY N/A 1/13/2017    Procedure: GASTROSCOPY - ENTEROSCOPY PUSH;  Surgeon: Boni Brooks M.D.;  Location: SURGERY [facility];  Service:    • GASTROSCOPY-ENDO N/A 10/5/2016    Procedure: GASTROSCOPY-ENDO;  Surgeon: Aravind Roman M.D.;  Location: ENDOSCOPY [facility];  Service:    • GASTROSCOPY WIITHSAVARY DILATATION  9/28/2016    Procedure: GASTROSCOPY WIITH SAVARY DILATATION;  Surgeon: Aftab Alegre M.D.;  Location: ENDOSCOPY [facility];  Service: Gastroenterology   • GASTROSCOPY W/PUSH ENTERSCOPY  9/28/2016    Procedure: GASTROSCOPY W/PUSH ENTERSCOPY;  Surgeon: Aftab Alegre M.D.;  Location: ENDOSCOPY [facility];  Service:    • SIGMOIDOSCOPY FLEX  9/27/2016    Procedure: SIGMOIDOSCOPY FLEX;  Surgeon: Aftab Alegre M.D.;  Location: ENDOSCOPY [facility];  Service:    • GASTROSCOPY-ENDO  9/27/2016     "Procedure: GASTROSCOPY-ENDO;  Surgeon: Aftab Alegre M.D.;  Location: ENDOSCOPY [facility];  Service:    • CHOLECYSTECTOMY  2013    \"burst\"   • RECOVERY  6/21/2010    Performed by SURGERY, IR-RECOVERY at SURGERY SAME DAY [facility] ORS   • ILEOSTOMY  2010    moved to left side   • UMBILICAL HERNIA REPAIR  2000   • OTHER SURGICAL PROCEDURE  1994    closed off rectum    • BOWEL RESECTION  1992    with ileostomy (right side)   • COLONOSCOPY      Hx of  several        CURRENT MEDICATIONS  Home Medications    **Home medications have not yet been reviewed for this encounter**         ALLERGIES  Allergies   Allergen Reactions   • Sulfa Drugs Hives and Itching       PHYSICAL EXAM  VITAL SIGNS: /62   Pulse 95   Temp 36.8 °C (98.2 °F)   Resp 20   Ht 1.524 m (5')   Wt 78 kg (171 lb 15.3 oz)   LMP 06/29/1995   SpO2 97%   BMI 33.58 kg/m²    Constitutional: Well developed, Well nourished, No acute distress, Non-toxic appearance.   HENT: Normocephalic, Atraumatic, Bilateral external ears normal, Oropharynx moist, No oral exudates, Nose normal.   Eyes: PERRL, EOMI, Conjunctiva normal, No discharge.   Neck: Normal range of motion, No tenderness, Supple, No stridor.   Lymphatic: No lymphadenopathy noted.   Cardiovascular: Normal heart rate, Normal rhythm, No murmurs, No rubs, No gallops.   Thorax & Lungs: Normal breath sounds, No respiratory distress, No wheezing, No chest tenderness.   Abdomen:  No tenderness, no guarding no rigidity and the abdomen is soft.  No masses, No pulsatile masses. Colostomy appears to be functioning normally  Skin: Warm, Dry, No erythema, No rash.   Back: No tenderness, No CVA tenderness.   Extremities: Intact distal pulses, No edema, No tenderness, No cyanosis, No clubbing.   Musculoskeletal: Good range of motion in all major joints. No tenderness to palpation or major deformities noted.   Neurologic: Alert & intermittently confused Normal motor function, Normal sensory function, No " focal deficits noted.   Psychiatric: Intermittently confused  EKG Interpretation    Interpreted by me    Rhythm: Sinus tachycardia  Rate: 100  Axis: normal  Ectopy: none  Conduction: normal  ST Segments: no acute change  T Waves: no acute change  Q Waves: none forearm x-ray  Poor R-wave progression in the anterior leads   Clinical Impression: I do have an old EKG to compare to and I do not see significant change  RADIOLOGY/PROCEDURES  DX-CHEST-PORTABLE (1 VIEW)   Final Result      Minimal left basilar atelectasis.      Atherosclerotic plaque.      Stable mild elevation of the left hemidiaphragm.      CT-HEAD W/O    (Results Pending)   US-ABDOMEN COMPLETE SURVEY    (Results Pending)   ECHOCARDIOGRAM COMP W/O CONT    (Results Pending)   LE VENOUS DUPLEX - DVT ([facility] Only)    (Results Pending)           COURSE & MEDICAL DECISION MAKING  Pertinent Labs & Imaging studies reviewed. (See chart for details)White count normal hematocrit 27 platelet count 89. 81 polys. Electrolytes, back or markedly low well. Urine creatinine both elevated, alkaline phosphatase, 382. Troponin normal clotting studies are unremarkable  Urinalysis done yesterday, packed white cells and 20 red cells and many bacteria      History of hepatic encephalopathy, ammonia level still pending hospitalist will be admitting     FINAL IMPRESSION  1.   1. Non-intractable vomiting with nausea, unspecified vomiting type    2. Altered level of consciousness    3. Anemia, unspecified type    4. Renal insufficiency    5. Urinary tract infection without hematuria, site unspecified        2.   3.     Disposition  Admitted by hospitalist  Electronically signed by: Humberto Alves, 1/9/2018 10:55 AM

## 2018-01-09 NOTE — ED NOTES
The Medication Reconciliation process has been completed by interviewing the patient    Allergies have been reviewed  Antibiotic use in 30 days - rifaximin    Home Pharmacy:  [facility]

## 2018-01-09 NOTE — ED NOTES
Rounded on patient, sleeping on arrival and arousable to voice. Denies needs at this time. Awaiting inpatient room assignment.

## 2018-01-09 NOTE — ED NOTES
MD aware of BP measurements and humerus fx on CXR. On investigation, is old injury. Pt denies new trauma (uses walker at home.) Home health aide only comes on Friday

## 2018-01-09 NOTE — ED NOTES
Lab aware of order for blood cultures. Loaiza placed and urine spec sent to lab. Stage 2 open wound noted to coccyx when changing sheets.

## 2018-01-09 NOTE — PROGRESS NOTES
I examined the patient 1/9/2018 12:09 PM  Vital Signs:/62   Pulse 91   Temp 36.8 °C (98.2 °F)   Resp (!) 24   Ht 1.524 m (5')   Wt 78 kg (171 lb 15.3 oz)   LMP 06/29/1995   SpO2 99%   BMI 33.58 kg/m²   Cardiac examination significant for Regular rate and rhythm  Pulmonary examination significant for Diminished in bases, rhonchi b/l bases  Capillary refill is slowed  Peripheral Pulse is 1+   Skin is pale     Harry Jean-Baptiste M.D.  01/09/18  12:09 PM

## 2018-01-10 PROBLEM — R65.21 SEPTIC SHOCK (HCC): Status: ACTIVE | Noted: 2018-01-01

## 2018-01-10 PROBLEM — G93.40 ENCEPHALOPATHY ACUTE: Status: ACTIVE | Noted: 2018-01-01

## 2018-01-10 PROBLEM — K21.9 GERD (GASTROESOPHAGEAL REFLUX DISEASE): Status: RESOLVED | Noted: 2017-01-01 | Resolved: 2018-01-01

## 2018-01-10 PROBLEM — R65.20 SEVERE SEPSIS (HCC): Status: RESOLVED | Noted: 2018-01-01 | Resolved: 2018-01-01

## 2018-01-10 PROBLEM — A41.9 SEPTIC SHOCK (HCC): Status: ACTIVE | Noted: 2018-01-01

## 2018-01-10 PROBLEM — A41.9 SEVERE SEPSIS (HCC): Status: RESOLVED | Noted: 2018-01-01 | Resolved: 2018-01-01

## 2018-01-10 NOTE — ED NOTES
Ice pack applied to left upper extremity per pt request (old injury). Pt refusing PO tylenol when offered.

## 2018-01-10 NOTE — PROGRESS NOTES
Spoke with Dr. Alegria via phone regarding critical platelet result and new bloody ostomy output. New orders received and implemented.

## 2018-01-10 NOTE — PROGRESS NOTES
[facility] Hospitalist Progress Note    Date of Service: 1/10/2018    Chief Complaint  77 y.o. female admitted 2018 with confusion.    Interval Problem Update  Ms. Tenorio has a history of cirrhosis, TRAVIS, and Crohn's disease with colostomy that presented with vomiting, ostomy dysfunction, and confusion. Her home health nurse called paramedics due to these conditions. She was recently diagnosed with a UTI outpatient. In the ER she was found to be encephalopathic and septic from UTI.    She had not had an ostomy bag on prior to admit and one has been placed on.  Her BP has been low thus a bolus has been given. She had two doses of albumin.  270 ml urine over night. Blood cultures are positive this morning.   I met with her sister and brother at bedside and we discussed her condition including her known DNR status. Ms. Tenorio and her siblings are agreeable to a central line and pressors as needed.   Consultants/Specialty  Critical care. I discussed her condition with Dr. Thakkar on ICU Hot Rounds.    Disposition  ICU        Review of Systems   Unable to perform ROS: Mental acuity      Physical Exam  Laboratory/Imaging   Hemodynamics  Temp (24hrs), Av.4 °C (97.5 °F), Min:35.9 °C (96.7 °F), Max:36.8 °C (98.2 °F)   Temperature: 36.2 °C (97.2 °F)  Pulse  Av.8  Min: 89  Max: 124 Heart Rate (Monitored): 99  Blood Pressure : 139/62, NIBP: (!) 96/43      Respiratory      Respiration: 16, Pulse Oximetry: 98 %, O2 Daily Delivery Respiratory : Room Air with O2 Available     Work Of Breathing / Effort: Mild  RUL Breath Sounds: Clear, RML Breath Sounds: Clear;Diminished, RLL Breath Sounds: Diminished, REMY Breath Sounds: Clear, LLL Breath Sounds: Diminished    Fluids    Intake/Output Summary (Last 24 hours) at 01/10/18 0901  Last data filed at 01/10/18 0600   Gross per 24 hour   Intake             1700 ml   Output             1970 ml   Net             -270 ml       Nutrition  Orders Placed This Encounter   Procedures   •  Diet NPO     Standing Status:   Standing     Number of Occurrences:   1     Order Specific Question:   Restrict to:     Answer:   Strict [1]     Physical Exam   Constitutional: No distress.   Neck: Neck supple.   Cardiovascular: Normal rate and regular rhythm.    Pulmonary/Chest: Effort normal. No respiratory distress.   Abdominal: Soft.   Ostomy with bag on it.   Significant irritated skin with breakdown surrounding the ostomy   Musculoskeletal: She exhibits edema. She exhibits no tenderness.   Neurological: She is alert.   She is a moderate historian   Skin: Skin is warm. She is not diaphoretic. There is pallor.   Multiple bruises on her arms.   Nursing note and vitals reviewed.      Recent Labs      01/09/18   1100  01/10/18   0255   WBC  6.1  5.1   RBC  2.88*  2.45*   HEMOGLOBIN  8.3*  7.1*   HEMATOCRIT  27.1*  23.5*   MCV  94.1  95.9   MCH  28.8  29.0   MCHC  30.6*  30.2*   RDW  57.9*  59.7*   PLATELETCT  89*  45*     Recent Labs      01/09/18   1100  01/10/18   0255  01/10/18   0615   SODIUM  138  141  143   POTASSIUM  5.3  4.3  4.2   CHLORIDE  117*  118*  118*   CO2  12*  13*  14*   GLUCOSE  80  112*  149*   BUN  53*  53*  55*   CREATININE  4.04*  3.87*  3.84*   CALCIUM  8.0*  8.3*  8.3*     Recent Labs      01/09/18   1100  01/10/18   0615   APTT  33.6   --    INR  1.36*  1.78*                  Assessment/Plan     * Septic shock (CMS-HCC)- (present on admission)   Assessment & Plan    Secondary to UTI.  IV fluids and IV antibiotics with IV zosyn.  The organ system failure is the renal system as is evidenced by renal failure secondary to sepsis.        ORAL (acute kidney injury) (CMS-HCC)- (present on admission)   Assessment & Plan    Sepsis related.  Cr 4 on admit.  Underlying CKD  IVF hydration, albumin support  Monitor renal function / Avoid nephrotoxins and dose medications renally  US abdomen did not reveal hydronephrosis  She is followed by Dr. Vee outpatient.        UTI (urinary tract infection)-  (present on admission)   Assessment & Plan    Previously cultures positive for enterococcus  Initiate patient on intravenous Zosyn  Await culture data          Hepatic encephalopathy (CMS-HCC)   Assessment & Plan    With contribution from sepsis  Maintain patient on lactulose, rifaximin and zinc  Avoid sedative, narcotics / BZDs as able  Frequent reorientation  Aspiration, fall and seizure precautions        Encephalopathy acute- (present on admission)   Assessment & Plan    Delirium secondary to infection.        DNR (do not resuscitate)- (present on admission)   Assessment & Plan    Orders written for this.        Severe protein-calorie malnutrition (CMS-HCC)- (present on admission)   Assessment & Plan    Optimize nutrition  Nutrition team consult  Check prealbumin levels        Edema- (present on admission)   Assessment & Plan    B/L, profound and pitting  Diuresis once acute issues resolve  Obtain LE duplex study to r/o DVT  TTE to evaluate RV function with underlying cirrhosis        Pancytopenia (CMS-HCC)- (present on admission)   Assessment & Plan    History of  Obtain anemia evaluation iron studies, b12,folate, retic, tsh  Monitor Hb / Restrictive transfusion strategy        BAKER (nonalcoholic steatohepatitis)- (present on admission)   Assessment & Plan    With cirrhosis        Thrombocytopenia (CMS-HCC)- (present on admission)   Assessment & Plan    Secondary to liver disease.  Platelets down to 45        Chronic kidney disease, stage IV (severe) (CMS-HCC)- (present on admission)   Assessment & Plan    With ORAL now  Baseline Cr around 2.7  BMP ordered for the morning.        Chronic pain with narcotic dependence- (present on admission)   Assessment & Plan    Holding pain medications at this time        Obesity (BMI 30.0-34.9)- (present on admission)   Assessment & Plan    Body mass index is 33.58 kg/m².        Cirrhosis with portal hypertension- (present on admission)   Assessment & Plan    BAKER  Related  Continue lactulose, rifaximin and Zn for encephalopathy  Diuresis with lasix / aldactone once acute issues resolved  US abdomen negative for significant ascites        TRAVIS on CPAP- (present on admission)   Assessment & Plan    Hx of          Crohn's disease (HCC)- (present on admission)   Assessment & Plan    S/p ileostomy  Recommend ongoing outpatient GI evaluation  Wound team consult for ileostomy management            Reviewed items::  Labs reviewed and Medications reviewed  DVT prophylaxis pharmacological::  Contraindicated - High bleeding risk

## 2018-01-10 NOTE — ASSESSMENT & PLAN NOTE
S/p ileostomy  Recommend ongoing outpatient GI evaluation  Wound team consult for ileostomy management

## 2018-01-10 NOTE — ASSESSMENT & PLAN NOTE
This is severe sepsis with the following associated acute organ dysfunction(s): acute kidney failure, metabolic/septic encephalopathy.   2ND TO UTI  DOING UCH BETTER  ROCEPHIN

## 2018-01-10 NOTE — ED NOTES
Linens changed and ostomy supplies applied to the best of writer's ability. Stoma is red and surrounding tissue bleeding and friable. Continue to await inpatient room. Warm blankets applied. Will continue to monitor.

## 2018-01-10 NOTE — PROGRESS NOTES
2 RN Skin check complete:    -Scattered bruising throughout body.    -Ostomy on LLQ. Stoma pink/red. Output noted.       -Open wound on sacrum.      -Redness on groin area, blanching.    -Pink abrasion on left knee, with bruising.    -Old scabs on heels, bilaterally.    Interventions in place. Photos Taken. Wound Consulted.

## 2018-01-10 NOTE — PALLIATIVE CARE
PALLIATIVE CARE:  Received EPIC. Patient seen by palliative care in June 2017. Reviewed AD/POLST and notified Dr. Cavanaugh of [facility] POLST.    Thank you for allowing Palliative Care to follow this patient. Please contact us at  with any questions.

## 2018-01-10 NOTE — PROGRESS NOTES
0000: Dr. Alegria at bedside to assess patient. New orders received. MD placed orders in EPIC.    0132: Paged Dr. Alegria    0150: Second page to Dr. Alegria    0205: Third page to Dr. Alegria    0220: Spoke with Dr. Alegria via phone regarding blood cultures, Ct, UOP, and SBP in 80s. New orders received and implemented.

## 2018-01-10 NOTE — PROGRESS NOTES
JEN from Lab called with critical result of blood cultures x 2 (gram negative rods) at 0107. Critical lab result read back to JEN.   RN currently in Ct and notified of critical lab result at 0115.

## 2018-01-10 NOTE — ASSESSMENT & PLAN NOTE
With contribution from sepsis  Maintain patient on lactulose, rifaximin and zinc  Avoid sedative, narcotics / BZDs as able  Frequent reorientation  Aspiration, fall and seizure precautions

## 2018-01-10 NOTE — CARE PLAN
Problem: Infection  Goal: Will remain free from infection  Outcome: NOT MET  Patient with positive urine and blood cultures. UOP cloudy. Afebrile. HR ST. Will continue to monitor.    Problem: Skin Integrity  Goal: Risk for impaired skin integrity will decrease  Outcome: PROGRESSING AS EXPECTED  Repositioning patient q2h. Pillows used for turning and to float heels. Open moisture fissure to sacrum, ostomy to LLQ, wound consult in place, weekly photo in, open to air. Mepilex to coccyx. See wound LDA. Will continue to monitor.

## 2018-01-10 NOTE — H&P
" Hospital Medicine History and Physical    Date of Service  1/9/2018    Chief Complaint  Chief Complaint   Patient presents with   • N/V     nausea and vomiting onset yesterday. unable to tolerate pills at home per family. hx colostomy, arrives without bag on colostomy (as directed by GI)   • ALOC     patient \"not herself\" this morning per family. A&Ox4 but hard of hearing        History of Presenting Illness  77 y.o. female who presented 1/9/2018 with Confusion and nausea and vomiting. Patient with underlying history of nonalcoholic steatohepatitis completed by cirrhosis and hepatic encephalopathy and portal hypertension. Previous admissions to the hospital with urinary tract infection leading to sepsis and development of hepatic encephalopathy. Patient had a urinalysis checked likely by her primary care physician yesterday which revealed evidence of urinary tract infection. Appears that the patient was not communicated about these results. It appears the patient has been developing encephalopathy and worsening confusion since yesterday. Patient was unable to care for herself. She had development of nausea and vomiting at home. Was unable to keep her medications down. It appears that her home health care nurse subsequently called EMS for the patient presented to the emergency room for further evaluation. Upon evaluation here patient is noted to be encephalopathic with underlying urinary tract infection and severe sepsis.    Upon evaluation by me this patient is alert to herself. She is unaware of where she is. She is unaware of the year or month. She is aware of being in [place] only. She is unaware who the president is. She is manifesting asterixis. She has profound hearing loss and she is a very poor historian. I'm unable to obtain any further history from this patient. She remains tangential during history of presenting illness. Actually reports that she has had a cough. She is uncertain why she is in the hospital. " She denies any fevers or chills. She is moving all extremities and following commands. She denies any pain. She reports no to all questions asked for review of systems.      Primary Care Physician  Roshni Morillo M.D.    Consultants  None    Code Status  FULL CODE  Recommend palliative care consult to discuss advance care planning    Review of Systems  Review of Systems   Unable to perform ROS: Mental acuity        Past Medical History  Past Medical History:   Diagnosis Date   • Arthritis 12/30/16    to hands and feet   • Anemia 12/30/16    unknown etiology   • Cirrhosis of liver (CMS-HCC) 12/30/16    states dx at one time but no treatment for >10yrs   • COPD (chronic obstructive pulmonary disease) (CMS-[facility]) 3/20/2013   • Bowel obstruction    • Breath shortness     uses O2@ at night and prn ([employer]). Uses inhaler prn. 12/30/16-reports no changes    • Cataract 2006,2007    bilat phaco with IOL   • COPD (chronic obstructive pulmonary disease) (CMS-HCC)     per pt   • Crohn's disease (CMS-HCC)    • Emphysema of lung (CMS-HCC)     COPD   • Heart burn    • Hemorrhagic disorder (CMS-HCC)     anemia of unkown etiology.   • Ileostomy in place (CMS-HCC)    • Indigestion    • Obstruction    • Occasional tremors    • TRAVIS on CPAP     10/2016-CPAP DC'd and wears O2 @4L/NC   • Renal disorder     Increased creatitine level due to meds.   • Sleep apnea    • Snoring        Surgical History  Past Surgical History:   Procedure Laterality Date   • GASTROSCOPY N/A 1/13/2017    Procedure: GASTROSCOPY - ENTEROSCOPY PUSH;  Surgeon: Boni Brooks M.D.;  Location: SURGERY [facility];  Service:    • GASTROSCOPY-ENDO N/A 10/5/2016    Procedure: GASTROSCOPY-ENDO;  Surgeon: Aravind Roman M.D.;  Location: ENDOSCOPY [facility];  Service:    • GASTROSCOPY WIITHSAVARY DILATATION  9/28/2016    Procedure: GASTROSCOPY WIITH SAVARY DILATATION;  Surgeon: Aftab Alegre M.D.;  Location: ENDOSCOPY [facility];  Service:  "Gastroenterology   • GASTROSCOPY W/PUSH ENTERSCOPY  9/28/2016    Procedure: GASTROSCOPY W/PUSH ENTERSCOPY;  Surgeon: Aftab Alegre M.D.;  Location: ENDOSCOPY [facility];  Service:    • SIGMOIDOSCOPY FLEX  9/27/2016    Procedure: SIGMOIDOSCOPY FLEX;  Surgeon: Aftab Alegre M.D.;  Location: ENDOSCOPY [facility];  Service:    • GASTROSCOPY-ENDO  9/27/2016    Procedure: GASTROSCOPY-ENDO;  Surgeon: Aftab Alegre M.D.;  Location: ENDOSCOPY [facility];  Service:    • CHOLECYSTECTOMY  2013    \"burst\"   • RECOVERY  6/21/2010    Performed by SURGERY, IR-RECOVERY at SURGERY SAME DAY [facility]   • ILEOSTOMY  2010    moved to left side   • UMBILICAL HERNIA REPAIR  2000   • OTHER SURGICAL PROCEDURE  1994    closed off rectum    • BOWEL RESECTION  1992    with ileostomy (right side)   • COLONOSCOPY      Hx of  several        Medications  No current facility-administered medications on file prior to encounter.      Current Outpatient Prescriptions on File Prior to Encounter   Medication Sig Dispense Refill   • rifaximin (XIFAXAN) 550 MG Tab tablet Take 1 Tab by mouth 2 Times a Day. 42 Tab 0   • lactulose 10 GM/15ML Solution Take 20 g by mouth every morning.     • potassium Chloride ER (K-TAB) 20 MEQ Tab CR tablet Take 1 Tab by mouth 4 times a day. 120 Tab 3   • zinc sulfate (ZINCATE) 220 (50 Zn) MG Cap Take 220 mg by mouth every day.     • omeprazole (PRILOSEC) 20 MG delayed-release capsule Take 2 Caps by mouth every day. 30 Cap 3   • sodium bicarbonate (SODIUM BICARBONATE) 650 MG Tab Take 3 Tabs by mouth 3 times a day. 120 Tab 1   • ferrous gluconate (FERGON) 324 (38 FE) MG Tab Take 324 mg by mouth every morning with breakfast.     • vitamin D (CHOLECALCIFEROL) 1000 UNIT Tab Take 1,000 Units by mouth every day.         Family History  Unable to obtain from patient given encephalopathy    Social History  Social History   Substance Use Topics   • Smoking status: Former Smoker     Years: 50.00     " Types: Cigarettes, Cigars     Quit date: 3/30/2013   • Smokeless tobacco: Never Used      Comment: 50yrs 1.5 ppd quit    • Alcohol use No      Comment: socially       Allergies  Allergies   Allergen Reactions   • Sulfa Drugs Hives and Itching        Physical Exam  Laboratory   Hemodynamics  Temp (24hrs), Av.8 °C (98.2 °F), Min:36.8 °C (98.2 °F), Max:36.8 °C (98.2 °F)   Temperature: 36.8 °C (98.2 °F)  Pulse  Av.9  Min: 90  Max: 107 Heart Rate (Monitored): 100  Blood Pressure : 139/62, NIBP: 114/52      Respiratory      Respiration: (!) 24, Pulse Oximetry: 97 %, O2 Daily Delivery Respiratory : Room Air with O2 Available     Work Of Breathing / Effort: Moderate  RLL Breath Sounds: Diminished, LLL Breath Sounds: Diminished    Physical Exam   Constitutional: No distress.   HENT:   Head: Normocephalic.   Mouth/Throat: Oropharynx is clear and moist. No oropharyngeal exudate.   Eyes: Pupils are equal, round, and reactive to light. No scleral icterus.   Pale conjunctivae   Neck: No JVD present.   Pulmonary/Chest: Effort normal. No stridor. No respiratory distress. She has no wheezes. She has rales.   Diminished in bases   Abdominal: Soft. Bowel sounds are normal. She exhibits distension. There is no tenderness. There is no rebound and no guarding.   Ileostomy with normal stool   Musculoskeletal: She exhibits edema (3plus b/l LE). She exhibits no tenderness or deformity.   Neurological: She is alert. No cranial nerve deficit. GCS eye subscore is 4. GCS verbal subscore is 5. GCS motor subscore is 6.   Confused. Following commands. Moving all extremities. Asterixis noted.    Skin: She is not diaphoretic.       Recent Labs      18   1100   WBC  6.1   RBC  2.88*   HEMOGLOBIN  8.3*   HEMATOCRIT  27.1*   MCV  94.1   MCH  28.8   MCHC  30.6*   RDW  57.9*   PLATELETCT  89*     Recent Labs      18   1100   SODIUM  138   POTASSIUM  5.3   CHLORIDE  117*   CO2  12*   GLUCOSE  80   BUN  53*   CREATININE  4.04*    CALCIUM  8.0*     Recent Labs      01/09/18   1100   ALTSGPT  31   ASTSGOT  64*   ALKPHOSPHAT  382*   TBILIRUBIN  1.5   GLUCOSE  80     Recent Labs      01/09/18   1100   APTT  33.6   INR  1.36*             Lab Results   Component Value Date    TROPONINI <0.01 01/09/2018     Urinalysis:    Lab Results  Component Value Date/Time   SPECGRAVITY 1.014 01/09/2018 1220   GLUCOSEUR Negative 01/09/2018 1220   KETONES Trace (A) 01/09/2018 1220   NITRITE Negative 01/09/2018 1220   WBCURINE 100-150 (A) 01/09/2018 1220   RBCURINE 2-5 (A) 01/09/2018 1220   BACTERIA Many (A) 01/09/2018 1220   EPITHELCELL Negative 01/09/2018 1220        Imaging  Reviewed   Assessment/Plan     I anticipate this patient will require at least two midnights for appropriate medical management, necessitating inpatient admission.    Severe sepsis (CMS-HCC)- (present on admission)   Assessment & Plan    This is severe sepsis with the following associated acute organ dysfunction(s): acute kidney failure, metabolic/septic encephalopathy.   Sepsis protocol  Source   Received 30 ml/kg IVF bolus  SBP < 90 during hospital stay, admit to ICU per hospital protocol  Empiric zosyn  De escalate abx as able  Monitor LA levels        ORAL (acute kidney injury) (CMS-HCC)- (present on admission)   Assessment & Plan    Sepsis related  Underlying CKD  Gentle IVF hydration, albumin support  Monitor renal function / Avoid nephrotoxins and dose medications renally  If worsening, nephrology consultation tomorrow  US abdomen to r/o hydronephrosis        UTI (urinary tract infection)- (present on admission)   Assessment & Plan    Previously cultures positive for enterococcus  Dirty urinalysis is noted on January 8 and again today  Initiate patient on intravenous Zosyn  Monitor culture data  De-escalate antibiotics as able        Hepatic encephalopathy (CMS-HCC)- (present on admission)   Assessment & Plan    With contribution from sepsis  Maintain patient on lactulose,  rifaximin and zinc  Avoid sedative, narcotics / BZDs as able  Frequent reorientation  Aspiration, fall and seizure precautions        Severe protein-calorie malnutrition (CMS-HCC)- (present on admission)   Assessment & Plan    Optimize nutrition  Nutrition team consult  Check prealbumin levels        Edema- (present on admission)   Assessment & Plan    B/L, profound and pitting  Diuresis once acute issues resolve  Obtain LE duplex study to r/o DVT  TTE to evaluate RV function with underlying cirrhosis        Pancytopenia (CMS-HCC)- (present on admission)   Assessment & Plan    History of  Obtain anemia evaluation iron studies, b12,folate, retic, tsh  Monitor Hb / Restrictive transfusion strategy        BAKER (nonalcoholic steatohepatitis)- (present on admission)   Assessment & Plan    With cirrhosis        Thrombocytopenia (CMS-HCC)- (present on admission)   Assessment & Plan    Portal HTN related  Monitor  Stable compared to prior        Chronic kidney disease, stage IV (severe) (CMS-HCC)- (present on admission)   Assessment & Plan    With ORAL now  Monitor renal function / Avoid nephrotoxins and dose medications renally  Check PTH / Vitamin D  If ORAL not improving, consider nephrology consultation        Chronic pain with narcotic dependence- (present on admission)   Assessment & Plan    Holding pain medications at this time        Obesity (BMI 30.0-34.9)- (present on admission)   Assessment & Plan    Body mass index is 33.58 kg/m².        GERD (gastroesophageal reflux disease)- (present on admission)   Assessment & Plan    Holding PPI while abx used to limit C Diff risk        Cirrhosis with portal hypertension- (present on admission)   Assessment & Plan    BAKER Related  Continue lactulose, rifaximin and Zn for encephalopathy  Diuresis with lasix / aldactone once acute issues resolved  US abdomen to see if any tapable ascites, if so perform tap to r/o SBP        TRAVIS on CPAP- (present on admission)   Assessment &  Plan    Hx of  Not candidate for CPAP with encephalopathy  HS O2 PRN for now        Crohn's disease (HCC)- (present on admission)   Assessment & Plan    S/p ileostomy  Recommend ongoing outpatient GI evaluation  Wound team consult for ileostomy management            VTE prophylaxis: SCD and SC Heparin.      This patient is critically ill with a life threatening illness as noted above requiring my direct presence, involvement and maximal preparedness. I have personally spend 38 minutes providing critical care to this patient. Time spend on critical care excludes time spend on procedures.

## 2018-01-10 NOTE — ASSESSMENT & PLAN NOTE
ORAL WITH ATN  NEPHROLOGY INPUT IS NOTED  NO NEED FOR HEMODIALYSIS AT THIS TIME  RENAL ULTRASOUND :The left kidney measures 7.31 cm. The left kidney appears small and echogenic. The left renal collecting system is not dilated. There is a 6.0 x 6.3 x 4.4 cm cyst in the lower pole left kidney. The bladder demonstrates no focal wall abnormality.

## 2018-01-10 NOTE — PROGRESS NOTES
Eliza from Lab called with critical result of Platelet 45 at 0410. Critical lab result read back to Eliza.   Dr. Alegria notified of critical lab result at 0424.  Critical lab result read back by Dr. Alegria.

## 2018-01-10 NOTE — ASSESSMENT & PLAN NOTE
Previously cultures positive for enterococcus  Data urinalysis is noted on January 8 and again today  Initiate patient on intravenous Zosyn  Monitor culture data  De-escalate antibiotics as able

## 2018-01-10 NOTE — PROGRESS NOTES
Pt arrived [facility] [place] RN and ER tech at 1835. Family at the bedside, all questions answered. Pt's sister Caleb took pt's two rings, hearing aids, phone, and belongings home with her.

## 2018-01-10 NOTE — DISCHARGE PLANNING
Palliative Care  Assessment    Current goals of care: Admit to ICU. Crohn's disease, hx of resected   Bowel, ostomy bag,  COPD, acute renal failure, anemia. Palliative care consult for chronic medical issues.     Possible bowel obstruction. CT of abdomen. GI consult  Nausea and vomiting. NPO.   Dysfunctional Ostomy. UTI (Zosyn q12)    Patient understanding/awareness of illness: Poor historian    Mental status: Stable.    Comment: S.O in [place] skilled for Hip fx.     Coping status: Doesn't quite feel herself     Comment: With S.O in Skilled, and no family locally, she feels isolated.     Emotional status: A/O 2-3. Some tearful emotions when she refers to her family in [place]. Emotional tears stems from the distance between them.          Comment: All family lives elsewhere.  Sister and FRANCIS in [place].     Home situation: Apt living.     Support systems: Live-in S.O of 17 years. Since 2001.      Comment: He is currently in skilled care for Hip fx.     Spiritual needs: [facility].  Request for BSV to Jani Kwan.      Financial needs: None noted     Comment: N/A    [place]: No     Comments: N/A    Advance directive: Patient has POLST/ AD.   OK with CPR/Cardioversion.  DNI.       Comments: Palliative care available for further POLST/AD discussions.     Patient's wishes related to goals: Patient and her SO were looking at [facility] in [place] to be closer to family before Both of their hospitalizations.     Patient/Family goals and expectations: To be with her family in [place]    Preference regarding death (place, family presence, rituals): Not discussed    Summary assessment: 78 y/o female. Faced with recent hospitalization and Live-in SO in a skilled facility for a hip fx. No local family. Patient has multiple medical problems and likely not able to transition straight home.  Patient remains full code.  Would benefit from clarification on POLST wishes prior to DC.     Referrals/Needs: Would benefit from  PT/OT when appropriate to assist with DC planning.

## 2018-01-10 NOTE — CONSULTS
DATE OF SERVICE:  01/10/2018    CRITICAL CARE CONSULTATION    The patient was seen in [facility].    CHIEF COMPLAINT:  Nausea, vomiting and ostomy dysfunction.    HISTORY OF PRESENT ILLNESS:  This is a 77-year-old female with a history of   chronic medical problems including Crohn's disease and a history of resected   bowel, COPD, acute renal failure, anemia of chronic disease that is a poor   historian.  The patient presented to the hospital with nausea, vomiting per   her home health care RN.  She has also had signs of urinary tract infection   with odorous cloudy urine noted as well.      Her ostomy was told to be removed as   directed by her gastroenterologist since she has been having significant   worsening of her ostomy bag and increased distention.  She has got significant   food particles within the bag that was replaced.  The patient has been   complaining of abdominal discomfort as well.      The patient did have a   urinalysis checked by primary care yesterday that showed urinary tract   infection.  Patient has not yet been started on antibiotics until the   admission to the hospital.  She has also been encephalopathic as well which   may or may not be an acute finding.  The patient had some asterixis and   elevated ammonia level and was started on lactulose.  The patient had no   significant cough.      She was not clear as to why she was in the hospital   according to the hospitalist who admitted the patient as well.  The patient   received approximately 2.5 liters of IV fluid is getting saline at 100 an   hour.  Her systolic pressures are running around 90/40.  She is tachycardic as   well in the 120s.  The patient at this point has had a replacement of the   ostomy bag, but still has significant distention of it as well.  She remains   acidotic with acute renal failure as well.      Further imaging including CT   abdomen has not yet been performed, but abdominal ultrasound was performed   earlier.  She was  started on empiric Zosyn and lactate levels were also   ordered.  Urinary tract infection again appears to be evident.  She does have   portal hypertension and cirrhosis felt to be related to BAKER and rifaximin has   been started as well.    ALLERGIES:  LISTED ARE SULFA DRUGS.    MEDICATIONS:  Rifaximin, lactulose, potassium, zinc, Prilosec, sodium bicarb,   ferrous gluconate, and vitamin D.    PAST MEDICAL HISTORY:  BAKER with cirrhosis, COPD, previous bowel obstruction,   Crohn's disease with probable resection of small bowel, indigestion,   obstructive sleep apnea, acute renal failure, previous sleep apnea.    PAST SURGICAL HISTORY:  She has had a gastrostomy with an enterostomy push   back in 01/2017.  Similarly previous gastroscopy endoscopy 10/2016.  She has   had a previous gastrostomy with Savary dilatation in 09/2016.  Sigmoid flex as   well as cholecystectomy, ileostomy umbilical herniorrhaphy.  Previous   colonoscopies and bowel resection with a right-sided ileostomy.    SOCIAL HISTORY:  She is a former smoker, 50-pack-year, quit in 2013, some   alcohol use at this time, but not heavy.    FAMILY HISTORY:  Unable to be obtained as well.    REVIEW OF SYSTEMS:  Reviewed and otherwise negative except as described above   in HPI, but were limited by the patient's encephalopathy and poor medical   insight.    PHYSICAL EXAMINATION:  VITAL SIGNS:  Her hemodynamics on admission, temperature 98.2 with a blood   pressure 139/62, heart rate is 100, respirations 24, saturation 97%.  She had   no obvious distress reported and was resting comfortably in the ICU upon my   evaluation.  She did have blood pressure at this time of 91/46 with a heart   rate of 129.  HEENT:  Her pupils are equal and reactive.  There is no icterus.  There is no   nystagmus.  She has no epistaxis.  The oropharynx is slightly dry.  NECK:  Shows no obvious jugular venous distention.  HEART:  Tachycardic, prominent S1 and S2.  I do not detect  obvious murmur, rub   or gallop.  ABDOMEN:  Significant distention.  There are multiple scars from previous   surgery.  There does appear to be guarding and some rebound tenderness.  There   may be obstructive pattern with high-pitched bowel sounds.  She does have   food particles with stool within her ostomy which is distended.  EXTREMITIES:  She does have slightly cool toes with cap refill of around 3   seconds.  There is no gross cyanosis.  Her fingernails have fake nails.  No   other cyanosis is seen of her upper extremities and they are slightly warmer   than lower.  She does have chronic venous stasis changes of portal   hypertension with edema.  There is movement of all 4 extremities.  NEUROLOGIC:  She does have asterixis.  There is no clonus.  Reflexes are equal   upper and lower extremity.  She has no significant tremor at baseline;   however.  Initial GCS was ranked as 15.    LABORATORY DATA:  White count was 6.1, H and H was 8.3 and 27.1 with a   platelet count that is low at 89, chronically low as well.  Her reticulocyte   count was 2.7 with immature reticulocytes of 20.6.  Chemistry showed that she   had a lactate of 1.4 prior to that was 1.6 and prior to that was 1.8.  Sodium   of 138, potassium 5.2, chloride 117, CO2 of 12, BUN of 52, creatinine 4.04   with previous creatinine back in December of last year of 2.72.  Her AST of   64, ALT of 31, T bili was 1.5, phos of 4.3, mag of 1.5, ammonia of 55.    Troponin was less than 0.01.  Patient did have EKG reviewed from 01/09/2018 at   12:21, this shows significant low voltage throughout the entire EKG.  She has   sinus tachycardia with a rate of 100, ID interval 176, QRS duration of 72   with , QRS of 33.  The time of the EKG was 12:21 p.m. on 01/09/2018.  I   am unable to detect a significant ST or T-wave changes of ischemia due to low   voltage, but there may be a slight ST depression in lead V5, V6.    Other diagnostic testing, there was a chest  x-ray performed earlier again   shows elevation of the left hemidiaphragm.  Calcifications of the aorta is   noted.  Left basilar atelectasis.  She also has proximal left humeral defect,   probable chronic.  UA is appearing to be positive for infection with a pH of   6, trace ketones, large amount of leukocyte esterase, wbc's were 100-150 with   RBCs of 2-5 with many bacteria.    ASSESSMENT AND PLAN:  1.  Intractable nausea and vomiting.  I suspect there may be a partial   small-bowel obstruction.  She did have instructions to have the ostomy removed   at this time concerning for partial small-bowel obstruction.  There may be   adhesions as well.  2.  Dysfunctional ostomy.  This may be related to intractable nausea and   vomiting.  3.  Acute renal failure.  This may be a hepatorenal syndrome, although there   is sign of underlying infection which makes this diagnosis difficult to   ascertain.  4.  Urinary tract infection.  Rule out a pyelonephritis.  5.  Hyperammonemia.  This is due to chronic cirrhotic liver disease with a   history of nonalcoholic steatohepatitis.  6.  History of chronic thrombocytopenia.  7.  Septic shock.  This is likely related to underlying urinary infection.    Also, volume loss from hypovolemia.  8.  Mild coagulopathy.    PLAN:  1.  The patient is admitted to the ICU.  I agree with Zosyn for antibiotics   for urinary infection and intra-abdominal coverage.  2.  Would perform a CT of the abdomen and pelvis to rule out obstructive   pathology or other concerns including perforation or obstruction.  3.  Will begin a bicarbonate infusion for the degree of acidosis and worsening   hyperchloremia, worsening of the metabolic acidosis.  4.  Would hold off on any oral intake including lactulose as we have not ruled   out or other intra-abdominal processes.  5.  Renal ultrasound to rule out pyelonephritis.  6.  Would give additional IV fluid bolus as well as albumin.  A full 500 of   the 5% has been  ordered.  7.  Agree with palliative care consultation regarding her chronic medical   problems and goals of care.  8.  Observe respiratory status as she may develop worsening acute pneumonia.  9.  Gastroenterology consultation recommended in the a.m.  We will have the   day team consult.  Possible surgical involvement if CT is abnormal.      The patient remains critically ill.    Critical care time, so far is 40 minutes, not including procedures or overlap   time.       ____________________________________     DO DAVID Newton / NTS    DD:  01/10/2018 00:07:50  DT:  01/10/2018 01:12:00    D#:  [id number]  Job#:  [id number]

## 2018-01-11 NOTE — CARE PLAN
Problem: Urinary Elimination:  Goal: Ability to reestablish a normal urinary elimination pattern will improve  Outcome: PROGRESSING SLOWER THAN EXPECTED  UOP remains low, see I/O [email or web address]. MD aware.    Problem: Skin Integrity  Goal: Risk for impaired skin integrity will decrease  Outcome: PROGRESSING AS EXPECTED  Repositioning patient q2h. Pillows used for turning and to float heels. SCDs on. Mepilex to saccrum, hydrocolloid dressing to moisture fissure on coccyx see wound LDA. Will continue to monitor.

## 2018-01-11 NOTE — ASSESSMENT & PLAN NOTE
Secondary to UTI.  Klebsiella in urine and blood  Repeat cultures neg thus far  Rocephin plan 2 weeks total course  The organ system failure is the renal system as is evidenced by renal failure secondary to sepsis.  PULMONOLOGY INPUT IS NOTED

## 2018-01-11 NOTE — CONSULTS
"Reason for PC Consult: Advance Care Planning    Consulted by: Dr. Jean-Baptiste    Assessment:  General: 77 year old female admitted 1/9/18 for NV and ALOC. Sepsis, ORAL, UTI, severe protein-calorie malnutrition, edema, pancytopenia, PMH significant for Crohn's disease with ileostomy, BAKER (nonalcoholic steatohepatitis with cirrhosis), portal HTN, TRAVIS on CPAP, CKD IV, obesity, and GERD. Awake, alert, and oriented x 2 (disoriented to event somewhat and time). Tearful.      Dyspnea: Yes. SOB OE. 95% on room air.   Last BM: 01/10/18 - ostomy in place; leaking; wound consulted.  Pain: Yes; abdominal pain \"my ostomy because of my infection.\" Reports pain is well controled with current regimen.   Depression: Mood appropriate for situation; tearful regarding her and her s/o's debility and need to find a new place to live with assistance.   Dementia: No.       Spiritual:  Is Anabaptist or spirituality important for coping with this illness? Yes; declined spiritual care visit.  Has a  or spiritual provider visit been requested? No    Palliative Performance Scale: 40%    Advance Directive: Advance Directive on file; directives include #2, 3, 5  DPOA: Dave Brush [phone] cell/[phone] home/[phone] work. Currently at [facility] after a fall/hip fracture.  POLST: On file: DNI, antibiotics, feeding tube, and TPN okay for trail only.       Code Status: Full; POLST indicated DNI. Patient expressed \"only if will survive.\" After asking what survival meant to patient and providing education about interventions/typical outcomes, patient would like time to consider.     Outcome:  Introduced myself along with palliative care SW Don. Patient remembers completing AD/POLST with me on prior admission. See code status section for goals of care. Patient was very tearful as her and her S/O were looking at [facility] in [place] to be closer to family before Both of their hospitalizations. She reports being sad not being able to see family " as well as her loss of independence. Active listening, therapeutic touch, and statements of support provided. Provided business card and encouraged patient to call with any questions, needs, or concerns.        Plan: Full code; clarify wishes/discrepancy between current wishes/POLST. Assist with goals of care and discharge planning as needed. Recommend PT/OT for post acute needs.     Recommendations: I do not recommend an ethics or hospice consult at this time because patient is considering code status/GOC; no ethical dilemma at this time. .    Thank you for allowing Palliative Care to participate in this patient's care. Please feel free to call [phone] with any questions or concerns.

## 2018-01-11 NOTE — PROCEDURES
Procedure: central line    Indication: septic shock    Consent: by patient    Description: a time-out was called. The left neck was prepped and draped in a sterile fashion.  Using sterile ultrasound guidance, the right internal jugular vein was identified. 2 mL of 1% lidocaine was used for anesthesia.  A large bore needle was used to aspirate dark, red, non-pulsating blood. Seldinger technique was used to place the central venous catheter. All three ports were aspirated and flushed with sterile saline.  The central line was adhered to the skin with sutures.     Blood loss: 1  mL    A stat chest x-ray is pending at this time.

## 2018-01-11 NOTE — PROGRESS NOTES
JEN from Lab called with critical result of Hgb 5.8 and Platelet 38 at 0352. Critical lab result read back to JEN.   Dr. Salcido notified of critical lab result at 0354.  Critical lab result read back by Dr. Salcido.

## 2018-01-11 NOTE — ASSESSMENT & PLAN NOTE
She likely has anemia of chronic dz with acute blood loss due to GI source.   Two units RBCs transfused  Follow her Hb with labs in the morning.

## 2018-01-11 NOTE — PROGRESS NOTES
Spoke with Dr. Salcido via phone regarding critical Hgb and Platelet and SBP 80s-90s. New orders received and implemented.

## 2018-01-11 NOTE — PROGRESS NOTES
[facility] Hospitalist Progress Note    Date of Service: 2018    Chief Complaint  77 y.o. female admitted 2018 with confusion.    Interval Problem Update  Ms. Tenorio has a history of cirrhosis, TRAVIS, and Crohn's disease with colostomy that presented with vomiting, ostomy dysfunction, and confusion. Her home health nurse called paramedics due to these conditions. She was recently diagnosed with a UTI outpatient. In the ER she was found to be encephalopathic and septic from UTI.    She had not had an ostomy bag on prior to admit and one has been placed on.  Her BP had been low thus a bolus has been given. She had two doses of albumin.  200 ml urine over night. Blood cultures are positive this morning for gram negative rods.   On 1/10 I met with her sister and brother at bedside and we discussed her condition including her known DNR status.  Her ostomy output has picked up. RN notes some blood in the ostomy.  Her Hb dropped to 5.8 thus one unit of RBCs was given and Hb is 6.6. Her Hb remains low.  Consultants/Specialty  Critical care. I discussed her condition with Dr. Thakkar on ICU Hot Rounds.  Nephrology. I discussed with Dr. Najjar for consult.  Disposition  ICU        Review of Systems   Constitutional: Positive for malaise/fatigue. Negative for chills and fever.   Respiratory: Negative for shortness of breath.    Cardiovascular: Negative for chest pain.   Psychiatric/Behavioral: Positive for memory loss.   All other systems reviewed and are negative.     Physical Exam  Laboratory/Imaging   Hemodynamics  Temp (24hrs), Av.3 °C (97.4 °F), Min:35.9 °C (96.7 °F), Max:36.7 °C (98 °F)   Temperature: 36.3 °C (97.3 °F)  Pulse  Av.7  Min: 67  Max: 124 Heart Rate (Monitored): 74  Blood Pressure : (!) 81/38, NIBP: (!) 94/53      Respiratory      Respiration: 12, Pulse Oximetry: 96 %     Work Of Breathing / Effort: Mild  RUL Breath Sounds: Clear, RML Breath Sounds: Clear;Diminished, RLL Breath Sounds: Diminished,  REMY Breath Sounds: Clear, LLL Breath Sounds: Diminished    Fluids    Intake/Output Summary (Last 24 hours) at 01/11/18 0938  Last data filed at 01/11/18 0615   Gross per 24 hour   Intake             3060 ml   Output             2340 ml   Net              720 ml       Nutrition  Orders Placed This Encounter   Procedures   • DIET ORDER     Standing Status:   Standing     Number of Occurrences:   1     Order Specific Question:   Diet:     Answer:   Renal [8]     Order Specific Question:   Diet:     Answer:   Regular [1]     Physical Exam   Constitutional: She is oriented to person, place, and time. No distress.   HENT:   Head: Atraumatic.   Neck: Neck supple.   Left IJ central line.   Cardiovascular: Normal rate and regular rhythm.    Pulmonary/Chest: Effort normal. No respiratory distress.   Abdominal: Soft.   Ostomy with bag on it.   Irritated skin with breakdown surrounding the ostomy without cellulitis.   Musculoskeletal: She exhibits edema. She exhibits no tenderness.   Neurological: She is alert and oriented to person, place, and time.   She is a moderate historian   Skin: Skin is warm. She is not diaphoretic. There is pallor.   Multiple bruises on her arms.   Psychiatric: She has a normal mood and affect.   Nursing note and vitals reviewed.      Recent Labs      01/10/18   0255  01/11/18   0310  01/11/18   0800   WBC  5.1  2.7*  2.9*   RBC  2.45*  1.98*  2.26*   HEMOGLOBIN  7.1*  5.8*  6.6*   HEMATOCRIT  23.5*  18.7*  20.9*   MCV  95.9  94.4  92.5   MCH  29.0  29.3  29.2   MCHC  30.2*  31.0*  31.6*   RDW  59.7*  58.4*  54.9*   PLATELETCT  45*  38*  37*     Recent Labs      01/10/18   0255  01/10/18   0615  01/11/18   0310   SODIUM  141  143  140   POTASSIUM  4.3  4.2  3.1*   CHLORIDE  118*  118*  113*   CO2  13*  14*  17*   GLUCOSE  112*  149*  129*   BUN  53*  55*  50*   CREATININE  3.87*  3.84*  3.51*   CALCIUM  8.3*  8.3*  6.9*     Recent Labs      01/09/18   1100  01/10/18   0615   APTT  33.6   --    INR   1.36*  1.78*                  Assessment/Plan     * Septic shock (CMS-HCC)- (present on admission)   Assessment & Plan    Secondary to UTI.  IV fluids and IV antibiotics with IV zosyn.  The organ system failure is the renal system as is evidenced by renal failure secondary to sepsis.  Blood culture is positive consistent with bacteremia.         UTI (urinary tract infection)- (present on admission)   Assessment & Plan    Cultures are positive for Klebsiella.  She has been initiated on intravenous Zosyn          ORAL (acute kidney injury) (CMS-HCC)- (present on admission)   Assessment & Plan    Sepsis related.  Cr 4 on admit and has gone down to 3.5 with IV fluids.  Urine output is poor.  Underlying CKD  IVF hydration, albumin support  Monitor renal function / Avoid nephrotoxins and dose medications renally  US abdomen did not reveal hydronephrosis  Nephrology will be consulted.         Hepatic encephalopathy (CMS-HCC)   Assessment & Plan    With contribution from sepsis  Maintain patient on lactulose, rifaximin and zinc  Avoid sedative, narcotics / BZDs as able  Frequent reorientation  Aspiration, fall and seizure precautions        Anemia- (present on admission)   Assessment & Plan    She likely has anemia of chronic dz with acute blood loss due to GI source.   One unit RBCs transfused this morning.   Follow her Hb with labs in the morning.         Encephalopathy acute- (present on admission)   Assessment & Plan    Delirium secondary to infection which is improving.        DNR (do not resuscitate)- (present on admission)   Assessment & Plan    This was discussed with Ms. Tenorio and her family.  Orders written for this.        Severe protein-calorie malnutrition (CMS-HCC)- (present on admission)   Assessment & Plan    Optimize nutrition  Nutrition team consult  Check prealbumin levels        Edema- (present on admission)   Assessment & Plan    B/L, profound and pitting  Diuresis once acute issues resolve  Obtain LE  duplex study to r/o DVT  TTE to evaluate RV function with underlying cirrhosis        Pancytopenia (CMS-HCC)- (present on admission)   Assessment & Plan    History of  Obtain anemia evaluation iron studies, b12,folate, retic, tsh  Monitor Hb / Restrictive transfusion strategy        BAKER (nonalcoholic steatohepatitis)- (present on admission)   Assessment & Plan    With cirrhosis        Thrombocytopenia (CMS-HCC)- (present on admission)   Assessment & Plan    Secondary to liver disease.  Platelets down to 45        Chronic kidney disease, stage IV (severe) (CMS-HCC)- (present on admission)   Assessment & Plan    With ORAL now  Baseline Cr around 2.7  BMP ordered for the morning.        Chronic pain with narcotic dependence- (present on admission)   Assessment & Plan    Holding pain medications at this time        Obesity (BMI 30.0-34.9)- (present on admission)   Assessment & Plan    Body mass index is 33.58 kg/m².        Cirrhosis with portal hypertension- (present on admission)   Assessment & Plan    BAKER Related  Continue lactulose, rifaximin and Zn for encephalopathy  Diuresis with lasix / aldactone once acute issues resolved  US abdomen negative for significant ascites        TRAVIS on CPAP- (present on admission)   Assessment & Plan    Hx of          Crohn's disease (HCC)- (present on admission)   Assessment & Plan    S/p ileostomy  Recommend ongoing outpatient GI evaluation  Wound team consult for ileostomy management            Reviewed items::  Labs reviewed and Medications reviewed  DVT prophylaxis pharmacological::  Contraindicated - High bleeding risk

## 2018-01-11 NOTE — WOUND TEAM
Ostomy follow up with difficult to maintain ostomy appliance.  Currently intact with moderate watery stool.  Belt on and no leaking noted.  Ostomy team to see tomorrow and possibly change.

## 2018-01-11 NOTE — PROGRESS NOTES
Ena from Lab called with critical result of Platelet of 37, Hgb of 6.6, and Hct of 20.9. Critical lab result read back to Ena.   Dr. Thakkar and Dr. Cavanaugh notified of critical lab result at 0945.  Critical lab result read back by Dr. Thakkar and Dr. Cavanaugh.

## 2018-01-11 NOTE — WOUND TEAM
"[facility]  Inpatient Services  Initial Wound & Skin Care Evaluation    Admission Date:  1/9/2018   HPI, PMH, SH: Reviewed  Unit where seen by Wound Team: S133/01    WOUND CONSULT RELATED TO: pressure injury mgmt    SUBJECTIVE:  \"It's on this side.\"     Self Report / Pain Level: c/o pain with crusting      OBJECTIVE: mepilex, MASON bed, waffle overlay  WOUND TYPE, LOCATION, CHARACTERISTICS (Pressure ulcers: location, stage, POA or date identified)  Pressure Ulcer  Deep Tissue Injury POA Coccyx;Sacrum Midline  Periwound Skin: Discolored  Drainage : Scant, Serosanguinous     Tissue Type and %:    Purple 95% 5% red  Wound Edges:    attached    Odor:     None   Exposed structure(s):   No   Signs and Symptoms of Infection:none    Measurements: taken 1/1/18   Length (cm): 3.5  Width (cm): 2  Depth (cm):  (95% intact)     Tracts/undermining:   None     INTERVENTIONS BY WOUND TEAM:see ostomy note; Pt turned to R side lying. Peeled back mepilex, viewed and palpated wound. Cleaned wound with NS, dried. Applied hydrocolloid over distal coccyx. Reapplied mepielx. Linens changed and pt repositioned R side lying.  Dressing Options: Hydrocolloid Thin    Interdisciplinary consultation:   With Nursing;With Patient     EVALUATION:pt has POA DTI with 2 open areas  Both feet with fissures L>R.    Factors affecting wound healing:self care, Crohn's disease, COPD, former smoker  Goals: decreased wound size 1% each week      NURSING PLAN OF CARE ORDERS (X):    Dressing changes: See Dressing Maintenance orders: x  Skin care: See Skin Care orders: x  Rectal tube care: See Rectal Tube Care orders:   Other orders: ostomy care    RSKIN: CURRENT (X) ORDERED (O)  Q shift Wan:  X  Q shift pressure point assessments:  X  Pressure redistribution mattress        MASON  x    Bariatric MASON      Bariatric foam        Heel float boots  x     Heels floated on pillows      Barrier wipes      Barrier Cream      Barrier paste      Sacral " "silicone dressing    x  Padded O2 tubing   x   Anchorfast      Trach with Optifoam split foam       Waffle cushion      Rectal tube or BMS      Antifungal tx    Turn q 2 hours x  Up to chair  Ambulate   PT/OT     Dietician      PO     TF   TPN     PVN    NPO 1  # days   Other       WOUND TEAM PLAN OF CARE (X):   NPWT change 3 x week:        Dressing changes by wound team:       Follow up as needed:  x     Other (explain):    Anticipated discharge plans (X):  SNF:           Home Care:           Outpatient Wound Center:            Self Care:            Other:   tbd          [facility]   Inpatient Services   Established Ostomy Management/ troubleshooting  HPI: Reviewed  PMH: Reviewed   SH: Reviewed   Reason for Ostomy nurse consult:  Leaking appliance  Subjective: \"I can't get a bag to work.\"  Objective: appliance with some staining  Ostomy type: ileostomy  Stoma location: LLQ  Stoma assessment:    Appearance:  Red,moist round   Size: 1.2\"   Protrusion: <1\"   MC jxn: resolved   Peristomal skin:  Open partial thickness wound    Ostomy Appliance (type and size): 1 piece 2\" soft convex with 4\" paste ring, belt  Assessment: stoma continuous output. Peristomal skin has open wound, that is wet and bleeds making pouching difficult. Question if it is pyoderma?  Interventions:  Removed appliance, cleaned skin. Cut barrier and applied 4\" paste ring. Held gauze over stoma. Crusted with Nystatin powder and No Sting. 4 pieces of brava strips around stoma. Applied appliance and closed. Attached ostomy belt.   Pt education: none  Plan: Ostomy nurses to continue to follow for ostomy needs and teaching PRN. Need to check tomorrow to see if appliance intact, may need to change every other day.   Anticipated discharge needs:Supplies, supplier information, possible HH or outpatient ostomy clinic.    "

## 2018-01-11 NOTE — DOCUMENTATION QUERY
DOCUMENTATION QUERY    PROVIDERS: Please select “Cosign w/ note”to reply to query.    To better represent the severity of illness of your patient, please review the following information and exercise your independent professional judgment in responding to this query.     Pressure ulcer deep tissue injury POA coccyx, sacrum midline is documented in the 1/10 Wound Team Progress Note. Based upon the clinical findings, risk factors, and treatment, can you clarify if you agree with the wound RN assessment?     • Agree with Wound Care RN assessment  • Disagree with Wound Care RN assessment (document your clinical findings)  • Other explanation of clinical findings  • Unable to determine        The medical record reflects the following:   Clinical Findings  1/10 Wound RN assessment:  Pressure ulcer deep tissue injury POA Coccyx, Sacrum midline.    Periwound skin: Discolored  Drainage: Scant, serosanguinous  Tissue Type and %: Purple 95% 5% red  Wound Edges: attached     Treatment  Turning, wound cleaning with NS and subsequent drying, hydrocolloid to distal coccyx, mepilex   Risk Factors  crohn's disease, COPD, former smoker, encephalopathy, sepsis , obesity    Location within medical record  History and Physical, Progress Notes and Wound Team Progress Notes     Thank you,   Louis Hay RN, BSN  Clinical   [phone]

## 2018-01-11 NOTE — PROGRESS NOTES
1745: Dr. Cavanaugh at the bedside for central line placement.    1820: procedure complete. Pt's VSS throughout procedure, sterile field maintained, chest x-ray ordered to confirm placement.

## 2018-01-11 NOTE — PALLIATIVE CARE
"PALLIATIVE CARE FOLLOW UP:  Patient with improved color and demeanor today, sitting up, smiling, watching TV with glasses on, and visiting siblings who are in town from out of state. Patient reports she feels much better. She stated \"I don't know what that's all about\" regarding need for levo/albumin.    Met siblings; introduced myself and role of PC. Patient asked about getting document notarized for her property. Her sister interjected and stated, \"You already have that but we need to look at it.\" Explained hospital notary typically can assist with documents for healthcare decisions. Explained that if she wanted something regarding estate/finances that I could assist with providing a list of mobile notaries at a charge to the patient/family. Provided business card to patient's sister and encouraged patient/family to call with any questions, needs or concerns.     Discussed updating POLST prior to discharge as patient is now DNR. She expressed being open to that but would like to complete closer to discharge.    Plan: DNR/full treat. [employer] back with goals of care discussion as clinical picture unfolds.     Thank you for allowing Palliative Care to follow this patient. Please contact us at  with any questions.   "

## 2018-01-12 NOTE — CARE PLAN
Problem: Infection  Goal: Will remain free from infection    Intervention: Assess signs and symptoms of infection  Standard precautions implemented. Hand hygiene being performed before and after patient contact.       Problem: Skin Integrity  Goal: Risk for impaired skin integrity will decrease    Intervention: Assess risk factors for impaired skin integrity and/or pressure ulcers  Performing Wan Skin Risk assessment every shift or more to maintain or improve skin integrity. Any signs of skin breakdown are being documented per hospital policy. Utilizing barrier wipes, cream, and moisturizer when need to help prevent skin breakdown.

## 2018-01-12 NOTE — PROGRESS NOTES
Spoke with Dr. Henry via phone regarding patient's hemoglobin of 6.9, Ca of 6.7, and platelet of 37. Orders to transfuse 1 unit PRBCs and give 1g calcium gluconate. Orders received and implemented.

## 2018-01-12 NOTE — CARE PLAN
Problem: Urinary Elimination:  Goal: Ability to reestablish a normal urinary elimination pattern will improve  Outcome: PROGRESSING AS EXPECTED  UOP continues to remain inadequate, MD aware. If no improvement, dialysis will be started tomorrow. Will continue to monitor.    Problem: Hemodynamic Status  Goal: Vital Signs and Fluid Balance Management  Outcome: PROGRESSING AS EXPECTED  Patient currently on Levo @ 2mcg/min with orders to maintain MAP greater than 60. SBP 100s-110s, MAP 60-70s. Will continue to monitor.

## 2018-01-12 NOTE — RESPIRATORY CARE
COPD EDUCATION by COPD CLINICAL EDUCATOR  1/11/2018 at 4:45 PM by Sulema Barragan     Patient reviewed by COPD education team. Patient does not qualify for COPD program.

## 2018-01-12 NOTE — PROGRESS NOTES
Hospital Medicine Progress Note, Adult, Complex               Author: Fadi Najjar Date & Time created: 1/12/2018  2:14 PM     Interval History:  Pt presented with ALOC, developed septic shock, ORAL.  This morning she is doing better, more awake/alert, worsening edema, and SOB.  She is off IV pressors  Review of Systems:  Review of Systems   Constitutional: Positive for malaise/fatigue.   Respiratory: Positive for shortness of breath.    Cardiovascular: Positive for leg swelling. Negative for chest pain and palpitations.   Gastrointestinal: Negative for nausea and vomiting.   Genitourinary: Negative for dysuria and urgency.   Neurological: Positive for weakness.   All other systems reviewed and are negative.      Physical Exam:  Physical Exam   Constitutional: She is oriented to person, place, and time. She appears well-developed and well-nourished. No distress.   HENT:   Head: Normocephalic and atraumatic.   Right Ear: External ear normal.   Left Ear: External ear normal.   Nose: Nose normal.   Mouth/Throat: No oropharyngeal exudate.   Eyes: Conjunctivae are normal. Right eye exhibits no discharge. Left eye exhibits no discharge. No scleral icterus.   Neck: No thyromegaly present.   Cardiovascular: Normal rate, regular rhythm and intact distal pulses.    Pulmonary/Chest: Effort normal and breath sounds normal. No respiratory distress. She has no wheezes.   Abdominal: Soft. Bowel sounds are normal. She exhibits no distension.   Musculoskeletal: She exhibits edema. She exhibits no tenderness.   Neurological: She is alert and oriented to person, place, and time. No cranial nerve deficit.   Skin: Skin is warm and dry. She is not diaphoretic. No erythema.   Psychiatric: She has a normal mood and affect. Her behavior is normal.   Nursing note and vitals reviewed.      Labs:        Invalid input(s): [id number]      Recent Labs      01/10/18   0255  01/10/18   0615  01/11/18   0310  01/12/18   0320   SODIUM  141  143   140  138   POTASSIUM  4.3  4.2  3.1*  3.3*   CHLORIDE  118*  118*  113*  108   CO2  13*  14*  17*  21   BUN  53*  55*  50*  49*   CREATININE  3.87*  3.84*  3.51*  3.30*   MAGNESIUM  1.5   --    --    --    PHOSPHORUS  5.0*   --    --    --    CALCIUM  8.3*  8.3*  6.9*  6.7*     Recent Labs      01/10/18   0255  01/10/18   0615  01/11/18   0310  01/12/18   032   ALTSGPT  21   --   16   --    ASTSGOT  43   --   31   --    ALKPHOSPHAT  267*   --   184*   --    TBILIRUBIN  1.8*   --   0.8   --    PREALBUMIN  6.0*   --    --    --    GLUCOSE  112*  149*  129*  112*     Recent Labs      01/10/18   0615  01/11/18   0310  01/11/18   0800  01/12/18   032   RBC   --   1.98*  2.26*  2.29*   HEMOGLOBIN   --   5.8*  6.6*  6.9*   HEMATOCRIT   --   18.7*  20.9*  20.7*   PLATELETCT   --   38*  37*  37*   PROTHROMBTM  20.4*   --    --    --    INR  1.78*   --    --    --       Labs      01/10/18   0255  01/11/18   0310  01/11/18   0800  01/12/18   032   WBC  5.1  2.7*  2.9*  3.0*   NEUTSPOLYS  90.80*  72.20*   --    --    LYMPHOCYTES  4.30*  16.40*   --    --    MONOCYTES  4.10  8.00   --    --    EOSINOPHILS  0.20  2.60   --    --    BASOPHILS  0.20  0.40   --    --    ASTSGOT  43  31   --    --    ALTSGPT    16   --    --    ALKPHOSPHAT  267*  184*   --    --    TBILIRUBIN  1.8*  0.8   --    --            Hemodynamics:  Temp (24hrs), Av.4 °C (97.6 °F), Min:36.2 °C (97.1 °F), Max:36.9 °C (98.4 °F)  Temperature: 36.3 °C (97.3 °F)  Pulse  Av.9  Min: 58  Max: 124Heart Rate (Monitored): 80  Blood Pressure : 110/49, NIBP: (!) 97/67     Respiratory:    Respiration: 16, Pulse Oximetry: 100 %     Work Of Breathing / Effort: Mild  RUL Breath Sounds: Clear, RML Breath Sounds: Clear;Diminished, RLL Breath Sounds: Diminished, REMY Breath Sounds: Clear, LLL Breath Sounds: Diminished  Fluids:    Intake/Output Summary (Last 24 hours) at 18 1414  Last data filed at 18 1200   Gross per 24 hour   Intake            4788.4 ml   Output             2110 ml   Net           2678.4 ml        GI/Nutrition:  Orders Placed This Encounter   Procedures   • DIET ORDER     Standing Status:   Standing     Number of Occurrences:   1     Order Specific Question:   Diet:     Answer:   Renal [8]     Order Specific Question:   Diet:     Answer:   Regular [1]     Medical Decision Making, by Problem:  Active Hospital Problems    Diagnosis   • *Septic shock (CMS-HCC) [A41.9, R65.21]   • UTI (urinary tract infection) [N39.0]   • ORAL (acute kidney injury) (CMS-HCC) [N17.9]   • Anemia [D64.9]   • Edema [R60.9]   • Severe protein-calorie malnutrition (CMS-HCC) [E43]   • BAKER (nonalcoholic steatohepatitis) [K75.81]   • Pancytopenia (CMS-HCC) [D61.818]   • Thrombocytopenia (CMS-HCC) [D69.6]   • Chronic kidney disease, stage IV (severe) (CMS-HCC) [N18.4]   • Chronic pain with narcotic dependence [G89.29]   • Cirrhosis with portal hypertension [K74.60]   • Obesity (BMI 30.0-34.9) [E66.9]   • TRAVIS on CPAP [G47.33, Z99.89]   • Crohn's disease (HCC) [K50.90]   • Encephalopathy acute [G93.40]   • DNR (do not resuscitate) [Z66]         Reviewed items::  Labs reviewed, Radiology images reviewed and Medications reviewed  Loaiza catheter::  Critically Ill - Requiring Accurate Measurement of Urinary Output  1 ORAL: sec to ATN , cr is still elevated, nonoliguric.  2 volume overload  3 Hypotension: better  4 Hypokalemia  5 sever anemia  Plan  no acute need for HD  Stop IVF  IV Lasix  Daily labs  R/O GI bleed  Renal dose all meds  Avoid nephrotoxins  Prognosis guarded.  D/W Dr Cavanaugh

## 2018-01-12 NOTE — DISCHARGE PLANNING
Care Transition Team Assessment    Information for this assessment was compiled from an interview with the pt as well as chart information. The pt informs this SW that she would like to relocate near her sister and guardian in [place]. The pt states that she currently receives in-home assistance form an aid 1-3 days a week to assist with light house keeping and laundry. The pt does not drive due to issues with her blood pressure.         Information Source  Orientation : Oriented x 4  Information Given By: Patient  Who is responsible for making decisions for patient? : Patient    Readmission Evaluation  Is this a readmission?: No    Elopement Risk  Legal Hold: No  Ambulatory or Self Mobile in Wheelchair: No-Not an Elopement Risk  Elopement Risk: Not at Risk for Elopement    Interdisciplinary Discharge Planning  Patient or legal guardian wants to designate a caregiver (see row info): No    Discharge Preparedness  What is your plan after discharge?: Other (comment) (Pt wants to move to [place] near her sister and POA)  Prior Functional Level: Ambulatory, Needs Assist with Activities of Daily Living, Uses Cane  Difficulity with ADLs: Walking  Difficulity with IADLs: Driving, Laundry, Shopping  Difficulity with IADL Comments: Pt states she has an in-home care giver come in the home 1-3 times a week    Functional Assesment  Prior Functional Level: Ambulatory, Needs Assist with Activities of Daily Living, Uses Cane    Finances  Financial Barriers to Discharge: No  Prescription Coverage: Yes    Vision / Hearing Impairment  Right Eye Vision: Impaired, Wears Glasses  Left Eye Vision: Impaired, Wears Glasses  Hearing Impairment: Both Ears, Hearing Device Not Available         Advance Directive  Advance Directive?: DPOA for Health Care  Durable Power of  Name and Contact : Aravind Brush (sister)         Psychological Assessment  History of Substance Abuse: None  History of Psychiatric Problems: No  Non-compliant with Treatment:  No  Newly Diagnosed Illness: No    Discharge Risks or Barriers  Discharge risks or barriers?: No    Anticipated Discharge Information  Anticipated discharge disposition: Discharge needs currently unknown

## 2018-01-12 NOTE — PROGRESS NOTES
[facility] Hospitalist Progress Note    Date of Service: 2018    Chief Complaint  77 y.o. female admitted 2018 with confusion.    Interval Problem Update  Ms. Tenorio has a history of cirrhosis, TRAVIS, and Crohn's disease with colostomy that presented with vomiting, ostomy dysfunction, and confusion. Her home health nurse called paramedics due to these conditions. She was recently diagnosed with a UTI outpatient. In the ER she was found to be encephalopathic and septic from UTI.    She had not had an ostomy bag on prior to admit and her skin has been cleaned and an ostomy bag adhered.  Her BP had been low thus a bolus has been given. She had two doses of albumin. Blood cultures are positive and pending. Urine has grown Klebsiella.   On 1/10 I met with her sister and brother at bedside and we discussed her condition including her known DNR status.  Her ostomy output has been appropriate. RN noted no blood in the ostomy. She has had wound care. Levophed was turned off this morning at 08:30.  Her Hb dropped to 5.8 thus and has been transfused 2 units of RBCs. She required Levophed over night. Cr has come down. She is on room air.   Consultants/Specialty  Critical care. I discussed her condition with Dr. Thakkar on ICU Hot Rounds.  Nephrology. I discussed with Dr. Najjar today  Disposition  ICU        Review of Systems   Constitutional: Positive for malaise/fatigue. Negative for chills and fever.   Respiratory: Negative for shortness of breath.    Cardiovascular: Negative for chest pain.   Psychiatric/Behavioral: Positive for memory loss.   All other systems reviewed and are negative.     Physical Exam  Laboratory/Imaging   Hemodynamics  Temp (24hrs), Av.5 °C (97.7 °F), Min:36.2 °C (97.2 °F), Max:36.9 °C (98.4 °F)   Temperature: 36.5 °C (97.7 °F)  Pulse  Av.4  Min: 58  Max: 124 Heart Rate (Monitored): 62  Blood Pressure : 110/49, NIBP: 117/57      Respiratory      Respiration: 13, Pulse Oximetry: 95 %      Work Of Breathing / Effort: Mild  RUL Breath Sounds: Clear, RML Breath Sounds: Clear;Diminished, RLL Breath Sounds: Diminished, REMY Breath Sounds: Clear, LLL Breath Sounds: Diminished    Fluids    Intake/Output Summary (Last 24 hours) at 01/12/18 0806  Last data filed at 01/12/18 0605   Gross per 24 hour   Intake          4973.59 ml   Output             1740 ml   Net          3233.59 ml       Nutrition  Orders Placed This Encounter   Procedures   • DIET ORDER     Standing Status:   Standing     Number of Occurrences:   1     Order Specific Question:   Diet:     Answer:   Renal [8]     Order Specific Question:   Diet:     Answer:   Regular [1]     Physical Exam   Constitutional: She is oriented to person, place, and time. She appears well-developed. No distress.   HENT:   Head: Atraumatic.   Neck: Neck supple.   Left IJ central line.   Cardiovascular: Normal rate and regular rhythm.    Pulmonary/Chest: Effort normal. No respiratory distress. She has rales.   Abdominal: Soft.   Ostomy with bag on it.      Musculoskeletal: She exhibits edema. She exhibits no tenderness.   Edema of the legs and arms.     Neurological: She is alert and oriented to person, place, and time.   She is a moderate historian   Skin: Skin is warm. She is not diaphoretic. There is pallor.   Multiple bruises on her arms. Blister on the left upper arm.   Psychiatric: She has a normal mood and affect. Her behavior is normal.   Nursing note and vitals reviewed.      Recent Labs      01/11/18   0310  01/11/18   0800  01/12/18   0320   WBC  2.7*  2.9*  3.0*   RBC  1.98*  2.26*  2.29*   HEMOGLOBIN  5.8*  6.6*  6.9*   HEMATOCRIT  18.7*  20.9*  20.7*   MCV  94.4  92.5  90.4   MCH  29.3  29.2  30.1   MCHC  31.0*  31.6*  33.3*   RDW  58.4*  54.9*  54.9*   PLATELETCT  38*  37*  37*     Recent Labs      01/10/18   0615  01/11/18   0310  01/12/18   0320   SODIUM  143  140  138   POTASSIUM  4.2  3.1*  3.3*   CHLORIDE  118*  113*  108   CO2  14*  17*  21    GLUCOSE  149*  129*  112*   BUN  55*  50*  49*   CREATININE  3.84*  3.51*  3.30*   CALCIUM  8.3*  6.9*  6.7*     Recent Labs      01/09/18   1100  01/10/18   0615   APTT  33.6   --    INR  1.36*  1.78*                  Assessment/Plan     * Septic shock (CMS-HCC)- (present on admission)   Assessment & Plan    Secondary to UTI.  IV fluids and IV antibiotics with IV zosyn.  The organ system failure is the renal system as is evidenced by renal failure secondary to sepsis.  Blood cultures and urine cultures are positive for Klebsiella         UTI (urinary tract infection)- (present on admission)   Assessment & Plan    Cultures are positive for Klebsiella.  She has been initiated on intravenous Zosyn          ORAL (acute kidney injury) (CMS-HCC)- (present on admission)   Assessment & Plan    Sepsis related.  Cr 4 on admit and has gone down to 3.3 with IV fluids.  Urine output is poor.  Underlying CKD  IVF hydration, albumin support will be stopped and 80 mg IV lasix BID has been ordered.  Monitor renal function / Avoid nephrotoxins and dose medications renally  US abdomen did not reveal hydronephrosis  Nephrology was consulted.         Hepatic encephalopathy (CMS-HCC)   Assessment & Plan    With contribution from sepsis  Maintain patient on lactulose, rifaximin and zinc  Avoid sedative, narcotics / BZDs as able  Frequent reorientation  Aspiration, fall and seizure precautions        Anemia- (present on admission)   Assessment & Plan    She likely has anemia of chronic dz with acute blood loss due to GI source.   Two units RBCs transfused  Follow her Hb with labs in the morning.         Encephalopathy acute- (present on admission)   Assessment & Plan    Delirium secondary to infection which is improving.        DNR (do not resuscitate)- (present on admission)   Assessment & Plan    This was discussed with Ms. Tenorio and her family.  Orders written for this.        Severe protein-calorie malnutrition (CMS-HCC)- (present  on admission)   Assessment & Plan    Optimize nutrition  Nutrition team consult  Check prealbumin levels        Edema- (present on admission)   Assessment & Plan    B/L, profound and pitting  Diuresis once acute issues resolve  Obtain LE duplex study to r/o DVT  TTE to evaluate RV function with underlying cirrhosis        Pancytopenia (CMS-HCC)- (present on admission)   Assessment & Plan    History of  Obtain anemia evaluation iron studies, b12,folate, retic, tsh  Monitor Hb / Restrictive transfusion strategy        BAKER (nonalcoholic steatohepatitis)- (present on admission)   Assessment & Plan    With cirrhosis        Thrombocytopenia (CMS-HCC)- (present on admission)   Assessment & Plan    Secondary to liver disease.  Platelets down to 45        Chronic kidney disease, stage IV (severe) (CMS-HCC)- (present on admission)   Assessment & Plan    With ORAL now  Baseline Cr around 2.7  BMP ordered for the morning.        Chronic pain with narcotic dependence- (present on admission)   Assessment & Plan    Holding pain medications at this time        Obesity (BMI 30.0-34.9)- (present on admission)   Assessment & Plan    Body mass index is 33.58 kg/m².        Cirrhosis with portal hypertension- (present on admission)   Assessment & Plan    BAKER Related  Continue lactulose, rifaximin and Zn for encephalopathy  Diuresis with lasix / aldactone once acute issues resolved  US abdomen negative for significant ascites        TRAVIS on CPAP- (present on admission)   Assessment & Plan    Hx of          Crohn's disease (HCC)- (present on admission)   Assessment & Plan    S/p ileostomy  Recommend ongoing outpatient GI evaluation  Wound team consult for ileostomy management            Reviewed items::  Labs reviewed and Medications reviewed  DVT prophylaxis pharmacological::  Contraindicated - High bleeding risk

## 2018-01-12 NOTE — DIETARY
Nutrition Services: Patient seen for wound; Discussed pt in interdisciplinary rounds.     77 y.o. female with admitting DX of Severe sepsis (CMS-HCC)  Pertinent History: cirrhosis, TRAVIS, COPD, and Crohn's disease with colostomy     Height: 152.4 cm (5')  Weight: 77.7 kg (171 lb 4.8 oz)  Body mass index is 33.45 kg/m².     No wt loss noted.    Patient is on a Renal, regular diet eating well.  Labs, medications and past medical history reviewed. No need for dialysis yet per Nephrology.  All Bee + C added to aid in wound healing.  Skin:  Pressure Ulcer  Deep Tissue Injury POA Coccyx;Sacrum Midline (per wound team note)  Recommend:  Continue to encourage PO intake.  Nutrition Representative will continue to see her for ongoing meal and snack preferences.  RD following.

## 2018-01-12 NOTE — CARE PLAN
Problem: Safety  Goal: Will remain free from injury  Fall prevention and safety protocols in place    Problem: Venous Thromboembolism (VTW)/Deep Vein Thrombosis (DVT) Prevention:  Goal: Patient will participate in Venous Thrombosis (VTE)/Deep Vein Thrombosis (DVT)Prevention Measures   01/11/18 2000 01/12/18 0800   Mechanical/VTE Prophylaxis   Mechanical Prophylaxis  --  SCDs, Sequential Compression Device   SCDs, Sequential Compression Device --  On   OTHER   Risk Assessment Score --  6   VTE RISK --  Very High   Pharmacologic Prophylaxis Used Contraindicated per MD --        Problem: Respiratory:  Goal: Respiratory status will improve  Pulm toilet    Problem: Skin Integrity  Goal: Risk for impaired skin integrity will decrease  Wound protocols in place, WCON following

## 2018-01-12 NOTE — CONSULTS
DATE OF SERVICE:  01/11/2018    REQUESTING PHYSICIAN:  Travis Cavanaugh MD    REASON FOR CONSULTATION:  Acute kidney injury.    The patient seen and examined, medical records were reviewed.    HISTORY OF PRESENT ILLNESS:  The patient is an unfortunate 77-year-old lady   with a past medical history significant for chronic arthritis, multiple   hospitalizations in the last few months, presented to the hospital on January 9th with nausea, vomiting as well as altered level of consciousness.    Apparently, she was found by her relative and she was confused.  The patient's   hospitalization has been complicated by urosepsis and septic shock, also   developed acute kidney injury with a creatinine up to 3.51.  Today, patient   has been treated with some IV fluids and her mental status has been improving.    Patient has no recent use of NSAIDs or IV contrast.    PAST MEDICAL HISTORY:  Significant for:  1.  Arthritis.  2.  Cirrhosis of the liver.  3.  COPD.    ALLERGIES:  The list was reviewed.    MEDICATIONS:  Reviewed.    SOCIAL HISTORY:  Patient is .  She lives with her  who is   demented.    FAMILY HISTORY:  No known renal disease.    REVIEW OF SYSTEMS:  Patient is tired, fatigued, but no nausea, no vomiting, no   headache, and no change in vision.  All other review of systems for a total   of 10 were negative except outlined in the history of present illness.    PHYSICAL EXAMINATION:  GENERAL:  Patient is in no apparent distress.  VITAL SIGNS:  Show blood pressure of 90/60, heart rate was 85.  HEENT:  Normocephalic, atraumatic.  Sclerae are anicteric.  NECK:  No lymphadenopathy.  CHEST:  Normal.  LUNGS:  Clear to auscultation.  HEART:  S1, S2.  ABDOMEN:  Soft, nontender.  Patient has ostomy bag.  EXTREMITIES:  There is no lower extremity edema.  SKIN:  No skin rashes.  NEUROLOGIC:  Patient is alert and oriented x3.    LABORATORY DATA:  Recent labs from today were reviewed.  I also reviewed the   abdominal  CT scan from yesterday, which showed no hydronephrosis.    ASSESSMENT:  1.  Acute kidney injury.  The etiology most likely is volume depletion versus   ischemic acute tubular necrosis from hypotension.  2.  Metabolic acidosis.  3.  Hypokalemia.  4.  Sepsis.  5.  Severe anemia.  6.  Severe hypoalbuminemia.    PLAN:  1.  There is no acute need for renal replacement therapy.  2.  Fluid resuscitation.  3.  Check urine sodium, creatinine, as well as protein.  4.  IV iron supplement.  5.  Renal dose all medications.  6.  Avoid nephrotoxins.  7.  If there is no improvement in the kidney function in the next 24-48 hours,   consider doing dialysis.  8.  Plan discussed in detail with Dr. Cavanaugh.       ____________________________________     FADI NAJJAR, MD FN / ALYSON    DD:  01/11/2018 16:09:39  DT:  01/11/2018 18:51:38    D#:  [id number]  Job#:  [id number]

## 2018-01-13 NOTE — CARE PLAN
Problem: Knowledge Deficit  Goal: Knowledge of disease process/condition, treatment plan, diagnostic tests, and medications will improve  Patient educated on plan of care. Verbalizes understanding. All questions answered.    Problem: Pain Management  Goal: Pain level will decrease to patient's comfort goal  0-10 Pain scale used to assess patient's pain. Pharmacological interventions implemented per MAR. Non-pharmacological interventions provided.

## 2018-01-13 NOTE — PROGRESS NOTES
[facility] Hospitalist Progress Note    Date of Service: 2018    Chief Complaint  Ms. Tenorio has a history of cirrhosis, TRAVIS, and Crohn's disease with colostomy that presented with vomiting, ostomy dysfunction, and confusion. Her home health nurse called paramedics due to these conditions. She was recently diagnosed with a UTI outpatient. In the ER she was found to be encephalopathic and septic from UTI.    Interval Problem Update  feelig good this am  Off Levo x 24hrs  Tolerating po  Tolerating po  Good UOP on lasix      Consultants/Specialty  Nephrology    Disposition  OK to floor        Review of Systems   Constitutional: Negative for chills and fever.   Respiratory: Negative for cough and shortness of breath.    Cardiovascular: Negative for chest pain.   Gastrointestinal: Negative for abdominal pain, diarrhea, nausea and vomiting.   Musculoskeletal: Negative for back pain.   Skin: Negative for rash.   Neurological: Positive for weakness. Negative for dizziness, loss of consciousness and headaches.      Physical Exam  Laboratory/Imaging   Hemodynamics  Temp (24hrs), Av.3 °C (97.4 °F), Min:36.2 °C (97.1 °F), Max:36.6 °C (97.8 °F)   Temperature: 36.6 °C (97.8 °F)  Pulse  Av.5  Min: 58  Max: 124 Heart Rate (Monitored): 81  NIBP: (!) 96/42      Respiratory      Respiration: (!) 11, Pulse Oximetry: 99 %     Work Of Breathing / Effort: Mild  RUL Breath Sounds: Clear, RML Breath Sounds: Diminished, RLL Breath Sounds: Diminished, REMY Breath Sounds: Clear, LLL Breath Sounds: Diminished    Fluids    Intake/Output Summary (Last 24 hours) at 18 0547  Last data filed at 18 0200   Gross per 24 hour   Intake           2905.2 ml   Output             3315 ml   Net           -409.8 ml       Nutrition  Orders Placed This Encounter   Procedures   • DIET ORDER     Standing Status:   Standing     Number of Occurrences:   1     Order Specific Question:   Diet:     Answer:   Renal [8]     Order Specific Question:    Diet:     Answer:   Regular [1]     Physical Exam   Constitutional: She is oriented to person, place, and time. She appears well-developed and well-nourished. No distress.   HENT:   Head: Normocephalic and atraumatic.   Neck: No JVD present.   Cardiovascular: Normal rate and regular rhythm.    Murmur heard.  Pulmonary/Chest: Effort normal. No stridor. No respiratory distress. She has no wheezes. She has no rales.   Abdominal: Soft. There is no tenderness. There is no rebound and no guarding.   Musculoskeletal: She exhibits no edema.   Neurological: She is oriented to person, place, and time.   Skin: Skin is warm and dry. No rash noted. She is not diaphoretic.   Psychiatric: She has a normal mood and affect. Thought content normal.       Recent Labs      01/11/18   0310  01/11/18   0800  01/12/18   0320   WBC  2.7*  2.9*  3.0*   RBC  1.98*  2.26*  2.29*   HEMOGLOBIN  5.8*  6.6*  6.9*   HEMATOCRIT  18.7*  20.9*  20.7*   MCV  94.4  92.5  90.4   MCH  29.3  29.2  30.1   MCHC  31.0*  31.6*  33.3*   RDW  58.4*  54.9*  54.9*   PLATELETCT  38*  37*  37*     Recent Labs      01/10/18   0615  01/11/18   0310  01/12/18   0320   SODIUM  143  140  138   POTASSIUM  4.2  3.1*  3.3*   CHLORIDE  118*  113*  108   CO2  14*  17*  21   GLUCOSE  149*  129*  112*   BUN  55*  50*  49*   CREATININE  3.84*  3.51*  3.30*   CALCIUM  8.3*  6.9*  6.7*     Recent Labs      01/10/18   0615   INR  1.78*                  Assessment/Plan     * Septic shock (CMS-HCC)- (present on admission)   Assessment & Plan    Secondary to UTI.  Klebsiella in urine and blood  Repeat cultures  Change to Rocephin  The organ system failure is the renal system as is evidenced by renal failure secondary to sepsis.          UTI (urinary tract infection)- (present on admission)   Assessment & Plan    Cultures are positive for Klebsiella.  initally Zosyn: change to Rocephin          ORAL (acute kidney injury) (CMS-HCC)- (present on admission)   Assessment & Plan    Sepsis  related.  Non oliguric  Cr 4 on admit and has gone down to 3.3 with IV fluids.  Urine output is improved  Underlying CKD  IVF hydration, albumin support will be stopped and 80 mg IV lasix BID has been ordered.  Monitor renal function / Avoid nephrotoxins and dose medications renally  US abdomen did not reveal hydronephrosis  Nephrology was consulted.         Hepatic encephalopathy (CMS-HCC)   Assessment & Plan    With contribution from sepsis  Maintain patient on lactulose, rifaximin and zinc  Avoid sedative, narcotics / BZDs as able  Frequent reorientation  Aspiration, fall and seizure precautions        Anemia- (present on admission)   Assessment & Plan    She likely has anemia of chronic dz with acute blood loss due to GI source.   Two units RBCs transfused  Follow her Hb with labs in the morning.         Encephalopathy acute- (present on admission)   Assessment & Plan    resolved        DNR (do not resuscitate)- (present on admission)   Assessment & Plan    This was discussed with Ms. Tenorio and her family.  Orders written for this.        Severe protein-calorie malnutrition (CMS-HCC)- (present on admission)   Assessment & Plan    Optimize nutrition  Nutrition team consult  Check prealbumin levels        Edema- (present on admission)   Assessment & Plan    B/L, profound and pitting  Neg fluid balance with lasix        Pancytopenia (CMS-HCC)- (present on admission)   Assessment & Plan    History of  Obtain anemia evaluation iron studies, b12,folate, retic, tsh  Monitor Hb / Restrictive transfusion strategy        BAKER (nonalcoholic steatohepatitis)- (present on admission)   Assessment & Plan    With cirrhosis        Thrombocytopenia (CMS-HCC)- (present on admission)   Assessment & Plan    Secondary to liver disease.  Platelets down to 45        Chronic kidney disease, stage IV (severe) (CMS-HCC)- (present on admission)   Assessment & Plan    With ORAL now  Baseline Cr around 2.7  Follow daily BMP        Chronic  pain with narcotic dependence- (present on admission)   Assessment & Plan    Holding pain medications at this time        Obesity (BMI 30.0-34.9)- (present on admission)   Assessment & Plan    Body mass index is 33.58 kg/m².        Cirrhosis with portal hypertension- (present on admission)   Assessment & Plan    BAKER Related  Continue lactulose, rifaximin and Zn for encephalopathy  Diuresis with lasix / aldactone once acute issues resolved  US abdomen negative for significant ascites        TRAVIS on CPAP- (present on admission)   Assessment & Plan    Hx of          Crohn's disease (HCC)- (present on admission)   Assessment & Plan    S/p ileostomy  Recommend ongoing outpatient GI evaluation  Wound team consult for ileostomy management            Reviewed items::  Radiology images reviewed, Labs reviewed and Medications reviewed  DVT prophylaxis - mechanical:  SCDs  Ulcer Prophylaxis::  Not indicated  Antibiotics:  Treating active infection/contamination beyond 24 hours perioperative coverage

## 2018-01-13 NOTE — CARE PLAN
Problem: Safety  Goal: Will remain free from injury  Fall prevention and safety protocols in place    Problem: Respiratory:  Goal: Respiratory status will improve  Pulm toilet

## 2018-01-13 NOTE — PROGRESS NOTES
Hospital Medicine Progress Note, Adult, Complex               Author: Fadi Najjar Date & Time created: 1/13/2018  12:48 PM     Interval History:  Pt presented with ALOC, developed septic shock, ORAL.  This morning she is doing better, more awake/alert, worsening edema, and SOB.  She is off IV pressors  She feels better today, good UO  Review of Systems:  Review of Systems   Constitutional: Positive for malaise/fatigue.   Respiratory: Negative for shortness of breath.    Cardiovascular: Positive for leg swelling. Negative for chest pain and palpitations.   Gastrointestinal: Negative for nausea and vomiting.   Genitourinary: Negative for dysuria and urgency.   Neurological: Positive for weakness.   All other systems reviewed and are negative.      Physical Exam:  Physical Exam   Constitutional: She is oriented to person, place, and time. She appears well-developed and well-nourished. No distress.   HENT:   Head: Normocephalic and atraumatic.   Right Ear: External ear normal.   Left Ear: External ear normal.   Nose: Nose normal.   Mouth/Throat: No oropharyngeal exudate.   Eyes: Conjunctivae are normal. Right eye exhibits no discharge. Left eye exhibits no discharge. No scleral icterus.   Neck: No thyromegaly present.   Cardiovascular: Normal rate, regular rhythm and intact distal pulses.    Pulmonary/Chest: Effort normal and breath sounds normal. No respiratory distress. She has no wheezes.   Abdominal: Soft. Bowel sounds are normal. She exhibits no distension.   Musculoskeletal: She exhibits edema. She exhibits no tenderness.   Neurological: She is alert and oriented to person, place, and time. No cranial nerve deficit.   Hard of hearing    Skin: Skin is warm and dry. She is not diaphoretic. No erythema.   Psychiatric: She has a normal mood and affect. Her behavior is normal.   Nursing note and vitals reviewed.      Labs:        Invalid input(s): [id number]      Recent Labs      01/11/18   0310  01/12/18   0320   18   SODIUM  140  138  138   POTASSIUM  3.1*  3.3*  3.4*   CHLORIDE  113*  108  107   CO2  17*  21  21   BUN  50*  49*  49*   CREATININE  3.51*  3.30*  3.55*   CALCIUM  6.9*  6.7*  6.8*     Recent Labs      18   ALTSGPT  16   --    --    ASTSGOT  31   --    --    ALKPHOSPHAT  184*   --    --    TBILIRUBIN  0.8   --    --    GLUCOSE  129*  112*  83     Recent Labs      18   RBC  2.26*  2.29*  2.70*   HEMOGLOBIN  6.6*  6.9*  7.7*   HEMATOCRIT  20.9*  20.7*  24.4*   PLATELETCT  37*  37*  32*     Recent Labs      18   WBC  2.7*  2.9*  3.0*  2.8*   NEUTSPOLYS  72.20*   --    --    --    LYMPHOCYTES  16.40*   --    --    --    MONOCYTES  8.00   --    --    --    EOSINOPHILS  2.60   --    --    --    BASOPHILS  0.40   --    --    --    ASTSGOT  31   --    --    --    ALTSGPT  16   --    --    --    ALKPHOSPHAT  184*   --    --    --    TBILIRUBIN  0.8   --    --    --            Hemodynamics:  Temp (24hrs), Av.6 °C (97.9 °F), Min:36.3 °C (97.3 °F), Max:36.8 °C (98.2 °F)  Temperature: 36.7 °C (98.1 °F)  Pulse  Av.1  Min: 58  Max: 124Heart Rate (Monitored): 79  NIBP: (!) 96/52     Respiratory:    Respiration: 14, Pulse Oximetry: 99 %     Work Of Breathing / Effort: Mild  RUL Breath Sounds: Clear, RML Breath Sounds: Diminished;Clear, RLL Breath Sounds: Diminished;Clear, REMY Breath Sounds: Clear, LLL Breath Sounds: Diminished;Clear  Fluids:    Intake/Output Summary (Last 24 hours) at 18 1248  Last data filed at 18 1200   Gross per 24 hour   Intake             1653 ml   Output             3960 ml   Net            -2307 ml     Weight: 80.5 kg (177 lb 7.5 oz)  GI/Nutrition:  Orders Placed This Encounter   Procedures   • DIET ORDER     Standing Status:   Standing     Number of Occurrences:   1     Order Specific Question:   Diet:     Answer:    Renal [8]     Order Specific Question:   Diet:     Answer:   Regular [1]     Medical Decision Making, by Problem:  Active Hospital Problems    Diagnosis   • *Septic shock (CMS-HCC) [A41.9, R65.21]   • UTI (urinary tract infection) [N39.0]   • ORAL (acute kidney injury) (CMS-HCC) [N17.9]   • Anemia [D64.9]   • Edema [R60.9]   • Severe protein-calorie malnutrition (CMS-HCC) [E43]   • BAKER (nonalcoholic steatohepatitis) [K75.81]   • Pancytopenia (CMS-HCC) [D61.818]   • Thrombocytopenia (CMS-HCC) [D69.6]   • Chronic kidney disease, stage IV (severe) (CMS-HCC) [N18.4]   • Chronic pain with narcotic dependence [G89.29]   • Cirrhosis with portal hypertension [K74.60]   • Obesity (BMI 30.0-34.9) [E66.9]   • TRAVIS on CPAP [G47.33, Z99.89]   • Crohn's disease (HCC) [K50.90]   • Encephalopathy acute [G93.40]   • DNR (do not resuscitate) [Z66]         Reviewed items::  Labs reviewed, Radiology images reviewed and Medications reviewed  Loaiza catheter::  Critically Ill - Requiring Accurate Measurement of Urinary Output  1 ORAL: sec to ATN , cr is still elevated at 3.55mg/dl, nonoliguric.  2 volume overload:improving  3 Hypotension: better  4 Hypokalemia  5 sever anemia  Plan  no acute need for HD  Continue IV Lasix  Daily labs  Renal dose all meds  Avoid nephrotoxins  Prognosis guarded.

## 2018-01-13 NOTE — PROGRESS NOTES
Pulmonary Critical Care Progress Note        DOS:  1/13/2018    Chief Complaint: ALOC    History of Present Illness: 77 y.o. Female, h/o Crohns, ostomy stump inflamed; COPD, admitted for ? Sepsis and ALOC, UTI    ROS:  Respiratory: negative, Cardiac: negative, GI: negative.  All other systems negative.    Interval Events:  24 hour interval history reviewed    - a/o   - BP 90 - 120, off norepi, SR 80   - 2+ anasarca; lasix 80 BID   - ostomy with good o/p   - good UOP   - afebrile   - Stage II coccyx DTI, wound care   - saline lock IVF   - home PPI   - anemia and thrombocytopenia   - Kleb blood, de-esc to C3   - add KCL   - repeat blood cx's   Yesterday   - Hgb low, rec'd PRBCs   - arouses, follows   - SR,   - norepi gtt, titrating down   - 3+ anasarca   - ostomy with good o/p   - cloudy urine   - afebrile   - stage II DTI coccyx   - bicarb gtt 100/hr   - albumin    - room air   - home PPI   - low plts   - zosyn, klep pneumo, GNR blood   - lactulose on hold, NH3 42   - DNR, ? Need HD     - a/o 4 today, weak   - sr/sb   - no pressors yet; cvc placed   - ostomy with stool o/p, skin and stoma better today with wound care, no blood from stoma   - low UOP   - 1 U PRBCs this am, Hgb 6.6, Plts 37   - room air, no chest film today   - home PPI   - rifaximin, holding lactulose   - zosyn day 3   - UA - Kleb pneumo   - replace K   - ida PO diet   - Nephro eval today   - add scheduled albumin     - UTI, purulent urine   - ostomy stump looks bad   - a/o x 2, gcs 15   - SR/ST   - MAP >55, hypotensive this am; bolus IVF   - rec'd albumin   - NPO   - poor UOP   - GNR in BCs   - afebrile   - cxr clear   - room air   - rifaximin   - zosyn   - bicarb gtt 100/hr   - plts low   - rec'd IVF per sepsis, LA 1.4   - wound care for ostomy care   - CT abd noted    PFSH:  No change.    Respiratory:     Pulse Oximetry: 99 %  Chest Tube Drains:          Exam: rhonchi bibasilar and diminished breath sounds moderate  ImagingAvailable data reviewed          Invalid input(s): [id number]    HemoDynamics:  Pulse: 92, Heart Rate (Monitored): 95  NIBP: 105/50       Exam: regular rate and rhythm  Imaging: Available data reviewed        Neuro:  GCS Total Iliana Coma Score: 15       Exam: no focal deficits noted  Imaging: Available data reviewed    Fluids:  Intake/Output       01/11/18 0700 - 01/12/18 0659 01/12/18 0700 - 01/13/18 0659 01/13/18 0700 - 01/14/18 0659      0700-1859 6016-8674 Total 0700-1859 7843-6258 Total 0700-1859 1900-0659 Total       Intake    P.O.  1130  240 1370  1440  300 1740  --  -- --    P.O. 6972 164 5725 8676 647 9736 -- -- --    I.V.  1828  1895.6 3723.6  507.6  50 557.6  --  -- --    Norepinephrine Volume 28 45.6 73.6 7.6 -- 7.6 -- -- --    IV Piggyback Volume (Abx) 100 -- 100 50 50 100 -- -- --    IV Volume (Bicarb) 1200 1200 2400 350 -- 350 -- -- --    IV Volume (albumin) 500 300 800 100 -- 100 -- -- --    IV Volume (RBC) -- 350 350 -- -- -- -- -- --    Blood  --  350 350  --  -- --  --  -- --    Volume (RELEASE RED BLOOD CELLS) -- 350 350 -- -- -- -- -- --    Total Intake 2958 2485.6 5443.6 1947.6 350 2297.6 -- -- --       Output    Urine  165  400 565  755  875 1630  235  -- 235    Indwelling Cathether 165 400 565  235 -- 235    Stool/Urine  850  350 1200  1450  600 2050  --  -- --    Ostomy #1 (ml)   4952 248 6613 -- -- --    Total Output 0418 918 0198 2205 1475 3680 235 -- 235       Net I/O     1943 1735.6 3678.6 -257.4 -1125 -1382.4 -235 -- -235        Weight: 80.5 kg (177 lb 7.5 oz)  Recent Labs      01/11/18   0310 01/12/18   0320  01/13/18   0518   SODIUM  140  138  138   POTASSIUM  3.1*  3.3*  3.4*   CHLORIDE  113*  108  107   CO2  17*  21  21   BUN  50*  49*  49*   CREATININE  3.51*  3.30*  3.55*   CALCIUM  6.9*  6.7*  6.8*       GI/Nutrition:  Exam: ostomy site improved, abdomen is soft and non-tender  Imaging: Available data reviewed  taking PO  Liver Function  Recent Labs      01/11/18 0310   18   ALTSGPT  16   --    --    ASTSGOT  31   --    --    ALKPHOSPHAT  184*   --    --    TBILIRUBIN  0.8   --    --    GLUCOSE  129*  112*  83       Heme:  Recent Labs      18   0818   RBC  2.26*  2.29*  2.70*   HEMOGLOBIN  6.6*  6.9*  7.7*   HEMATOCRIT  20.9*  20.7*  24.4*   PLATELETCT  37*  37*  32*       Infectious Disease:  Temp  Av.5 °C (97.7 °F)  Min: 36.2 °C (97.2 °F)  Max: 36.8 °C (98.2 °F)  Micro: reviewed  Recent Labs      18   WBC  2.7*  2.9*  3.0*  2.8*   NEUTSPOLYS  72.20*   --    --    --    LYMPHOCYTES  16.40*   --    --    --    MONOCYTES  8.00   --    --    --    EOSINOPHILS  2.60   --    --    --    BASOPHILS  0.40   --    --    --    ASTSGOT  31   --    --    --    ALTSGPT  16   --    --    --    ALKPHOSPHAT  184*   --    --    --    TBILIRUBIN  0.8   --    --    --      Current Facility-Administered Medications   Medication Dose Frequency Provider Last Rate Last Dose   • vitamin B comp+C (ALLBEE WITH C) 1 Tab  1 Tab DAILY Marshall Thakkar M.D.   1 Tab at 18   • furosemide (LASIX) injection 80 mg  80 mg BID DIURETIC Travis Cavanaugh M.D.   80 mg at 18   • piperacillin-tazobactam (ZOSYN) IVPB premix 3.375 g  3.375 g Q12HRS Marshall Thakkar M.D. 12.5 mL/hr at 18 3.375 g at 18   • omeprazole (PRILOSEC) capsule 20 mg  20 mg Q12HRS Marshall Thakkar M.D.   20 mg at 18   • nystatin (MYCOSTATIN) powder   BID Leonardo Alegria D.O.   1,500,000 Units at 18   • rifaximin (XIFAXAN) tablet 550 mg  550 mg BID Harry Jean-Baptiste M.D.   550 mg at 18   • zinc sulfate (ZINCATE) capsule 220 mg  220 mg Q12HRS Harry Jean-Baptiste M.D.   220 mg at 18   • Respiratory Care per Protocol   Continuous RT Harry Jean-Baptiste M.D.       • ondansetron (ZOFRAN) syringe/vial injection 4 mg  4 mg Q4HRS PRN Harry Jean-Baptiste M.D.       •  ondansetron (ZOFRAN ODT) dispertab 4 mg  4 mg Q4HRS PRN Harry Jean-Baptiste M.D.       • acetaminophen (TYLENOL) tablet 650 mg  650 mg Q6HRS PRN Harry Jean-Baptiste M.D.   650 mg at 01/12/18 2247   • fentaNYL (SUBLIMAZE) injection 25 mcg  25 mcg Q HOUR PRN Leonardo Alegria D.O.         Last reviewed on 1/9/2018 12:36 PM by Charlette Bruce    Quality  Measures:  Core Measures & Quality Metrics    Problems/Plan:  Intractable nausea and vomiting   ? PSBO, CT 1/10 diffuse edema and anasarca   - improved, ostomy o/p  Sepsis with shock   - UTI, bacteremia   - Klebsiella bacteremia and urine   - titrating off pressors   - albumin added   - lasix given per nephro  Acute renal failure, CKD   - albumin added   - nephro following  Cirrhosis    - h/o nonalcoholic steatohepatitis   - PSE, f/u ammonia  coagulopathy  Thrombocytopenia  COPD   - titrated oxygen, RT protocols; DNR  Obesity  Anemia  Discussed patient condition and risk of morbidity and/or mortality with RN, RT and QA team.    The patient remains critically ill.  Critical care time = 31 minutes in directly providing and coordinating critical care and extensive data review.  No time overlap and excludes procedures.

## 2018-01-13 NOTE — WOUND TEAM
"[facility]   Inpatient Services   Established Ostomy Management/ troubleshooting  HPI: Reviewed  PMH: Reviewed   SH: Reviewed   Reason for Ostomy nurse consult:  Scheduled appliance change.  Subjective: \"It keeps bleeding.\"  Objective: appliance intact, no staining.   Ostomy type: ileostomy  Stoma location: LLQ  Stoma assessment:    Appearance:  Red,moist round   Size: 1.2\"   Protrusion: <1\"    jxn: resolved   Peristomal skin:  Open partial thickness wound    Ostomy Appliance (type and size): 1 piece 2\" soft convex with 4\" paste ring, belt  Assessment: stoma continuous output. Peristomal skin has open wound, that is wet and bleeds making pouching difficult. Question if it is pyoderma? An area at 0800 bled heavily. Tired pressure for 15 minutes, that did not work. Had to use 7 sticks of silver nitrate then finally the bleeding stopped.  Interventions:  Removed appliance, cleaned skin. Cut barrier and applied 4\" paste ring. Held gauze over stoma. Crusted with Nystatin powder and No Sting.  Applied appliance and closed. Attached ostomy belt.   Pt education: none  Plan: Ostomy nurses to continue to follow for ostomy needs and teaching PRN. Need to check tomorrow to see if appliance intact, may need to change every other day.   Anticipated discharge needs: Outpatient ostomy clinic.    "

## 2018-01-13 NOTE — PROGRESS NOTES
James from Lab called with critical result of Platelets of 32 and Calcium of 6.8 at 0612. Critical lab result read back to James.   Dr. Sanchez notified of critical lab result at 0612.  Critical lab result read back by Dr. Sanchez.

## 2018-01-14 PROBLEM — A41.9 SEVERE SEPSIS (HCC): Status: ACTIVE | Noted: 2018-01-01

## 2018-01-14 PROBLEM — R65.20 SEVERE SEPSIS (HCC): Status: ACTIVE | Noted: 2018-01-01

## 2018-01-14 NOTE — PROGRESS NOTES
Hospital Medicine Progress Note, Adult, Complex               Author: Fadi Najjar Date & Time created: 1/14/2018  3:27 PM     Interval History:  Pt presented with ALOC, developed septic shock, ORAL.  This morning she is doing better, more awake/alert, worsening edema, and SOB.  She is off IV pressors  Doing better today, good UO.  Was hypotensive, lasix was stopped  Review of Systems:  Review of Systems   Constitutional: Negative for malaise/fatigue.   Respiratory: Negative for shortness of breath.    Cardiovascular: Positive for leg swelling. Negative for chest pain and palpitations.   Gastrointestinal: Negative for nausea and vomiting.   Genitourinary: Negative for dysuria and urgency.   Neurological: Negative for weakness.   All other systems reviewed and are negative.      Physical Exam:  Physical Exam   Constitutional: She is oriented to person, place, and time. She appears well-developed and well-nourished. No distress.   HENT:   Head: Normocephalic and atraumatic.   Right Ear: External ear normal.   Left Ear: External ear normal.   Nose: Nose normal.   Mouth/Throat: No oropharyngeal exudate.   Eyes: Conjunctivae are normal. Right eye exhibits no discharge. Left eye exhibits no discharge. No scleral icterus.   Neck: No thyromegaly present.   Cardiovascular: Normal rate, regular rhythm and intact distal pulses.    Pulmonary/Chest: Effort normal and breath sounds normal. No respiratory distress. She has no wheezes.   Abdominal: Soft. Bowel sounds are normal. She exhibits no distension.   Musculoskeletal: She exhibits edema. She exhibits no tenderness.   Neurological: She is alert and oriented to person, place, and time. No cranial nerve deficit.   Hard of hearing    Skin: Skin is warm and dry. She is not diaphoretic. No erythema.   Psychiatric: She has a normal mood and affect. Her behavior is normal.   Nursing note and vitals reviewed.      Labs:        Invalid input(s): [id number]      Recent Labs       18   1215   SODIUM  138  138  135   POTASSIUM  3.3*  3.4*  3.7   CHLORIDE  108  107  107   CO2  21  21  20   BUN  49*  49*  53*   CREATININE  3.30*  3.55*  3.53*   MAGNESIUM   --    --   1.1*   PHOSPHORUS   --    --   2.5   CALCIUM  6.7*  6.8*  6.8*     Recent Labs      18   1215   GLUCOSE  112*  83  99     Recent Labs      18   RBC  2.29*  2.70*   HEMOGLOBIN  6.9*  7.7*   HEMATOCRIT  20.7*  24.4*   PLATELETCT  37*  32*     Recent Labs      18   WBC  3.0*  2.8*           Hemodynamics:  Temp (24hrs), Av.6 °C (97.8 °F), Min:36 °C (96.8 °F), Max:36.8 °C (98.2 °F)  Temperature: 36.6 °C (97.8 °F)  Pulse  Av.6  Min: 58  Max: 124Heart Rate (Monitored): 86  NIBP: 104/65     Respiratory:    Respiration: 16, Pulse Oximetry: 100 %     Work Of Breathing / Effort: Mild  RUL Breath Sounds: Clear, RML Breath Sounds: Clear, RLL Breath Sounds: Clear;Diminished, REMY Breath Sounds: Clear, LLL Breath Sounds: Clear;Diminished  Fluids:    Intake/Output Summary (Last 24 hours) at 18 1527  Last data filed at 18 1200   Gross per 24 hour   Intake             1540 ml   Output             2875 ml   Net            -1335 ml     Weight: 85.8 kg (189 lb 2.5 oz)  GI/Nutrition:  Orders Placed This Encounter   Procedures   • DIET ORDER     Standing Status:   Standing     Number of Occurrences:   1     Order Specific Question:   Diet:     Answer:   Renal [8]     Order Specific Question:   Diet:     Answer:   Regular [1]     Medical Decision Making, by Problem:  Active Hospital Problems    Diagnosis   • *Septic shock (CMS-[facility]) [A41.9, R65.21]   • UTI (urinary tract infection) [N39.0]   • ORAL (acute kidney injury) (CMS-HCC) [N17.9]   • Anemia [D64.9]   • Edema [R60.9]   • Severe protein-calorie malnutrition (CMS-HCC) [E43]   • BAKER (nonalcoholic steatohepatitis) [K75.81]   • Pancytopenia  (CMS-HCC) [D61.818]   • Thrombocytopenia (CMS-HCC) [D69.6]   • Chronic kidney disease, stage IV (severe) (CMS-HCC) [N18.4]   • Chronic pain with narcotic dependence [G89.29]   • Cirrhosis with portal hypertension [K74.60]   • Obesity (BMI 30.0-34.9) [E66.9]   • TRAVIS on CPAP [G47.33, Z99.89]   • Crohn's disease (HCC) [K50.90]   • Encephalopathy acute [G93.40]   • DNR (do not resuscitate) [Z66]         Reviewed items::  Labs reviewed, Radiology images reviewed and Medications reviewed  Wyatt catheter::  Critically Ill - Requiring Accurate Measurement of Urinary Output  1 ORAL: sec to ATN , cr is still elevated at 3.55mg/dl, nonoliguric.  2 volume overload:improving  3 Hypotension: agree with holding Lasix, encourage PO intake  4 Hypokalemia  5 sever anemia  Plan  no acute need for HD  Remove wyatt cath  Daily labs  Renal dose all meds  Avoid nephrotoxins  Prognosis guarded.  D/W Dr Ivory

## 2018-01-14 NOTE — CARE PLAN
Problem: Safety  Goal: Will remain free from injury  Fall prevention and safety protocols in place    Problem: Venous Thromboembolism (VTW)/Deep Vein Thrombosis (DVT) Prevention:  Goal: Patient will participate in Venous Thrombosis (VTE)/Deep Vein Thrombosis (DVT)Prevention Measures   01/14/18 0800   Mechanical/VTE Prophylaxis   Mechanical Prophylaxis  SCDs, Sequential Compression Device   SCDs, Sequential Compression Device On   OTHER   Risk Assessment Score 4   VTE RISK High   Pharmacologic Prophylaxis Used Contraindicated per MD       Problem: Pain Management  Goal: Pain level will decrease to patient's comfort goal   01/11/18 1800 01/13/18 0600 01/14/18 1000   OTHER   Nurse Pain Scale 0 - 10  --  --  0   Non Verbal Scale  Calm;Unlabored Breathing --  --    Comfort Goal --  Comfort at Rest;Comfort with Movement;Sleep Comfortably --        Problem: Respiratory:  Goal: Respiratory status will improve  Pul toilet

## 2018-01-14 NOTE — CARE PLAN
Problem: Safety  Goal: Will remain free from falls  Pt bed in lowest position, call light within reach, bed alarm on. Verbalized understanding to call for assistance,

## 2018-01-14 NOTE — PROGRESS NOTES
Dr. Sanchez notified of pt becoming progressively hypotensive. Current BP 80/40. Orders received, see pt MAR.

## 2018-01-14 NOTE — CARE PLAN
Problem: Safety  Goal: Will remain free from injury  Fall prevention and safety protocols in place    Problem: Venous Thromboembolism (VTW)/Deep Vein Thrombosis (DVT) Prevention:  Goal: Patient will participate in Venous Thrombosis (VTE)/Deep Vein Thrombosis (DVT)Prevention Measures   01/13/18 0800 01/13/18 2000   Mechanical/VTE Prophylaxis   Mechanical Prophylaxis  --  SCDs, Sequential Compression Device   SCDs, Sequential Compression Device --  On   OTHER   Risk Assessment Score --  5   VTE RISK --  Very High   Pharmacologic Prophylaxis Used Contraindicated per MD --        Problem: Pain Management  Goal: Pain level will decrease to patient's comfort goal   01/11/18 1800 01/13/18 0600 01/14/18 0600   OTHER   Nurse Pain Scale 0 - 10  --  --  0   Non Verbal Scale  Calm;Unlabored Breathing --  --    Comfort Goal --  Comfort at Rest;Comfort with Movement;Sleep Comfortably --        Problem: Respiratory:  Goal: Respiratory status will improve  Pul toilet

## 2018-01-14 NOTE — PROGRESS NOTES
"[facility] Hospital[facility] Progress Note    Date of Service: 2018    Chief Complaint  Ms. Tenorio has a history of cirrhosis, TRAVIS, and Crohn's disease with colostomy that presented with vomiting, ostomy dysfunction, and confusion. Her home health nurse called paramedics due to these conditions. She was recently diagnosed with a UTI outpatient. In the ER she was found to be encephalopathic and septic from UTI.    Interval Problem Update  feelig good this am  Levo off  Given albumin for low BP's  Lasix held  Tolerating po  Good ostomy output  UOP 800ml/12hrs  Rocephin D 6    Pt feeling \"good\".  No complaints.  Eating well.  Up to chair.    Consultants/Specialty  Nephrology    Disposition  OK to floor        Review of Systems   Constitutional: Negative for chills and fever.   Respiratory: Negative for cough and shortness of breath.    Cardiovascular: Positive for leg swelling. Negative for chest pain.   Gastrointestinal: Negative for abdominal pain, diarrhea, nausea and vomiting.   Musculoskeletal: Negative for back pain.   Skin: Negative for rash.   Neurological: Positive for weakness. Negative for dizziness, loss of consciousness and headaches.      Physical Exam  Laboratory/Imaging   Hemodynamics  Temp (24hrs), Av.6 °C (97.9 °F), Min:36 °C (96.8 °F), Max:36.8 °C (98.3 °F)   Temperature: 36.5 °C (97.7 °F)  Pulse  Av.7  Min: 58  Max: 124 Heart Rate (Monitored): 68  NIBP: (!) 98/59      Respiratory      Respiration: 14, Pulse Oximetry: 96 %     Work Of Breathing / Effort: Mild  RUL Breath Sounds: Clear, RML Breath Sounds: Clear, RLL Breath Sounds: Clear;Diminished, REMY Breath Sounds: Clear, LLL Breath Sounds: Clear;Diminished    Fluids    Intake/Output Summary (Last 24 hours) at 18 0507  Last data filed at 18 0400   Gross per 24 hour   Intake             1373 ml   Output             3635 ml   Net            -2262 ml       Nutrition  Orders Placed This Encounter   Procedures   • DIET ORDER     Standing " Status:   Standing     Number of Occurrences:   1     Order Specific Question:   Diet:     Answer:   Renal [8]     Order Specific Question:   Diet:     Answer:   Regular [1]     Physical Exam   Constitutional: She is oriented to person, place, and time. She appears well-developed and well-nourished. No distress.   HENT:   Head: Normocephalic and atraumatic.   Neck: No JVD present.   Cardiovascular: Normal rate and regular rhythm.    Murmur heard.  Pulmonary/Chest: Effort normal. No stridor. No respiratory distress. She has no wheezes. She has no rales.   Abdominal: Soft. There is no tenderness. There is no rebound and no guarding.   Musculoskeletal: She exhibits edema.   Neurological: She is oriented to person, place, and time.   Skin: Skin is warm and dry. No rash noted. She is not diaphoretic.   Psychiatric: She has a normal mood and affect. Thought content normal.       Recent Labs      01/11/18   0800  01/12/18   0320  01/13/18   0518   WBC  2.9*  3.0*  2.8*   RBC  2.26*  2.29*  2.70*   HEMOGLOBIN  6.6*  6.9*  7.7*   HEMATOCRIT  20.9*  20.7*  24.4*   MCV  92.5  90.4  90.4   MCH  29.2  30.1  28.5   MCHC  31.6*  33.3*  31.6*   RDW  54.9*  54.9*  59.4*   PLATELETCT  37*  37*  32*     Recent Labs      01/12/18   0320  01/13/18   0518   SODIUM  138  138   POTASSIUM  3.3*  3.4*   CHLORIDE  108  107   CO2  21  21   GLUCOSE  112*  83   BUN  49*  49*   CREATININE  3.30*  3.55*   CALCIUM  6.7*  6.8*                      Assessment/Plan     * Septic shock (CMS-HCC)- (present on admission)   Assessment & Plan    Secondary to UTI.  Klebsiella in urine and blood  Repeat cultures neg thus far  Rocephin plan 2 weeks total course  The organ system failure is the renal system as is evidenced by renal failure secondary to sepsis.          UTI (urinary tract infection)- (present on admission)   Assessment & Plan    Cultures are positive for Klebsiella.  initally Zosyn now Rocephin  Plan two week total course: dc on po  fluorquinolone          ORAL (acute kidney injury) (CMS-HCC)- (present on admission)   Assessment & Plan    Sepsis related.  Non oliguric  Cr 4 on admit and has gone down to 3.3 with IV fluids.  Urine output is improved  Underlying CKD  IVF hydration, albumin support will be stopped and 80 mg IV lasix BID has been ordered.  Monitor renal function / Avoid nephrotoxins and dose medications renally  US abdomen did not reveal hydronephrosis  Nephrology was consulted.         Hepatic encephalopathy (CMS-[facility])   Assessment & Plan    With contribution from sepsis  Maintain patient on lactulose, rifaximin and zinc  Avoid sedative, narcotics / BZDs as able  Frequent reorientation  Aspiration, fall and seizure precautions        Anemia- (present on admission)   Assessment & Plan    She likely has anemia of chronic dz with acute blood loss due to GI source.   Two units RBCs transfused  Follow her Hb with labs in the morning.         Encephalopathy acute- (present on admission)   Assessment & Plan    resolved        DNR (do not resuscitate)- (present on admission)   Assessment & Plan    This was discussed with Ms. Tenorio and her family.  Orders written for this.        Severe protein-calorie malnutrition (CMS-[facility])- (present on admission)   Assessment & Plan    Optimize nutrition  Nutrition team consult  Check prealbumin levels        Edema- (present on admission)   Assessment & Plan    B/L, profound and pitting  Neg fluid balance with lasix        Pancytopenia (CMS-[facility])- (present on admission)   Assessment & Plan    History of  Obtain anemia evaluation iron studies, b12,folate, retic, tsh  Monitor Hb / Restrictive transfusion strategy        BAKER (nonalcoholic steatohepatitis)- (present on admission)   Assessment & Plan    With cirrhosis        Thrombocytopenia (CMS-[facility])- (present on admission)   Assessment & Plan    Secondary to liver disease.  Platelets down to 45        Chronic kidney disease, stage IV (severe) (CMS-[facility])-  (present on admission)   Assessment & Plan    With ORAL now  Baseline Cr around 2.7  Follow daily BMP        Chronic pain with narcotic dependence- (present on admission)   Assessment & Plan    Holding pain medications at this time        Obesity (BMI 30.0-34.9)- (present on admission)   Assessment & Plan    Body mass index is 33.58 kg/m².        Cirrhosis with portal hypertension- (present on admission)   Assessment & Plan    BAKER Related  Continue lactulose, rifaximin and Zn for encephalopathy  Diuresis with lasix / aldactone once acute issues resolved  US abdomen negative for significant ascites        TRAVIS on CPAP- (present on admission)   Assessment & Plan    Hx of          Crohn's disease (HCC)- (present on admission)   Assessment & Plan    S/p ileostomy  Recommend ongoing outpatient GI evaluation  Wound team consult for ileostomy management            Reviewed items::  Radiology images reviewed, Labs reviewed and Medications reviewed  DVT prophylaxis - mechanical:  SCDs  Ulcer Prophylaxis::  Not indicated  Antibiotics:  Treating active infection/contamination beyond 24 hours perioperative coverage

## 2018-01-14 NOTE — CARE PLAN
Problem: Pain Management  Goal: Pain level will decrease to patient's comfort goal  Pt assessed for pain every four hours and PRN

## 2018-01-14 NOTE — PROGRESS NOTES
Pulmonary Critical Care Progress Note        DOS:  1/14/2018    Chief Complaint: ALOC    History of Present Illness: 77 y.o. Female, h/o Crohns, ostomy stump inflamed; COPD, admitted for ? Sepsis and ALOC, UTI    ROS:  Respiratory: negative, Cardiac: negative, GI: negative.  All other systems negative.    Interval Events:  24 hour interval history reviewed    - soft BP   - restarted albumin   - no pressor   - a/o x 4   - SR 60-80   - 3+ anasarca   - ida renal diet   - ostomy with good o/p   - afebrile   - up to chair   - room air   - C3 day 6 Klebsiella, urosepsis   - BMP pending today   - lasix held  Yesterday   - a/o   - BP 90 - 120, off norepi, SR 80   - 2+ anasarca; lasix 80 BID   - ostomy with good o/p   - good UOP   - afebrile   - Stage II coccyx DTI, wound care   - saline lock IVF   - home PPI   - anemia and thrombocytopenia   - Kleb blood, de-esc to C3   - add KCL   - repeat blood cx's     PFSH:  No change.    Respiratory:     Pulse Oximetry: 98 %  Chest Tube Drains:          Exam: rhonchi bibasilar and diminished breath sounds moderate  ImagingAvailable data reviewed         Invalid input(s): [id number]    HemoDynamics:  Pulse: 84, Heart Rate (Monitored): 83  NIBP: 100/47       Exam: regular rate and rhythm  Imaging: Available data reviewed        Neuro:  GCS Total Iliana Coma Score: 15       Exam: no focal deficits noted  Imaging: Available data reviewed    Fluids:  Intake/Output       01/12/18 0700 - 01/13/18 0659 01/13/18 0700 - 01/14/18 0659 01/14/18 0700 - 01/15/18 0659      6724-9006 4604-6870 Total 4511-7616 2109-9797 Total 9968-9630 5219-9149 Total       Intake    P.O.  1440  300 1740  960  360 1320  --  -- --    P.O. 4600 946 8875  -- -- --    I.V.  507.6  50 557.6  53  -- 53  --  -- --    Norepinephrine Volume 7.6 -- 7.6 -- -- -- -- -- --    IV Piggyback Volume (Abx) 50 50 100 50 -- 50 -- -- --    IV Volume (Bicarb) 350 -- 350 -- -- -- -- -- --    IV Volume (albumin) 100 -- 100 --  -- -- -- -- --    IV Volume (RBC) -- -- -- 3 -- 3 -- -- --    Total Intake 1947.6 350 2297.6 2551 221 5457 -- -- --       Output    Urine  755  875 1630  885  800 1685  --  -- --    Indwelling Cathether   -- -- --    Stool/Urine  1450  600 2050  1400  450 1850  --  -- --    Ostomy #1 (ml)  3904 650 9075 3382 414 4246 -- -- --    Total Output 2205 1475 3680 2285 1250 3535 -- -- --       Net I/O     -257.4 -1125 -1382.4 -1272 -890 -2162 -- -- --        Weight: 85.8 kg (189 lb 2.5 oz)  Recent Labs      18   SODIUM  138  138   POTASSIUM  3.3*  3.4*   CHLORIDE  108  107   CO2  21  21   BUN  49*  49*   CREATININE  3.30*  3.55*   CALCIUM  6.7*  6.8*       GI/Nutrition:  Exam: ostomy site improved, abdomen is soft and non-tender  Imaging: Available data reviewed  taking PO  Liver Function  Recent Labs      18   05   GLUCOSE  112*  83       Heme:  Recent Labs      18   RBC  2.29*  2.70*   HEMOGLOBIN  6.9*  7.7*   HEMATOCRIT  20.7*  24.4*   PLATELETCT  37*  32*       Infectious Disease:  Temp  Av.6 °C (97.9 °F)  Min: 36 °C (96.8 °F)  Max: 36.8 °C (98.3 °F)  Micro: reviewed  Recent Labs      18   WBC  3.0*  2.8*     Current Facility-Administered Medications   Medication Dose Frequency Provider Last Rate Last Dose   • albumin human 25% solution 12.5 g  12.5 g Q6HRS Anselmo Sanchez Jr., D.O. 150 mL/hr at 18 0345 12.5 g at 18 034   • cefTRIAXone (ROCEPHIN) syringe 2 g  2 g Q24HRS Andrae Ivory D.O.   2 g at 18 0820   • potassium chloride SA (Kdur) tablet 40 mEq  40 mEq BID Marshall Thakkar M.D.   40 mEq at 18 0819   • vitamin B comp+C (ALLBEE WITH C) 1 Tab  1 Tab DAILY Marshall Thakkar M.D.   1 Tab at 18 0820   • furosemide (LASIX) injection 80 mg  80 mg BID DIURETIC Travis Cavanaugh M.D.   Stopped at 18 0600   • omeprazole (PRILOSEC) capsule 20  mg  20 mg Q12HRS Marshall Thakkar M.D.   20 mg at 01/14/18 0819   • nystatin (MYCOSTATIN) powder   BID Leonardo Alegria D.O.   1,500,000 Units at 01/14/18 0820   • rifaximin (XIFAXAN) tablet 550 mg  550 mg BID Harry Jean-Baptiste M.D.   550 mg at 01/14/18 0819   • zinc sulfate (ZINCATE) capsule 220 mg  220 mg Q12HRS Harry Jean-Baptiste M.D.   220 mg at 01/14/18 0820   • Respiratory Care per Protocol   Continuous RT Harry Jean-Baptiste M.D.       • ondansetron (ZOFRAN) syringe/vial injection 4 mg  4 mg Q4HRS PRN Harry Jean-Baptiste M.D.       • ondansetron (ZOFRAN ODT) dispertab 4 mg  4 mg Q4HRS PRN Harry Jean-Baptiste M.D.       • acetaminophen (TYLENOL) tablet 650 mg  650 mg Q6HRS PRN Harry Jean-Baptiste M.D.   650 mg at 01/12/18 2247   • fentaNYL (SUBLIMAZE) injection 25 mcg  25 mcg Q HOUR PRN Leonardo Alegria D.O.         Last reviewed on 1/9/2018 12:36 PM by Charlette Bruce    Quality  Measures:  Labs reviewed, Medications reviewed and Radiology images reviewed                      Problems/Plan:  Intractable nausea and vomiting   - resolved PSBO, CT 1/10 diffuse edema and anasarca   - improved, ostomy o/p  Sepsis with shock   - UTI, bacteremia   - Klebsiella bacteremia and urine   - titrating off pressors   - albumin added   - lasix given per nephro; held for soft BP  Acute renal failure, CKD   - albumin added   - nephro following  Cirrhosis    - h/o nonalcoholic steatohepatitis   - PSE, f/u ammonia  coagulopathy  Thrombocytopenia  COPD   - titrated oxygen, RT protocols; DNR  Obesity  Anemia  Discussed patient condition and risk of morbidity and/or mortality with RN, RT and QA team.    Improved today; OK to move out of ICU

## 2018-01-15 NOTE — PROGRESS NOTES
Pt A&Ox4, feeling well, up to chair at this time. Ambulated with PT 100ft, use of FWW steady and tolerated well. Denies SOB, pain, and n/v, tolerating diet.

## 2018-01-15 NOTE — PROGRESS NOTES
Eliza from Lab called with critical result of platelet count at 28. Critical lab result read back to Eliza.   Dr. Lopez notified of critical lab result at 0630.  Critical lab result read back by . There are no new orders for patient's updated status.

## 2018-01-15 NOTE — CARE PLAN
Problem: Hemodynamic Status  Goal: Vital Signs and Fluid Balance Management    Intervention: Hemodynamic Monitoring  Patient's hemodynamic stability are being monitored, especially patient's systolic BP, Fluid intake and abnormal lab value results.

## 2018-01-15 NOTE — PROGRESS NOTES
Mercedes from Lab called with critical result of Ptl 29 at 1217. Critical lab result read back to Mercedes.   Dr. Chawla notified of critical lab result at 1218.  Critical lab result read back by Dr. Chawla.

## 2018-01-15 NOTE — WOUND TEAM
"[facility]   Inpatient Services   Established Ostomy Management/ troubleshooting  HPI: Reviewed  PMH: Reviewed   SH: Reviewed   Reason for Ostomy nurse consult:  appliance change  Subjective: \"I hope it doesn't bleed.\"  Objective: appliance intact   Ostomy type: ileostomy  Stoma location: LLQ  Stoma assessment:    Appearance:  Red,moist, round   Size: 1.2\"   Protrusion: <1\"   MC jxn: resolved   Peristomal skin:  Open partial thickness wound    Ostomy Appliance (type and size): 1 piece 2\" soft convex with 4\" paste ring, belt    Assessment: stoma continuous output. Peristomal skin has open wound, that is wet and bleeds making pouching difficult. Question if it is pyoderma? Bleeding barely any when appliance removed  Interventions:   Cut barrier, applied 4\" paste ring. Removed appliance, cleaned skin. Crusted open skin x 2 layers with stoma powder and No Sting, applied brava strips to open wound,  Held gauze over stoma.  Applied appliance and closed. Attached ostomy belt.   Pt education: none  Plan: Ostomy nurses to continue to follow for ostomy needs and teaching PRN. Need to change every other day.   Anticipated discharge needs: Outpatient ostomy clinic to assess further ostomy needs    "

## 2018-01-15 NOTE — PALLIATIVE CARE
PALLIATIVE CARE FOLLOW UP:  Met with patient who was being assisted by RN to go to the bathroom. New POLST was completed to reflect patient's current wishes. She expressed her nephew is a  and has offered to fly her and her s/o to [place] to be closer to family and where they were planning on moving to an AL prior to both of their hospitalizations. She expressed her nephew/insurance would need a letter from an MD with specific information as to why the patient would not be able to drive to [place] or fly commercially. Explained I would discuss with SW and requested that patient obtain specific information needing to be addressed in letter. Patient with assistance of RN to bathroom.     Updated SW.     Patient encouraged to call with any questions, needs or concerns.     Thank you for allowing Palliative Care to follow this patient. Please contact us at  with any questions.

## 2018-01-15 NOTE — PROGRESS NOTES
[facility]    Date of Service: 1/15/2018    Chief Complaint  77 y.o. female admitted 2018 with UTI AND SEPSIS WITH LIVER CIRHOSIS AND  ORAL/ATN    Interval Problem Update  NONE    Consultants/Specialty  NEPHROLOGY  PULMONOLOGY    Disposition  PENDING        Review of Systems   Constitutional: Negative for chills, diaphoresis and fever.   HENT: Negative for ear pain, hearing loss and tinnitus.    Eyes: Negative for blurred vision, double vision and photophobia.   Respiratory: Negative for cough and hemoptysis.    Cardiovascular: Negative for chest pain, palpitations and orthopnea.   Gastrointestinal: Negative for abdominal pain, nausea and vomiting.   Genitourinary: Negative for dysuria, frequency and urgency.   Musculoskeletal: Negative for back pain, myalgias and neck pain.   Skin: Negative for itching.   Neurological: Negative for dizziness, tingling and headaches.      Physical Exam  Laboratory/Imaging   Hemodynamics  Temp (24hrs), Av.6 °C (97.9 °F), Min:36.3 °C (97.3 °F), Max:36.9 °C (98.4 °F)   Temperature: 36.3 °C (97.4 °F)  Pulse  Av.7  Min: 58  Max: 124 Heart Rate (Monitored): 84  Blood Pressure : 105/61, NIBP: (!) 91/58      Respiratory      Respiration: 14, Pulse Oximetry: 94 %     Work Of Breathing / Effort: Mild  RUL Breath Sounds: Clear, RML Breath Sounds: Clear, RLL Breath Sounds: Clear;Diminished, REMY Breath Sounds: Clear, LLL Breath Sounds: Clear;Diminished    Fluids    Intake/Output Summary (Last 24 hours) at 01/15/18 0846  Last data filed at 01/15/18 0200   Gross per 24 hour   Intake              990 ml   Output             1650 ml   Net             -660 ml       Nutrition  Orders Placed This Encounter   Procedures   • DIET ORDER     Standing Status:   Standing     Number of Occurrences:   1     Order Specific Question:   Diet:     Answer:   Renal [8]     Order Specific Question:   Diet:     Answer:   Regular [1]     Physical Exam   Constitutional: She is oriented  to person, place, and time. No distress.   HENT:   Head: Normocephalic and atraumatic.   Eyes: Right eye exhibits no discharge. Left eye exhibits no discharge.   Neck: Neck supple. No JVD present.   Cardiovascular: Normal rate and regular rhythm.    Pulmonary/Chest: Effort normal. No stridor. No respiratory distress. She has no wheezes. She exhibits no tenderness.   Abdominal: Soft. She exhibits no distension. There is no tenderness.   OSTOMY BAG IN PLACE   Musculoskeletal: She exhibits edema.   Neurological: She is alert and oriented to person, place, and time.   Skin: Rash (ON THE UPPER EXTS AND CHRONIC) noted. She is not diaphoretic.       Recent Labs      01/13/18   0518  01/15/18   0406   WBC  2.8*  2.8*   RBC  2.70*  2.63*   HEMOGLOBIN  7.7*  7.6*   HEMATOCRIT  24.4*  23.8*   MCV  90.4  90.5   MCH  28.5  28.9   MCHC  31.6*  31.9*   RDW  59.4*  56.4*   PLATELETCT  32*  28*     Recent Labs      01/13/18   0518  01/14/18   1215  01/15/18   0406   SODIUM  138  135  137   POTASSIUM  3.4*  3.7  3.7   CHLORIDE  107  107  107   CO2  21  20  21   GLUCOSE  83  99  132*   BUN  49*  53*  52*   CREATININE  3.55*  3.53*  3.39*   CALCIUM  6.8*  6.8*  7.3*                      Assessment/Plan     * Septic shock (CMS-HCC)- (present on admission)   Assessment & Plan    Secondary to UTI.  Klebsiella in urine and blood  Repeat cultures neg thus far  Rocephin plan 2 weeks total course  The organ system failure is the renal system as is evidenced by renal failure secondary to sepsis.  PULMONOLOGY INPUT IS NOTED          UTI (urinary tract infection)- (present on admission)   Assessment & Plan    Cultures are positive for Klebsiella PNEUMONIA  ROCEPHIN            ORAL (acute kidney injury) (CMS-HCC)- (present on admission)   Assessment & Plan    AS PER ABOVE        Hepatic encephalopathy (CMS-HCC)   Assessment & Plan    With contribution from sepsis  Maintain patient on lactulose, rifaximin and zinc  Avoid sedative, narcotics / BZDs  as able  Frequent reorientation  Aspiration, fall and seizure precautions        Anemia- (present on admission)   Assessment & Plan    She likely has anemia of chronic dz with acute blood loss due to GI source.   Two units RBCs transfused  Follow her Hb with labs in the morning.         Encephalopathy acute- (present on admission)   Assessment & Plan    Resolved  ON RIFAXIMIN        DNR (do not resuscitate)- (present on admission)   Assessment & Plan    This was discussed with Ms. Tenorio and her family.  Orders written for this.        Severe protein-calorie malnutrition (CMS-HCC)- (present on admission)   Assessment & Plan    Optimize nutrition  Nutrition team consult  SEVERE MALNUTRITION   prealbumin OF 6        Edema- (present on admission)   Assessment & Plan    2ND TO LIVER FAILURE        Pancytopenia (CMS-HCC)- (present on admission)   Assessment & Plan    2ND TO CIRRHOSIS  h/o nonalcoholic steatohepatitis        BAKER (nonalcoholic steatohepatitis)- (present on admission)   Assessment & Plan    With cirrhosis        Thrombocytopenia (CMS-HCC)- (present on admission)   Assessment & Plan    Secondary to liver disease.  Platelets down to 28  WILL CHECK CBC AT 2PM TODAY        Chronic kidney disease, stage IV (severe) (CMS-HCC)- (present on admission)   Assessment & Plan    ORAL WITH ATN  NEPHROLOGY INPUT IS NOTED  NO NEED FOR HEMODIALYSIS AT THIS TIME  RENAL ULTRASOUND :The left kidney measures 7.31 cm. The left kidney appears small and echogenic. The left renal collecting system is not dilated. There is a 6.0 x 6.3 x 4.4 cm cyst in the lower pole left kidney. The bladder demonstrates no focal wall abnormality.          Chronic pain with narcotic dependence- (present on admission)   Assessment & Plan    Holding pain medications at this time        Obesity (BMI 30.0-34.9)- (present on admission)   Assessment & Plan    Body mass index is 33.58 kg/m².        Cirrhosis with portal hypertension- (present on admission)    Assessment & Plan    BAKER Related  ON RIFAXIMIN  ON ALBUMIN  US abdomen negative for significant ascites        TRAVIS on CPAP- (present on admission)   Assessment & Plan    Hx of          Crohn's disease (HCC)- (present on admission)   Assessment & Plan    S/p ileostomy  Recommend ongoing outpatient GI evaluation  Wound team consult for ileostomy management            Loaiza catheter::  No Loaiza  DVT prophylaxis pharmacological::  Contraindicated - High bleeding risk

## 2018-01-15 NOTE — PROGRESS NOTES
Received bedside report and accepted care of patient. Patient currently resting in bed in no visible or stated signs of distress. Magnesium Sulfate is running at 25 ml per hour infusing in the right peripheral IV.    Bed alarm in place, controls on and bed in locked and lowest position. Call light and personal possessions within reach. Patient educated about use of call light and verbalized understanding.

## 2018-01-15 NOTE — CARE PLAN
Problem: Safety  Goal: Will remain free from falls  Outcome: PROGRESSING AS EXPECTED  Bed alarm in use, pt calling appropriately, ambulating safely with staff and use of FWW    Problem: Discharge Barriers/Planning  Goal: Patient's continuum of care needs will be met  Outcome: PROGRESSING AS EXPECTED  Pt A&Ox4, feeling well, ambulated with staff and use of FWW, tolerated well. Discussed POC for day. IV Rocephin administered per MAR

## 2018-01-15 NOTE — THERAPY
"Physical Therapy Evaluation completed.   Bed Mobility:  Supine to Sit: Minimal Assist  Transfers: Sit to Stand: Contact Guard Assist  Gait: Level Of Assist: Stand by Assist with Front-Wheel Walker       Plan of Care: Will benefit from Physical Therapy 3 times per week  Discharge Recommendations: Equipment: No Equipment Needed. Post-acute therapy Discharge to home with outpatient or home health for additional skilled therapy services.        See \"Rehab Therapy-Acute\" Patient Summary Report for complete documentation.     "

## 2018-01-15 NOTE — THERAPY
"Occupational Therapy Evaluation completed.   Functional Status:  Min/CGA with ADLs and txfs  Plan of Care: Will benefit from Occupational Therapy 3 times per week  Discharge Recommendations:  Equipment: Will Continue to Assess for Equipment Needs. Post-acute therapy Discharge to a transitional care facility for continued skilled therapy services.    See \"Rehab Therapy-Acute\" Patient Summary Report for complete documentation.    "

## 2018-01-15 NOTE — CARE PLAN
Problem: Hemodynamic Status  Goal: Vital Signs and Fluid Balance Management    Intervention: Monitor I & O, Manage IV fluids and IV infusions  IV fluids, IV infustions, and I&O's

## 2018-01-15 NOTE — PROGRESS NOTES
Received pt., assumed care. Oriented pt. To floor policy. Pt. A&OX3-4, resting comfortably at bed.     2 RN skin assessment performed with RN Kike. Wound noted on coccyx area, see wound LDA. Scab noted on R heel. Bilateral elbow and heel blanching. Pt. Wears glasses and hearing aid, ears are intact.

## 2018-01-16 NOTE — DISCHARGE PLANNING
MSW was notified pt is ready for d/c to a SNF facility. MSW met with pt and provided SNF choice form. Pt completed choice form for [facility]. MSW provided completed choice form to CCS who will place referral.

## 2018-01-16 NOTE — CARE PLAN
Problem: Infection  Goal: Will remain free from infection  Outcome: PROGRESSING AS EXPECTED  Education regarding hand hygiene, supplies provided for pt to perform oral hygiene, ileostomy containment device to be kept clean when emptying    Problem: Discharge Barriers/Planning  Goal: Patient's continuum of care needs will be met  Outcome: PROGRESSING AS EXPECTED  IV Rocephin administered per MAR, ostomy care by wound team today, belt in use for containment device along with frequent emptying of containment bag. Pt encouraged to ambulate with staff.

## 2018-01-16 NOTE — DISCHARGE PLANNING
SNF Referral sent to [place] with note pt would like to room with spouse (Dave Emmanuelchito) per choice form.

## 2018-01-16 NOTE — DISCHARGE PLANNING
[facility] has accepted pt to transfer. MSW completed COBRA and placed on chart with chart copy.

## 2018-01-16 NOTE — PROGRESS NOTES
Pt A&Ox4, feeling well this morning with no complaints. Tolerating diet, no n/v or c/o pain. Up to bathroom and ambulating with FWW, steady with stand by assistance. Pt requesting to go to [place] where  is currently, SW updated.

## 2018-01-16 NOTE — WOUND TEAM
"[facility]   Inpatient Services   Established Ostomy Management/ troubleshooting  HPI: Reviewed  PMH: Reviewed   SH: Reviewed   Reason for Ostomy nurse consult:  appliance change  Subjective: \"This is good timing. I think it is starting to leak.\"  Objective: appliance lifting  Ostomy type: ileostomy  Stoma location: [facility]  Stoma assessment:    Appearance:  Red,moist, round   Size: 1.2\"   Protrusion: <1\"   MC jxn: resolved   Peristomal skin:  Open partial thickness wound    Ostomy Appliance (type and size): 1 piece 2\" soft convex with 4\" paste ring, belt    Assessment: stoma with loose watery output. Peristomal skin has open wound, that is wet and bleeds making pouching difficult and only lasts a couple of days.  Question if it is pyoderma? Scant bleeding from stoma when appliance applied and rubbing to get seal.   Interventions:   Cut barrier, applied 4\" paste ring. Removed appliance, cleaned skin. Crusted open skin x 2 layers with stoma powder and No Sting.  Applied appliance and closed. Attached ostomy belt. Padded skin underneath plastic parts of belt.  Extra set of supplies in room. Saw that pt to DC to SNF. Extra set of supplies tubed to unit and RN made aware so that 2 sets sent with pt.  Pt education: none  Plan: Ostomy nurses to continue to follow for ostomy needs and teaching PRN. Need to change every other day.   Anticipated discharge needs: Outpatient ostomy clinic to assess further ostomy needs    "

## 2018-01-16 NOTE — DISCHARGE SUMMARY
"CHIEF COMPLAINT ON ADMISSION  Chief Complaint   Patient presents with   • N/V     nausea and vomiting onset yesterday. unable to tolerate pills at home per family. hx colostomy, arrives without bag on colostomy (as directed by GI)   • ALOC     patient \"not herself\" this morning per family. A&Ox4 but hard of hearing        CODE STATUS  DNR    HPI & HOSPITAL COURSE  This is a 77 y.o. female here with    Septic shock (CMS-HCC)- (present on admission)   Assessment & Plan     Secondary to UTI.  Klebsiella in urine and blood  Repeat cultures neg thus far  Rocephin  WAS GIVEN IN THE HOSPITAL.  WE WILL CONTINUE WITH CEFDINIR 300MG PO DAILY FOR 6 MORE DAYS AND IS RENALLY DOSED.   The organ system failure is the renal system as is evidenced by renal failure secondary to sepsis.  SHE IS DOING BETTER NOW          UTI (urinary tract infection)- (present on admission)   Assessment & Plan     Cultures are positive for Klebsiella PNEUMONIA  AS PER ABOVE          ORAL (acute kidney injury) (CMS-HCC)- (present on admission)   Assessment & Plan     D/W NEPHROLOGY  ATN  CONTINUE WITH LASIX 80MG PO BID  KDUR 40 PO DAILY  BMP ON 1/18/2018       Hepatic encephalopathy (CMS-HCC)   Assessment & Plan     With contribution from sepsis  Maintain patient on lactulose, rifaximin and zinc  Avoid sedative, narcotics / BZDs as able  Frequent reorientation  Aspiration, fall and seizure precautions       Anemia- (present on admission)   Assessment & Plan     She likely has anemia of chronic dz with acute blood loss due to GI source.   Two units RBCs transfused  CBC ON 1/18/2018       Encephalopathy acute- (present on admission)   Assessment & Plan     Resolved  ON RIFAXIMIN  2ND TO LIVE FAILURE  ON LACTULOSE       DNR (do not resuscitate)- (present on admission)   Assessment & Plan     This was discussed with Ms. Tenorio and her family.  Orders written for this.       Severe protein-calorie malnutrition (CMS-HCC)- (present on admission)   Assessment & " Plan     Optimize nutrition  Nutrition team consult  SEVERE MALNUTRITION   prealbumin OF 6  DIETITIAN CONSULT       Edema- (present on admission)   Assessment & Plan     2ND TO LIVER FAILURE  BETTER ON LASIX       Pancytopenia (CMS-HCC)- (present on admission)   Assessment & Plan     2ND TO CIRRHOSIS  h/o nonalcoholic steatohepatitis       BAKER (nonalcoholic steatohepatitis)- (present on admission)   Assessment & Plan     With cirrhosis       Thrombocytopenia (CMS-HCC)- (present on admission)   Assessment & Plan     Secondary to liver disease.  Platelets down to 29  WILL CHECK CBC ON 1/18       Chronic kidney disease, stage IV (severe) (CMS-HCC)- (present on admission)   Assessment & Plan     ORAL WITH ATN  NEPHROLOGY INPUT IS NOTED  NO NEED FOR HEMODIALYSIS AT THIS TIME  RENAL ULTRASOUND :The left kidney measures 7.31 cm. The left kidney appears small and echogenic. The left renal collecting system is not dilated. There is a 6.0 x 6.3 x 4.4 cm cyst in the lower pole left kidney. The bladder demonstrates no focal wall abnormality.  AS PER ABOVE  STAGE 4                        Obesity (BMI 30.0-34.9)- (present on admission)   Assessment & Plan     Body mass index is 33.58 kg/m².       Cirrhosis with portal hypertension- (present on admission)   Assessment & Plan     BAKER Related  ON RIFAXIMIN   ALBUMIN WAS GIVEN  US abdomen negative for significant ascites       TRAVIS on CPAP- (present on admission)   Assessment & Plan     Hx of          Crohn's disease (HCC)- (present on admission)   Assessment & Plan     S/p ileostomy  Recommend ongoing outpatient GI evaluation  Wound team consult for ileostomy management             Loaiza catheter::  No Loaiza  DVT prophylaxis pharmacological::  Contraindicated - High bleeding risk        Constitutional: She is oriented to person, place, and time. No distress.   HEENT:   Head: Normocephalic and atraumatic.   Eyes: Right eye exhibits no discharge. Left eye exhibits no discharge.    Neck: Neck supple. No JVD present.   Cardiovascular: Normal rate and regular rhythm.    Pulmonary/Chest: Effort normal. No stridor. No respiratory distress. She has no wheezes. She exhibits no tenderness.   Abdominal: Soft. She exhibits no distension. There is no tenderness.   OSTOMY BAG IN PLACE   Musculoskeletal: She exhibits edema.   Neurological: She is alert and oriented to person, place, and time.   Skin: Rash (ON THE UPPER EXTS AND CHRONIC) noted. She is not diaphoretic.     Therefore, she is discharged in fair and stable condition with close outpatient follow-up.    SPECIFIC OUTPATIENT FOLLOW-UP  PCP IN 1 WEEK    DISCHARGE PROBLEM LIST  Principal Problem:    Septic shock (CMS-HCC) POA: Yes  Active Problems:    UTI (urinary tract infection) POA: Yes    Anemia POA: Yes    ORAL (acute kidney injury) (CMS-HCC) POA: Yes    Crohn's disease (HCC) POA: Yes      Overview: ICD-10 transition    TRAVIS on CPAP POA: Yes    Cirrhosis with portal hypertension POA: Yes    Obesity (BMI 30.0-34.9) POA: Yes    Chronic pain with narcotic dependence POA: Yes    Chronic kidney disease, stage IV (severe) (CMS-HCC) POA: Yes    Thrombocytopenia (CMS-HCC) POA: Yes    BAKER (nonalcoholic steatohepatitis) POA: Yes    Pancytopenia (CMS-HCC) POA: Yes    Edema POA: Yes    Severe protein-calorie malnutrition (CMS-HCC) POA: Yes    DNR (do not resuscitate) (Chronic) POA: Yes    Encephalopathy acute POA: Yes    Severe sepsis (CMS-HCC) POA: Unknown  Resolved Problems:    Severe sepsis (CMS-HCC) POA: Yes    GERD (gastroesophageal reflux disease) POA: Yes      FOLLOW UP  No future appointments.  No follow-up provider specified.    MEDICATIONS ON DISCHARGE   Cristina Tenorio   Home Medication Instructions VADNANA:[id number]    Printed on:01/16/18 3090   Medication Information                      cefdinir (OMNICEF) 300 MG Cap  Take 1 Cap by mouth every day.             ferrous gluconate (FERGON) 324 (38 FE) MG Tab  Take 324 mg by mouth every morning  with breakfast.             lactulose 10 GM/15ML Solution  Take 20 g by mouth every morning.             omeprazole (PRILOSEC) 20 MG delayed-release capsule  Take 2 Caps by mouth every day.             potassium chloride SA (KDUR) 20 MEQ Tab CR  Take 2 Tabs by mouth every day.             rifaximin (XIFAXAN) 550 MG Tab tablet  Take 1 Tab by mouth 2 Times a Day.             sodium bicarbonate (SODIUM BICARBONATE) 650 MG Tab  Take 3 Tabs by mouth 3 times a day.             vitamin D (CHOLECALCIFEROL) 1000 UNIT Tab  Take 1,000 Units by mouth every day.             zinc sulfate (ZINCATE) 220 (50 Zn) MG Cap  Take 220 mg by mouth every day.    LASIX 80MG PO BID               DIET  Orders Placed This Encounter   Procedures   • DIET ORDER     Standing Status:   Standing     Number of Occurrences:   1     Order Specific Question:   Diet:     Answer:   Renal [8]     Order Specific Question:   Diet:     Answer:   Regular [1]       ACTIVITY  As tolerated and directed by skilled nursing.  Weight bearing as tolerated      CONSULTATIONS  NEPHROLOGY  PULMONOLOGY    PROCEDURES  NONE    LABORATORY  Lab Results   Component Value Date/Time    SODIUM 136 01/16/2018 02:27 AM    POTASSIUM 3.8 01/16/2018 02:27 AM    CHLORIDE 110 01/16/2018 02:27 AM    CO2 19 (L) 01/16/2018 02:27 AM    GLUCOSE 101 (H) 01/16/2018 02:27 AM    BUN 57 (H) 01/16/2018 02:27 AM    CREATININE 3.45 (H) 01/16/2018 02:27 AM        Lab Results   Component Value Date/Time    WBC 3.0 (L) 01/16/2018 02:27 AM    HEMOGLOBIN 7.5 (L) 01/16/2018 02:27 AM    HEMATOCRIT 22.9 (L) 01/16/2018 02:27 AM    PLATELETCT 29 (LL) 01/16/2018 02:27 AM        Total time of the discharge process exceeds 36 minutes

## 2018-01-16 NOTE — PROGRESS NOTES
Hospital Medicine Progress Note, Adult, Complex               Author: Arik Vee Date & Time created: 1/15/2018  4:41 PM     Interval History:  77 year old with known CKD IV who presented with septic shock and ALOC.     Cr 3.39, improved.    Review of Systems:  Review of Systems   Constitutional: Negative for chills and fever.   Respiratory: Negative for cough.    Cardiovascular: Negative for chest pain.       Physical Exam:  Physical Exam   Constitutional: She is oriented to person, place, and time. She appears well-developed. She appears ill.   Cardiovascular: Normal rate and regular rhythm.    Pulmonary/Chest: Effort normal and breath sounds normal.   Musculoskeletal: She exhibits no edema or tenderness.   Neurological: She is alert and oriented to person, place, and time.       Labs:        Invalid input(s): [id number]      Recent Labs      18   0518   1215  01/15/18   0406   SODIUM  138  135  137   POTASSIUM  3.4*  3.7  3.7   CHLORIDE  107  107  107   CO2  21  20  21   BUN  49*  53*  52*   CREATININE  3.55*  3.53*  3.39*   MAGNESIUM   --   1.1*  2.0   PHOSPHORUS   --   2.5   --    CALCIUM  6.8*  6.8*  7.3*     Recent Labs      18   0518  18   1215  01/15/18   0406   GLUCOSE  83  99  132*     Recent Labs      01/15/18   0406  01/15/18   1121  01/15/18   1454  01/15/18   1455   RBC  2.63*  2.85*   --   2.87*   HEMOGLOBIN  7.6*  8.3*   --   8.3*   HEMATOCRIT  23.8*  26.4*   --   26.7*   PLATELETCT  28*  29*   --   31*   IRON   --    --   53   --    TOTIRONBC   --    --   211*   --      Recent Labs      01/15/18   0406  01/15/18   1121  01/15/18   1455   WBC  2.8*  4.1*  3.7*   NEUTSPOLYS  70.40  73.90*   --    LYMPHOCYTES  11.10*  8.80*   --    MONOCYTES  12.50  12.20   --    EOSINOPHILS  3.90  3.40   --    BASOPHILS  0.70  0.50   --            Hemodynamics:  Temp (24hrs), Av.5 °C (97.7 °F), Min:36.3 °C (97.3 °F), Max:36.9 °C (98.4 °F)  Temperature: 36.3 °C (97.3  °F)  Pulse  Av.7  Min: 58  Max: 124Heart Rate (Monitored): 84  Blood Pressure : 120/50, NIBP: (!) 91/58     Respiratory:    Respiration: 16, Pulse Oximetry: 96 %     Work Of Breathing / Effort: Mild  RUL Breath Sounds: Clear, RML Breath Sounds: Clear, RLL Breath Sounds: Clear;Diminished, REMY Breath Sounds: Clear, LLL Breath Sounds: Clear;Diminished  Fluids:    Intake/Output Summary (Last 24 hours) at 01/15/18 1641  Last data filed at 01/15/18 0200   Gross per 24 hour   Intake              290 ml   Output              300 ml   Net              -10 ml        GI/Nutrition:  Orders Placed This Encounter   Procedures   • DIET ORDER     Standing Status:   Standing     Number of Occurrences:   1     Order Specific Question:   Diet:     Answer:   Renal [8]     Order Specific Question:   Diet:     Answer:   Regular [1]     Medical Decision Making, by Problem:  Active Hospital Problems    Diagnosis   • *Septic shock (CMS-HCC) [A41.9, R65.21]   • UTI (urinary tract infection) [N39.0]   • ORAL (acute kidney injury) (CMS-HCC) [N17.9]   • Anemia [D64.9]   • Edema [R60.9]   • Severe protein-calorie malnutrition (CMS-HCC) [E43]   • BAKER (nonalcoholic steatohepatitis) [K75.81]   • Pancytopenia (CMS-HCC) [D61.818]   • Thrombocytopenia (CMS-HCC) [D69.6]   • Chronic kidney disease, stage IV (severe) (CMS-HCC) [N18.4]   • Chronic pain with narcotic dependence [G89.29]   • Cirrhosis with portal hypertension [K74.60]   • Obesity (BMI 30.0-34.9) [E66.9]   • TRAVIS on CPAP [G47.33, Z99.89]   • Crohn's disease (HCC) [K50.90]   • Severe sepsis (CMS-HCC) [A41.9, R65.20]   • Encephalopathy acute [G93.40]   • DNR (do not resuscitate) [Z66]     1. ORAL due to ATN, now improved  2. Volume overload, better  3. Anemia    -Will follow    Quality-Core Measures

## 2018-01-16 NOTE — CARE PLAN
Problem: Safety  Goal: Will remain free from falls    Intervention: Implement fall precautions  Call light and personal belongings within reach. Pt instructed to call for assistance, pt verbalizes understanding. Non skid socks on. Strip alarm in place. Bed in lowest position.

## 2018-01-16 NOTE — DISCHARGE PLANNING
Transport has been arranged with Kandi at [facility] for 3:00pm today. [facility]

## 2018-01-16 NOTE — PROGRESS NOTES
Patient alert/oriented x4,ra,discussed plan of care,denies pain,ambulated to the bathroom with a walker and 1 person assist,no respiratory distress and denies pain,ileostomy left abdomen intact and patent,high fall risk,call light and personal belongings within reach,strip alarm on,bed in low position and hourly rounding in placed,patient call for assistance.

## 2018-01-18 LAB
BACTERIA BLD CULT: NORMAL
BACTERIA BLD CULT: NORMAL
SIGNIFICANT IND 70042: NORMAL
SIGNIFICANT IND 70042: NORMAL
SITE SITE: NORMAL
SITE SITE: NORMAL
SOURCE SOURCE: NORMAL
SOURCE SOURCE: NORMAL

## 2018-01-18 NOTE — ED NOTES
Called [facility] Medical Examiner's office and reported  to [facility].

## 2018-01-18 NOTE — DISCHARGE PLANNING
Medical Social Worker    MSW received call from the ER Lobby stating pt's family wants to go over mortuary information. MSW met with pt's sister Reina ([phone]) and pt's brother Anselmo ([phone]). Per Reina, pt's spouse Dave was told last night that pt had passed away. Dave also has dementia and is at [facility]. Reina and Anselmo are assisting Dave is getting pt's wishes in order and cremated.     MSW went over mortuary information with pt's family. Pt's family to call MSW when mortuary decision is made. Will remain available for support.

## 2018-01-18 NOTE — ED PROVIDER NOTES
ED Provider Note    Scribed for Clyde Zamarripa M.D. by Rahul Salgado. 1/17/2018, 9:41 PM.    Primary care provider: Roshni Morillo M.D.  Means of arrival: EMS  History obtained from: EMS  History limited by: Patient arrives unconscious     CHIEF COMPLAINT  Chief Complaint   Patient presents with   • Unresponsive     76yo pt bib EMS from [facility] living. Pt responsive to physical stimuli. Agonal breathing noted. Pt has a filled out advanced directive to give to ERP.   • Bleeding/Bruising     Per EMS, Pt lost 2L of blood from colonstomy bag     HPI  Cristina Tenorio is a 77 y.o. female who was brought to the Emergency Department via EMS due to bleeding.     The patient was admitted to [facility] yesterday for sepsis. About 45 mintues prior to her arrival nursing staff at [place] noticed bright red blood coming from colostomy bag. The patient lost approximately 2L of blood into her colostomy bag, and subsequently experienced a loss of consciousness. She arrives to the ED unconscious and is DNR status. I am unable to obtain further history from the patient secondary to her condition.      REVIEW OF SYSTEMS  ROS unobtainable secondary to patient is unconscious.     PAST MEDICAL HISTORY   has a past medical history of Anemia (12/30/16); Arthritis (12/30/16); Bowel obstruction; Breath shortness; Cataract (2006,2007); Cirrhosis of liver (CMS-HCC) (12/30/16); COPD (chronic obstructive pulmonary disease) (CMS-HCC); COPD (chronic obstructive pulmonary disease) (CMS-[facility]) (3/20/2013); Crohn's disease (CMS-HCC); Emphysema of lung (CMS-HCC); Heart burn; Hemorrhagic disorder (CMS-HCC); Ileostomy in place (CMS-HCC); Indigestion; Obstruction; Occasional tremors; TRAVIS on CPAP; Renal disorder; Sleep apnea; and Snoring.    SURGICAL HISTORY   has a past surgical history that includes cholecystectomy (2013); bowel resection (1992); ileostomy (2010); recovery (6/21/2010); sigmoidoscopy flex (9/27/2016);  gastroscopy-endo (9/27/2016); gastroscopy wiithsavary dilatation (9/28/2016); gastroscopy w/push enterscopy (9/28/2016); gastroscopy-endo (N/A, 10/5/2016); umbilical hernia repair (2000); colonoscopy; other surgical procedure (1994); and gastroscopy (N/A, 1/13/2017).    SOCIAL HISTORY  Social History   Substance Use Topics   • Smoking status: Former Smoker     Years: 50.00     Types: Cigarettes, Cigars     Quit date: 3/30/2013   • Smokeless tobacco: Never Used      Comment: 50yrs 1.5 ppd quit 2009   • Alcohol use No      Comment: socially      History   Drug Use No     FAMILY HISTORY  None noted.     CURRENT MEDICATIONS  Home Medications    **Home medications have not yet been reviewed for this encounter**       ALLERGIES  Allergies   Allergen Reactions   • Sulfa Drugs Hives and Itching     PHYSICAL EXAM  VITAL SIGNS: Resp (!) 7   Ht 1.524 m (5')   Wt 85.7 kg (189 lb)   LMP 06/29/1995   SpO2 (!) 85%   BMI 36.91 kg/m²   Vitals reviewed.  Constitutional: Unresponsive  HENT: No signs of trauma, Bilateral external ears normal, Nose normal.   Eyes: Conjunctiva normal, Non-icteric.   Lymphatic: No lymphadenopathy noted.   Cardiovascular: Weak carotid pulse appreciated   Thorax & Lungs: Shallow, agonal breathing without rales or rhonchi   Abdomen:  Soft, ostomy bag filled with dark red blood.   Skin: Warm, Dry, No erythema, No rash.   Extremities: No cyanosis  Neurologic: Unresponsive. Unable to assess.  Psychiatric: Unable to assess    DIAGNOSTIC STUDIES / PROCEDURES    LABS  Labs Reviewed - No data to display     RADIOLOGY  No orders to display     COURSE & MEDICAL DECISION MAKING  Nursing notes, VS, PMSFHx reviewed in chart.  Differential diagnoses include but not limited to: Significant blood loss, cardiac arrest, infection.     9:41 PM Called acutely to patient's bedside by RN. On arrival to the room, I was handed a DNR paperwork filled out by the patient. She has a very weak carotid pulse, agonal breathing, and  is unresponsive. Her ostomy bag is filled with dark red blood and I think this is likely the source of her decompensation. I called the patient's nursing facility and spoke to someone who identified himself as her  who is also a resident of her same nursing facility. He confirmed that the patient is DNR. Staff report that the patient was alert and talking when she developed acute bleeding into her ostomy bag and lost approximately 2 L of blood over a very short period of time. She subsequently became unresponsive and was transported to the ER. After spending approximately 1 minute on the telephone, I was notified that the patient had lost her carotid pulse and her respirations had stopped. I return to the patient's bedside with an ultrasound and noted only quivering cardiac activity. Given her advanced directive no resuscitative efforts were initiated. The patient had no cardiac activity nor had she had any respirations. She was pronounced dead at 2147.    Death paperwork was completed by nursing staff and the patient was transferred to the [facility].    FINAL IMPRESSION  1. Bleeding          Rahul ANG (Abdirizak), am scribing for, and in the presence of, Clyde Zamarripa M.D..    Electronically signed by: Rahul Salgado (Abdirizak), 1/17/2018    Clyde ANG M.D. personally performed the services described in this documentation, as scribed by Rahul Salgado in my presence, and it is both accurate and complete.    The note accurately reflects work and decisions made by me.  Clyde Zamarripa  1/18/2018  1:53 AM

## 2018-01-18 NOTE — ED NOTES
Pt has weak carotid pulse palpated. Attempted to obtain blood pressure, could not get reading. Agonal breathing noted.

## 2018-01-18 NOTE — ED NOTES
Pt roomed, placed on monitor. Pt agonally breathing, colonoscopy bag full of blood. ERP paged overhead.

## 2018-05-29 NOTE — PROGRESS NOTES
Pt educated regarding discharge instructions. Questions and concerns addressed at this time. IV removed. Extra ostomy supplies sent with pt belongings. Transportation provided by [facility]. COBRA packet with transport. Report called to Yunier PICKARD.   Average

## 2019-04-05 NOTE — DIETARY
"Nutrition Support (cortrak) Assessment - Female    Cristina Tenorio is a 76 y.o. 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, Respiratory failure   Past Medical History   Diagnosis Date   • Bowel obstruction (CMS-[facility])    • Crohn's disease (CMS-HCC)    • Indigestion    • Obstruction    • Snoring    • Ileostomy in place (CMS-HCC)    • COPD (chronic obstructive pulmonary disease) (CMS-HCC)      per pt   • COPD (chronic obstructive pulmonary disease) (CMS-HCC) 3/20/2013   • Arthritis 12/30/16     to hands and feet   • Heart burn    • Emphysema of lung (CMS-HCC)      COPD   • Sleep apnea    • TRAVIS on CPAP      10/2016-CPAP DC'd and wears O2 @4L/NC   • Hemorrhagic disorder (CMS-HCC)      anemia of unkown etiology.   • Anemia 12/30/16     unknown etiology   • Renal disorder      Increased creatitine level due to meds.   • Breath shortness      uses O2@ at night and prn ([employer]). Uses inhaler prn. 12/30/16-reports no changes    • Cataract 2006,2007     bilat phaco with IOL   • Cirrhosis of liver (CMS-HCC) 12/30/16     states dx at one time but no treatment for >10yrs   • Occasional tremors      Past Surgical History   Procedure Laterality Date   • Cholecystectomy  2013     \"burst\"   • Bowel resection  1992     with ileostomy (right side)   • Ileostomy  2010     moved to left side   • Recovery  6/21/2010     Performed by SURGERY, IR-RECOVERY at SURGERY SAME DAY [facility] ORS   • Sigmoidoscopy flex  9/27/2016     Procedure: SIGMOIDOSCOPY FLEX;  Surgeon: Aftab Alegre M.D.;  Location: ENDOSCOPY [facility] ORS;  Service:    • Gastroscopy-endo  9/27/2016     Procedure: GASTROSCOPY-ENDO;  Surgeon: Aftab Alegre M.D.;  Location: ENDOSCOPY [facility] ORS;  Service:    • Gastroscopy wiithsavary dilatation  9/28/2016     Procedure: GASTROSCOPY WIITH SAVARY DILATATION;  Surgeon: Aftab Alegre M.D.;  Location: ENDOSCOPY [facility];  Service: Gastroenterology   • " LMTCB  Constantine Sexton RN     "Gastroscopy w/push enterscopy  2016     Procedure: GASTROSCOPY W/PUSH ENTERSCOPY;  Surgeon: Aftab Alegre M.D.;  Location: ENDOSCOPY [facility];  Service:    • Gastroscopy-endo N/A 10/5/2016     Procedure: GASTROSCOPY-ENDO;  Surgeon: Aravind Roman M.D.;  Location: ENDOSCOPY [facility];  Service:    • Umbilical hernia repair     • Colonoscopy       Hx of  several    • Other surgical procedure       closed off rectum    • Gastroscopy N/A 2017     Procedure: GASTROSCOPY - ENTEROSCOPY PUSH;  Surgeon: Boni Brooks M.D.;  Location: SURGERY [facility];  Service:      Allergies: Sulfa drugs  Height: 60\"  Weight: 70.7 kg (155 lb 13.8 oz)  Weight to Use in Calculations: 70 kg (154 lb 5.2 oz)  Ideal Body Weight: 45.36 kg (100 lb)  Percent Ideal Body Weight: 155.9  Body mass index is 30.44 kg/(m^2). - obese, class I     Pertinent Labs: Gluc 146, BUN 56, creat 3.21, AlkPhos 334, NH3 190  Pertinent Medications: Pepcid, Fentanyl, Heparin, SSI, Lactulose, Adult ICU electrolyte replacement protocol, Zofran (prn), Xifaxan, Sodium Bicarb, Vitamin D   Pertinent Fluids: IVF NS at 83 mL/hr   Skin: no skin breakdown noted at this time   Last BM: 17 +ileostomy     Estimated Needs: MSJ x 1.1 = 1230 kcals, PSU  = 1315 kcals (Ve: 8.4, Tmax/24 hours: 36.1C)   Total Calories / day: 1190 - 1540 (Calories / k - 22)  Total Grams Protein / day: 55 - 85 (Grams Protein / k.8 - 1.2)  Total Fluids ml / day: 1753.1 ml        Assessment / Evaluation:  Pt admitted this morning as she was found to be unresponsive. Per chart review, pt was recently hospitalized - and was seen by an RD whom noted the pt had a good appetite.  Per chart review, during pt's hospitalization in /July of this year, her wt was obtained: 70.3 kg, which is close to her current wt.     Pt is currently intubated, she appears well nourished. Received nutrition support recommendations. Pt is currently on " propofol, no pressor support at this time.  Will provide standard TF formula, Fibersource HN as pt does not require a specialized TF formula at this time.     Plan / Recommendation:   · Once cortrak is placed and verified, start Fibersource HN at 25 mL/hr and advance per protocol to goal rate of 50 mL/hr.  TF at goal will provide 1440 kcals, 65 gm protein and 970 mL free water per day   · Fluids per MD   · Monitor wt and lab trends   · RD to monitor TF tolerance and meds; will adjust accordingly     RD following

## 2019-05-20 NOTE — PROGRESS NOTES
Spoke to wound care RN notify about wound care consult, states will see patient today   regular regular

## 2019-12-02 NOTE — PROGRESS NOTES
Cough for 4 days bark like, fatigue poor appetite, chest congestion.  Here with mom. produtive at times.  Afebrile.   Headache.    Visit Vitals  /75   Pulse 92   Temp 97 °F (36.1 °C) (Tympanic)   Resp 20   Ht 5' 11\" (1.803 m)   Wt 64.1 kg   SpO2 98%   BMI 19.71 kg/m²        Patient is more responsive and following commands able to lift both legs off bed. Found with ileostomy leaking all over the bed with greenish fluid and dark green particles complete bed bath and linen changed. Ileostomy wafer and bag changed.

## 2020-07-30 NOTE — PROGRESS NOTES
Romina Morrissey Fall Risk Assessment:     Last Known Fall: Within the last year  Mobility: Immobilized/requires assist of one person  Medications: Cardiovascular or central nervous system meds  Mental Status/LOC/Awareness: Awake, alert, and oriented to date, place, and person  Toileting Needs: Use of catheters or diversion devices, Use of assistive device (Bedside commode, bedpan, urinal)  Volume/Electrolyte Status: Use of IV fluids/tube feeds  Communication/Sensory: Visual (Glasses)/hearing deficit  Behavior: Appropriate behavior  Romina Morrissey Fall Risk Total: 13  Fall Risk Level: MODERATE RISK    Universal Fall Precautions:  bed in low position and locked, call light/belongings in reach, siderails up x 2, wheelchairs and assistive devices out of sight, use non-slip footwear, adequate lighting, clutter free and spill free environment, educate on level of risk, educate to call for assistance    Fall Risk Level Interventions:    TRIAL (TELE 8, NEURO, MED RADHA 5) Moderate Fall Risk Interventions  Place yellow fall risk ID band on patient: verified  Provide patient/family education based on risk assessment : completed  Educate patient/family to call staff for assistance when getting out of bed: completed  Place fall precaution signage outside patient door: verified  Utilize bed/chair fall alarm: verified     Patient Specific Interventions:     Medication: review medications with patient and family  Mental Status/LOC/Awareness: check on patient hourly and reinforce the use of call light  Toileting: instruct patient/family on the need to call for assistance when toileting  Volume/Electrolyte Status: ensure patient remains hydrated, monitor abnormal lab values and ensure IV fluids are appropriate  Communication/Sensory: update plan of care on whiteboard, ensure proper positioning when transferrng/ambulating and ensure patient has glasses/contacts and hearing aids/dentures  Behavioral: encourage patient to voice  feelings  Mobility: provide comfort measures during transport, dangle prior to standing, utilize bed/chair fall alarm, ensure bed is locked and in lowest position and instruct patient to exit bed on their strongest side   Social History:    Marital Status: (  ) , ( x ) Single, (  ) , (  ) , (  )   # of Children: none  Lives with: (  ) alone, (  ) children, (  ) spouse, ( x ) parents, (  ) siblings, (  ) friends, (  ) other:   Occupation: [employer]    Substance Use/Illicit Drugs: (  ) never used vs other:   Tobacco Usage: ( x ) never smoked, (  ) former smoker, (  ) current smoker and Total Pack-Years:   Last Alcohol Usage/Frequency/Amount/Withdrawal/Hx of Abuse:  1 glass of rum and coke 4 days ago  Foreign travel: see HPI  Animal exposure:

## 2020-09-28 NOTE — ASSESSMENT & PLAN NOTE
Encourage the patient to exercise in bed.    PT recommend SNF vs HH, pt refused SNF and wants to go home with HH, orders placed, accepted to HH.   Just have GI appt to set her up

## 2021-07-15 NOTE — CERTIFICATION
"[facility]  [facility]  [address]  [place] [address]  [phone] Fax#    Initial Ostomy Evaluation  For 90 Day Certification Period:  7/14/17-10/14/17    Referring Provider:  Ashlee Dacosta  Primary Provider: Dr. Gilberto Morillo  Consulting Providers: KELLEN Mills  Surgeon: Dr. Gaviria  Start of Care: 7/14/17  Reason for referral: ileostomy eval      SUBJECTIVE:      HPI: 76 year old female pt with a history of Crohn's disease s/p ileostomy, CKD IV, and cirrhosis.  Pt had original ileostomy in [place] in 1992 and a revision was done by Dr. Gaviria (no longer at [facility]) in 2010.  Pt stated that she started to have problems with her skin around the stoma after the revision.  Pt has been getting a 3 day wear time with her ileostomy appliance.    Past Medical Hx:   Past Medical History   Diagnosis Date   • Bowel obstruction (CMS-HCC)    • Crohn's disease (CMS-HCC)    • Indigestion    • Obstruction    • Snoring    • Ileostomy in place (CMS-HCC)    • COPD (chronic obstructive pulmonary disease) (CMS-HCC)      per pt   • COPD (chronic obstructive pulmonary disease) (CMS-HCC) 3/20/2013   • Arthritis 12/30/16     to hands and feet   • Heart burn    • Emphysema of lung (CMS-HCC)      COPD   • Sleep apnea    • TRAVIS on CPAP      10/2016-CPAP DC'd and wears O2 @4L/NC   • Hemorrhagic disorder (CMS-HCC)      anemia of unkown etiology.   • Anemia 12/30/16     unknown etiology   • Renal disorder      Increased creatitine level due to meds.   • Breath shortness      uses O2@ at night and prn ([employer]). Uses inhaler prn. 12/30/16-reports no changes    • Cataract 2006,2007     bilat [place] with IOL   • Cirrhosis of liver (CMS-HCC) 12/30/16     states dx at one time but no treatment for >10yrs   • Occasional tremors      Surgical Hx:   Past Surgical History   Procedure Laterality Date   • Cholecystectomy  2013     \"burst\"   • Bowel resection  1992     with " ileostomy (right side)   • Ileostomy  2010     moved to left side   • Recovery  6/21/2010     Performed by SURGERY, IR-RECOVERY at SURGERY SAME DAY [facility]   • Sigmoidoscopy flex  9/27/2016     Procedure: SIGMOIDOSCOPY FLEX;  Surgeon: Aftab Alegre M.D.;  Location: [facility];  Service:    • Gastroscopy-endo  9/27/2016     Procedure: GASTROSCOPY-ENDO;  Surgeon: Aftab Alegre M.D.;  Location: ENDOSCOPY [facility];  Service:    • Gastroscopy wiithsavary dilatation  9/28/2016     Procedure: GASTROSCOPY WIITH SAVARY DILATATION;  Surgeon: Aftab Alegre M.D.;  Location: ENDOSCOPY [facility];  Service: Gastroenterology   • Gastroscopy w/push enterscopy  9/28/2016     Procedure: GASTROSCOPY W/PUSH ENTERSCOPY;  Surgeon: Aftab Alegre M.D.;  Location: ENDOSCOPY [facility];  Service:    • Gastroscopy-endo N/A 10/5/2016     Procedure: GASTROSCOPY-ENDO;  Surgeon: Aravind Roman M.D.;  Location: ENDOSCOPY [facility];  Service:    • Umbilical hernia repair  2000   • Colonoscopy       Hx of  several    • Other surgical procedure  1994     closed off rectum    • Gastroscopy N/A 1/13/2017     Procedure: GASTROSCOPY - ENTEROSCOPY PUSH;  Surgeon: Boni Brooks M.D.;  Location: SURGERY [facility];  Service:    Medications:  Current outpatient prescriptions:   •  sodium bicarbonate (SODIUM BICARBONATE) 650 MG Tab, Take 3 Tabs by mouth 3 times a day., Disp: 270 Tab, Rfl: 11  •  potassium Chloride ER (K-TAB) 20 MEQ Tab CR tablet, Take 1 Tab by mouth every day., Disp: 30 Tab, Rfl: 11  •  lactulose 20 GM/30ML Solution, Take 30 mL by mouth 2 Times a Day., Disp: 30 Bottle, Rfl: 1  •  Probiotic Cap, Take 2 Caps by mouth every morning., Disp: , Rfl:   •  rifaximin (XIFAXAN) 550 MG Tab tablet, Take 1 Tab by mouth 3 times a day., Disp: 90 Tab, Rfl: 0  •  ferrous gluconate (FERGON) 324 (38 FE) MG Tab, Take 324 mg by mouth every morning with breakfast., Disp: , Rfl:   •   "omeprazole (PRILOSEC) 20 MG delayed-release capsule, Take 40 mg by mouth every day., Disp: , Rfl:   •  vitamin D (CHOLECALCIFEROL) 1000 UNIT Tab, Take 1,000 Units by mouth every day., Disp: , Rfl:   Allergies: Sulfa drugs  Social Hx:   Social History     Social History   • Marital Status:      Spouse Name: N/A   • Number of Children: N/A   • Years of Education: N/A     Occupational History   • Not on file.     Social History Main Topics   • Smoking status: Former Smoker -- 50 years     Types: Cigarettes, Cigars     Quit date: 03/30/2013   • Smokeless tobacco: Not on file      Comment: 50yrs 1.5 ppd quit 2009   • Alcohol Use: No      Comment: socially   • Drug Use: No   • Sexual Activity: Not on file     Other Topics Concern   • Not on file     Social History Narrative     Fall Risk Assessment (alli all that apply with an X): + fall risk   X65 years or older     XFall within the last 2 years, uses   XAmbulatory devices - SPC  Loss of protective sensation in feet,   Use of prostethic/orthotic, years    Presence of lower extremity/foot/toe amputation   XTaking medication that increases risk (per facility policy)    Interventions Recommended (if any of the above are selected):   XUse of Assistive Device:_________SPC_______________   Supervision with ambulation:  Caregiver   Assistance with ambulation:  Caregiver   Mari safety education:  Educational material provided    OBJECTIVE:     STOMA ASSESSMENT:   Type:  ___X_Ileostomy     ____Colostomy    ____Urostomy    ____Other     Location:   LLQ   Size: 1 3/8\" (pt currently using precut 1 1/4\" round barriers)   Shape: oval   Color: red, budded   Protrudes:      ___ >1 inch               __X_ <1 inch   [place]:  center   Mucocutaneous Junction:  intact   Gale-stomal Skin Problems: denudation (worse on inferior to stoma)   Effluent / Flatus: brown liquid   Photo  _X___ Yes ____ No    Pouching Procedure:   Old appliance removed.    Peristomal skin cleansed with: water and " "warm wash cloth   Peristomal skin treated with: crusting (stoma powder and no sting skin barrier) x 2   Ostomy appliance used: 2 1/4\" CTF convex jagdish 2 piece    Patient Education:   Verbal/demonstration instructions of above pouching procedure provided to patient/family.      Response: Pt verbalized understanding and is willing to participate with crusting on next follow up visit.        PLAN: Reinforce importance of crusting and cutting barrier to clear stoma.  Pt will return to clinic 1x/week until peristomal issues resolve.    Supplies Needed:   Appliance type:    Jagdish 2 piece  2 /14\" CTF convex (72049), pouch [id number]             Other: kendy barrier ring, stoma powder, skin barrier     Accessories:    Pt does not want a belt (pt has tried this in the past and does not like it)     Supplier:    Frequency:  1x/week and PRN       Professional Collaboration:  Evaluation notes forwarded to APN.      At the time of each visit, a thorough assessment of the patient is completed to assure appropriateness of our plan of care.  The plan of care may need to be adapted from the original plan of care to address any significant changes in patient status.    Clinician Signature:  _________________________________  Date:  ____________    Physician Signature:  ________________________________  Date:  ____________       " None

## 2021-08-03 NOTE — PROGRESS NOTES
Romina Morrissey Fall Risk Assessment:     Last Known Fall: Within the last six months  Mobility: Immobilized/requires assist of one person  Medications: Cardiovascular or central nervous system meds  Mental Status/LOC/Awareness: Awake, alert, and oriented to date, place, and person  Toileting Needs: Use of assistive device (Bedside commode, bedpan, urinal)  Volume/Electrolyte Status: Use of IV fluids/tube feeds  Communication/Sensory: Visual (Glasses)/hearing deficit  Behavior: Appropriate behavior  Romina Morrissey Fall Risk Total: 13  Fall Risk Level: MODERATE RISK    Universal Fall Precautions:  call light/belongings in reach, bed in low position and locked, siderails up x 2, use non-slip footwear, adequate lighting, clutter free and spill free environment    Fall Risk Level Interventions:    TRIAL (TELE 8, NEURO, MED RADHA 5) Moderate Fall Risk Interventions  Place yellow fall risk ID band on patient: verified  Provide patient/family education based on risk assessment : completed  Educate patient/family to call staff for assistance when getting out of bed: completed  Place fall precaution signage outside patient door: verified  Utilize bed/chair fall alarm: verified     Patient Specific Interventions:     Medication: review medications with patient and family and provide patients who received diuretics/laxatives frequent toileting  Mental Status/LOC/Awareness: reinforce falls education, check on patient hourly and reinforce the use of call light  Toileting: provide frquent toileting, monitor intake and output/use of appropriate interventions and instruct patient/family on the need to call for assistance when toileting  Volume/Electrolyte Status: ensure patient remains hydrated, monitor abnormal lab values and ensure IV fluids are appropriate  Communication/Sensory: update plan of care on whiteboard and ensure patient has glasses/contacts and hearing aids/dentures  Behavioral: encourage patient to voice  feelings  Mobility: dangle prior to standing and utilize bed/chair fall alarm   Name band;

## 2021-10-20 NOTE — ADDENDUM NOTE
Encounter addended by: Sigifredo Gupta M.D. on: 10/19/2021 5:43 PM   Actions taken: Cosign clinical note

## 2021-11-23 NOTE — PROGRESS NOTES
----- Message from Dileep Marie sent at 11/23/2021  9:03 AM EST -----  Subject: Message to Provider    QUESTIONS  Information for Provider? Juanpablo Hansen is requesting to schedule a Physical and   symptomatic appointment for the patient for extreme fatigued & bruises all   over. I attempted to transfer the caller to nurse triage but they   disconnected while holding for the nurse. Please contact to schedule as   soon as possible.   ---------------------------------------------------------------------------  --------------  CALL BACK INFO  What is the best way for the office to contact you? OK to leave message on   voicemail  Preferred Call Back Phone Number? [phone]  ---------------------------------------------------------------------------  --------------  SCRIPT ANSWERS  Relationship to Patient? Third Party  Representative Name?  Juanpablo Hansen Pt a&o. Generalized weakness. Pending PT/OT eval. Turning side to side in bed. States she uses walker or cane at home. Multiple bruising noted especially to bilat upper extremities. [place]. On 2l o2 nc per home regimen. Denies pain. mepilex at buttocks. Tube feeding at goal rate tolerating well. Colostomy with high output of very watery brown stool. Loaiza to dd with qs output. Trace edema to bilat LE. Plan of care reviewed with patient. Verbalized understanding and agrees. Able to make needs known.

## 2022-01-01 NOTE — PROGRESS NOTES
Received report from day RN. Assumed pt care at 1850, 8/29. Discussed call light/phone system, communication board and POC. Pt is aao x 4 and verbalizes understanding. Pt is hard of hearing. Pt's SO brings in hearing aids daily. Pt has a colostomy in place to LLQ. This RN changes bag and cleanses site. Pt has high output, this RN sets up high output ostomy running at gravity to a drainage bag. Pt has redness/excoriation to area. RN orders new wound consult for re-evaluation. Pt mobility assessed. Pt is a one assist with a shuffling gait and hand held assist. Bed alarm on. Fall precautions in place. Bed is locked and in low position, call light within reach. Treaded slippers in place. Pt needs met at this time. Hourly rounding in place.   51

## 2022-01-07 NOTE — CARE PLAN
Problem: Knowledge Deficit  Goal: Knowledge of disease process/condition, treatment plan, diagnostic tests, and medications will improve  Outcome: PROGRESSING AS EXPECTED  Using walker with one assist to bathroom         none

## 2024-01-29 NOTE — DISCHARGE INSTRUCTIONS
January 29, 2024       Lamont Monet MD  [address]  [facility] [address]  Via Fax: [phone]      Patient: Noelle Ford   YOB: 1973   Date of Visit: 1/29/2024       Dear Dr. Monet:    I saw your patient, Noelle Ford, for an evaluation. Below are my notes for this visit with her.    If you have questions, please do not hesitate to call me.      Sincerely,        Josh Lombardo MD        CC: No Recipients  Josh Lombardo MD  1/29/2024  1:50 PM  Signed  Patient was referred for Consultation by Lamont Monet MD for RIGHT Shoulder pain    CHIEF COMPLAINT(S): RIGHT shoulder pain    HISTORY OF PRESENT ILLNESS:  Noelle Ford is a 50 year old right-handed female presenting for RIGHT shoulder pain. History rotator cuff surgery due to impingement. Pain returned a couple months ago. No known injury. Pain is located all over the RIGHT shoulder. Pain is aggravated by touch and raising her arm. Pain is constant. Denies any pain in the neck. Denies any numbness and tingling. She has not tried much for the pain. No history of physical therapy and cortisone injections for the RIGHT shoulder.    She is s/p RIGHT Shoulder Arthroscopic Distal Clavicle Resection, Acromioplasty and Extensive Labrum Debridement (DOS: 9/2/2020). She did very well after Surgery... started a couple of months ago... now waking her at night... constant pain. Pain is mostly on top of the Shoulder/Upper Arm and in the RIGHT Shoulder Blade as well. She is unable to take NSAIDs due to Kidney effects.     She is unable to take NSAIDs due to Kidney effects.     PSH:   s/p RIGHT Shoulder Arthroscopic Distal Clavicle Resection, Acromioplasty and Extensive Labrum Debridement (DOS: 9/2/2020)  s/p Closed Reduction Distal Radius Fracture; RIGHT Wrist (DOI: 12/13/17)    Accompanied by no one  Date of Onset: Couple months ago   Severity:10/10     Occupation/School: Management- Desk work   PT: none    Review of Systems    Past  Discharge Instructions    Discharged to other by car with friend. Discharged via wheelchair, hospital escort: Yes.  Special equipment needed:Ostomy Supplies    Be sure to schedule a follow-up appointment with your primary care doctor or any specialists as instructed.     Discharge Plan:   Diet Plan: Discussed  Activity Level: Discussed  Confirmed Follow up Appointment: Patient to Call and Schedule Appointment  Confirmed Symptoms Management: Discussed  Medication Reconciliation Updated: Yes    I understand that a diet low in cholesterol, fat, and sodium is recommended for good health. Unless I have been given specific instructions below for another diet, I accept this instruction as my diet prescription.   Other diet: Limit animal protein-hepatic diet    Special Instructions: None    · Is patient discharged on Warfarin / Coumadin?   No     · Is patient Post Blood Transfusion?  No    Acute Kidney Injury  Acute kidney injury is any condition in which there is sudden (acute) damage to the kidneys. Acute kidney injury was previously known as acute kidney failure or acute renal failure. The kidneys are two organs that lie on either side of the spine between the middle of the back and the front of the abdomen. The kidneys:  · Remove wastes and extra water from the blood.    · Produce important hormones. These help keep bones strong, regulate blood pressure, and help create red blood cells.    · Balance the fluids and chemicals in the blood and tissues.  A small amount of kidney damage may not cause problems, but a large amount of damage may make it difficult or impossible for the kidneys to work the way they should. Acute kidney injury may develop into long-lasting (chronic) kidney disease. It may also develop into a life-threatening disease called end-stage kidney disease. Acute kidney injury can get worse very quickly, so it should be treated right away. Early treatment may prevent other kidney diseases from  Medical History:   Diagnosis Date   • Allergy    • Arthritis, rheumatic, acute or subacute    • Diabetes mellitus (CMS/HCC)    • Essential (primary) hypertension    • Lupus (CMD)    • PCOS (polycystic ovarian syndrome)    • RAD (reactive airway disease)    • Thyroid disease         Past Surgical History:   Procedure Laterality Date   • Appendectomy     • Bladder surgery     • Cholecystectomy     • Foot surgery     • Hysterectomy     • Knee surgery Bilateral    • Removal gallbladder     • Shoulder arthroscopy Right 09/02/2020    RIGHT Shoulder Arthroscopic Distal Clavicle Resection, Acromioplasty and Extensive Labrum Debridement.    • Wrist surgery Right         Family History   Problem Relation Age of Onset   • Heart disease Mother    • Stroke/TIA Father    • Heart disease Father         Social History     Tobacco Use   Smoking Status Never   Smokeless Tobacco Never      Social History     Substance and Sexual Activity   Alcohol Use Yes    Comment: social      Social History     Substance and Sexual Activity   Drug Use No         MEDICATIONS:  Current Outpatient Medications   Medication Sig Dispense Refill   • HYDROcodone-acetaminophen (Norco) 5-325 MG per tablet Take 1 tablet by mouth every 6 hours as needed for Pain. 8 tablet 0   • ibuprofen (MOTRIN) 600 MG tablet Take 1 tablet by mouth every 6 hours as needed for Pain. 30 tablet 0   • oxyCODONE-acetaminophen (Percocet) 5-325 MG per tablet Take 1 tablet by mouth every 4 hours as needed for Pain. 10 tablet 0   • esomeprazole (NexIUM) 40 MG packet Take 40 mg by mouth 2 times daily.     • abatacept (Orencia) 125 MG/ML prefilled syringe for injection Inject 125 mg into the skin 1 day a week.     • albuterol 108 (90 Base) MCG/ACT inhaler Inhale 2 puffs into the lungs every 4 hours as needed.      • azelastine (OPTIVAR) 0.05 % ophthalmic solution Place 1 drop into both eyes as needed.      • Cholecalciferol 125 mcg (5,000 units) capsule Take 5,000 Units by mouth as  developing.  CAUSES   · A problem with blood flow to the kidneys. This may be caused by:    ¨ Blood loss.    ¨ Heart disease.    ¨ Severe burns.    ¨ Liver disease.  · Direct damage to the kidneys. This may be caused by:  ¨ Some medicines.    ¨ A kidney infection.    ¨ Poisoning or consuming toxic substances.    ¨ A surgical wound.    ¨ A blow to the kidney area.    · A problem with urine flow. This may be caused by:    ¨ Cancer.    ¨ Kidney stones.    ¨ An enlarged prostate.  SIGNS AND SYMPTOMS   · Swelling (edema) of the legs, ankles, or feet.    · Tiredness (lethargy).    · Nausea or vomiting.    · Confusion.    · Problems with urination, such as:    ¨ Painful or burning feeling during urination.    ¨ Decreased urine production.    ¨ Frequent accidents in children who are potty trained.    ¨ Bloody urine.    · Muscle twitches and cramps.    · Shortness of breath.    · Seizures.    · Chest pain or pressure.  Sometimes, no symptoms are present.   DIAGNOSIS  Acute kidney injury may be detected and diagnosed by tests, including blood, urine, imaging, or kidney biopsy tests.   TREATMENT  Treatment of acute kidney injury varies depending on the cause and severity of the kidney damage. In mild cases, no treatment may be needed. The kidneys may heal on their own. If acute kidney injury is more severe, your health care provider will treat the cause of the kidney damage, help the kidneys heal, and prevent complications from occurring. Severe cases may require a procedure to remove toxic wastes from the body (dialysis) or surgery to repair kidney damage. Surgery may involve:   · Repair of a torn kidney.    · Removal of an obstruction.  HOME CARE INSTRUCTIONS  · Follow your prescribed diet.  · Take medicines only as directed by your health care provider.   · Do not take any new medicines (prescription, over-the-counter, or nutritional supplements) unless approved by your health care provider. Many medicines can worsen your  kidney damage or may need to have the dose adjusted.    · Keep all follow-up visits as directed by your health care provider. This is important.  · Observe your condition to make sure you are healing as expected.  SEEK IMMEDIATE MEDICAL CARE IF:  · You are feeling ill or have severe pain in the back or side.    · Your symptoms return or you have new symptoms.  · You have any symptoms of end-stage kidney disease. These include:    ¨ Persistent itchiness.    ¨ Loss of appetite.    ¨ Headaches.    ¨ Abnormally dark or light skin.  ¨ Numbness in the hands or feet.    ¨ Easy bruising.    ¨ Frequent hiccups.    ¨ Menstruation stops.    · You have a fever.  · You have increased urine production.  · You have pain or bleeding when urinating.  MAKE SURE YOU:   · Understand these instructions.  · Will watch your condition.  · Will get help right away if you are not doing well or get worse.     This information is not intended to replace advice given to you by your health care provider. Make sure you discuss any questions you have with your health care provider.     Document Released: 07/02/2012 Document Revised: 01/08/2016 Document Reviewed: 08/16/2013  [employer] Interactive Patient Education ©2016 [employer]    Aplastic Anemia  Aplastic anemia occurs when the soft material that makes up the hollow insides of your bones (bone marrow) stops making enough blood cells. There are three types of blood cells:   · Red blood cells. These carry oxygen to the tissues of the body.    · White blood cells. These fight infection.    · Platelets. These help your blood clot when you have an injury.    Aplastic anemia is a rare and serious condition that may develop slowly or rapidly. Even after successful treatment, those with aplastic anemia must be monitored for possible recurrent problems.   CAUSES   Anything that hurts or injures your bone marrow can cause aplastic anemia. Things that may injure marrow include:   · Radiation and  needed.     • DULoxetine (CYMBALTA) 30 MG capsule Take 30 mg by mouth daily.     • hydrochlorothiazide (HYDRODIURIL) 25 MG tablet Take 25 mg by mouth daily.     • Levemir FlexTouch 100 UNIT/ML pen-injector INJECT 72 UNITS SUBCUTANEOUS EVERY NIGHT AT BEDTIME. NEEDS 2 UNIT PRIME EACH TIME     • levalbuterol (XOPENEX) 0.31 MG/3ML nebulizer solution Inhale 1 ampule into the lungs every 4 hours as needed.      • levocetirizine (XYZAL) 5 MG tablet Take 5 mg by mouth daily.     • levothyroxine 100 MCG tablet Take 125 mcg by mouth daily.      • Victoza 18 MG/3ML pen-injector INJECT 1.8 MG SUBCUTANEOUSLY ONCE PER DAY.     • metaxalone (SKELAXIN) 800 MG tablet 800 mg daily as needed.      • metFORMIN (GLUCOPHAGE-XR) 750 MG 24 hr tablet TAKE 2 TABLETS BY MOUTH EVERY DAY WITH DINNER.     • montelukast (SINGULAIR) 10 MG tablet Take 10 mg by mouth daily.     • pravastatin (PRAVACHOL) 80 MG tablet TAKE 1 TABLET BY MOUTH EVERYDAY AT BEDTIME     • losartan (COZAAR) 100 MG tablet Take 100 mg by mouth daily.     • oxyCODONE, IMM REL, (ROXICODONE) 5 MG immediate release tablet Take 1 tablet by mouth every 6 hours as needed for Pain. 28 tablet 0     No current facility-administered medications for this visit.       ALLERGIES:   Allergen Reactions   • Bactrim Ds SHORTNESS OF BREATH     Severe Respiratory distress    • Iodine   (Environmental Or Med) SHORTNESS OF BREATH     Respiratory distress, Ended up in ER after, Ingested iodine and IV dye  States can use topical without difficulties    • Chloroquine RASH and VISUAL DISTURBANCE       PHYSICAL EXAMINATION:  Visit Vitals  Ht 5' 4\" (1.626 m)   Wt 83.5 kg (184 lb)   BMI 31.58 kg/m²     Constitutional: No Acute Distress  Skin: No rash, No ecchymosis  Pulmonary: No Labored Respirations  Cardiovascular: Normal Rate and Rhythm    RIGHT Shoulder: Mild TTP Anterior Cuff. No TTP Biceps Tendon, AC Joint, Posterior or Greater Tuberosity. No Atrophy. AROM: FF = 15  (vs. 150 ) / ABD = 130  (vs. 140  ) / ER1 = 45  (vs. 45 ) / ER2 = 80  (vs. 80 ) / IR = Low Back  (vs. Low Back). No significant glenohumeral crepitus. Strength: Supraspinatus 5/5 with mild pain, Infraspinatus 5/5 without pain, Subscapularis 5/5 without pain. Negative ER Lag. Positive Pruitt. Negative Pembina's. Negative Speed's. No Anterior Apprehension. No anterior or posterior instability noted. NVI distally.    Psychiatric: Alert and Orientated x3    IMAGING &TESTING:    AP / Supraspinatus Outlet View / Axillary Lateral RIGHT Shoulder: Post-operative changes noted with resected distal clavicle and Type 1 Acromion. Normal glenohumeral alignment without arthritis. Normal acromiohumeral distance. No fracture or dislocation.    PROCEDURE:  L Inj/Asp: R subacromial bursa on 1/29/2024 1:48 PM  Indications: pain (Subacromial Bursitis)  Details: 22 G needle, posterior approach  Medications: 5 mL lidocaine 1 %; 40 mg methylPREDNISolone ACETATE long-acting DEPOT 40 MG/ML  Outcome: tolerated well, no immediate complications    [place]  Procedure, treatment alternatives, risks and benefits explained, specific risks discussed. Consent was given by the patient. Immediately prior to procedure a time out was called to verify the correct patient, procedure, equipment, support staff and site/side marked as required. Patient was prepped and draped in the usual sterile fashion.     ASSESSMENT & PLAN:  Noelle Ford is a 50 year old female Subacromial Bursitis; RIGHT Shoulder     Plan as follows:  1. Reviewed the radiographs... post-surgical changes noted.   2. Discussed her symptoms and treatment options... Cortisone injection was discussed. She wished to proceed  3. She tolerated the injection well. Ice as needed  4. Follow-up as needed.... if no relief from the Cortisone injection she may call for a RIGHT Shoulder MRI first.      Josh Lombardo MD  01/29/24    CC: Lamont Monet MD  chemotherapy treatment for cancer. These treatments are used to kill cancer cells but also damage other cells.    · Exposure to toxic chemicals used in some pesticides and insecticides.    · Some medicines, such as those used to treat rheumatoid arthritis.    · Autoimmune disorders in which your immune system begins attacking your own body cells.    · Viral infections, including hepatitis.    · Pregnancy.    Sometimes the cause is not known.   SYMPTOMS   · Shortness of breath.    · Fatigue.    · Lightheadedness or fainting.    · Shortness of breath and rapid heart rate, especially with exertion.    · Pale skin and lips.    · Frequent infections.    · Easy bruising and bleeding.    · Nosebleeds and bleeding gums.    · Prolonged bleeding from cuts.    · Severe bleeding during menstrual periods in women.  · Sore mouth.    · Bacterial or fungal infections.  DIAGNOSIS   Blood tests and a bone marrow biopsy are used to diagnose the condition. Additional testing may be done to find the underlying cause of the anemia.   TREATMENT   For mild cases, observation is needed. In severe cases or if complications develop, hospitalization may be necessary. Severe aplastic anemia is life threatening. Treatment may include:   · Blood transfusions.    · Medicines. Medicines may be given to suppress the immune system if you have an autoimmune disorder. They may also be given to stimulate marrow to make more blood cells.   · A procedure where healthy marrow from a donor is given to the person with aplastic anemia (bone marrow transplant). A bone marrow transplant is used to treat severe aplastic anemia. After the transplant, drugs are needed to help prevent the body from rejecting the new marrow. If the procedure is successful, the healthy marrow will begin producing new blood cells. Bone marrow transplants carry risk, and not everyone is a candidate for transplantation. If the body rejects the transplant, it can be life  threatening.  HOME CARE INSTRUCTIONS   · Get plenty of rest and eat a well-balanced diet.    · Avoid excessive exercise. Long-term anemia can stress the heart.    · When platelets are at low levels, avoid all activities that risk injury. This is important because the risk of bleeding is greater.    · Only take over-the-counter or prescription medicines for pain, fever, or discomfort as directed by your health care provider.    · Take antibiotics as directed by your health care provider. Make sure you finish them even if you start to feel better.    · Protect yourself from infections by washing your hands often. Avoid crowds. Avoid being around sick people.    · Keep all follow-up appointments.  · To prevent aplastic anemia from returning, avoid exposure to toxic chemicals such as:    ¨ Insecticides.    ¨ Herbicides.    ¨ Organic solvents.    ¨ Paint removers.  SEEK IMMEDIATE MEDICAL CARE IF:  · You have a fever or persistent symptoms for more than 2-3 days.    · You have a fever and your symptoms suddenly get worse.    · You develop flu-like symptoms.    · You develop signs of infection.    · You develop infections more frequently.    · You have blood in your urine or bowel movements.    · You are bruising easily.    · You are bleeding from your gums or nose.    · You have prolonged bleeding from cuts.    · You have increasing shortness of breath or chest pain with exertion.    · You develop a rapid heart rate with exertion.    · You have increasing fatigue and tiredness.    · You develop lightheadedness or fainting.    · You develop pale skin and lips.    · You develop a sore mouth.  MAKE SURE YOU:   · Understand these instructions.  · Will watch your condition.  · Will get help right away if you are not doing well or get worse.     This information is not intended to replace advice given to you by your health care provider. Make sure you discuss any questions you have with your health care provider.     Document  Released: 10/14/2008 Document Revised: 12/23/2014 Document Reviewed: 06/13/2014  [employer] Interactive Patient Education ©2016 [employer]        Thrombocytopenia  Thrombocytopenia means there are not enough platelets in your blood. Platelets are tiny cells in your blood. When you start bleeding, platelets clump together around the cut or injury to stop the bleeding. This process is called blood clotting. Not having enough platelets can cause bleeding problems.  HOME CARE  · Check your skin and inside your mouth for bruises or blood as told by your doctor.  · Check your spit (sputum), pee (urine), and poop (stool) for blood as told by your doctor.  · Do not do activities that can cause bumps or bruises until your doctor says it is okay.  · Be careful not to cut yourself when you shave or use scissors, needles, knives, or other tools.  · Be careful not to burn yourself when you iron or cook.  · Ask your doctor if you can drink alcohol.  · Only take medicines as told by your doctor.  · Tell all your doctors and your dentist that you have this bleeding problem.  GET HELP RIGHT AWAY IF:  · You are bleeding anywhere on your body.  · You are bleeding or have bruises without knowing why.  · You have blood in your spit, pee, or poop.  MAKE SURE YOU:  · Understand these instructions.  · Will watch your condition.  · Will get help right away if you are not doing well or get worse.     This information is not intended to replace advice given to you by your health care provider. Make sure you discuss any questions you have with your health care provider.     Document Released: 12/06/2012 Document Revised: 03/11/2013 Document Reviewed: 12/06/2012  [employer] Patient Education ©2016 [employer] Inc.    Colostomy Home Guide  A colostomy is an opening for stool to leave your body when a medical condition prevents it from leaving through the usual opening (rectum). During a surgery, a piece of large intestine (colon) is  brought through a hole in the abdominal wall. The new opening is called a stoma or ostomy. A bag or pouch fits over the stoma to catch stool and gas. Your stool may be liquid, somewhat pasty, or formed.  CARING FOR YOUR STOMA   Normally, the stoma looks a lot like the inside of your cheek: pink, red, and moist. At first it may be swollen, but this swelling will decrease within 6 weeks.  Keep the skin around your stoma clean and dry. You can gently wash your stoma and the skin around your stoma in the shower with a clean, soft washcloth. If you develop any skin irritation, your caregiver may give you a stoma powder or ointment to help heal the area. Do not use any products other than those specifically given to you by your caregiver.   Your stoma should not be uncomfortable. If you notice any stinging or burning, your pouch may be leaking, and the skin around your stoma may be coming into contact with stool. This can cause skin irritation. If you notice stinging, replace your pouch with a new one and discard the old one.  OSTOMY POUCHES   The pouch that fits over the ostomy can be made up of either 1 or 2 pieces. A one-piece pouch has a skin barrier piece and the pouch itself in one unit. A two-piece pouch has a skin barrier with a separate pouch that snaps on and off of the skin barrier. Either way, you should empty the pouch when it is only  to ½ full. Do not let more stool or gas build up. This could cause the pouch to leak.  Some ostomy bags have a built-in gas release valve. Ostomy deodorizer (5 drops) can be put into the pouch to prevent odor. Some people use ostomy lubricant drops inside the pouch to help the stool slide out of the bag more easily and completely.   EMPTYING YOUR OSTOMY POUCH   You may get lessons on how to empty your pouch from a wound-ostomy nurse before you leave the hospital. Here are the basic steps:  · Wash your hands with soap and water.  · Sit far back on the toilet.  · Put several  pieces of toilet paper into the toilet water. This will prevent splashing as you empty the stool into the toilet bowl.  · Unclip or unvelcro the tail end of the pouch.  · Unroll the tail and empty stool into the toilet.  · Clean the tail with toilet paper.  · Reroll the tail, and clip or velcro it closed.  · Wash your hands again.  CHANGING YOUR OSTOMY POUCH   Change your ostomy pouch about every 3 to 4 days for the first 6 weeks, then every 5 to7 days. Always change the bag sooner if there is any leakage or you begin to notice any discomfort or irritation of the skin around the stoma. When possible, plan to change your ostomy pouch before eating or drinking as this will lessen the chance of stool coming out during the pouch change. A wound-ostomy nurse may teach you how to change your pouch before you leave the hospital. Here are the basic steps:  · Lay out your supplies.  · Wash your hands with soap and water.  · Carefully remove the old pouch.  · Wash the stoma and allow it to dry. Men may be advised to shave any hair around the stoma very carefully. This will make the adhesive stick better.  · Use the stoma measuring guide that comes with your pouch set to decide what size hole you will need to cut in the skin barrier piece. Choose the smallest possible size that will hold the stoma but will not touch it.  · Use the guide to trace the [place] on the back of the skin barrier piece. Cut out the hole.  · Hold the skin barrier piece over the stoma to make sure the hole is the correct size.  · Remove the adhesive paper backing from the skin barrier piece.  · Squeeze stoma paste around the opening of the skin barrier piece.  · Clean and dry the skin around the stoma again.  · Carefully fit the skin barrier piece over your stoma.  · If you are using a two-piece pouch, snap the pouch onto the skin barrier piece.  · Close the tail of the pouch.  · Put your hand over the top of the skin barrier piece to help warm it for  about 5 minutes, so that it conforms to your body better.  · Wash your hands again.  DIET TIPS   · Continue to follow your usual diet.  · Drink about eight 8 oz glasses of water each day.  · You can prevent gas by eating slowly and chewing your food thoroughly.  · If you feel concerned that you have too much gas, you can cut back on gas-producing foods, such as:  ¨ Spicy foods.  ¨ Onions and garlic.  ¨ Cruciferous vegetables (cabbage, broccoli, cauliflower, [place] sprouts).  ¨ Beans and legumes.  ¨ Some cheeses.  ¨ Eggs.  ¨ Fish.  ¨ Bubbly (carbonated) drinks.  ¨ Chewing gum.  GENERAL TIPS   · You can shower with or without the bag in place.  · Always keep the bag on if you are bathing or swimming.  · If your bag gets wet, you can dry it with a blow-dryer set to cool.  · Avoid wearing tight clothing directly over your stoma so that it does not become irritated or bleed. Tight clothing can also prevent stool from draining into the pouch.  · It is helpful to always have an extra skin barrier and pouch with you when traveling. Do not leave them anywhere too warm, as parts of them can melt.  · Do not let your seat belt rest on your stoma. Try to keep the seat belt either above or below your stoma, or use a tiny pillow to cushion it.  · You can still participate in sports, but you should avoid activities in which there is a risk of getting hit in the abdomen.  · You can still have sex. It is a good idea to empty your pouch prior to sex. Some people and their partners feel very comfortable seeing the pouch during sex. Others choose to wear lingerie or a T-shirt that covers the device.  SEEK IMMEDIATE MEDICAL CARE IF:  · You notice a change in the size or color of the stoma, especially if it becomes very red, purple, black, or pale white.  · You have bloody stools or bleeding from the stoma.  · You have abdominal pain, nausea, vomiting, or bloating.  · There is anything unusual protruding from the stoma.  · You have  irritation or red skin around the stoma.  · No stool is passing from the stoma.  · You have diarrhea (requiring more frequent than normal pouch emptying).     This information is not intended to replace advice given to you by your health care provider. Make sure you discuss any questions you have with your health care provider.     Document Released: 12/20/2004 Document Revised: 03/11/2013 Document Reviewed: 05/16/2012  [employer] Interactive Patient Education ©2016 [employer].    Rifaximin tablets  What is this medicine?  RIFAXIMIN (ri FAX i men) is an antibiotic. It is used to treat traveler's diarrhea. It is also used to prevent hepatic encephalopathy, a brain disorder that results from liver disease.  This medicine may be used for other purposes; ask your health care provider or pharmacist if you have questions.  COMMON BRAND NAME(S): Xifaxan  What should I tell my health care provider before I take this medicine?  They need to know if you have any of these conditions:  -bloody or tarry stools  -fever  -liver disease  -an unusual or allergic reaction to rifaximin, rifampin, rifabutin, other medicines, foods, dyes, or preservatives  -pregnant or trying to get pregnant  -breast-feeding  How should I use this medicine?  Take this medicine by mouth with a glass of water. Follow the directions on the prescription label. This medicine may be taken with or without food. Take your medicine at regular intervals. Do not take your medicine more often than directed. Take all of your medicine as directed even if you think your are better. Do not skip doses or stop your medicine early.  Talk to your pediatrician regarding the use of this medicine in children. Special care may be needed.  Overdosage: If you think you have taken too much of this medicine contact a poison control center or emergency room at once.  NOTE: This medicine is only for you. Do not share this medicine with others.  What if I miss a dose?  If you miss a  dose, take it as soon as you can. If it is almost time for your next dose, take only that dose. Do not take double or extra doses.  What may interact with this medicine?  -birth control pills  -cyclosporine  -midazolam  -verapamil  This list may not describe all possible interactions. Give your health care provider a list of all the medicines, herbs, non-prescription drugs, or dietary supplements you use. Also tell them if you smoke, drink alcohol, or use illegal drugs. Some items may interact with your medicine.  What should I watch for while using this medicine?  Tell your doctor or healthcare professional if your symptoms do not start to get better or if they get worse.  Do not treat diarrhea with over the counter products. Contact your doctor if you have diarrhea that lasts more than 2 days or if it is severe and watery.  What side effects may I notice from receiving this medicine?  Side effects that you should report to your doctor or health care professional as soon as possible:  -allergic reactions like skin rash, itching or hives, swelling of the face, lips, or tongue  -blood in the urine  -bloody or watery diarrhea  -fever  Side effects that usually do not require medical attention (report to your doctor or health care professional if they continue or are bothersome):  -constipation  -headache  -nausea, vomiting  -stomach bloating, gas  -urgent bowel movements  This list may not describe all possible side effects. Call your doctor for medical advice about side effects. You may report side effects to FDA at 9-247-FDA-7002.  Where should I keep my medicine?  Keep out of the reach of children.  Store at room temperature between 15 and 30 degrees C (59 and 86 degrees F). Throw away any unused medicine after the expiration date.  NOTE: This sheet is a summary. It may not cover all possible information. If you have questions about this medicine, talk to your doctor, pharmacist, or health care provider.  © 2014,  [employer]. (3/18/2014 11:08:42 AM)        Depression / Suicide Risk    As you are discharged from this [facility] facility, it is important to learn how to keep safe from harming yourself.    Recognize the warning signs:  · Abrupt changes in personality, positive or negative- including increase in energy   · Giving away possessions  · Change in eating patterns- significant weight changes-  positive or negative  · Change in sleeping patterns- unable to sleep or sleeping all the time   · Unwillingness or inability to communicate  · Depression  · Unusual sadness, discouragement and loneliness  · Talk of wanting to die  · Neglect of personal appearance   · Rebelliousness- reckless behavior  · Withdrawal from people/activities they love  · Confusion- inability to concentrate     If you or a loved one observes any of these behaviors or has concerns about self-harm, here's what you can do:  · Talk about it- your feelings and reasons for harming yourself  · Remove any means that you might use to hurt yourself (examples: pills, rope, extension cords, firearm)  · Get professional help from the community (Mental Health, Substance Abuse, psychological counseling)  · Do not be alone:Call your Safe Contact- someone whom you trust who will be there for you.  · Call your local CRISIS HOTLINE 091-5460 or 275-035-1600  · Call your local Children's Mobile Crisis Response Team [place] (105) 901-5034 or www.m0um0u  · Call the toll free National Suicide Prevention Hotlines   · National Suicide Prevention Lifeline 500-345-BVPT (3836)  · National Hope Line Network 800-SUICIDE (415-0770)

## 2024-08-12 NOTE — DISCHARGE INSTRUCTIONS
Discharge Instructions    Discharged to other by medical transportation with escort. Discharged via wheelchair, hospital escort: Yes.  Special equipment needed: Not Applicable    Be sure to schedule a follow-up appointment with your primary care doctor or any specialists as instructed.     Discharge Plan:   Diet Plan: Discussed  Activity Level: Discussed  Confirmed Follow up Appointment: Patient to Call and Schedule Appointment  Confirmed Symptoms Management: Discussed  Medication Reconciliation Updated: Yes  Influenza Vaccine Indication: Not indicated: Previously immunized this influenza season and > 8 years of age    I understand that a diet low in cholesterol, fat, and sodium is recommended for good health. Unless I have been given specific instructions below for another diet, I accept this instruction as my diet prescription.   Other diet: regular    Special Instructions: Sepsis, Adult  Sepsis is a serious infection of your blood or tissues that affects your whole body. The infection that causes sepsis may be bacterial, viral, fungal, or parasitic. Sepsis may be life threatening. Sepsis can cause your blood pressure to drop. This may result in shock. Shock causes your central nervous system and your organs to stop working correctly.   RISK FACTORS  Sepsis can happen in anyone, but it is more likely to happen in people who have weakened immune systems.  SIGNS AND SYMPTOMS   Symptoms of sepsis can include:  · Fever or low body temperature (hypothermia).  · Rapid breathing (hyperventilation).  · Chills.  · Rapid heartbeat (tachycardia).  · Confusion or light-headedness.  · Trouble breathing.  · Urinating much less than usual.  · Cool, clammy skin or red, flushed skin.  · Other problems with the heart, kidneys, or brain.  DIAGNOSIS   Your health care provider will likely do tests to look for an infection, to see if the infection has spread to your blood, and to see how serious your condition is. Tests can  include:  · Blood tests, including cultures of your blood.  · Cultures of other fluids from your body, such as:  ¨ Urine.  ¨ Pus from wounds.  ¨ Mucus coughed up from your lungs.  · Urine tests other than cultures.  · X-ray exams or other imaging tests.  TREATMENT   Treatment will begin with elimination of the source of infection. If your sepsis is likely caused by a bacterial or fungal infection, you will be given antibiotic or antifungal medicines.  You may also receive:  · Oxygen.  · Fluids through an IV tube.  · Medicines to increase your blood pressure.  · A machine to clean your blood (dialysis) if your kidneys fail.  · A machine to help you breathe if your lungs fail.  SEEK IMMEDIATE MEDICAL CARE IF:  You get an infection or develop any of the signs and symptoms of sepsis after surgery or a hospitalization.     This information is not intended to replace advice given to you by your health care provider. Make sure you discuss any questions you have with your health care provider.     Document Released: 09/15/2004 Document Revised: 05/03/2016 Document Reviewed: 08/25/2014  [employer] Interactive Patient Education ©2016 [employer]      · Is patient discharged on Warfarin / Coumadin?   No     · Is patient Post Blood Transfusion?  No    Depression / Suicide Risk    As you are discharged from this [facility], it is important to learn how to keep safe from harming yourself.    Recognize the warning signs:  · Abrupt changes in personality, positive or negative- including increase in energy   · Giving away possessions  · Change in eating patterns- significant weight changes-  positive or negative  · Change in sleeping patterns- unable to sleep or sleeping all the time   · Unwillingness or inability to communicate  · Depression  · Unusual sadness, discouragement and loneliness  · Talk of wanting to die  · Neglect of personal appearance   · Rebelliousness- reckless behavior  · Withdrawal from  people/activities they love  · Confusion- inability to concentrate     If you or a loved one observes any of these behaviors or has concerns about self-harm, here's what you can do:  · Talk about it- your feelings and reasons for harming yourself  · Remove any means that you might use to hurt yourself (examples: pills, rope, extension cords, firearm)  · Get professional help from the community (Mental Health, Substance Abuse, psychological counseling)  · Do not be alone:Call your Safe Contact- someone whom you trust who will be there for you.  · Call your local CRISIS HOTLINE 295-2412 or 822-653-3537  · Call your local Children's Mobile Crisis Response Team [place] (945) 389-3002 or www.Benesight  · Call the toll free National Suicide Prevention Hotlines   · National Suicide Prevention Lifeline 462-422-JKDW (3282)  · [employer] Line Network 800-SUICIDE (116-1287)      Urinary Tract Infection  A urinary tract infection (UTI) can occur any place along the urinary tract. The tract includes the kidneys, ureters, bladder, and urethra. A type of germ called bacteria often causes a UTI. UTIs are often helped with antibiotic medicine.   HOME CARE   · If given, take antibiotics as told by your doctor. Finish them even if you start to feel better.  · Drink enough fluids to keep your pee (urine) clear or pale yellow.  · Avoid tea, drinks with caffeine, and bubbly (carbonated) drinks.  · Pee often. Avoid holding your pee in for a long time.  · Pee before and after having sex (intercourse).  · Wipe from front to back after you poop (bowel movement) if you are a woman. Use each tissue only once.  GET HELP RIGHT AWAY IF:   · You have back pain.  · You have lower belly (abdominal) pain.  · You have chills.  · You feel sick to your stomach (nauseous).  · You throw up (vomit).  · Your burning or discomfort with peeing does not go away.  · You have a fever.  · Your symptoms are not better in 3 days.  MAKE SURE YOU:    · Understand these instructions.  · Will watch your condition.  · Will get help right away if you are not doing well or get worse.     This information is not intended to replace advice given to you by your health care provider. Make sure you discuss any questions you have with your health care provider.     Document Released: 06/05/2009 Document Revised: 01/08/2016 Document Reviewed: 07/18/2013  [employer] Interactive Patient Education ©2016 [employer] Inc.          Antibiotic Medication  Antibiotic medicine helps fight germs. Germs cause infections. This type of medicine will not work for colds, flu, or other viral infections. Tell your doctor if you:  · Are allergic to any medicines.  · Are pregnant or are trying to get pregnant.  · Are taking other medicines.  · Have other medical problems.  HOME CARE  · Take your medicine with a glass of water or food as told by your doctor.  · Take the medicine as told. Finish them even if you start to feel better.  · Do not give your medicine to other people.  · Do not use your medicine in the future for a different infection.  · Ask your doctor about which side effects to watch for.  · Try not to miss any doses. If you miss a dose, take it as soon as possible. If it is almost time for your next dose, and your dosing schedule is:  ¨ Two doses a day, take the missed dose and the next dose 5 to 6 hours later.  ¨ Three or more doses a day, take the missed dose and the next dose 2 to 4 hours later, or double your next dose.  ¨ Then go back to your normal schedule.  GET HELP RIGHT AWAY IF:   · You get worse or do not get better within a few days.  · The medicine makes you sick.  · You develop a rash or any other side effects.  · You have questions or concerns.  MAKE SURE YOU:  · Understand these instructions.  · Will watch your condition.  · Will get help right away if you are not doing well or get worse.     This information is not intended to replace advice given to you by your  health care provider. Make sure you discuss any questions you have with your health care provider.     Document Released: 09/26/2009 Document Revised: 03/11/2013 Document Reviewed: 11/23/2010  [employer] Interactive Patient Education ©2016 [employer] Inc.       Initial (On Arrival)

## 2025-04-25 NOTE — THERAPY
"Physical Therapy Treatment completed.   Bed Mobility:  Supine to Sit:  (found in chair)  Transfers: Sit to Stand: Minimal Assist (to quad cane)  Gait: Level Of Assist: Contact Guard Assist with Quad Cane     x3 ft  Plan of Care: Will benefit from Physical Therapy 3 times per week  Discharge Recommendations: Equipment: Quad Cane. Post-acute therapy Discharge to a transitional care facility for continued skilled therapy services.     See \"Rehab Therapy-Acute\" Patient Summary Report for complete documentation.     Pt wanting to go BTB afdter sitting up for breakfast. pt not interested in do any exercises or further walking, so left inbed. Discussed w/ RN yesica pt has thin water at BS and she is on NTL per SLP sign above bed so removed water from BS table. Also discussed w/. RN need to clarify pt;s Wbing and use on L UE due to recent xray showing subluxation and old fracture of humerus. pt left in bed. Rn notified of session and mobility.   " Principal Discharge DX:	Dislocation of right shoulder joint   1